# Patient Record
Sex: MALE | Race: WHITE | Employment: OTHER | URBAN - NONMETROPOLITAN AREA
[De-identification: names, ages, dates, MRNs, and addresses within clinical notes are randomized per-mention and may not be internally consistent; named-entity substitution may affect disease eponyms.]

---

## 2017-08-11 LAB
ALBUMIN SERPL-MCNC: 4.1 G/DL (ref 3.5–5.2)
ALP BLD-CCNC: 96 U/L (ref 40–130)
ALT SERPL-CCNC: 16 U/L (ref 5–41)
ANION GAP SERPL CALCULATED.3IONS-SCNC: 13 MMOL/L (ref 7–19)
AST SERPL-CCNC: 17 U/L (ref 5–40)
BASOPHILS ABSOLUTE: 0 K/UL (ref 0–0.2)
BASOPHILS RELATIVE PERCENT: 0.4 % (ref 0–1)
BILIRUB SERPL-MCNC: 0.4 MG/DL (ref 0.2–1.2)
BUN BLDV-MCNC: 21 MG/DL (ref 8–23)
CALCIUM SERPL-MCNC: 10.1 MG/DL (ref 8.8–10.2)
CHLORIDE BLD-SCNC: 104 MMOL/L (ref 98–111)
CHOLESTEROL, TOTAL: 180 MG/DL (ref 160–199)
CO2: 29 MMOL/L (ref 22–29)
CREAT SERPL-MCNC: 1.2 MG/DL (ref 0.5–1.2)
EOSINOPHILS ABSOLUTE: 0.2 K/UL (ref 0–0.6)
EOSINOPHILS RELATIVE PERCENT: 2.5 % (ref 0–5)
GFR NON-AFRICAN AMERICAN: 58
GLUCOSE BLD-MCNC: 105 MG/DL (ref 74–109)
HCT VFR BLD CALC: 44.8 % (ref 42–52)
HDLC SERPL-MCNC: 66 MG/DL (ref 55–121)
HEMOGLOBIN: 14.2 G/DL (ref 14–18)
LDL CHOLESTEROL CALCULATED: 87 MG/DL
LYMPHOCYTES ABSOLUTE: 2.3 K/UL (ref 1.1–4.5)
LYMPHOCYTES RELATIVE PERCENT: 29.9 % (ref 20–40)
MCH RBC QN AUTO: 27.5 PG (ref 27–31)
MCHC RBC AUTO-ENTMCNC: 31.7 G/DL (ref 33–37)
MCV RBC AUTO: 86.8 FL (ref 80–94)
MONOCYTES ABSOLUTE: 0.7 K/UL (ref 0–0.9)
MONOCYTES RELATIVE PERCENT: 9.1 % (ref 0–10)
NEUTROPHILS ABSOLUTE: 4.4 K/UL (ref 1.5–7.5)
NEUTROPHILS RELATIVE PERCENT: 57.7 % (ref 50–65)
PDW BLD-RTO: 13.7 % (ref 11.5–14.5)
PLATELET # BLD: 252 K/UL (ref 130–400)
PMV BLD AUTO: 10.8 FL (ref 9.4–12.4)
POTASSIUM SERPL-SCNC: 4.5 MMOL/L (ref 3.5–5)
RBC # BLD: 5.16 M/UL (ref 4.7–6.1)
SODIUM BLD-SCNC: 146 MMOL/L (ref 136–145)
TOTAL PROTEIN: 7.3 G/DL (ref 6.6–8.7)
TRIGL SERPL-MCNC: 135 MG/DL (ref 150–199)
WBC # BLD: 7.6 K/UL (ref 4.8–10.8)

## 2017-08-16 PROBLEM — M15.9 PRIMARY OSTEOARTHRITIS INVOLVING MULTIPLE JOINTS: Chronic | Status: ACTIVE | Noted: 2017-08-16

## 2017-08-16 PROBLEM — I10 ESSENTIAL HYPERTENSION: Chronic | Status: ACTIVE | Noted: 2017-08-16

## 2017-08-16 PROBLEM — G25.81 RESTLESS LEGS SYNDROME: Chronic | Status: ACTIVE | Noted: 2017-08-16

## 2017-08-16 PROBLEM — M79.2 PERIPHERAL NEUROPATHIC PAIN: Chronic | Status: ACTIVE | Noted: 2017-08-16

## 2017-08-16 PROBLEM — N40.1 BENIGN NON-NODULAR PROSTATIC HYPERPLASIA WITH LOWER URINARY TRACT SYMPTOMS: Chronic | Status: ACTIVE | Noted: 2017-08-16

## 2017-08-16 PROBLEM — E78.2 MIXED HYPERLIPIDEMIA: Chronic | Status: ACTIVE | Noted: 2017-08-16

## 2017-08-16 PROBLEM — M10.9 GOUT: Chronic | Status: ACTIVE | Noted: 2017-08-16

## 2017-08-16 PROBLEM — K21.9 GASTROESOPHAGEAL REFLUX DISEASE WITHOUT ESOPHAGITIS: Chronic | Status: ACTIVE | Noted: 2017-08-16

## 2017-08-16 PROBLEM — M15.0 PRIMARY OSTEOARTHRITIS INVOLVING MULTIPLE JOINTS: Chronic | Status: ACTIVE | Noted: 2017-08-16

## 2017-08-16 PROBLEM — R97.20 ELEVATED PSA: Status: ACTIVE | Noted: 2017-08-16

## 2017-08-17 RX ORDER — BUSPIRONE HYDROCHLORIDE 10 MG/1
10 TABLET ORAL 2 TIMES DAILY
COMMUNITY
End: 2018-08-20

## 2017-08-17 RX ORDER — ATORVASTATIN CALCIUM 20 MG/1
20 TABLET, FILM COATED ORAL DAILY
COMMUNITY
End: 2018-02-19 | Stop reason: SDUPTHER

## 2017-08-17 RX ORDER — ETODOLAC 400 MG/1
400 TABLET, FILM COATED ORAL DAILY
COMMUNITY
End: 2018-02-19 | Stop reason: SDUPTHER

## 2017-08-17 RX ORDER — GABAPENTIN 300 MG/1
600 CAPSULE ORAL NIGHTLY
COMMUNITY
End: 2017-11-16 | Stop reason: SDUPTHER

## 2017-08-17 RX ORDER — METOPROLOL SUCCINATE 50 MG/1
50 TABLET, EXTENDED RELEASE ORAL DAILY
COMMUNITY
End: 2017-11-16 | Stop reason: SDUPTHER

## 2017-08-17 RX ORDER — OMEPRAZOLE 20 MG/1
20 CAPSULE, DELAYED RELEASE ORAL DAILY
COMMUNITY
End: 2018-02-19 | Stop reason: SDUPTHER

## 2017-08-17 RX ORDER — FINASTERIDE 5 MG/1
5 TABLET, FILM COATED ORAL DAILY
COMMUNITY
End: 2017-11-16 | Stop reason: SDUPTHER

## 2017-08-17 RX ORDER — ALLOPURINOL 300 MG/1
300 TABLET ORAL DAILY
COMMUNITY
End: 2020-02-18

## 2017-08-17 RX ORDER — PRAMIPEXOLE DIHYDROCHLORIDE 0.25 MG/1
TABLET ORAL
COMMUNITY
End: 2018-08-20

## 2017-08-18 ENCOUNTER — OFFICE VISIT (OUTPATIENT)
Dept: INTERNAL MEDICINE | Age: 77
End: 2017-08-18
Payer: MEDICARE

## 2017-08-18 VITALS
DIASTOLIC BLOOD PRESSURE: 84 MMHG | SYSTOLIC BLOOD PRESSURE: 134 MMHG | HEIGHT: 72 IN | BODY MASS INDEX: 26.68 KG/M2 | OXYGEN SATURATION: 94 % | HEART RATE: 73 BPM | WEIGHT: 197 LBS

## 2017-08-18 DIAGNOSIS — I10 ESSENTIAL HYPERTENSION: Chronic | ICD-10-CM

## 2017-08-18 DIAGNOSIS — E78.2 MIXED HYPERLIPIDEMIA: Primary | Chronic | ICD-10-CM

## 2017-08-18 DIAGNOSIS — N40.1 BENIGN NON-NODULAR PROSTATIC HYPERPLASIA WITH LOWER URINARY TRACT SYMPTOMS: Chronic | ICD-10-CM

## 2017-08-18 DIAGNOSIS — G25.81 RESTLESS LEGS SYNDROME: Chronic | ICD-10-CM

## 2017-08-18 DIAGNOSIS — M15.9 PRIMARY OSTEOARTHRITIS INVOLVING MULTIPLE JOINTS: Chronic | ICD-10-CM

## 2017-08-18 DIAGNOSIS — M79.2 PERIPHERAL NEUROPATHIC PAIN: Chronic | ICD-10-CM

## 2017-08-18 PROCEDURE — 99214 OFFICE O/P EST MOD 30 MIN: CPT | Performed by: INTERNAL MEDICINE

## 2017-08-18 RX ORDER — COLCHICINE 0.6 MG/1
0.6 TABLET ORAL 3 TIMES DAILY
COMMUNITY
End: 2018-08-20 | Stop reason: ALTCHOICE

## 2017-08-18 ASSESSMENT — ENCOUNTER SYMPTOMS
SORE THROAT: 0
ABDOMINAL PAIN: 0
DIARRHEA: 0
RHINORRHEA: 0
NAUSEA: 0
CONSTIPATION: 0
TROUBLE SWALLOWING: 0
SHORTNESS OF BREATH: 0
BLOOD IN STOOL: 0
COUGH: 0
APNEA: 1

## 2017-08-18 ASSESSMENT — PATIENT HEALTH QUESTIONNAIRE - PHQ9
2. FEELING DOWN, DEPRESSED OR HOPELESS: 0
1. LITTLE INTEREST OR PLEASURE IN DOING THINGS: 0
SUM OF ALL RESPONSES TO PHQ QUESTIONS 1-9: 0
SUM OF ALL RESPONSES TO PHQ9 QUESTIONS 1 & 2: 0

## 2017-11-17 NOTE — TELEPHONE ENCOUNTER
Requested Prescriptions     Pending Prescriptions Disp Refills    finasteride (PROSCAR) 5 MG tablet [Pharmacy Med Name: FINASTERIDE 5 MG TAB 5 TAB] 90 tablet 1     Sig: TAKE 1 TABLET DAILY AS DIRECTED.  gabapentin (NEURONTIN) 300 MG capsule [Pharmacy Med Name: GABAPENTIN 300 MG  CAP] 60 capsule 5     Sig: TAKE 1 CAPSULE AT BEDTIME FOR 1 WEEK THEN INCREASE  MG (2 CAPSULES) AT BEDTIME    metoprolol succinate (TOPROL XL) 50 MG extended release tablet [Pharmacy Med Name: METOPROLOL SUCC ER 50 MG TA 50 TAB] 90 tablet 3     Sig: TAKE 1 TABLET BY MOUTH EVERY DAY       Rosie farah ran.

## 2017-11-20 RX ORDER — GABAPENTIN 300 MG/1
CAPSULE ORAL
Qty: 60 CAPSULE | Refills: 5 | Status: SHIPPED | OUTPATIENT
Start: 2017-11-20 | End: 2018-02-19 | Stop reason: SDUPTHER

## 2017-11-20 RX ORDER — METOPROLOL SUCCINATE 50 MG/1
TABLET, EXTENDED RELEASE ORAL
Qty: 90 TABLET | Refills: 3 | Status: SHIPPED | OUTPATIENT
Start: 2017-11-20 | End: 2019-01-27 | Stop reason: SDUPTHER

## 2017-11-20 RX ORDER — FINASTERIDE 5 MG/1
TABLET, FILM COATED ORAL
Qty: 90 TABLET | Refills: 3 | Status: SHIPPED | OUTPATIENT
Start: 2017-11-20 | End: 2018-02-19 | Stop reason: SDUPTHER

## 2017-12-28 ENCOUNTER — OFFICE VISIT (OUTPATIENT)
Dept: URGENT CARE | Age: 77
End: 2017-12-28
Payer: MEDICARE

## 2017-12-28 VITALS
DIASTOLIC BLOOD PRESSURE: 78 MMHG | BODY MASS INDEX: 27.16 KG/M2 | SYSTOLIC BLOOD PRESSURE: 138 MMHG | OXYGEN SATURATION: 96 % | WEIGHT: 200.25 LBS | HEART RATE: 66 BPM | RESPIRATION RATE: 18 BRPM | TEMPERATURE: 98 F

## 2017-12-28 DIAGNOSIS — H60.502 ACUTE OTITIS EXTERNA OF LEFT EAR, UNSPECIFIED TYPE: Primary | ICD-10-CM

## 2017-12-28 DIAGNOSIS — L98.9 SKIN LESION: ICD-10-CM

## 2017-12-28 DIAGNOSIS — H61.23 BILATERAL IMPACTED CERUMEN: ICD-10-CM

## 2017-12-28 PROCEDURE — 1123F ACP DISCUSS/DSCN MKR DOCD: CPT | Performed by: NURSE PRACTITIONER

## 2017-12-28 PROCEDURE — 4130F TOPICAL PREP RX AOE: CPT | Performed by: NURSE PRACTITIONER

## 2017-12-28 PROCEDURE — 4040F PNEUMOC VAC/ADMIN/RCVD: CPT | Performed by: NURSE PRACTITIONER

## 2017-12-28 PROCEDURE — 1036F TOBACCO NON-USER: CPT | Performed by: NURSE PRACTITIONER

## 2017-12-28 PROCEDURE — 69210 REMOVE IMPACTED EAR WAX UNI: CPT | Performed by: NURSE PRACTITIONER

## 2017-12-28 PROCEDURE — G8484 FLU IMMUNIZE NO ADMIN: HCPCS | Performed by: NURSE PRACTITIONER

## 2017-12-28 PROCEDURE — G8419 CALC BMI OUT NRM PARAM NOF/U: HCPCS | Performed by: NURSE PRACTITIONER

## 2017-12-28 PROCEDURE — G8427 DOCREV CUR MEDS BY ELIG CLIN: HCPCS | Performed by: NURSE PRACTITIONER

## 2017-12-28 PROCEDURE — 99213 OFFICE O/P EST LOW 20 MIN: CPT | Performed by: NURSE PRACTITIONER

## 2017-12-28 ASSESSMENT — ENCOUNTER SYMPTOMS
COLOR CHANGE: 1
SINUS PRESSURE: 0
SORE THROAT: 0
RESPIRATORY NEGATIVE: 1
RHINORRHEA: 0
EYES NEGATIVE: 1
SINUS PAIN: 0

## 2017-12-28 NOTE — PROGRESS NOTES
BEDTIME 60 capsule 5    metoprolol succinate (TOPROL XL) 50 MG extended release tablet TAKE 1 TABLET BY MOUTH EVERY DAY 90 tablet 3    colchicine (COLCRYS) 0.6 MG tablet Take 0.6 mg by mouth 3 times daily Indications: Gout Until symptoms resolve      allopurinol (ZYLOPRIM) 300 MG tablet Take 300 mg by mouth daily      atorvastatin (LIPITOR) 20 MG tablet Take 20 mg by mouth daily      busPIRone (BUSPAR) 10 MG tablet Take 10 mg by mouth 2 times daily      etodolac (LODINE) 400 MG tablet Take 400 mg by mouth daily      omeprazole (PRILOSEC) 20 MG delayed release capsule Take 20 mg by mouth daily      pramipexole (MIRAPEX) 0.25 MG tablet Take 1-2 tablets at bedtime as needed. No current facility-administered medications for this visit. Allergies   Allergen Reactions    Codeine Nausea Only       Health Maintenance   Topic Date Due    DTaP/Tdap/Td vaccine (1 - Tdap) 02/14/1959    Zostavax vaccine  02/14/1999    Flu vaccine (1) 09/01/2017    Lipid screen  08/11/2022    Pneumococcal low/med risk  Completed       Subjective:      Review of Systems   Constitutional: Negative. Negative for activity change. HENT: Positive for ear pain. Negative for ear discharge, hearing loss, postnasal drip, rhinorrhea, sinus pain, sinus pressure and sore throat. Eyes: Negative. Respiratory: Negative. Cardiovascular: Negative. Skin: Positive for color change. Objective:     Physical Exam   Constitutional: He is oriented to person, place, and time. Vital signs are normal. He appears well-developed and well-nourished. Non-toxic appearance. He does not have a sickly appearance. He does not appear ill. No distress. HENT:   Head: Normocephalic and atraumatic. Right Ear: Hearing, tympanic membrane, external ear and ear canal normal.   Left Ear: Tympanic membrane and ear canal normal. There is swelling. Tympanic membrane is not injected and not scarred. Decreased hearing is noted.    Nose: Nose normal. Right sinus exhibits no maxillary sinus tenderness and no frontal sinus tenderness. Left sinus exhibits no maxillary sinus tenderness and no frontal sinus tenderness. Mouth/Throat: Uvula is midline, oropharynx is clear and moist and mucous membranes are normal.   Eyes: Conjunctivae are normal.   Neck: Normal range of motion. Cardiovascular: Normal rate and regular rhythm. Pulmonary/Chest: Effort normal and breath sounds normal. He has no decreased breath sounds. He has no wheezes. He has no rhonchi. He has no rales. Abdominal: Soft. Lymphadenopathy:        Head (right side): No submental, no submandibular, no tonsillar, no preauricular, no posterior auricular and no occipital adenopathy present. Head (left side): No submental, no submandibular, no tonsillar, no preauricular, no posterior auricular and no occipital adenopathy present. Neurological: He is alert and oriented to person, place, and time. Skin: Skin is warm and intact. No rash noted. 1cm skin crusted lesion opening of left ear canal. Swelling of middle ear noted. No drainage, bleeding noted. Psychiatric: He has a normal mood and affect. His speech is normal and behavior is normal. Judgment and thought content normal. Cognition and memory are normal.   Vitals reviewed. /78   Pulse 66   Temp 98 °F (36.7 °C) (Oral)   Resp 18   Wt 200 lb 4 oz (90.8 kg)   SpO2 96%   BMI 27.16 kg/m²     Assessment:      1. Acute otitis externa of left ear, unspecified type  neomycin-polymyxin-hydrocortisone (CORTISPORIN) 3.5-51986-7 otic solution   2. Bilateral impacted cerumen  32023 - FL REMOVE IMPACTED EAR WAX       Plan:      Orders Placed This Encounter   Procedures    22808 - [place] REMOVE IMPACTED EAR WAX       No Follow-up on file.     Orders Placed This Encounter   Procedures    46027 - [place] REMOVE IMPACTED EAR WAX     Orders Placed This Encounter   Medications    neomycin-polymyxin-hydrocortisone (CORTISPORIN) 3.5-99066-7 otic solution     Sig: Place 3 drops into the left ear 3 times daily for 10 days     Dispense:  1 each     Refill:  0       Patient given educational materials - see patient instructions. Discussed use, benefit, and side effects of prescribed medications. All patient questions answered. Pt voiced understanding. Reviewed health maintenance. Instructed to continue current medications, diet and exercise. Patient agreed with treatment plan. Follow up as directed. Patient Instructions   1. Follow up with Dr Carter regarding ear growth  2. Use ear drops as prescribed   3.  Return to clinic if symptoms worsen or fail to improve        Electronically signed by Anita Ramos CNP on 12/28/2017 at 3:59 PM

## 2018-02-12 DIAGNOSIS — M79.2 PERIPHERAL NEUROPATHIC PAIN: Chronic | ICD-10-CM

## 2018-02-12 DIAGNOSIS — G25.81 RESTLESS LEGS SYNDROME: Chronic | ICD-10-CM

## 2018-02-12 DIAGNOSIS — E78.2 MIXED HYPERLIPIDEMIA: Chronic | ICD-10-CM

## 2018-02-12 DIAGNOSIS — M15.9 PRIMARY OSTEOARTHRITIS INVOLVING MULTIPLE JOINTS: Chronic | ICD-10-CM

## 2018-02-12 DIAGNOSIS — I10 ESSENTIAL HYPERTENSION: Chronic | ICD-10-CM

## 2018-02-12 LAB
ALBUMIN SERPL-MCNC: 4 G/DL (ref 3.5–5.2)
ALP BLD-CCNC: 96 U/L (ref 40–130)
ALT SERPL-CCNC: 15 U/L (ref 5–41)
ANION GAP SERPL CALCULATED.3IONS-SCNC: 15 MMOL/L (ref 7–19)
AST SERPL-CCNC: 17 U/L (ref 5–40)
BASOPHILS ABSOLUTE: 0 K/UL (ref 0–0.2)
BASOPHILS RELATIVE PERCENT: 0.3 % (ref 0–1)
BILIRUB SERPL-MCNC: 0.5 MG/DL (ref 0.2–1.2)
BUN BLDV-MCNC: 20 MG/DL (ref 8–23)
CALCIUM SERPL-MCNC: 9.7 MG/DL (ref 8.8–10.2)
CHLORIDE BLD-SCNC: 102 MMOL/L (ref 98–111)
CHOLESTEROL, TOTAL: 172 MG/DL (ref 160–199)
CO2: 29 MMOL/L (ref 22–29)
CREAT SERPL-MCNC: 1 MG/DL (ref 0.5–1.2)
EOSINOPHILS ABSOLUTE: 0.2 K/UL (ref 0–0.6)
EOSINOPHILS RELATIVE PERCENT: 1.8 % (ref 0–5)
GFR NON-AFRICAN AMERICAN: >60
GLUCOSE BLD-MCNC: 102 MG/DL (ref 74–109)
HCT VFR BLD CALC: 46.5 % (ref 42–52)
HDLC SERPL-MCNC: 69 MG/DL (ref 55–121)
HEMOGLOBIN: 14.2 G/DL (ref 14–18)
LDL CHOLESTEROL CALCULATED: 71 MG/DL
LYMPHOCYTES ABSOLUTE: 2.6 K/UL (ref 1.1–4.5)
LYMPHOCYTES RELATIVE PERCENT: 27.6 % (ref 20–40)
MCH RBC QN AUTO: 27 PG (ref 27–31)
MCHC RBC AUTO-ENTMCNC: 30.5 G/DL (ref 33–37)
MCV RBC AUTO: 88.4 FL (ref 80–94)
MONOCYTES ABSOLUTE: 0.7 K/UL (ref 0–0.9)
MONOCYTES RELATIVE PERCENT: 7.2 % (ref 0–10)
NEUTROPHILS ABSOLUTE: 6 K/UL (ref 1.5–7.5)
NEUTROPHILS RELATIVE PERCENT: 62.4 % (ref 50–65)
PDW BLD-RTO: 14.8 % (ref 11.5–14.5)
PLATELET # BLD: 283 K/UL (ref 130–400)
PMV BLD AUTO: 10.8 FL (ref 9.4–12.4)
POTASSIUM SERPL-SCNC: 4.5 MMOL/L (ref 3.5–5)
RBC # BLD: 5.26 M/UL (ref 4.7–6.1)
SODIUM BLD-SCNC: 146 MMOL/L (ref 136–145)
TOTAL PROTEIN: 7.2 G/DL (ref 6.6–8.7)
TRIGL SERPL-MCNC: 162 MG/DL (ref 0–149)
WBC # BLD: 9.6 K/UL (ref 4.8–10.8)

## 2018-02-19 ENCOUNTER — OFFICE VISIT (OUTPATIENT)
Dept: INTERNAL MEDICINE | Age: 78
End: 2018-02-19
Payer: MEDICARE

## 2018-02-19 VITALS
SYSTOLIC BLOOD PRESSURE: 104 MMHG | RESPIRATION RATE: 20 BRPM | OXYGEN SATURATION: 97 % | WEIGHT: 185.6 LBS | DIASTOLIC BLOOD PRESSURE: 72 MMHG | BODY MASS INDEX: 25.14 KG/M2 | HEART RATE: 78 BPM | HEIGHT: 72 IN

## 2018-02-19 DIAGNOSIS — E78.2 MIXED HYPERLIPIDEMIA: Chronic | ICD-10-CM

## 2018-02-19 DIAGNOSIS — G25.81 RESTLESS LEGS SYNDROME: Primary | Chronic | ICD-10-CM

## 2018-02-19 DIAGNOSIS — M79.2 PERIPHERAL NEUROPATHIC PAIN: Chronic | ICD-10-CM

## 2018-02-19 DIAGNOSIS — N40.1 BENIGN NON-NODULAR PROSTATIC HYPERPLASIA WITH LOWER URINARY TRACT SYMPTOMS: Chronic | ICD-10-CM

## 2018-02-19 DIAGNOSIS — G60.9 IDIOPATHIC PERIPHERAL NEUROPATHY: Chronic | ICD-10-CM

## 2018-02-19 DIAGNOSIS — K21.9 GASTROESOPHAGEAL REFLUX DISEASE WITHOUT ESOPHAGITIS: Chronic | ICD-10-CM

## 2018-02-19 DIAGNOSIS — I10 ESSENTIAL HYPERTENSION: Chronic | ICD-10-CM

## 2018-02-19 PROCEDURE — G8419 CALC BMI OUT NRM PARAM NOF/U: HCPCS | Performed by: INTERNAL MEDICINE

## 2018-02-19 PROCEDURE — 4040F PNEUMOC VAC/ADMIN/RCVD: CPT | Performed by: INTERNAL MEDICINE

## 2018-02-19 PROCEDURE — 1123F ACP DISCUSS/DSCN MKR DOCD: CPT | Performed by: INTERNAL MEDICINE

## 2018-02-19 PROCEDURE — G8484 FLU IMMUNIZE NO ADMIN: HCPCS | Performed by: INTERNAL MEDICINE

## 2018-02-19 PROCEDURE — G8427 DOCREV CUR MEDS BY ELIG CLIN: HCPCS | Performed by: INTERNAL MEDICINE

## 2018-02-19 PROCEDURE — 99213 OFFICE O/P EST LOW 20 MIN: CPT | Performed by: INTERNAL MEDICINE

## 2018-02-19 PROCEDURE — 1036F TOBACCO NON-USER: CPT | Performed by: INTERNAL MEDICINE

## 2018-02-19 RX ORDER — FINASTERIDE 5 MG/1
TABLET, FILM COATED ORAL
Qty: 90 TABLET | Refills: 3 | Status: SHIPPED | OUTPATIENT
Start: 2018-02-19 | End: 2018-06-22 | Stop reason: SDUPTHER

## 2018-02-19 RX ORDER — GABAPENTIN 300 MG/1
CAPSULE ORAL
Qty: 180 CAPSULE | Refills: 3 | Status: SHIPPED | OUTPATIENT
Start: 2018-02-19 | End: 2018-06-22 | Stop reason: SDUPTHER

## 2018-02-19 RX ORDER — ATORVASTATIN CALCIUM 20 MG/1
20 TABLET, FILM COATED ORAL DAILY
Qty: 90 TABLET | Refills: 3 | Status: SHIPPED | OUTPATIENT
Start: 2018-02-19 | End: 2019-02-19 | Stop reason: SDUPTHER

## 2018-02-19 RX ORDER — ETODOLAC 400 MG/1
400 TABLET, FILM COATED ORAL DAILY
Qty: 90 TABLET | Refills: 3 | Status: SHIPPED | OUTPATIENT
Start: 2018-02-19 | End: 2019-02-19 | Stop reason: SDUPTHER

## 2018-02-19 RX ORDER — OMEPRAZOLE 20 MG/1
20 CAPSULE, DELAYED RELEASE ORAL DAILY
Qty: 90 CAPSULE | Refills: 3 | Status: SHIPPED | OUTPATIENT
Start: 2018-02-19 | End: 2019-02-19 | Stop reason: SDUPTHER

## 2018-02-19 ASSESSMENT — ENCOUNTER SYMPTOMS
CONSTIPATION: 0
ABDOMINAL PAIN: 0
SHORTNESS OF BREATH: 0
COUGH: 0
NAUSEA: 0
DIARRHEA: 0

## 2018-02-19 NOTE — PROGRESS NOTES
place, and time. He appears well-developed. No distress. HENT:   Head: Normocephalic. Neck: Neck supple. No thyromegaly present. Cardiovascular: Normal rate, regular rhythm and normal heart sounds. Exam reveals no gallop. No murmur heard. No carotid bruits heard   Pulmonary/Chest: Effort normal and breath sounds normal. No respiratory distress. Musculoskeletal: He exhibits no edema. Lymphadenopathy:     He has no cervical adenopathy. Neurological: He is alert and oriented to person, place, and time. Psychiatric: He has a normal mood and affect.  Thought content normal.        Results for orders placed or performed in visit on 02/12/18   CBC Auto Differential   Result Value Ref Range    WBC 9.6 4.8 - 10.8 K/uL    RBC 5.26 4.70 - 6.10 M/uL    Hemoglobin 14.2 14.0 - 18.0 g/dL    Hematocrit 46.5 42.0 - 52.0 %    MCV 88.4 80.0 - 94.0 fL    MCH 27.0 27.0 - 31.0 pg    MCHC 30.5 (L) 33.0 - 37.0 g/dL    RDW 14.8 (H) 11.5 - 14.5 %    Platelets 394 873 - 034 K/uL    MPV 10.8 9.4 - 12.4 fL    Neutrophils % 62.4 50.0 - 65.0 %    Lymphocytes % 27.6 20.0 - 40.0 %    Monocytes % 7.2 0.0 - 10.0 %    Eosinophils % 1.8 0.0 - 5.0 %    Basophils % 0.3 0.0 - 1.0 %    Neutrophils # 6.0 1.5 - 7.5 K/uL    Lymphocytes # 2.6 1.1 - 4.5 K/uL    Monocytes # 0.70 0.00 - 0.90 K/uL    Eosinophils # 0.20 0.00 - 0.60 K/uL    Basophils # 0.00 0.00 - 0.20 K/uL   Comprehensive Metabolic Panel   Result Value Ref Range    Sodium 146 (H) 136 - 145 mmol/L    Potassium 4.5 3.5 - 5.0 mmol/L    Chloride 102 98 - 111 mmol/L    CO2 29 22 - 29 mmol/L    Anion Gap 15 7 - 19 mmol/L    Glucose 102 74 - 109 mg/dL    BUN 20 8 - 23 mg/dL    CREATININE 1.0 0.5 - 1.2 mg/dL    GFR Non-African American >60 >60    Calcium 9.7 8.8 - 10.2 mg/dL    Total Protein 7.2 6.6 - 8.7 g/dL    Alb 4.0 3.5 - 5.2 g/dL    Total Bilirubin 0.5 0.2 - 1.2 mg/dL    Alkaline Phosphatase 96 40 - 130 U/L    ALT 15 5 - 41 U/L    AST 17 5 - 40 U/L   Lipid Panel   Result Value Ref

## 2018-04-10 ENCOUNTER — OFFICE VISIT (OUTPATIENT)
Dept: PRIMARY CARE CLINIC | Age: 78
End: 2018-04-10
Payer: MEDICARE

## 2018-04-10 VITALS
WEIGHT: 186 LBS | BODY MASS INDEX: 25.19 KG/M2 | OXYGEN SATURATION: 94 % | HEIGHT: 72 IN | HEART RATE: 64 BPM | DIASTOLIC BLOOD PRESSURE: 88 MMHG | SYSTOLIC BLOOD PRESSURE: 134 MMHG | TEMPERATURE: 97.8 F

## 2018-04-10 DIAGNOSIS — H61.032 CHONDRITIS OF LEFT EXTERNAL EAR: Primary | ICD-10-CM

## 2018-04-10 PROCEDURE — G8419 CALC BMI OUT NRM PARAM NOF/U: HCPCS | Performed by: NURSE PRACTITIONER

## 2018-04-10 PROCEDURE — 99203 OFFICE O/P NEW LOW 30 MIN: CPT | Performed by: NURSE PRACTITIONER

## 2018-04-10 PROCEDURE — 4040F PNEUMOC VAC/ADMIN/RCVD: CPT | Performed by: NURSE PRACTITIONER

## 2018-04-10 PROCEDURE — 1036F TOBACCO NON-USER: CPT | Performed by: NURSE PRACTITIONER

## 2018-04-10 PROCEDURE — 1123F ACP DISCUSS/DSCN MKR DOCD: CPT | Performed by: NURSE PRACTITIONER

## 2018-04-10 PROCEDURE — G8427 DOCREV CUR MEDS BY ELIG CLIN: HCPCS | Performed by: NURSE PRACTITIONER

## 2018-04-10 RX ORDER — CIPROFLOXACIN 500 MG/1
500 TABLET, FILM COATED ORAL 2 TIMES DAILY
Qty: 20 TABLET | Refills: 0 | Status: SHIPPED | OUTPATIENT
Start: 2018-04-10 | End: 2018-04-20

## 2018-04-10 ASSESSMENT — ENCOUNTER SYMPTOMS
SHORTNESS OF BREATH: 0
COUGH: 0
ABDOMINAL PAIN: 0
CONSTIPATION: 0
RHINORRHEA: 0
NAUSEA: 0
VOMITING: 0
DIARRHEA: 0
SORE THROAT: 0
TROUBLE SWALLOWING: 0

## 2018-06-18 ENCOUNTER — APPOINTMENT (OUTPATIENT)
Dept: GENERAL RADIOLOGY | Facility: HOSPITAL | Age: 78
End: 2018-06-18

## 2018-06-18 ENCOUNTER — HOSPITAL ENCOUNTER (EMERGENCY)
Facility: HOSPITAL | Age: 78
Discharge: HOME | End: 2018-06-19
Admitting: EMERGENCY MEDICINE

## 2018-06-18 DIAGNOSIS — R33.8 ACUTE URINARY RETENTION: ICD-10-CM

## 2018-06-18 DIAGNOSIS — K59.01 SLOW TRANSIT CONSTIPATION: Primary | ICD-10-CM

## 2018-06-18 DIAGNOSIS — Z87.438 HISTORY OF BPH: ICD-10-CM

## 2018-06-18 PROCEDURE — 74018 RADEX ABDOMEN 1 VIEW: CPT

## 2018-06-18 PROCEDURE — 99285 EMERGENCY DEPT VISIT HI MDM: CPT

## 2018-06-18 PROCEDURE — 81003 URINALYSIS AUTO W/O SCOPE: CPT | Performed by: NURSE PRACTITIONER

## 2018-06-18 PROCEDURE — 51702 INSERT TEMP BLADDER CATH: CPT

## 2018-06-18 PROCEDURE — 51798 US URINE CAPACITY MEASURE: CPT

## 2018-06-19 ENCOUNTER — TELEPHONE (OUTPATIENT)
Dept: UROLOGY | Facility: CLINIC | Age: 78
End: 2018-06-19

## 2018-06-19 VITALS
SYSTOLIC BLOOD PRESSURE: 129 MMHG | TEMPERATURE: 97.9 F | HEIGHT: 72 IN | WEIGHT: 193 LBS | HEART RATE: 76 BPM | DIASTOLIC BLOOD PRESSURE: 82 MMHG | RESPIRATION RATE: 16 BRPM | BODY MASS INDEX: 26.14 KG/M2 | OXYGEN SATURATION: 96 %

## 2018-06-19 LAB
ANION GAP SERPL CALCULATED.3IONS-SCNC: 12 MMOL/L (ref 4–13)
BILIRUB UR QL STRIP: NEGATIVE
BUN BLD-MCNC: 24 MG/DL (ref 5–21)
BUN/CREAT SERPL: 23.3 (ref 7–25)
CALCIUM SPEC-SCNC: 9.6 MG/DL (ref 8.4–10.4)
CHLORIDE SERPL-SCNC: 106 MMOL/L (ref 98–110)
CLARITY UR: CLEAR
CO2 SERPL-SCNC: 25 MMOL/L (ref 24–31)
COLOR UR: YELLOW
CREAT BLD-MCNC: 1.03 MG/DL (ref 0.5–1.4)
GFR SERPL CREATININE-BSD FRML MDRD: 70 ML/MIN/1.73
GLUCOSE BLD-MCNC: 122 MG/DL (ref 70–100)
GLUCOSE UR STRIP-MCNC: NEGATIVE MG/DL
HGB UR QL STRIP.AUTO: NEGATIVE
KETONES UR QL STRIP: ABNORMAL
LEUKOCYTE ESTERASE UR QL STRIP.AUTO: NEGATIVE
NITRITE UR QL STRIP: NEGATIVE
PH UR STRIP.AUTO: 7 [PH] (ref 5–8)
POTASSIUM BLD-SCNC: 4.5 MMOL/L (ref 3.5–5.3)
PROT UR QL STRIP: NEGATIVE
SODIUM BLD-SCNC: 143 MMOL/L (ref 135–145)
SP GR UR STRIP: 1.02 (ref 1–1.03)
UROBILINOGEN UR QL STRIP: ABNORMAL

## 2018-06-19 PROCEDURE — 80048 BASIC METABOLIC PNL TOTAL CA: CPT | Performed by: NURSE PRACTITIONER

## 2018-06-21 ENCOUNTER — PROCEDURE VISIT (OUTPATIENT)
Dept: UROLOGY | Facility: CLINIC | Age: 78
End: 2018-06-21

## 2018-06-21 VITALS — HEIGHT: 72 IN | WEIGHT: 195 LBS | BODY MASS INDEX: 26.41 KG/M2

## 2018-06-21 DIAGNOSIS — R33.9 RETENTION OF URINE: ICD-10-CM

## 2018-06-21 DIAGNOSIS — N13.8 BPH WITH URINARY OBSTRUCTION: Primary | ICD-10-CM

## 2018-06-21 DIAGNOSIS — N40.1 BPH WITH URINARY OBSTRUCTION: Primary | ICD-10-CM

## 2018-06-21 PROCEDURE — 99214 OFFICE O/P EST MOD 30 MIN: CPT | Performed by: UROLOGY

## 2018-06-21 RX ORDER — METOPROLOL SUCCINATE 50 MG/1
TABLET, EXTENDED RELEASE ORAL
COMMUNITY
Start: 2018-03-23

## 2018-06-21 RX ORDER — GABAPENTIN 300 MG/1
CAPSULE ORAL
COMMUNITY
Start: 2018-04-28

## 2018-06-21 RX ORDER — ETODOLAC 400 MG/1
TABLET, FILM COATED ORAL
COMMUNITY
Start: 2018-04-28

## 2018-06-21 RX ORDER — TAMSULOSIN HYDROCHLORIDE 0.4 MG/1
1 CAPSULE ORAL NIGHTLY
Qty: 90 CAPSULE | Refills: 3 | Status: SHIPPED | OUTPATIENT
Start: 2018-06-21 | End: 2019-06-17 | Stop reason: SDUPTHER

## 2018-06-21 RX ORDER — FINASTERIDE 5 MG/1
TABLET, FILM COATED ORAL
COMMUNITY
Start: 2018-02-19

## 2018-06-21 RX ORDER — FINASTERIDE 5 MG/1
TABLET, FILM COATED ORAL
COMMUNITY
Start: 2018-06-13 | End: 2018-09-18 | Stop reason: SDUPTHER

## 2018-06-21 RX ORDER — ATORVASTATIN CALCIUM 20 MG/1
20 TABLET, FILM COATED ORAL
COMMUNITY
Start: 2018-02-19

## 2018-06-22 DIAGNOSIS — M79.2 PERIPHERAL NEUROPATHIC PAIN: Primary | Chronic | ICD-10-CM

## 2018-06-22 RX ORDER — FINASTERIDE 5 MG/1
TABLET, FILM COATED ORAL
Qty: 90 TABLET | Refills: 3 | Status: SHIPPED | OUTPATIENT
Start: 2018-06-22 | End: 2019-02-19 | Stop reason: SDUPTHER

## 2018-06-22 RX ORDER — GABAPENTIN 300 MG/1
CAPSULE ORAL
Qty: 180 CAPSULE | Refills: 3 | Status: SHIPPED | OUTPATIENT
Start: 2018-06-22 | End: 2019-02-19 | Stop reason: SDUPTHER

## 2018-07-09 ENCOUNTER — PROCEDURE VISIT (OUTPATIENT)
Dept: UROLOGY | Facility: CLINIC | Age: 78
End: 2018-07-09

## 2018-07-09 DIAGNOSIS — R33.9 RETENTION OF URINE: ICD-10-CM

## 2018-07-09 DIAGNOSIS — N13.8 BPH WITH URINARY OBSTRUCTION: Primary | ICD-10-CM

## 2018-07-09 DIAGNOSIS — N40.1 BPH WITH URINARY OBSTRUCTION: Primary | ICD-10-CM

## 2018-07-09 PROCEDURE — 52000 CYSTOURETHROSCOPY: CPT | Performed by: UROLOGY

## 2018-08-14 DIAGNOSIS — E78.2 MIXED HYPERLIPIDEMIA: Chronic | ICD-10-CM

## 2018-08-14 DIAGNOSIS — I10 ESSENTIAL HYPERTENSION: Chronic | ICD-10-CM

## 2018-08-14 DIAGNOSIS — G60.9 IDIOPATHIC PERIPHERAL NEUROPATHY: Chronic | ICD-10-CM

## 2018-08-14 LAB
ALBUMIN SERPL-MCNC: 4.3 G/DL (ref 3.5–5.2)
ALP BLD-CCNC: 94 U/L (ref 40–130)
ALT SERPL-CCNC: 27 U/L (ref 5–41)
ANION GAP SERPL CALCULATED.3IONS-SCNC: 16 MMOL/L (ref 7–19)
AST SERPL-CCNC: 25 U/L (ref 5–40)
BILIRUB SERPL-MCNC: 0.6 MG/DL (ref 0.2–1.2)
BUN BLDV-MCNC: 24 MG/DL (ref 8–23)
CALCIUM SERPL-MCNC: 10.1 MG/DL (ref 8.8–10.2)
CHLORIDE BLD-SCNC: 103 MMOL/L (ref 98–111)
CHOLESTEROL, TOTAL: 178 MG/DL (ref 160–199)
CO2: 26 MMOL/L (ref 22–29)
CREAT SERPL-MCNC: 1.1 MG/DL (ref 0.5–1.2)
GFR NON-AFRICAN AMERICAN: >60
GLUCOSE BLD-MCNC: 98 MG/DL (ref 74–109)
HDLC SERPL-MCNC: 70 MG/DL (ref 55–121)
LDL CHOLESTEROL CALCULATED: 87 MG/DL
POTASSIUM SERPL-SCNC: 5.1 MMOL/L (ref 3.5–5)
SODIUM BLD-SCNC: 145 MMOL/L (ref 136–145)
TOTAL PROTEIN: 7.3 G/DL (ref 6.6–8.7)
TRIGL SERPL-MCNC: 104 MG/DL (ref 0–149)

## 2018-08-20 ENCOUNTER — OFFICE VISIT (OUTPATIENT)
Dept: INTERNAL MEDICINE | Age: 78
End: 2018-08-20
Payer: MEDICARE

## 2018-08-20 VITALS
SYSTOLIC BLOOD PRESSURE: 102 MMHG | BODY MASS INDEX: 25.25 KG/M2 | HEIGHT: 72 IN | HEART RATE: 73 BPM | RESPIRATION RATE: 20 BRPM | OXYGEN SATURATION: 94 % | DIASTOLIC BLOOD PRESSURE: 60 MMHG | WEIGHT: 186.4 LBS

## 2018-08-20 DIAGNOSIS — K21.9 GASTROESOPHAGEAL REFLUX DISEASE WITHOUT ESOPHAGITIS: Chronic | ICD-10-CM

## 2018-08-20 DIAGNOSIS — G60.9 IDIOPATHIC PERIPHERAL NEUROPATHY: Chronic | ICD-10-CM

## 2018-08-20 DIAGNOSIS — I10 ESSENTIAL HYPERTENSION: Primary | Chronic | ICD-10-CM

## 2018-08-20 DIAGNOSIS — Z23 NEED FOR PROPHYLACTIC VACCINATION AND INOCULATION AGAINST VARICELLA: ICD-10-CM

## 2018-08-20 DIAGNOSIS — Z23 NEED FOR PROPHYLACTIC VACCINATION AGAINST DIPHTHERIA-TETANUS-PERTUSSIS (DTP): ICD-10-CM

## 2018-08-20 DIAGNOSIS — K59.04 CHRONIC IDIOPATHIC CONSTIPATION: Chronic | ICD-10-CM

## 2018-08-20 DIAGNOSIS — E78.2 MIXED HYPERLIPIDEMIA: Chronic | ICD-10-CM

## 2018-08-20 DIAGNOSIS — N40.1 BENIGN NON-NODULAR PROSTATIC HYPERPLASIA WITH LOWER URINARY TRACT SYMPTOMS: Chronic | ICD-10-CM

## 2018-08-20 DIAGNOSIS — Z12.11 COLON CANCER SCREENING: ICD-10-CM

## 2018-08-20 DIAGNOSIS — M15.9 PRIMARY OSTEOARTHRITIS INVOLVING MULTIPLE JOINTS: Chronic | ICD-10-CM

## 2018-08-20 PROCEDURE — 99214 OFFICE O/P EST MOD 30 MIN: CPT | Performed by: INTERNAL MEDICINE

## 2018-08-20 PROCEDURE — 1101F PT FALLS ASSESS-DOCD LE1/YR: CPT | Performed by: INTERNAL MEDICINE

## 2018-08-20 PROCEDURE — 1123F ACP DISCUSS/DSCN MKR DOCD: CPT | Performed by: INTERNAL MEDICINE

## 2018-08-20 PROCEDURE — 1036F TOBACCO NON-USER: CPT | Performed by: INTERNAL MEDICINE

## 2018-08-20 PROCEDURE — G8419 CALC BMI OUT NRM PARAM NOF/U: HCPCS | Performed by: INTERNAL MEDICINE

## 2018-08-20 PROCEDURE — 4040F PNEUMOC VAC/ADMIN/RCVD: CPT | Performed by: INTERNAL MEDICINE

## 2018-08-20 PROCEDURE — G8427 DOCREV CUR MEDS BY ELIG CLIN: HCPCS | Performed by: INTERNAL MEDICINE

## 2018-08-20 RX ORDER — TAMSULOSIN HYDROCHLORIDE 0.4 MG/1
0.4 CAPSULE ORAL
COMMUNITY
Start: 2018-06-21 | End: 2020-02-18 | Stop reason: SDUPTHER

## 2018-08-20 ASSESSMENT — ENCOUNTER SYMPTOMS
COUGH: 0
CONSTIPATION: 1
ABDOMINAL PAIN: 0
DIARRHEA: 0
SHORTNESS OF BREATH: 0
NAUSEA: 0

## 2018-08-20 ASSESSMENT — PATIENT HEALTH QUESTIONNAIRE - PHQ9
SUM OF ALL RESPONSES TO PHQ QUESTIONS 1-9: 0
SUM OF ALL RESPONSES TO PHQ9 QUESTIONS 1 & 2: 0
SUM OF ALL RESPONSES TO PHQ QUESTIONS 1-9: 0
2. FEELING DOWN, DEPRESSED OR HOPELESS: 0
1. LITTLE INTEREST OR PLEASURE IN DOING THINGS: 0
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
## 2018-09-18 ENCOUNTER — OFFICE VISIT (OUTPATIENT)
Dept: GASTROENTEROLOGY | Facility: CLINIC | Age: 78
End: 2018-09-18

## 2018-09-18 VITALS
HEIGHT: 72 IN | WEIGHT: 187 LBS | SYSTOLIC BLOOD PRESSURE: 122 MMHG | OXYGEN SATURATION: 98 % | DIASTOLIC BLOOD PRESSURE: 72 MMHG | BODY MASS INDEX: 25.33 KG/M2 | HEART RATE: 90 BPM

## 2018-09-18 DIAGNOSIS — R19.4 CHANGE IN BOWEL HABITS: ICD-10-CM

## 2018-09-18 DIAGNOSIS — I10 HTN (HYPERTENSION), BENIGN: ICD-10-CM

## 2018-09-18 DIAGNOSIS — Z78.9 NONSMOKER: ICD-10-CM

## 2018-09-18 DIAGNOSIS — K59.01 SLOW TRANSIT CONSTIPATION: Primary | ICD-10-CM

## 2018-09-18 PROCEDURE — 99213 OFFICE O/P EST LOW 20 MIN: CPT | Performed by: CLINICAL NURSE SPECIALIST

## 2018-09-18 RX ORDER — PRAMIPEXOLE DIHYDROCHLORIDE 0.25 MG/1
0.25 TABLET ORAL 3 TIMES DAILY
COMMUNITY

## 2018-09-18 RX ORDER — BUSPIRONE HYDROCHLORIDE 10 MG/1
10 TABLET ORAL 2 TIMES DAILY
Status: ON HOLD | COMMUNITY
End: 2018-11-06

## 2018-09-18 RX ORDER — OMEPRAZOLE 20 MG/1
20 CAPSULE, DELAYED RELEASE ORAL DAILY
COMMUNITY

## 2018-09-18 RX ORDER — COLCHICINE 0.6 MG/1
0.6 TABLET ORAL DAILY
COMMUNITY

## 2018-09-18 RX ORDER — ALLOPURINOL 300 MG/1
300 TABLET ORAL DAILY
COMMUNITY

## 2018-09-18 RX ORDER — POLYETHYLENE GLYCOL 3350 17 G/17G
17 POWDER, FOR SOLUTION ORAL DAILY
COMMUNITY

## 2018-11-06 ENCOUNTER — ANESTHESIA EVENT (OUTPATIENT)
Dept: GASTROENTEROLOGY | Facility: HOSPITAL | Age: 78
End: 2018-11-06

## 2018-11-06 ENCOUNTER — HOSPITAL ENCOUNTER (OUTPATIENT)
Facility: HOSPITAL | Age: 78
Setting detail: HOSPITAL OUTPATIENT SURGERY
Discharge: HOME | End: 2018-11-06
Attending: INTERNAL MEDICINE | Admitting: INTERNAL MEDICINE

## 2018-11-06 ENCOUNTER — ANESTHESIA (OUTPATIENT)
Dept: GASTROENTEROLOGY | Facility: HOSPITAL | Age: 78
End: 2018-11-06

## 2018-11-06 VITALS
SYSTOLIC BLOOD PRESSURE: 104 MMHG | OXYGEN SATURATION: 95 % | WEIGHT: 188 LBS | HEART RATE: 72 BPM | TEMPERATURE: 97 F | BODY MASS INDEX: 25.47 KG/M2 | HEIGHT: 72 IN | RESPIRATION RATE: 25 BRPM | DIASTOLIC BLOOD PRESSURE: 66 MMHG

## 2018-11-06 DIAGNOSIS — K59.01 SLOW TRANSIT CONSTIPATION: ICD-10-CM

## 2018-11-06 PROCEDURE — 45385 COLONOSCOPY W/LESION REMOVAL: CPT | Performed by: INTERNAL MEDICINE

## 2018-11-06 PROCEDURE — 25010000002 PROPOFOL 10 MG/ML EMULSION: Performed by: NURSE ANESTHETIST, CERTIFIED REGISTERED

## 2018-11-06 PROCEDURE — 88305 TISSUE EXAM BY PATHOLOGIST: CPT | Performed by: INTERNAL MEDICINE

## 2018-11-06 RX ORDER — SODIUM CHLORIDE 9 MG/ML
500 INJECTION, SOLUTION INTRAVENOUS CONTINUOUS PRN
Status: DISCONTINUED | OUTPATIENT
Start: 2018-11-06 | End: 2018-11-06 | Stop reason: HOSPADM

## 2018-11-06 RX ORDER — LIDOCAINE HYDROCHLORIDE 20 MG/ML
INJECTION, SOLUTION INFILTRATION; PERINEURAL AS NEEDED
Status: DISCONTINUED | OUTPATIENT
Start: 2018-11-06 | End: 2018-11-06 | Stop reason: SURG

## 2018-11-06 RX ORDER — SODIUM CHLORIDE 0.9 % (FLUSH) 0.9 %
3 SYRINGE (ML) INJECTION AS NEEDED
Status: DISCONTINUED | OUTPATIENT
Start: 2018-11-06 | End: 2018-11-06 | Stop reason: HOSPADM

## 2018-11-06 RX ORDER — METOPROLOL TARTRATE 5 MG/5ML
INJECTION INTRAVENOUS AS NEEDED
Status: DISCONTINUED | OUTPATIENT
Start: 2018-11-06 | End: 2018-11-06 | Stop reason: SURG

## 2018-11-06 RX ORDER — PROPOFOL 10 MG/ML
VIAL (ML) INTRAVENOUS AS NEEDED
Status: DISCONTINUED | OUTPATIENT
Start: 2018-11-06 | End: 2018-11-06 | Stop reason: SURG

## 2018-11-06 RX ADMIN — LIDOCAINE HYDROCHLORIDE 100 MG: 20 INJECTION, SOLUTION INFILTRATION; PERINEURAL at 07:40

## 2018-11-06 RX ADMIN — METOPROLOL TARTRATE 2.5 MG: 5 INJECTION, SOLUTION INTRAVENOUS at 07:38

## 2018-11-06 RX ADMIN — LIDOCAINE HYDROCHLORIDE 0.5 ML: 10 INJECTION, SOLUTION EPIDURAL; INFILTRATION; INTRACAUDAL; PERINEURAL at 07:20

## 2018-11-06 RX ADMIN — PROPOFOL 300 MG: 10 INJECTION, EMULSION INTRAVENOUS at 07:40

## 2018-11-06 RX ADMIN — SODIUM CHLORIDE 500 ML: 9 INJECTION, SOLUTION INTRAVENOUS at 07:21

## 2018-11-07 LAB
CYTO UR: NORMAL
LAB AP CASE REPORT: NORMAL
LAB AP CLINICAL INFORMATION: NORMAL
PATH REPORT.FINAL DX SPEC: NORMAL
PATH REPORT.GROSS SPEC: NORMAL

## 2019-01-28 RX ORDER — METOPROLOL SUCCINATE 50 MG/1
TABLET, EXTENDED RELEASE ORAL
Qty: 90 TABLET | Refills: 2 | Status: SHIPPED | OUTPATIENT
Start: 2019-01-28 | End: 2020-01-03 | Stop reason: SDUPTHER

## 2019-02-12 DIAGNOSIS — M15.9 PRIMARY OSTEOARTHRITIS INVOLVING MULTIPLE JOINTS: Chronic | ICD-10-CM

## 2019-02-12 DIAGNOSIS — I10 ESSENTIAL HYPERTENSION: Chronic | ICD-10-CM

## 2019-02-12 DIAGNOSIS — K59.04 CHRONIC IDIOPATHIC CONSTIPATION: Chronic | ICD-10-CM

## 2019-02-12 DIAGNOSIS — K21.9 GASTROESOPHAGEAL REFLUX DISEASE WITHOUT ESOPHAGITIS: Chronic | ICD-10-CM

## 2019-02-12 DIAGNOSIS — E78.2 MIXED HYPERLIPIDEMIA: Chronic | ICD-10-CM

## 2019-02-12 DIAGNOSIS — G60.9 IDIOPATHIC PERIPHERAL NEUROPATHY: Chronic | ICD-10-CM

## 2019-02-12 LAB
ALBUMIN SERPL-MCNC: 4.3 G/DL (ref 3.5–5.2)
ALP BLD-CCNC: 100 U/L (ref 40–130)
ALT SERPL-CCNC: 28 U/L (ref 5–41)
ANION GAP SERPL CALCULATED.3IONS-SCNC: 19 MMOL/L (ref 7–19)
AST SERPL-CCNC: 25 U/L (ref 5–40)
BILIRUB SERPL-MCNC: 0.4 MG/DL (ref 0.2–1.2)
BUN BLDV-MCNC: 27 MG/DL (ref 8–23)
CALCIUM SERPL-MCNC: 9.8 MG/DL (ref 8.8–10.2)
CHLORIDE BLD-SCNC: 105 MMOL/L (ref 98–111)
CHOLESTEROL, TOTAL: 170 MG/DL (ref 160–199)
CO2: 25 MMOL/L (ref 22–29)
CREAT SERPL-MCNC: 1.1 MG/DL (ref 0.5–1.2)
GFR NON-AFRICAN AMERICAN: >60
GLUCOSE BLD-MCNC: 106 MG/DL (ref 74–109)
HDLC SERPL-MCNC: 68 MG/DL (ref 55–121)
LDL CHOLESTEROL CALCULATED: 85 MG/DL
POTASSIUM SERPL-SCNC: 4.7 MMOL/L (ref 3.5–5)
SODIUM BLD-SCNC: 149 MMOL/L (ref 136–145)
TOTAL PROTEIN: 7.2 G/DL (ref 6.6–8.7)
TRIGL SERPL-MCNC: 86 MG/DL (ref 0–149)

## 2019-02-19 ENCOUNTER — OFFICE VISIT (OUTPATIENT)
Dept: INTERNAL MEDICINE | Age: 79
End: 2019-02-19
Payer: MEDICARE

## 2019-02-19 VITALS
OXYGEN SATURATION: 95 % | BODY MASS INDEX: 27.04 KG/M2 | WEIGHT: 199.6 LBS | DIASTOLIC BLOOD PRESSURE: 92 MMHG | HEART RATE: 71 BPM | SYSTOLIC BLOOD PRESSURE: 150 MMHG | HEIGHT: 72 IN | RESPIRATION RATE: 22 BRPM

## 2019-02-19 DIAGNOSIS — Z79.899 HIGH RISK MEDICATION USE: ICD-10-CM

## 2019-02-19 DIAGNOSIS — K59.04 CHRONIC IDIOPATHIC CONSTIPATION: Chronic | ICD-10-CM

## 2019-02-19 DIAGNOSIS — M79.2 PERIPHERAL NEUROPATHIC PAIN: Chronic | ICD-10-CM

## 2019-02-19 DIAGNOSIS — G60.9 IDIOPATHIC PERIPHERAL NEUROPATHY: Chronic | ICD-10-CM

## 2019-02-19 DIAGNOSIS — I10 ESSENTIAL HYPERTENSION: Primary | Chronic | ICD-10-CM

## 2019-02-19 DIAGNOSIS — K21.9 GASTROESOPHAGEAL REFLUX DISEASE WITHOUT ESOPHAGITIS: Chronic | ICD-10-CM

## 2019-02-19 DIAGNOSIS — M15.9 PRIMARY OSTEOARTHRITIS INVOLVING MULTIPLE JOINTS: Chronic | ICD-10-CM

## 2019-02-19 DIAGNOSIS — E78.2 MIXED HYPERLIPIDEMIA: Chronic | ICD-10-CM

## 2019-02-19 LAB
AMPHETAMINE SCREEN, URINE: NEGATIVE
BARBITURATE SCREEN, URINE: NEGATIVE
BENZODIAZEPINE SCREEN, URINE: NEGATIVE
BUPRENORPHINE URINE: NEGATIVE
COCAINE METABOLITE SCREEN URINE: NEGATIVE
GABAPENTIN SCREEN, URINE: NEGATIVE
MDMA URINE: NEGATIVE
METHADONE SCREEN, URINE: NEGATIVE
METHAMPHETAMINE, URINE: NEGATIVE
OPIATE SCREEN URINE: NEGATIVE
OXYCODONE SCREEN URINE: NEGATIVE
PHENCYCLIDINE SCREEN URINE: NEGATIVE
PROPOXYPHENE SCREEN, URINE: NEGATIVE
THC SCREEN, URINE: NEGATIVE
TRICYCLIC ANTIDEPRESSANTS, UR: NEGATIVE

## 2019-02-19 PROCEDURE — 1123F ACP DISCUSS/DSCN MKR DOCD: CPT | Performed by: INTERNAL MEDICINE

## 2019-02-19 PROCEDURE — G8482 FLU IMMUNIZE ORDER/ADMIN: HCPCS | Performed by: INTERNAL MEDICINE

## 2019-02-19 PROCEDURE — 80305 DRUG TEST PRSMV DIR OPT OBS: CPT | Performed by: INTERNAL MEDICINE

## 2019-02-19 PROCEDURE — G8419 CALC BMI OUT NRM PARAM NOF/U: HCPCS | Performed by: INTERNAL MEDICINE

## 2019-02-19 PROCEDURE — 99213 OFFICE O/P EST LOW 20 MIN: CPT | Performed by: INTERNAL MEDICINE

## 2019-02-19 PROCEDURE — 1036F TOBACCO NON-USER: CPT | Performed by: INTERNAL MEDICINE

## 2019-02-19 PROCEDURE — 4040F PNEUMOC VAC/ADMIN/RCVD: CPT | Performed by: INTERNAL MEDICINE

## 2019-02-19 PROCEDURE — 1101F PT FALLS ASSESS-DOCD LE1/YR: CPT | Performed by: INTERNAL MEDICINE

## 2019-02-19 PROCEDURE — G8427 DOCREV CUR MEDS BY ELIG CLIN: HCPCS | Performed by: INTERNAL MEDICINE

## 2019-02-19 RX ORDER — ATORVASTATIN CALCIUM 20 MG/1
20 TABLET, FILM COATED ORAL DAILY
Qty: 90 TABLET | Refills: 3 | Status: SHIPPED | OUTPATIENT
Start: 2019-02-19 | End: 2020-02-18 | Stop reason: SDUPTHER

## 2019-02-19 RX ORDER — FINASTERIDE 5 MG/1
TABLET, FILM COATED ORAL
Qty: 90 TABLET | Refills: 3 | Status: SHIPPED | OUTPATIENT
Start: 2019-02-19 | End: 2020-02-18 | Stop reason: SDUPTHER

## 2019-02-19 RX ORDER — ETODOLAC 400 MG/1
400 TABLET, FILM COATED ORAL DAILY
Qty: 90 TABLET | Refills: 3 | Status: SHIPPED | OUTPATIENT
Start: 2019-02-19 | End: 2020-02-18 | Stop reason: SDUPTHER

## 2019-02-19 RX ORDER — OMEPRAZOLE 20 MG/1
20 CAPSULE, DELAYED RELEASE ORAL DAILY
Qty: 90 CAPSULE | Refills: 3 | Status: SHIPPED | OUTPATIENT
Start: 2019-02-19 | End: 2020-02-18 | Stop reason: SDUPTHER

## 2019-02-19 RX ORDER — GABAPENTIN 300 MG/1
CAPSULE ORAL
Qty: 180 CAPSULE | Refills: 3 | Status: SHIPPED | OUTPATIENT
Start: 2019-02-19 | End: 2019-09-24 | Stop reason: SDUPTHER

## 2019-02-19 ASSESSMENT — ENCOUNTER SYMPTOMS
NAUSEA: 0
COUGH: 0
CONSTIPATION: 1
SHORTNESS OF BREATH: 0
ABDOMINAL PAIN: 0

## 2019-02-19 ASSESSMENT — PATIENT HEALTH QUESTIONNAIRE - PHQ9
1. LITTLE INTEREST OR PLEASURE IN DOING THINGS: 0
SUM OF ALL RESPONSES TO PHQ9 QUESTIONS 1 & 2: 0
SUM OF ALL RESPONSES TO PHQ QUESTIONS 1-9: 0
2. FEELING DOWN, DEPRESSED OR HOPELESS: 0
SUM OF ALL RESPONSES TO PHQ QUESTIONS 1-9: 0

## 2019-06-17 DIAGNOSIS — N13.8 BPH WITH URINARY OBSTRUCTION: ICD-10-CM

## 2019-06-17 DIAGNOSIS — R33.9 RETENTION OF URINE: ICD-10-CM

## 2019-06-17 DIAGNOSIS — N40.1 BPH WITH URINARY OBSTRUCTION: ICD-10-CM

## 2019-06-18 RX ORDER — TAMSULOSIN HYDROCHLORIDE 0.4 MG/1
CAPSULE ORAL
Qty: 90 CAPSULE | Refills: 3 | Status: SHIPPED | OUTPATIENT
Start: 2019-06-18

## 2019-08-12 DIAGNOSIS — K59.04 CHRONIC IDIOPATHIC CONSTIPATION: Chronic | ICD-10-CM

## 2019-08-12 DIAGNOSIS — G60.9 IDIOPATHIC PERIPHERAL NEUROPATHY: Chronic | ICD-10-CM

## 2019-08-12 DIAGNOSIS — E78.2 MIXED HYPERLIPIDEMIA: Chronic | ICD-10-CM

## 2019-08-12 DIAGNOSIS — I10 ESSENTIAL HYPERTENSION: Chronic | ICD-10-CM

## 2019-08-12 DIAGNOSIS — M15.9 PRIMARY OSTEOARTHRITIS INVOLVING MULTIPLE JOINTS: Chronic | ICD-10-CM

## 2019-08-12 LAB
ALBUMIN SERPL-MCNC: 4.1 G/DL (ref 3.5–5.2)
ALP BLD-CCNC: 97 U/L (ref 40–130)
ALT SERPL-CCNC: 20 U/L (ref 5–41)
ANION GAP SERPL CALCULATED.3IONS-SCNC: 16 MMOL/L (ref 7–19)
AST SERPL-CCNC: 22 U/L (ref 5–40)
BASOPHILS ABSOLUTE: 0 K/UL (ref 0–0.2)
BASOPHILS RELATIVE PERCENT: 0.4 % (ref 0–1)
BILIRUB SERPL-MCNC: 0.4 MG/DL (ref 0.2–1.2)
BUN BLDV-MCNC: 20 MG/DL (ref 8–23)
CALCIUM SERPL-MCNC: 9.6 MG/DL (ref 8.8–10.2)
CHLORIDE BLD-SCNC: 107 MMOL/L (ref 98–111)
CHOLESTEROL, TOTAL: 155 MG/DL (ref 160–199)
CO2: 22 MMOL/L (ref 22–29)
CREAT SERPL-MCNC: 1.1 MG/DL (ref 0.5–1.2)
EOSINOPHILS ABSOLUTE: 0.2 K/UL (ref 0–0.6)
EOSINOPHILS RELATIVE PERCENT: 2.4 % (ref 0–5)
GFR NON-AFRICAN AMERICAN: >60
GLUCOSE BLD-MCNC: 100 MG/DL (ref 74–109)
HCT VFR BLD CALC: 44.6 % (ref 42–52)
HDLC SERPL-MCNC: 62 MG/DL (ref 55–121)
HEMOGLOBIN: 13.5 G/DL (ref 14–18)
LDL CHOLESTEROL CALCULATED: 69 MG/DL
LYMPHOCYTES ABSOLUTE: 1.7 K/UL (ref 1.1–4.5)
LYMPHOCYTES RELATIVE PERCENT: 23.2 % (ref 20–40)
MCH RBC QN AUTO: 27.1 PG (ref 27–31)
MCHC RBC AUTO-ENTMCNC: 30.3 G/DL (ref 33–37)
MCV RBC AUTO: 89.4 FL (ref 80–94)
MONOCYTES ABSOLUTE: 0.6 K/UL (ref 0–0.9)
MONOCYTES RELATIVE PERCENT: 7.6 % (ref 0–10)
NEUTROPHILS ABSOLUTE: 5 K/UL (ref 1.5–7.5)
NEUTROPHILS RELATIVE PERCENT: 66 % (ref 50–65)
PDW BLD-RTO: 13.7 % (ref 11.5–14.5)
PLATELET # BLD: 214 K/UL (ref 130–400)
PMV BLD AUTO: 11.2 FL (ref 9.4–12.4)
POTASSIUM SERPL-SCNC: 4.5 MMOL/L (ref 3.5–5)
RBC # BLD: 4.99 M/UL (ref 4.7–6.1)
SODIUM BLD-SCNC: 145 MMOL/L (ref 136–145)
TOTAL PROTEIN: 7.1 G/DL (ref 6.6–8.7)
TRIGL SERPL-MCNC: 120 MG/DL (ref 0–149)
WBC # BLD: 7.5 K/UL (ref 4.8–10.8)

## 2019-08-20 ENCOUNTER — OFFICE VISIT (OUTPATIENT)
Dept: FAMILY MEDICINE CLINIC | Age: 79
End: 2019-08-20
Payer: MEDICARE

## 2019-08-20 VITALS
BODY MASS INDEX: 27.22 KG/M2 | RESPIRATION RATE: 16 BRPM | HEIGHT: 72 IN | DIASTOLIC BLOOD PRESSURE: 84 MMHG | HEART RATE: 94 BPM | TEMPERATURE: 98.1 F | WEIGHT: 201 LBS | SYSTOLIC BLOOD PRESSURE: 130 MMHG | OXYGEN SATURATION: 96 %

## 2019-08-20 DIAGNOSIS — M10.9 GOUT, UNSPECIFIED CAUSE, UNSPECIFIED CHRONICITY, UNSPECIFIED SITE: ICD-10-CM

## 2019-08-20 DIAGNOSIS — G62.9 NEUROPATHY: Primary | ICD-10-CM

## 2019-08-20 DIAGNOSIS — N40.1 BENIGN PROSTATIC HYPERPLASIA WITH URINARY OBSTRUCTION: ICD-10-CM

## 2019-08-20 DIAGNOSIS — E78.2 MIXED HYPERLIPIDEMIA: ICD-10-CM

## 2019-08-20 DIAGNOSIS — M19.90 ARTHRITIS: ICD-10-CM

## 2019-08-20 DIAGNOSIS — N13.8 BENIGN PROSTATIC HYPERPLASIA WITH URINARY OBSTRUCTION: ICD-10-CM

## 2019-08-20 DIAGNOSIS — I10 ESSENTIAL HYPERTENSION: Chronic | ICD-10-CM

## 2019-08-20 DIAGNOSIS — K21.9 GASTROESOPHAGEAL REFLUX DISEASE, ESOPHAGITIS PRESENCE NOT SPECIFIED: ICD-10-CM

## 2019-08-20 DIAGNOSIS — Z23 NEED FOR PROPHYLACTIC VACCINATION AND INOCULATION AGAINST VARICELLA: ICD-10-CM

## 2019-08-20 PROCEDURE — 99214 OFFICE O/P EST MOD 30 MIN: CPT | Performed by: FAMILY MEDICINE

## 2019-08-20 PROCEDURE — 4040F PNEUMOC VAC/ADMIN/RCVD: CPT | Performed by: FAMILY MEDICINE

## 2019-08-20 PROCEDURE — 1036F TOBACCO NON-USER: CPT | Performed by: FAMILY MEDICINE

## 2019-08-20 PROCEDURE — G8427 DOCREV CUR MEDS BY ELIG CLIN: HCPCS | Performed by: FAMILY MEDICINE

## 2019-08-20 PROCEDURE — G8419 CALC BMI OUT NRM PARAM NOF/U: HCPCS | Performed by: FAMILY MEDICINE

## 2019-08-20 PROCEDURE — 1123F ACP DISCUSS/DSCN MKR DOCD: CPT | Performed by: FAMILY MEDICINE

## 2019-08-20 ASSESSMENT — ENCOUNTER SYMPTOMS
CONSTIPATION: 0
SHORTNESS OF BREATH: 0
COUGH: 0
DIARRHEA: 0
EYE DISCHARGE: 0
EYE PAIN: 0
VOMITING: 0
ABDOMINAL PAIN: 0
NAUSEA: 0
RHINORRHEA: 0

## 2019-09-24 DIAGNOSIS — M79.2 PERIPHERAL NEUROPATHIC PAIN: Chronic | ICD-10-CM

## 2019-09-24 RX ORDER — GABAPENTIN 300 MG/1
CAPSULE ORAL
Qty: 180 CAPSULE | Refills: 1 | Status: SHIPPED | OUTPATIENT
Start: 2019-09-24 | End: 2020-02-18 | Stop reason: SDUPTHER

## 2020-01-02 ENCOUNTER — TELEPHONE (OUTPATIENT)
Dept: FAMILY MEDICINE CLINIC | Age: 80
End: 2020-01-02

## 2020-01-02 NOTE — TELEPHONE ENCOUNTER
Pt needs a refill on Metoprolol ER 50 MG tab act / a 3 month supply to [facility]. He brought the bottle into the office. He explained that he has only seen Dr Naveed Arcos once when he established care and now needs this medication.

## 2020-01-03 RX ORDER — METOPROLOL SUCCINATE 50 MG/1
50 TABLET, EXTENDED RELEASE ORAL DAILY
Qty: 90 TABLET | Refills: 0 | Status: SHIPPED | OUTPATIENT
Start: 2020-01-03 | End: 2020-01-06 | Stop reason: SDUPTHER

## 2020-01-06 ENCOUNTER — TELEPHONE (OUTPATIENT)
Dept: FAMILY MEDICINE CLINIC | Age: 80
End: 2020-01-06

## 2020-01-06 RX ORDER — METOPROLOL SUCCINATE 50 MG/1
50 TABLET, EXTENDED RELEASE ORAL DAILY
Qty: 90 TABLET | Refills: 0 | Status: SHIPPED | OUTPATIENT
Start: 2020-01-06 | End: 2020-02-18 | Stop reason: SDUPTHER

## 2020-02-10 DIAGNOSIS — I10 ESSENTIAL HYPERTENSION: Chronic | ICD-10-CM

## 2020-02-10 LAB
ALBUMIN SERPL-MCNC: 4.1 G/DL (ref 3.5–5.2)
ALP BLD-CCNC: 97 U/L (ref 40–130)
ALT SERPL-CCNC: 24 U/L (ref 5–41)
ANION GAP SERPL CALCULATED.3IONS-SCNC: 12 MMOL/L (ref 7–19)
AST SERPL-CCNC: 20 U/L (ref 5–40)
BASOPHILS ABSOLUTE: 0 K/UL (ref 0–0.2)
BASOPHILS RELATIVE PERCENT: 0.5 % (ref 0–1)
BILIRUB SERPL-MCNC: 0.6 MG/DL (ref 0.2–1.2)
BUN BLDV-MCNC: 25 MG/DL (ref 8–23)
CALCIUM SERPL-MCNC: 9.6 MG/DL (ref 8.8–10.2)
CHLORIDE BLD-SCNC: 103 MMOL/L (ref 98–111)
CO2: 29 MMOL/L (ref 22–29)
CREAT SERPL-MCNC: 1.1 MG/DL (ref 0.5–1.2)
EOSINOPHILS ABSOLUTE: 0.2 K/UL (ref 0–0.6)
EOSINOPHILS RELATIVE PERCENT: 3 % (ref 0–5)
GFR NON-AFRICAN AMERICAN: >60
GLUCOSE BLD-MCNC: 105 MG/DL (ref 74–109)
HCT VFR BLD CALC: 46.3 % (ref 42–52)
HEMOGLOBIN: 14.1 G/DL (ref 14–18)
IMMATURE GRANULOCYTES #: 0 K/UL
LYMPHOCYTES ABSOLUTE: 2.1 K/UL (ref 1.1–4.5)
LYMPHOCYTES RELATIVE PERCENT: 26.1 % (ref 20–40)
MCH RBC QN AUTO: 27.4 PG (ref 27–31)
MCHC RBC AUTO-ENTMCNC: 30.5 G/DL (ref 33–37)
MCV RBC AUTO: 89.9 FL (ref 80–94)
MONOCYTES ABSOLUTE: 0.7 K/UL (ref 0–0.9)
MONOCYTES RELATIVE PERCENT: 8.5 % (ref 0–10)
NEUTROPHILS ABSOLUTE: 4.9 K/UL (ref 1.5–7.5)
NEUTROPHILS RELATIVE PERCENT: 61.6 % (ref 50–65)
PDW BLD-RTO: 13.3 % (ref 11.5–14.5)
PLATELET # BLD: 237 K/UL (ref 130–400)
PMV BLD AUTO: 10.9 FL (ref 9.4–12.4)
POTASSIUM SERPL-SCNC: 4.9 MMOL/L (ref 3.5–5)
RBC # BLD: 5.15 M/UL (ref 4.7–6.1)
SODIUM BLD-SCNC: 144 MMOL/L (ref 136–145)
TOTAL PROTEIN: 7 G/DL (ref 6.6–8.7)
WBC # BLD: 7.9 K/UL (ref 4.8–10.8)

## 2020-02-18 ENCOUNTER — OFFICE VISIT (OUTPATIENT)
Dept: FAMILY MEDICINE CLINIC | Age: 80
End: 2020-02-18
Payer: MEDICARE

## 2020-02-18 VITALS
TEMPERATURE: 97.6 F | SYSTOLIC BLOOD PRESSURE: 130 MMHG | HEART RATE: 82 BPM | WEIGHT: 205 LBS | HEIGHT: 72 IN | OXYGEN SATURATION: 96 % | BODY MASS INDEX: 27.77 KG/M2 | RESPIRATION RATE: 16 BRPM | DIASTOLIC BLOOD PRESSURE: 72 MMHG

## 2020-02-18 PROCEDURE — 99214 OFFICE O/P EST MOD 30 MIN: CPT | Performed by: FAMILY MEDICINE

## 2020-02-18 PROCEDURE — 4040F PNEUMOC VAC/ADMIN/RCVD: CPT | Performed by: FAMILY MEDICINE

## 2020-02-18 PROCEDURE — 1123F ACP DISCUSS/DSCN MKR DOCD: CPT | Performed by: FAMILY MEDICINE

## 2020-02-18 PROCEDURE — G8484 FLU IMMUNIZE NO ADMIN: HCPCS | Performed by: FAMILY MEDICINE

## 2020-02-18 PROCEDURE — G8417 CALC BMI ABV UP PARAM F/U: HCPCS | Performed by: FAMILY MEDICINE

## 2020-02-18 PROCEDURE — G8427 DOCREV CUR MEDS BY ELIG CLIN: HCPCS | Performed by: FAMILY MEDICINE

## 2020-02-18 PROCEDURE — 1036F TOBACCO NON-USER: CPT | Performed by: FAMILY MEDICINE

## 2020-02-18 RX ORDER — ETODOLAC 400 MG/1
400 TABLET, FILM COATED ORAL DAILY
Qty: 90 TABLET | Refills: 3 | Status: SHIPPED | OUTPATIENT
Start: 2020-02-18 | End: 2020-09-17 | Stop reason: SDUPTHER

## 2020-02-18 RX ORDER — FINASTERIDE 5 MG/1
TABLET, FILM COATED ORAL
Qty: 90 TABLET | Refills: 3 | Status: SHIPPED | OUTPATIENT
Start: 2020-02-18 | End: 2020-09-17 | Stop reason: SDUPTHER

## 2020-02-18 RX ORDER — OMEPRAZOLE 20 MG/1
20 CAPSULE, DELAYED RELEASE ORAL DAILY
Qty: 90 CAPSULE | Refills: 3 | Status: SHIPPED | OUTPATIENT
Start: 2020-02-18 | End: 2020-09-17 | Stop reason: SDUPTHER

## 2020-02-18 RX ORDER — GABAPENTIN 300 MG/1
CAPSULE ORAL
Qty: 180 CAPSULE | Refills: 1 | Status: SHIPPED | OUTPATIENT
Start: 2020-02-18 | End: 2020-10-12 | Stop reason: SDUPTHER

## 2020-02-18 RX ORDER — METOPROLOL SUCCINATE 50 MG/1
50 TABLET, EXTENDED RELEASE ORAL DAILY
Qty: 90 TABLET | Refills: 0 | Status: SHIPPED | OUTPATIENT
Start: 2020-02-18 | End: 2020-06-24

## 2020-02-18 RX ORDER — TAMSULOSIN HYDROCHLORIDE 0.4 MG/1
0.4 CAPSULE ORAL DAILY
Qty: 90 CAPSULE | Refills: 3 | Status: SHIPPED | OUTPATIENT
Start: 2020-02-18 | End: 2020-09-17 | Stop reason: SDUPTHER

## 2020-02-18 RX ORDER — ATORVASTATIN CALCIUM 20 MG/1
20 TABLET, FILM COATED ORAL DAILY
Qty: 90 TABLET | Refills: 3 | Status: SHIPPED | OUTPATIENT
Start: 2020-02-18 | End: 2020-10-12 | Stop reason: SDUPTHER

## 2020-02-18 ASSESSMENT — PATIENT HEALTH QUESTIONNAIRE - PHQ9
2. FEELING DOWN, DEPRESSED OR HOPELESS: 0
SUM OF ALL RESPONSES TO PHQ QUESTIONS 1-9: 0
SUM OF ALL RESPONSES TO PHQ QUESTIONS 1-9: 0
SUM OF ALL RESPONSES TO PHQ9 QUESTIONS 1 & 2: 0
1. LITTLE INTEREST OR PLEASURE IN DOING THINGS: 0

## 2020-02-18 NOTE — PROGRESS NOTES
Dav Lowry is a 79 y.o. male who presents today for   Chief Complaint   Patient presents with    6 Month Follow-Up    Arthritis    Peripheral Neuropathy       Chief Complaint: Follow-up    HPI  Patient reports that he is been doing well without any new problems or issues. He does have a history of arthritis and states that he is doing well with his current use of low down. Denies any issues with use of medicine denies any recent changes to his arthritis. He also has issues with neuropathic pain and uses Neurontin which is been beneficial for symptoms. He denies any issues with use the medicine reports that has been beneficial for symptoms. Without he has significantly more issues with his peripheral neuropathic pain. He does have a history of hyperlipidemia is tolerating Lipitor without any new myalgias or GI upsets. He is attempting watch his diet and try and stay physically active. He also has BPH with some urinary symptoms which was improved with the use of Proscar and Flomax. He denies any issues with use of medicine denies any recent changes or issues with his BPH/urinary symptoms at this time. He does also have gastroesophageal reflux disease and does well with use of Prilosec. He denies any issues with use of medicine denies any recent changes to his reflux symptoms. Review of Systems   Constitutional: Negative for activity change, appetite change, fatigue and fever. HENT: Negative for congestion and rhinorrhea. Eyes: Negative for pain and discharge. Respiratory: Negative for cough and shortness of breath. Cardiovascular: Negative for chest pain and palpitations. Gastrointestinal: Negative for abdominal pain, constipation, diarrhea, nausea and vomiting. Endocrine: Negative for cold intolerance and heat intolerance. Genitourinary: Negative for dysuria and hematuria. Musculoskeletal: Positive for arthralgias. Negative for gait problem.    Skin: Negative for rash and wound. Neurological: Negative for syncope and weakness. Hematological: Negative for adenopathy. Does not bruise/bleed easily. Psychiatric/Behavioral: Negative for dysphoric mood and sleep disturbance. The patient is not nervous/anxious. Past Medical History:   Diagnosis Date    Benign non-nodular prostatic hyperplasia with lower urinary tract symptoms 2017    Essential hypertension 2017    Gastroesophageal reflux disease without esophagitis 2017    Gout 2017    Idiopathic peripheral neuropathy 2018    Mixed hyperlipidemia 2017    Peripheral neuropathic pain 2017    Primary osteoarthritis involving multiple joints 2017    Restless legs syndrome 2017       Current Outpatient Medications   Medication Sig Dispense Refill    atorvastatin (LIPITOR) 20 MG tablet Take 1 tablet by mouth daily 90 tablet 3    etodolac (LODINE) 400 MG tablet Take 1 tablet by mouth daily 90 tablet 3    finasteride (PROSCAR) 5 MG tablet TAKE 1 TABLET DAILY AS DIRECTED. 90 tablet 3    gabapentin (NEURONTIN) 300 MG capsule 2 tablets at bedtime M79.2 180 capsule 1    metoprolol succinate (TOPROL XL) 50 MG extended release tablet Take 1 tablet by mouth daily 90 tablet 0    omeprazole (PRILOSEC) 20 MG delayed release capsule Take 1 capsule by mouth daily 90 capsule 3    tamsulosin (FLOMAX) 0.4 MG capsule Take 1 capsule by mouth daily 90 capsule 3     No current facility-administered medications for this visit. Allergies   Allergen Reactions    Codeine Nausea Only       Past Surgical History:   Procedure Laterality Date    INGUINAL HERNIA REPAIR Bilateral     TURP         Social History     Tobacco Use    Smoking status: Former Smoker     Packs/day: 3.00     Years: 30.00     Pack years: 90.00     Types: Cigarettes     Last attempt to quit: 1975     Years since quittin.1    Smokeless tobacco: Never Used   Substance Use Topics    Alcohol use:  No  Drug use: No       Family History   Problem Relation Age of Onset    Uterine Cancer Mother     Coronary Art Dis Father         MI at 62    COPD Sister     Stroke Brother        /72 (Site: Right Lower Arm, Position: Sitting, Cuff Size: Medium Adult)   Pulse 82   Temp 97.6 °F (36.4 °C) (Oral)   Resp 16   Ht 6' (1.829 m)   Wt 205 lb (93 kg)   SpO2 96%   BMI 27.80 kg/m²     Physical Exam  Vitals signs and nursing note reviewed. Constitutional:       General: He is not in acute distress. Appearance: He is well-developed. He is not diaphoretic. HENT:      Head: Normocephalic and atraumatic. Right Ear: External ear normal.      Left Ear: External ear normal.      Nose: Nose normal.   Eyes:      General: No scleral icterus. Right eye: No discharge. Left eye: No discharge. Conjunctiva/sclera: Conjunctivae normal.   Neck:      Musculoskeletal: Neck supple. Thyroid: No thyromegaly. Trachea: No tracheal deviation. Cardiovascular:      Rate and Rhythm: Normal rate and regular rhythm. Heart sounds: Normal heart sounds. No murmur. No friction rub. No gallop. Pulmonary:      Effort: Pulmonary effort is normal. No respiratory distress. Breath sounds: Normal breath sounds. No wheezing or rales. Abdominal:      General: Bowel sounds are normal. There is no distension. Palpations: Abdomen is soft. Tenderness: There is no abdominal tenderness. Musculoskeletal:         General: No deformity (No gross deformities of upper or lower extremities). Lymphadenopathy:      Cervical: No cervical adenopathy. Skin:     General: Skin is warm and dry. Findings: No erythema or rash. Neurological:      Mental Status: He is alert and oriented to person, place, and time. Cranial Nerves: No cranial nerve deficit. Psychiatric:         Behavior: Behavior normal.         Thought Content:  Thought content normal.         Assessment:       ICD-10-CM 1. Peripheral neuropathic pain M79.2 gabapentin (NEURONTIN) 300 MG capsule    Symptomatic improvement with use of Neurontin. Will continue continue to monitor. 2. Arthritis M19.90 etodolac (LODINE) 400 MG tablet    Doing well with current dose of Lodine. Will continue and adjust the course dictates. 3. Essential hypertension I10 metoprolol succinate (TOPROL XL) 50 MG extended release tablet     CBC Auto Differential     Comprehensive Metabolic Panel     Microalbumin / Creatinine Urine Ratio     CBC Auto Differential     Comprehensive Metabolic Panel    Controlled current medications. We will continue to monitor and adjust as course dictates. 4. Mixed hyperlipidemia E78.2 atorvastatin (LIPITOR) 20 MG tablet     Lipid Panel    Tolerating Lipitor. Will continue and adjust course dictates. 5. Benign prostatic hyperplasia, unspecified whether lower urinary tract symptoms present N40.0 finasteride (PROSCAR) 5 MG tablet     tamsulosin (FLOMAX) 0.4 MG capsule    Symptomatic improvement with use of medications. Will continue and adjust the course dictates. 6. Gastroesophageal reflux disease, esophagitis presence not specified K21.9 omeprazole (PRILOSEC) 20 MG delayed release capsule    Symptomatic improvement with use of Prilosec. Will continue continue to monitor. Discussed side effects with use of the medicine. 7. Need for prophylactic vaccination and inoculation against varicella Z23 zoster recombinant adjuvanted vaccine [facility]) 50 MCG/0.5ML SUSR injection       Plan:  Discussed proper use of medication, including OTC medications if prescription is too expensive or insurance does not cover. Discussed signs and symptoms requiring medical attention. All questions were answered and patient voiced understanding and agreement with plan as discussed.     Orders Placed This Encounter   Procedures    CBC Auto Differential     Standing Status:   Future     Standing Expiration Date:   2/17/2021   [facility] Comprehensive Metabolic Panel     Standing Status:   Future     Standing Expiration Date:   2021    Lipid Panel     Standing Status:   Future     Standing Expiration Date:   2021     Order Specific Question:   Is Patient Fasting?/# of Hours     Answer:   fasting    Microalbumin / Creatinine Urine Ratio     Standing Status:   Future     Standing Expiration Date:   2021    CBC Auto Differential     Standing Status:   Future     Standing Expiration Date:   3/24/2021    Comprehensive Metabolic Panel     Standing Status:   Future     Standing Expiration Date:   3/24/2021     Orders Placed This Encounter   Medications    atorvastatin (LIPITOR) 20 MG tablet     Sig: Take 1 tablet by mouth daily     Dispense:  90 tablet     Refill:  3    etodolac (LODINE) 400 MG tablet     Sig: Take 1 tablet by mouth daily     Dispense:  90 tablet     Refill:  3    finasteride (PROSCAR) 5 MG tablet     Sig: TAKE 1 TABLET DAILY AS DIRECTED. Dispense:  90 tablet     Refill:  3    gabapentin (NEURONTIN) 300 MG capsule     Si tablets at bedtime M79.2     Dispense:  180 capsule     Refill:  1    metoprolol succinate (TOPROL XL) 50 MG extended release tablet     Sig: Take 1 tablet by mouth daily     Dispense:  90 tablet     Refill:  0    omeprazole (PRILOSEC) 20 MG delayed release capsule     Sig: Take 1 capsule by mouth daily     Dispense:  90 capsule     Refill:  3    tamsulosin (FLOMAX) 0.4 MG capsule     Sig: Take 1 capsule by mouth daily     Dispense:  90 capsule     Refill:  3    zoster recombinant adjuvanted vaccine (SHINGRIX) 50 MCG/0.5ML SUSR injection     Sig: Inject 0.5 mLs into the muscle once for 1 dose 50 MCG IM then repeat 2-6 months.      Dispense:  1 each     Refill:  1     Medications Discontinued During This Encounter   Medication Reason    allopurinol (ZYLOPRIM) 300 MG tablet     atorvastatin (LIPITOR) 20 MG tablet REORDER    etodolac (LODINE) 400 MG tablet REORDER    finasteride (PROSCAR)

## 2020-02-25 ASSESSMENT — ENCOUNTER SYMPTOMS
EYE DISCHARGE: 0
RHINORRHEA: 0
DIARRHEA: 0
COUGH: 0
EYE PAIN: 0
NAUSEA: 0
CONSTIPATION: 0
VOMITING: 0
SHORTNESS OF BREATH: 0
ABDOMINAL PAIN: 0

## 2020-02-25 NOTE — PATIENT INSTRUCTIONS
Patient Education        Arthritis: Care Instructions  Your Care Instructions  Arthritis, also called osteoarthritis, is a breakdown of the cartilage that cushions your joints. When the cartilage wears down, your bones rub against each other. This causes pain and stiffness. Many people have some arthritis as they age. Arthritis most often affects the joints of the spine, hands, hips, knees, or feet. You can take simple measures to protect your joints, ease your pain, and help you stay active. Follow-up care is a key part of your treatment and safety. Be sure to make and go to all appointments, and call your doctor if you are having problems. It's also a good idea to know your test results and keep a list of the medicines you take. How can you care for yourself at home? · Stay at a healthy weight. Being overweight puts extra strain on your joints. · Talk to your doctor or physical therapist about exercises that will help ease joint pain. ? Stretch. You may enjoy gentle forms of yoga to help keep your joints and muscles flexible. ? Walk instead of jog. Other types of exercise that are less stressful on the joints include riding a bicycle, swimming, edenilson chi, or water exercise. ? Lift weights. Strong muscles help reduce stress on your joints. Stronger thigh muscles, for example, take some of the stress off of the knees and hips. Learn the right way to lift weights so you do not make joint pain worse. · Take your medicines exactly as prescribed. Call your doctor if you think you are having a problem with your medicine. · Take pain medicines exactly as directed. ? If the doctor gave you a prescription medicine for pain, take it as prescribed. ? If you are not taking a prescription pain medicine, ask your doctor if you can take an over-the-counter medicine. · Use a cane, crutch, walker, or another device if you need help to get around. These can help rest your joints.  You also can use other things to make life easier, such as a higher toilet seat and padded handles on kitchen utensils. · Do not sit in low chairs, which can make it hard to get up. · Put heat or cold on your sore joints as needed. Use whichever helps you most. You also can take turns with hot and cold packs. ? Apply heat 2 or 3 times a day for 20 to 30 minutes--using a heating pad, hot shower, or hot pack--to relieve pain and stiffness. ? Put ice or a cold pack on your sore joint for 10 to 20 minutes at a time. Put a thin cloth between the ice and your skin. When should you call for help? Call your doctor now or seek immediate medical care if:    · You have sudden swelling, warmth, or pain in any joint.     · You have joint pain and a fever or rash.     · You have such bad pain that you cannot use a joint.    Watch closely for changes in your health, and be sure to contact your doctor if:    · You have mild joint symptoms that continue even with more than 6 weeks of care at home.     · You have stomach pain or other problems with your medicine. Where can you learn more? Go to https://[employer] (Offers.com). org and sign in to your [email or web address][email or web address] account. Enter U500 in the ZoomInfo box to learn more about \"Arthritis: Care Instructions. \"     If you do not have an account, please click on the \"Sign Up Now\" link. Current as of: April 1, 2019  Content Version: 12.3  © 7065-8484 [employer]wise, Incorporated. Care instructions adapted under license by [facility] ([facility]). If you have questions about a medical condition or this instruction, always ask your healthcare professional. [place] [address] any warranty or liability for your use of this information.

## 2020-06-24 RX ORDER — METOPROLOL SUCCINATE 50 MG/1
TABLET, EXTENDED RELEASE ORAL
Qty: 90 TABLET | Refills: 0 | Status: SHIPPED | OUTPATIENT
Start: 2020-06-24 | End: 2020-08-18

## 2020-08-18 RX ORDER — METOPROLOL SUCCINATE 50 MG/1
TABLET, EXTENDED RELEASE ORAL
Qty: 90 TABLET | Refills: 0 | Status: SHIPPED | OUTPATIENT
Start: 2020-08-18 | End: 2020-10-12 | Stop reason: SDUPTHER

## 2020-09-18 RX ORDER — ETODOLAC 400 MG/1
400 TABLET, FILM COATED ORAL DAILY
Qty: 90 TABLET | Refills: 1 | Status: SHIPPED | OUTPATIENT
Start: 2020-09-18 | End: 2021-03-27 | Stop reason: SDUPTHER

## 2020-09-18 RX ORDER — TAMSULOSIN HYDROCHLORIDE 0.4 MG/1
0.4 CAPSULE ORAL DAILY
Qty: 90 CAPSULE | Refills: 1 | Status: SHIPPED | OUTPATIENT
Start: 2020-09-18 | End: 2021-03-12 | Stop reason: SDUPTHER

## 2020-09-18 RX ORDER — FINASTERIDE 5 MG/1
TABLET, FILM COATED ORAL
Qty: 90 TABLET | Refills: 1 | Status: SHIPPED | OUTPATIENT
Start: 2020-09-18 | End: 2020-10-12 | Stop reason: SDUPTHER

## 2020-09-18 RX ORDER — OMEPRAZOLE 20 MG/1
20 CAPSULE, DELAYED RELEASE ORAL DAILY
Qty: 90 CAPSULE | Refills: 1 | Status: SHIPPED | OUTPATIENT
Start: 2020-09-18 | End: 2021-04-19 | Stop reason: SDUPTHER

## 2020-09-25 DIAGNOSIS — E78.2 MIXED HYPERLIPIDEMIA: ICD-10-CM

## 2020-09-25 DIAGNOSIS — I10 ESSENTIAL HYPERTENSION: ICD-10-CM

## 2020-09-25 LAB
ALBUMIN SERPL-MCNC: 4 G/DL (ref 3.5–5.2)
ALP BLD-CCNC: 86 U/L (ref 40–130)
ALT SERPL-CCNC: 14 U/L (ref 5–41)
ANION GAP SERPL CALCULATED.3IONS-SCNC: 10 MMOL/L (ref 7–19)
AST SERPL-CCNC: 18 U/L (ref 5–40)
BASOPHILS ABSOLUTE: 0 K/UL (ref 0–0.2)
BASOPHILS RELATIVE PERCENT: 0.5 % (ref 0–1)
BILIRUB SERPL-MCNC: 0.6 MG/DL (ref 0.2–1.2)
BUN BLDV-MCNC: 22 MG/DL (ref 8–23)
CALCIUM SERPL-MCNC: 9.8 MG/DL (ref 8.8–10.2)
CHLORIDE BLD-SCNC: 104 MMOL/L (ref 98–111)
CHOLESTEROL, TOTAL: 160 MG/DL (ref 160–199)
CO2: 30 MMOL/L (ref 22–29)
CREAT SERPL-MCNC: 1.2 MG/DL (ref 0.5–1.2)
EOSINOPHILS ABSOLUTE: 0.2 K/UL (ref 0–0.6)
EOSINOPHILS RELATIVE PERCENT: 2.2 % (ref 0–5)
GFR AFRICAN AMERICAN: >59
GFR NON-AFRICAN AMERICAN: 58
GLUCOSE BLD-MCNC: 102 MG/DL (ref 74–109)
HCT VFR BLD CALC: 45.4 % (ref 42–52)
HDLC SERPL-MCNC: 61 MG/DL (ref 55–121)
HEMOGLOBIN: 14 G/DL (ref 14–18)
IMMATURE GRANULOCYTES #: 0 K/UL
LDL CHOLESTEROL CALCULATED: 76 MG/DL
LYMPHOCYTES ABSOLUTE: 1.7 K/UL (ref 1.1–4.5)
LYMPHOCYTES RELATIVE PERCENT: 21.6 % (ref 20–40)
MCH RBC QN AUTO: 27.1 PG (ref 27–31)
MCHC RBC AUTO-ENTMCNC: 30.8 G/DL (ref 33–37)
MCV RBC AUTO: 87.8 FL (ref 80–94)
MONOCYTES ABSOLUTE: 0.6 K/UL (ref 0–0.9)
MONOCYTES RELATIVE PERCENT: 7.6 % (ref 0–10)
NEUTROPHILS ABSOLUTE: 5.2 K/UL (ref 1.5–7.5)
NEUTROPHILS RELATIVE PERCENT: 67.7 % (ref 50–65)
PDW BLD-RTO: 13.3 % (ref 11.5–14.5)
PLATELET # BLD: 246 K/UL (ref 130–400)
PMV BLD AUTO: 10.8 FL (ref 9.4–12.4)
POTASSIUM SERPL-SCNC: 4.7 MMOL/L (ref 3.5–5)
RBC # BLD: 5.17 M/UL (ref 4.7–6.1)
SODIUM BLD-SCNC: 144 MMOL/L (ref 136–145)
TOTAL PROTEIN: 6.9 G/DL (ref 6.6–8.7)
TRIGL SERPL-MCNC: 113 MG/DL (ref 0–149)
WBC # BLD: 7.7 K/UL (ref 4.8–10.8)

## 2020-10-12 ENCOUNTER — OFFICE VISIT (OUTPATIENT)
Dept: FAMILY MEDICINE CLINIC | Age: 80
End: 2020-10-12
Payer: MEDICARE

## 2020-10-12 VITALS
DIASTOLIC BLOOD PRESSURE: 74 MMHG | WEIGHT: 196 LBS | SYSTOLIC BLOOD PRESSURE: 126 MMHG | HEART RATE: 92 BPM | TEMPERATURE: 97 F | OXYGEN SATURATION: 98 % | HEIGHT: 72 IN | BODY MASS INDEX: 26.55 KG/M2

## 2020-10-12 PROCEDURE — G8417 CALC BMI ABV UP PARAM F/U: HCPCS | Performed by: FAMILY MEDICINE

## 2020-10-12 PROCEDURE — 1123F ACP DISCUSS/DSCN MKR DOCD: CPT | Performed by: FAMILY MEDICINE

## 2020-10-12 PROCEDURE — 4040F PNEUMOC VAC/ADMIN/RCVD: CPT | Performed by: FAMILY MEDICINE

## 2020-10-12 PROCEDURE — G8427 DOCREV CUR MEDS BY ELIG CLIN: HCPCS | Performed by: FAMILY MEDICINE

## 2020-10-12 PROCEDURE — G8484 FLU IMMUNIZE NO ADMIN: HCPCS | Performed by: FAMILY MEDICINE

## 2020-10-12 PROCEDURE — 1036F TOBACCO NON-USER: CPT | Performed by: FAMILY MEDICINE

## 2020-10-12 PROCEDURE — 90694 VACC AIIV4 NO PRSRV 0.5ML IM: CPT | Performed by: FAMILY MEDICINE

## 2020-10-12 PROCEDURE — 99214 OFFICE O/P EST MOD 30 MIN: CPT | Performed by: FAMILY MEDICINE

## 2020-10-12 PROCEDURE — G0008 ADMIN INFLUENZA VIRUS VAC: HCPCS | Performed by: FAMILY MEDICINE

## 2020-10-12 RX ORDER — METOPROLOL SUCCINATE 50 MG/1
TABLET, EXTENDED RELEASE ORAL
Qty: 90 TABLET | Refills: 1 | Status: SHIPPED | OUTPATIENT
Start: 2020-10-12 | End: 2021-04-19 | Stop reason: SDUPTHER

## 2020-10-12 RX ORDER — FINASTERIDE 5 MG/1
TABLET, FILM COATED ORAL
Qty: 90 TABLET | Refills: 1 | Status: SHIPPED | OUTPATIENT
Start: 2020-10-12 | End: 2021-04-27 | Stop reason: SDUPTHER

## 2020-10-12 RX ORDER — GABAPENTIN 300 MG/1
CAPSULE ORAL
Qty: 180 CAPSULE | Refills: 1 | Status: SHIPPED | OUTPATIENT
Start: 2020-10-12 | End: 2021-04-19 | Stop reason: SDUPTHER

## 2020-10-12 RX ORDER — ATORVASTATIN CALCIUM 20 MG/1
20 TABLET, FILM COATED ORAL DAILY
Qty: 90 TABLET | Refills: 3 | Status: SHIPPED | OUTPATIENT
Start: 2020-10-12 | End: 2021-04-19 | Stop reason: SDUPTHER

## 2020-10-12 ASSESSMENT — PATIENT HEALTH QUESTIONNAIRE - PHQ9
SUM OF ALL RESPONSES TO PHQ9 QUESTIONS 1 & 2: 1
SUM OF ALL RESPONSES TO PHQ QUESTIONS 1-9: 1
1. LITTLE INTEREST OR PLEASURE IN DOING THINGS: 1
SUM OF ALL RESPONSES TO PHQ QUESTIONS 1-9: 1
2. FEELING DOWN, DEPRESSED OR HOPELESS: 0

## 2020-10-12 NOTE — PROGRESS NOTES
nervous/anxious. Past Medical History:   Diagnosis Date    Benign non-nodular prostatic hyperplasia with lower urinary tract symptoms 2017    Essential hypertension 2017    Gastroesophageal reflux disease without esophagitis 2017    Gout 2017    Idiopathic peripheral neuropathy 2018    Mixed hyperlipidemia 2017    Peripheral neuropathic pain 2017    Primary osteoarthritis involving multiple joints 2017    Restless legs syndrome 2017       Current Outpatient Medications   Medication Sig Dispense Refill    finasteride (PROSCAR) 5 MG tablet TAKE 1 TABLET DAILY AS DIRECTED. 90 tablet 1    gabapentin (NEURONTIN) 300 MG capsule 2 tablets at bedtime M79.2 180 capsule 1    metoprolol succinate (TOPROL XL) 50 MG extended release tablet TAKE 1 TABLET EVERY DAY 90 tablet 1    atorvastatin (LIPITOR) 20 MG tablet Take 1 tablet by mouth daily 90 tablet 3    etodolac (LODINE) 400 MG tablet Take 1 tablet by mouth daily 90 tablet 1    omeprazole (PRILOSEC) 20 MG delayed release capsule Take 1 capsule by mouth daily 90 capsule 1    tamsulosin (FLOMAX) 0.4 MG capsule Take 1 capsule by mouth daily 90 capsule 1     No current facility-administered medications for this visit.            Allergies   Allergen Reactions    Codeine Nausea Only       Past Surgical History:   Procedure Laterality Date    INGUINAL HERNIA REPAIR Bilateral     TURP         Social History     Tobacco Use    Smoking status: Former Smoker     Packs/day: 3.00     Years: 30.00     Pack years: 90.00     Types: Cigarettes     Last attempt to quit: 1975     Years since quittin.8    Smokeless tobacco: Never Used   Substance Use Topics    Alcohol use: No    Drug use: No       Family History   Problem Relation Age of Onset    Uterine Cancer Mother     Coronary Art Dis Father         MI at 62    COPD Sister     Stroke Brother        /74 (Site: Left Upper Arm, Position: Sitting, Cuff Size: Large Adult)   Pulse 92   Temp 97 °F (36.1 °C) (Infrared)   Ht 6' (1.829 m)   Wt 196 lb (88.9 kg)   SpO2 98%   BMI 26.58 kg/m²     Physical Exam  Vitals signs and nursing note reviewed. Constitutional:       General: He is not in acute distress. Appearance: He is well-developed. He is not diaphoretic. HENT:      Head: Normocephalic and atraumatic. Right Ear: External ear normal.      Left Ear: External ear normal.      Mouth/Throat:      Comments: Mask in place  Eyes:      General: No scleral icterus. Right eye: No discharge. Left eye: No discharge. Conjunctiva/sclera: Conjunctivae normal.   Neck:      Musculoskeletal: Neck supple. Thyroid: No thyromegaly. Trachea: No tracheal deviation. Cardiovascular:      Rate and Rhythm: Normal rate and regular rhythm. Heart sounds: Normal heart sounds. No murmur. No friction rub. No gallop. Pulmonary:      Effort: Pulmonary effort is normal. No respiratory distress. Breath sounds: Normal breath sounds. No wheezing or rales. Abdominal:      General: Bowel sounds are normal. There is no distension. Palpations: Abdomen is soft. Tenderness: There is no abdominal tenderness. Musculoskeletal:         General: No deformity (No gross deformities of upper or lower extremities). Right lower leg: No edema. Left lower leg: No edema. Lymphadenopathy:      Cervical: No cervical adenopathy. Skin:     General: Skin is warm and dry. Findings: No erythema or rash. Neurological:      Mental Status: He is alert and oriented to person, place, and time. Cranial Nerves: No cranial nerve deficit. Psychiatric:         Behavior: Behavior normal.         Thought Content: Thought content normal.         Assessment:       ICD-10-CM    1. Essential hypertension  I10 metoprolol succinate (TOPROL XL) 50 MG extended release tablet    Doing well with current medication.   Will continue at this time continue to monitor. 2. Mixed hyperlipidemia  E78.2 atorvastatin (LIPITOR) 20 MG tablet    Tolerating Lipitor. Will continue and adjust course dictates. 3. Peripheral neuropathic pain  M79.2 gabapentin (NEURONTIN) 300 MG capsule    Symptomatic improvement with use of Neurontin. Will continue at this time and adjust as course dictates. 4. Benign prostatic hyperplasia, unspecified whether lower urinary tract symptoms present  N40.0 finasteride (PROSCAR) 5 MG Tablet    Symptomatic improvement with the use of Flomax and Proscar. We will continue to monitor and adjust course dictates. 5. Need for vaccination  Z23 INFLUENZA, QUADV, ADJUVANTED, 65 YRS =, IM, PF, PREFILL SYR, 0.5ML (FLUAD)     zoster recombinant adjuvanted vaccine [facility]) injection 50 mcg       Plan:  Discussed proper use of medication. Discussed signs and symptoms requiring medical attention. All questions were answered and patient voiced understanding and agreement with plan as discussed. Orders Placed This Encounter   Procedures    INFLUENZA, QUADV, ADJUVANTED, 72 YRS =, IM, PF, PREFILL SYR, 0.5ML (FLUAD)     Orders Placed This Encounter   Medications    finasteride (PROSCAR) 5 MG tablet     Sig: TAKE 1 TABLET DAILY AS DIRECTED.      Dispense:  90 tablet     Refill:  1    zoster recombinant adjuvanted vaccine [facility]) injection 50 mcg    gabapentin (NEURONTIN) 300 MG capsule     Si tablets at bedtime M79.2     Dispense:  180 capsule     Refill:  1    metoprolol succinate (TOPROL XL) 50 MG extended release tablet     Sig: TAKE 1 TABLET EVERY DAY     Dispense:  90 tablet     Refill:  1    atorvastatin (LIPITOR) 20 MG tablet     Sig: Take 1 tablet by mouth daily     Dispense:  90 tablet     Refill:  3     Medications Discontinued During This Encounter   Medication Reason    finasteride (PROSCAR) 5 MG tablet REORDER    gabapentin (NEURONTIN) 300 MG capsule REORDER    metoprolol succinate (TOPROL XL) 50 MG extended release tablet REORDER    atorvastatin (LIPITOR) 20 MG tablet REORDER     Patient Instructions   Patient given educational handouts and has had all questions answered. Patient voices understanding and agrees to plans along with risks and benefits of plan. Patient is instructed to continue prior meds,diet, and exercise plans as instructed. Patient agrees to follow up as instructed and sooner if needed. Patient agrees to go to ER if condition becomes emergent. Return in about 6 months (around 4/12/2021).

## 2020-11-04 ASSESSMENT — ENCOUNTER SYMPTOMS
DIARRHEA: 0
CONSTIPATION: 0
EYE PAIN: 0
NAUSEA: 0
ABDOMINAL PAIN: 0
SHORTNESS OF BREATH: 0
RHINORRHEA: 0
COUGH: 0
VOMITING: 0
EYE DISCHARGE: 0

## 2020-11-04 NOTE — PATIENT INSTRUCTIONS

## 2020-11-05 ENCOUNTER — VIRTUAL VISIT (OUTPATIENT)
Dept: FAMILY MEDICINE CLINIC | Age: 80
End: 2020-11-05
Payer: MEDICARE

## 2020-11-05 VITALS
WEIGHT: 198 LBS | SYSTOLIC BLOOD PRESSURE: 145 MMHG | HEIGHT: 72 IN | BODY MASS INDEX: 26.82 KG/M2 | DIASTOLIC BLOOD PRESSURE: 74 MMHG

## 2020-11-05 PROCEDURE — 4040F PNEUMOC VAC/ADMIN/RCVD: CPT | Performed by: FAMILY MEDICINE

## 2020-11-05 PROCEDURE — G0438 PPPS, INITIAL VISIT: HCPCS | Performed by: FAMILY MEDICINE

## 2020-11-05 PROCEDURE — G8484 FLU IMMUNIZE NO ADMIN: HCPCS | Performed by: FAMILY MEDICINE

## 2020-11-05 PROCEDURE — 1123F ACP DISCUSS/DSCN MKR DOCD: CPT | Performed by: FAMILY MEDICINE

## 2020-11-05 ASSESSMENT — PATIENT HEALTH QUESTIONNAIRE - PHQ9
1. LITTLE INTEREST OR PLEASURE IN DOING THINGS: 2
SUM OF ALL RESPONSES TO PHQ QUESTIONS 1-9: 2
SUM OF ALL RESPONSES TO PHQ QUESTIONS 1-9: 2
2. FEELING DOWN, DEPRESSED OR HOPELESS: 0
SUM OF ALL RESPONSES TO PHQ9 QUESTIONS 1 & 2: 2
SUM OF ALL RESPONSES TO PHQ QUESTIONS 1-9: 2

## 2020-11-05 ASSESSMENT — LIFESTYLE VARIABLES
HOW MANY STANDARD DRINKS CONTAINING ALCOHOL DO YOU HAVE ON A TYPICAL DAY: 0
AUDIT-C TOTAL SCORE: 1
HOW OFTEN DURING THE LAST YEAR HAVE YOU BEEN UNABLE TO REMEMBER WHAT HAPPENED THE NIGHT BEFORE BECAUSE YOU HAD BEEN DRINKING: 0
HOW OFTEN DURING THE LAST YEAR HAVE YOU FAILED TO DO WHAT WAS NORMALLY EXPECTED FROM YOU BECAUSE OF DRINKING: 0
HOW OFTEN DURING THE LAST YEAR HAVE YOU FOUND THAT YOU WERE NOT ABLE TO STOP DRINKING ONCE YOU HAD STARTED: 0
HOW OFTEN DURING THE LAST YEAR HAVE YOU HAD A FEELING OF GUILT OR REMORSE AFTER DRINKING: 0
HAS A RELATIVE, FRIEND, DOCTOR, OR ANOTHER HEALTH PROFESSIONAL EXPRESSED CONCERN ABOUT YOUR DRINKING OR SUGGESTED YOU CUT DOWN: 0
HOW OFTEN DO YOU HAVE A DRINK CONTAINING ALCOHOL: 1
HOW OFTEN DO YOU HAVE SIX OR MORE DRINKS ON ONE OCCASION: 0
HAVE YOU OR SOMEONE ELSE BEEN INJURED AS A RESULT OF YOUR DRINKING: 0
HOW OFTEN DURING THE LAST YEAR HAVE YOU NEEDED AN ALCOHOLIC DRINK FIRST THING IN THE MORNING TO GET YOURSELF GOING AFTER A NIGHT OF HEAVY DRINKING: 0
AUDIT TOTAL SCORE: 1

## 2020-11-05 NOTE — PATIENT INSTRUCTIONS
Personalized Preventive Plan for Dameon Harness - 11/5/2020  Medicare offers a range of preventive health benefits. Some of the tests and screenings are paid in full while other may be subject to a deductible, co-insurance, and/or copay. Some of these benefits include a comprehensive review of your medical history including lifestyle, illnesses that may run in your family, and various assessments and screenings as appropriate. After reviewing your medical record and screening and assessments performed today your provider may have ordered immunizations, labs, imaging, and/or referrals for you. A list of these orders (if applicable) as well as your Preventive Care list are included within your After Visit Summary for your review. Other Preventive Recommendations:    · A preventive eye exam performed by an eye specialist is recommended every 1-2 years to screen for glaucoma; cataracts, macular degeneration, and other eye disorders. · A preventive dental visit is recommended every 6 months. · Try to get at least 150 minutes of exercise per week or 10,000 steps per day on a pedometer . · Order or download the FREE \"Exercise & Physical Activity: Your Everyday Guide\" from [employer]. Call 7-868.170.9143 or search [employer] online. · You need 7646-3524 mg of calcium and 0094-7905 IU of vitamin D per day. It is possible to meet your calcium requirement with diet alone, but a vitamin D supplement is usually necessary to meet this goal.  · When exposed to the sun, use a sunscreen that protects against both UVA and UVB radiation with an SPF of 30 or greater. Reapply every 2 to 3 hours or after sweating, drying off with a towel, or swimming. · Always wear a seat belt when traveling in a car. Always wear a helmet when riding a bicycle or motorcycle.

## 2020-11-05 NOTE — PROGRESS NOTES
Medicare Annual Wellness Visit  Name: Dina Morgan Date: 2020   MRN: [id number] Sex: Male   Age: 79 y.o. Ethnicity: Non-/Non    : 1940 Race: Francis Whitt is here for Medicare AWV    Screenings for behavioral, psychosocial and functional/safety risks, and cognitive dysfunction are all negative except as indicated below. These results, as well as other patient data from the [address] form, are documented in Flowsheets linked to this Encounter. Allergies   Allergen Reactions    Codeine Nausea Only       Prior to Visit Medications    Medication Sig Taking? Authorizing Provider   finasteride (PROSCAR) 5 MG tablet TAKE 1 TABLET DAILY AS DIRECTED.  Yes Olga Castro MD   gabapentin (NEURONTIN) 300 MG capsule 2 tablets at bedtime M79.2 Yes Olga Castro MD   metoprolol succinate (TOPROL XL) 50 MG extended release tablet TAKE 1 TABLET EVERY DAY Yes Olga Castro MD   atorvastatin (LIPITOR) 20 MG tablet Take 1 tablet by mouth daily Yes Olga Castro MD   etodolac (LODINE) 400 MG tablet Take 1 tablet by mouth daily Yes Olga Castro MD   omeprazole (PRILOSEC) 20 MG delayed release capsule Take 1 capsule by mouth daily Yes Olga Castro MD   tamsulosin (FLOMAX) 0.4 MG capsule Take 1 capsule by mouth daily Yes Olga Castro MD       Past Medical History:   Diagnosis Date    Benign non-nodular prostatic hyperplasia with lower urinary tract symptoms 2017    Essential hypertension 2017    Gastroesophageal reflux disease without esophagitis 2017    Gout 2017    Idiopathic peripheral neuropathy 2018    Mixed hyperlipidemia 2017    Peripheral neuropathic pain 2017    Primary osteoarthritis involving multiple joints 2017    Restless legs syndrome 2017       Past Surgical History:   Procedure Laterality Date    INGUINAL HERNIA REPAIR Bilateral 1963    TURP         Family History   Problem Relation Age of Onset    Uterine Cancer Mother     Coronary Art Dis Father         MI at 60    COPD Sister     Stroke Brother        CareTeam (Including outside providers/suppliers regularly involved in providing care):   Patient Care Team:  Gaby Jensen MD as PCP - General (Family Medicine)  Gaby Jensen MD as PCP - REHABILITATION [facility] Empaneled Provider    Wt Readings from Last 3 Encounters:   11/05/20 198 lb (89.8 kg)   10/12/20 196 lb (88.9 kg)   02/18/20 205 lb (93 kg)     Vitals:    11/05/20 1358   BP: (!) 145/74   Weight: 198 lb (89.8 kg)   Height: 6' (1.829 m)     Body mass index is 26.85 kg/m². Based upon direct observation of the patient, evaluation of cognition reveals recent and remote memory intact. Patient's complete Health Risk Assessment and screening values have been reviewed and are found in Flowsheets. The following problems were reviewed today and where indicated follow up appointments were made and/or referrals ordered. Positive Risk Factor Screenings with Interventions:     General Health and ACP:  General  In general, how would you say your health is?: Excellent  In the past 7 days, have you experienced any of the following?  New or Increased Pain, New or Increased Fatigue, Loneliness, Social Isolation, Stress or Anger?: None of These(related to living alone.)  Do you get the social and emotional support that you need?: Yes  Do you have a Living Will?: (!) No  Advance Directives     Power of [facility] Will ACP-Advance Directive ACP-Power of Jesús Lee on 06/17/20 Not on Ghulam Moore  Risk Interventions:  · No Living Will: Patient declines ACP discussion/assistance    Health Habits/Nutrition:  Health Habits/Nutrition  Do you exercise for at least 20 minutes 2-3 times per week?: (!) No  Have you lost any weight without trying in the past 3 months?: No  Do you eat fewer than 2 meals per day?: No  Have you seen a dentist within the past year?: (!) No  Body mass index: (!) 26.85  Health Habits/Nutrition Interventions:  · Inadequate physical activity:  educational materials provided to promote increased physical activity  · Dental exam overdue:  patient encouraged to make appointment with his/her dentist    Hearing/Vision:  No exam data present  Hearing/Vision  Do you or your family notice any trouble with your hearing?: (!) Yes  Do you have difficulty driving, watching TV, or doing any of your daily activities because of your eyesight?: No  Have you had an eye exam within the past year?: Yes  Hearing/Vision Interventions:  · Hearing concerns:  patient declines any further evaluation/treatment for hearing issues    ADL:  ADLs  In the past 7 days, did you need help from others to perform any of the following everyday activities? Eating, dressing, grooming, bathing, toileting, or walking/balance?: None  In the past 7 days, did you need help from others to take care of any of the following? Laundry, housekeeping, banking/finances, shopping, telephone use, food preparation, transportation, or taking medications?: [employer], Food Preparation, Transportation  ADL Interventions:  · Patient declines any further evaluation/treatment for this issue  · Patients daughter lives with him and helps care for him. He reports he is thinking about moving into a assisted facility.      Personalized Preventive Plan   Current Health Maintenance Status  Immunization History   Administered Date(s) Administered    Influenza, High Dose (Fluzone 65 yrs and older) 10/12/2016, 10/12/2017, 09/17/2018    Influenza, Quadv, adjuvanted, 65 yrs +, IM, PF (Fluad) 10/12/2020    Pneumococcal Conjugate 13-valent ([id number]) 10/12/2016    Pneumococcal Polysaccharide ([id number]) 10/05/2015    Tdap (Boostrix, Adacel) 09/17/2018    Zoster Recombinant (Shingrix) 10/12/2020        Health Maintenance   Topic Date Due    PSA counseling  01/29/2017    Annual Wellness Visit (AWV)  05/29/2019    Shingles Vaccine (2 of 2) 12/07/2020    Lipid screen  09/25/2021    Potassium monitoring  09/25/2021    Creatinine monitoring  09/25/2021    DTaP/Tdap/Td vaccine (2 - Td) 09/17/2028    Colon cancer screen colonoscopy  11/06/2117    Flu vaccine  Completed    Pneumococcal 65+ years Vaccine  Completed    Hepatitis A vaccine  Aged Out    Hepatitis B vaccine  Aged Out    Hib vaccine  Aged Out    Meningococcal (ACWY) vaccine  Aged Out     Recommendations for [address] Due: see orders and patient instructions/AVS.  . Recommended screening schedule for the next 5-10 years is provided to the patient in written form: see Patient Instructions/AVS.    IAngela LPN, 56/3/7911, performed the documented evaluation under the direct supervision of the attending physician. Rosio Ashford is a 79 y.o. male evaluated via telephone on 11/5/2020. Consent:  He and/or health care decision maker is aware that that he may receive a bill for this telephone service, depending on his insurance coverage, and has provided verbal consent to proceed: Yes      Documentation:  I communicated with the patient and/or health care decision maker about AWV. Details of this discussion including any medical advice provided. I affirm this is a Patient Initiated Episode with a Patient who has not had a related appointment within my department in the past 7 days or scheduled within the next 24 hours.     Patient identification was verified at the start of the visit: Yes    Total Time: minutes: 21-30 minutes    Note: not billable if this call serves to triage the patient into an appointment for the relevant concern      Angela Conroy

## 2021-03-08 ENCOUNTER — IMMUNIZATION (OUTPATIENT)
Dept: VACCINE CLINIC | Facility: HOSPITAL | Age: 81
End: 2021-03-08

## 2021-03-08 PROCEDURE — 91301 HC SARSCO02 VAC 100MCG/0.5ML IM: CPT | Performed by: OBSTETRICS & GYNECOLOGY

## 2021-03-08 PROCEDURE — 0011A: CPT | Performed by: OBSTETRICS & GYNECOLOGY

## 2021-03-12 DIAGNOSIS — N40.0 BENIGN PROSTATIC HYPERPLASIA, UNSPECIFIED WHETHER LOWER URINARY TRACT SYMPTOMS PRESENT: ICD-10-CM

## 2021-03-16 RX ORDER — TAMSULOSIN HYDROCHLORIDE 0.4 MG/1
0.4 CAPSULE ORAL DAILY
Qty: 90 CAPSULE | Refills: 1 | Status: SHIPPED | OUTPATIENT
Start: 2021-03-16 | End: 2021-09-15 | Stop reason: SDUPTHER

## 2021-03-27 DIAGNOSIS — M19.90 ARTHRITIS: ICD-10-CM

## 2021-03-29 RX ORDER — ETODOLAC 400 MG/1
400 TABLET, FILM COATED ORAL DAILY
Qty: 90 TABLET | Refills: 1 | Status: SHIPPED | OUTPATIENT
Start: 2021-03-29 | End: 2021-09-15 | Stop reason: SDUPTHER

## 2021-04-01 DIAGNOSIS — I10 ESSENTIAL HYPERTENSION: ICD-10-CM

## 2021-04-01 DIAGNOSIS — E78.2 MIXED HYPERLIPIDEMIA: ICD-10-CM

## 2021-04-01 DIAGNOSIS — Z12.5 SCREENING PSA (PROSTATE SPECIFIC ANTIGEN): ICD-10-CM

## 2021-04-01 DIAGNOSIS — N40.0 BENIGN PROSTATIC HYPERPLASIA, UNSPECIFIED WHETHER LOWER URINARY TRACT SYMPTOMS PRESENT: Primary | ICD-10-CM

## 2021-04-01 LAB
ALBUMIN SERPL-MCNC: 4.1 G/DL (ref 3.5–5.2)
ALP BLD-CCNC: 102 U/L (ref 40–130)
ALT SERPL-CCNC: 13 U/L (ref 5–41)
ANION GAP SERPL CALCULATED.3IONS-SCNC: 9 MMOL/L (ref 7–19)
AST SERPL-CCNC: 16 U/L (ref 5–40)
BASOPHILS ABSOLUTE: 0 K/UL (ref 0–0.2)
BASOPHILS RELATIVE PERCENT: 0.3 % (ref 0–1)
BILIRUB SERPL-MCNC: 0.4 MG/DL (ref 0.2–1.2)
BUN BLDV-MCNC: 23 MG/DL (ref 8–23)
CALCIUM SERPL-MCNC: 9.7 MG/DL (ref 8.8–10.2)
CHLORIDE BLD-SCNC: 105 MMOL/L (ref 98–111)
CHOLESTEROL, TOTAL: 155 MG/DL (ref 160–199)
CO2: 29 MMOL/L (ref 22–29)
CREAT SERPL-MCNC: 1.1 MG/DL (ref 0.5–1.2)
EOSINOPHILS ABSOLUTE: 0.2 K/UL (ref 0–0.6)
EOSINOPHILS RELATIVE PERCENT: 1.6 % (ref 0–5)
GFR AFRICAN AMERICAN: >59
GFR NON-AFRICAN AMERICAN: >60
GLUCOSE BLD-MCNC: 95 MG/DL (ref 74–109)
HCT VFR BLD CALC: 46 % (ref 42–52)
HDLC SERPL-MCNC: 65 MG/DL (ref 55–121)
HEMOGLOBIN: 13.9 G/DL (ref 14–18)
IMMATURE GRANULOCYTES #: 0 K/UL
LDL CHOLESTEROL CALCULATED: 72 MG/DL
LYMPHOCYTES ABSOLUTE: 1.9 K/UL (ref 1.1–4.5)
LYMPHOCYTES RELATIVE PERCENT: 19.6 % (ref 20–40)
MCH RBC QN AUTO: 26.8 PG (ref 27–31)
MCHC RBC AUTO-ENTMCNC: 30.2 G/DL (ref 33–37)
MCV RBC AUTO: 88.8 FL (ref 80–94)
MONOCYTES ABSOLUTE: 0.7 K/UL (ref 0–0.9)
MONOCYTES RELATIVE PERCENT: 7.4 % (ref 0–10)
NEUTROPHILS ABSOLUTE: 6.7 K/UL (ref 1.5–7.5)
NEUTROPHILS RELATIVE PERCENT: 70.8 % (ref 50–65)
PDW BLD-RTO: 13.5 % (ref 11.5–14.5)
PLATELET # BLD: 247 K/UL (ref 130–400)
PMV BLD AUTO: 11.1 FL (ref 9.4–12.4)
POTASSIUM SERPL-SCNC: 5 MMOL/L (ref 3.5–5)
PROSTATE SPECIFIC ANTIGEN: 1.63 NG/ML (ref 0–4)
RBC # BLD: 5.18 M/UL (ref 4.7–6.1)
SODIUM BLD-SCNC: 143 MMOL/L (ref 136–145)
TOTAL PROTEIN: 7 G/DL (ref 6.6–8.7)
TRIGL SERPL-MCNC: 92 MG/DL (ref 0–149)
WBC # BLD: 9.4 K/UL (ref 4.8–10.8)

## 2021-04-05 ENCOUNTER — IMMUNIZATION (OUTPATIENT)
Dept: VACCINE CLINIC | Facility: HOSPITAL | Age: 81
End: 2021-04-05

## 2021-04-05 PROCEDURE — 91301 HC SARSCO02 VAC 100MCG/0.5ML IM: CPT | Performed by: OBSTETRICS & GYNECOLOGY

## 2021-04-05 PROCEDURE — 0012A: CPT | Performed by: OBSTETRICS & GYNECOLOGY

## 2021-04-19 ENCOUNTER — OFFICE VISIT (OUTPATIENT)
Dept: FAMILY MEDICINE CLINIC | Age: 81
End: 2021-04-19
Payer: MEDICARE

## 2021-04-19 VITALS
DIASTOLIC BLOOD PRESSURE: 84 MMHG | WEIGHT: 202 LBS | HEART RATE: 90 BPM | HEIGHT: 72 IN | RESPIRATION RATE: 16 BRPM | TEMPERATURE: 96.5 F | SYSTOLIC BLOOD PRESSURE: 132 MMHG | BODY MASS INDEX: 27.36 KG/M2 | OXYGEN SATURATION: 96 %

## 2021-04-19 DIAGNOSIS — K21.9 GASTROESOPHAGEAL REFLUX DISEASE WITHOUT ESOPHAGITIS: ICD-10-CM

## 2021-04-19 DIAGNOSIS — M79.2 PERIPHERAL NEUROPATHIC PAIN: Chronic | ICD-10-CM

## 2021-04-19 DIAGNOSIS — E78.2 MIXED HYPERLIPIDEMIA: ICD-10-CM

## 2021-04-19 DIAGNOSIS — I10 ESSENTIAL HYPERTENSION: ICD-10-CM

## 2021-04-19 PROCEDURE — G8427 DOCREV CUR MEDS BY ELIG CLIN: HCPCS | Performed by: FAMILY MEDICINE

## 2021-04-19 PROCEDURE — G8417 CALC BMI ABV UP PARAM F/U: HCPCS | Performed by: FAMILY MEDICINE

## 2021-04-19 PROCEDURE — 99214 OFFICE O/P EST MOD 30 MIN: CPT | Performed by: FAMILY MEDICINE

## 2021-04-19 PROCEDURE — 1036F TOBACCO NON-USER: CPT | Performed by: FAMILY MEDICINE

## 2021-04-19 PROCEDURE — 4040F PNEUMOC VAC/ADMIN/RCVD: CPT | Performed by: FAMILY MEDICINE

## 2021-04-19 PROCEDURE — 1123F ACP DISCUSS/DSCN MKR DOCD: CPT | Performed by: FAMILY MEDICINE

## 2021-04-19 RX ORDER — METOPROLOL SUCCINATE 50 MG/1
TABLET, EXTENDED RELEASE ORAL
Qty: 90 TABLET | Refills: 1 | Status: SHIPPED | OUTPATIENT
Start: 2021-04-19 | End: 2022-02-25 | Stop reason: SDUPTHER

## 2021-04-19 RX ORDER — ATORVASTATIN CALCIUM 20 MG/1
20 TABLET, FILM COATED ORAL DAILY
Qty: 90 TABLET | Refills: 3 | Status: SHIPPED | OUTPATIENT
Start: 2021-04-19 | End: 2022-04-01 | Stop reason: SDUPTHER

## 2021-04-19 RX ORDER — GABAPENTIN 300 MG/1
CAPSULE ORAL
Qty: 180 CAPSULE | Refills: 1 | Status: SHIPPED | OUTPATIENT
Start: 2021-04-19 | End: 2022-01-28 | Stop reason: ALTCHOICE

## 2021-04-19 RX ORDER — OMEPRAZOLE 20 MG/1
20 CAPSULE, DELAYED RELEASE ORAL DAILY
Qty: 90 CAPSULE | Refills: 1 | Status: SHIPPED | OUTPATIENT
Start: 2021-04-19 | End: 2022-01-28 | Stop reason: SDUPTHER

## 2021-04-19 ASSESSMENT — PATIENT HEALTH QUESTIONNAIRE - PHQ9
2. FEELING DOWN, DEPRESSED OR HOPELESS: 0
1. LITTLE INTEREST OR PLEASURE IN DOING THINGS: 0
SUM OF ALL RESPONSES TO PHQ9 QUESTIONS 1 & 2: 0
SUM OF ALL RESPONSES TO PHQ QUESTIONS 1-9: 0
SUM OF ALL RESPONSES TO PHQ QUESTIONS 1-9: 0

## 2021-04-19 NOTE — PROGRESS NOTES
[address] [facility]  [address] [place] [address]  Dept: [phone]  Dept Fax: [phone]: [phone]     Visit type: Established patient    Reason for Visit: 6 Month Follow-Up, Discuss Medications, Peripheral Neuropathy, and Discuss Labs      Assessment and Plan       1. Peripheral neuropathic pain  -     gabapentin (NEURONTIN) 300 MG capsule; 2 tablets at bedtime M79.2, Disp-180 capsule, R-1Normal  2. Essential hypertension  -     metoprolol succinate (TOPROL XL) 50 MG extended release tablet; TAKE 1 TABLET EVERY DAY, Disp-90 tablet, R-1Normal  3. Mixed hyperlipidemia  -     atorvastatin (LIPITOR) 20 MG tablet; Take 1 tablet by mouth daily, Disp-90 tablet, R-3Normal  4. Gastroesophageal reflux disease without esophagitis  -     omeprazole (PRILOSEC) 20 MG delayed release capsule; Take 1 capsule by mouth daily, Disp-90 capsule, R-1Normal    With patient doing well with his current medications will continue at this time continue to monitor and adjust course dictates. Discussed proper use of medication. Discussed signs symptoms require medical attention. All questions were answered patient voiced understanding and agreement with plan as discussed. Return in about 6 months (around 10/19/2021), or if symptoms worsen or fail to improve. Subjective       HPI   Patient has a history of peripheral neuropathy and reports that he is benefiting from the use of Neurontin. He denies any issues with the medicine or any recent changes to his symptoms. He does have arterial hypertension that he reports is doing well with his current medication. He denies any issues with use of his medicine. Denies any symptoms of abnormal blood pressures. He does attempt avoid excess salt intake. He also has hyperlipidemia is tolerating Lipitor without any myalgias or GI upsets. He is tender watch his diet and try to stay physically active.     He also has gastroesophageal reflux disease and is doing well with his use of Prilosec. Denies any problems with the medicine or any recent changes to his symptoms. Review of Systems   Constitutional: Negative for activity change, appetite change, fatigue and fever. HENT: Negative for congestion and rhinorrhea. Eyes: Negative for pain and discharge. Respiratory: Negative for cough and shortness of breath. Cardiovascular: Negative for chest pain and palpitations. Gastrointestinal: Negative for abdominal pain, constipation, diarrhea, nausea and vomiting. Endocrine: Negative for cold intolerance and heat intolerance. Genitourinary: Negative for dysuria and hematuria. Musculoskeletal: Positive for arthralgias. Negative for gait problem. Skin: Negative for rash and wound. Neurological: Negative for syncope and weakness. Hematological: Negative for adenopathy. Does not bruise/bleed easily. Psychiatric/Behavioral: Negative for dysphoric mood and sleep disturbance. The patient is not nervous/anxious. Allergies   Allergen Reactions    Codeine Nausea Only       Outpatient Medications Prior to Visit   Medication Sig Dispense Refill    etodolac (LODINE) 400 MG tablet Take 1 tablet by mouth daily 90 tablet 1    tamsulosin (FLOMAX) 0.4 MG capsule Take 1 capsule by mouth daily 90 capsule 1    finasteride (PROSCAR) 5 MG tablet TAKE 1 TABLET DAILY AS DIRECTED. 90 tablet 1    gabapentin (NEURONTIN) 300 MG capsule 2 tablets at bedtime M79.2 180 capsule 1    metoprolol succinate (TOPROL XL) 50 MG extended release tablet TAKE 1 TABLET EVERY DAY 90 tablet 1    atorvastatin (LIPITOR) 20 MG tablet Take 1 tablet by mouth daily 90 tablet 3    omeprazole (PRILOSEC) 20 MG delayed release capsule Take 1 capsule by mouth daily 90 capsule 1     No facility-administered medications prior to visit.          Past Medical History:   Diagnosis Date    Benign non-nodular prostatic hyperplasia with lower urinary tract symptoms 8/16/2017  Essential hypertension 2017    Gastroesophageal reflux disease without esophagitis 2017    Gout 2017    Idiopathic peripheral neuropathy 2018    Mixed hyperlipidemia 2017    Peripheral neuropathic pain 2017    Primary osteoarthritis involving multiple joints 2017    Restless legs syndrome 2017        Social History     Tobacco Use    Smoking status: Former Smoker     Packs/day: 3.00     Years: 30.00     Pack years: 90.00     Types: Cigarettes     Quit date: 1975     Years since quittin.3    Smokeless tobacco: Never Used   Substance Use Topics    Alcohol use: No        Past Surgical History:   Procedure Laterality Date    INGUINAL HERNIA REPAIR Bilateral 1963    TURP         Family History   Problem Relation Age of Onset    Uterine Cancer Mother     Coronary Art Dis Father         MI at 62    COPD Sister     Stroke Brother        Objective       /84 (Site: Left Upper Arm, Position: Sitting, Cuff Size: Medium Adult)   Pulse 90   Temp 96.5 °F (35.8 °C) (Oral)   Resp 16   Ht 6' (1.829 m)   Wt 202 lb (91.6 kg)   SpO2 96%   BMI 27.40 kg/m²   Physical Exam  Vitals signs and nursing note reviewed. Constitutional:       General: He is not in acute distress. Appearance: He is well-developed. He is not diaphoretic. HENT:      Head: Normocephalic and atraumatic. Right Ear: External ear normal.      Left Ear: External ear normal.      Mouth/Throat:      Comments: Mask in place  Eyes:      General: No scleral icterus. Right eye: No discharge. Left eye: No discharge. Conjunctiva/sclera: Conjunctivae normal.   Neck:      Musculoskeletal: Neck supple. Thyroid: No thyromegaly. Trachea: No tracheal deviation. Cardiovascular:      Rate and Rhythm: Normal rate and regular rhythm. Heart sounds: Normal heart sounds. No murmur. No friction rub. No gallop.     Pulmonary:      Effort: Pulmonary effort is normal. No respiratory distress. Breath sounds: Normal breath sounds. No wheezing or rales. Abdominal:      General: Bowel sounds are normal. There is no distension. Palpations: Abdomen is soft. Tenderness: There is no abdominal tenderness. Musculoskeletal:         General: No deformity (No gross deformities of upper or lower extremities). Right lower leg: No edema. Left lower leg: No edema. Lymphadenopathy:      Cervical: No cervical adenopathy. Skin:     General: Skin is warm and dry. Findings: No erythema or rash. Neurological:      Mental Status: He is alert and oriented to person, place, and time. Cranial Nerves: No cranial nerve deficit. Psychiatric:         Behavior: Behavior normal.         Thought Content:  Thought content normal.           Data Reviewed and Summarized       Labs:     Imaging/Testing:        Melany Eli MD

## 2021-04-19 NOTE — LETTER
CONTROLLED SUBSTANCE MEDICATION AGREEMENT     Patient Name: Rizwan Shelton  Patient YOB: 1940   I understand, that controlled substance medications may be used to help better manage my symptoms and to improve my ability to function at home, work and in social settings. However, I also understand that these medications do have risks, which have been discussed with me, including possible development of physical or psychological dependence. I understand that successful treatment requires mutual trust and honesty between me and my provider. I understand and agree that following this Medication Agreement is necessary in continuing my provider-patient relationship and the success of my treatment plan. Explanation from my Provider: Benefits and Goals of Controlled Substance Medications: There are two potential goals for your treatment: (1) decreased pain and suffering (2) improved daily life functions. There are many possible treatments for your chronic condition(s). Alternatives such as physical therapy, yoga, massage, home daily exercise, meditation, relaxation techniques, injections, chiropractic manipulations, surgery, cognitive therapy, hypnosis and many medications that are not habit-forming may be used. Use of controlled substance medications may be helpful, but they are unlikely to resolve all symptoms or restore all function. Explanation from my Provider: Risks of Controlled Substance Medications:  Opioid pain medications: These medications can lead to problems such as addiction/dependence, sedation, lightheadedness/dizziness, memory issues, falls, constipation, nausea, or vomiting. They may also impair the ability to drive or operate machinery. Additionally, these medications may lower testosterone levels, leading to loss of bone strength, stamina and sex drive.   They may cause problems with breathing, sleep apnea and reduced coughing, which is especially dangerous for patients with lung disease. Overdose or dangerous interactions with alcohol and other medications may occur, leading to death. Hyperalgesia may develop, which means patients receiving opioids for the treatment of pain may become more sensitive to certain painful stimuli, and in some cases, experience pain from ordinarily non-painful stimuli. Women between the ages of 14-53 who could become pregnant should carefully weigh the risks and benefits of opioids with their physicians, as these medications increase the risk of pregnancy complications, including miscarriage,  delivery and stillbirth. It is also possible for babies to be born addicted to opioids. Opioid dependence withdrawal symptoms may include; feelings of uneasiness, increased pain, irritability, belly pain, diarrhea, sweats and goose-flesh. Benzodiazepines and non-benzodiazepine sleep medications: These medications can lead to problems such as addiction/dependence, sedation, fatigue, lightheadedness, dizziness, incoordination, falls, depression, hallucinations, and impaired judgment, memory and concentration. The ability to drive and operate machinery may also be affected. Abnormal sleep-related behaviors have been reported, including sleepwalking, driving, making telephone calls, eating, or having sex while not fully awake. These medications can suppress breathing and worsen sleep apnea, particularly when combined with alcohol or other sedating medications, potentially leading to death. Dependence withdrawal symptoms may include tremors, anxiety, hallucinations and seizures. Stimulants:  Common adverse effects include addiction/dependence, increased blood  pressure and heart rate, decreased appetite, nausea, involuntary weight loss, insomnia,                                                                                                                     Initials:_______   irritability, and headaches.   These risks may increase when these medications are combined with other stimulants, such as caffeine pills or energy drinks, certain weight loss supplements and oral decongestants. Dependence withdrawal symptoms may include depressed mood, loss of interest, suicidal thoughts, anxiety, fatigue, appetite changes and agitation. Testosterone replacement therapy:  Potential side effects include increased risk of stroke and heart attack, blood clots, increased blood pressure, increased cholesterol, enlarged prostate, sleep apnea, irritability/aggression and other mood disorders, and decreased fertility. I agree and understand that I and my prescriber have the following rights and responsibilities regarding my treatment plan:     1. MY RIGHTS:  To be informed of my treatment and medication plan. To be an active participant in my health and wellbeing. 2. MY RESPONSIBILITY AND UNDERSTANDING FOR USE OF MEDICATIONS   I will take medications at the dose and frequency as directed. For my safety, I will not increase or change how I take my medications without the recommendation of my healthcare provider.  I will actively participate in any program recommended by my provider which may improve function, including social, physical, psychological programs.  I will not take my medications with alcohol or other drugs not prescribed to me. I understand that drinking alcohol with my medications increases the chances of side effects, including reduced breathing rate and could lead to personal injury when operating machinery.  I understand that if I have a history of substance use disorders, including alcohol or other illicit drugs, that I may be at increased risk of addiction to my medications.  I agree to notify my provider immediately if I should become pregnant so that my treatment plan can be adjusted.    I agree and understand that I shall only receive controlled substance medications from the prescriber that signed this agreement unless there is written agreement among other prescribers of controlled substances outlining the responsibility of the medications being prescribed.  I understand that the if the controlled medication is not helping to achieve goals, the dosage may be tapered and no longer prescribed. 3. MY RESPONSIBILITY FOR COMMUNICATION / PRESCRIPTION RENEWALS   I agree that all controlled substance medications that I take will be prescribed only by my provider. If another healthcare provider prescribes me medication in an emergency, I will notify my provider within seventy-two (72) hours.  I will arrange for refills at the prescribed interval ONLY during regular office hours. I will not ask for refills earlier than agreed, after-hours, on holidays or weekends. Refills may take up to 72 hours for processing and prescriptions to reach the pharmacy.  I will inform my other health care providers that I am taking these medications and of the existence of this [address] [id number]. In the event of an emergency, I will provide the same information to the emergency department prescribers.  I will keep my provider updated on the pharmacy I am using for controlled medication prescription filling. Initials:_______  4. MY RESPONSIBILITY FOR PROTECTING MEDICATIONS   I will protect my prescriptions and medications. I understand that lost or misplaced prescriptions will not be replaced.  I will keep medications only for my own use and will not share them with others. I will keep all medications away from children.  I agree that if my medications are adjusted or discontinued, I will properly dispose of any remaining medications. I understand that I will be required to dispose of any remaining controlled medications as, directed by my prescriber, prior to being provided with any prescriptions for other controlled medications.   Medication drop box locations can be found at: [employer]dk    5. MY RESPONSIBILITY WITH ILLEGAL DRUGS    I will not use illegal or street drugs or another person's prescription medications not prescribed to me.  If there are identified addiction type symptoms, then referral to a program may be provided by my provider and I agree to follow through with this recommendation. 6. MY RESPONSIBILITY FOR COOPERATION WITH INVESTIGATIONS   I understand that my provider will comply with any applicable law and may discuss my use and/or possible misuse/abuse of controlled substances and alcohol, as appropriate, with any health care provider involved in my care, pharmacist, or legal authority.  I authorize my provider and pharmacy to cooperate fully with law enforcement agencies (as permitted by law) in the investigation of any possible misuse, sale, or other diversion of my controlled substances.  I agree to waive any applicable privilege or right of privacy or confidentiality with respect to these authorizations. 7. PROVIDERS RIGHT TO MONITOR FOR SAFETY: PRESCRIPTION MONITORING / DRUG TESTING   I consent to drug/toxicology screening and will submit to a drug screen upon my providers request to assure I am only taking the prescribed drugs for my safety monitoring. I understand that a drug screen is a laboratory test in which a sample of my urine, blood or saliva is checked to see what drugs I have been taking. This may entail an observed urine specimen, which means that a nurse or other health care provider may watch me provide urine, and I will cooperate if I am asked to provide an observed specimen.  I understand that my provider will check a copy of my State Prescription Monitoring Program () Report in order to safely prescribe medications.  Pill Counts: I consent to pill counts when requested.   I may be asked to bring all my prescribed controlled substance medications, in their original bottles, to all of my scheduled appointments. In addition, my provider may ask me to come to the practice at any time for a random pill count. 8. TERMINATION OF THIS AGREEMENT  For my safety, my prescriber has the right to stop prescribing controlled substance medications and may end this agreement. Initials:_______   Conditions that may result in termination of this agreement:  a. I do not show any improvement in pain, or my activity has not improved. b. I develop rapid tolerance or loss of improvement, as described in my treatment plan.  c. I develop significant side effects from the medication. d. My behavior is not consistent with the responsibilities outlined above, thereby causing safety concerns to continue prescribing controlled substance medications. e. I fail to follow the terms of this agreement. f. Other:____________________________       UNDERSTANDING THIS MEDICATION AGREEMENT:    I have read the above and have had all my questions answered. For chronic disease management, I know that my symptoms can be managed with many types of treatments. A chronic medication trial may be part of my treatment, but I must be an active participant in my care. Medication therapy is only one part of my symptom management plan. In some cases, there may be limited scientific evidence to support the chronic use of certain medications to improve symptoms and daily function. Furthermore, in certain circumstances, there may be scientific information that suggests that the use of chronic controlled substances may worsen my symptoms and increase my risk of unintentional death directly related to this medication therapy. I know that if my provider feels my risk from controlled medications is greater than my benefit, I will have my controlled substance medication(s) compassionately lowered or removed altogether.      I further agree to allow this office to contact my HIPAA contact if there are concerns about my safety and use of the controlled medications. I have agreed to use the prescribed controlled substance medications to me as instructed by my provider and as stated in this Medication Agreement. My initial on each page and my signature below shows that I have read each page and I have had the opportunity to ask questions with answers provided by my provider.     Patient Name (Printed): _____________________________________  Patient Signature:  ______________________   Date: _____________    Prescriber Name (Printed): ___________________________________  Prescriber Signature: _____________________  Date: _____________

## 2021-04-27 DIAGNOSIS — N40.0 BENIGN PROSTATIC HYPERPLASIA, UNSPECIFIED WHETHER LOWER URINARY TRACT SYMPTOMS PRESENT: ICD-10-CM

## 2021-04-27 RX ORDER — FINASTERIDE 5 MG/1
TABLET, FILM COATED ORAL
Qty: 90 TABLET | Refills: 1 | Status: SHIPPED | OUTPATIENT
Start: 2021-04-27 | End: 2021-07-26 | Stop reason: SDUPTHER

## 2021-05-05 ASSESSMENT — ENCOUNTER SYMPTOMS
RHINORRHEA: 0
EYE PAIN: 0
SHORTNESS OF BREATH: 0
CONSTIPATION: 0
EYE DISCHARGE: 0
ABDOMINAL PAIN: 0
NAUSEA: 0
COUGH: 0
DIARRHEA: 0
VOMITING: 0

## 2021-05-05 NOTE — PATIENT INSTRUCTIONS
Patient Education        Home Blood Pressure Test: About This Test  What is it? A home blood pressure test allows you to keep track of your blood pressure at home. Blood pressure is a measure of the force of blood against the walls of your arteries. Blood pressure readings include two numbers, such as 130/80 (say \"130 over 80\"). The first number is the systolic pressure. The second number is the diastolic pressure. Why is this test done? You may do this test at home to:  · Find out if you have high blood pressure. · Track your blood pressure if you have high blood pressure. · Track how well medicine is working to reduce high blood pressure. · Check how lifestyle changes, such as weight loss and exercise, are affecting blood pressure. How do you prepare for the test?  For at least 30 minutes before you take your blood pressure, don't exercise, drink caffeine, or smoke. Empty your bladder before the test. Sit quietly with your back straight and both feet on the floor for at least 5 minutes. This helps you take your blood pressure while you feel comfortable and relaxed. How is the test done? · If your doctor recommends it, take your blood pressure twice a day. Take it in the morning and evening. · Sit with your arm slightly bent and resting on a table so that your upper arm is at the same level as your heart. · Use the same arm each time you take your blood pressure. · Place the blood pressure cuff on the bare skin of your upper arm. You may have to roll up your sleeve, remove your arm from the sleeve, or take your shirt off. · Wrap the blood pressure cuff around your upper arm so that the lower edge of the cuff is about 1 inch above the bend of your elbow. · Do not move, talk, or text while you take your blood pressure. Follow the instructions that came with your blood pressure monitor. They might be different from the following. · Press the on/off button on the automatic monitor.  Then you may need to wait until the screen says the monitor is ready. · Press the start button. The cuff will inflate and deflate by itself. · Your blood pressure numbers will appear on the screen. · Wait one minute and take your blood pressure again. · If your monitor does not automatically save your numbers, write them in your log book, along with the date and time. Follow-up care is a key part of your treatment and safety. Be sure to make and go to all appointments, and call your doctor if you are having problems. It's also a good idea to keep a list of the medicines you take. Where can you learn more? Go to https://[employer]. org and sign in to your [employer] account. Enter C427 in the Student[employer]under box to learn more about \"Home Blood Pressure Test: About This Test.\"     If you do not have an account, please click on the \"Sign Up Now\" link. Current as of: August 31, 2020               Content Version: 12.8  © 2006-2021 [employer]wise[employer]. Care instructions adapted under license by [facility] ([facility]). If you have questions about a medical condition or this instruction, always ask your healthcare professional. [place] [address] any warranty or liability for your use of this information.

## 2021-07-26 DIAGNOSIS — N40.0 BENIGN PROSTATIC HYPERPLASIA, UNSPECIFIED WHETHER LOWER URINARY TRACT SYMPTOMS PRESENT: ICD-10-CM

## 2021-07-27 RX ORDER — FINASTERIDE 5 MG/1
TABLET, FILM COATED ORAL
Qty: 90 TABLET | Refills: 1 | Status: SHIPPED | OUTPATIENT
Start: 2021-07-27 | End: 2022-04-01 | Stop reason: SDUPTHER

## 2021-09-13 ENCOUNTER — TELEPHONE (OUTPATIENT)
Dept: PRIMARY CARE CLINIC | Age: 81
End: 2021-09-13

## 2021-09-13 ENCOUNTER — OFFICE VISIT (OUTPATIENT)
Dept: PRIMARY CARE CLINIC | Age: 81
End: 2021-09-13
Payer: MEDICARE

## 2021-09-13 VITALS
SYSTOLIC BLOOD PRESSURE: 130 MMHG | OXYGEN SATURATION: 98 % | BODY MASS INDEX: 27.58 KG/M2 | HEART RATE: 86 BPM | WEIGHT: 203.6 LBS | DIASTOLIC BLOOD PRESSURE: 90 MMHG | TEMPERATURE: 97.1 F | HEIGHT: 72 IN

## 2021-09-13 DIAGNOSIS — I10 ESSENTIAL HYPERTENSION: ICD-10-CM

## 2021-09-13 DIAGNOSIS — E78.2 MIXED HYPERLIPIDEMIA: Chronic | ICD-10-CM

## 2021-09-13 DIAGNOSIS — K21.9 GASTROESOPHAGEAL REFLUX DISEASE WITHOUT ESOPHAGITIS: Chronic | ICD-10-CM

## 2021-09-13 DIAGNOSIS — N40.1 BENIGN NON-NODULAR PROSTATIC HYPERPLASIA WITH LOWER URINARY TRACT SYMPTOMS: ICD-10-CM

## 2021-09-13 DIAGNOSIS — Z00.00 ENCOUNTER FOR MEDICAL EXAMINATION TO ESTABLISH CARE: Primary | ICD-10-CM

## 2021-09-13 DIAGNOSIS — M10.9 GOUT, UNSPECIFIED CAUSE, UNSPECIFIED CHRONICITY, UNSPECIFIED SITE: ICD-10-CM

## 2021-09-13 DIAGNOSIS — G60.9 IDIOPATHIC PERIPHERAL NEUROPATHY: ICD-10-CM

## 2021-09-13 DIAGNOSIS — R79.9 ABNORMAL FINDING OF BLOOD CHEMISTRY, UNSPECIFIED: ICD-10-CM

## 2021-09-13 PROCEDURE — 1036F TOBACCO NON-USER: CPT | Performed by: STUDENT IN AN ORGANIZED HEALTH CARE EDUCATION/TRAINING PROGRAM

## 2021-09-13 PROCEDURE — 99214 OFFICE O/P EST MOD 30 MIN: CPT | Performed by: STUDENT IN AN ORGANIZED HEALTH CARE EDUCATION/TRAINING PROGRAM

## 2021-09-13 PROCEDURE — 1123F ACP DISCUSS/DSCN MKR DOCD: CPT | Performed by: STUDENT IN AN ORGANIZED HEALTH CARE EDUCATION/TRAINING PROGRAM

## 2021-09-13 PROCEDURE — 4040F PNEUMOC VAC/ADMIN/RCVD: CPT | Performed by: STUDENT IN AN ORGANIZED HEALTH CARE EDUCATION/TRAINING PROGRAM

## 2021-09-13 PROCEDURE — G8417 CALC BMI ABV UP PARAM F/U: HCPCS | Performed by: STUDENT IN AN ORGANIZED HEALTH CARE EDUCATION/TRAINING PROGRAM

## 2021-09-13 PROCEDURE — G8427 DOCREV CUR MEDS BY ELIG CLIN: HCPCS | Performed by: STUDENT IN AN ORGANIZED HEALTH CARE EDUCATION/TRAINING PROGRAM

## 2021-09-13 RX ORDER — PREGABALIN 50 MG/1
50 CAPSULE ORAL 2 TIMES DAILY PRN
Qty: 90 CAPSULE | Refills: 3 | Status: SHIPPED | OUTPATIENT
Start: 2021-09-13 | End: 2022-04-01

## 2021-09-13 SDOH — ECONOMIC STABILITY: FOOD INSECURITY: WITHIN THE PAST 12 MONTHS, THE FOOD YOU BOUGHT JUST DIDN'T LAST AND YOU DIDN'T HAVE MONEY TO GET MORE.: NEVER TRUE

## 2021-09-13 SDOH — ECONOMIC STABILITY: FOOD INSECURITY: WITHIN THE PAST 12 MONTHS, YOU WORRIED THAT YOUR FOOD WOULD RUN OUT BEFORE YOU GOT MONEY TO BUY MORE.: NEVER TRUE

## 2021-09-13 ASSESSMENT — SOCIAL DETERMINANTS OF HEALTH (SDOH): HOW HARD IS IT FOR YOU TO PAY FOR THE VERY BASICS LIKE FOOD, HOUSING, MEDICAL CARE, AND HEATING?: NOT HARD AT ALL

## 2021-09-13 NOTE — PROGRESS NOTES
[address] [facility]  [address] [facility] [address] [address]  Dept: [phone]  Dept Fax: [phone]  Loc: [phone]      Subjective:     Chief Complaint   Patient presents with    New Patient     establish care    Discuss Medications       HPI:  Tamir Hodgson is a 79 y.o. male presenting for    Here to establish care with me. Moved recently to assisted living facility closer to this office. Idiopathic peripheral neuropathy- For at least 10 years. No h/o DM. Unsure of etiology. No clear occupational/toxic exposure. Says feet burn and tingle from toes up to mid shin. Hasn't fallen but uses cane. Doesn't walk barefoot. Had been taking gabapentin 600mg nightly, not lately. Thinks he may have had nerve conduction study done 40 years ago but doesn't quite remember. HTN-On metoprolol 50mg. Denies HA, visual changes, cp, palpitations, or swelling  HLD-On lipitor 20mg. LDL 72 checked 4/2021 HDL 65, TRG 92, total cholesterol 155. No h/o CAD. Denies chest pain, exertional or at rest.  GERD-Sx controlled w/ prilosec  Gout-Do not see recent labs. Not on medication recently. Says last gout attack 9 or 10 years. BPH-S/p TURP. On finasteride and flomax. PSA checked 04/2021 (1.63).  Seen Dr. Mireille Wheeler and Dmitri Plummer previously    ROS:   Review of Systems  Reviewed with patient and noted to be negative except that listed in HPI    PMHx:  Past Medical History:   Diagnosis Date    Benign non-nodular prostatic hyperplasia with lower urinary tract symptoms 8/16/2017    Essential hypertension 8/16/2017    Gastroesophageal reflux disease without esophagitis 8/16/2017    Gout 8/16/2017    Idiopathic peripheral neuropathy 2/19/2018    Mixed hyperlipidemia 8/16/2017    Peripheral neuropathic pain 8/16/2017    Primary osteoarthritis involving multiple joints 8/16/2017    Restless legs syndrome 8/16/2017     Patient Active Problem List   Diagnosis    Mixed hyperlipidemia    this visit. Objective:   PE:  BP (!) 130/90   Pulse 86   Temp 97.1 °F (36.2 °C) (Temporal)   Ht 6' (1.829 m)   Wt 203 lb 9.6 oz (92.4 kg)   SpO2 98%   BMI 27.61 kg/m²   Physical Exam  Constitutional:       General: He is not in acute distress. Appearance: Normal appearance. HENT:      Head: Normocephalic. Nose: Nose normal.      Mouth/Throat:      Mouth: Mucous membranes are moist.      Pharynx: Oropharynx is clear. No oropharyngeal exudate or posterior oropharyngeal erythema. Eyes:      General: No scleral icterus. Extraocular Movements: Extraocular movements intact. Conjunctiva/sclera: Conjunctivae normal.   Cardiovascular:      Rate and Rhythm: Normal rate and regular rhythm. Pulses: Normal pulses. Heart sounds: No murmur heard. Pulmonary:      Effort: Pulmonary effort is normal.      Breath sounds: Normal breath sounds. Abdominal:      General: There is no distension. Palpations: Abdomen is soft. There is no mass. Tenderness: There is no abdominal tenderness. Musculoskeletal:         General: Normal range of motion. Cervical back: Normal range of motion. Skin:     General: Skin is warm and dry. Capillary Refill: Capillary refill takes less than 2 seconds. Comments: No cuts or wounds seen on feet     Neurological:      General: No focal deficit present. Mental Status: He is alert and oriented to person, place, and time. Comments: Abnormal monofilament exam, 0/10 bilaterally  Absent propioception and vibratory sensations bilaterally   Psychiatric:         Mood and Affect: Mood normal.         Behavior: Behavior normal.         Thought Content: Thought content normal.            Assessment & Plan   Georgiana Mooney was seen today for new patient and discuss medications.     Diagnoses and all orders for this visit:    Encounter for medical examination to establish care    Idiopathic peripheral neuropathy  -     Hemoglobin A1C; Future  - TSH WITH REFLEX TO FT4; Future  -     Vitamin B12 & Folate; Future  -     CBC Auto Differential; Future  -     Nerve conduction test; Future  -     pregabalin (LYRICA) 50 MG capsule; Take 1 capsule by mouth 2 times daily as needed (neuropathy) for up to 30 days. Essential hypertension  -Continue to monitor. Continue metoprolol 50mg for now  -     Microalbumin, Ur; Future  -     Comprehensive Metabolic Panel; Future  -     Lipid Panel; Future    Mixed hyperlipidemia  -Labs reviewed. Continue lipitor 20mg    Gastroesophageal reflux disease without esophagitis  -Continue prilosec    Gout, unspecified cause, unspecified chronicity, unspecified site  -     Uric Acid; Future    Benign non-nodular prostatic hyperplasia with lower urinary tract symptoms  -Continue finasteride and flomax    Abnormal finding of blood chemistry, unspecified   -     Hemoglobin A1C; Future        Return in about 5 weeks (around 10/18/2021). All questions were answered. Medications, including possible adverse effects, and instructions were reviewed and  understanding was confirmed. Follow-up recommendations, including when to contact or return to office (ie; if symptoms worsen or fail to improve), were discussed and acknowledged.     Electronically signed by Babak Louis MD on 9/13/21 at 10:55 AM CDT

## 2021-09-13 NOTE — TELEPHONE ENCOUNTER
Called patient to let him know about his nerve conduction test. Patient told me that that day did not work for him so I advised patient to call and reschedule at his convenience. Patient KASSIDY.

## 2021-09-14 ENCOUNTER — TELEPHONE (OUTPATIENT)
Dept: PRIMARY CARE CLINIC | Age: 81
End: 2021-09-14

## 2021-09-14 DIAGNOSIS — G60.9 IDIOPATHIC PERIPHERAL NEUROPATHY: Primary | ICD-10-CM

## 2021-09-14 NOTE — TELEPHONE ENCOUNTER
Antoine from imaging called needing the order for EMG be faxed.  I have faxed the order to [phone]

## 2021-09-15 DIAGNOSIS — M19.90 ARTHRITIS: ICD-10-CM

## 2021-09-15 DIAGNOSIS — N40.0 BENIGN PROSTATIC HYPERPLASIA, UNSPECIFIED WHETHER LOWER URINARY TRACT SYMPTOMS PRESENT: ICD-10-CM

## 2021-09-15 RX ORDER — ETODOLAC 400 MG/1
400 TABLET, FILM COATED ORAL DAILY
Qty: 90 TABLET | Refills: 1 | Status: SHIPPED | OUTPATIENT
Start: 2021-09-15 | End: 2022-04-01 | Stop reason: SDUPTHER

## 2021-09-15 RX ORDER — TAMSULOSIN HYDROCHLORIDE 0.4 MG/1
0.4 CAPSULE ORAL DAILY
Qty: 90 CAPSULE | Refills: 1 | Status: SHIPPED | OUTPATIENT
Start: 2021-09-15 | End: 2022-03-28 | Stop reason: SDUPTHER

## 2021-09-23 ENCOUNTER — TELEPHONE (OUTPATIENT)
Dept: PRIMARY CARE CLINIC | Age: 81
End: 2021-09-23

## 2021-09-24 ENCOUNTER — HOSPITAL ENCOUNTER (OUTPATIENT)
Dept: NEUROLOGY | Age: 81
Discharge: HOME | End: 2021-09-24
Payer: MEDICARE

## 2021-09-24 DIAGNOSIS — G60.9 IDIOPATHIC PERIPHERAL NEUROPATHY: ICD-10-CM

## 2021-09-24 PROCEDURE — 95886 MUSC TEST DONE W/N TEST COMP: CPT | Performed by: PSYCHIATRY & NEUROLOGY

## 2021-09-24 PROCEDURE — 95909 NRV CNDJ TST 5-6 STUDIES: CPT

## 2021-09-24 PROCEDURE — 95886 MUSC TEST DONE W/N TEST COMP: CPT

## 2021-09-24 PROCEDURE — 95909 NRV CNDJ TST 5-6 STUDIES: CPT | Performed by: PSYCHIATRY & NEUROLOGY

## 2021-10-01 ENCOUNTER — TELEPHONE (OUTPATIENT)
Dept: PRIMARY CARE CLINIC | Age: 81
End: 2021-10-01

## 2021-10-01 NOTE — TELEPHONE ENCOUNTER
----- Message from Toby Barton sent at 10/1/2021  9:34 AM CDT -----  Subject: Message to Provider    QUESTIONS  Information for Provider? Pt wants to know if any lab work is needed to be   done prior to appointment on 10/22.   ---------------------------------------------------------------------------  --------------  [address]  What is the best way for the office to contact you? OK to leave message on   voicemail  Preferred Call Back Phone Number? [phone]  ---------------------------------------------------------------------------  --------------  SCRIPT ANSWERS  Relationship to Patient?  Self

## 2021-10-22 ENCOUNTER — OFFICE VISIT (OUTPATIENT)
Dept: PRIMARY CARE CLINIC | Age: 81
End: 2021-10-22
Payer: MEDICARE

## 2021-10-22 VITALS
OXYGEN SATURATION: 97 % | HEIGHT: 73 IN | HEART RATE: 66 BPM | SYSTOLIC BLOOD PRESSURE: 130 MMHG | WEIGHT: 204 LBS | TEMPERATURE: 97 F | DIASTOLIC BLOOD PRESSURE: 72 MMHG | BODY MASS INDEX: 27.04 KG/M2

## 2021-10-22 DIAGNOSIS — G60.9 IDIOPATHIC PERIPHERAL NEUROPATHY: Primary | ICD-10-CM

## 2021-10-22 DIAGNOSIS — Z23 NEED FOR INFLUENZA VACCINATION: ICD-10-CM

## 2021-10-22 DIAGNOSIS — M10.9 GOUT, UNSPECIFIED CAUSE, UNSPECIFIED CHRONICITY, UNSPECIFIED SITE: ICD-10-CM

## 2021-10-22 PROCEDURE — G8417 CALC BMI ABV UP PARAM F/U: HCPCS | Performed by: STUDENT IN AN ORGANIZED HEALTH CARE EDUCATION/TRAINING PROGRAM

## 2021-10-22 PROCEDURE — 4040F PNEUMOC VAC/ADMIN/RCVD: CPT | Performed by: STUDENT IN AN ORGANIZED HEALTH CARE EDUCATION/TRAINING PROGRAM

## 2021-10-22 PROCEDURE — 1123F ACP DISCUSS/DSCN MKR DOCD: CPT | Performed by: STUDENT IN AN ORGANIZED HEALTH CARE EDUCATION/TRAINING PROGRAM

## 2021-10-22 PROCEDURE — 99214 OFFICE O/P EST MOD 30 MIN: CPT | Performed by: STUDENT IN AN ORGANIZED HEALTH CARE EDUCATION/TRAINING PROGRAM

## 2021-10-22 PROCEDURE — G8427 DOCREV CUR MEDS BY ELIG CLIN: HCPCS | Performed by: STUDENT IN AN ORGANIZED HEALTH CARE EDUCATION/TRAINING PROGRAM

## 2021-10-22 PROCEDURE — G8484 FLU IMMUNIZE NO ADMIN: HCPCS | Performed by: STUDENT IN AN ORGANIZED HEALTH CARE EDUCATION/TRAINING PROGRAM

## 2021-10-22 PROCEDURE — G0008 ADMIN INFLUENZA VIRUS VAC: HCPCS | Performed by: STUDENT IN AN ORGANIZED HEALTH CARE EDUCATION/TRAINING PROGRAM

## 2021-10-22 PROCEDURE — 90694 VACC AIIV4 NO PRSRV 0.5ML IM: CPT | Performed by: STUDENT IN AN ORGANIZED HEALTH CARE EDUCATION/TRAINING PROGRAM

## 2021-10-22 PROCEDURE — 1036F TOBACCO NON-USER: CPT | Performed by: STUDENT IN AN ORGANIZED HEALTH CARE EDUCATION/TRAINING PROGRAM

## 2021-10-22 RX ORDER — ALLOPURINOL 100 MG/1
100 TABLET ORAL DAILY
Qty: 90 TABLET | Refills: 1 | Status: SHIPPED | OUTPATIENT
Start: 2021-10-22 | End: 2022-04-01 | Stop reason: SDUPTHER

## 2021-10-22 NOTE — PROGRESS NOTES
[address] [facility]  [address] [address]  [address] [address]  Dept: [phone]  Dept Fax: [phone]  Loc: [phone]      Subjective:     Chief Complaint   Patient presents with    3 Month Follow-Up    Results     Patient states he had some test completed he is needing results on them. HPI:    Cece Galvan is a 79 y.o. male presenting for    Peripheral neuropathy  -Lyrica 50mg bid is helping-  -NCS done, shows s/o severe generalized peripheral neuropathy w/ both axonal and demyelinating feature. -Wants to wait on lyrica increase or seeing neurology until next visit    Gout-Uric acid 6.3. Not on medication. Last gout attack 7-8 years ago. Doesn't eat a lot of red meat    Other labs reviewed today. Renal fxn stable. Otherwise unremarkable.     Would like flu shot today    ROS:   Review of Systems  Reviewed with patient and noted to be negative except that listed in HPI    PMHx:  Past Medical History:   Diagnosis Date    Benign non-nodular prostatic hyperplasia with lower urinary tract symptoms 8/16/2017    Essential hypertension 8/16/2017    Gastroesophageal reflux disease without esophagitis 8/16/2017    Gout 8/16/2017    Idiopathic peripheral neuropathy 2/19/2018    Mixed hyperlipidemia 8/16/2017    Peripheral neuropathic pain 8/16/2017    Primary osteoarthritis involving multiple joints 8/16/2017    Restless legs syndrome 8/16/2017     Patient Active Problem List   Diagnosis    Mixed hyperlipidemia    Essential hypertension    Restless legs syndrome    Gastroesophageal reflux disease without esophagitis    Peripheral neuropathic pain    Primary osteoarthritis involving multiple joints    Gout    Benign non-nodular prostatic hyperplasia with lower urinary tract symptoms    Elevated PSA    Idiopathic peripheral neuropathy    Chronic idiopathic constipation       PSHx:  Past Surgical History:   Procedure Laterality Date    INGUINAL HERNIA REPAIR Bilateral     TURP         PFHx:  Family History   Problem Relation Age of Onset    Uterine Cancer Mother     Coronary Art Dis Father         MI at 62    COPD Sister     Stroke Brother        SocialHx:  Social History     Tobacco Use    Smoking status: Former Smoker     Packs/day: 3.00     Years: 30.00     Pack years: 90.00     Types: Cigarettes     Quit date: 1975     Years since quittin.8    Smokeless tobacco: Never Used   Substance Use Topics    Alcohol use: No       Allergies: Allergies   Allergen Reactions    Codeine Nausea Only       Medications:  Current Outpatient Medications   Medication Sig Dispense Refill    allopurinol (ZYLOPRIM) 100 MG tablet Take 1 tablet by mouth daily 90 tablet 1    tamsulosin (FLOMAX) 0.4 MG capsule Take 1 capsule by mouth daily 90 capsule 1    etodolac (LODINE) 400 MG tablet Take 1 tablet by mouth daily 90 tablet 1    pregabalin (LYRICA) 50 MG capsule Take 1 capsule by mouth 2 times daily as needed (neuropathy) for up to 30 days. 90 capsule 3    finasteride (PROSCAR) 5 MG tablet TAKE 1 TABLET DAILY AS DIRECTED. 90 tablet 1    metoprolol succinate (TOPROL XL) 50 MG extended release tablet TAKE 1 TABLET EVERY DAY 90 tablet 1    atorvastatin (LIPITOR) 20 MG tablet Take 1 tablet by mouth daily 90 tablet 3    omeprazole (PRILOSEC) 20 MG delayed release capsule Take 1 capsule by mouth daily 90 capsule 1    gabapentin (NEURONTIN) 300 MG capsule 2 tablets at bedtime M79.2 (Patient not taking: Reported on 2021) 180 capsule 1     No current facility-administered medications for this visit. Objective:   PE:  /72   Pulse 66   Temp 97 °F (36.1 °C)   Ht 6' 1\" (1.854 m)   Wt 204 lb (92.5 kg)   SpO2 97%   BMI 26.91 kg/m²   Physical Exam  Constitutional:       General: He is not in acute distress. Appearance: Normal appearance. HENT:      Head: Normocephalic.       Nose: Nose normal.      Mouth/Throat: Mouth: Mucous membranes are moist.      Pharynx: Oropharynx is clear. No oropharyngeal exudate or posterior oropharyngeal erythema. Eyes:      General: No scleral icterus. Extraocular Movements: Extraocular movements intact. Conjunctiva/sclera: Conjunctivae normal.   Cardiovascular:      Rate and Rhythm: Normal rate and regular rhythm. Pulses: Normal pulses. Heart sounds: No murmur heard. Pulmonary:      Effort: Pulmonary effort is normal.      Breath sounds: Normal breath sounds. Abdominal:      General: There is no distension. Palpations: Abdomen is soft. There is no mass. Tenderness: There is no abdominal tenderness. Musculoskeletal:         General: Normal range of motion. Cervical back: Normal range of motion. Skin:     General: Skin is warm and dry. Capillary Refill: Capillary refill takes less than 2 seconds. Neurological:      General: No focal deficit present. Mental Status: He is alert and oriented to person, place, and time. Comments: Neurologic exam unchanged from previous visit. Psychiatric:         Mood and Affect: Mood normal.         Behavior: Behavior normal.         Thought Content: Thought content normal.            Assessment & Plan   Abi Scott was seen today for 3 month follow-up and results. Diagnoses and all orders for this visit:    Idiopathic peripheral neuropathy  -Continue lyrica 50mg BID for now per pt request. Follow up in 2 mo to reassess sx. Discussed possibly referring pt to neurology     Gout, unspecified cause, unspecified chronicity, unspecified site  -Uric acid slightly above goal. Recheck labs at next visit  -     allopurinol (ZYLOPRIM) 100 MG tablet; Take 1 tablet by mouth daily      Return in about 2 months (around 12/22/2021) for Medicare annual wellness. All questions were answered. Medications, including possible adverse effects, and instructions were reviewed and  understanding was confirmed.    Follow-up recommendations, including when to contact or return to office (ie; if symptoms worsen or fail to improve), were discussed and acknowledged.     Electronically signed by Kip Romero MD on 10/22/21 at 2:33 PM CDT

## 2022-01-24 ASSESSMENT — PATIENT HEALTH QUESTIONNAIRE - PHQ9
SUM OF ALL RESPONSES TO PHQ9 QUESTIONS 1 & 2: 0
SUM OF ALL RESPONSES TO PHQ QUESTIONS 1-9: 0
1. LITTLE INTEREST OR PLEASURE IN DOING THINGS: 0
2. FEELING DOWN, DEPRESSED OR HOPELESS: 0
SUM OF ALL RESPONSES TO PHQ QUESTIONS 1-9: 0

## 2022-01-24 ASSESSMENT — LIFESTYLE VARIABLES
AUDIT-C TOTAL SCORE: INCOMPLETE
HOW OFTEN DO YOU HAVE A DRINK CONTAINING ALCOHOL: NEVER
HOW OFTEN DO YOU HAVE A DRINK CONTAINING ALCOHOL: 0
AUDIT TOTAL SCORE: INCOMPLETE

## 2022-01-28 ENCOUNTER — OFFICE VISIT (OUTPATIENT)
Dept: PRIMARY CARE CLINIC | Age: 82
End: 2022-01-28
Payer: MEDICARE

## 2022-01-28 VITALS
TEMPERATURE: 97.4 F | DIASTOLIC BLOOD PRESSURE: 78 MMHG | HEIGHT: 73 IN | HEART RATE: 81 BPM | OXYGEN SATURATION: 96 % | BODY MASS INDEX: 27.8 KG/M2 | SYSTOLIC BLOOD PRESSURE: 120 MMHG | WEIGHT: 209.8 LBS

## 2022-01-28 DIAGNOSIS — Z00.00 ROUTINE GENERAL MEDICAL EXAMINATION AT A HEALTH CARE FACILITY: Primary | ICD-10-CM

## 2022-01-28 DIAGNOSIS — N40.1 BENIGN NON-NODULAR PROSTATIC HYPERPLASIA WITH LOWER URINARY TRACT SYMPTOMS: ICD-10-CM

## 2022-01-28 DIAGNOSIS — K21.9 GASTROESOPHAGEAL REFLUX DISEASE WITHOUT ESOPHAGITIS: ICD-10-CM

## 2022-01-28 DIAGNOSIS — I10 ESSENTIAL HYPERTENSION: ICD-10-CM

## 2022-01-28 DIAGNOSIS — G60.9 IDIOPATHIC PERIPHERAL NEUROPATHY: ICD-10-CM

## 2022-01-28 DIAGNOSIS — M10.9 GOUT, UNSPECIFIED CAUSE, UNSPECIFIED CHRONICITY, UNSPECIFIED SITE: ICD-10-CM

## 2022-01-28 LAB
MICROALBUMIN UR-MCNC: 3.6 MG/DL (ref 0–19)
URIC ACID, SERUM: 4.6 MG/DL (ref 3.4–7)

## 2022-01-28 PROCEDURE — G8417 CALC BMI ABV UP PARAM F/U: HCPCS | Performed by: STUDENT IN AN ORGANIZED HEALTH CARE EDUCATION/TRAINING PROGRAM

## 2022-01-28 PROCEDURE — G8427 DOCREV CUR MEDS BY ELIG CLIN: HCPCS | Performed by: STUDENT IN AN ORGANIZED HEALTH CARE EDUCATION/TRAINING PROGRAM

## 2022-01-28 PROCEDURE — 1036F TOBACCO NON-USER: CPT | Performed by: STUDENT IN AN ORGANIZED HEALTH CARE EDUCATION/TRAINING PROGRAM

## 2022-01-28 PROCEDURE — 1123F ACP DISCUSS/DSCN MKR DOCD: CPT | Performed by: STUDENT IN AN ORGANIZED HEALTH CARE EDUCATION/TRAINING PROGRAM

## 2022-01-28 PROCEDURE — G8484 FLU IMMUNIZE NO ADMIN: HCPCS | Performed by: STUDENT IN AN ORGANIZED HEALTH CARE EDUCATION/TRAINING PROGRAM

## 2022-01-28 PROCEDURE — 99214 OFFICE O/P EST MOD 30 MIN: CPT | Performed by: STUDENT IN AN ORGANIZED HEALTH CARE EDUCATION/TRAINING PROGRAM

## 2022-01-28 PROCEDURE — 4040F PNEUMOC VAC/ADMIN/RCVD: CPT | Performed by: STUDENT IN AN ORGANIZED HEALTH CARE EDUCATION/TRAINING PROGRAM

## 2022-01-28 PROCEDURE — G0439 PPPS, SUBSEQ VISIT: HCPCS | Performed by: STUDENT IN AN ORGANIZED HEALTH CARE EDUCATION/TRAINING PROGRAM

## 2022-01-28 RX ORDER — OMEPRAZOLE 20 MG/1
20 CAPSULE, DELAYED RELEASE ORAL DAILY
Qty: 90 CAPSULE | Refills: 3 | Status: SHIPPED | OUTPATIENT
Start: 2022-01-28 | End: 2022-04-01 | Stop reason: SDUPTHER

## 2022-01-28 RX ORDER — PREGABALIN 100 MG/1
100 CAPSULE ORAL 2 TIMES DAILY
Qty: 60 CAPSULE | Refills: 3 | Status: SHIPPED | OUTPATIENT
Start: 2022-01-28 | End: 2022-04-01 | Stop reason: SDUPTHER

## 2022-01-28 ASSESSMENT — PATIENT HEALTH QUESTIONNAIRE - PHQ9
SUM OF ALL RESPONSES TO PHQ QUESTIONS 1-9: 0
SUM OF ALL RESPONSES TO PHQ9 QUESTIONS 1 & 2: 0
SUM OF ALL RESPONSES TO PHQ QUESTIONS 1-9: 0
2. FEELING DOWN, DEPRESSED OR HOPELESS: 0
SUM OF ALL RESPONSES TO PHQ QUESTIONS 1-9: 0
SUM OF ALL RESPONSES TO PHQ QUESTIONS 1-9: 0
1. LITTLE INTEREST OR PLEASURE IN DOING THINGS: 0

## 2022-01-28 ASSESSMENT — LIFESTYLE VARIABLES: HOW OFTEN DO YOU HAVE A DRINK CONTAINING ALCOHOL: 0

## 2022-01-28 NOTE — PROGRESS NOTES
Patient is here for 2 separate reasons: annual wellness visit as well as acute and chronic issues. Medicare Annual Wellness Visit  Name: Teo Poole Date: 2022   MRN: [id number] Sex: Male   Age: 79 y.o. Ethnicity: Unavailable / Unknown   : 1940 Race: White (non-)      Henri Atkinson is here for Medicare AWV and Discuss Medications (Patient states Susi Olvera is not working for him like gabapentin did )    Screenings for behavioral, psychosocial and functional/safety risks, and cognitive dysfunction are all negative except as indicated below. These results, as well as other patient data from the [address] form, are documented in Flowsheets linked to this Encounter. Allergies   Allergen Reactions    Codeine Nausea Only         Prior to Visit Medications    Medication Sig Taking? Authorizing Provider   pregabalin (LYRICA) 100 MG capsule Take 1 capsule by mouth 2 times daily for 30 days. Yes Gabi Thomas MD   omeprazole (PRILOSEC) 20 MG delayed release capsule Take 1 capsule by mouth daily Yes Gabi Thomas MD   allopurinol (ZYLOPRIM) 100 MG tablet Take 1 tablet by mouth daily Yes Gabi Thomas MD   tamsulosin (FLOMAX) 0.4 MG capsule Take 1 capsule by mouth daily Yes Margy Cheek MD   etodolac (LODINE) 400 MG tablet Take 1 tablet by mouth daily Yes Margy Cheek MD   pregabalin (LYRICA) 50 MG capsule Take 1 capsule by mouth 2 times daily as needed (neuropathy) for up to 30 days. Yes Gabi Thomas MD   finasteride (PROSCAR) 5 MG tablet TAKE 1 TABLET DAILY AS DIRECTED.  Yes Margy Cheek MD   metoprolol succinate (TOPROL XL) 50 MG extended release tablet TAKE 1 TABLET EVERY DAY Yes Margy Cheek MD   atorvastatin (LIPITOR) 20 MG tablet Take 1 tablet by mouth daily Yes Margy Cheek MD         Past Medical History:   Diagnosis Date    Benign non-nodular prostatic hyperplasia with lower urinary tract symptoms 2017    Essential hypertension 2017    Gastroesophageal reflux disease without esophagitis 8/16/2017    Gout 8/16/2017    Idiopathic peripheral neuropathy 2/19/2018    Mixed hyperlipidemia 8/16/2017    Peripheral neuropathic pain 8/16/2017    Primary osteoarthritis involving multiple joints 8/16/2017    Restless legs syndrome 8/16/2017       Past Surgical History:   Procedure Laterality Date    INGUINAL HERNIA REPAIR Bilateral 1963    TURP  2003         Family History   Problem Relation Age of Onset    Uterine Cancer Mother     Coronary Art Dis Father         MI at 62    COPD Sister     Stroke Brother        CareTeam (Including outside providers/suppliers regularly involved in providing care):   Patient Care Team:  Chela Clayton MD as PCP - General (Family Medicine)  Chela Clayton MD as PCP - [facility] Empaneled Provider    Wt Readings from Last 3 Encounters:   01/28/22 209 lb 12.8 oz (95.2 kg)   10/22/21 204 lb (92.5 kg)   09/13/21 203 lb 9.6 oz (92.4 kg)     Vitals:    01/28/22 0905   BP: 120/78   Pulse: 81   Temp: 97.4 °F (36.3 °C)   SpO2: 96%   Weight: 209 lb 12.8 oz (95.2 kg)   Height: 6' 1\" (1.854 m)     Body mass index is 27.68 kg/m². Based upon direct observation of the patient, evaluation of cognition reveals recent and remote memory intact. Patient's complete Health Risk Assessment and screening values have been reviewed and are found in Flowsheets. The following problems were reviewed today and where indicated follow up appointments were made and/or referrals ordered. Positive Risk Factor Screenings with Interventions:              General Health and ACP:  General  In general, how would you say your health is?: Excellent  In the past 7 days, have you experienced any of the following?  New or Increased Pain, New or Increased Fatigue, Loneliness, Social Isolation, Stress or Anger?: None of These  Do you get the social and emotional support that you need?: Yes  Do you have a Living Will?: (!) No  Advance Directives     Power of [address] [place] [address] Will ACP-Advance Directive ACP-Power of     Not on File Not on File Not on File Filed      General Health Risk Interventions:  · No Living Will: Advance Care Planning addressed with patient today    Health Habits/Nutrition:  Health Habits/Nutrition  Do you exercise for at least 20 minutes 2-3 times per week?: (!) No  Have you lost any weight without trying in the past 3 months?: No  Do you eat only one meal per day?: No  Have you seen the dentist within the past year?: Yes  Body mass index: (!) 27.68  Health Habits/Nutrition Interventions:  · Inadequate physical activity:  educational materials provided to promote increased physical activity      ADL:  ADLs  In the past 7 days, did you need help from others to perform any of the following everyday activities? Eating, dressing, grooming, bathing, toileting, or walking/balance?: None  In the past 7 days, did you need help from others to take care of any of the following?  Laundry, housekeeping, banking/finances, shopping, telephone use, food preparation, transportation, or taking medications?: (!) Transportation  ADL Interventions:  · Patient declines any further evaluation/treatment for this issue      Personalized Preventive Plan   Current Health Maintenance Status  Immunization History   Administered Date(s) Administered    COVID-19, Moderna, Booster, PF, 50mcg/0.25ml 11/10/2021    COVID-19, Moderna, Primary or Immunocompromised, PF, 100mcg/0.5mL 03/08/2021, 04/05/2021    Influenza, High Dose (Fluzone 65 yrs and older) 10/12/2016, 10/12/2017, 09/17/2018    Influenza, Quadv, adjuvanted, 65 yrs +, IM, PF (Fluad) 10/12/2020, 10/22/2021    Pneumococcal Conjugate 13-valent ([id number]) 10/12/2016    Pneumococcal Polysaccharide ([id number]) 10/05/2015    Tdap (Boostrix, Adacel) 09/17/2018    Zoster Recombinant (Shingrix) 10/12/2020, 01/12/2021        Health Maintenance   Topic Date Due    Annual Wellness Visit (AWV)  11/06/2021    PSA counseling  04/01/2022  Lipid screen  09/13/2022    Potassium monitoring  09/13/2022    Creatinine monitoring  09/13/2022    Depression Screen  01/24/2023    DTaP/Tdap/Td vaccine (2 - Td or Tdap) 09/17/2028    Colon cancer screen colonoscopy  11/06/2117    Flu vaccine  Completed    Shingles Vaccine  Completed    Pneumococcal 65+ years Vaccine  Completed    COVID-19 Vaccine  Completed    Hepatitis A vaccine  Aged Out    Hepatitis B vaccine  Aged Out    Hib vaccine  Aged Out    Meningococcal (ACWY) vaccine  Aged Out     Recommendations for Drivr Due: see orders and patient instructions/AVS.  . Recommended screening schedule for the next 5-10 years is provided to the patient in written form: see Patient Instructions/AVS.    Moe Clements was seen today for medicare awv and discuss medications. Diagnoses and all orders for this visit:    Routine general medical examination at a health care facility    Idiopathic peripheral neuropathy  -     pregabalin (LYRICA) 100 MG capsule; Take 1 capsule by mouth 2 times daily for 30 days. Gout, unspecified cause, unspecified chronicity, unspecified site  -     Uric Acid; Future    Essential hypertension  -     Microalbumin, Ur; Future    Gastroesophageal reflux disease without esophagitis  -     omeprazole (PRILOSEC) 20 MG delayed release capsule;  Take 1 capsule by mouth daily    Benign non-nodular prostatic hyperplasia with lower urinary tract symptoms  -     External Referral To Urology                 BAILEE Wilson [address]  [address] [address]  Dept: [phone]  Dept Fax: [phone]  Loc: [phone]      Subjective:     Chief Complaint   Patient presents with    Medicare AWV    Discuss Medications     Patient states Libby Alexander is not working for him like gabapentin did        HPI:  Sabino Larson is a 79 y.o. male presenting for       Peripheral neuropathy  NCS done, shows s/o severe generalized peripheral neuropathy w/ both axonal and demyelinating feature. On lyrica 50mg bid. Describes breakthrough sx on this dose. Gout  -Uric acid 6.3 (2021). Started on allopurinol 100mg. He is taking this. Due to have lab rechecked    ROS:   Reviewed with patient and noted to be negative except that listed in HPI    PMHx:  Past Medical History:   Diagnosis Date    Benign non-nodular prostatic hyperplasia with lower urinary tract symptoms 2017    Essential hypertension 2017    Gastroesophageal reflux disease without esophagitis 2017    Gout 2017    Idiopathic peripheral neuropathy 2018    Mixed hyperlipidemia 2017    Peripheral neuropathic pain 2017    Primary osteoarthritis involving multiple joints 2017    Restless legs syndrome 2017     Patient Active Problem List   Diagnosis    Mixed hyperlipidemia    Essential hypertension    Restless legs syndrome    Gastroesophageal reflux disease without esophagitis    Peripheral neuropathic pain    Primary osteoarthritis involving multiple joints    Gout    Benign non-nodular prostatic hyperplasia with lower urinary tract symptoms    Elevated PSA    Idiopathic peripheral neuropathy    Chronic idiopathic constipation       PSHx:  Past Surgical History:   Procedure Laterality Date    INGUINAL HERNIA REPAIR Bilateral 1963    TURP         PFHx:  Family History   Problem Relation Age of Onset    Uterine Cancer Mother     Coronary Art Dis Father         MI at 62    COPD Sister     Stroke Brother        SocialHx:  Social History     Tobacco Use    Smoking status: Former Smoker     Packs/day: 3.00     Years: 30.00     Pack years: 90.00     Types: Cigarettes     Quit date: 1975     Years since quittin.1    Smokeless tobacco: Never Used   Substance Use Topics    Alcohol use: No       Allergies:   Allergies   Allergen Reactions    Codeine Nausea Only       Medications:  Current Outpatient Medications Medication Sig Dispense Refill    pregabalin (LYRICA) 100 MG capsule Take 1 capsule by mouth 2 times daily for 30 days. 60 capsule 3    omeprazole (PRILOSEC) 20 MG delayed release capsule Take 1 capsule by mouth daily 90 capsule 3    allopurinol (ZYLOPRIM) 100 MG tablet Take 1 tablet by mouth daily 90 tablet 1    tamsulosin (FLOMAX) 0.4 MG capsule Take 1 capsule by mouth daily 90 capsule 1    etodolac (LODINE) 400 MG tablet Take 1 tablet by mouth daily 90 tablet 1    pregabalin (LYRICA) 50 MG capsule Take 1 capsule by mouth 2 times daily as needed (neuropathy) for up to 30 days. 90 capsule 3    finasteride (PROSCAR) 5 MG tablet TAKE 1 TABLET DAILY AS DIRECTED. 90 tablet 1    metoprolol succinate (TOPROL XL) 50 MG extended release tablet TAKE 1 TABLET EVERY DAY 90 tablet 1    atorvastatin (LIPITOR) 20 MG tablet Take 1 tablet by mouth daily 90 tablet 3     No current facility-administered medications for this visit. Objective:   PE:  /78   Pulse 81   Temp 97.4 °F (36.3 °C)   Ht 6' 1\" (1.854 m)   Wt 209 lb 12.8 oz (95.2 kg)   SpO2 96%   BMI 27.68 kg/m²   Physical Exam  Constitutional:       General: He is not in acute distress. Appearance: Normal appearance. HENT:      Head: Normocephalic. Nose: Nose normal.      Mouth/Throat:      Mouth: Mucous membranes are moist.      Pharynx: Oropharynx is clear. No oropharyngeal exudate or posterior oropharyngeal erythema. Eyes:      General: No scleral icterus. Extraocular Movements: Extraocular movements intact. Conjunctiva/sclera: Conjunctivae normal.   Cardiovascular:      Rate and Rhythm: Normal rate and regular rhythm. Pulses: Normal pulses. Heart sounds: No murmur heard. Pulmonary:      Effort: Pulmonary effort is normal.      Breath sounds: Normal breath sounds. Abdominal:      General: There is no distension. Palpations: Abdomen is soft. There is no mass. Tenderness:  There is no abdominal tenderness. Musculoskeletal:         General: Normal range of motion. Cervical back: Normal range of motion. Skin:     General: Skin is warm and dry. Capillary Refill: Capillary refill takes less than 2 seconds. Neurological:      General: No focal deficit present. Mental Status: He is alert and oriented to person, place, and time. Psychiatric:         Mood and Affect: Mood normal.         Behavior: Behavior normal.         Thought Content: Thought content normal.            Assessment & Plan   Vincent Hogan was seen today for medicare aw and discuss medications. Diagnoses and all orders for this visit:    Routine general medical examination at a health care facility    Idiopathic peripheral neuropathy  -Uncontrolled sx. Increase lyrica to 100mg bid  -     pregabalin (LYRICA) 100 MG capsule; Take 1 capsule by mouth 2 times daily for 30 days. Gout, unspecified cause, unspecified chronicity, unspecified site  -Recheck levels. Continue allopurinol 100mg daily for now  -     Uric Acid; Future    Essential hypertension  -At goal. Continue metoprolol       Microalbumin, Ur; Future    Gastroesophageal reflux disease without esophagitis  -     omeprazole (PRILOSEC) 20 MG delayed release capsule; Take 1 capsule by mouth daily    Benign non-nodular prostatic hyperplasia with lower urinary tract symptoms  -Will have pt follow up w/ urology. S/p TURP, not clear why pt is still on proscar  -     External Referral To Urology        Return for Medicare Annual Wellness Visit in 1 year. All questions were answered. Medications, including possible adverse effects, and instructions were reviewed and  understanding was confirmed. Follow-up recommendations, including when to contact or return to office (ie; if symptoms worsen or fail to improve), were discussed and acknowledged.     Electronically signed by Aisha Merino MD on 1/28/22 at 9:43 AM CST

## 2022-01-28 NOTE — PATIENT INSTRUCTIONS
Personalized Preventive Plan for [facility] - 1/28/2022  Medicare offers a range of preventive health benefits. Some of the tests and screenings are paid in full while other may be subject to a deductible, co-insurance, and/or copay. Some of these benefits include a comprehensive review of your medical history including lifestyle, illnesses that may run in your family, and various assessments and screenings as appropriate. After reviewing your medical record and screening and assessments performed today your provider may have ordered immunizations, labs, imaging, and/or referrals for you. A list of these orders (if applicable) as well as your Preventive Care list are included within your After Visit Summary for your review. Other Preventive Recommendations:    · A preventive eye exam performed by an eye specialist is recommended every 1-2 years to screen for glaucoma; cataracts, macular degeneration, and other eye disorders. · A preventive dental visit is recommended every 6 months. · Try to get at least 150 minutes of exercise per week or 10,000 steps per day on a pedometer . · Order or download the FREE \"Exercise & Physical Activity: Your Everyday Guide\" from [employer]. Call 3-893.193.9825 or search [employer] online. · You need 4581-8420 mg of calcium and 7058-7857 IU of vitamin D per day. It is possible to meet your calcium requirement with diet alone, but a vitamin D supplement is usually necessary to meet this goal.  · When exposed to the sun, use a sunscreen that protects against both UVA and UVB radiation with an SPF of 30 or greater. Reapply every 2 to 3 hours or after sweating, drying off with a towel, or swimming. · Always wear a seat belt when traveling in a car. Always wear a helmet when riding a bicycle or motorcycle.

## 2022-02-22 DIAGNOSIS — I10 ESSENTIAL HYPERTENSION: ICD-10-CM

## 2022-02-22 RX ORDER — METOPROLOL SUCCINATE 50 MG/1
TABLET, EXTENDED RELEASE ORAL
Qty: 90 TABLET | Refills: 1 | OUTPATIENT
Start: 2022-02-22

## 2022-02-25 ENCOUNTER — TELEPHONE (OUTPATIENT)
Dept: PRIMARY CARE CLINIC | Age: 82
End: 2022-02-25

## 2022-02-25 DIAGNOSIS — I10 ESSENTIAL HYPERTENSION: ICD-10-CM

## 2022-02-25 RX ORDER — METOPROLOL SUCCINATE 50 MG/1
TABLET, EXTENDED RELEASE ORAL
Qty: 90 TABLET | Refills: 1 | Status: SHIPPED | OUTPATIENT
Start: 2022-02-25 | End: 2022-04-01 | Stop reason: SDUPTHER

## 2022-02-25 NOTE — TELEPHONE ENCOUNTER
Oxana Dobson called to request a refill on his medication.       Last office visit : 1/28/2022   Next office visit : 4/1/2022     Requested Prescriptions     Signed Prescriptions Disp Refills    metoprolol succinate (TOPROL XL) 50 MG extended release tablet 90 tablet 1     Sig: [id number] Provider: Oneal Chilel     Ordering User: Germania Silva

## 2022-02-25 NOTE — TELEPHONE ENCOUNTER
----- Message from [employer] sent at 2/25/2022  8:51 AM CST -----  Subject: Refill Request    QUESTIONS  Name of Medication? metoprolol succinate (TOPROL XL) 50 MG extended   release tablet  Patient-reported dosage and instructions? 1/day  How many days do you have left? 3  Preferred Pharmacy? [facility] phone number (if available)? [phone]  ---------------------------------------------------------------------------  --------------  CALL BACK INFO  What is the best way for the office to contact you?  OK to leave message on   voicemail  Preferred Call Back Phone Number? [phone]

## 2022-02-25 NOTE — TELEPHONE ENCOUNTER
I made contact with pt and asked him to reach out to [facility] and he VU and said he would call them at  [phone].

## 2022-02-25 NOTE — TELEPHONE ENCOUNTER
----- Message from Vladimir Alvarez sent at 2/24/2022  8:59 AM CST -----  Subject: Message to Provider    QUESTIONS  Information for Provider? [facility] department is requesting for the   office to reach out to patient, to call them, to get scheduled. They have   been unable to reach him.  ---------------------------------------------------------------------------  --------------  CALL BACK INFO  What is the best way for the office to contact you? OK to leave message on   voicemail  Preferred Call Back Phone Number? [phone]  ---------------------------------------------------------------------------  --------------  SCRIPT ANSWERS  Relationship to Patient? Third Party  Representative Name?  [facility]

## 2022-02-28 RX ORDER — METOPROLOL SUCCINATE 50 MG/1
TABLET, EXTENDED RELEASE ORAL
Qty: 90 TABLET | Refills: 0 | OUTPATIENT
Start: 2022-02-28

## 2022-03-23 DIAGNOSIS — N40.0 BENIGN PROSTATIC HYPERPLASIA, UNSPECIFIED WHETHER LOWER URINARY TRACT SYMPTOMS PRESENT: ICD-10-CM

## 2022-03-23 DIAGNOSIS — M19.90 ARTHRITIS: ICD-10-CM

## 2022-03-23 RX ORDER — TAMSULOSIN HYDROCHLORIDE 0.4 MG/1
0.4 CAPSULE ORAL DAILY
Qty: 90 CAPSULE | Refills: 1 | Status: CANCELLED | OUTPATIENT
Start: 2022-03-23

## 2022-03-28 ENCOUNTER — TELEPHONE (OUTPATIENT)
Dept: PRIMARY CARE CLINIC | Age: 82
End: 2022-03-28

## 2022-03-28 DIAGNOSIS — N40.0 BENIGN PROSTATIC HYPERPLASIA, UNSPECIFIED WHETHER LOWER URINARY TRACT SYMPTOMS PRESENT: ICD-10-CM

## 2022-03-28 DIAGNOSIS — M19.90 ARTHRITIS: ICD-10-CM

## 2022-03-28 RX ORDER — TAMSULOSIN HYDROCHLORIDE 0.4 MG/1
0.4 CAPSULE ORAL DAILY
Qty: 90 CAPSULE | Refills: 1 | Status: SHIPPED | OUTPATIENT
Start: 2022-03-28 | End: 2022-04-01 | Stop reason: SDUPTHER

## 2022-03-28 RX ORDER — ETODOLAC 400 MG/1
TABLET, FILM COATED ORAL
Qty: 90 TABLET | Refills: 0 | OUTPATIENT
Start: 2022-03-28

## 2022-03-28 RX ORDER — ETODOLAC 400 MG/1
400 TABLET, FILM COATED ORAL DAILY
Qty: 90 TABLET | Refills: 1 | OUTPATIENT
Start: 2022-03-28

## 2022-03-28 NOTE — TELEPHONE ENCOUNTER
Patient requested medication refill sent medication in for 90 day supply with 1 additional refill.     ----- Message from Roxanetristen Dye sent at 3/28/2022 11:17 AM CDT -----  Subject: Refill Request    QUESTIONS  Name of Medication? tamsulosin (FLOMAX) 0.4 MG capsule  Patient-reported dosage and instructions? 0.4mg  How many days do you have left? 2  Preferred Pharmacy? [facility] phone number (if available)? [phone]  ---------------------------------------------------------------------------  --------------,  Name of Medication? etodolac (LODINE) 400 MG tablet  Patient-reported dosage and instructions? 400mg  How many days do you have left? 2  Preferred Pharmacy? [facility] phone number (if available)? [phone]  ---------------------------------------------------------------------------  --------------  CALL BACK INFO  What is the best way for the office to contact you?  OK to leave message on   voicemail  Preferred Call Back Phone Number? [phone]

## 2022-03-29 DIAGNOSIS — Z01.89 ROUTINE LAB DRAW: ICD-10-CM

## 2022-03-29 DIAGNOSIS — I10 ESSENTIAL HYPERTENSION: ICD-10-CM

## 2022-03-29 DIAGNOSIS — E74.819 DISORDERS OF GLUCOSE TRANSPORT, UNSPECIFIED (HCC): ICD-10-CM

## 2022-03-29 DIAGNOSIS — E78.2 MIXED HYPERLIPIDEMIA: Primary | ICD-10-CM

## 2022-03-29 DIAGNOSIS — E78.2 MIXED HYPERLIPIDEMIA: ICD-10-CM

## 2022-03-29 DIAGNOSIS — E55.9 VITAMIN D DEFICIENCY: ICD-10-CM

## 2022-03-29 DIAGNOSIS — Z13.1 DIABETES MELLITUS SCREENING: ICD-10-CM

## 2022-03-29 DIAGNOSIS — K59.04 CHRONIC IDIOPATHIC CONSTIPATION: ICD-10-CM

## 2022-03-29 DIAGNOSIS — M10.9 GOUT, UNSPECIFIED CAUSE, UNSPECIFIED CHRONICITY, UNSPECIFIED SITE: ICD-10-CM

## 2022-03-29 LAB
ALBUMIN SERPL-MCNC: 4.7 G/DL (ref 3.5–5.2)
ALP BLD-CCNC: 126 U/L (ref 40–130)
ALT SERPL-CCNC: 13 U/L (ref 5–41)
ANION GAP SERPL CALCULATED.3IONS-SCNC: 13 MMOL/L (ref 7–19)
AST SERPL-CCNC: 17 U/L (ref 5–40)
BASOPHILS ABSOLUTE: 0 K/UL (ref 0–0.2)
BASOPHILS RELATIVE PERCENT: 0.3 % (ref 0–1)
BILIRUB SERPL-MCNC: 0.8 MG/DL (ref 0.2–1.2)
BUN BLDV-MCNC: 29 MG/DL (ref 8–23)
CALCIUM SERPL-MCNC: 10.2 MG/DL (ref 8.8–10.2)
CHLORIDE BLD-SCNC: 105 MMOL/L (ref 98–111)
CHOLESTEROL, FASTING: 156 MG/DL (ref 160–199)
CO2: 26 MMOL/L (ref 22–29)
CREAT SERPL-MCNC: 1.2 MG/DL (ref 0.5–1.2)
EOSINOPHILS ABSOLUTE: 0.2 K/UL (ref 0–0.6)
EOSINOPHILS RELATIVE PERCENT: 2.1 % (ref 0–5)
GFR AFRICAN AMERICAN: >59
GFR NON-AFRICAN AMERICAN: 58
GLUCOSE BLD-MCNC: 101 MG/DL (ref 74–109)
HBA1C MFR BLD: 6 % (ref 4–6)
HCT VFR BLD CALC: 45.9 % (ref 42–52)
HDLC SERPL-MCNC: 55 MG/DL (ref 55–121)
HEMOGLOBIN: 14.2 G/DL (ref 14–18)
IMMATURE GRANULOCYTES #: 0 K/UL
LDL CHOLESTEROL CALCULATED: 76 MG/DL
LYMPHOCYTES ABSOLUTE: 1.9 K/UL (ref 1.1–4.5)
LYMPHOCYTES RELATIVE PERCENT: 24.3 % (ref 20–40)
MCH RBC QN AUTO: 27.8 PG (ref 27–31)
MCHC RBC AUTO-ENTMCNC: 30.9 G/DL (ref 33–37)
MCV RBC AUTO: 89.8 FL (ref 80–94)
MONOCYTES ABSOLUTE: 0.6 K/UL (ref 0–0.9)
MONOCYTES RELATIVE PERCENT: 7.5 % (ref 0–10)
NEUTROPHILS ABSOLUTE: 5.2 K/UL (ref 1.5–7.5)
NEUTROPHILS RELATIVE PERCENT: 65.4 % (ref 50–65)
PDW BLD-RTO: 13.7 % (ref 11.5–14.5)
PLATELET # BLD: 247 K/UL (ref 130–400)
PMV BLD AUTO: 10.9 FL (ref 9.4–12.4)
POTASSIUM SERPL-SCNC: 5.1 MMOL/L (ref 3.5–5)
RBC # BLD: 5.11 M/UL (ref 4.7–6.1)
SODIUM BLD-SCNC: 144 MMOL/L (ref 136–145)
TOTAL PROTEIN: 6.9 G/DL (ref 6.6–8.7)
TRIGLYCERIDE, FASTING: 123 MG/DL (ref 0–149)
URIC ACID, SERUM: 4.7 MG/DL (ref 3.4–7)
VITAMIN D 25-HYDROXY: 15.4 NG/ML
WBC # BLD: 8 K/UL (ref 4.8–10.8)

## 2022-04-01 ENCOUNTER — OFFICE VISIT (OUTPATIENT)
Dept: PRIMARY CARE CLINIC | Age: 82
End: 2022-04-01
Payer: MEDICARE

## 2022-04-01 VITALS
BODY MASS INDEX: 27.7 KG/M2 | OXYGEN SATURATION: 99 % | TEMPERATURE: 97.2 F | SYSTOLIC BLOOD PRESSURE: 128 MMHG | RESPIRATION RATE: 20 BRPM | WEIGHT: 209 LBS | HEIGHT: 73 IN | DIASTOLIC BLOOD PRESSURE: 60 MMHG | HEART RATE: 76 BPM

## 2022-04-01 DIAGNOSIS — M19.90 ARTHRITIS: ICD-10-CM

## 2022-04-01 DIAGNOSIS — G60.9 IDIOPATHIC PERIPHERAL NEUROPATHY: ICD-10-CM

## 2022-04-01 DIAGNOSIS — K21.9 GASTROESOPHAGEAL REFLUX DISEASE WITHOUT ESOPHAGITIS: ICD-10-CM

## 2022-04-01 DIAGNOSIS — M10.9 GOUT, UNSPECIFIED CAUSE, UNSPECIFIED CHRONICITY, UNSPECIFIED SITE: ICD-10-CM

## 2022-04-01 DIAGNOSIS — N40.0 BENIGN PROSTATIC HYPERPLASIA, UNSPECIFIED WHETHER LOWER URINARY TRACT SYMPTOMS PRESENT: ICD-10-CM

## 2022-04-01 DIAGNOSIS — E78.2 MIXED HYPERLIPIDEMIA: ICD-10-CM

## 2022-04-01 DIAGNOSIS — E55.9 VITAMIN D DEFICIENCY: Primary | ICD-10-CM

## 2022-04-01 DIAGNOSIS — I10 ESSENTIAL HYPERTENSION: ICD-10-CM

## 2022-04-01 PROCEDURE — 99214 OFFICE O/P EST MOD 30 MIN: CPT | Performed by: STUDENT IN AN ORGANIZED HEALTH CARE EDUCATION/TRAINING PROGRAM

## 2022-04-01 PROCEDURE — G8417 CALC BMI ABV UP PARAM F/U: HCPCS | Performed by: STUDENT IN AN ORGANIZED HEALTH CARE EDUCATION/TRAINING PROGRAM

## 2022-04-01 PROCEDURE — 4040F PNEUMOC VAC/ADMIN/RCVD: CPT | Performed by: STUDENT IN AN ORGANIZED HEALTH CARE EDUCATION/TRAINING PROGRAM

## 2022-04-01 PROCEDURE — G8427 DOCREV CUR MEDS BY ELIG CLIN: HCPCS | Performed by: STUDENT IN AN ORGANIZED HEALTH CARE EDUCATION/TRAINING PROGRAM

## 2022-04-01 PROCEDURE — 1123F ACP DISCUSS/DSCN MKR DOCD: CPT | Performed by: STUDENT IN AN ORGANIZED HEALTH CARE EDUCATION/TRAINING PROGRAM

## 2022-04-01 PROCEDURE — 1036F TOBACCO NON-USER: CPT | Performed by: STUDENT IN AN ORGANIZED HEALTH CARE EDUCATION/TRAINING PROGRAM

## 2022-04-01 RX ORDER — METOPROLOL SUCCINATE 50 MG/1
TABLET, EXTENDED RELEASE ORAL
Qty: 90 TABLET | Refills: 1 | Status: SHIPPED | OUTPATIENT
Start: 2022-04-01 | End: 2022-09-13

## 2022-04-01 RX ORDER — ETODOLAC 400 MG/1
400 TABLET, FILM COATED ORAL DAILY
Qty: 90 TABLET | Refills: 1 | Status: SHIPPED | OUTPATIENT
Start: 2022-04-01 | End: 2022-04-01 | Stop reason: SDUPTHER

## 2022-04-01 RX ORDER — ALLOPURINOL 100 MG/1
100 TABLET ORAL DAILY
Qty: 90 TABLET | Refills: 1 | Status: SHIPPED | OUTPATIENT
Start: 2022-04-01

## 2022-04-01 RX ORDER — TAMSULOSIN HYDROCHLORIDE 0.4 MG/1
0.4 CAPSULE ORAL DAILY
Qty: 90 CAPSULE | Refills: 1 | Status: SHIPPED | OUTPATIENT
Start: 2022-04-01 | End: 2022-09-29 | Stop reason: SDUPTHER

## 2022-04-01 RX ORDER — ERGOCALCIFEROL 1.25 MG/1
50000 CAPSULE ORAL WEEKLY
Qty: 5 CAPSULE | Refills: 3 | Status: SHIPPED | OUTPATIENT
Start: 2022-04-01

## 2022-04-01 RX ORDER — ETODOLAC 400 MG/1
400 TABLET, FILM COATED ORAL DAILY
Qty: 90 TABLET | Refills: 1 | Status: SHIPPED | OUTPATIENT
Start: 2022-04-01 | End: 2022-09-29 | Stop reason: SDUPTHER

## 2022-04-01 RX ORDER — OMEPRAZOLE 20 MG/1
20 CAPSULE, DELAYED RELEASE ORAL DAILY
Qty: 90 CAPSULE | Refills: 3 | Status: SHIPPED | OUTPATIENT
Start: 2022-04-01

## 2022-04-01 RX ORDER — FINASTERIDE 5 MG/1
TABLET, FILM COATED ORAL
Qty: 90 TABLET | Refills: 1 | Status: SHIPPED | OUTPATIENT
Start: 2022-04-01 | End: 2022-10-14 | Stop reason: ALTCHOICE

## 2022-04-01 RX ORDER — ATORVASTATIN CALCIUM 20 MG/1
20 TABLET, FILM COATED ORAL DAILY
Qty: 90 TABLET | Refills: 3 | Status: SHIPPED | OUTPATIENT
Start: 2022-04-01

## 2022-04-01 RX ORDER — PREGABALIN 100 MG/1
100 CAPSULE ORAL 2 TIMES DAILY
Qty: 60 CAPSULE | Refills: 3 | Status: SHIPPED | OUTPATIENT
Start: 2022-04-01 | End: 2022-10-14 | Stop reason: SDUPTHER

## 2022-04-01 NOTE — PROGRESS NOTES
[facility] PRIMARY CARE  [address] [facility] [address] [address]  Dept: [phone]  Dept Fax: [phone]  Loc: [phone]      Subjective:     Chief Complaint   Patient presents with    Medication Adjustment       HPI:  Ed Nance is a 79 y.o. male presenting for    Patient is following up. He had lab work done to check uric acid. This was 4.7, relatively unchanged from previous reading last year. He is on allopurinol 100 mg, he limits red meat in his diet. Lyrica was also increased at last visit, now 100 mg twice daily. He says this is helped his peripheral neuropathy, more so in left leg than right leg, but overall better. Labs otherwise show low vitamin D 15.4. Blood counts normal.  A1c 6.0, this has not changed very much the last couple years. Renal function stable with GFR 58. No microalbuminuria. Normal hepatic enzymes.   Cholesterol levels are at goal, he is on Lipitor 20 mg    ROS:   Reviewed with patient and noted to be negative except that listed in HPI    PMHx:  Past Medical History:   Diagnosis Date    Benign non-nodular prostatic hyperplasia with lower urinary tract symptoms 8/16/2017    Essential hypertension 8/16/2017    Gastroesophageal reflux disease without esophagitis 8/16/2017    Gout 8/16/2017    Idiopathic peripheral neuropathy 2/19/2018    Mixed hyperlipidemia 8/16/2017    Peripheral neuropathic pain 8/16/2017    Primary osteoarthritis involving multiple joints 8/16/2017    Restless legs syndrome 8/16/2017     Patient Active Problem List   Diagnosis    Mixed hyperlipidemia    Essential hypertension    Restless legs syndrome    Gastroesophageal reflux disease without esophagitis    Peripheral neuropathic pain    Primary osteoarthritis involving multiple joints    Gout    Benign non-nodular prostatic hyperplasia with lower urinary tract symptoms    Elevated PSA    Idiopathic peripheral neuropathy    Chronic idiopathic constipation       PSHx:  Past Surgical History:   Procedure Laterality Date    INGUINAL HERNIA REPAIR Bilateral     TURP         PFHx:  Family History   Problem Relation Age of Onset    Uterine Cancer Mother     Coronary Art Dis Father         MI at 62    COPD Sister     Stroke Brother        SocialHx:  Social History     Tobacco Use    Smoking status: Former Smoker     Packs/day: 3.00     Years: 30.00     Pack years: 90.00     Types: Cigarettes     Quit date: 1975     Years since quittin.3    Smokeless tobacco: Never Used   Substance Use Topics    Alcohol use: No       Allergies: Allergies   Allergen Reactions    Codeine Nausea Only       Medications:  Current Outpatient Medications   Medication Sig Dispense Refill    allopurinol (ZYLOPRIM) 100 MG tablet Take 1 tablet by mouth daily 90 tablet 1    atorvastatin (LIPITOR) 20 MG tablet Take 1 tablet by mouth daily 90 tablet 3    finasteride (PROSCAR) 5 MG tablet TAKE 1 TABLET DAILY AS DIRECTED. 90 tablet 1    metoprolol succinate (TOPROL XL) 50 MG extended release tablet TAKE 1 TABLET EVERY DAY 90 tablet 1    omeprazole (PRILOSEC) 20 MG delayed release capsule Take 1 capsule by mouth daily 90 capsule 3    tamsulosin (FLOMAX) 0.4 MG capsule Take 1 capsule by mouth daily 90 capsule 1    pregabalin (LYRICA) 100 MG capsule Take 1 capsule by mouth 2 times daily for 30 days. 60 capsule 3    vitamin D (ERGOCALCIFEROL) 1.25 MG (01187 UT) CAPS capsule Take 1 capsule by mouth once a week 5 capsule 3    etodolac (LODINE) 400 MG tablet Take 1 tablet by mouth daily 90 tablet 1     No current facility-administered medications for this visit. Objective:   PE:  /60   Pulse 76   Temp 97.2 °F (36.2 °C) (Temporal)   Resp 20   Ht 6' 1\" (1.854 m)   Wt 209 lb (94.8 kg)   SpO2 99%   BMI 27.57 kg/m²   Physical Exam  Constitutional:       General: He is not in acute distress. Appearance: Normal appearance.    HENT: Head: Normocephalic. Nose: Nose normal.      Mouth/Throat:      Mouth: Mucous membranes are moist.      Pharynx: Oropharynx is clear. No oropharyngeal exudate or posterior oropharyngeal erythema. Eyes:      General: No scleral icterus. Extraocular Movements: Extraocular movements intact. Conjunctiva/sclera: Conjunctivae normal.   Cardiovascular:      Rate and Rhythm: Normal rate and regular rhythm. Pulses: Normal pulses. Heart sounds: No murmur heard. Pulmonary:      Effort: Pulmonary effort is normal.      Breath sounds: Normal breath sounds. Musculoskeletal:         General: Normal range of motion. Cervical back: Normal range of motion. Skin:     General: Skin is warm and dry. Capillary Refill: Capillary refill takes less than 2 seconds. Neurological:      General: No focal deficit present. Mental Status: He is alert and oriented to person, place, and time. Psychiatric:         Mood and Affect: Mood normal.         Behavior: Behavior normal.         Thought Content: Thought content normal.            Assessment & Plan   Vanessa Fernandez was seen today for medication adjustment. Diagnoses and all orders for this visit:    Vitamin D deficiency  -     vitamin D (ERGOCALCIFEROL) 1.25 MG (84721 UT) CAPS capsule; Take 1 capsule by mouth once a week    Gout, unspecified cause, unspecified chronicity, unspecified site  -Levels at goal.  Continue allopurinol at current dose  -     allopurinol (ZYLOPRIM) 100 MG tablet; Take 1 tablet by mouth daily    Idiopathic peripheral neuropathy  -General improvement noted on increased dose. Continue 100 mg twice daily for now. Will reevaluate next visit  -     pregabalin (LYRICA) 100 MG capsule; Take 1 capsule by mouth 2 times daily for 30 days. Mixed hyperlipidemia  -Levels at goal.  Continue statin at current dose  -     atorvastatin (LIPITOR) 20 MG tablet;  Take 1 tablet by mouth daily    Arthritis  -     etodolac (LODINE) 400 MG tablet; Take 1 tablet by mouth daily    Benign prostatic hyperplasia, unspecified whether lower urinary tract symptoms present  -     finasteride (PROSCAR) 5 MG tablet; TAKE 1 TABLET DAILY AS DIRECTED. -     tamsulosin (FLOMAX) 0.4 MG capsule; Take 1 capsule by mouth daily    Essential hypertension  -At goal.  Renal function stable, no proteinuria. Continue metoprolol  -     metoprolol succinate (TOPROL XL) 50 MG extended release tablet; TAKE 1 TABLET EVERY DAY    Gastroesophageal reflux disease without esophagitis  -     omeprazole (PRILOSEC) 20 MG delayed release capsule; Take 1 capsule by mouth daily        Return in about 6 months (around 10/1/2022) for Follow up. All questions were answered. Medications, including possible adverse effects, and instructions were reviewed and  understanding was confirmed. Follow-up recommendations, including when to contact or return to office (ie; if symptoms worsen or fail to improve), were discussed and acknowledged.     Electronically signed by Georgi Ruiz MD on 4/1/22 at 10:37 AM CDT

## 2022-05-19 ENCOUNTER — APPOINTMENT (OUTPATIENT)
Dept: GENERAL RADIOLOGY | Age: 82
End: 2022-05-19
Payer: MEDICARE

## 2022-05-19 ENCOUNTER — HOSPITAL ENCOUNTER (EMERGENCY)
Age: 82
Discharge: HOME | End: 2022-05-19
Payer: MEDICARE

## 2022-05-19 VITALS
SYSTOLIC BLOOD PRESSURE: 118 MMHG | RESPIRATION RATE: 17 BRPM | HEART RATE: 71 BPM | DIASTOLIC BLOOD PRESSURE: 74 MMHG | WEIGHT: 200 LBS | OXYGEN SATURATION: 95 % | HEIGHT: 72 IN | TEMPERATURE: 98.2 F | BODY MASS INDEX: 27.09 KG/M2

## 2022-05-19 DIAGNOSIS — U07.1 COVID-19: Primary | ICD-10-CM

## 2022-05-19 LAB
ADENOVIRUS BY PCR: NOT DETECTED
ALBUMIN SERPL-MCNC: 3.7 G/DL (ref 3.5–5.2)
ALP BLD-CCNC: 105 U/L (ref 40–130)
ALT SERPL-CCNC: 12 U/L (ref 5–41)
ANION GAP SERPL CALCULATED.3IONS-SCNC: 12 MMOL/L (ref 7–19)
APTT: 31.2 SEC (ref 26–36.2)
AST SERPL-CCNC: 19 U/L (ref 5–40)
BASOPHILS ABSOLUTE: 0 K/UL (ref 0–0.2)
BASOPHILS RELATIVE PERCENT: 0.1 % (ref 0–1)
BILIRUB SERPL-MCNC: 0.8 MG/DL (ref 0.2–1.2)
BORDETELLA PARAPERTUSSIS BY PCR: NOT DETECTED
BORDETELLA PERTUSSIS BY PCR: NOT DETECTED
BUN BLDV-MCNC: 25 MG/DL (ref 8–23)
CALCIUM SERPL-MCNC: 9.1 MG/DL (ref 8.8–10.2)
CHLAMYDOPHILIA PNEUMONIAE BY PCR: NOT DETECTED
CHLORIDE BLD-SCNC: 106 MMOL/L (ref 98–111)
CO2: 24 MMOL/L (ref 22–29)
CORONAVIRUS 229E BY PCR: NOT DETECTED
CORONAVIRUS HKU1 BY PCR: NOT DETECTED
CORONAVIRUS NL63 BY PCR: NOT DETECTED
CORONAVIRUS OC43 BY PCR: NOT DETECTED
CREAT SERPL-MCNC: 1.1 MG/DL (ref 0.5–1.2)
EOSINOPHILS ABSOLUTE: 0 K/UL (ref 0–0.6)
EOSINOPHILS RELATIVE PERCENT: 0.3 % (ref 0–5)
GFR AFRICAN AMERICAN: >59
GFR NON-AFRICAN AMERICAN: >60
GLUCOSE BLD-MCNC: 102 MG/DL (ref 74–109)
HCT VFR BLD CALC: 41.5 % (ref 42–52)
HEMOGLOBIN: 12.9 G/DL (ref 14–18)
HUMAN METAPNEUMOVIRUS BY PCR: NOT DETECTED
HUMAN RHINOVIRUS/ENTEROVIRUS BY PCR: NOT DETECTED
IMMATURE GRANULOCYTES #: 0 K/UL
INFLUENZA A BY PCR: NOT DETECTED
INFLUENZA B BY PCR: NOT DETECTED
INR BLD: 1.1 (ref 0.88–1.18)
LYMPHOCYTES ABSOLUTE: 1.1 K/UL (ref 1.1–4.5)
LYMPHOCYTES RELATIVE PERCENT: 14.7 % (ref 20–40)
MCH RBC QN AUTO: 27.3 PG (ref 27–31)
MCHC RBC AUTO-ENTMCNC: 31.1 G/DL (ref 33–37)
MCV RBC AUTO: 87.9 FL (ref 80–94)
MONOCYTES ABSOLUTE: 0.7 K/UL (ref 0–0.9)
MONOCYTES RELATIVE PERCENT: 9.6 % (ref 0–10)
MYCOPLASMA PNEUMONIAE BY PCR: NOT DETECTED
NEUTROPHILS ABSOLUTE: 5.8 K/UL (ref 1.5–7.5)
NEUTROPHILS RELATIVE PERCENT: 74.8 % (ref 50–65)
PARAINFLUENZA VIRUS 1 BY PCR: NOT DETECTED
PARAINFLUENZA VIRUS 2 BY PCR: NOT DETECTED
PARAINFLUENZA VIRUS 3 BY PCR: NOT DETECTED
PARAINFLUENZA VIRUS 4 BY PCR: NOT DETECTED
PDW BLD-RTO: 13.7 % (ref 11.5–14.5)
PLATELET # BLD: 188 K/UL (ref 130–400)
PMV BLD AUTO: 10.9 FL (ref 9.4–12.4)
POTASSIUM SERPL-SCNC: 4 MMOL/L (ref 3.5–5)
PRO-BNP: 507 PG/ML (ref 0–1800)
PROTHROMBIN TIME: 14.2 SEC (ref 12–14.6)
RBC # BLD: 4.72 M/UL (ref 4.7–6.1)
RESPIRATORY SYNCYTIAL VIRUS BY PCR: NOT DETECTED
SARS-COV-2, PCR: DETECTED
SODIUM BLD-SCNC: 142 MMOL/L (ref 136–145)
TOTAL PROTEIN: 6.5 G/DL (ref 6.6–8.7)
TROPONIN: <0.01 NG/ML (ref 0–0.03)
WBC # BLD: 7.7 K/UL (ref 4.8–10.8)

## 2022-05-19 PROCEDURE — 85610 PROTHROMBIN TIME: CPT

## 2022-05-19 PROCEDURE — 36415 COLL VENOUS BLD VENIPUNCTURE: CPT

## 2022-05-19 PROCEDURE — 80053 COMPREHEN METABOLIC PANEL: CPT

## 2022-05-19 PROCEDURE — 71045 X-RAY EXAM CHEST 1 VIEW: CPT

## 2022-05-19 PROCEDURE — 93005 ELECTROCARDIOGRAM TRACING: CPT | Performed by: EMERGENCY MEDICINE

## 2022-05-19 PROCEDURE — 85025 COMPLETE CBC W/AUTO DIFF WBC: CPT

## 2022-05-19 PROCEDURE — 84484 ASSAY OF TROPONIN QUANT: CPT

## 2022-05-19 PROCEDURE — 99285 EMERGENCY DEPT VISIT HI MDM: CPT

## 2022-05-19 PROCEDURE — 85730 THROMBOPLASTIN TIME PARTIAL: CPT

## 2022-05-19 PROCEDURE — 0202U NFCT DS 22 TRGT SARS-COV-2: CPT

## 2022-05-19 PROCEDURE — 83880 ASSAY OF NATRIURETIC PEPTIDE: CPT

## 2022-05-19 ASSESSMENT — ENCOUNTER SYMPTOMS
SORE THROAT: 1
VOMITING: 1
RHINORRHEA: 1
DIARRHEA: 0

## 2022-05-19 ASSESSMENT — PAIN - FUNCTIONAL ASSESSMENT
PAIN_FUNCTIONAL_ASSESSMENT: NONE - DENIES PAIN
PAIN_FUNCTIONAL_ASSESSMENT: NONE - DENIES PAIN

## 2022-05-19 NOTE — ED PROVIDER NOTES
[facility] EMERGENCY DEPT  eMERGENCY dEPARTMENT eNCOUnter      Pt Name: Tamir Hodgson  MRN: [id number]  [place] 1940  Date of evaluation: 5/19/2022  Provider: Elvia Dallas [address]       Chief Complaint   Patient presents with    Pharyngitis    Nausea & Vomiting     hx of GERD         HISTORY OF PRESENT ILLNESS   (Location/Symptom, Timing/Onset,Context/Setting, Quality, Duration, Modifying Factors, Severity)  Note limiting factors. Tamir Hodgson is a 79 y.o. male who presents to the emergency department with a fever that started last night and nasal congestion. Pt [facility] at [facility]. He describes fleeting chest pain that lasted seconds and has resolved. Pt says he feels better no pain now. Has also had an episode of nausea and vomiting. No diarrhea. Pt is vaccinated and boosted for covid     The history is provided by the patient. URI  Presenting symptoms: congestion, fever, rhinorrhea and sore throat    Severity:  Moderate  Onset quality:  Sudden  Duration:  1 day  Timing:  Intermittent  Progression:  Waxing and waning  Chronicity:  New  Risk factors: being elderly    Risk factors: no chronic cardiac disease, no chronic kidney disease, no chronic respiratory disease, no diabetes mellitus and no sick contacts        NursingNotes were reviewed. REVIEW OF SYSTEMS    (2-9 systems for level 4, 10 or more for level 5)     Review of Systems   Constitutional: Positive for fever. HENT: Positive for congestion, rhinorrhea and sore throat. Gastrointestinal: Positive for vomiting. Negative for diarrhea. Genitourinary: Negative for difficulty urinating. Except as noted above the remainder of the review of systems was reviewed and negative.        PAST MEDICAL HISTORY     Past Medical History:   Diagnosis Date    Benign non-nodular prostatic hyperplasia with lower urinary tract symptoms 8/16/2017    Essential hypertension 8/16/2017    Gastroesophageal reflux disease without esophagitis 2017    Gout 2017    Idiopathic peripheral neuropathy 2018    Mixed hyperlipidemia 2017    Peripheral neuropathic pain 2017    Primary osteoarthritis involving multiple joints 2017    Restless legs syndrome 2017         SURGICALHISTORY       Past Surgical History:   Procedure Laterality Date    INGUINAL HERNIA REPAIR Bilateral 1963    TURP           CURRENT MEDICATIONS       Previous Medications    ALLOPURINOL (ZYLOPRIM) 100 MG TABLET    Take 1 tablet by mouth daily    ATORVASTATIN (LIPITOR) 20 MG TABLET    Take 1 tablet by mouth daily    ETODOLAC (LODINE) 400 MG TABLET    Take 1 tablet by mouth daily    FINASTERIDE (PROSCAR) 5 MG TABLET    TAKE 1 TABLET DAILY AS DIRECTED. METOPROLOL SUCCINATE (TOPROL XL) 50 MG EXTENDED RELEASE TABLET    TAKE 1 TABLET EVERY DAY    OMEPRAZOLE (PRILOSEC) 20 MG DELAYED RELEASE CAPSULE    Take 1 capsule by mouth daily    PREGABALIN (LYRICA) 100 MG CAPSULE    Take 1 capsule by mouth 2 times daily for 30 days. TAMSULOSIN (FLOMAX) 0.4 MG CAPSULE    Take 1 capsule by mouth daily    VITAMIN D (ERGOCALCIFEROL) 1.25 MG (54790 UT) CAPS CAPSULE    Take 1 capsule by mouth once a week       ALLERGIES     Codeine    FAMILY HISTORY       Family History   Problem Relation Age of Onset    Uterine Cancer Mother     Coronary Art Dis Father         MI at 62    COPD Sister     Stroke Brother           SOCIAL HISTORY       Social History     Socioeconomic History    Marital status:      Spouse name: Carlos Wood    Number of children: 2    Years of education: 15    Highest education level: None   Occupational History     Employer: RETIRED   Tobacco Use    Smoking status: Former Smoker     Packs/day: 3.00     Years: 30.00     Pack years: 90.00     Types: Cigarettes     Quit date: 1975     Years since quittin.4    Smokeless tobacco: Never Used   Substance and Sexual Activity    Alcohol use: No    Drug use:  No  Sexual activity: Yes     Partners: Female     Comment: wife   Other Topics Concern    None   Social History Narrative    None     Social Determinants of Health     Financial Resource Strain: Low Risk     Difficulty of Paying Living Expenses: Not hard at all   Food Insecurity: No Food Insecurity    Worried About Running Out of Food in the Last Year: Never true    [address] in the Last Year: Never true   Transportation Needs:     Lack of Transportation (Medical): Not on file    Lack of Transportation (Non-Medical): Not on file   Physical Activity:     Days of Exercise per Week: Not on file    Minutes of Exercise per Session: Not on file   Stress:     Feeling of Stress : Not on file   Social Connections:     Frequency of Communication with Friends and Family: Not on file    Frequency of Social Gatherings with Friends and Family: Not on file    Attends Confucianism Services: Not on file    Active Member of [address] [place] [address] [id number] or Organizations: Not on file    Attends Club or Organization Meetings: Not on file    Marital Status: Not on file   Intimate Partner Violence:     Fear of Current or Ex-Partner: Not on file    Emotionally Abused: Not on file    Physically Abused: Not on file    Sexually Abused: Not on file   Housing Stability:     Unable to Pay for Housing in the Last Year: Not on file    Number of [place] in the Last Year: Not on file    Unstable Housing in the Last Year: Not on file       SCREENINGS      @FLOW([id number])@      PHYSICAL EXAM    (up to 7 for level 4, 8 or more for level 5)     ED Triage Vitals [05/19/22 1527]   BP Temp Temp Source Pulse Resp SpO2 Height Weight   129/76 98.2 °F (36.8 °C) Oral 83 18 96 % 6' (1.829 m) 200 lb (90.7 kg)       Physical Exam  Vitals and nursing note reviewed. Constitutional:       Appearance: He is well-developed. HENT:      Head: Normocephalic and atraumatic.       Right Ear: Tympanic membrane normal.      Left Ear: Tympanic membrane normal. Mouth/Throat:      Mouth: Mucous membranes are moist.   Eyes:      General: No scleral icterus. Right eye: No discharge. Left eye: No discharge. Cardiovascular:      Rate and Rhythm: Normal rate and regular rhythm. Heart sounds: Normal heart sounds. Pulmonary:      Effort: Pulmonary effort is normal. No respiratory distress. Breath sounds: Normal breath sounds. No wheezing. Musculoskeletal:      Cervical back: Normal range of motion and neck supple. Skin:     General: Skin is warm. Capillary Refill: Capillary refill takes less than 2 seconds. Neurological:      Mental Status: He is alert and oriented to person, place, and time. Psychiatric:         Behavior: Behavior normal.         DIAGNOSTIC RESULTS     EKG: All EKG's are interpreted by the Emergency Department Physician who either signs or Co-signsthis chart in the absence of a cardiologist.  81 sinus rhythm incomplete left bundle branch block read at 1536 by Dr. Sharyn Davis:   Lorrane Brownie such as CT, Ultrasound and MRI are read by the radiologist. Plain radiographic images are visualized and preliminarily interpreted by the emergency physician with the below findings:      Interpretation per the Radiologist below, if available at the time of this note:    XR CHEST PORTABLE   Final Result   1. Poor lung expansion but no active cardiopulmonary disease.    Signed by Dr Stephanie Cyr            ED BEDSIDEULTRASOUND:   Performed by ED Physician -none    LABS:  Labs Reviewed   RESPIRATORY PANEL, MOLECULAR, WITH COVID-19 - Abnormal; Notable for the following components:       Result Value    SARS-CoV-2, PCR DETECTED (*)     All other components within normal limits    Narrative:     Tari Collins tel. ,  Results called to [facility], 05/19/2022 17:11, by Romayne Matt   CBC WITH AUTO DIFFERENTIAL - Abnormal; Notable for the following components:    Hemoglobin 12.9 (*)     Hematocrit 41.5 (*)     MCHC 31.1 (*)     Neutrophils % 74.8 (*)     Lymphocytes % 14.7 (*)     All other components within normal limits   COMPREHENSIVE METABOLIC PANEL - Abnormal; Notable for the following components:    BUN 25 (*)     Total Protein 6.5 (*)     All other components within normal limits   BRAIN NATRIURETIC PEPTIDE   TROPONIN   PROTIME-INR   APTT       All other labs were within normal range or not returned as of this dictation. EMERGENCY DEPARTMENT COURSE and DIFFERENTIALDIAGNOSIS/MDM:   Vitals:    Vitals:    05/19/22 1527   BP: 129/76   Pulse: 83   Resp: 18   Temp: 98.2 °F (36.8 °C)   TempSrc: Oral   SpO2: 96%   Weight: 200 lb (90.7 kg)   Height: 6' (1.829 m)           MDM chest x-ray and labs are nonconcerning. Patient's oxygen's sat are normal.  He will be discharged back to the [facility]. Spoke to pt's son      CONSULTS:  None    PROCEDURES:  Unless otherwise noted below, none     Procedures    FINAL IMPRESSION      1. COVID-19        DISPOSITION/PLAN   DISPOSITION Decision To Discharge 05/19/2022 05:20:41 PM      PATIENT REFERRED TO:  No follow-up provider specified.     DISCHARGE MEDICATIONS:  New Prescriptions    No medications on file          (Please note that portions of this note were completed with a voice recognitionprogram.  Efforts were made to edit the dictations but occasionally words are mis-transcribed.)    GOLD Mcclelland (electronically signed)          GOLD Mcclelland  05/19/22 [id number]

## 2022-05-19 NOTE — ED NOTES
Phoned patients son, daughter, and Melani Annita. They all state that they are unable to come get patient and they cannot leave work. Notified all parties that patient must have a ride.       Meli Chun RN  05/19/22 [id number]

## 2022-05-20 ENCOUNTER — CARE COORDINATION (OUTPATIENT)
Dept: CARE COORDINATION | Age: 82
End: 2022-05-20

## 2022-05-20 LAB
EKG P AXIS: 13 DEGREES
EKG P-R INTERVAL: 154 MS
EKG Q-T INTERVAL: 370 MS
EKG QRS DURATION: 110 MS
EKG QTC CALCULATION (BAZETT): 405 MS
EKG T AXIS: 76 DEGREES

## 2022-05-20 PROCEDURE — 93010 ELECTROCARDIOGRAM REPORT: CPT | Performed by: INTERNAL MEDICINE

## 2022-05-21 NOTE — CARE COORDINATION
Patient contacted regarding COVID-19 diagnosis. Discussed COVID-19 related testing which was available at this time. Test results were positive. Patient informed of results, if available? Yes. Ambulatory Care Manager contacted the patient by telephone to perform post discharge assessment. Call within 2 business days of discharge: Yes. Verified name with patient as identifier. Provided introduction to self, and explanation of the CTN/ACM role, and reason for call due to risk factors for infection and/or exposure to COVID-19. Symptoms reviewed with patient who verbalized the following symptoms: fever  cough  shortness of breath  no new symptoms  no worsening symptoms. Patient states that he is feeling better today. Patient continues with the above symptoms, however he states that his symptoms have lessened. ACM reviewed COVID-19 Red Flag symptoms with patient and when to return to the ER. Patient agrees to self quarantine, drink plenty of fluids, eat nutritious meals, and to get plenty of rest.  Patient agrees to call PCP if experiencing new or worsening symptoms or will call 911 for severe or life threatening symptoms. Patient agrees to follow COVID-19 precautions. Due to no new or worsening symptoms encounter was not routed to provider for escalation. [facility] follow up appointment(s):   Future Appointments   Date Time Provider Edie Bey   10[facility]14/2022 10:45 AM [facility]   2/3/2023 10:15 AM Nisa Ramirez MD [address] [facility]         Non-face-to-face services provided:  Obtained and reviewed discharge summary and/or continuity of care documents    Educated patient about risk for severe COVID-19 due to risk factors according to CDC guidelines. ACM reviewed discharge instructions, medical action plan and red flag symptoms with the patient who verbalized understanding. Discussed exposure protocols and quarantine with CDC Guidelines.  Patient was given an opportunity to verbalize any questions and concerns and agrees to contact ACM or health care provider for questions related to their healthcare. Reviewed and educated patient on any new and changed medications related to discharge diagnosis       ACM provided contact information. No further follow-up call identified based on severity of symptoms and risk factors.

## 2022-09-12 DIAGNOSIS — I10 ESSENTIAL HYPERTENSION: ICD-10-CM

## 2022-09-13 RX ORDER — METOPROLOL SUCCINATE 50 MG/1
TABLET, EXTENDED RELEASE ORAL
Qty: 90 TABLET | Refills: 0 | Status: SHIPPED | OUTPATIENT
Start: 2022-09-13

## 2022-09-29 DIAGNOSIS — M19.90 ARTHRITIS: ICD-10-CM

## 2022-09-29 DIAGNOSIS — N40.0 BENIGN PROSTATIC HYPERPLASIA, UNSPECIFIED WHETHER LOWER URINARY TRACT SYMPTOMS PRESENT: ICD-10-CM

## 2022-10-01 DIAGNOSIS — N40.0 BENIGN PROSTATIC HYPERPLASIA, UNSPECIFIED WHETHER LOWER URINARY TRACT SYMPTOMS PRESENT: ICD-10-CM

## 2022-10-01 DIAGNOSIS — M19.90 ARTHRITIS: ICD-10-CM

## 2022-10-02 RX ORDER — ETODOLAC 400 MG/1
400 TABLET, FILM COATED ORAL DAILY
Qty: 90 TABLET | Refills: 1 | OUTPATIENT
Start: 2022-10-02

## 2022-10-02 RX ORDER — ETODOLAC 400 MG/1
400 TABLET, FILM COATED ORAL DAILY
Qty: 90 TABLET | Refills: 1 | Status: SHIPPED | OUTPATIENT
Start: 2022-10-02

## 2022-10-02 RX ORDER — TAMSULOSIN HYDROCHLORIDE 0.4 MG/1
0.4 CAPSULE ORAL DAILY
Qty: 90 CAPSULE | Refills: 1 | Status: SHIPPED | OUTPATIENT
Start: 2022-10-02

## 2022-10-02 RX ORDER — TAMSULOSIN HYDROCHLORIDE 0.4 MG/1
0.4 CAPSULE ORAL DAILY
Qty: 90 CAPSULE | Refills: 1 | OUTPATIENT
Start: 2022-10-02

## 2022-10-07 ENCOUNTER — TELEPHONE (OUTPATIENT)
Dept: PRIMARY CARE CLINIC | Age: 82
End: 2022-10-07

## 2022-10-07 NOTE — TELEPHONE ENCOUNTER
Left voicemail for return call to get pt established with a new provider, also sent a [employer] message. Gave [facility] #.

## 2022-10-12 SDOH — HEALTH STABILITY: PHYSICAL HEALTH: ON AVERAGE, HOW MANY DAYS PER WEEK DO YOU ENGAGE IN MODERATE TO STRENUOUS EXERCISE (LIKE A BRISK WALK)?: 0 DAYS

## 2022-10-12 ASSESSMENT — SOCIAL DETERMINANTS OF HEALTH (SDOH)
WITHIN THE LAST YEAR, HAVE YOU BEEN AFRAID OF YOUR PARTNER OR EX-PARTNER?: PATIENT DECLINED
WITHIN THE LAST YEAR, HAVE YOU BEEN HUMILIATED OR EMOTIONALLY ABUSED IN OTHER WAYS BY YOUR PARTNER OR EX-PARTNER?: PATIENT DECLINED
WITHIN THE LAST YEAR, HAVE TO BEEN RAPED OR FORCED TO HAVE ANY KIND OF SEXUAL ACTIVITY BY YOUR PARTNER OR EX-PARTNER?: PATIENT DECLINED
WITHIN THE LAST YEAR, HAVE YOU BEEN KICKED, HIT, SLAPPED, OR OTHERWISE PHYSICALLY HURT BY YOUR PARTNER OR EX-PARTNER?: PATIENT DECLINED

## 2022-10-14 ENCOUNTER — OFFICE VISIT (OUTPATIENT)
Dept: PRIMARY CARE CLINIC | Age: 82
End: 2022-10-14
Payer: MEDICARE

## 2022-10-14 VITALS
OXYGEN SATURATION: 97 % | RESPIRATION RATE: 20 BRPM | HEART RATE: 87 BPM | WEIGHT: 200 LBS | HEIGHT: 72 IN | TEMPERATURE: 97.6 F | SYSTOLIC BLOOD PRESSURE: 114 MMHG | DIASTOLIC BLOOD PRESSURE: 68 MMHG | BODY MASS INDEX: 27.09 KG/M2

## 2022-10-14 DIAGNOSIS — R73.03 PREDIABETES: ICD-10-CM

## 2022-10-14 DIAGNOSIS — R97.20 ELEVATED PSA: ICD-10-CM

## 2022-10-14 DIAGNOSIS — I10 ESSENTIAL HYPERTENSION: Primary | ICD-10-CM

## 2022-10-14 DIAGNOSIS — K21.9 GASTROESOPHAGEAL REFLUX DISEASE WITHOUT ESOPHAGITIS: ICD-10-CM

## 2022-10-14 DIAGNOSIS — E78.2 MIXED HYPERLIPIDEMIA: ICD-10-CM

## 2022-10-14 DIAGNOSIS — M10.9 GOUT, UNSPECIFIED CAUSE, UNSPECIFIED CHRONICITY, UNSPECIFIED SITE: ICD-10-CM

## 2022-10-14 DIAGNOSIS — G60.9 IDIOPATHIC PERIPHERAL NEUROPATHY: ICD-10-CM

## 2022-10-14 DIAGNOSIS — Z12.5 PROSTATE CANCER SCREENING: ICD-10-CM

## 2022-10-14 DIAGNOSIS — Z13.29 SCREENING FOR THYROID DISORDER: ICD-10-CM

## 2022-10-14 PROCEDURE — 1123F ACP DISCUSS/DSCN MKR DOCD: CPT | Performed by: INTERNAL MEDICINE

## 2022-10-14 PROCEDURE — 1036F TOBACCO NON-USER: CPT | Performed by: INTERNAL MEDICINE

## 2022-10-14 PROCEDURE — G8417 CALC BMI ABV UP PARAM F/U: HCPCS | Performed by: INTERNAL MEDICINE

## 2022-10-14 PROCEDURE — G8484 FLU IMMUNIZE NO ADMIN: HCPCS | Performed by: INTERNAL MEDICINE

## 2022-10-14 PROCEDURE — G8427 DOCREV CUR MEDS BY ELIG CLIN: HCPCS | Performed by: INTERNAL MEDICINE

## 2022-10-14 PROCEDURE — 99214 OFFICE O/P EST MOD 30 MIN: CPT | Performed by: INTERNAL MEDICINE

## 2022-10-14 RX ORDER — PREGABALIN 100 MG/1
100 CAPSULE ORAL 2 TIMES DAILY
Qty: 60 CAPSULE | Refills: 0 | Status: SHIPPED | OUTPATIENT
Start: 2022-10-14 | End: 2022-11-13

## 2022-10-14 RX ORDER — PREGABALIN 100 MG/1
100 CAPSULE ORAL 2 TIMES DAILY
Qty: 60 CAPSULE | Refills: 0 | Status: SHIPPED | OUTPATIENT
Start: 2022-10-14 | End: 2022-10-14

## 2022-10-14 SDOH — ECONOMIC STABILITY: FOOD INSECURITY: WITHIN THE PAST 12 MONTHS, YOU WORRIED THAT YOUR FOOD WOULD RUN OUT BEFORE YOU GOT MONEY TO BUY MORE.: NEVER TRUE

## 2022-10-14 SDOH — ECONOMIC STABILITY: FOOD INSECURITY: WITHIN THE PAST 12 MONTHS, THE FOOD YOU BOUGHT JUST DIDN'T LAST AND YOU DIDN'T HAVE MONEY TO GET MORE.: NEVER TRUE

## 2022-10-14 ASSESSMENT — ENCOUNTER SYMPTOMS
DIARRHEA: 0
BACK PAIN: 0
COLOR CHANGE: 0
STRIDOR: 0
COUGH: 0
SORE THROAT: 0
SHORTNESS OF BREATH: 0
WHEEZING: 0
CONSTIPATION: 0
TROUBLE SWALLOWING: 0
ABDOMINAL PAIN: 0
BLOOD IN STOOL: 0

## 2022-10-14 ASSESSMENT — SOCIAL DETERMINANTS OF HEALTH (SDOH): HOW HARD IS IT FOR YOU TO PAY FOR THE VERY BASICS LIKE FOOD, HOUSING, MEDICAL CARE, AND HEATING?: NOT HARD AT ALL

## 2022-10-14 NOTE — PROGRESS NOTES
Aristides Young ( 1940) is a 79 y.o. male, NEW  here for evaluation of the following chief complaint(s). Established New Doctor      Patient was encouraged and advised to be compliant with all  medications leads an active lifestyle and promote maintaining a healthy weight, encouraged not to use cigarettes, laboratory results discussed and reviewed with patient's during this visit   ASSESSMENT/PLAN:  Problem List          Circulatory    Essential hypertension - Primary (Chronic)    Relevant Medications    metoprolol succinate (TOPROL XL) 50 MG extended release tablet    Other Relevant Orders    Comprehensive Metabolic Panel       Digestive    Gastroesophageal reflux disease without esophagitis (Chronic)    Relevant Medications    omeprazole (PRILOSEC) 20 MG delayed release capsule    Other Relevant Orders    Comprehensive Metabolic Panel       Nervous and Auditory    Idiopathic peripheral neuropathy (Chronic)    Relevant Medications    pregabalin (LYRICA) 100 MG capsule    pregabalin (LYRICA) 100 MG capsule       Other    Prediabetes    Mixed hyperlipidemia (Chronic)    Relevant Medications    atorvastatin (LIPITOR) 20 MG tablet    metoprolol succinate (TOPROL XL) 50 MG extended release tablet    Other Relevant Orders    Comprehensive Metabolic Panel    Lipid Panel    Gout (Chronic)    Relevant Medications    allopurinol (ZYLOPRIM) 100 MG tablet    etodolac (LODINE) 400 MG tablet    Other Relevant Orders    CBC with Auto Differential    Uric Acid    Elevated PSA    Relevant Orders    PSA Screening    PSA Screening         No flowsheet data found.     PHQ Scores 2022 2022 2021 2020 10/12/2020   PHQ2 Score 0 0 0 2 1   PHQ9 Score 0 0 0 2 1       Results for orders placed or performed during the hospital encounter of 22   Respiratory Panel, Molecular, with COVID-19 (Restricted: peds pts or suitable admitted adults)    Specimen: Nasopharyngeal   Result Value Ref Range    Adenovirus by PCR Not Detected Not Detected    Bordetella parapertussis by PCR Not Detected Not Detected    Bordetella pertussis by PCR Not Detected Not Detected    Chlamydophilia pneumoniae by PCR Not Detected Not Detected    Coronavirus 229E by PCR Not Detected Not Detected    Coronavirus HKU1 by PCR Not Detected Not Detected    Coronavirus NL63 by PCR Not Detected Not Detected    Coronavirus OC43 by PCR Not Detected Not Detected    SARS-CoV-2, PCR DETECTED (AA) Not Detected    Human Metapneumovirus by PCR Not Detected Not Detected    Human Rhinovirus/Enterovirus by PCR Not Detected Not Detected    Influenza A by PCR Not Detected Not Detected    Influenza B by PCR Not Detected Not Detected    Mycoplasma pneumoniae by PCR Not Detected Not Detected    Parainfluenza Virus 1 by PCR Not Detected Not Detected    Parainfluenza Virus 2 by PCR Not Detected Not Detected    Parainfluenza Virus 3 by PCR Not Detected Not Detected    Parainfluenza Virus 4 by PCR Not Detected Not Detected    Respiratory Syncytial Virus by PCR Not Detected Not Detected   CBC with Auto Differential   Result Value Ref Range    WBC 7.7 4.8 - 10.8 K/uL    RBC 4.72 4.70 - 6.10 M/uL    Hemoglobin 12.9 (L) 14.0 - 18.0 g/dL    Hematocrit 41.5 (L) 42.0 - 52.0 %    MCV 87.9 80.0 - 94.0 fL    MCH 27.3 27.0 - 31.0 pg    MCHC 31.1 (L) 33.0 - 37.0 g/dL    RDW 13.7 11.5 - 14.5 %    Platelets 824 118 - 394 K/uL    MPV 10.9 9.4 - 12.4 fL    Neutrophils % 74.8 (H) 50.0 - 65.0 %    Lymphocytes % 14.7 (L) 20.0 - 40.0 %    Monocytes % 9.6 0.0 - 10.0 %    Eosinophils % 0.3 0.0 - 5.0 %    Basophils % 0.1 0.0 - 1.0 %    Neutrophils Absolute 5.8 1.5 - 7.5 K/uL    Immature Granulocytes # 0.0 K/uL    Lymphocytes Absolute 1.1 1.1 - 4.5 K/uL    Monocytes Absolute 0.70 0.00 - 0.90 K/uL    Eosinophils Absolute 0.00 0.00 - 0.60 K/uL    Basophils Absolute 0.00 0.00 - 0.20 K/uL   Comprehensive Metabolic Panel   Result Value Ref Range    Sodium 142 136 - 145 mmol/L    Potassium 4.0 3.5 - 5.0 mmol/L Chloride 106 98 - 111 mmol/L    CO2 24 22 - 29 mmol/L    Anion Gap 12 7 - 19 mmol/L    Glucose 102 74 - 109 mg/dL    BUN 25 (H) 8 - 23 mg/dL    Creatinine 1.1 0.5 - 1.2 mg/dL    GFR Non-African American >60 >60    GFR African American >59 >59    Calcium 9.1 8.8 - 10.2 mg/dL    Total Protein 6.5 (L) 6.6 - 8.7 g/dL    Albumin 3.7 3.5 - 5.2 g/dL    Total Bilirubin 0.8 0.2 - 1.2 mg/dL    Alkaline Phosphatase 105 40 - 130 U/L    ALT 12 5 - 41 U/L    AST 19 5 - 40 U/L   Brain Natriuretic Peptide   Result Value Ref Range    Pro- 0 - 1,800 pg/mL   Troponin   Result Value Ref Range    Troponin <0.01 0.00 - 0.03 ng/mL   Protime-INR   Result Value Ref Range    Protime 14.2 12.0 - 14.6 sec    INR 1.10 0.88 - 1.18   APTT   Result Value Ref Range    aPTT 31.2 26.0 - 36.2 sec   EKG 12 Lead   Result Value Ref Range    P-R Interval 154 ms    QRS Duration 110 ms    Q-T Interval 370 ms    QTc Calculation (Bazett) 405 ms    P Axis 13 degrees    T Axis 76 degrees       Return in about 6 months (around 4/14/2023). HPI  Peripheral neuropathu  Gout  R foot pain  GERD  Hyperlipidimia  Prediabetes  Review of Systems   Constitutional:  Negative for activity change, appetite change, chills, diaphoresis, fatigue and fever. HENT:  Negative for congestion, hearing loss, postnasal drip, sore throat, tinnitus and trouble swallowing. Eyes:  Negative for visual disturbance. Respiratory:  Negative for cough, shortness of breath, wheezing and stridor. Cardiovascular:  Negative for chest pain, palpitations and leg swelling. Gastrointestinal:  Negative for abdominal pain, blood in stool, constipation and diarrhea. Endocrine: Negative for cold intolerance. Genitourinary:  Negative for frequency. Musculoskeletal:  Positive for arthralgias. Negative for back pain and joint swelling. Skin:  Positive for rash. Negative for color change and wound. Allergic/Immunologic: Negative for environmental allergies.    Neurological: Positive for numbness. Negative for dizziness, light-headedness and headaches. Hematological:  Negative for adenopathy. Does not bruise/bleed easily. Psychiatric/Behavioral:  Negative for decreased concentration, dysphoric mood and sleep disturbance. The patient is not nervous/anxious. Physical Exam  Constitutional:       General: He is not in acute distress. Appearance: Normal appearance. HENT:      Head: Normocephalic. Nose: Nose normal.      Mouth/Throat:      Mouth: Mucous membranes are moist.      Pharynx: Oropharynx is clear. No oropharyngeal exudate or posterior oropharyngeal erythema. Eyes:      General: No scleral icterus. Extraocular Movements: Extraocular movements intact. Conjunctiva/sclera: Conjunctivae normal.   Cardiovascular:      Rate and Rhythm: Normal rate and regular rhythm. Pulses: Normal pulses. Heart sounds: No murmur heard. Pulmonary:      Effort: Pulmonary effort is normal.      Breath sounds: Normal breath sounds. Abdominal:      General: There is no distension. Palpations: Abdomen is soft. There is no mass. Tenderness: There is no abdominal tenderness. Musculoskeletal:         General: Swelling and deformity (R foot) present. Normal range of motion. Cervical back: Normal range of motion. Skin:     General: Skin is warm and dry. Capillary Refill: Capillary refill takes less than 2 seconds. Findings: Rash present. Neurological:      General: No focal deficit present. Mental Status: He is alert and oriented to person, place, and time. Sensory: Sensory deficit present. Comments: Neurologic exam unchanged from previous visit. Psychiatric:         Mood and Affect: Mood normal.         Behavior: Behavior normal.         Thought Content:  Thought content normal.         (Time Documentation Optional [id number])    An electronic signaturewaas used to authenticate this note  -Rowena Cervnates MD on 10/14/2022 at 2:51 PM

## 2022-10-14 NOTE — PATIENT INSTRUCTIONS
Please schedule FOLLOW UP VISIT in  6  months  Have lab work 2 weeks before scheduled Follow up Visit

## 2023-01-01 DIAGNOSIS — G60.9 IDIOPATHIC PERIPHERAL NEUROPATHY: ICD-10-CM

## 2023-01-02 DIAGNOSIS — G60.9 IDIOPATHIC PERIPHERAL NEUROPATHY: ICD-10-CM

## 2023-01-04 ENCOUNTER — TELEPHONE (OUTPATIENT)
Dept: PRIMARY CARE CLINIC | Age: 83
End: 2023-01-04

## 2023-01-04 RX ORDER — PREGABALIN 100 MG/1
100 CAPSULE ORAL 2 TIMES DAILY
Qty: 60 CAPSULE | Refills: 0 | Status: SHIPPED | OUTPATIENT
Start: 2023-01-04 | End: 2023-02-03

## 2023-01-04 NOTE — TELEPHONE ENCOUNTER
Called patient and left voicemail instructing patient to call the office regarding a refill request.   Patient will need to come in for a UDS and medication contract prior to refill.

## 2023-01-04 NOTE — TELEPHONE ENCOUNTER
[facility] called to request a refill on his medication. Last office visit : 10/14/2022   Next office visit : 4/14/2023     Last UDS:   Amphetamine Screen, Urine   Date Value Ref Range Status   02/19/2019 NEGATIVE  Final     Barbiturate Screen, Urine   Date Value Ref Range Status   02/19/2019 NEGATIVE  Final     Benzodiazepine Screen, Urine   Date Value Ref Range Status   02/19/2019 NEGATIVE  Final     Buprenorphine Urine   Date Value Ref Range Status   02/19/2019 NEGATIVE  Final     Cocaine Metabolite Screen, Urine   Date Value Ref Range Status   02/19/2019 NEGATIVE  Final     Gabapentin Screen, Urine   Date Value Ref Range Status   02/19/2019 NEGATIVE  Final     MDMA, Urine   Date Value Ref Range Status   02/19/2019 NEGATIVE  Final     Methamphetamine, Urine   Date Value Ref Range Status   02/19/2019 NEGATIVE  Final     Opiate Scrn, Ur   Date Value Ref Range Status   02/19/2019 NEGATIVE  Final     Oxycodone Screen, Ur   Date Value Ref Range Status   02/19/2019 NEGATIVE  Final     PCP Screen, Urine   Date Value Ref Range Status   02/19/2019 NEGATIVE  Final     Propoxyphene Screen, Urine   Date Value Ref Range Status   02/19/2019 NEGATIVE  Final     THC Screen, Urine   Date Value Ref Range Status   02/19/2019 NEGATIVE  Final     Tricyclic Antidepressants, Urine   Date Value Ref Range Status   02/19/2019 NEGATIVE  Final       Last Pansy Mila: 04/15/2021  Medication Contract: NONE   Last Fill: 10/14/2022  Left message for patient to call the office regarding this refill request.   Patient called stating he would come in the office on Friday 01/06/2023 to update UDS and contract. Requested Prescriptions     Pending Prescriptions Disp Refills    pregabalin (LYRICA) 100 MG capsule 60 capsule 0     Sig: Take 1 capsule by mouth 2 times daily for 30 days. Please approve or refuse this medication.    Mary Nicole LPN

## 2023-01-05 RX ORDER — PREGABALIN 100 MG/1
CAPSULE ORAL
Qty: 60 CAPSULE | Refills: 0 | OUTPATIENT
Start: 2023-01-05

## 2023-01-06 ENCOUNTER — NURSE ONLY (OUTPATIENT)
Dept: PRIMARY CARE CLINIC | Age: 83
End: 2023-01-06
Payer: MEDICARE

## 2023-01-06 DIAGNOSIS — Z51.81 MEDICATION MONITORING ENCOUNTER: Primary | ICD-10-CM

## 2023-01-06 LAB
ALCOHOL URINE: NORMAL
AMPHETAMINE SCREEN, URINE: NEGATIVE
BARBITURATE SCREEN, URINE: NEGATIVE
BENZODIAZEPINE SCREEN, URINE: NEGATIVE
BUPRENORPHINE URINE: NEGATIVE
COCAINE METABOLITE SCREEN URINE: NEGATIVE
FENTANYL SCREEN, URINE: NORMAL
GABAPENTIN SCREEN, URINE: NORMAL
MDMA URINE: NEGATIVE
METHADONE SCREEN, URINE: NEGATIVE
METHAMPHETAMINE, URINE: NEGATIVE
OPIATE SCREEN URINE: NEGATIVE
OXYCODONE SCREEN URINE: NEGATIVE
PHENCYCLIDINE SCREEN URINE: NEGATIVE
PROPOXYPHENE SCREEN, URINE: NEGATIVE
SYNTHETIC CANNABINOIDS(K2) SCREEN, URINE: NORMAL
THC SCREEN, URINE: NEGATIVE
TRAMADOL SCREEN URINE: NORMAL
TRICYCLIC ANTIDEPRESSANTS, UR: NEGATIVE

## 2023-01-06 PROCEDURE — 80305 DRUG TEST PRSMV DIR OPT OBS: CPT | Performed by: INTERNAL MEDICINE

## 2023-01-06 PROCEDURE — 99999 PR OFFICE/OUTPT VISIT,PROCEDURE ONLY: CPT | Performed by: INTERNAL MEDICINE

## 2023-01-06 NOTE — PROGRESS NOTES
Patient came in to the office to update Medication Contract and UDS. UDS collected with results entered. Contract signed and scanned into the chart.

## 2023-01-23 ENCOUNTER — APPOINTMENT (OUTPATIENT)
Dept: CT IMAGING | Age: 83
DRG: 871 | End: 2023-01-23
Payer: MEDICARE

## 2023-01-23 ENCOUNTER — HOSPITAL ENCOUNTER (INPATIENT)
Age: 83
LOS: 12 days | Discharge: NURSING, REHAB OR LONG-TERM CARE | DRG: 871 | End: 2023-02-04
Attending: EMERGENCY MEDICINE | Admitting: STUDENT IN AN ORGANIZED HEALTH CARE EDUCATION/TRAINING PROGRAM
Payer: MEDICARE

## 2023-01-23 DIAGNOSIS — N12 PYELONEPHRITIS OF LEFT KIDNEY: Primary | ICD-10-CM

## 2023-01-23 DIAGNOSIS — G60.9 IDIOPATHIC PERIPHERAL NEUROPATHY: ICD-10-CM

## 2023-01-23 DIAGNOSIS — R11.2 NAUSEA AND VOMITING, UNSPECIFIED VOMITING TYPE: ICD-10-CM

## 2023-01-23 PROBLEM — N20.0 RENAL STONE: Status: ACTIVE | Noted: 2023-01-23

## 2023-01-23 LAB
ALBUMIN SERPL-MCNC: 4.1 G/DL (ref 3.5–5.2)
ALP BLD-CCNC: 125 U/L (ref 40–130)
ALT SERPL-CCNC: 15 U/L (ref 5–41)
ANION GAP SERPL CALCULATED.3IONS-SCNC: 11 MMOL/L (ref 7–19)
AST SERPL-CCNC: 15 U/L (ref 5–40)
BACTERIA: ABNORMAL /HPF
BASOPHILS ABSOLUTE: 0 K/UL (ref 0–0.2)
BASOPHILS RELATIVE PERCENT: 0.2 % (ref 0–1)
BILIRUB SERPL-MCNC: 0.7 MG/DL (ref 0.2–1.2)
BILIRUBIN URINE: NEGATIVE
BLOOD, URINE: ABNORMAL
BUN BLDV-MCNC: 31 MG/DL (ref 8–23)
CALCIUM SERPL-MCNC: 9.5 MG/DL (ref 8.8–10.2)
CHLORIDE BLD-SCNC: 103 MMOL/L (ref 98–111)
CLARITY: ABNORMAL
CO2: 26 MMOL/L (ref 22–29)
COLOR: YELLOW
CREAT SERPL-MCNC: 1.4 MG/DL (ref 0.5–1.2)
EOSINOPHILS ABSOLUTE: 0 K/UL (ref 0–0.6)
EOSINOPHILS RELATIVE PERCENT: 0.2 % (ref 0–5)
EPITHELIAL CELLS, UA: ABNORMAL /HPF
GFR SERPL CREATININE-BSD FRML MDRD: 50 ML/MIN/{1.73_M2}
GLUCOSE BLD-MCNC: 136 MG/DL (ref 74–109)
GLUCOSE URINE: NEGATIVE MG/DL
HCT VFR BLD CALC: 45 % (ref 42–52)
HEMOGLOBIN: 14.1 G/DL (ref 14–18)
IMMATURE GRANULOCYTES #: 0.1 K/UL
KETONES, URINE: NEGATIVE MG/DL
LACTIC ACID: 1.7 MMOL/L (ref 0.5–1.9)
LEUKOCYTE ESTERASE, URINE: ABNORMAL
LIPASE: 38 U/L (ref 13–60)
LYMPHOCYTES ABSOLUTE: 1 K/UL (ref 1.1–4.5)
LYMPHOCYTES RELATIVE PERCENT: 7.5 % (ref 20–40)
MCH RBC QN AUTO: 28 PG (ref 27–31)
MCHC RBC AUTO-ENTMCNC: 31.3 G/DL (ref 33–37)
MCV RBC AUTO: 89.3 FL (ref 80–94)
MONOCYTES ABSOLUTE: 0.9 K/UL (ref 0–0.9)
MONOCYTES RELATIVE PERCENT: 6.3 % (ref 0–10)
NEUTROPHILS ABSOLUTE: 11.6 K/UL (ref 1.5–7.5)
NEUTROPHILS RELATIVE PERCENT: 85.1 % (ref 50–65)
NITRITE, URINE: POSITIVE
PDW BLD-RTO: 13.3 % (ref 11.5–14.5)
PH UA: 6 (ref 5–8)
PLATELET # BLD: 214 K/UL (ref 130–400)
PMV BLD AUTO: 11 FL (ref 9.4–12.4)
POTASSIUM SERPL-SCNC: 4.6 MMOL/L (ref 3.5–5)
PROTEIN UA: 30 MG/DL
RBC # BLD: 5.04 M/UL (ref 4.7–6.1)
RBC UA: ABNORMAL /HPF (ref 0–2)
SARS-COV-2, NAAT: NOT DETECTED
SODIUM BLD-SCNC: 140 MMOL/L (ref 136–145)
SPECIFIC GRAVITY UA: 1.04 (ref 1–1.03)
TOTAL PROTEIN: 7.1 G/DL (ref 6.6–8.7)
UROBILINOGEN, URINE: 1 E.U./DL
WBC # BLD: 13.6 K/UL (ref 4.8–10.8)
WBC UA: ABNORMAL /HPF (ref 0–5)

## 2023-01-23 PROCEDURE — 80053 COMPREHEN METABOLIC PANEL: CPT

## 2023-01-23 PROCEDURE — 83690 ASSAY OF LIPASE: CPT

## 2023-01-23 PROCEDURE — 6370000000 HC RX 637 (ALT 250 FOR IP): Performed by: STUDENT IN AN ORGANIZED HEALTH CARE EDUCATION/TRAINING PROGRAM

## 2023-01-23 PROCEDURE — 96375 TX/PRO/DX INJ NEW DRUG ADDON: CPT

## 2023-01-23 PROCEDURE — 6360000002 HC RX W HCPCS: Performed by: EMERGENCY MEDICINE

## 2023-01-23 PROCEDURE — 6360000004 HC RX CONTRAST MEDICATION: Performed by: EMERGENCY MEDICINE

## 2023-01-23 PROCEDURE — 87040 BLOOD CULTURE FOR BACTERIA: CPT

## 2023-01-23 PROCEDURE — 85025 COMPLETE CBC W/AUTO DIFF WBC: CPT

## 2023-01-23 PROCEDURE — 74177 CT ABD & PELVIS W/CONTRAST: CPT | Performed by: RADIOLOGY

## 2023-01-23 PROCEDURE — 83605 ASSAY OF LACTIC ACID: CPT

## 2023-01-23 PROCEDURE — 36415 COLL VENOUS BLD VENIPUNCTURE: CPT

## 2023-01-23 PROCEDURE — 96374 THER/PROPH/DIAG INJ IV PUSH: CPT

## 2023-01-23 PROCEDURE — 87186 SC STD MICRODIL/AGAR DIL: CPT

## 2023-01-23 PROCEDURE — 2580000003 HC RX 258: Performed by: EMERGENCY MEDICINE

## 2023-01-23 PROCEDURE — 87635 SARS-COV-2 COVID-19 AMP PRB: CPT

## 2023-01-23 PROCEDURE — 87150 DNA/RNA AMPLIFIED PROBE: CPT

## 2023-01-23 PROCEDURE — 74177 CT ABD & PELVIS W/CONTRAST: CPT

## 2023-01-23 PROCEDURE — 81001 URINALYSIS AUTO W/SCOPE: CPT

## 2023-01-23 PROCEDURE — 99285 EMERGENCY DEPT VISIT HI MDM: CPT

## 2023-01-23 PROCEDURE — 1210000000 HC MED SURG R&B

## 2023-01-23 PROCEDURE — 6360000002 HC RX W HCPCS

## 2023-01-23 PROCEDURE — 87086 URINE CULTURE/COLONY COUNT: CPT

## 2023-01-23 PROCEDURE — 2580000003 HC RX 258

## 2023-01-23 PROCEDURE — 6370000000 HC RX 637 (ALT 250 FOR IP)

## 2023-01-23 RX ORDER — METOPROLOL SUCCINATE 50 MG/1
50 TABLET, EXTENDED RELEASE ORAL DAILY
Status: DISCONTINUED | OUTPATIENT
Start: 2023-01-23 | End: 2023-02-05 | Stop reason: HOSPADM

## 2023-01-23 RX ORDER — POLYETHYLENE GLYCOL 3350 17 G/17G
17 POWDER, FOR SOLUTION ORAL DAILY PRN
Status: DISCONTINUED | OUTPATIENT
Start: 2023-01-23 | End: 2023-01-29

## 2023-01-23 RX ORDER — ATORVASTATIN CALCIUM 40 MG/1
20 TABLET, FILM COATED ORAL DAILY
Status: DISCONTINUED | OUTPATIENT
Start: 2023-01-23 | End: 2023-01-31

## 2023-01-23 RX ORDER — ACETAMINOPHEN 325 MG/1
650 TABLET ORAL EVERY 6 HOURS PRN
Status: DISCONTINUED | OUTPATIENT
Start: 2023-01-23 | End: 2023-02-05 | Stop reason: HOSPADM

## 2023-01-23 RX ORDER — SODIUM CHLORIDE 9 MG/ML
INJECTION, SOLUTION INTRAVENOUS PRN
Status: DISCONTINUED | OUTPATIENT
Start: 2023-01-23 | End: 2023-02-03 | Stop reason: SDUPTHER

## 2023-01-23 RX ORDER — DOCUSATE SODIUM 100 MG/1
100 CAPSULE, LIQUID FILLED ORAL DAILY
Status: DISCONTINUED | OUTPATIENT
Start: 2023-01-23 | End: 2023-02-05 | Stop reason: HOSPADM

## 2023-01-23 RX ORDER — SODIUM CHLORIDE 9 MG/ML
INJECTION, SOLUTION INTRAVENOUS CONTINUOUS
Status: DISCONTINUED | OUTPATIENT
Start: 2023-01-23 | End: 2023-01-30

## 2023-01-23 RX ORDER — TRAMADOL HYDROCHLORIDE 50 MG/1
50 TABLET ORAL EVERY 6 HOURS PRN
Status: DISCONTINUED | OUTPATIENT
Start: 2023-01-23 | End: 2023-02-05 | Stop reason: HOSPADM

## 2023-01-23 RX ORDER — ONDANSETRON 2 MG/ML
4 INJECTION INTRAMUSCULAR; INTRAVENOUS ONCE
Status: COMPLETED | OUTPATIENT
Start: 2023-01-23 | End: 2023-01-23

## 2023-01-23 RX ORDER — PANTOPRAZOLE SODIUM 40 MG/1
40 TABLET, DELAYED RELEASE ORAL
Status: DISCONTINUED | OUTPATIENT
Start: 2023-01-24 | End: 2023-02-05 | Stop reason: HOSPADM

## 2023-01-23 RX ORDER — MORPHINE SULFATE 4 MG/ML
4 INJECTION, SOLUTION INTRAMUSCULAR; INTRAVENOUS
Status: DISCONTINUED | OUTPATIENT
Start: 2023-01-23 | End: 2023-01-26

## 2023-01-23 RX ORDER — ENOXAPARIN SODIUM 100 MG/ML
40 INJECTION SUBCUTANEOUS DAILY
Status: DISCONTINUED | OUTPATIENT
Start: 2023-01-23 | End: 2023-01-25

## 2023-01-23 RX ORDER — SODIUM CHLORIDE, SODIUM LACTATE, POTASSIUM CHLORIDE, AND CALCIUM CHLORIDE .6; .31; .03; .02 G/100ML; G/100ML; G/100ML; G/100ML
1000 INJECTION, SOLUTION INTRAVENOUS ONCE
Status: COMPLETED | OUTPATIENT
Start: 2023-01-23 | End: 2023-01-23

## 2023-01-23 RX ORDER — SODIUM CHLORIDE 0.9 % (FLUSH) 0.9 %
5-40 SYRINGE (ML) INJECTION EVERY 12 HOURS SCHEDULED
Status: DISCONTINUED | OUTPATIENT
Start: 2023-01-23 | End: 2023-02-03 | Stop reason: SDUPTHER

## 2023-01-23 RX ORDER — ONDANSETRON 4 MG/1
4 TABLET, ORALLY DISINTEGRATING ORAL EVERY 8 HOURS PRN
Status: DISCONTINUED | OUTPATIENT
Start: 2023-01-23 | End: 2023-02-05 | Stop reason: HOSPADM

## 2023-01-23 RX ORDER — SODIUM CHLORIDE 0.9 % (FLUSH) 0.9 %
5-40 SYRINGE (ML) INJECTION PRN
Status: DISCONTINUED | OUTPATIENT
Start: 2023-01-23 | End: 2023-02-03 | Stop reason: SDUPTHER

## 2023-01-23 RX ORDER — ALLOPURINOL 100 MG/1
100 TABLET ORAL DAILY
Status: DISCONTINUED | OUTPATIENT
Start: 2023-01-23 | End: 2023-02-05 | Stop reason: HOSPADM

## 2023-01-23 RX ORDER — ACETAMINOPHEN 650 MG/1
650 SUPPOSITORY RECTAL EVERY 6 HOURS PRN
Status: DISCONTINUED | OUTPATIENT
Start: 2023-01-23 | End: 2023-02-05 | Stop reason: HOSPADM

## 2023-01-23 RX ORDER — PREGABALIN 50 MG/1
100 CAPSULE ORAL 2 TIMES DAILY
Status: DISCONTINUED | OUTPATIENT
Start: 2023-01-23 | End: 2023-02-04

## 2023-01-23 RX ORDER — MORPHINE SULFATE 4 MG/ML
4 INJECTION, SOLUTION INTRAMUSCULAR; INTRAVENOUS ONCE
Status: COMPLETED | OUTPATIENT
Start: 2023-01-23 | End: 2023-01-23

## 2023-01-23 RX ORDER — TAMSULOSIN HYDROCHLORIDE 0.4 MG/1
0.4 CAPSULE ORAL DAILY
Status: DISCONTINUED | OUTPATIENT
Start: 2023-01-23 | End: 2023-02-05 | Stop reason: HOSPADM

## 2023-01-23 RX ORDER — MORPHINE SULFATE 2 MG/ML
2 INJECTION, SOLUTION INTRAMUSCULAR; INTRAVENOUS
Status: DISCONTINUED | OUTPATIENT
Start: 2023-01-23 | End: 2023-01-26

## 2023-01-23 RX ORDER — ONDANSETRON 2 MG/ML
4 INJECTION INTRAMUSCULAR; INTRAVENOUS EVERY 6 HOURS PRN
Status: DISCONTINUED | OUTPATIENT
Start: 2023-01-23 | End: 2023-02-05 | Stop reason: HOSPADM

## 2023-01-23 RX ADMIN — SODIUM CHLORIDE, PRESERVATIVE FREE 10 ML: 5 INJECTION INTRAVENOUS at 20:18

## 2023-01-23 RX ADMIN — IOPAMIDOL 70 ML: 755 INJECTION, SOLUTION INTRAVENOUS at 16:32

## 2023-01-23 RX ADMIN — CEFTRIAXONE SODIUM 1000 MG: 1 INJECTION, POWDER, FOR SOLUTION INTRAMUSCULAR; INTRAVENOUS at 18:37

## 2023-01-23 RX ADMIN — ONDANSETRON 4 MG: 2 INJECTION INTRAMUSCULAR; INTRAVENOUS at 16:08

## 2023-01-23 RX ADMIN — TRAMADOL HYDROCHLORIDE 50 MG: 50 TABLET, COATED ORAL at 20:18

## 2023-01-23 RX ADMIN — PREGABALIN 100 MG: 50 CAPSULE ORAL at 20:18

## 2023-01-23 RX ADMIN — DOCUSATE SODIUM 100 MG: 100 CAPSULE, LIQUID FILLED ORAL at 20:18

## 2023-01-23 RX ADMIN — SODIUM CHLORIDE: 9 INJECTION, SOLUTION INTRAVENOUS at 20:19

## 2023-01-23 RX ADMIN — ENOXAPARIN SODIUM 40 MG: 100 INJECTION SUBCUTANEOUS at 20:18

## 2023-01-23 RX ADMIN — MORPHINE SULFATE 4 MG: 4 INJECTION, SOLUTION INTRAMUSCULAR; INTRAVENOUS at 16:08

## 2023-01-23 RX ADMIN — SODIUM CHLORIDE, SODIUM LACTATE, POTASSIUM CHLORIDE, AND CALCIUM CHLORIDE 1000 ML: 600; 310; 30; 20 INJECTION, SOLUTION INTRAVENOUS at 16:07

## 2023-01-23 ASSESSMENT — ENCOUNTER SYMPTOMS
CONSTIPATION: 0
CHEST TIGHTNESS: 0
NAUSEA: 1
SHORTNESS OF BREATH: 0
ABDOMINAL DISTENTION: 1
WHEEZING: 0
COUGH: 0
COLOR CHANGE: 0
ABDOMINAL DISTENTION: 0
ABDOMINAL PAIN: 1
VOMITING: 1

## 2023-01-23 ASSESSMENT — PAIN DESCRIPTION - ORIENTATION: ORIENTATION: LEFT

## 2023-01-23 ASSESSMENT — PAIN SCALES - GENERAL
PAINLEVEL_OUTOF10: 5
PAINLEVEL_OUTOF10: 6

## 2023-01-23 ASSESSMENT — PAIN DESCRIPTION - LOCATION: LOCATION: FLANK

## 2023-01-23 ASSESSMENT — PAIN - FUNCTIONAL ASSESSMENT: PAIN_FUNCTIONAL_ASSESSMENT: ACTIVITIES ARE NOT PREVENTED

## 2023-01-23 ASSESSMENT — PAIN DESCRIPTION - DESCRIPTORS: DESCRIPTORS: NAGGING

## 2023-01-24 ENCOUNTER — APPOINTMENT (OUTPATIENT)
Dept: CT IMAGING | Age: 83
DRG: 871 | End: 2023-01-24
Payer: MEDICARE

## 2023-01-24 LAB
ACINETOBACTER CALCOAC BAUMANNII COMPLEX BY PCR: NOT DETECTED
ALBUMIN SERPL-MCNC: 3.8 G/DL (ref 3.5–5.2)
ALP BLD-CCNC: 116 U/L (ref 40–130)
ALT SERPL-CCNC: 14 U/L (ref 5–41)
ANION GAP SERPL CALCULATED.3IONS-SCNC: 12 MMOL/L (ref 7–19)
ANION GAP SERPL CALCULATED.3IONS-SCNC: 9 MMOL/L (ref 7–19)
AST SERPL-CCNC: 14 U/L (ref 5–40)
BACTEROIDES FRAGILIS BY PCR: NOT DETECTED
BILIRUB SERPL-MCNC: 0.6 MG/DL (ref 0.2–1.2)
BUN BLDV-MCNC: 35 MG/DL (ref 8–23)
BUN BLDV-MCNC: 41 MG/DL (ref 8–23)
CALCIUM SERPL-MCNC: 9.1 MG/DL (ref 8.8–10.2)
CALCIUM SERPL-MCNC: 9.2 MG/DL (ref 8.8–10.2)
CANDIDA ALBICANS BY PCR: NOT DETECTED
CANDIDA AURIS BY PCR: NOT DETECTED
CANDIDA GLABRATA BY PCR: NOT DETECTED
CANDIDA KRUSEI BY PCR: NOT DETECTED
CANDIDA PARAPSILOSIS BY PCR: NOT DETECTED
CANDIDA TROPICALIS BY PCR: NOT DETECTED
CHLORIDE BLD-SCNC: 105 MMOL/L (ref 98–111)
CHLORIDE BLD-SCNC: 105 MMOL/L (ref 98–111)
CO2: 24 MMOL/L (ref 22–29)
CO2: 26 MMOL/L (ref 22–29)
CREAT SERPL-MCNC: 1.7 MG/DL (ref 0.5–1.2)
CREAT SERPL-MCNC: 2.1 MG/DL (ref 0.5–1.2)
CRYPTOCOCCUS NEOFORMANS/GATTII BY PCR: NOT DETECTED
ENTEROBACTER CLOACAE COMPLEX BY PCR: NOT DETECTED
ENTEROBACTERALES BY PCR: NOT DETECTED
ENTEROCOCCUS FAECALIS BY PCR: NOT DETECTED
ENTEROCOCCUS FAECIUM BY PCR: NOT DETECTED
ESCHERICHIA COLI BY PCR: NOT DETECTED
GFR SERPL CREATININE-BSD FRML MDRD: 30 ML/MIN/{1.73_M2}
GFR SERPL CREATININE-BSD FRML MDRD: 39 ML/MIN/{1.73_M2}
GLUCOSE BLD-MCNC: 117 MG/DL (ref 74–109)
GLUCOSE BLD-MCNC: 139 MG/DL (ref 74–109)
HAEMOPHILUS INFLUENZAE BY PCR: NOT DETECTED
HCT VFR BLD CALC: 42.3 % (ref 42–52)
HEMOGLOBIN: 13.3 G/DL (ref 14–18)
KLEBSIELLA AEROGENES BY PCR: NOT DETECTED
KLEBSIELLA OXYTOCA BY PCR: NOT DETECTED
KLEBSIELLA PNEUMONIAE GROUP BY PCR: NOT DETECTED
LISTERIA MONOCYTOGENES BY PCR: NOT DETECTED
MCH RBC QN AUTO: 28.2 PG (ref 27–31)
MCHC RBC AUTO-ENTMCNC: 31.4 G/DL (ref 33–37)
MCV RBC AUTO: 89.8 FL (ref 80–94)
METHICILLIN RESISTANCE MECA/C  BY PCR: NOT DETECTED
NEISSERIA MENINGITIDIS BY PCR: NOT DETECTED
PDW BLD-RTO: 13.5 % (ref 11.5–14.5)
PLATELET # BLD: 191 K/UL (ref 130–400)
PMV BLD AUTO: 11.4 FL (ref 9.4–12.4)
POTASSIUM REFLEX MAGNESIUM: 4.8 MMOL/L (ref 3.5–5)
POTASSIUM REFLEX MAGNESIUM: 5.2 MMOL/L (ref 3.5–5)
PROTEUS SPECIES BY PCR: NOT DETECTED
PSEUDOMONAS AERUGINOSA BY PCR: NOT DETECTED
RBC # BLD: 4.71 M/UL (ref 4.7–6.1)
SALMONELLA SPECIES BY PCR: NOT DETECTED
SERRATIA MARCESCENS BY PCR: NOT DETECTED
SODIUM BLD-SCNC: 140 MMOL/L (ref 136–145)
SODIUM BLD-SCNC: 141 MMOL/L (ref 136–145)
STAPHYLOCOCCUS AUREUS BY PCR: NOT DETECTED
STAPHYLOCOCCUS EPIDERMIDIS BY PCR: DETECTED
STAPHYLOCOCCUS LUGDUNENSIS BY PCR: NOT DETECTED
STAPHYLOCOCCUS SPECIES BY PCR: DETECTED
STENOTROPHOMONAS MALTOPHILIA BY PCR: NOT DETECTED
STREPTOCOCCUS AGALACTIAE BY PCR: NOT DETECTED
STREPTOCOCCUS PNEUMONIAE BY PCR: NOT DETECTED
STREPTOCOCCUS PYOGENES  BY PCR: NOT DETECTED
STREPTOCOCCUS SPECIES BY PCR: NOT DETECTED
TOTAL CK: 105 U/L (ref 39–308)
TOTAL PROTEIN: 6.7 G/DL (ref 6.6–8.7)
VANCOMYCIN RANDOM: <4 UG/ML
WBC # BLD: 13.1 K/UL (ref 4.8–10.8)

## 2023-01-24 PROCEDURE — 2580000003 HC RX 258

## 2023-01-24 PROCEDURE — 36415 COLL VENOUS BLD VENIPUNCTURE: CPT

## 2023-01-24 PROCEDURE — 6370000000 HC RX 637 (ALT 250 FOR IP): Performed by: STUDENT IN AN ORGANIZED HEALTH CARE EDUCATION/TRAINING PROGRAM

## 2023-01-24 PROCEDURE — 6360000002 HC RX W HCPCS

## 2023-01-24 PROCEDURE — 6370000000 HC RX 637 (ALT 250 FOR IP)

## 2023-01-24 PROCEDURE — 94760 N-INVAS EAR/PLS OXIMETRY 1: CPT

## 2023-01-24 PROCEDURE — 2580000003 HC RX 258: Performed by: STUDENT IN AN ORGANIZED HEALTH CARE EDUCATION/TRAINING PROGRAM

## 2023-01-24 PROCEDURE — 51798 US URINE CAPACITY MEASURE: CPT

## 2023-01-24 PROCEDURE — 85027 COMPLETE CBC AUTOMATED: CPT

## 2023-01-24 PROCEDURE — 82550 ASSAY OF CK (CPK): CPT

## 2023-01-24 PROCEDURE — 6360000002 HC RX W HCPCS: Performed by: STUDENT IN AN ORGANIZED HEALTH CARE EDUCATION/TRAINING PROGRAM

## 2023-01-24 PROCEDURE — 80053 COMPREHEN METABOLIC PANEL: CPT

## 2023-01-24 PROCEDURE — 80202 ASSAY OF VANCOMYCIN: CPT

## 2023-01-24 PROCEDURE — 70450 CT HEAD/BRAIN W/O DYE: CPT

## 2023-01-24 PROCEDURE — 70450 CT HEAD/BRAIN W/O DYE: CPT | Performed by: RADIOLOGY

## 2023-01-24 PROCEDURE — 1210000000 HC MED SURG R&B

## 2023-01-24 RX ADMIN — PREGABALIN 100 MG: 50 CAPSULE ORAL at 21:36

## 2023-01-24 RX ADMIN — SODIUM CHLORIDE: 9 INJECTION, SOLUTION INTRAVENOUS at 17:30

## 2023-01-24 RX ADMIN — SODIUM CHLORIDE, PRESERVATIVE FREE 10 ML: 5 INJECTION INTRAVENOUS at 21:47

## 2023-01-24 RX ADMIN — METOPROLOL SUCCINATE 50 MG: 50 TABLET, FILM COATED, EXTENDED RELEASE ORAL at 08:19

## 2023-01-24 RX ADMIN — ENOXAPARIN SODIUM 40 MG: 100 INJECTION SUBCUTANEOUS at 17:28

## 2023-01-24 RX ADMIN — TRAMADOL HYDROCHLORIDE 50 MG: 50 TABLET, COATED ORAL at 12:07

## 2023-01-24 RX ADMIN — VANCOMYCIN HYDROCHLORIDE 1750 MG: 10 INJECTION, POWDER, LYOPHILIZED, FOR SOLUTION INTRAVENOUS at 21:36

## 2023-01-24 RX ADMIN — PREGABALIN 100 MG: 50 CAPSULE ORAL at 08:18

## 2023-01-24 RX ADMIN — ATORVASTATIN CALCIUM 20 MG: 40 TABLET, FILM COATED ORAL at 08:19

## 2023-01-24 RX ADMIN — ALLOPURINOL 100 MG: 100 TABLET ORAL at 08:19

## 2023-01-24 RX ADMIN — TAMSULOSIN HYDROCHLORIDE 0.4 MG: 0.4 CAPSULE ORAL at 08:19

## 2023-01-24 RX ADMIN — SODIUM ZIRCONIUM CYCLOSILICATE 10 G: 10 POWDER, FOR SUSPENSION ORAL at 21:36

## 2023-01-24 RX ADMIN — PANTOPRAZOLE SODIUM 40 MG: 40 TABLET, DELAYED RELEASE ORAL at 06:04

## 2023-01-24 RX ADMIN — SODIUM CHLORIDE: 9 INJECTION, SOLUTION INTRAVENOUS at 08:15

## 2023-01-24 RX ADMIN — DOCUSATE SODIUM 100 MG: 100 CAPSULE, LIQUID FILLED ORAL at 08:19

## 2023-01-24 RX ADMIN — CEFTRIAXONE 1000 MG: 1 INJECTION, POWDER, FOR SOLUTION INTRAMUSCULAR; INTRAVENOUS at 17:28

## 2023-01-24 ASSESSMENT — PAIN - FUNCTIONAL ASSESSMENT: PAIN_FUNCTIONAL_ASSESSMENT: PREVENTS OR INTERFERES WITH ALL ACTIVE AND SOME PASSIVE ACTIVITIES

## 2023-01-24 ASSESSMENT — PAIN SCALES - GENERAL
PAINLEVEL_OUTOF10: 0
PAINLEVEL_OUTOF10: 7

## 2023-01-24 ASSESSMENT — ENCOUNTER SYMPTOMS
ABDOMINAL PAIN: 1
SHORTNESS OF BREATH: 0
NAUSEA: 0
COUGH: 0
VOMITING: 0

## 2023-01-24 ASSESSMENT — PAIN DESCRIPTION - LOCATION: LOCATION: BACK

## 2023-01-24 ASSESSMENT — PAIN DESCRIPTION - DESCRIPTORS: DESCRIPTORS: ACHING;THROBBING

## 2023-01-24 NOTE — ED PROVIDER NOTES
[facility] EMERGENCY DEPT  eMERGENCY dEPARTMENT eNCOUnter      Pt Name: Perez Coffman  MRN: [id number]  Birthdate 1940  Date of evaluation: 1/23/2023  Provider: Duane Gamino MD    CHIEF COMPLAINT       Chief Complaint   Patient presents with    Abdominal Pain    Flank Pain     Pt states that he began having flank pain and LQ abdominal pain at 1000 this morning. Pt states that he has not had an episode like this in the past. Pt states he has had nausea, and has had consistent Bms. Pt denies diarrhea. ABCs are intact. Skin wpd. A&ox4.         HISTORY OF PRESENT ILLNESS   (Location/Symptom, Timing/Onset,Context/Setting, Quality, Duration, Modifying Factors, Severity)  Note limiting factors.   Perez Coffman is a 82 y.o. male who presents to the emergency department for evaluation regarding left-sided lower abdominal pain and flank pain.  Patient reports the symptoms began today at around 10 AM.  He describes the pain as fairly sharp in nature.  Is been associated with nausea and a several episodes of vomiting.  States that he is not really had any episodes of diarrhea.  He denies any hematuria or dysuria symptoms.  No prior history of previous renal calculus.  He has not had any fevers or chills but has noted decreased oral intake today.  There have been no relieving factors for the symptoms.  No known prior history of diverticulitis.    HPI    NursingNotes were reviewed.    REVIEW OF SYSTEMS    (2-9 systems for level 4, 10 or more for level 5)     Review of Systems   Constitutional:  Positive for appetite change and fatigue. Negative for chills and fever.   Respiratory:  Negative for shortness of breath and wheezing.    Cardiovascular:  Negative for chest pain and palpitations.   Gastrointestinal:  Positive for abdominal pain, nausea and vomiting. Negative for abdominal distention.   Genitourinary:  Positive for flank pain. Negative for hematuria and testicular pain.   Neurological:  Negative for syncope.   All other  systems reviewed and are negative. PAST MEDICALHISTORY     Past Medical History:   Diagnosis Date    Benign non-nodular prostatic hyperplasia with lower urinary tract symptoms 8/16/2017    Essential hypertension 8/16/2017    Gastroesophageal reflux disease without esophagitis 8/16/2017    Gout 8/16/2017    Idiopathic peripheral neuropathy 2/19/2018    Mixed hyperlipidemia 8/16/2017    Peripheral neuropathic pain 8/16/2017    Primary osteoarthritis involving multiple joints 8/16/2017    Restless legs syndrome 8/16/2017         SURGICAL HISTORY       Past Surgical History:   Procedure Laterality Date    INGUINAL HERNIA REPAIR Bilateral 1963    TURP  2003         CURRENT MEDICATIONS     Previous Medications    ALLOPURINOL (ZYLOPRIM) 100 MG TABLET    Take 1 tablet by mouth daily    ATORVASTATIN (LIPITOR) 20 MG TABLET    Take 1 tablet by mouth daily    ETODOLAC (LODINE) 400 MG TABLET    Take 1 tablet by mouth daily    METOPROLOL SUCCINATE (TOPROL XL) 50 MG EXTENDED RELEASE TABLET    Take 1 tablet by mouth once daily    OMEPRAZOLE (PRILOSEC) 20 MG DELAYED RELEASE CAPSULE    Take 1 capsule by mouth daily    PREGABALIN (LYRICA) 100 MG CAPSULE    Take 1 capsule by mouth 2 times daily for 30 days. PREGABALIN (LYRICA) 100 MG CAPSULE    Take 1 capsule by mouth 2 times daily for 30 days. TAMSULOSIN (FLOMAX) 0.4 MG CAPSULE    Take 1 capsule by mouth daily    VITAMIN D (ERGOCALCIFEROL) 1.25 MG (51132 UT) CAPS CAPSULE    Take 1 capsule by mouth once a week       ALLERGIES     Codeine    FAMILY HISTORY       Family History   Problem Relation Age of Onset    Uterine Cancer Mother     Coronary Art Dis Father         MI at 62    COPD Sister     Stroke Brother           SOCIAL HISTORY       Social History     Socioeconomic History    Marital status:       Spouse name: Brody Gerber    Number of children: 2    Years of education: 12    Highest education level: None   Occupational History     Employer: RETIRED Tobacco Use    Smoking status: Former     Packs/day: 3.00     Years: 30.00     Pack years: 90.00     Types: Cigarettes     Quit date: 1975     Years since quittin.0    Smokeless tobacco: Never   Substance and Sexual Activity    Alcohol use: No    Drug use: No    Sexual activity: Yes     Partners: Female     Comment: wife     Social Determinants of Health     Financial Resource Strain: Low Risk     Difficulty of Paying Living Expenses: Not hard at all   Food Insecurity: No Food Insecurity    Worried About Running Out of Food in the Last Year: Never true    Ran Out of Food in the Last Year: Never true   Physical Activity: Unknown    Days of Exercise per Week: 0 days   Intimate Partner Violence: Unknown    Fear of Current or Ex-Partner: Patient refused    Emotionally Abused: Patient refused    Physically Abused: Patient refused    Sexually Abused: Patient refused       SCREENINGS    [address] Coma Scale  Eye Opening: Spontaneous  Best Verbal Response: Oriented  Best Motor Response: Obeys commands  Jl Coma Scale Score: 15        PHYSICAL EXAM    (up to 7 for level 4, 8 or more for level 5)     ED Triage Vitals   BP Temp Temp Source Heart Rate Resp SpO2 Height Weight   23 1443 23 1441 23 1441 23 1443 23 1443 23 1443 -- --   118/74 97.6 °F (36.4 °C) Temporal 62 18 95 %         Physical Exam  Vitals and nursing note reviewed. HENT:      Head: Atraumatic. Mouth/Throat:      Mouth: Mucous membranes are moist. Mucous membranes are not dry. Eyes:      General: No scleral icterus. Pupils: Pupils are equal, round, and reactive to light. Neck:      Trachea: No tracheal deviation. Cardiovascular:      Rate and Rhythm: Normal rate and regular rhythm. Pulses: Normal pulses. Heart sounds: Normal heart sounds. No murmur heard. Pulmonary:      Effort: Pulmonary effort is normal. No respiratory distress. Breath sounds: Normal breath sounds. No stridor. Abdominal:      General: There is no distension. Palpations: Abdomen is soft. Tenderness: There is abdominal tenderness. There is no guarding. Musculoskeletal:      Right lower leg: No edema. Left lower leg: No edema. Skin:     Capillary Refill: Capillary refill takes less than 2 seconds. Coloration: Skin is not pale. Findings: No rash. Neurological:      General: No focal deficit present. Mental Status: He is alert and oriented to person, place, and time. Psychiatric:         Behavior: Behavior is cooperative. DIAGNOSTIC RESULTS       RADIOLOGY:  Non-plain film images such as CT, Ultrasound and MRI are read by the radiologist. Plain radiographic images are visualized and preliminarily interpreted bythe emergency physician with the below findings:      CT ABDOMEN PELVIS W IV CONTRAST Additional Contrast? None   Final Result   1. Portions of the mid small bowel suggest mild mucosal thickening. Exact etiology is uncertain. Mild enteritis is not excluded. No fluid-filled bowel loops. 2. Stool filled colon. 3. Simple cystic lesions of the liver and kidneys. 4. Nonobstructing stone within the left renal pelvis. 5. Focal coarse calcification in the left renal parenchyma. This can be from prior inflammatory infectious process. Recommendation: Follow up as clinically indicated. All CT scans at this facility utilize dose modulation, iterative reconstruction, and/or weight based dosing when appropriate to reduce radiation dose to as low as reasonably achievable.    Dictated and Electronically Signed by Gordy Vivar MD, MQSA CERTIFIED at [id number] 07:03:41 PM EST                       LABS:  Labs Reviewed   CBC WITH AUTO DIFFERENTIAL - Abnormal; Notable for the following components:       Result Value    WBC 13.6 (*)     MCHC 31.3 (*)     Neutrophils % 85.1 (*)     Lymphocytes % 7.5 (*)     Neutrophils Absolute 11.6 (*)     Lymphocytes Absolute 1.0 (*)     All other components within normal limits   COMPREHENSIVE METABOLIC PANEL - Abnormal; Notable for the following components:    Glucose 136 (*)     BUN 31 (*)     Creatinine 1.4 (*)     Est, Glom Filt Rate 50 (*)     All other components within normal limits   URINALYSIS - Abnormal; Notable for the following components:    Clarity, UA CLOUDY (*)     Blood, Urine MODERATE (*)     Protein, UA 30 (*)     Nitrite, Urine POSITIVE (*)     Leukocyte Esterase, Urine MODERATE (*)     All other components within normal limits   MICROSCOPIC URINALYSIS - Abnormal; Notable for the following components:    WBC, UA 21-30 (*)     RBC, UA 6-10 (*)     Bacteria, UA 1+ (*)     All other components within normal limits   CULTURE, BLOOD 1   CULTURE, BLOOD 2   CULTURE, URINE   LACTIC ACID   LIPASE       All other labs were within normal range or not returned as of this dictation. EMERGENCY DEPARTMENT COURSE and DIFFERENTIAL DIAGNOSIS/MDM:   Vitals:    Vitals:    01/23/23 1441 01/23/23 1443 01/23/23 1613 01/23/23 1713   BP:  118/74 138/70 (!) 172/86   Pulse:  62 64 62   Resp:  18 18 18   Temp: 97.6 °F (36.4 °C)      TempSrc: Temporal      SpO2:  95% 90% 96%       MDM     Amount and/or Complexity of Data Reviewed  Clinical lab tests: ordered and reviewed  Tests in the radiology section of CPT®: ordered and reviewed  Obtain history from someone other than the patient: yes  Discuss the patient with other providers: yes  Independent visualization of images, tracings, or specimens: yes    Patient presents to the ED with left lower quadrant abdominal pain that began today. This been associated with nausea and associated vomiting. Patient undergo laboratory studies and CT imaging for further evaluation. I have independently reviewed patient's laboratory studies which reveal evidence of bacteria and pyuria with nitrate positive, leukocyte Estrace positive urinalysis. His serum lactic acid is normal at 1.7.   WBC count is slightly elevated at 13.6.  Serum creatinine looks okay at 1.4 along with a serum electrolytes that appeared normal.  Patient has received IV fluids along with IV pain medications and broad-spectrum IV antibiotics for treatment of presumed pyelonephritis. I have independently reviewed his CT imaging that demonstrates evidence of a sending infection. There is no acute bowel pathology identified. Urine culture and blood cultures have been obtained. CONSULTS:    Case was discussed with Shaheen Monte of the hospitalist team regarding plans for inpatient admission. PROCEDURES:  Unless otherwise noted below, none     Procedures    FINAL IMPRESSION      1. Pyelonephritis of left kidney    2.  Nausea and vomiting, unspecified vomiting type          DISPOSITION/PLAN   DISPOSITION  Admitted      (Please note that portions of this note were completed with a voice recognition program.  Efforts were made to edit thedictations but occasionally words are mis-transcribed.)    Dyana Avilez MD (electronically signed)  Attending Emergency Physician         Dyana Avilez MD  01/23/23 [id number]

## 2023-01-24 NOTE — PROGRESS NOTES
Verified home med list with [address] [place] [address].  No changes made to list.     Electronically signed by Vel Landry RN on 1/24/2023 at 10:21 AM

## 2023-01-24 NOTE — PROGRESS NOTES
Comprehensive Nutrition Assessment    Type and Reason for Visit:  Initial, Positive Nutrition Screen    Nutrition Recommendations/Plan:   ONS BID     Malnutrition Assessment:  Malnutrition Status: At risk for malnutrition (Comment) (01/24/23 1412)    Context:  Acute Illness     Findings of the 6 clinical characteristics of malnutrition:  Energy Intake:  Mild decrease in energy intake (Comment)  Weight Loss:  No significant weight loss     Body Fat Loss:  Unable to assess     Muscle Mass Loss:  Unable to assess    Fluid Accumulation:  Unable to assess     Strength:  Not Performed    Nutrition Assessment:    Seen for positive nutrition screen. Pt reflecting a poor PO intake of 1-25% since admission. BG elevated at 117. Electrolytes WNL. Pt has complaints of nausea and consistent BM at this time. Will start ONS BID to help with oral intake. Will follow-up on 1/26/23 to see if tolerating well. Nutrition Related Findings:      Wound Type: None       Current Nutrition Intake & Therapies:    Average Meal Intake: 1-25%  ADULT DIET; Regular; 4 carb choices (60 gm/meal); Low Fat/Low Chol/High Fiber/MAITE  ADULT ORAL NUTRITION SUPPLEMENT; Breakfast, Dinner; Low Calorie/High Protein Oral Supplement    Anthropometric Measures:  Height: 6' (182.9 cm)  Ideal Body Weight (IBW): 178 lbs (81 kg)    Current Body Weight: 195 lb (88.5 kg), 109.6 % IBW. Weight Source: Standing Scale  Current BMI (kg/m2): 26.4  Weight Adjustment For: No Adjustment  BMI Categories: Overweight (BMI 25.0-29. 9)    Estimated Daily Nutrient Needs:  Energy Requirements Based On: Kcal/kg  Weight Used for Energy Requirements: Current  Energy (kcal/day): 2341-3615  Weight Used for Protein Requirements: Ideal  Protein (g/day): 105-162  Method Used for Fluid Requirements: 1 ml/kcal  Fluid (ml/day): 8879-8436    Nutrition Diagnosis:   Inadequate oral intake related to acute injury/trauma, other (comment) (nausea and frequent BM) as evidenced by intake 0-25%    Nutrition Interventions:   Food and/or Nutrient Delivery: Continue Current Diet, Start Oral Nutrition Supplement    Goals:  Goals: Meet at least 75% of estimated needs, PO intake 50% or greater    Nutrition Monitoring and Evaluation:   Food/Nutrient Intake Outcomes: Diet Advancement/Tolerance, Food and Nutrient Intake, Supplement Intake  Physical Signs/Symptoms Outcomes: Biochemical Data, Weight, GI Status, Nausea or Vomiting    Cletis Rinne, MS, RD, LD  Contact: [phone]

## 2023-01-24 NOTE — PROGRESS NOTES
4 Eyes Skin Assessment    Froilan Alvares is being assessed upon: Admission    I agree that I, Kelsy Echavarria RN, along with Thaddeus Severin RN (either 2 RN's or 1 LPN and 1 RN) have performed a thorough Head to Toe Skin Assessment on the patient. ALL assessment sites listed below have been assessed. Areas assessed by both nurses:     [x]   Head, Face, and Ears   [x]   Shoulders, Back, and Chest  [x]   Arms, Elbows, and Hands   [x]   Coccyx, Sacrum, and Ischium  [x]   Legs, Feet, and Heels    Does the Patient have Skin Breakdown? No    Isiah Prevention initiated: Yes  Wound Care Orders initiated: No    WOC nurse consulted for Pressure Injury (Stage 3,4, Unstageable, DTI, NWPT, and Complex wounds) and New or Established Ostomies: No        Primary Nurse eSignature:  Kelsy Echavarria RN on 1/24/2023 at 2:58 AM      Co-Signer eSignature: {Esignature:[id number]}  2

## 2023-01-24 NOTE — H&P
[facility] - History & Physical      PCP: Sarah Perez MD    Date of Admission: 1/23/2023    Date of Service: 1/23/2023    Chief Complaint: Abdominal pain    History Of Present Illness: The patient is a 79 y.o. male who presented to [facility] ER with PMH HTN, GERD, hyperlipidemia, BPH, s/p TURP, complaining of abdominal pain. Patient states that he began having left-sided lower abdominal pain last evening. Initially he thought this was related to him being constipated however this morning he began having sharp worsening abdominal pain with nausea and vomiting. He has noted diaphoresis and has had decreased oral intake. Denies diarrhea, shortness of breath and chest pain. Denies hematemesis or blood in stool. Work-up in ER CT abdomen pelvis stool-filled colon, nonobstructing stone within the left renal pelvis, WBC 13.6, creatinine 1.4 BUN 31, lipase 38, urinalysis moderate leukocyte, positive nitrite, WBC 21-30, +1 bacteria, and blood cultures pending. Patient is to be admitted to the hospitalist service due to pyelonephritis. Past Medical History:        Diagnosis Date    Benign non-nodular prostatic hyperplasia with lower urinary tract symptoms 8/16/2017    Essential hypertension 8/16/2017    Gastroesophageal reflux disease without esophagitis 8/16/2017    Gout 8/16/2017    Idiopathic peripheral neuropathy 2/19/2018    Mixed hyperlipidemia 8/16/2017    Peripheral neuropathic pain 8/16/2017    Primary osteoarthritis involving multiple joints 8/16/2017    Restless legs syndrome 8/16/2017       Past Surgical History:        Procedure Laterality Date    INGUINAL HERNIA REPAIR Bilateral 1963    TURP  2003       Home Medications:  Prior to Admission medications    Medication Sig Start Date End Date Taking? Authorizing Provider   pregabalin (LYRICA) 100 MG capsule Take 1 capsule by mouth 2 times daily for 30 days.  1/4/23 2/3/23  Bety Cuevas MD   pregabalin (LYRICA) 100 MG capsule Take 1 capsule by mouth 2 times daily for 30 days. 10/14/22 11/13/22  Bety Cuevas MD   tamsulosin (FLOMAX) 0.4 MG capsule Take 1 capsule by mouth daily 10/2/22   Sunshine Brooks MD   etodolac (LODINE) 400 MG tablet Take 1 tablet by mouth daily 10/2/22   Sunshine Brooks MD   metoprolol succinate (TOPROL XL) 50 MG extended release tablet Take 1 tablet by mouth once daily 9/13/22   Sunshine Brooks MD   allopurinol (ZYLOPRIM) 100 MG tablet Take 1 tablet by mouth daily 4/1/22   Sunshine Brooks MD   atorvastatin (LIPITOR) 20 MG tablet Take 1 tablet by mouth daily 4/1/22   Sunshine Brooks MD   omeprazole (PRILOSEC) 20 MG delayed release capsule Take 1 capsule by mouth daily 4/1/22   Sunshine Brooks MD   vitamin D (ERGOCALCIFEROL) 1.25 MG (47383 UT) CAPS capsule Take 1 capsule by mouth once a week 4/1/22   Sunshine Brooks MD       Allergies:    Codeine    Social History:    The patient currently lives home  Tobacco:   reports that he quit smoking about 48 years ago. His smoking use included cigarettes. He has a 90.00 pack-year smoking history. He has never used smokeless tobacco.  Alcohol:   reports no history of alcohol use. Illicit Drugs: denies    Family History:      Problem Relation Age of Onset    Uterine Cancer Mother     Coronary Art Dis Father         MI at 62    COPD Sister     Stroke Brother        Review of Systems:   Review of Systems   Constitutional:  Positive for activity change, appetite change, diaphoresis and fatigue. Negative for chills and fever. Respiratory:  Negative for cough, chest tightness, shortness of breath and wheezing. Cardiovascular:  Negative for chest pain and palpitations. Gastrointestinal:  Positive for abdominal distention, abdominal pain, nausea and vomiting. Negative for constipation. Genitourinary:  Positive for decreased urine volume and flank pain. Skin:  Negative for color change, pallor and rash. Neurological:  Positive for weakness.  Negative for tremors, syncope, light-headedness, numbness and headaches. Psychiatric/Behavioral:  Negative for agitation, behavioral problems and confusion. 14 point review of systems is negative except as specifically addressed above. Physical Examination:  /78   Pulse 68   Temp 97.6 °F (36.4 °C) (Temporal)   Resp 18   SpO2 96%   Physical Exam  Vitals and nursing note reviewed. Constitutional:       Appearance: Normal appearance. HENT:      Mouth/Throat:      Mouth: Mucous membranes are moist.      Pharynx: Oropharynx is clear. Eyes:      Extraocular Movements: Extraocular movements intact. Conjunctiva/sclera: Conjunctivae normal.      Pupils: Pupils are equal, round, and reactive to light. Cardiovascular:      Rate and Rhythm: Normal rate and regular rhythm. Pulses: Normal pulses. Heart sounds: Normal heart sounds. No murmur heard. Pulmonary:      Effort: Pulmonary effort is normal. No respiratory distress. Breath sounds: Normal breath sounds. Abdominal:      General: Bowel sounds are normal. There is distension. Palpations: Abdomen is soft. Tenderness: There is abdominal tenderness in the left upper quadrant and left lower quadrant. There is left CVA tenderness. There is no guarding or rebound. Musculoskeletal:         General: No swelling. Normal range of motion. Cervical back: Normal range of motion and neck supple. No rigidity or tenderness. Right lower leg: No edema. Left lower leg: No edema. Skin:     General: Skin is warm and dry. Capillary Refill: Capillary refill takes less than 2 seconds. Neurological:      General: No focal deficit present. Mental Status: He is alert and oriented to person, place, and time. Cranial Nerves: No cranial nerve deficit.    Psychiatric:         Mood and Affect: Mood normal.         Behavior: Behavior normal.        Diagnostic Data:  CBC:  Recent Labs     01/23/23  1535   WBC 13.6*   HGB 14.1   HCT 45.0   PLT 214     BMP:  Recent Labs     01/23/23  1535      K 4.6      CO2 26   BUN 31*   CREATININE 1.4*   CALCIUM 9.5     Recent Labs     01/23/23  1535   AST 15   ALT 15   BILITOT 0.7   ALKPHOS 125       Urinalysis:  Lab Results   Component Value Date/Time    NITRU POSITIVE 01/23/2023 04:59 PM    WBCUA 21-30 01/23/2023 04:59 PM    BACTERIA 1+ 01/23/2023 04:59 PM    RBCUA 6-10 01/23/2023 04:59 PM    BLOODU MODERATE 01/23/2023 04:59 PM    SPECGRAV 1.039 01/23/2023 04:59 PM    GLUCOSEU Negative 01/23/2023 04:59 PM         CT ABDOMEN PELVIS W IV CONTRAST Additional Contrast? None    Result Date: 1/23/2023  NO PRIOR REPORT AVAILABLE Exam: CT OF THE ABDOMEN/PELVIS WITH IV CONTRAST Clinical data: Left LQ pain Technique:Direct contiguousaxial CT images were acquired through the abdomen and pelvis with intravenouscontrastusing soft tissue and bone algorithms. Oral contrast was not administered. Reformatted/MPR images were performed. Radiation dose: CTDIvol =10.77 mGy, DLP =645.28 mGy x cm. Limitations: Lack of oral contrast limits evaluation of the bowel loops. Prior Studies: No prior studies submitted. Findings: Lung bases: No acute pulmonary process. Mild cardiomegaly. d Liver:Unremarkable size andcontour. Low attenuated lesions within the liver measuring up to 2.1 cm consistent with simple cysts. Normal density. No evidence of mass. No evidence of dilated ducts. Gallbladder Fossa: Unremarkable Spleen: Grossly unremarkable. Pancreas/adrenal glands: Grossly unremarkable size, contour and density. Kidneys: In anatomic position. Grossly unremarkablerenal size, contour and density. Cystic lesions of the kidneys measuring up to 3.9 cm with simple appearance. Coarse calcification within the parenchyma posteriorly within the interpolar left kidney can be from prior inflammatory infectious or chronic scarring. 4-5 mm nonobstructing stone within the left renal pelvis. No evidence of a renal mass.  Perinephric space is unremarkable. Retroperitoneum: No enlarged retroperitoneal lymphadenopathy. The aorta and IVC appear unremarkable. Peritoneal cavity: No evidence of free air or ascites. Gastrointestinal tract: No obstruction. Stool filled colon. Portions of the small bowel demonstrate mucosal thickening. Appendix: Unremarkable Pelvis: Solid and hollow viscera grossly unremarkable. Osseous structures: No acute or destructive bony process identified. Degenerative changes of the spine. 1. Portions of the mid small bowel suggest mild mucosal thickening. Exact etiology is uncertain. Mild enteritis is not excluded. No fluid-filled bowel loops. 2. Stool filled colon. 3. Simple cystic lesions of the liver and kidneys. 4. Nonobstructing stone within the left renal pelvis. 5. Focal coarse calcification in the left renal parenchyma. This can be from prior inflammatory infectious process. Recommendation: Follow up as clinically indicated. All CT scans at this facility utilize dose modulation, iterative reconstruction, and/or weight based dosing when appropriate to reduce radiation dose to as low as reasonably achievable. Dictated and Electronically Signed by Hernan Whittington MD, [facility] CERTIFIED at [id number] 07:03:41 PM EST               Assessment/Plan:  Principal Problem:    Pyelonephritis   -CT abdomen pelvis    -Urinalysis & Culture  -IVF    -IV antibiotic, Rocephin    -As needed pain medication    -Monitor I&O  Active Problems:    Renal stone   -Flomax daily    -Strain all urine     DVT prophylactic: Lovenox        Signed:  GOLD Camejo CNP, 1/23/2023 6:43 PM       EMR Dragon/Transcription disclaimer:   Much of this encounter note is an electronic transcription/translation of spoken language to printed text.  The electronic translation of spoken language may permit erroneous, or at times, nonsensical words or phrases to be inadvertently transcribed; although attempts have made to review the note for such errors, some may still exist.

## 2023-01-24 NOTE — PROGRESS NOTES
Daily Progress Note    Date:2023  Patient: Veronica Garcia  : 1940  EGL:[id number]  CODE:Full Code No additional code details  PCP:Bety Cuevas MD    Admit Date: 2023  3:25 PM   LOS: 1 day     Chief Complaint   Patient presents with    Abdominal Pain    Flank Pain     Pt states that he began having flank pain and LQ abdominal pain at 1000 this morning. Pt states that he has not had an episode like this in the past. Pt states he has had nausea, and has had consistent Bms. Pt denies diarrhea. ABCs are intact. Skin wpd. A&ox4. Subjective: Pt resting in bed. No fevers today or overnight. Denies difficulty urinating. Still having some L sided abdominal and flank pain. Bladder scans have been <100 cc throughout the day. Had an unwitnessed fall when climbing out of bed to get his phone this evening. Hit his head on the floor. No headache or signs of trauma to head. Skin tear on L arm. Hospital Summary:  78 yo M with HTN, GERD, HLD, BPH s/p TURP who presented to [address] [place] [address] ED with abdominal pain. CT abdomen in ED showed stool-filled colon, nonobstructing stone within L renal pelvis. Admitted to hospitalist service for UTI/Pyelonephritis. Blood cultures returned 2/2 + for gram + cocci, PCR showing Staph Epi. Review of Systems   Constitutional:  Negative for chills and fever. Respiratory:  Negative for cough and shortness of breath. Cardiovascular:  Negative for chest pain and palpitations. Gastrointestinal:  Positive for abdominal pain. Negative for nausea and vomiting.      Objective:      Vital signs in last 24 hours:  Patient Vitals for the past 24 hrs:   BP Temp Temp src Pulse Resp SpO2 Height Weight   23 1743 (!) 118/92 97 °F (36.1 °C) Oral 76 16 97 % -- --   23 1406 -- -- -- -- -- -- 6' (1.829 m) --   23 1118 109/65 98 °F (36.7 °C) Temporal 77 18 91 % -- --   23 0727 (!) 142/78 97.5 °F (36.4 °C) Oral 75 18 92 % -- --   23 0717 -- -- -- -- -- 90 % -- --   01/24/23 0134 137/86 98.2 °F (36.8 °C) Oral 86 18 90 % -- --   01/23/23 2048 -- -- -- -- 16 -- -- --   01/23/23 1930 (!) 153/71 -- -- 61 14 93 % 6' (1.829 m) 195 lb 11.2 oz (88.8 kg)   01/23/23 1836 138/78 -- -- 68 18 96 % -- --       No intake/output data recorded. I/O this shift:  In: 2143 [P.O.:360; I.V.:1783]  Out: 50 [Urine:50]    Physical Exam  Constitutional:       General: He is not in acute distress. Appearance: Normal appearance. He is not toxic-appearing. Cardiovascular:      Rate and Rhythm: Normal rate and regular rhythm. Pulses: Normal pulses. Heart sounds: Normal heart sounds. Pulmonary:      Effort: Pulmonary effort is normal. No respiratory distress. Breath sounds: Normal breath sounds. Abdominal:      General: Bowel sounds are normal. There is no distension. Palpations: Abdomen is soft. Tenderness: There is abdominal tenderness (L flank and LLQ).            Lab Review   Recent Results (from the past 24 hour(s))   COVID-19, Rapid    Collection Time: 01/23/23  6:27 PM    Specimen: Nasopharyngeal Swab   Result Value Ref Range    SARS-CoV-2, NAAT Not Detected Not Detected   Comprehensive Metabolic Panel w/ Reflex to MG    Collection Time: 01/24/23  2:55 AM   Result Value Ref Range    Sodium 141 136 - 145 mmol/L    Potassium reflex Magnesium 4.8 3.5 - 5.0 mmol/L    Chloride 105 98 - 111 mmol/L    CO2 24 22 - 29 mmol/L    Anion Gap 12 7 - 19 mmol/L    Glucose 117 (H) 74 - 109 mg/dL    BUN 35 (H) 8 - 23 mg/dL    Creatinine 1.7 (H) 0.5 - 1.2 mg/dL    Est, Glom Filt Rate 39 (A) >60    Calcium 9.2 8.8 - 10.2 mg/dL    Total Protein 6.7 6.6 - 8.7 g/dL    Albumin 3.8 3.5 - 5.2 g/dL    Total Bilirubin 0.6 0.2 - 1.2 mg/dL    Alkaline Phosphatase 116 40 - 130 U/L    ALT 14 5 - 41 U/L    AST 14 5 - 40 U/L   CBC    Collection Time: 01/24/23  2:55 AM   Result Value Ref Range    WBC 13.1 (H) 4.8 - 10.8 K/uL    RBC 4.71 4.70 - 6.10 M/uL    Hemoglobin 13.3 (L) 14.0 - 18.0 g/dL    Hematocrit 42.3 42.0 - 52.0 %    MCV 89.8 80.0 - 94.0 fL    MCH 28.2 27.0 - 31.0 pg    MCHC 31.4 (L) 33.0 - 37.0 g/dL    RDW 13.5 11.5 - 14.5 %    Platelets 887 852 - 282 K/uL    MPV 11.4 9.4 - 12.4 fL   CK    Collection Time: 01/24/23  2:55 AM   Result Value Ref Range    Total  39 - 308 U/L   Basic Metabolic Panel w/ Reflex to MG    Collection Time: 01/24/23  3:18 PM   Result Value Ref Range    Sodium 140 136 - 145 mmol/L    Potassium reflex Magnesium 5.2 (H) 3.5 - 5.0 mmol/L    Chloride 105 98 - 111 mmol/L    CO2 26 22 - 29 mmol/L    Anion Gap 9 7 - 19 mmol/L    Glucose 139 (H) 74 - 109 mg/dL    BUN 41 (H) 8 - 23 mg/dL    Creatinine 2.1 (H) 0.5 - 1.2 mg/dL    Est, Glom Filt Rate 30 (A) >60    Calcium 9.1 8.8 - 10.2 mg/dL             Current Facility-Administered Medications:     cefTRIAXone (ROCEPHIN) 1,000 mg in sterile water 10 mL IV syringe, 1,000 mg, IntraVENous, Q24H, Caleb Tapia MD, 1,000 mg at 01/24/23 1728    tamsulosin (FLOMAX) capsule 0.4 mg, 0.4 mg, Oral, Daily, Arlyne Primus, APRN - CNP, 0.4 mg at 01/24/23 4419    sodium chloride flush 0.9 % injection 5-40 mL, 5-40 mL, IntraVENous, 2 times per day, Arlyne Primus, APRN - CNP, 10 mL at 01/23/23 2018    sodium chloride flush 0.9 % injection 5-40 mL, 5-40 mL, IntraVENous, PRN, Arlyne Primus, APRN - CNP    0.9 % sodium chloride infusion, , IntraVENous, PRN, Arlyne Primus, APRN - CNP    enoxaparin (LOVENOX) injection 40 mg, 40 mg, SubCUTAneous, Daily, Arlyne Primus, APRN - CNP, 40 mg at 01/24/23 1728    ondansetron (ZOFRAN-ODT) disintegrating tablet 4 mg, 4 mg, Oral, Q8H PRN **OR** ondansetron (ZOFRAN) injection 4 mg, 4 mg, IntraVENous, Q6H PRN, Arlyne Primus, APRN - CNP    acetaminophen (TYLENOL) tablet 650 mg, 650 mg, Oral, Q6H PRN **OR** acetaminophen (TYLENOL) suppository 650 mg, 650 mg, Rectal, Q6H PRN, Arlyne Primus, APRN - CNP    polyethylene glycol (GLYCOLAX) packet 17 g, 17 g, Oral, Daily PRN, Arlyne Primus, APRN - CNP 0.9 % sodium chloride infusion, , IntraVENous, Continuous, Denise Swanson MD, Last Rate: 150 mL/hr at 01/24/23 1730, New Bag at 01/24/23 1730    traMADol (ULTRAM) tablet 50 mg, 50 mg, Oral, Q6H PRN, Denise Swanson MD, 50 mg at 01/24/23 1207    morphine (PF) injection 2 mg, 2 mg, IntraVENous, Q2H PRN **OR** morphine sulfate (PF) injection 4 mg, 4 mg, IntraVENous, Q2H PRN, Denise Swanson MD    allopurinol (ZYLOPRIM) tablet 100 mg, 100 mg, Oral, Daily, Zarate Yuriy, APRN - CNP, 100 mg at 01/24/23 0819    atorvastatin (LIPITOR) tablet 20 mg, 20 mg, Oral, Daily, Zarate Yuriy, APRN - CNP, 20 mg at 01/24/23 0819    metoprolol succinate (TOPROL XL) extended release tablet 50 mg, 50 mg, Oral, Daily, Zarate Yuriy, APRN - CNP, 50 mg at 01/24/23 0819    pantoprazole (PROTONIX) tablet 40 mg, 40 mg, Oral, QAM AC, Zarate Yuriy, APRN - CNP, 40 mg at 01/24/23 0604    pregabalin (LYRICA) capsule 100 mg, 100 mg, Oral, BID, Zarate Yuriy, APRN - CNP, 100 mg at 01/24/23 0818    docusate sodium (COLACE) capsule 100 mg, 100 mg, Oral, Daily, Zarate Yuriy, APRN - CNP, 100 mg at 01/24/23 8048      Imaging:  CT ABDOMEN PELVIS W IV CONTRAST Additional Contrast? None    Result Date: 1/23/2023  1. Portions of the mid small bowel suggest mild mucosal thickening. Exact etiology is uncertain. Mild enteritis is not excluded. No fluid-filled bowel loops. 2. Stool filled colon. 3. Simple cystic lesions of the liver and kidneys. 4. Nonobstructing stone within the left renal pelvis. 5. Focal coarse calcification in the left renal parenchyma. This can be from prior inflammatory infectious process. Recommendation: Follow up as clinically indicated. All CT scans at this facility utilize dose modulation, iterative reconstruction, and/or weight based dosing when appropriate to reduce radiation dose to as low as reasonably achievable.  Dictated and Electronically Signed by Mario Naidu MD, [facility] CERTIFIED at [id number] 07:03:41 PM EST Assessment/Plan  Principal Problem:    Pyelonephritis  Active Problems:    Renal stone  Resolved Problems:    * No resolved hospital problems. *       I have reviewed the patient's daily labs, including BMP and CBC with pertinent results discussed below.     Pyelonephritis  -- SIRS 1/4 on admission for WBC 13.6  -- UA w/ 21-30 WBCs, 6-10 RBCs, positive nitrite, mod LE  -- CT abdomen w/ nonobstructing stone in L renal pelvis, coarse calcification in L renal parenchyma  -- Continue Ceftriaxone  -- Follow up urine culture    Gram + bacteremia  -- 2/2 blood cultures + for GPC, PCR showing Staph Epi  -- Vancomycin added, continue Ceftriaxone   -- Follow up final blood cx    ALEXANDRO  -- Cr up to 2.1 from 1.1 (baseline)  -- nonobstructing stone on CT abdomen  -- PVRs have been negative  -- Urine studies ordered  -- Order renal US; its possible stone has become obstructive if it moved distally  -- Increase IVF to 150/hr  -- [facility] for K 5.2  -- Possible Nephrology consult in AM  -- May need urology consult pending results of US    Nephrolithiasis  -- Nonobstructing on CT abdomen  -- Continue Flomax  -- Reassess on Renal US      DVT prophylaxis: Lovenox    DISPOSITION:  TBD, PT/OT eval    Full Code No additional code details        Arnetha Mohs, MD 1/24/2023 5:55 PM

## 2023-01-25 ENCOUNTER — APPOINTMENT (OUTPATIENT)
Dept: ULTRASOUND IMAGING | Age: 83
DRG: 871 | End: 2023-01-25
Payer: MEDICARE

## 2023-01-25 LAB
ALBUMIN SERPL-MCNC: 3.2 G/DL (ref 3.5–5.2)
ALP BLD-CCNC: 85 U/L (ref 40–130)
ALT SERPL-CCNC: 12 U/L (ref 5–41)
ANION GAP SERPL CALCULATED.3IONS-SCNC: 11 MMOL/L (ref 7–19)
AST SERPL-CCNC: 22 U/L (ref 5–40)
BILIRUB SERPL-MCNC: 0.4 MG/DL (ref 0.2–1.2)
BUN BLDV-MCNC: 44 MG/DL (ref 8–23)
CALCIUM SERPL-MCNC: 8.3 MG/DL (ref 8.8–10.2)
CHLORIDE BLD-SCNC: 108 MMOL/L (ref 98–111)
CO2: 23 MMOL/L (ref 22–29)
CREAT SERPL-MCNC: 2.3 MG/DL (ref 0.5–1.2)
CREATININE URINE: 177.9 MG/DL (ref 4.2–622)
CREATININE URINE: 236.4 MG/DL (ref 4.2–622)
GFR SERPL CREATININE-BSD FRML MDRD: 27 ML/MIN/{1.73_M2}
GLUCOSE BLD-MCNC: 106 MG/DL (ref 74–109)
HCT VFR BLD CALC: 34.9 % (ref 42–52)
HEMOGLOBIN: 10.9 G/DL (ref 14–18)
MCH RBC QN AUTO: 28.2 PG (ref 27–31)
MCHC RBC AUTO-ENTMCNC: 31.2 G/DL (ref 33–37)
MCV RBC AUTO: 90.4 FL (ref 80–94)
PDW BLD-RTO: 13.7 % (ref 11.5–14.5)
PLATELET # BLD: 141 K/UL (ref 130–400)
PMV BLD AUTO: 11.6 FL (ref 9.4–12.4)
POTASSIUM REFLEX MAGNESIUM: 4.8 MMOL/L (ref 3.5–5)
RBC # BLD: 3.86 M/UL (ref 4.7–6.1)
SODIUM BLD-SCNC: 142 MMOL/L (ref 136–145)
SODIUM URINE: 34 MMOL/L
SODIUM URINE: 34 MMOL/L
TOTAL PROTEIN: 5.5 G/DL (ref 6.6–8.7)
WBC # BLD: 10.8 K/UL (ref 4.8–10.8)

## 2023-01-25 PROCEDURE — 76770 US EXAM ABDO BACK WALL COMP: CPT | Performed by: RADIOLOGY

## 2023-01-25 PROCEDURE — 87040 BLOOD CULTURE FOR BACTERIA: CPT

## 2023-01-25 PROCEDURE — 51798 US URINE CAPACITY MEASURE: CPT

## 2023-01-25 PROCEDURE — 2580000003 HC RX 258: Performed by: STUDENT IN AN ORGANIZED HEALTH CARE EDUCATION/TRAINING PROGRAM

## 2023-01-25 PROCEDURE — 97165 OT EVAL LOW COMPLEX 30 MIN: CPT

## 2023-01-25 PROCEDURE — 84300 ASSAY OF URINE SODIUM: CPT

## 2023-01-25 PROCEDURE — 1210000000 HC MED SURG R&B

## 2023-01-25 PROCEDURE — 6360000002 HC RX W HCPCS

## 2023-01-25 PROCEDURE — 97530 THERAPEUTIC ACTIVITIES: CPT

## 2023-01-25 PROCEDURE — 82570 ASSAY OF URINE CREATININE: CPT

## 2023-01-25 PROCEDURE — 85027 COMPLETE CBC AUTOMATED: CPT

## 2023-01-25 PROCEDURE — 6360000002 HC RX W HCPCS: Performed by: STUDENT IN AN ORGANIZED HEALTH CARE EDUCATION/TRAINING PROGRAM

## 2023-01-25 PROCEDURE — 76770 US EXAM ABDO BACK WALL COMP: CPT

## 2023-01-25 PROCEDURE — 80053 COMPREHEN METABOLIC PANEL: CPT

## 2023-01-25 PROCEDURE — 2580000003 HC RX 258

## 2023-01-25 PROCEDURE — 6370000000 HC RX 637 (ALT 250 FOR IP)

## 2023-01-25 PROCEDURE — 97162 PT EVAL MOD COMPLEX 30 MIN: CPT

## 2023-01-25 PROCEDURE — 36415 COLL VENOUS BLD VENIPUNCTURE: CPT

## 2023-01-25 RX ORDER — ENOXAPARIN SODIUM 100 MG/ML
30 INJECTION SUBCUTANEOUS DAILY
Status: DISCONTINUED | OUTPATIENT
Start: 2023-01-25 | End: 2023-01-28

## 2023-01-25 RX ADMIN — PREGABALIN 100 MG: 50 CAPSULE ORAL at 21:02

## 2023-01-25 RX ADMIN — POLYETHYLENE GLYCOL 3350 17 G: 17 POWDER, FOR SOLUTION ORAL at 14:22

## 2023-01-25 RX ADMIN — DOCUSATE SODIUM 100 MG: 100 CAPSULE, LIQUID FILLED ORAL at 08:54

## 2023-01-25 RX ADMIN — ENOXAPARIN SODIUM 30 MG: 100 INJECTION SUBCUTANEOUS at 18:19

## 2023-01-25 RX ADMIN — ALLOPURINOL 100 MG: 100 TABLET ORAL at 08:54

## 2023-01-25 RX ADMIN — PANTOPRAZOLE SODIUM 40 MG: 40 TABLET, DELAYED RELEASE ORAL at 06:24

## 2023-01-25 RX ADMIN — SODIUM CHLORIDE: 9 INJECTION, SOLUTION INTRAVENOUS at 17:37

## 2023-01-25 RX ADMIN — PREGABALIN 100 MG: 50 CAPSULE ORAL at 08:54

## 2023-01-25 RX ADMIN — SODIUM CHLORIDE: 9 INJECTION, SOLUTION INTRAVENOUS at 10:55

## 2023-01-25 RX ADMIN — ONDANSETRON 4 MG: 2 INJECTION INTRAMUSCULAR; INTRAVENOUS at 02:51

## 2023-01-25 RX ADMIN — SODIUM CHLORIDE: 9 INJECTION, SOLUTION INTRAVENOUS at 03:52

## 2023-01-25 RX ADMIN — CEFTRIAXONE 1000 MG: 1 INJECTION, POWDER, FOR SOLUTION INTRAMUSCULAR; INTRAVENOUS at 18:19

## 2023-01-25 RX ADMIN — METOPROLOL SUCCINATE 50 MG: 50 TABLET, FILM COATED, EXTENDED RELEASE ORAL at 08:54

## 2023-01-25 RX ADMIN — SODIUM CHLORIDE, PRESERVATIVE FREE 10 ML: 5 INJECTION INTRAVENOUS at 08:56

## 2023-01-25 RX ADMIN — ATORVASTATIN CALCIUM 20 MG: 40 TABLET, FILM COATED ORAL at 08:55

## 2023-01-25 RX ADMIN — TAMSULOSIN HYDROCHLORIDE 0.4 MG: 0.4 CAPSULE ORAL at 08:54

## 2023-01-25 ASSESSMENT — ENCOUNTER SYMPTOMS
COUGH: 0
SHORTNESS OF BREATH: 0
NAUSEA: 0
VOMITING: 0
ABDOMINAL PAIN: 1

## 2023-01-25 NOTE — PROGRESS NOTES
Daily Progress Note    Date:2023  Patient: Jess Peace  : 1940  JENNIFER:[id number]  CODE:Full Code No additional code details  PCP:Bety Lee MD    Admit Date: 2023  3:25 PM   LOS: 2 days     Chief Complaint   Patient presents with    Abdominal Pain    Flank Pain     Pt states that he began having flank pain and LQ abdominal pain at 1000 this morning. Pt states that he has not had an episode like this in the past. Pt states he has had nausea, and has had consistent Bms. Pt denies diarrhea. ABCs are intact. Skin wpd. A&ox4. Subjective: Pt became somewhat confused last night. Had a fall where he hit his head as previously documented. CT head was unremarkable. He does continue to feel a little lightheaded with positional changes. IVF running at 150/hr. Hospital Summary:  78 yo M with HTN, GERD, HLD, BPH s/p TURP who presented to [facility] ED with abdominal pain. CT abdomen in ED showed stool-filled colon, nonobstructing stone within L renal pelvis. Admitted to hospitalist service for UTI/Pyelonephritis. Blood cultures returned 2/2 + for gram + cocci, PCR showing Staph Epi. Review of Systems   Constitutional:  Negative for chills and fever. Respiratory:  Negative for cough and shortness of breath. Cardiovascular:  Negative for chest pain and palpitations. Gastrointestinal:  Positive for abdominal pain. Negative for nausea and vomiting. Objective:      Vital signs in last 24 hours:  Patient Vitals for the past 24 hrs:   BP Temp Temp src Pulse Resp SpO2   23 1324 111/73 97.9 °F (36.6 °C) Oral 69 -- 92 %   23 0638 123/80 97.5 °F (36.4 °C) Oral 75 18 94 %   23 0149 126/74 97.5 °F (36.4 °C) Oral 79 18 90 %   23 2147 -- -- -- 96 -- --   23 1816 139/76 97.4 °F (36.3 °C) Oral 96 20 95 %   23 1743 (!) 118/92 97 °F (36.1 °C) Oral 76 16 97 %         I/O last 3 completed shifts:   In: 9219 [P.O.:360; I.V.:3377]  Out: 48 [Urine:50]  I/O this shift:  In: 330 [P.O.:330]  Out: -     Physical Exam  Constitutional:       General: He is not in acute distress. Appearance: Normal appearance. He is not toxic-appearing. Cardiovascular:      Rate and Rhythm: Normal rate and regular rhythm. Pulses: Normal pulses. Heart sounds: Normal heart sounds. Pulmonary:      Effort: Pulmonary effort is normal. No respiratory distress. Breath sounds: Normal breath sounds. Abdominal:      General: Bowel sounds are normal. There is no distension. Palpations: Abdomen is soft. Tenderness: There is abdominal tenderness (L flank and LLQ).            Lab Review   Recent Results (from the past 24 hour(s))   Basic Metabolic Panel w/ Reflex to MG    Collection Time: 01/24/23  3:18 PM   Result Value Ref Range    Sodium 140 136 - 145 mmol/L    Potassium reflex Magnesium 5.2 (H) 3.5 - 5.0 mmol/L    Chloride 105 98 - 111 mmol/L    CO2 26 22 - 29 mmol/L    Anion Gap 9 7 - 19 mmol/L    Glucose 139 (H) 74 - 109 mg/dL    BUN 41 (H) 8 - 23 mg/dL    Creatinine 2.1 (H) 0.5 - 1.2 mg/dL    Est, Glom Filt Rate 30 (A) >60    Calcium 9.1 8.8 - 10.2 mg/dL   Vancomycin Level, Random    Collection Time: 01/24/23  9:01 PM   Result Value Ref Range    Vancomycin Rm <4.0 ug/mL   Comprehensive Metabolic Panel w/ Reflex to MG    Collection Time: 01/25/23  1:27 AM   Result Value Ref Range    Sodium 142 136 - 145 mmol/L    Potassium reflex Magnesium 4.8 3.5 - 5.0 mmol/L    Chloride 108 98 - 111 mmol/L    CO2 23 22 - 29 mmol/L    Anion Gap 11 7 - 19 mmol/L    Glucose 106 74 - 109 mg/dL    BUN 44 (H) 8 - 23 mg/dL    Creatinine 2.3 (H) 0.5 - 1.2 mg/dL    Est, Glom Filt Rate 27 (A) >60    Calcium 8.3 (L) 8.8 - 10.2 mg/dL    Total Protein 5.5 (L) 6.6 - 8.7 g/dL    Albumin 3.2 (L) 3.5 - 5.2 g/dL    Total Bilirubin 0.4 0.2 - 1.2 mg/dL    Alkaline Phosphatase 85 40 - 130 U/L    ALT 12 5 - 41 U/L    AST 22 5 - 40 U/L   CBC    Collection Time: 01/25/23  1:27 AM   Result Value Ref Range WBC 10.8 4.8 - 10.8 K/uL    RBC 3.86 (L) 4.70 - 6.10 M/uL    Hemoglobin 10.9 (L) 14.0 - 18.0 g/dL    Hematocrit 34.9 (L) 42.0 - 52.0 %    MCV 90.4 80.0 - 94.0 fL    MCH 28.2 27.0 - 31.0 pg    MCHC 31.2 (L) 33.0 - 37.0 g/dL    RDW 13.7 11.5 - 14.5 %    Platelets 445 163 - 483 K/uL    MPV 11.6 9.4 - 12.4 fL   Sodium, Urine, Random    Collection Time: 01/25/23  5:00 AM   Result Value Ref Range    Sodium, Ur 34.0 mmol/L   Creatinine, Random Urine    Collection Time: 01/25/23  5:00 AM   Result Value Ref Range    Creatinine, Ur 236.4 4.2 - 622.0 mg/dL             Current Facility-Administered Medications:     enoxaparin Sodium (LOVENOX) injection 30 mg, 30 mg, SubCUTAneous, Daily, GOLD Ruvalcaba CNP    cefTRIAXone (ROCEPHIN) 1,000 mg in sterile water 10 mL IV syringe, 1,000 mg, IntraVENous, Q24H, Donny Ramirez MD, 1,000 mg at 01/24/23 1728    vancomycin (VANCOCIN) intermittent dosing (placeholder), , Other, RX Placeholder, GOLD Ruvalcaba CNP    tamsulosin (FLOMAX) capsule 0.4 mg, 0.4 mg, Oral, Daily, GOLD Ruvalcaba CNP, 0.4 mg at 01/25/23 0854    sodium chloride flush 0.9 % injection 5-40 mL, 5-40 mL, IntraVENous, 2 times per day, GOLD Ruvalcaba CNP, 10 mL at 01/25/23 0856    sodium chloride flush 0.9 % injection 5-40 mL, 5-40 mL, IntraVENous, PRN, GOLD Ruvalcaba CNP    0.9 % sodium chloride infusion, , IntraVENous, PRN, GOLD Ruvalcaba CNP    ondansetron (ZOFRAN-ODT) disintegrating tablet 4 mg, 4 mg, Oral, Q8H PRN **OR** ondansetron (ZOFRAN) injection 4 mg, 4 mg, IntraVENous, Q6H PRN, GLOD Ruvalcaba CNP, 4 mg at 01/25/23 0251    acetaminophen (TYLENOL) tablet 650 mg, 650 mg, Oral, Q6H PRN **OR** acetaminophen (TYLENOL) suppository 650 mg, 650 mg, Rectal, Q6H PRN, GOLD Ruvalcaba CNP    polyethylene glycol (GLYCOLAX) packet 17 g, 17 g, Oral, Daily PRN, GOLD Ruvalcaba CNP    0.9 % sodium chloride infusion, , IntraVENous, Continuous, Amirah Lema Ronnie Gomez MD, Last Rate: 150 mL/hr at 01/25/23 1055, New Bag at 01/25/23 1055    traMADol (ULTRAM) tablet 50 mg, 50 mg, Oral, Q6H PRN, Alejandra Bauer MD, 50 mg at 01/24/23 1207    morphine (PF) injection 2 mg, 2 mg, IntraVENous, Q2H PRN **OR** morphine sulfate (PF) injection 4 mg, 4 mg, IntraVENous, Q2H PRN, Alejandra Bauer MD    allopurinol (ZYLOPRIM) tablet 100 mg, 100 mg, Oral, Daily, Sara Sellar, APRN - CNP, 100 mg at 01/25/23 0854    atorvastatin (LIPITOR) tablet 20 mg, 20 mg, Oral, Daily, Sara Sellar, APRN - CNP, 20 mg at 01/25/23 0855    metoprolol succinate (TOPROL XL) extended release tablet 50 mg, 50 mg, Oral, Daily, Sara Sellar, APRN - CNP, 50 mg at 01/25/23 0854    pantoprazole (PROTONIX) tablet 40 mg, 40 mg, Oral, QAM AC, Sara Sellar, APRN - CNP, 40 mg at 01/25/23 6609    pregabalin (LYRICA) capsule 100 mg, 100 mg, Oral, BID, Sara Sellar, APRN - CNP, 100 mg at 01/25/23 0854    docusate sodium (COLACE) capsule 100 mg, 100 mg, Oral, Daily, Sara Sellar, APRN - CNP, 100 mg at 01/25/23 3239      Imaging:  CT ABDOMEN PELVIS W IV CONTRAST Additional Contrast? None    Result Date: 1/23/2023  1. Portions of the mid small bowel suggest mild mucosal thickening. Exact etiology is uncertain. Mild enteritis is not excluded. No fluid-filled bowel loops. 2. Stool filled colon. 3. Simple cystic lesions of the liver and kidneys. 4. Nonobstructing stone within the left renal pelvis. 5. Focal coarse calcification in the left renal parenchyma. This can be from prior inflammatory infectious process. Recommendation: Follow up as clinically indicated. All CT scans at this facility utilize dose modulation, iterative reconstruction, and/or weight based dosing when appropriate to reduce radiation dose to as low as reasonably achievable.  Dictated and Electronically Signed by Michelle Sidhu MD, MQSA CERTIFIED at [id number] 07:03:41 PM EST                  Assessment/Plan  Principal Problem: Pyelonephritis  Active Problems:    Renal stone  Resolved Problems:    * No resolved hospital problems. *       I have reviewed the patient's daily labs, including BMP and CBC with pertinent results discussed below.     Pyelonephritis  -- SIRS 1/4 on admission for WBC 13.6  -- UA w/ 21-30 WBCs, 6-10 RBCs, positive nitrite, mod LE  -- CT abdomen w/ nonobstructing stone in L renal pelvis, coarse calcification in L renal parenchyma  -- Continue Ceftriaxone  -- Follow up urine culture    Staph Epidermidis bacteremia  -- 2/2 blood cultures + for GPC, PCR showing Staph Epi  -- Vancomycin added, continue Ceftriaxone   -- Follow up final blood cx  -- Consult ID  -- Staph Epi not typical for UTI, we may need to look for alternative sources of infection, will obtain Echo and f/u ID recs    ALEXANDRO  -- Cr up to 2.1 from 1.1 (baseline)  -- nonobstructing stone on CT abdomen  -- PVRs have been negative  -- Urine studies ordered  -- Renal US negative for hydronephrosis  -- Continue /hr  -- [facility] for K 5.2 > 4.8  -- Consulted nephrology, appreciate recommendations  -- Renal US negative for hydronephrosis on 1/25    Nephrolithiasis  -- Nonobstructing on CT abdomen  -- Continue Flomax  -- Renal US negative for hydronephrosis 1/25      DVT prophylaxis: Lovenox    DISPOSITION:  TBD, PT/OT eval    Full Code No additional code details        Sanna Mendez MD 1/25/2023 2:09 PM

## 2023-01-25 NOTE — PROGRESS NOTES
This RN arrived to patient's room at approximately 1808 d/t sounding IV alarm signaling low battery. IV pump was plugged in and patient was awoken to inform him of his dinner tray at the bedside. Patient stated he would wake up to eat. This RN then left patient's room to assist another nurse in a room nearby but was immediately called back to this patient's room d/t sounding bed alarm. Patient was found in floor after going over two side rails on the L side. Patient was alert and oriented and stated he was trying to get to his ringing cellphone that was on his bedside table. Stated he hit his head on the floor but caught himself with his L arm and did not hit hip on ground. Skin tear noted to L arm and dressed with dry dressing. VSS. Dr. Naila Coppola notified of event, CT of head ordered. Pharmacy, charge RN Faina Russell, and clinical house RN, and patient's Daughter Julia Hernandez) notified of event. Non-slip socks, fall bracelet, three side rails, and bed alarm all present prior to fall event.      Electronically signed by Erick Conley RN on 1/24/2023 at 6:41 PM

## 2023-01-25 NOTE — PROGRESS NOTES
[address] [facility] Pharmacokinetic Monitoring Service - Vancomycin     Latanya Cordero is a 79 y.o. male starting on vancomycin therapy for blood stream infection. Pharmacy consulted by GOLD Fernández for monitoring and adjustment. Target Concentration: Dosing based on anticipated concentration <15 mg/L due to renal impairment/insufficiency    Additional Antimicrobials: Ceftriaxone    Pertinent Laboratory Values: Wt Readings from Last 1 Encounters:   01/23/23 195 lb 11.2 oz (88.8 kg)     Temp Readings from Last 1 Encounters:   01/24/23 97.4 °F (36.3 °C) (Oral)     Estimated Creatinine Clearance: 29 mL/min (A) (based on SCr of 2.1 mg/dL (H)). Recent Labs     01/23/23  1535 01/24/23  0255 01/24/23  1518   CREATININE 1.4* 1.7* 2.1*   WBC 13.6* 13.1*  --      Procalcitonin: NA    Pertinent Cultures:  Culture Date Source Results   01/23/23 Blood Gram + cocci in clusters   01/23/23 Urine Too young   MRSA Nasal Swab: N/A. Non-respiratory infection.     Plan:  Concentration-guided dosing due to renal impairment/insufficiency  Give Vancomycin 1750 mg IV x 1  Renal labs as indicated   Vancomycin concentration ordered for 1/26 @ 0200   Pharmacy will continue to monitor patient and adjust therapy as indicated    Thank you for the consult,  Samantha Escalona RPH, BCPS  1/24/2023 8:13 PM

## 2023-01-25 NOTE — PROGRESS NOTES
Physical Therapy  Facility/Department: [facility] GABI/VAS/MED  Physical Therapy Initial Assessment    Name: Juana Terrell  : 1940  MRN: [id number]  Date of Service: 2023    Discharge Recommendations:  Continue to assess pending progress, Patient would benefit from continued therapy after discharge (will continue to assess for safe dc as Pt is able to tolerate being upright without becoming orthostatic)          Patient Diagnosis(es): The primary encounter diagnosis was Pyelonephritis of left kidney. A diagnosis of Nausea and vomiting, unspecified vomiting type was also pertinent to this visit. Past Medical History:  has a past medical history of Benign non-nodular prostatic hyperplasia with lower urinary tract symptoms, Essential hypertension, Gastroesophageal reflux disease without esophagitis, Gout, Idiopathic peripheral neuropathy, Mixed hyperlipidemia, Peripheral neuropathic pain, Primary osteoarthritis involving multiple joints, Renal stone, and Restless legs syndrome. Past Surgical History:  has a past surgical history that includes Inguinal hernia repair (Bilateral, ) and TURP (). Assessment   Body Structures, Functions, Activity Limitations Requiring Skilled Therapeutic Intervention: Decreased functional mobility ; Decreased safe awareness;Decreased balance;Decreased endurance  Assessment: Pt would benefit from skilled PT in this setting to address his mobility deficits  Therapy Prognosis: Good  Decision Making: Medium Complexity  Requires PT Follow-Up: Yes  Activity Tolerance  Activity Tolerance: Patient tolerated treatment well     Plan   Physcial Therapy Plan  General Plan: 5-7 times per week  Therapy Duration: 2 Weeks  Current Treatment Recommendations: Strengthening, Balance training, Functional mobility training, Transfer training, Gait training, Endurance training, Safety education & training, Therapeutic activities  Safety Devices  Type of Devices: Call light within reach, Bed alarm in place, Nurse notified     Restrictions  Restrictions/Precautions  Restrictions/Precautions: Fall Risk  Position Activity Restriction  Other position/activity restrictions: watch for signs and symptoms of orthostatic hypotension. Strain all urine     Subjective   General  Patient assessed for rehabilitation services?: Yes  Diagnosis: pyelonephritis. fall out of bed 1-24  Subjective  Subjective: Pt c/o being \"woozy\" when sitting up on eob. states he feels like his BP has been dropping. Social/Functional History  Social/Functional History  Lives With: Alone  Type of Home: Assisted living  Home Layout: Multi-level  Home Access: Elevator  Bathroom Shower/Tub: Tub/Shower unit  Bathroom Accessibility: Accessible  Home Equipment: [employer] Help From: Family  ADL Assistance: Independent  Homemaking Assistance: Independent  Ambulation Assistance: Independent  Transfer Assistance: Independent  Active : No  Occupation: Retired  Vision/Hearing       Cognition   Orientation  Overall Orientation Status: Within [address]: Awake, alert, verbal. Barbara Patel to provide detailed information regarding symptoms     Objective   Heart Rate: 69  Heart Rate Source: Monitor  BP: 111/73  BP Location: Left upper arm  Patient Position: Sitting  MAP (Calculated): 86  Resp: 18  SpO2: 92 %  O2 Device: None (Room air)              AROM RLE (degrees)  RLE AROM: WFL  AROM LLE (degrees)  LLE AROM : WFL  Strength Other  Other: able to move le's against gravity           Bed mobility  Supine to Sit: Minimal assistance  Sit to Supine: Minimal assistance  Transfers  Sit to Stand: Contact guard assistance;Minimal Assistance  Stand to Sit: Contact guard assistance  Comment: initially stood with one bedrail and use of sc. pT was unsteady with this.  pT then given two bedrails and was able to stand with upright posture and supporting weight well thru le's, but states \"i dont feel right\" and c/o being \"woozy\" so Pt was returned to supine and RN notified.         Balance  Posture: Fair  Sitting - Static: Good  Sitting - Dynamic: Good           Goals  Short Term Goals  Time Frame for Short Term Goals: 2 wks  Short Term Goal 1: amb 200 ft with rw, SBA       Education  Patient Education  Education Given To: Patient  Education Provided: Role of Therapy;Plan of Care  Education Method: Verbal;Demonstration  Education Outcome: Verbalized understanding      Therapy Time   Individual Concurrent Group Co-treatment   Time In           Time Out           Minutes                   Li Andersen PT   Electronically signed by Li Andersen PT on 1/25/2023 at 4:02 PM

## 2023-01-25 NOTE — PLAN OF CARE
Problem: Discharge Planning  Goal: Discharge to home or other facility with appropriate resources  1/25/2023 1225 by Joe Lee RN  Outcome: Progressing  1/24/2023 2258 by Aisha Kaye RN  Outcome: Progressing  Flowsheets (Taken 1/24/2023 2147)  Discharge to home or other facility with appropriate resources: Identify barriers to discharge with patient and caregiver     Problem: Pain  Goal: Verbalizes/displays adequate comfort level or baseline comfort level  1/25/2023 1225 by Joe Lee RN  Outcome: Progressing  1/24/2023 2258 by Aisha Kaye RN  Outcome: Progressing     Problem: Safety - Adult  Goal: Free from fall injury  1/25/2023 1225 by Joe Lee RN  Outcome: Progressing  1/24/2023 2258 by Aisha Kaye RN  Outcome: Progressing     Problem: Skin/Tissue Integrity  Goal: Absence of new skin breakdown  Description: 1. Monitor for areas of redness and/or skin breakdown  2. Assess vascular access sites hourly  3. Every 4-6 hours minimum:  Change oxygen saturation probe site  4. Every 4-6 hours:  If on nasal continuous positive airway pressure, respiratory therapy assess nares and determine need for appliance change or resting period.   1/25/2023 1225 by Joe Lee RN  Outcome: Progressing  1/24/2023 2258 by Aisha Kaye RN  Outcome: Progressing     Problem: Nutrition Deficit:  Goal: Optimize nutritional status  1/25/2023 1225 by Joe Lee RN  Outcome: Progressing  1/24/2023 2258 by Aisha Kaye RN  Outcome: Progressing

## 2023-01-25 NOTE — CARE COORDINATION
Case Management Assessment  Initial Evaluation    Date/Time of Evaluation: 1/25/2023 3:14 PM  Assessment Completed by: Tyshawn Schuster RN    If patient is discharged prior to next notation, then this note serves as note for discharge by case management. Patient Name: Olegario Marshall                   YOB: 1940  Diagnosis: Pyelonephritis [N12]  Pyelonephritis of left kidney [N12]  Nausea and vomiting, unspecified vomiting type [R11.2]                   Date / Time: 1/23/2023  3:25 PM    Patient Admission Status: Inpatient   Readmission Risk (Low < 19, Mod (19-27), High > 27): Readmission Risk Score: 17.7    Current PCP: Denver Rodriguez MD  PCP verified by CM? Yes    Chart Reviewed: Yes      History Provided by: Patient  Patient Orientation: Alert and Oriented, Person, Place, Situation    Patient Cognition: Alert    Hospitalization in the last 30 days (Readmission):  No    If yes, Readmission Assessment in  Navigator will be completed. Advance Directives:      Code Status: Full Code   Patient's Primary Decision Maker is: Legal Next of Kin    Primary Decision Maker: Donna Anand - Child - [phone]    Secondary Decision Maker: Coffman,Russ - Child - [phone]    Discharge Planning:    Patient lives with: Alone Type of Home: Assisted living  Primary Care Giver: Self  Patient Support Systems include: Friends/Neighbors, Children   Current Financial resources:    Current community resources:    Current services prior to admission: None, Durable Medical Equipment            Current DME: Eun Bland            Type of Home Care services:  None    ADLS  Prior functional level: Independent in ADLs/IADLs  Current functional level: Independent in ADLs/IADLs    PT AM-PAC:   /24  OT AM-PAC:   /24    Family can provide assistance at DC: Yes  Would you like Case Management to discuss the discharge plan with any other family members/significant others, and if so, who?  No  Plans to Return to Present Housing: Yes  Other Identified Issues/Barriers to RETURNING to current housing: none  Potential Assistance needed at discharge: Home Care            Potential DME:    Patient expects to discharge to: Assisted living [facility])  Plan for transportation at discharge:      Financial    Payor: [place] / Plan: MEDICARE PART A AND B / Product Type: *No Product type* /     Does insurance require precert for SNF: No    Potential assistance Purchasing Medications: No  Meds-to-Beds request: No      [address] [employer] [phone]  [facility] [address]  Phone: [phone] Fax: [phone]      Notes:    Factors facilitating achievement of predicted outcomes: Family support, Friend support, Cooperative, Pleasant, and Has needed Durable Medical Equipment at home    Barriers to discharge: Pain and Limited family support    Additional Case Management Notes: pt lives alone at [facility]. Plans same upon dc. Pt has family support and all dme thre he needs. Daughter transports pt to and from [facility]. Pt willing to have [facility] as needed. Will cont to follow. The Plan for Transition of Care is related to the following treatment goals of Pyelonephritis [N12]  Pyelonephritis of left kidney [N12]  Nausea and vomiting, unspecified vomiting type [E59.0]    IF APPLICABLE: The Patient and/or patient representative Nandini Villela and his family were provided with a choice of provider and agrees with the discharge plan. Freedom of choice list with basic dialogue that supports the patient's individualized plan of care/goals and shares the quality data associated with the providers was provided to:     Patient Representative Name:       The Patient and/or Patient Representative Agree with the Discharge Plan?       Claudio England RN  Case Management Department  Ph: [phone] Fax: [phone]

## 2023-01-25 NOTE — PROGRESS NOTES
Automatic Dose Adjustment of                Subcutaneous Anticoagulant for Prophylaxis    Chares Dakin is a 79 y.o. male. Recent Labs     01/24/23  1518 01/25/23  0127   CREATININE 2.1* 2.3*       Estimated Creatinine Clearance: 27 mL/min (A) (based on SCr of 2.3 mg/dL (H)). Weight:  Wt Readings from Last 1 Encounters:   01/23/23 195 lb 11.2 oz (88.8 kg)           Pharmacy has adjusted subcutaneous anticoagulant for prophylaxis to Enoxaparin 30 mg SC daily based on the patient's weight and estimated CrCl per [facility].              Héctor Storm, PHARM D, 1/25/2023, 12:55 PM

## 2023-01-25 NOTE — CONSULTS
Renal Consult Note    Thank you to requesting provider: Dr Tip Montes De Oca, for asking us to see Erik Stephenson    Reason for consultation:  ALEXANDRO. Chief Complaint:  Left flank pain. History of Presenting Illness      Patient is a very pleasant 80 yo man with pmhx of HTN, pre diabetes, gout, OA, peripheral neuropathy. He was admitted on 1/23 for left flank and lateral abdominal pain. He was also having chills and was not feeling well. He denies recurrent UTI. His abdominal CT in ED revealed non-obstructing left kidney stone of 4-5 mm in size. He was also having positive UA. He was subsequently admitted and was started with IV fluids and antibiotics for acute pyelonephritis. His serum creat was 1.4 on admission (baseline 1.1). It ab to 2.1 and 2.3. Renal service is consulted to manage his ALEXANDRO. Patient denies any prior renal dysfunction. He noticed some drop in his urine output. He also denies NSAIDs abuse.       Past Medical/Surgical History      Active Ambulatory Problems     Diagnosis Date Noted    Mixed hyperlipidemia 08/16/2017    Essential hypertension 08/16/2017    Restless legs syndrome 08/16/2017    Gastroesophageal reflux disease without esophagitis 08/16/2017    Peripheral neuropathic pain 08/16/2017    Primary osteoarthritis involving multiple joints 08/16/2017    Gout 08/16/2017    Benign non-nodular prostatic hyperplasia with lower urinary tract symptoms 08/16/2017    Elevated PSA 08/16/2017    Idiopathic peripheral neuropathy 02/19/2018    Chronic idiopathic constipation 08/20/2018    Prediabetes 10/14/2022     Resolved Ambulatory Problems     Diagnosis Date Noted    No Resolved Ambulatory Problems     Past Medical History:   Diagnosis Date    Renal stone 1/23/2023         Review of Systems     Constitutional:  No weight loss, + fever/chills  Eyes:  No eye pain, no eye redness  Cardiovascular:  No chest pain, no worsening of edema  Respiratory:  No hemoptysis, no stidor  Gastrointestinal:  No blood in stool, no n/v, no diarrhea  Genitoruinary:  No hematuria, no difficulty with urination  Musculoskeletal:  No joint swelling, no redness  Integumentary:  No Rash, no itching  Neurological:  No focal weakness, No new sensory deficit  Psychiatric:  No depression, no confusion  Endocrine:  No polyuria, no polydipsia       Medications        Current Facility-Administered Medications:     cefTRIAXone (ROCEPHIN) 1,000 mg in sterile water 10 mL IV syringe, 1,000 mg, IntraVENous, Q24H, Tracey Lane MD, 1,000 mg at 01/24/23 1728    sodium zirconium cyclosilicate (LOKELMA) oral suspension 10 g, 10 g, Oral, TID, Tracey Lane MD, 10 g at 01/24/23 2136    vancomycin (VANCOCIN) intermittent dosing (placeholder), , Other, RX Placeholder, Davonna Imus, APRN - CNP    tamsulosin (FLOMAX) capsule 0.4 mg, 0.4 mg, Oral, Daily, Davonna Imus, APRN - CNP, 0.4 mg at 01/25/23 0854    sodium chloride flush 0.9 % injection 5-40 mL, 5-40 mL, IntraVENous, 2 times per day, Davonna Imus, APRN - CNP, 10 mL at 01/25/23 0856    sodium chloride flush 0.9 % injection 5-40 mL, 5-40 mL, IntraVENous, PRN, Davonna Imus, APRN - CNP    0.9 % sodium chloride infusion, , IntraVENous, PRN, Davonna Imus, APRN - CNP    enoxaparin (LOVENOX) injection 40 mg, 40 mg, SubCUTAneous, Daily, Davonna Imus, APRN - CNP, 40 mg at 01/24/23 1728    ondansetron (ZOFRAN-ODT) disintegrating tablet 4 mg, 4 mg, Oral, Q8H PRN **OR** ondansetron (ZOFRAN) injection 4 mg, 4 mg, IntraVENous, Q6H PRN, Davonna Imus, APRN - CNP, 4 mg at 01/25/23 0251    acetaminophen (TYLENOL) tablet 650 mg, 650 mg, Oral, Q6H PRN **OR** acetaminophen (TYLENOL) suppository 650 mg, 650 mg, Rectal, Q6H PRN, Davonna Imus, APRN - CNP    polyethylene glycol (GLYCOLAX) packet 17 g, 17 g, Oral, Daily PRN, Davonna Imus, APRN - CNP    0.9 % sodium chloride infusion, , IntraVENous, Continuous, Tracey Lane MD, Last Rate: 150 mL/hr at 01/25/23 1055, New Bag at 01/25/23 1055    traMADol (ULTRAM) tablet 50 mg, 50 mg, Oral, Q6H PRN, Giselle Bass MD, 50 mg at 23 1207    morphine (PF) injection 2 mg, 2 mg, IntraVENous, Q2H PRN **OR** morphine sulfate (PF) injection 4 mg, 4 mg, IntraVENous, Q2H PRN, Giselle Bass MD    allopurinol (ZYLOPRIM) tablet 100 mg, 100 mg, Oral, Daily, Candelaria Valenzuela, APRN - CNP, 100 mg at 23 0854    atorvastatin (LIPITOR) tablet 20 mg, 20 mg, Oral, Daily, Candelaria Valenzuela, APRN - CNP, 20 mg at 23 0855    metoprolol succinate (TOPROL XL) extended release tablet 50 mg, 50 mg, Oral, Daily, Candelaria Valenzuela, APRN - CNP, 50 mg at 23 0854    pantoprazole (PROTONIX) tablet 40 mg, 40 mg, Oral, QAM AC, Candelaria Valenzuela, APRN - CNP, 40 mg at 23 4292    pregabalin (LYRICA) capsule 100 mg, 100 mg, Oral, BID, Candelaria Valenzuela, APRN - CNP, 100 mg at 23 0854    docusate sodium (COLACE) capsule 100 mg, 100 mg, Oral, Daily, Candelaria Valenzuela, APRN - CNP, 100 mg at 23 0854  No outpatient medications have been marked as taking for the 23 encounter [facility] HOSPITAL Encounter). Allergies   Codeine    Family History       Family History   Problem Relation Age of Onset    Uterine Cancer Mother     Coronary Art Dis Father         MI at 62    COPD Sister     Stroke Brother      Family history negative for kidney disease. Social History      Social History     Socioeconomic History    Marital status:       Spouse name: Teresa Torres    Number of children: 2    Years of education: 12    Highest education level: None   Occupational History     Employer: RETIRED   Tobacco Use    Smoking status: Former     Packs/day: 3.00     Years: 30.00     Pack years: 90.00     Types: Cigarettes     Quit date: 1975     Years since quittin.0    Smokeless tobacco: Never   Substance and Sexual Activity    Alcohol use: No    Drug use: No    Sexual activity: Yes     Partners: Female     Comment: wife     Social Determinants of Health     Financial Resource Strain: Low Risk Difficulty of Paying Living Expenses: Not hard at all   Food Insecurity: No Food Insecurity    Worried About Running Out of Food in the Last Year: Never true    Ran Out of Food in the Last Year: Never true   Physical Activity: Unknown    Days of Exercise per Week: 0 days   Intimate Partner Violence: Unknown    Fear of Current or Ex-Partner: Patient refused    Emotionally Abused: Patient refused    Physically Abused: Patient refused    Sexually Abused: Patient refused       Physical Exam     Blood pressure 123/80, pulse 75, temperature 97.5 °F (36.4 °C), temperature source Oral, resp. rate 18, height 6' (1.829 m), weight 195 lb 11.2 oz (88.8 kg), SpO2 94 %. General:  NAD, A+Ox3, ill-appearing, normal body habitus  HEENT:  Atraumatic, normocephalic  Neck:  Supple, normal range of movement  Chest:  CTAB, good respiratory effort, good air movement  CV:  RRR, no murmurs Abdomen:  NTND, soft, +BS, no hepatosplenomegaly  Extremities:  No peripheral edema  Neurological:  Moving all four extremities   Lymphatics:  No palpable lymph nodes   Skin:  No rash, no jaundice  Psychiatric:  Normal insight and judgement, good recall    Data     Recent Labs     01/23/23  1535 01/24/23  0255 01/25/23  0127   WBC 13.6* 13.1* 10.8   HGB 14.1 13.3* 10.9*   HCT 45.0 42.3 34.9*   MCV 89.3 89.8 90.4    191 141     Recent Labs     01/24/23  0255 01/24/23  1518 01/25/23  0127    140 142   K 4.8 5.2* 4.8    105 108   CO2 24 26 23   GLUCOSE 117* 139* 106   BUN 35* 41* 44*   CREATININE 1.7* 2.1* 2.3*   LABGLOM 39* 30* 27*       Assessment:  ALEXANDRO- pre renal azotemia vs ATN  Acute pyelonephritis  HTN  Pre- type DM  Left kidney stone      Plan:  I agree with current management (IV fluids and antibiotics). Follow up labs. Avoid hypotension and nephrotoxins. Check PTH and uric acid.      Thank you for asking us to participate in the management of your patient, please do not hesitate to contact me for any concerns regarding my recommendations as outlined above.    -----------------------------  Electronically signed by King Milan MD on 1/25/23 at 11:44 AM CST

## 2023-01-25 NOTE — PROGRESS NOTES
[address] [facility] Gloria Ortiz has been consulted to review medication profile for fall risk. Medications increasing risk of falling:     Allopurinol  Atorvastatin  Ceftriaxone  Metoprolol  Morphine  Ondansetron  Pantoprazole  Pregabalin  Tamsulosin  Tramadol    Medications increasing risk of bleeding: Allopurinol, Ceftriaxone, Enoxaparin, Pantoprazole    Findings discussed with Ivet Avila    Recommendations: No medication modifications warranted at this time. Encourage to patient to use call lights, and as for assistance when ambulating.     Electronically signed by JEFFREY Epstein [facility] on 1/24/2023 at 6:23 PM

## 2023-01-25 NOTE — PROGRESS NOTES
Occupational Therapy Initial Assessment  Date: 2023   Patient Name: Bethany Strong  MRN: [id number]     : 1940    Date of Service: 2023    Discharge Recommendations:  Patient would benefit from continued therapy after discharge       Assessment   Assessment: Eval completed and tx initiated. Unable to progress eval and tx to include standing or transfers due to low BP. Will continue to follow. Treatment Diagnosis: Pyelonephritis, fall  History: Neuropathy  REQUIRES OT FOLLOW-UP: Yes  Activity Tolerance  Activity Tolerance: Treatment limited secondary to medical complications (free text)  Activity Tolerance Comments: BP in supine dsf602/67 and felt lightheaded. Sitting BP was 88/68 with only mild increase in lightheadedness. Returned to recline in bed 129/64. Report given to charge nurse           Patient Diagnosis(es): The primary encounter diagnosis was Pyelonephritis of left kidney. A diagnosis of Nausea and vomiting, unspecified vomiting type was also pertinent to this visit. has a past medical history of Benign non-nodular prostatic hyperplasia with lower urinary tract symptoms, Essential hypertension, Gastroesophageal reflux disease without esophagitis, Gout, Idiopathic peripheral neuropathy, Mixed hyperlipidemia, Peripheral neuropathic pain, Primary osteoarthritis involving multiple joints, Renal stone, and Restless legs syndrome. has a past surgical history that includes Inguinal hernia repair (Bilateral, ) and TURP (). Treatment Diagnosis: Pyelonephritis, fall      Restrictions  Restrictions/Precautions  Restrictions/Precautions: Fall Risk  Position Activity Restriction  Other position/activity restrictions: watch for signs and symptoms of orthostatic hypotension.   Strain all urine    Subjective   General  Chart Reviewed: Yes  Patient assessed for rehabilitation services?: Yes  Family / Caregiver Present: No  Pre Treatment Pain Screening  Pain at present: 0  Scale Used: Numeric Score  Intervention List: Patient able to continue with treatment  Comments / Details: no complaint of pain with activity    Social/Functional History  Social/Functional History  Type of Home: Assisted living  ADL Assistance: Independent (for toileting and dressing)  Ambulation Assistance: Independent (cane prn)  Transfer Assistance: Independent       Objective              Toilet Transfers  Toilet Transfers Comments: Did not perform due to low BP in sitting  ADL  Feeding: Independent  Grooming: Independent;Setup  UE Bathing: Independent;Setup  LE Bathing: Minimal assistance  UE Dressing: Independent;Setup  LE Dressing: Minimal assistance (for seated aspects. Did not stand due to low BP)  Toileting: Unable to assess(comment) (due to low BP)        Bed mobility  Supine to Sit: Minimal assistance  Sit to Supine: Minimal assistance  Bed Mobility Comments: Sitting EOB with SBA  Transfers  Transfer Comments: Did not perform due to low BP in sitting     Cognition  Cognition Comment: Awake, alert, verbal. Unable to provide detailed information regarding symptoms                 LUE AROM (degrees)  LUE AROM : WFL  RUE AROM (degrees)  RUE AROM : WFL                    Plan   Occupational Therapy Plan  Times Per Week: 3-5    Goals  Short Term Goals  Short Term Goal 1: Upgrade activity to include standing level transfers and ADL  with assist as needed as BP allows  Short Term Goal 2:  Independent with therapeutic activity recommendations  Short Term Goal 3: Perform seated ADL with supervision     Tx initiated:  training in therapeutic activity  (15 mins)          MINA Voss  Electronically signed by Christel ENRIQUEZ on 1/25/2023 at 1:17 PM.

## 2023-01-25 NOTE — PLAN OF CARE
Problem: Discharge Planning  Goal: Discharge to home or other facility with appropriate resources  1/24/2023 2258 by Aisha Kaye RN  Outcome: Progressing  Flowsheets (Taken 1/24/2023 2147)  Discharge to home or other facility with appropriate resources: Identify barriers to discharge with patient and caregiver  1/24/2023 1849 by Amberly Agustin RN  Outcome: Progressing     Problem: Pain  Goal: Verbalizes/displays adequate comfort level or baseline comfort level  1/24/2023 2258 by Aisha Kaye RN  Outcome: Progressing  1/24/2023 1849 by Amberly Agustin RN  Outcome: Progressing     Problem: Safety - Adult  Goal: Free from fall injury  1/24/2023 2258 by Aisha Kaye RN  Outcome: Progressing  1/24/2023 1849 by Amberly Agustin RN  Outcome: Progressing     Problem: Skin/Tissue Integrity  Goal: Absence of new skin breakdown  Description: 1. Monitor for areas of redness and/or skin breakdown  2. Assess vascular access sites hourly  3. Every 4-6 hours minimum:  Change oxygen saturation probe site  4. Every 4-6 hours:  If on nasal continuous positive airway pressure, respiratory therapy assess nares and determine need for appliance change or resting period.   1/24/2023 2258 by Aisha Kaye RN  Outcome: Progressing  1/24/2023 1849 by Amberly Agustin RN  Outcome: Progressing     Problem: Nutrition Deficit:  Goal: Optimize nutritional status  1/24/2023 2258 by Aisha Kaye RN  Outcome: Progressing  1/24/2023 1849 by Amberly Agustin RN  Outcome: Progressing

## 2023-01-26 LAB
ALBUMIN SERPL-MCNC: 3 G/DL (ref 3.5–5.2)
ALP BLD-CCNC: 83 U/L (ref 40–130)
ALT SERPL-CCNC: 15 U/L (ref 5–41)
ANION GAP SERPL CALCULATED.3IONS-SCNC: 11 MMOL/L (ref 7–19)
AST SERPL-CCNC: 33 U/L (ref 5–40)
BILIRUB SERPL-MCNC: 0.4 MG/DL (ref 0.2–1.2)
BUN BLDV-MCNC: 46 MG/DL (ref 8–23)
CALCIUM SERPL-MCNC: 8.3 MG/DL (ref 8.8–10.2)
CHLORIDE BLD-SCNC: 108 MMOL/L (ref 98–111)
CO2: 22 MMOL/L (ref 22–29)
CREAT SERPL-MCNC: 2.2 MG/DL (ref 0.5–1.2)
GFR SERPL CREATININE-BSD FRML MDRD: 29 ML/MIN/{1.73_M2}
GLUCOSE BLD-MCNC: 91 MG/DL (ref 74–109)
HCT VFR BLD CALC: 39.5 % (ref 42–52)
HEMOGLOBIN: 11.7 G/DL (ref 14–18)
LV EF: 53 %
LVEF MODALITY: NORMAL
MCH RBC QN AUTO: 27.1 PG (ref 27–31)
MCHC RBC AUTO-ENTMCNC: 29.6 G/DL (ref 33–37)
MCV RBC AUTO: 91.4 FL (ref 80–94)
PARATHYROID HORMONE INTACT: 152.1 PG/ML (ref 15–65)
PDW BLD-RTO: 13.6 % (ref 11.5–14.5)
PLATELET # BLD: 136 K/UL (ref 130–400)
PMV BLD AUTO: 10.8 FL (ref 9.4–12.4)
POTASSIUM REFLEX MAGNESIUM: 4.6 MMOL/L (ref 3.5–5)
RBC # BLD: 4.32 M/UL (ref 4.7–6.1)
SODIUM BLD-SCNC: 141 MMOL/L (ref 136–145)
TOTAL CK: 631 U/L (ref 39–308)
TOTAL PROTEIN: 5.6 G/DL (ref 6.6–8.7)
URIC ACID, SERUM: 5.7 MG/DL (ref 3.4–7)
VANCOMYCIN RANDOM: 10.5 UG/ML
WBC # BLD: 7.7 K/UL (ref 4.8–10.8)

## 2023-01-26 PROCEDURE — 6360000004 HC RX CONTRAST MEDICATION: Performed by: STUDENT IN AN ORGANIZED HEALTH CARE EDUCATION/TRAINING PROGRAM

## 2023-01-26 PROCEDURE — 82550 ASSAY OF CK (CPK): CPT

## 2023-01-26 PROCEDURE — 83970 ASSAY OF PARATHORMONE: CPT

## 2023-01-26 PROCEDURE — 2580000003 HC RX 258

## 2023-01-26 PROCEDURE — 6370000000 HC RX 637 (ALT 250 FOR IP): Performed by: STUDENT IN AN ORGANIZED HEALTH CARE EDUCATION/TRAINING PROGRAM

## 2023-01-26 PROCEDURE — 6370000000 HC RX 637 (ALT 250 FOR IP): Performed by: INTERNAL MEDICINE

## 2023-01-26 PROCEDURE — 36415 COLL VENOUS BLD VENIPUNCTURE: CPT

## 2023-01-26 PROCEDURE — 85027 COMPLETE CBC AUTOMATED: CPT

## 2023-01-26 PROCEDURE — 80202 ASSAY OF VANCOMYCIN: CPT

## 2023-01-26 PROCEDURE — 80053 COMPREHEN METABOLIC PANEL: CPT

## 2023-01-26 PROCEDURE — 6360000002 HC RX W HCPCS

## 2023-01-26 PROCEDURE — 94760 N-INVAS EAR/PLS OXIMETRY 1: CPT

## 2023-01-26 PROCEDURE — 97116 GAIT TRAINING THERAPY: CPT

## 2023-01-26 PROCEDURE — 51702 INSERT TEMP BLADDER CATH: CPT

## 2023-01-26 PROCEDURE — 84550 ASSAY OF BLOOD/URIC ACID: CPT

## 2023-01-26 PROCEDURE — C8929 TTE W OR WO FOL WCON,DOPPLER: HCPCS

## 2023-01-26 PROCEDURE — 1210000000 HC MED SURG R&B

## 2023-01-26 PROCEDURE — 6370000000 HC RX 637 (ALT 250 FOR IP)

## 2023-01-26 PROCEDURE — 97530 THERAPEUTIC ACTIVITIES: CPT

## 2023-01-26 PROCEDURE — 2580000003 HC RX 258: Performed by: STUDENT IN AN ORGANIZED HEALTH CARE EDUCATION/TRAINING PROGRAM

## 2023-01-26 RX ORDER — MIRTAZAPINE 7.5 MG/1
7.5 TABLET, FILM COATED ORAL NIGHTLY
Status: DISCONTINUED | OUTPATIENT
Start: 2023-01-26 | End: 2023-02-05 | Stop reason: HOSPADM

## 2023-01-26 RX ORDER — CALCITRIOL 0.25 UG/1
0.25 CAPSULE, LIQUID FILLED ORAL DAILY
Status: DISCONTINUED | OUTPATIENT
Start: 2023-01-26 | End: 2023-02-05 | Stop reason: HOSPADM

## 2023-01-26 RX ADMIN — ENOXAPARIN SODIUM 30 MG: 100 INJECTION SUBCUTANEOUS at 17:35

## 2023-01-26 RX ADMIN — ALLOPURINOL 100 MG: 100 TABLET ORAL at 08:56

## 2023-01-26 RX ADMIN — MIRTAZAPINE 7.5 MG: 7.5 TABLET ORAL at 19:56

## 2023-01-26 RX ADMIN — ATORVASTATIN CALCIUM 20 MG: 40 TABLET, FILM COATED ORAL at 08:56

## 2023-01-26 RX ADMIN — PERFLUTREN 1.5 ML: 6.52 INJECTION, SUSPENSION INTRAVENOUS at 08:00

## 2023-01-26 RX ADMIN — Medication 1000 MG: at 04:42

## 2023-01-26 RX ADMIN — SODIUM CHLORIDE: 9 INJECTION, SOLUTION INTRAVENOUS at 09:04

## 2023-01-26 RX ADMIN — METOPROLOL SUCCINATE 50 MG: 50 TABLET, FILM COATED, EXTENDED RELEASE ORAL at 08:56

## 2023-01-26 RX ADMIN — PREGABALIN 100 MG: 50 CAPSULE ORAL at 19:56

## 2023-01-26 RX ADMIN — PANTOPRAZOLE SODIUM 40 MG: 40 TABLET, DELAYED RELEASE ORAL at 06:38

## 2023-01-26 RX ADMIN — CALCITRIOL CAPSULES 0.25 MCG 0.25 MCG: 0.25 CAPSULE ORAL at 11:35

## 2023-01-26 RX ADMIN — TAMSULOSIN HYDROCHLORIDE 0.4 MG: 0.4 CAPSULE ORAL at 08:56

## 2023-01-26 RX ADMIN — PREGABALIN 100 MG: 50 CAPSULE ORAL at 08:56

## 2023-01-26 RX ADMIN — DOCUSATE SODIUM 100 MG: 100 CAPSULE, LIQUID FILLED ORAL at 08:56

## 2023-01-26 RX ADMIN — SODIUM CHLORIDE, PRESERVATIVE FREE 10 ML: 5 INJECTION INTRAVENOUS at 08:56

## 2023-01-26 ASSESSMENT — ENCOUNTER SYMPTOMS
COUGH: 0
ABDOMINAL PAIN: 1
NAUSEA: 0
VOMITING: 0
SHORTNESS OF BREATH: 0

## 2023-01-26 NOTE — PROGRESS NOTES
Physical Therapy  Name: Erik Stephenson  MRN:  [id number]  Date of service:  1/26/2023 01/26/23 1330   Restrictions/Precautions   Restrictions/Precautions Fall Risk   Subjective   Subjective I just got done with the pot. Do I have too? (Walk)   Pain Assessment   Pain Assessment None - Denies Pain   Oxygen Therapy   O2 Device None (Room air)   Bed Mobility   Supine to Sit Contact guard assistance;Stand by assistance   Sit to Supine Contact guard assistance;Stand by assistance   Transfers   Sit to Stand Contact guard assistance;Stand by assistance   Stand to Sit Contact guard assistance;Stand by assistance   Short Term Goals   Time Frame for Short Term Goals 2 wks   Short Term Goal 1 amb 200 ft with rw, SBA   Conditions Requiring Skilled Therapeutic Intervention   Body Structures, Functions, Activity Limitations Requiring Skilled Therapeutic Intervention Decreased functional mobility ; Decreased safe awareness;Decreased balance;Decreased endurance   Discharge Recommendations Continue to assess pending progress   Activity Tolerance   Activity Tolerance Patient tolerated treatment well   Physcial Therapy Plan   General Plan 5-7 times per week   Therapy Duration 2 Weeks   PT Plan of Care   Thursday X   Safety Devices   Type of Devices Bed alarm in place;Call light within reach; Left in bed       Electronically signed by Anna Marie Salazar PTA on 1/26/2023 at 3:55 PM

## 2023-01-26 NOTE — PROGRESS NOTES
[address] [facility] Pharmacokinetic Monitoring Service - Vancomycin    Consulting Provider: Pamela Baker   Indication: Bloodstream Infection  Target Concentration: Dosing based on anticipated concentration <15 mg/L due to renal impairment/insufficiency  Day of Therapy: 3  Additional Antimicrobials: Rocephin    Pertinent Laboratory Values: Wt Readings from Last 1 Encounters:   01/23/23 195 lb 11.2 oz (88.8 kg)     Temp Readings from Last 1 Encounters:   01/26/23 97.7 °F (36.5 °C) (Oral)     Estimated Creatinine Clearance: 28 mL/min (A) (based on SCr of 2.2 mg/dL (H)). Recent Labs     01/25/23  0127 01/26/23  0139   CREATININE 2.3* 2.2*   WBC 10.8 7.7     Procalcitonin: N/A    Pertinent Cultures:  Culture Date Source Results   01/23/23 Blood Gram+ cocci  Staph epidermidis   01/23/23 Urine Staph epidermidis   MRSA Nasal Swab: N/A. Non-respiratory infection.     Recent vancomycin administrations                     vancomycin (VANCOCIN) 1000 mg in sodium chloride 0.9% 250 mL IVPB (mg) 1,000 mg New Bag 01/26/23 0442    vancomycin (VANCOCIN) 1,750 mg in sodium chloride 0.9 % 500 mL IVPB (mg) 1,750 mg New Bag 01/24/23 2136                    Assessment:  Date/Time Current Dose Concentration Timing of Concentration (h) AUC   01/26/23 Pulse dosing 10.5 Random level    Note: Serum concentrations collected for AUC dosing may appear elevated if collected in close proximity to the dose administered, this is not necessarily an indication of toxicity    Plan:  Current dosing regimen is therapeutic  Give Vancomycin 1000mg x1 dose today with random level tomorrow morning  Repeat vancomycin concentration ordered for 01/27 @ [address] [facility] will continue to monitor patient and adjust therapy as indicated    Thank you for the consult,  JEFFREY Giron [facility]  1/26/2023 5:30 AM

## 2023-01-26 NOTE — PLAN OF CARE
Problem: Discharge Planning  Goal: Discharge to home or other facility with appropriate resources  Outcome: Progressing  Flowsheets (Taken 1/26/2023 8956)  Discharge to home or other facility with appropriate resources: Identify barriers to discharge with patient and caregiver     Problem: Pain  Goal: Verbalizes/displays adequate comfort level or baseline comfort level  Outcome: Progressing     Problem: Safety - Adult  Goal: Free from fall injury  Outcome: Progressing     Problem: Skin/Tissue Integrity  Goal: Absence of new skin breakdown  Description: 1. Monitor for areas of redness and/or skin breakdown  2. Assess vascular access sites hourly  3. Every 4-6 hours minimum:  Change oxygen saturation probe site  4. Every 4-6 hours:  If on nasal continuous positive airway pressure, respiratory therapy assess nares and determine need for appliance change or resting period.   Outcome: Progressing     Problem: Nutrition Deficit:  Goal: Optimize nutritional status  Outcome: Progressing

## 2023-01-26 NOTE — PROGRESS NOTES
Occupational Therapy     01/26/23 1500   Subjective   Subjective Pt in bed and had just used BSC with nursing. Pt agreeable to participate. Pain Assessment   Pain Assessment None - Denies Pain   Cognition   Overall Cognitive Status WFL   Orientation   Overall Orientation Status WFL   Bed Mobility Training   Bed Mobility Training Yes   Overall Level of Assistance Stand-by assistance   Interventions Verbal cues   Supine to Sit Stand-by assistance   Sit to Supine Stand-by assistance   Scooting Stand-by assistance   Transfer Training   Transfer Training Yes   Overall Level of Assistance Contact-guard assistance   Interventions Verbal cues; Tactile cues;Manual cues   Sit to Stand Contact-guard assistance   Stand to Sit Contact-guard assistance   Toilet Transfer Contact-guard assistance  [facility])   Balance   Sitting Intact   Standing With support  (RW level)   ADL   Feeding Independent   Grooming Independent;Setup   UE Bathing Independent;Setup   LE Bathing Minimal assistance   UE Dressing Independent;Setup   LE Dressing Minimal assistance   Toileting Minimal assistance   Toileting Skilled Clinical Factors for thorough cleaning and clothing management. Assessment   Assessment Tx focused on functional mobility at RW level. Pt required gown and bedding change after toileting task for thorough clean up.    Activity Tolerance Patient tolerated treatment well   Discharge Recommendations Patient would benefit from continued therapy after discharge   Occupational Therapy Plan   Times Per Week 3-5   Times Per Day Once a day   OT Plan of Care   Thursday X   Electronically signed by RAVI Narayanan on 1/26/2023 at 3:57 PM

## 2023-01-26 NOTE — CONSULTS
INFECTIOUS DISEASES CONSULT NOTE    Patient:  Bria Kat 79 y.o. male  ROOM # [unfilled]  YOB: 1940  MRN: [id number]  CSN:  [id number]  Admit date: 1/23/2023   Admitting Physician: Lima Posadas MD  Primary Care Physician: Louis Begum MD  REFERRING PROVIDER: No ref. provider found    Reason for Consultation: Staph aureus bacteremia and UTI. History of Present Illness/Chief Complaint: Pleasant 69-year-old man. He had had some left lower abdominal and left flank discomfort. He had had some difficulty voiding urine. He has not had fever or chills. He had urinalysis that revealed mild pyuria. He was admitted to the hospital for further management. He has been receiving treatment with vancomycin and ceftriaxone. Urine and blood cultures have been both grown Staph epidermidis. He had required García catheter placement for urinary drainage. Infectious disease asked to evaluate and offer recommendations. Current Scheduled Medications:    enoxaparin  30 mg SubCUTAneous Daily    cefTRIAXone (ROCEPHIN) IV  1,000 mg IntraVENous Q24H    vancomycin (VANCOCIN) intermittent dosing (placeholder)   Other RX Placeholder    tamsulosin  0.4 mg Oral Daily    sodium chloride flush  5-40 mL IntraVENous 2 times per day    allopurinol  100 mg Oral Daily    atorvastatin  20 mg Oral Daily    metoprolol succinate  50 mg Oral Daily    pantoprazole  40 mg Oral QAM AC    pregabalin  100 mg Oral BID    docusate sodium  100 mg Oral Daily     Current PRN Medications:  perflutren lipid microspheres, sodium chloride flush, sodium chloride, ondansetron **OR** ondansetron, acetaminophen **OR** acetaminophen, polyethylene glycol, traMADol, morphine **OR** morphine    Allergies: Allergies   Allergen Reactions    Codeine Nausea Only     Past Medical History: Benign prostatic hyperplasia. Hypertension. Gastroesophageal reflux disease. Gout. Idiopathic neuropathy. Hyperlipidemia. Osteoarthritis.   Restless leg syndrome. Past Surgical History: Inguinal hernia repair.  TURP. Social History: No alcohol use. No illicit drug use. Remote history of tobacco use. Family History: Uterine cancer. Coronary artery disease. Chronic obstructive pulmonary disease. Stroke. Exposure History: No close contacts have been ill    Review of Systems: No chest pain or chest pressure. No cough or sputum production. No nausea or vomiting. Vital Signs:  BP (!) 140/87   Pulse 73   Temp 98.2 °F (36.8 °C) (Oral)   Resp 18   Ht 6' (1.829 m)   Wt 195 lb 11.2 oz (88.8 kg)   SpO2 94%   BMI 26.54 kg/m²  Temp (24hrs), Av.9 °F (36.6 °C), Min:97.7 °F (36.5 °C), Max:98.2 °F (36.8 °C)    Physical Exam:   Vital signs reviewed.   Alert, pleasant, no distress  No cervical axillary or inguinal adenopathy  Lungs clear to auscultation without crackles or wheezes  Heart regular rhythm without murmur  Bilateral lower extremity strength is intact  Abdomen is soft and nontender without suprapubic or flank tenderness  Skin without rash    Lab Results:  CBC:   Recent Labs     23  1535 23  0255 23  0127 23  0139   WBC 13.6* 13.1* 10.8 7.7   HGB 14.1 13.3* 10.9* 11.7*   HCT 45.0 42.3 34.9* 39.5*    191 141 136   LYMPHOPCT 7.5*  --   --   --    MONOPCT 6.3  --   --   --      CMP:   Recent Labs     23  0255 23  1518 23  0127 23  0139    140 142 141   K 4.8 5.2* 4.8 4.6    105 108 108   CO2  22   BUN 35* 41* 44* 46*   CREATININE 1.7* 2.1* 2.3* 2.2*   CALCIUM 9.2 9.1 8.3* 8.3*   BILITOT 0.6  --  0.4 0.4   ALKPHOS 116  --  85 83   ALT 14  --  12 15   AST 14  --  22 33   GLUCOSE 117* 139* 106 91     Culture:     Urine culture 2023-Staph epidermidis  Blood cultures 2023-gram-positive cocci in clusters resembling Staphylococcus  Blood cultures 2023-gram-positive cocci in clusters resembling Staphylococcus  Blood cultures done  2023-no growth to date  Blood cultures January 25, 2023-no growth to date    Molecular identification from blood cultures on December 23, 2023-Staph epidermidis    Radiology:     Renal ultrasound done December 25, 2023:  Impression   There is no evidence of hydronephrosis. Both kidneys demonstrate increased cortical echogenicity, suggestive of chronic   medical renal disease. CT scan of the abdomen and pelvis January 23, 2023:  Impression   1. Portions of the mid small bowel suggest mild mucosal thickening. Exact etiology is uncertain. Mild enteritis is not excluded. No fluid-filled bowel loops. 2. Stool filled colon. 3. Simple cystic lesions of the liver and kidneys. 4. Nonobstructing stone within the left renal pelvis. 5. Focal coarse calcification in the left renal parenchyma. This can be from prior inflammatory infectious process. Recommendation: Follow up as clinically indicated. All CT scans at this facility utilize dose modulation, iterative reconstruction, and/or weight based dosing when appropriate to reduce radiation dose to as low as reasonably achievable. Dictated and Electronically Signed by Jyotsna Benton MD, SA CERTIFIED at [id number] 07:03:41 PM EST         Additional Studies Reviewed:     Impression:   1. Urinary tract infection with bacteremia secondary to Staph epidermidis  2. BPH/urinary retention  3. Back and flank pain-improved following García catheter placement.   4.  Renal insufficiency-creatinine on admission 1.7 and now increased to 2.2    Recommendations:    Continue treatment with vancomycin  Pharmacy assisting with vancomycin dosing  Feel ceftriaxone can be discontinued  Urology evaluation for difficulty voiding/urinary retention  Continue to follow  Await susceptibility from blood and urine culture    Tomás Lynch MD  01/26/23  9:49 AM

## 2023-01-26 NOTE — PROGRESS NOTES
Daily Progress Note    Date:2023  Patient: Ken Hilario  : 1940  PEA:[id number]  CODE:Full Code No additional code details  PCP:Bety Cuevas MD    Admit Date: 2023  3:25 PM   LOS: 3 days     Chief Complaint   Patient presents with    Abdominal Pain    Flank Pain     Pt states that he began having flank pain and LQ abdominal pain at 1000 this morning. Pt states that he has not had an episode like this in the past. Pt states he has had nausea, and has had consistent Bms. Pt denies diarrhea. ABCs are intact. Skin wpd. A&ox4. Subjective: García catheter placed yesterday due to urinary retention on bladder scan. Pt tired this AM. Reports that he did not sleep well. Otherwise abdominal and flank pain is somewhat improved. Hospital Summary:  78 yo M with HTN, GERD, HLD, BPH s/p TURP who presented to [address] [place] [address] ED with abdominal pain. CT abdomen in ED showed stool-filled colon, nonobstructing stone within L renal pelvis. Admitted to hospitalist service for UTI/Pyelonephritis. Nephrology consulted due to ALEXANDRO. Blood cultures returned 2/2 + for gram + cocci, PCR showing Staph Epi. ID consulted. Review of Systems   Constitutional:  Negative for chills and fever. Respiratory:  Negative for cough and shortness of breath. Cardiovascular:  Negative for chest pain and palpitations. Gastrointestinal:  Positive for abdominal pain. Negative for nausea and vomiting.      Objective:      Vital signs in last 24 hours:  Patient Vitals for the past 24 hrs:   BP Temp Temp src Pulse Resp SpO2 Height   23 1201 (!) 135/95 98.6 °F (37 °C) Temporal 78 18 94 % --   23 1124 -- -- -- -- -- 95 % --   23 1000 -- -- -- -- -- -- 6' (1.829 m)   23 0826 (!) 140/87 98.2 °F (36.8 °C) Oral 73 18 94 % --   23 0046 137/80 97.7 °F (36.5 °C) Oral 84 20 92 % --   23 1739 116/83 97.7 °F (36.5 °C) Oral 80 18 92 % --   23 1324 111/73 97.9 °F (36.6 °C) Oral 69 -- 92 % -- I/O last 3 completed shifts: In: 2404 [P.O.:810; I.V.:1594]  Out: -   No intake/output data recorded. Physical Exam  Constitutional:       General: He is not in acute distress. Appearance: Normal appearance. He is not toxic-appearing. Cardiovascular:      Rate and Rhythm: Normal rate and regular rhythm. Pulses: Normal pulses. Heart sounds: Normal heart sounds. Pulmonary:      Effort: Pulmonary effort is normal. No respiratory distress. Breath sounds: Normal breath sounds. Abdominal:      General: Bowel sounds are normal. There is no distension. Palpations: Abdomen is soft. Tenderness: There is abdominal tenderness (L flank and LLQ).            Lab Review   Recent Results (from the past 24 hour(s))   Sodium, Urine, Random    Collection Time: 01/25/23  3:30 PM   Result Value Ref Range    Sodium, Ur 34.0 mmol/L   Creatinine, Random Urine    Collection Time: 01/25/23  3:30 PM   Result Value Ref Range    Creatinine, Ur 177.9 4.2 - 622.0 mg/dL   Vancomycin Level, Random    Collection Time: 01/26/23  1:39 AM   Result Value Ref Range    Vancomycin Rm 10.5 ug/mL   Comprehensive Metabolic Panel w/ Reflex to MG    Collection Time: 01/26/23  1:39 AM   Result Value Ref Range    Sodium 141 136 - 145 mmol/L    Potassium reflex Magnesium 4.6 3.5 - 5.0 mmol/L    Chloride 108 98 - 111 mmol/L    CO2 22 22 - 29 mmol/L    Anion Gap 11 7 - 19 mmol/L    Glucose 91 74 - 109 mg/dL    BUN 46 (H) 8 - 23 mg/dL    Creatinine 2.2 (H) 0.5 - 1.2 mg/dL    Est, Glom Filt Rate 29 (A) >60    Calcium 8.3 (L) 8.8 - 10.2 mg/dL    Total Protein 5.6 (L) 6.6 - 8.7 g/dL    Albumin 3.0 (L) 3.5 - 5.2 g/dL    Total Bilirubin 0.4 0.2 - 1.2 mg/dL    Alkaline Phosphatase 83 40 - 130 U/L    ALT 15 5 - 41 U/L    AST 33 5 - 40 U/L   CBC    Collection Time: 01/26/23  1:39 AM   Result Value Ref Range    WBC 7.7 4.8 - 10.8 K/uL    RBC 4.32 (L) 4.70 - 6.10 M/uL    Hemoglobin 11.7 (L) 14.0 - 18.0 g/dL    Hematocrit 39.5 (L) 42.0 - 52.0 %    MCV 91.4 80.0 - 94.0 fL    MCH 27.1 27.0 - 31.0 pg    MCHC 29.6 (L) 33.0 - 37.0 g/dL    RDW 13.6 11.5 - 14.5 %    Platelets 353 747 - 861 K/uL    MPV 10.8 9.4 - 12.4 fL   Uric Acid    Collection Time: 01/26/23  1:39 AM   Result Value Ref Range    Uric Acid, Serum 5.7 3.4 - 7.0 mg/dL   PTH, Intact    Collection Time: 01/26/23  1:39 AM   Result Value Ref Range    .1 (H) 15.0 - 65.0 pg/mL   CK    Collection Time: 01/26/23  1:39 AM   Result Value Ref Range    Total  (H) 39 - 308 U/L             Current Facility-Administered Medications:     perflutren lipid microspheres (DEFINITY) injection 1.5 mL, 1.5 mL, IntraVENous, ONCE PRN, Arnetha Mohs, MD, 1.5 mL at 01/26/23 0800    calcitRIOL (ROCALTROL) capsule 0.25 mcg, 0.25 mcg, Oral, Daily, Regine Whelan MD, 0.25 mcg at 01/26/23 1135    enoxaparin Sodium (LOVENOX) injection 30 mg, 30 mg, SubCUTAneous, Daily, Sherl Pretty, APRN - CNP, 30 mg at 01/25/23 1819    vancomycin (VANCOCIN) intermittent dosing (placeholder), , Other, RX Placeholder, Sherl Pretty, APRN - CNP    tamsulosin (FLOMAX) capsule 0.4 mg, 0.4 mg, Oral, Daily, Sherl Pretty, APRN - CNP, 0.4 mg at 01/26/23 0856    sodium chloride flush 0.9 % injection 5-40 mL, 5-40 mL, IntraVENous, 2 times per day, Sherl Pretty, APRN - CNP, 10 mL at 01/26/23 0856    sodium chloride flush 0.9 % injection 5-40 mL, 5-40 mL, IntraVENous, PRN, Sherl Pretty, APRN - CNP    0.9 % sodium chloride infusion, , IntraVENous, PRN, Sherl Pretty, APRN - CNP    ondansetron (ZOFRAN-ODT) disintegrating tablet 4 mg, 4 mg, Oral, Q8H PRN **OR** ondansetron (ZOFRAN) injection 4 mg, 4 mg, IntraVENous, Q6H PRN, Sherl Pretty, APRN - CNP, 4 mg at 01/25/23 0251    acetaminophen (TYLENOL) tablet 650 mg, 650 mg, Oral, Q6H PRN **OR** acetaminophen (TYLENOL) suppository 650 mg, 650 mg, Rectal, Q6H PRN, Sherl Pretty, APRN - CNP    polyethylene glycol (GLYCOLAX) packet 17 g, 17 g, Oral, Daily PRN, Sherl Pretty, APRN - CNP, 17 g at 01/25/23 1422    0.9 % sodium chloride infusion, , IntraVENous, Continuous, Eugenie Fothergill, MD, Last Rate: 125 mL/hr at 01/26/23 1038, Rate Change at 01/26/23 1038    traMADol (ULTRAM) tablet 50 mg, 50 mg, Oral, Q6H PRN, Shazia Marie MD, 50 mg at 01/24/23 1207    morphine (PF) injection 2 mg, 2 mg, IntraVENous, Q2H PRN **OR** morphine sulfate (PF) injection 4 mg, 4 mg, IntraVENous, Q2H PRN, Shazia Marie MD    allopurinol (ZYLOPRIM) tablet 100 mg, 100 mg, Oral, Daily, Myles Escamilla APRN - CNP, 100 mg at 01/26/23 0856    atorvastatin (LIPITOR) tablet 20 mg, 20 mg, Oral, Daily, Myles Escamilla APRN - CNP, 20 mg at 01/26/23 0856    metoprolol succinate (TOPROL XL) extended release tablet 50 mg, 50 mg, Oral, Daily, Myles Escamilla APRN - CNP, 50 mg at 01/26/23 0856    pantoprazole (PROTONIX) tablet 40 mg, 40 mg, Oral, QAM AC, Myles Escamilla APRN - CNP, 40 mg at 01/26/23 1277    pregabalin (LYRICA) capsule 100 mg, 100 mg, Oral, BID, Myles Escamilla APRN - CNP, 100 mg at 01/26/23 0856    docusate sodium (COLACE) capsule 100 mg, 100 mg, Oral, Daily, GOLD Colon - CNP, 100 mg at 01/26/23 3398      Imaging:  CT ABDOMEN PELVIS W IV CONTRAST Additional Contrast? None    Result Date: 1/23/2023  1. Portions of the mid small bowel suggest mild mucosal thickening. Exact etiology is uncertain. Mild enteritis is not excluded. No fluid-filled bowel loops. 2. Stool filled colon. 3. Simple cystic lesions of the liver and kidneys. 4. Nonobstructing stone within the left renal pelvis. 5. Focal coarse calcification in the left renal parenchyma. This can be from prior inflammatory infectious process. Recommendation: Follow up as clinically indicated. All CT scans at this facility utilize dose modulation, iterative reconstruction, and/or weight based dosing when appropriate to reduce radiation dose to as low as reasonably achievable.  Dictated and Electronically Signed by Leslie Pinto MD, SA CERTIFIED at 23-Jan-2023 07:03:41 PM EST                  Assessment/Plan  Principal Problem:    Pyelonephritis  Active Problems:    Renal stone  Resolved Problems:    * No resolved hospital problems. *       I have reviewed the patient's daily labs, including BMP and CBC with pertinent results discussed below.     Staph Epidermidis bacteremia  Pyelonephritis  -- SIRS 1/4 on admission for WBC 13.6  -- UA w/ 21-30 WBCs, 6-10 RBCs, positive nitrite, mod LE  -- CT abdomen w/ nonobstructing stone in L renal pelvis, coarse calcification in L renal parenchyma  -- Renal US negative for hydronephrosis  -- 2/2 blood cultures + for staph epi on 1/23, sens pending  -- Repeat blood cultures drawn 1/25, NGTD  -- Continue Vancomycin, dc ceftriaxone  -- Follow up urine culture  -- ID consulted, appreciate recommendations, note reviewed    ALEXANDRO  -- Cr plateau at 2.2  -- nonobstructing stone on CT abdomen  -- Renal US negative for hydronephrosis  -- García placed 1/25 due to retention  -- Continue /hr  -- Hollie Denver for K 5.2 > 4.8  -- Consulted nephrology, appreciate recommendations, note reviewed    Nephrolithiasis  BPH with obstruction  -- Nonobstructing on CT abdomen  -- Renal US negative for hydronephrosis 1/25  -- Continue Flomax  -- García placed 1/25  -- Voiding trial tomorrow, outpatient urology referral     Insomnia  -- Melatonin 10 mg QHS  -- Start Mirtazapine 7.5 mg QHS      DVT prophylaxis: Lovenox    DISPOSITION:  TBD, PT/OT eval    Full Code No additional code details        John Holley MD 1/26/2023 12:38 PM

## 2023-01-26 NOTE — PROGRESS NOTES
Physical Therapy  Name: Ken Hilario  MRN:  [id number]  Date of service:  1/26/2023    Attempt this am.  \"Am I ever gonna get any sleep? I doze off and here comes one right after another. \"  Patient declined at this time.     Electronically signed by Romaine Dorsey PTA on 1/26/2023 at 10:13 AM

## 2023-01-27 LAB
ANION GAP SERPL CALCULATED.3IONS-SCNC: 12 MMOL/L (ref 7–19)
BUN BLDV-MCNC: 35 MG/DL (ref 8–23)
CALCIUM SERPL-MCNC: 8.2 MG/DL (ref 8.8–10.2)
CHLORIDE BLD-SCNC: 109 MMOL/L (ref 98–111)
CO2: 18 MMOL/L (ref 22–29)
CREAT SERPL-MCNC: 1.4 MG/DL (ref 0.5–1.2)
GFR SERPL CREATININE-BSD FRML MDRD: 50 ML/MIN/{1.73_M2}
GLUCOSE BLD-MCNC: 98 MG/DL (ref 74–109)
ORGANISM: ABNORMAL
POTASSIUM REFLEX MAGNESIUM: 3.9 MMOL/L (ref 3.5–5)
SODIUM BLD-SCNC: 139 MMOL/L (ref 136–145)
URINE CULTURE, ROUTINE: ABNORMAL
URINE CULTURE, ROUTINE: ABNORMAL
VANCOMYCIN TROUGH: 10.3 UG/ML (ref 10–20)

## 2023-01-27 PROCEDURE — 2580000003 HC RX 258

## 2023-01-27 PROCEDURE — 36415 COLL VENOUS BLD VENIPUNCTURE: CPT

## 2023-01-27 PROCEDURE — 6370000000 HC RX 637 (ALT 250 FOR IP): Performed by: INTERNAL MEDICINE

## 2023-01-27 PROCEDURE — 6360000002 HC RX W HCPCS

## 2023-01-27 PROCEDURE — 94760 N-INVAS EAR/PLS OXIMETRY 1: CPT

## 2023-01-27 PROCEDURE — 51798 US URINE CAPACITY MEASURE: CPT

## 2023-01-27 PROCEDURE — 80202 ASSAY OF VANCOMYCIN: CPT

## 2023-01-27 PROCEDURE — 97116 GAIT TRAINING THERAPY: CPT

## 2023-01-27 PROCEDURE — 6370000000 HC RX 637 (ALT 250 FOR IP)

## 2023-01-27 PROCEDURE — 2580000003 HC RX 258: Performed by: INTERNAL MEDICINE

## 2023-01-27 PROCEDURE — 6370000000 HC RX 637 (ALT 250 FOR IP): Performed by: STUDENT IN AN ORGANIZED HEALTH CARE EDUCATION/TRAINING PROGRAM

## 2023-01-27 PROCEDURE — 80048 BASIC METABOLIC PNL TOTAL CA: CPT

## 2023-01-27 PROCEDURE — 1210000000 HC MED SURG R&B

## 2023-01-27 RX ADMIN — CALCITRIOL CAPSULES 0.25 MCG 0.25 MCG: 0.25 CAPSULE ORAL at 08:01

## 2023-01-27 RX ADMIN — ATORVASTATIN CALCIUM 20 MG: 40 TABLET, FILM COATED ORAL at 08:01

## 2023-01-27 RX ADMIN — METOPROLOL SUCCINATE 50 MG: 50 TABLET, FILM COATED, EXTENDED RELEASE ORAL at 08:03

## 2023-01-27 RX ADMIN — TAMSULOSIN HYDROCHLORIDE 0.4 MG: 0.4 CAPSULE ORAL at 08:02

## 2023-01-27 RX ADMIN — PREGABALIN 100 MG: 50 CAPSULE ORAL at 08:02

## 2023-01-27 RX ADMIN — PANTOPRAZOLE SODIUM 40 MG: 40 TABLET, DELAYED RELEASE ORAL at 05:13

## 2023-01-27 RX ADMIN — ENOXAPARIN SODIUM 30 MG: 100 INJECTION SUBCUTANEOUS at 17:57

## 2023-01-27 RX ADMIN — TRAMADOL HYDROCHLORIDE 50 MG: 50 TABLET, COATED ORAL at 08:02

## 2023-01-27 RX ADMIN — PREGABALIN 100 MG: 50 CAPSULE ORAL at 21:36

## 2023-01-27 RX ADMIN — DOCUSATE SODIUM 100 MG: 100 CAPSULE, LIQUID FILLED ORAL at 08:01

## 2023-01-27 RX ADMIN — SODIUM CHLORIDE: 9 INJECTION, SOLUTION INTRAVENOUS at 00:49

## 2023-01-27 RX ADMIN — SODIUM CHLORIDE, PRESERVATIVE FREE 10 ML: 5 INJECTION INTRAVENOUS at 21:37

## 2023-01-27 RX ADMIN — MIRTAZAPINE 7.5 MG: 7.5 TABLET ORAL at 21:36

## 2023-01-27 RX ADMIN — ALLOPURINOL 100 MG: 100 TABLET ORAL at 08:02

## 2023-01-27 RX ADMIN — ONDANSETRON 4 MG: 2 INJECTION INTRAMUSCULAR; INTRAVENOUS at 15:53

## 2023-01-27 RX ADMIN — VANCOMYCIN HYDROCHLORIDE 1000 MG: 10 INJECTION, POWDER, LYOPHILIZED, FOR SOLUTION INTRAVENOUS at 05:14

## 2023-01-27 ASSESSMENT — PAIN DESCRIPTION - FREQUENCY
FREQUENCY: CONTINUOUS
FREQUENCY: INTERMITTENT

## 2023-01-27 ASSESSMENT — PAIN DESCRIPTION - ORIENTATION
ORIENTATION: RIGHT;LEFT
ORIENTATION: RIGHT;LEFT

## 2023-01-27 ASSESSMENT — PAIN DESCRIPTION - PAIN TYPE: TYPE: CHRONIC PAIN

## 2023-01-27 ASSESSMENT — PAIN DESCRIPTION - DESCRIPTORS
DESCRIPTORS: ACHING
DESCRIPTORS: SHOOTING;SORE

## 2023-01-27 ASSESSMENT — PAIN - FUNCTIONAL ASSESSMENT: PAIN_FUNCTIONAL_ASSESSMENT: PREVENTS OR INTERFERES SOME ACTIVE ACTIVITIES AND ADLS

## 2023-01-27 ASSESSMENT — PAIN SCALES - GENERAL
PAINLEVEL_OUTOF10: 2
PAINLEVEL_OUTOF10: 2
PAINLEVEL_OUTOF10: 8

## 2023-01-27 ASSESSMENT — ENCOUNTER SYMPTOMS
SHORTNESS OF BREATH: 0
NAUSEA: 0
VOMITING: 0
COUGH: 0
ABDOMINAL PAIN: 1

## 2023-01-27 ASSESSMENT — PAIN DESCRIPTION - ONSET
ONSET: ON-GOING
ONSET: ON-GOING

## 2023-01-27 ASSESSMENT — PAIN DESCRIPTION - LOCATION
LOCATION: LEG
LOCATION: LEG

## 2023-01-27 NOTE — CARE COORDINATION
Alejandra offered a bed . No precert required. Can go over the weekend if stable. Will need a covid test prior to dc.   Alejandra  [phone]  Electronically signed by Anayeli Moon RN on 1/27/2023 at 2:32 PM

## 2023-01-27 NOTE — PROGRESS NOTES
Physical Therapy  Name: Bradford Rosario  MRN:  [id number]  Date of service:  1/27/2023 01/27/23 1358   Restrictions/Precautions   Restrictions/Precautions Fall Risk   Subjective   Subjective Some times I feel better and some times i feel awful. Oxygen Therapy   O2 Device None (Room air)   Bed Mobility   Supine to Sit Contact guard assistance   Sit to Supine Contact guard assistance   Transfers   Sit to Stand Contact guard assistance   Stand to Sit Contact guard assistance   Ambulation   Surface Level tile   Device Rolling Walker   Assistance Minimal assistance   Distance 125 feet   Other Activities   Comment assist to and from [address] [facility]   Time Frame for Short Term Goals 2 wks   Short Term Goal 1 amb 200 ft with rw, SBA   Conditions Requiring Skilled Therapeutic Intervention   Body Structures, Functions, Activity Limitations Requiring Skilled Therapeutic Intervention Decreased functional mobility ; Decreased safe awareness;Decreased balance;Decreased endurance   Assessment Patient was slightly off balance this visit. and required mod assist several times to maintain balance. Discharge Recommendations Continue to assess pending progress; Patient would benefit from continued therapy after discharge   Activity Tolerance   Activity Tolerance Patient limited by fatigue   Physcial Therapy Plan   General Plan 5-7 times per week   Therapy Duration 2 Weeks   Current Treatment Recommendations Strengthening;Balance training;Functional mobility training;Transfer training;Gait training; Endurance training; Safety education & training; Therapeutic activities   PT Plan of Care   Friday X   Safety Devices   Type of Devices Call light within reach; Bed alarm in place; Left in bed       Electronically signed by Claudia Gore PTA on 1/27/2023 at 3:36 PM

## 2023-01-27 NOTE — PROGRESS NOTES
Daily Progress Note    Date:2023  Patient: Teri Ulrich  : 1940  UDB:[id number]  CODE:Full Code No additional code details  PCP:Bety Cuevas MD    Admit Date: 2023  3:25 PM   LOS: 4 days     Chief Complaint   Patient presents with    Abdominal Pain    Flank Pain     Pt states that he began having flank pain and LQ abdominal pain at 1000 this morning. Pt states that he has not had an episode like this in the past. Pt states he has had nausea, and has had consistent Bms. Pt denies diarrhea. ABCs are intact. Skin wpd. A&ox4. Subjective: García catheter placed yesterday due to urinary retention on bladder scan. Pt tired this AM. Reports that he did not sleep well. Otherwise abdominal and flank pain is somewhat improved. Hospital Summary:  78 yo M with HTN, GERD, HLD, BPH s/p TURP who presented to [facility] ED with abdominal pain. CT abdomen in ED showed stool-filled colon, nonobstructing stone within L renal pelvis. Admitted to hospitalist service for UTI/Pyelonephritis. Nephrology consulted due to ALEXANDRO. Blood cultures returned 2/2 + for gram + cocci, PCR showing Staph Epi. ID consulted. Review of Systems   Constitutional:  Negative for chills and fever. Respiratory:  Negative for cough and shortness of breath. Cardiovascular:  Negative for chest pain and palpitations. Gastrointestinal:  Positive for abdominal pain. Negative for nausea and vomiting.      Objective:      Vital signs in last 24 hours:  Patient Vitals for the past 24 hrs:   BP Temp Temp src Pulse Resp SpO2   23 1318 -- -- -- 72 18 93 %   23 1239 (!) 140/86 98.8 °F (37.1 °C) Temporal 76 16 96 %   23 0832 -- -- -- -- 18 --   23 0803 (!) 158/88 -- -- 88 -- --   23 0635 (!) 158/88 -- -- -- -- --   23 0634 (!) 148/91 97.5 °F (36.4 °C) -- 88 20 94 %   23 0011 (!) 153/89 97.7 °F (36.5 °C) -- 80 20 96 %   23 1805 136/64 97.7 °F (36.5 °C) Temporal 88 18 97 %         I/O last 3 completed shifts: In: 370 [P.O.:370]  Out: 3250 [Urine:3250]  I/O this shift: In: 26 [P.O.:525]  Out: -     Physical Exam  Constitutional:       General: He is not in acute distress. Appearance: Normal appearance. He is not toxic-appearing. Cardiovascular:      Rate and Rhythm: Normal rate and regular rhythm. Pulses: Normal pulses. Heart sounds: Normal heart sounds. Pulmonary:      Effort: Pulmonary effort is normal. No respiratory distress. Breath sounds: Normal breath sounds. Abdominal:      General: Bowel sounds are normal. There is no distension. Palpations: Abdomen is soft. Tenderness: There is abdominal tenderness (L flank and LLQ).            Lab Review   Recent Results (from the past 24 hour(s))   Vancomycin Level, Trough    Collection Time: 01/27/23  2:27 AM   Result Value Ref Range    Vancomycin Tr 10.3 10.0 - 20.0 ug/mL   Basic Metabolic Panel w/ Reflex to MG    Collection Time: 01/27/23  2:27 AM   Result Value Ref Range    Sodium 139 136 - 145 mmol/L    Potassium reflex Magnesium 3.9 3.5 - 5.0 mmol/L    Chloride 109 98 - 111 mmol/L    CO2 18 (L) 22 - 29 mmol/L    Anion Gap 12 7 - 19 mmol/L    Glucose 98 74 - 109 mg/dL    BUN 35 (H) 8 - 23 mg/dL    Creatinine 1.4 (H) 0.5 - 1.2 mg/dL    Est, Glom Filt Rate 50 (A) >60    Calcium 8.2 (L) 8.8 - 10.2 mg/dL             Current Facility-Administered Medications:     calcitRIOL (ROCALTROL) capsule 0.25 mcg, 0.25 mcg, Oral, Daily, Maureen Santos MD, 0.25 mcg at 01/27/23 0801    mirtazapine (REMERON) tablet 7.5 mg, 7.5 mg, Oral, Nightly, John Connolly MD, 7.5 mg at 01/26/23 1956    enoxaparin Sodium (LOVENOX) injection 30 mg, 30 mg, SubCUTAneous, Daily, GOLD Connelly CNP, 30 mg at 01/26/23 1735    vancomycin (VANCOCIN) intermittent dosing (placeholder), , Other, RX Placeholder, Trenia Bless, APRN - CNP    tamsulosin (FLOMAX) capsule 0.4 mg, 0.4 mg, Oral, Daily, Cheri Miller APRN - CNP, 0.4 mg at 01/27/23 0802    sodium chloride flush 0.9 % injection 5-40 mL, 5-40 mL, IntraVENous, 2 times per day, GOLD Montana - CNP, 10 mL at 01/26/23 0856    sodium chloride flush 0.9 % injection 5-40 mL, 5-40 mL, IntraVENous, PRN, Judith Boo APRN - CNP    0.9 % sodium chloride infusion, , IntraVENous, PRN, Judith Boo APRN - CNP    ondansetron (ZOFRAN-ODT) disintegrating tablet 4 mg, 4 mg, Oral, Q8H PRN **OR** ondansetron (ZOFRAN) injection 4 mg, 4 mg, IntraVENous, Q6H PRN, GOLD Montana - CNP, 4 mg at 01/25/23 0251    acetaminophen (TYLENOL) tablet 650 mg, 650 mg, Oral, Q6H PRN **OR** acetaminophen (TYLENOL) suppository 650 mg, 650 mg, Rectal, Q6H PRN, Judith Boo APRN - CNP    polyethylene glycol (GLYCOLAX) packet 17 g, 17 g, Oral, Daily PRN, Judith Boo APRN - CNP, 17 g at 01/25/23 1422    0.9 % sodium chloride infusion, , IntraVENous, Continuous, Piyush MD Georgi, Last Rate: 75 mL/hr at 01/27/23 1117, Rate Change at 01/27/23 1117    traMADol (ULTRAM) tablet 50 mg, 50 mg, Oral, Q6H PRN, Jazz Reyna MD, 50 mg at 01/27/23 0802    allopurinol (ZYLOPRIM) tablet 100 mg, 100 mg, Oral, Daily, GOLD Motnana - CNP, 100 mg at 01/27/23 0802    atorvastatin (LIPITOR) tablet 20 mg, 20 mg, Oral, Daily, GOLD Montana - CNP, 20 mg at 01/27/23 0801    metoprolol succinate (TOPROL XL) extended release tablet 50 mg, 50 mg, Oral, Daily, Judith Boo APRN - CNP, 50 mg at 01/27/23 0803    pantoprazole (PROTONIX) tablet 40 mg, 40 mg, Oral, QAM AC, Judith Humptulips, APRN - CNP, 40 mg at 01/27/23 0513    pregabalin (LYRICA) capsule 100 mg, 100 mg, Oral, BID, Judith Humptulips, APRN - CNP, 100 mg at 01/27/23 0802    docusate sodium (COLACE) capsule 100 mg, 100 mg, Oral, Daily, Judith Humptulips, APRN - CNP, 100 mg at 01/27/23 0801      Imaging:  CT ABDOMEN PELVIS W IV CONTRAST Additional Contrast? None    Result Date: 1/23/2023  1. Portions of the mid small bowel suggest mild mucosal thickening. Exact etiology is uncertain. Mild enteritis is not excluded. No fluid-filled bowel loops. 2. Stool filled colon. 3. Simple cystic lesions of the liver and kidneys. 4. Nonobstructing stone within the left renal pelvis. 5. Focal coarse calcification in the left renal parenchyma. This can be from prior inflammatory infectious process. Recommendation: Follow up as clinically indicated. All CT scans at this facility utilize dose modulation, iterative reconstruction, and/or weight based dosing when appropriate to reduce radiation dose to as low as reasonably achievable. Dictated and Electronically Signed by Eller Bamberger MD, SA CERTIFIED at [id number] 07:03:41 PM EST                  Assessment/Plan  Principal Problem:    Pyelonephritis  Active Problems:    Renal stone  Resolved Problems:    * No resolved hospital problems. *       I have reviewed the patient's daily labs, including BMP and CBC with pertinent results discussed below.     Staph Epidermidis bacteremia  Pyelonephritis  -- SIRS 1/4 on admission for WBC 13.6  -- UA w/ 21-30 WBCs, 6-10 RBCs, positive nitrite, mod LE  -- CT abdomen w/ nonobstructing stone in L renal pelvis, coarse calcification in L renal parenchyma  -- Renal US negative for hydronephrosis  -- 2/2 blood cultures + for staph epi on 1/23, sensitivity still pending  -- Repeat blood cultures drawn 1/25, NGTD  -- Continue Vancomycin, dc ceftriaxone  -- Urine culture growing staph epi, sensitivities reviewed  -- ID consulted, appreciate recommendations, note reviewed    ALEXANDRO  -- Cr plateau at 2.2  -- nonobstructing stone on CT abdomen  -- Renal US negative for hydronephrosis  -- García placed 1/25 due to retention  -- Continue /hr  -- [place] for K 5.2 > 4.8  -- Consulted nephrology, appreciate recommendations, note reviewed    Nephrolithiasis  BPH with obstruction  -- Nonobstructing on CT abdomen  -- Renal US negative for hydronephrosis 1/25  -- Continue Flomax  -- García placed 1/25  -- Voiding trial today    Insomnia  -- Melatonin 10 mg QHS  -- Start Mirtazapine 7.5 mg QHS      DVT prophylaxis: Lovenox    DISPOSITION:  TBD, PT/OT eval    Full Code No additional code details        Rayne Kapadia MD 1/27/2023 3:04 PM

## 2023-01-27 NOTE — PROGRESS NOTES
Renal Progress Note    Thank you to requesting provider: Dr Luz Elena Nelson, for asking us to see Bria Kat    Reason for consultation:  ALEXANDRO. Chief Complaint:  Left flank pain. History of Presenting Illness      Patient is a very pleasant 78 yo man with pmhx of HTN, pre diabetes, gout, OA, peripheral neuropathy. He was admitted on 1/23 for left flank and lateral abdominal pain. He was also having chills and was not feeling well. He denies recurrent UTI. His abdominal CT in ED revealed non-obstructing left kidney stone of 4-5 mm in size. He was also having positive UA. He was subsequently admitted and was started with IV fluids and antibiotics for acute pyelonephritis. His serum creat was 1.4 on admission (baseline 1.1). It ab to 2.1 and 2.3. Renal service is consulted to manage his ALEXANDRO. Patient denies any prior renal dysfunction. He noticed some drop in his urine output. He also denies NSAIDs abuse. He was found to be bacteriemic to Staph epidermidis form urine origin. Today, he offered no new complaints. His flank pain has improved some. Has god urine output.     Past Medical/Surgical History      Active Ambulatory Problems     Diagnosis Date Noted    Mixed hyperlipidemia 08/16/2017    Essential hypertension 08/16/2017    Restless legs syndrome 08/16/2017    Gastroesophageal reflux disease without esophagitis 08/16/2017    Peripheral neuropathic pain 08/16/2017    Primary osteoarthritis involving multiple joints 08/16/2017    Gout 08/16/2017    Benign non-nodular prostatic hyperplasia with lower urinary tract symptoms 08/16/2017    Elevated PSA 08/16/2017    Idiopathic peripheral neuropathy 02/19/2018    Chronic idiopathic constipation 08/20/2018    Prediabetes 10/14/2022     Resolved Ambulatory Problems     Diagnosis Date Noted    No Resolved Ambulatory Problems     Past Medical History:   Diagnosis Date    Renal stone 1/23/2023         Review of Systems     Constitutional:  No weight loss, + fever/chills  Eyes:  No eye pain, no eye redness  Cardiovascular:  No chest pain, no worsening of edema  Respiratory:  No hemoptysis, no stidor  Gastrointestinal:  No blood in stool, no n/v, no diarrhea  Genitoruinary:  No hematuria, + difficulty with urination  Musculoskeletal:  No joint swelling, no redness  Integumentary:  No Rash, no itching  Neurological:  No focal weakness, No new sensory deficit  Psychiatric:  No depression, no confusion  Endocrine:  No polyuria, no polydipsia       Medications        Current Facility-Administered Medications:     calcitRIOL (ROCALTROL) capsule 0.25 mcg, 0.25 mcg, Oral, Daily, Raquel Moyer MD, 0.25 mcg at 01/27/23 0801    mirtazapine (REMERON) tablet 7.5 mg, 7.5 mg, Oral, Nightly, Daisha Pineda MD, 7.5 mg at 01/26/23 1956    enoxaparin Sodium (LOVENOX) injection 30 mg, 30 mg, SubCUTAneous, Daily, GOLD Angela - CNP, 30 mg at 01/26/23 1735    vancomycin (VANCOCIN) intermittent dosing (placeholder), , Other, RX Placeholder, GOLD Angela - CNP    tamsulosin (FLOMAX) capsule 0.4 mg, 0.4 mg, Oral, Daily, GOLD Angela - CNP, 0.4 mg at 01/27/23 0802    sodium chloride flush 0.9 % injection 5-40 mL, 5-40 mL, IntraVENous, 2 times per day, GOLD Angela - CNP, 10 mL at 01/26/23 0856    sodium chloride flush 0.9 % injection 5-40 mL, 5-40 mL, IntraVENous, PRN, Callie Cabrera APRN - CNP    0.9 % sodium chloride infusion, , IntraVENous, PRN, Callie Cabrera APRN - CNP    ondansetron (ZOFRAN-ODT) disintegrating tablet 4 mg, 4 mg, Oral, Q8H PRN **OR** ondansetron (ZOFRAN) injection 4 mg, 4 mg, IntraVENous, Q6H PRN, GOLD Angela - CNP, 4 mg at 01/25/23 0251    acetaminophen (TYLENOL) tablet 650 mg, 650 mg, Oral, Q6H PRN **OR** acetaminophen (TYLENOL) suppository 650 mg, 650 mg, Rectal, Q6H PRN, GOLD Angela CNP    polyethylene glycol (GLYCOLAX) packet 17 g, 17 g, Oral, Daily PRN, GOLD Angela CNP, 17 g at 01/25/23 1422    0.9 % sodium chloride infusion, , IntraVENous, Continuous, Tonja Cordova MD, Last Rate: 125 mL/hr at 23 0049, New Bag at 23 0049    traMADol (ULTRAM) tablet 50 mg, 50 mg, Oral, Q6H PRN, Len Newton MD, 50 mg at 23 08    allopurinol (ZYLOPRIM) tablet 100 mg, 100 mg, Oral, Daily, Kevin Garces APRN - CNP, 100 mg at 23 08    atorvastatin (LIPITOR) tablet 20 mg, 20 mg, Oral, Daily, Kevin Garces, APRN - CNP, 20 mg at 23 0801    metoprolol succinate (TOPROL XL) extended release tablet 50 mg, 50 mg, Oral, Daily, Kevin Garces, APRN - CNP, 50 mg at 23 0803    pantoprazole (PROTONIX) tablet 40 mg, 40 mg, Oral, QAM AC, Kevin Garces APRN - CNP, 40 mg at 23 0513    pregabalin (LYRICA) capsule 100 mg, 100 mg, Oral, BID, Kevin Garces, APRN - CNP, 100 mg at 23 0802    docusate sodium (COLACE) capsule 100 mg, 100 mg, Oral, Daily, Kevin Garces, APRN - CNP, 100 mg at 23 0801  No outpatient medications have been marked as taking for the 23 encounter [facility] HOSPITAL Encounter). Allergies   Codeine    Family History       Family History   Problem Relation Age of Onset    Uterine Cancer Mother     Coronary Art Dis Father         MI at 62    COPD Sister     Stroke Brother      Family history negative for kidney disease. Social History      Social History     Socioeconomic History    Marital status:       Spouse name: Prashant Salinas    Number of children: 2    Years of education: 12    Highest education level: None   Occupational History     Employer: RETIRED   Tobacco Use    Smoking status: Former     Packs/day: 3.00     Years: 30.00     Pack years: 90.00     Types: Cigarettes     Quit date: 1975     Years since quittin.1    Smokeless tobacco: Never   Substance and Sexual Activity    Alcohol use: No    Drug use: No    Sexual activity: Yes     Partners: Female     Comment: wife     Social Determinants of Health     Financial Resource Strain: Low Risk     Difficulty of Paying Living Expenses: Not hard at all   Food Insecurity: No Food Insecurity    Worried About Running Out of Food in the Last Year: Never true    Ran Out of Food in the Last Year: Never true   Physical Activity: Unknown    Days of Exercise per Week: 0 days   Intimate Partner Violence: Unknown    Fear of Current or Ex-Partner: Patient refused    Emotionally Abused: Patient refused    Physically Abused: Patient refused    Sexually Abused: Patient refused       Physical Exam     Blood pressure (!) 158/88, pulse 88, temperature 97.5 °F (36.4 °C), resp. rate 18, height 6' (1.829 m), weight 195 lb 11.2 oz (88.8 kg), SpO2 94 %. General:  NAD, A+Ox3, ill-appearing, normal body habitus  HEENT:  Atraumatic, normocephalic  Neck:  Supple, normal range of movement  Chest:  CTAB, good respiratory effort, good air movement  CV:  RRR, no murmurs Abdomen:  NTND, soft, +BS, no hepatosplenomegaly  Extremities:  No peripheral edema  Neurological:  Moving all four extremities   Psychiatric:  Normal insight and judgement, good recall    Data     Recent Labs     01/25/23 0127 01/26/23 0139   WBC 10.8 7.7   HGB 10.9* 11.7*   HCT 34.9* 39.5*   MCV 90.4 91.4    136       Recent Labs     01/25/23 0127 01/26/23 0139 01/27/23  0227    141 139   K 4.8 4.6 3.9    108 109   CO2 23 22 18*   GLUCOSE 106 91 98   BUN 44* 46* 35*   CREATININE 2.3* 2.2* 1.4*   LABGLOM 27* 29* 50*         Assessment:  ALEXANDRO- due to ATN  Acute pyelonephritis to staph epidermidis  HTN  Pre- type DM  Left kidney stone  Sec HPTH  Bacteremia to Staph epidermidis  Dysuria/ retention  Hematuria      Plan:  Continue IV fluids at lower rate, also continue Vanco per ID. Follow up labs. Continue oral calcitriol for now.  May consider voiding trial.

## 2023-01-27 NOTE — PROGRESS NOTES
Physical Therapy  Name: Ericka Means  MRN:  [id number]  Date of service:  1/27/2023    Patient in bed and says,\"I just got wrangled around in this bed. I am just so tried right now. \"  He declined gait and out of bed to chair. Physical Therapy will continue to follow and progress as able.     Electronically signed by Anuel Tyler PTA on 1/27/2023 at 11:40 AM

## 2023-01-27 NOTE — CARE COORDINATION
Pt has requested a referral to [facility] for his IV antibiotics. Referral sent. Pt asked that we update his daughter, Ray Briggs, and inform her of this. Placed call to Ray Briggs. All questions answered.    [facility]  [phone]  Electronically signed by Butch Mora on 1/27/2023 at 10:52 AM

## 2023-01-27 NOTE — PROGRESS NOTES
Infectious Diseases Progress Note    Patient:  Bria Kat  YOB: 1940  MRN: [id number]   Admit date: 1/23/2023   Admitting Physician: Lima Posadas MD  Primary Care Physician: Louis Begum MD    Chief Complaint/Interval History: His back discomfort has been showing improvement. He is complaining primarily of some of his neuropathy symptoms on the plantar aspect of both feet. He is without fever. He is hemodynamically stable. In/Out    Intake/Output Summary (Last 24 hours) at 1/27/2023 1625  Last data filed at 1/27/2023 1404  Gross per 24 hour   Intake 645 ml   Output 3650 ml   Net -3005 ml     Allergies:    Allergies   Allergen Reactions    Codeine Nausea Only     Current Meds: calcitRIOL (ROCALTROL) capsule 0.25 mcg, Daily  mirtazapine (REMERON) tablet 7.5 mg, Nightly  enoxaparin Sodium (LOVENOX) injection 30 mg, Daily  vancomycin (VANCOCIN) intermittent dosing (placeholder), RX Placeholder  tamsulosin (FLOMAX) capsule 0.4 mg, Daily  sodium chloride flush 0.9 % injection 5-40 mL, 2 times per day  sodium chloride flush 0.9 % injection 5-40 mL, PRN  0.9 % sodium chloride infusion, PRN  ondansetron (ZOFRAN-ODT) disintegrating tablet 4 mg, Q8H PRN   Or  ondansetron (ZOFRAN) injection 4 mg, Q6H PRN  acetaminophen (TYLENOL) tablet 650 mg, Q6H PRN   Or  acetaminophen (TYLENOL) suppository 650 mg, Q6H PRN  polyethylene glycol (GLYCOLAX) packet 17 g, Daily PRN  0.9 % sodium chloride infusion, Continuous  traMADol (ULTRAM) tablet 50 mg, Q6H PRN  allopurinol (ZYLOPRIM) tablet 100 mg, Daily  atorvastatin (LIPITOR) tablet 20 mg, Daily  metoprolol succinate (TOPROL XL) extended release tablet 50 mg, Daily  pantoprazole (PROTONIX) tablet 40 mg, QAM AC  pregabalin (LYRICA) capsule 100 mg, BID  docusate sodium (COLACE) capsule 100 mg, Daily      Review of Systems see HPI    VitalSigns:  BP (!) 140/86   Pulse 72   Temp 98.8 °F (37.1 °C) (Temporal)   Resp 18   Ht 6' (1.829 m)   Wt 195 lb 11.2 oz (88.8 kg)   SpO2 93%   BMI 26.54 kg/m²      Physical Exam  Line/IV site: No erythema, warmth, induration, or tenderness.  Abdomen is soft and nontender  No suprapubic or flank tenderness    Lab Results:  CBC:   Recent Labs     01/25/23 0127 01/26/23 0139   WBC 10.8 7.7   HGB 10.9* 11.7*    136     BMP:  Recent Labs     01/25/23 0127 01/26/23 0139 01/27/23  0227    141 139   K 4.8 4.6 3.9    108 109   CO2 23 22 18*   BUN 44* 46* 35*   CREATININE 2.3* 2.2* 1.4*   GLUCOSE 106 91 98     CultureResults:  Admission blood cultures positive for Staph epidermidis-susceptibility pending    Radiology: None    Additional Studies Reviewed:  None    Impression:  1.  Urinary tract infection/pyelonephritis with Staph epidermidis-susceptibility pending  2.  BPH/urinary retention-has had catheter placed  3.  Back and flank pain-showing improvement  4.  Renal insufficiency-improving    Recommendations:  Continue vancomycin  Await susceptibility testing  Continue to follow    DEE QUINONES MD

## 2023-01-27 NOTE — CARE COORDINATION
Updated phone # and fax # incase pt dc'd over the weekend.    # [phone] [facility] [phone]  Fax [phone]  Electronically signed by Rocio Man RN on 1/27/2023 at 3:16 PM

## 2023-01-27 NOTE — PLAN OF CARE
Problem: Discharge Planning  Goal: Discharge to home or other facility with appropriate resources  1/27/2023 0118 by Markos Jc RN  Outcome: Progressing  Flowsheets (Taken 1/26/2023 1948)  Discharge to home or other facility with appropriate resources: Identify barriers to discharge with patient and caregiver  1/26/2023 1643 by Debo Rosenbaum RN  Outcome: Progressing  Flowsheets (Taken 1/26/2023 0856)  Discharge to home or other facility with appropriate resources: Identify barriers to discharge with patient and caregiver     Problem: Pain  Goal: Verbalizes/displays adequate comfort level or baseline comfort level  1/27/2023 0118 by Markos Jc RN  Outcome: Progressing  1/26/2023 1643 by Debo Rosenbaum RN  Outcome: Progressing     Problem: Safety - Adult  Goal: Free from fall injury  1/27/2023 0118 by Markos Jc RN  Outcome: Progressing  1/26/2023 1643 by Debo Rosenbaum RN  Outcome: Progressing     Problem: Skin/Tissue Integrity  Goal: Absence of new skin breakdown  Description: 1. Monitor for areas of redness and/or skin breakdown  2. Assess vascular access sites hourly  3. Every 4-6 hours minimum:  Change oxygen saturation probe site  4. Every 4-6 hours:  If on nasal continuous positive airway pressure, respiratory therapy assess nares and determine need for appliance change or resting period.   1/27/2023 0118 by Markos Jc RN  Outcome: Progressing  1/26/2023 1643 by Debo Rosenbaum RN  Outcome: Progressing     Problem: Nutrition Deficit:  Goal: Optimize nutritional status  1/27/2023 0118 by Markos Jc RN  Outcome: Progressing  1/26/2023 1643 by Debo Rosenbaum RN  Outcome: Progressing

## 2023-01-27 NOTE — PROGRESS NOTES
[address] [facility] Pharmacokinetic Monitoring Service - Vancomycin    Consulting Provider: Fior Rogers   Indication: Bloodstream Infection  Target Concentration: Dosing based on anticipated concentration <15 mg/L due to renal impairment/insufficiency  Day of Therapy: 4  Additional Antimicrobials: None    Pertinent Laboratory Values: Wt Readings from Last 1 Encounters:   01/23/23 195 lb 11.2 oz (88.8 kg)     Temp Readings from Last 1 Encounters:   01/27/23 97.7 °F (36.5 °C)     Estimated Creatinine Clearance: 44 mL/min (A) (based on SCr of 1.4 mg/dL (H)). Recent Labs     01/25/23  0127 01/26/23  0139 01/27/23  0227   CREATININE 2.3* 2.2* 1.4*   WBC 10.8 7.7  --      Procalcitonin: N/A    Pertinent Cultures:  Culture Date Source Results   01/23/23 Blood Gram+ cocci  Staph epidermidis   01/23/23 Urine Staph epidermidis   MRSA Nasal Swab: N/A. Non-respiratory infection.     Recent vancomycin administrations                     vancomycin (VANCOCIN) 1000 mg in sodium chloride 0.9% 250 mL IVPB (mg) 1,000 mg New Bag 01/27/23 0514    vancomycin (VANCOCIN) 1000 mg in sodium chloride 0.9% 250 mL IVPB (mg) 1,000 mg New Bag 01/26/23 0442    vancomycin (VANCOCIN) 1,750 mg in sodium chloride 0.9 % 500 mL IVPB (mg) 1,750 mg New Bag 01/24/23 2136                    Assessment:  Date/Time Current Dose Concentration Timing of Concentration (h) AUC   01/27/23 Pulse dosing 10.3 Random level    Note: Serum concentrations collected for AUC dosing may appear elevated if collected in close proximity to the dose administered, this is not necessarily an indication of toxicity    Plan:  Current dosing regimen is therapeutic  Give  Vancomycin 1000mg x1 dose today, with random level tomorrow morning  Repeat vancomycin concentration ordered for 01/28 @ [facility] will continue to monitor patient and adjust therapy as indicated    Thank you for the consult,  Andrez Conway, [facility]  1/27/2023 5:37 AM

## 2023-01-27 NOTE — PLAN OF CARE
Problem: Discharge Planning  Goal: Discharge to home or other facility with appropriate resources  Outcome: Progressing  Flowsheets (Taken 1/27/2023 0803)  Discharge to home or other facility with appropriate resources: Identify barriers to discharge with patient and caregiver     Problem: Pain  Goal: Verbalizes/displays adequate comfort level or baseline comfort level  Outcome: Progressing     Problem: Safety - Adult  Goal: Free from fall injury  Outcome: Progressing     Problem: Skin/Tissue Integrity  Goal: Absence of new skin breakdown  Description: 1. Monitor for areas of redness and/or skin breakdown  2. Assess vascular access sites hourly  3. Every 4-6 hours minimum:  Change oxygen saturation probe site  4. Every 4-6 hours:  If on nasal continuous positive airway pressure, respiratory therapy assess nares and determine need for appliance change or resting period.   Outcome: Progressing     Problem: Nutrition Deficit:  Goal: Optimize nutritional status  Outcome: Progressing

## 2023-01-28 LAB
ANION GAP SERPL CALCULATED.3IONS-SCNC: 12 MMOL/L (ref 7–19)
BLOOD CULTURE, ROUTINE: ABNORMAL
BLOOD CULTURE, ROUTINE: ABNORMAL
BUN BLDV-MCNC: 30 MG/DL (ref 8–23)
CALCIUM SERPL-MCNC: 8.5 MG/DL (ref 8.8–10.2)
CHLORIDE BLD-SCNC: 109 MMOL/L (ref 98–111)
CO2: 20 MMOL/L (ref 22–29)
CREAT SERPL-MCNC: 1.5 MG/DL (ref 0.5–1.2)
CULTURE, BLOOD 2: ABNORMAL
CULTURE, BLOOD 2: ABNORMAL
GFR SERPL CREATININE-BSD FRML MDRD: 46 ML/MIN/{1.73_M2}
GLUCOSE BLD-MCNC: 101 MG/DL (ref 74–109)
GLUCOSE BLD-MCNC: 110 MG/DL (ref 70–99)
ORGANISM: ABNORMAL
ORGANISM: ABNORMAL
PERFORMED ON: ABNORMAL
POTASSIUM REFLEX MAGNESIUM: 3.8 MMOL/L (ref 3.5–5)
SODIUM BLD-SCNC: 141 MMOL/L (ref 136–145)
VANCOMYCIN TROUGH: 11.6 UG/ML (ref 10–20)

## 2023-01-28 PROCEDURE — 6370000000 HC RX 637 (ALT 250 FOR IP)

## 2023-01-28 PROCEDURE — 2580000003 HC RX 258: Performed by: INTERNAL MEDICINE

## 2023-01-28 PROCEDURE — 36415 COLL VENOUS BLD VENIPUNCTURE: CPT

## 2023-01-28 PROCEDURE — 82962 GLUCOSE BLOOD TEST: CPT

## 2023-01-28 PROCEDURE — 94760 N-INVAS EAR/PLS OXIMETRY 1: CPT

## 2023-01-28 PROCEDURE — 1210000000 HC MED SURG R&B

## 2023-01-28 PROCEDURE — 80202 ASSAY OF VANCOMYCIN: CPT

## 2023-01-28 PROCEDURE — 2580000003 HC RX 258

## 2023-01-28 PROCEDURE — 6370000000 HC RX 637 (ALT 250 FOR IP): Performed by: STUDENT IN AN ORGANIZED HEALTH CARE EDUCATION/TRAINING PROGRAM

## 2023-01-28 PROCEDURE — 51702 INSERT TEMP BLADDER CATH: CPT

## 2023-01-28 PROCEDURE — 6360000002 HC RX W HCPCS

## 2023-01-28 PROCEDURE — 80048 BASIC METABOLIC PNL TOTAL CA: CPT

## 2023-01-28 PROCEDURE — 51798 US URINE CAPACITY MEASURE: CPT

## 2023-01-28 PROCEDURE — 6370000000 HC RX 637 (ALT 250 FOR IP): Performed by: INTERNAL MEDICINE

## 2023-01-28 RX ORDER — ENOXAPARIN SODIUM 100 MG/ML
40 INJECTION SUBCUTANEOUS DAILY
Status: DISCONTINUED | OUTPATIENT
Start: 2023-01-28 | End: 2023-01-31

## 2023-01-28 RX ADMIN — SODIUM CHLORIDE, PRESERVATIVE FREE 10 ML: 5 INJECTION INTRAVENOUS at 09:04

## 2023-01-28 RX ADMIN — ALLOPURINOL 100 MG: 100 TABLET ORAL at 09:05

## 2023-01-28 RX ADMIN — CALCITRIOL CAPSULES 0.25 MCG 0.25 MCG: 0.25 CAPSULE ORAL at 09:05

## 2023-01-28 RX ADMIN — ENOXAPARIN SODIUM 40 MG: 100 INJECTION SUBCUTANEOUS at 17:10

## 2023-01-28 RX ADMIN — SODIUM CHLORIDE: 9 INJECTION, SOLUTION INTRAVENOUS at 20:19

## 2023-01-28 RX ADMIN — ATORVASTATIN CALCIUM 20 MG: 40 TABLET, FILM COATED ORAL at 09:04

## 2023-01-28 RX ADMIN — METOPROLOL SUCCINATE 50 MG: 50 TABLET, FILM COATED, EXTENDED RELEASE ORAL at 09:05

## 2023-01-28 RX ADMIN — SODIUM CHLORIDE: 9 INJECTION, SOLUTION INTRAVENOUS at 05:07

## 2023-01-28 RX ADMIN — MIRTAZAPINE 7.5 MG: 7.5 TABLET ORAL at 20:16

## 2023-01-28 RX ADMIN — DOCUSATE SODIUM 100 MG: 100 CAPSULE, LIQUID FILLED ORAL at 09:04

## 2023-01-28 RX ADMIN — TRAMADOL HYDROCHLORIDE 50 MG: 50 TABLET, COATED ORAL at 03:24

## 2023-01-28 RX ADMIN — PREGABALIN 100 MG: 50 CAPSULE ORAL at 09:04

## 2023-01-28 RX ADMIN — PREGABALIN 100 MG: 50 CAPSULE ORAL at 20:15

## 2023-01-28 RX ADMIN — PANTOPRAZOLE SODIUM 40 MG: 40 TABLET, DELAYED RELEASE ORAL at 05:08

## 2023-01-28 RX ADMIN — ONDANSETRON 4 MG: 2 INJECTION INTRAMUSCULAR; INTRAVENOUS at 03:23

## 2023-01-28 RX ADMIN — TAMSULOSIN HYDROCHLORIDE 0.4 MG: 0.4 CAPSULE ORAL at 09:04

## 2023-01-28 RX ADMIN — VANCOMYCIN HYDROCHLORIDE 1250 MG: 10 INJECTION, POWDER, LYOPHILIZED, FOR SOLUTION INTRAVENOUS at 05:08

## 2023-01-28 ASSESSMENT — PAIN DESCRIPTION - DESCRIPTORS: DESCRIPTORS: ACHING

## 2023-01-28 ASSESSMENT — ENCOUNTER SYMPTOMS
SHORTNESS OF BREATH: 0
VOMITING: 0
ABDOMINAL PAIN: 1
COUGH: 0
NAUSEA: 0

## 2023-01-28 ASSESSMENT — PAIN - FUNCTIONAL ASSESSMENT: PAIN_FUNCTIONAL_ASSESSMENT: PREVENTS OR INTERFERES SOME ACTIVE ACTIVITIES AND ADLS

## 2023-01-28 ASSESSMENT — PAIN SCALES - GENERAL
PAINLEVEL_OUTOF10: 7
PAINLEVEL_OUTOF10: 5

## 2023-01-28 ASSESSMENT — PAIN DESCRIPTION - LOCATION: LOCATION: BACK

## 2023-01-28 ASSESSMENT — PAIN DESCRIPTION - ORIENTATION: ORIENTATION: LEFT;RIGHT;LOWER

## 2023-01-28 NOTE — PROGRESS NOTES
Infectious Diseases Progress Note    Patient:  Juana Terrell  YOB: 1940  MRN: [id number]   Admit date: 1/23/2023   Admitting Physician: John Connolly MD  Primary Care Physician: Nery Naranjo MD    Chief Complaint/Interval History: He is uncomfortable today with low back and left flank area pain. He is without fever. Neuropathy pain in the feet which is bothering yesterday seems to be less of a concern today. He is without fever. He is hemodynamically stable. Oxygen saturation on room air remains in the mid 90s. Interval notes reviewed. In/Out    Intake/Output Summary (Last 24 hours) at 1/28/2023 0843  Last data filed at 1/28/2023 7148  Gross per 24 hour   Intake 8715 ml   Output 1722 ml   Net 6993 ml     Allergies:    Allergies   Allergen Reactions    Codeine Nausea Only     Current Meds: vancomycin (VANCOCIN) 1,250 mg in sodium chloride 0.9 % 250 mL IVPB, Q24H  enoxaparin (LOVENOX) injection 40 mg, Daily  calcitRIOL (ROCALTROL) capsule 0.25 mcg, Daily  mirtazapine (REMERON) tablet 7.5 mg, Nightly  vancomycin (VANCOCIN) intermittent dosing (placeholder), RX Placeholder  tamsulosin (FLOMAX) capsule 0.4 mg, Daily  sodium chloride flush 0.9 % injection 5-40 mL, 2 times per day  sodium chloride flush 0.9 % injection 5-40 mL, PRN  0.9 % sodium chloride infusion, PRN  ondansetron (ZOFRAN-ODT) disintegrating tablet 4 mg, Q8H PRN   Or  ondansetron (ZOFRAN) injection 4 mg, Q6H PRN  acetaminophen (TYLENOL) tablet 650 mg, Q6H PRN   Or  acetaminophen (TYLENOL) suppository 650 mg, Q6H PRN  polyethylene glycol (GLYCOLAX) packet 17 g, Daily PRN  0.9 % sodium chloride infusion, Continuous  traMADol (ULTRAM) tablet 50 mg, Q6H PRN  allopurinol (ZYLOPRIM) tablet 100 mg, Daily  atorvastatin (LIPITOR) tablet 20 mg, Daily  metoprolol succinate (TOPROL XL) extended release tablet 50 mg, Daily  pantoprazole (PROTONIX) tablet 40 mg, QAM AC  pregabalin (LYRICA) capsule 100 mg, BID  docusate sodium (COLACE) capsule 100 mg, Daily      Review of Systems no cardiopulmonary symptoms    VitalSigns:  BP (!) 161/80   Pulse 84   Temp 97.7 °F (36.5 °C)   Resp 18   Ht 6' (1.829 m)   Wt 195 lb 11.2 oz (88.8 kg)   SpO2 93%   BMI 26.54 kg/m²      Physical Exam  Line/IV site: No erythema, warmth, induration, or tenderness. There is some tenderness left lower abdomen and left flank area. Lower extremity strength intact  Heart without murmur  Skin without rash    Lab Results:  CBC:   Recent Labs     01/26/23 0139   WBC 7.7   HGB 11.7*        BMP:  Recent Labs     01/26/23 0139 01/27/23  0227 01/28/23  0254    139 141   K 4.6 3.9 3.8    109 109   CO2 22 18* 20*   BUN 46* 35* 30*   CREATININE 2.2* 1.4* 1.5*   GLUCOSE 91 98 101     CultureResults:  Blood cultures January 23, 2023-Staph epidermidis  Blood cultures January 23, 2023-Staph epidermidis  Urine cultures January 23, 2023-Staph epidermidis    Susceptibility testing from blood culture:  Susceptibility    Staphylococcus epidermidis (1)    Antibiotic Interpretation Microscan  Method Status    azithromycin Sensitive   BACTERIAL SUSCEPTIBILITY PANEL BY NGOC     benzylpenicillin Resistant 0.25 mcg/mL BACTERIAL SUSCEPTIBILITY PANEL BY NGOC     clindamycin Sensitive <=0.25 mcg/mL BACTERIAL SUSCEPTIBILITY PANEL BY NGOC     erythromycin Sensitive <=0.25 mcg/mL BACTERIAL SUSCEPTIBILITY PANEL BY NGOC     inducible clindamycin resistance  Neg mcg/mL BACTERIAL SUSCEPTIBILITY PANEL BY NGOC     oxacillin Sensitive <=0.25 mcg/mL BACTERIAL SUSCEPTIBILITY PANEL BY NGOC     tetracycline Intermediate 8 mcg/mL BACTERIAL SUSCEPTIBILITY PANEL BY NGOC     vancomycin Sensitive <=0.5 mcg/mL BACTERIAL SUSCEPTIBILITY PANEL BY NGOC         Radiology:   Renal ultrasound done January 24, 2023:    Impression   There is no evidence of hydronephrosis. Both kidneys demonstrate increased cortical echogenicity, suggestive of chronic   medical renal disease.      CT scan abdomen and pelvis January 23, 2023:    Impression   1. Portions of the mid small bowel suggest mild mucosal thickening. Exact etiology is uncertain. Mild enteritis is not excluded. No fluid-filled bowel loops. 2. Stool filled colon. 3. Simple cystic lesions of the liver and kidneys. 4. Nonobstructing stone within the left renal pelvis. 5. Focal coarse calcification in the left renal parenchyma. This can be from prior inflammatory infectious process. Recommendation: Follow up as clinically indicated. All CT scans at this facility utilize dose modulation, iterative reconstruction, and/or weight based dosing when appropriate to reduce radiation dose to as low as reasonably achievable. Dictated and Electronically Signed by Marc Dwyer MD, SA CERTIFIED at [id number] 07:03:41 PM EST          Additional Studies Reviewed:  None    Impression:  1. Urinary tract infection/pyelonephritis with Staph epidermidis  2. Staph epidermidis bloodstream infection-urinary source  3. BPH/urinary retention-has had a catheter placement  4.   Back and flank pain    Recommendations:  Continue treatment vancomycin  If no improvement in discomfort would repeat imaging to make sure no development of left urinary tract obstruction or renal abscess  If persistent symptoms and above evaluation negative may need MRI of lumbar sacral spine to look for discitis or paraspinal infection  Continue to follow    Almita Harding MD

## 2023-01-28 NOTE — PROGRESS NOTES
Renal Progress Note    Thank you to requesting provider: Dr Naila Coppola, for asking us to see Della Madrigal    Reason for consultation:  ALEXANDRO. Chief Complaint:  Left flank pain. History of Presenting Illness      Patient is a very pleasant 78 yo man with pmhx of HTN, pre diabetes, gout, OA, peripheral neuropathy. He was admitted on 1/23 for left flank and lateral abdominal pain. He was also having chills and was not feeling well. He denies recurrent UTI. His abdominal CT in ED revealed non-obstructing left kidney stone of 4-5 mm in size. He was also having positive UA. He was subsequently admitted and was started with IV fluids and antibiotics for acute pyelonephritis. His serum creat was 1.4 on admission (baseline 1.1). It ab to 2.1 and 2.3. Renal service is consulted to manage his ALEXANDRO. Patient denies any prior renal dysfunction. He noticed some drop in his urine output. He also denies NSAIDs abuse. He was found to be bacteriemic to Staph epidermidis form urine origin. Today, he offered no new complaints. He still likes to sleep most of his day. Has good urine output.     Past Medical/Surgical History      Active Ambulatory Problems     Diagnosis Date Noted    Mixed hyperlipidemia 08/16/2017    Essential hypertension 08/16/2017    Restless legs syndrome 08/16/2017    Gastroesophageal reflux disease without esophagitis 08/16/2017    Peripheral neuropathic pain 08/16/2017    Primary osteoarthritis involving multiple joints 08/16/2017    Gout 08/16/2017    Benign non-nodular prostatic hyperplasia with lower urinary tract symptoms 08/16/2017    Elevated PSA 08/16/2017    Idiopathic peripheral neuropathy 02/19/2018    Chronic idiopathic constipation 08/20/2018    Prediabetes 10/14/2022     Resolved Ambulatory Problems     Diagnosis Date Noted    No Resolved Ambulatory Problems     Past Medical History:   Diagnosis Date    Renal stone 1/23/2023         Review of Systems     Patient is sleepy and not wanting to wake up.        Medications        Current Facility-Administered Medications:     vancomycin (VANCOCIN) 1,250 mg in sodium chloride 0.9 % 250 mL IVPB, 1,250 mg, IntraVENous, Q24H, Saray Self, APRN - CNP, Stopped at 01/28/23 0715    enoxaparin (LOVENOX) injection 40 mg, 40 mg, SubCUTAneous, Daily, Saray Self, APRN - CNP    calcitRIOL (ROCALTROL) capsule 0.25 mcg, 0.25 mcg, Oral, Daily, Miroslava Cruz MD, 0.25 mcg at 01/28/23 5601    mirtazapine (REMERON) tablet 7.5 mg, 7.5 mg, Oral, Nightly, Maday Reyes MD, 7.5 mg at 01/27/23 2136    vancomycin (VANCOCIN) intermittent dosing (placeholder), , Other, RX Placeholder, Saray Self, APRN - CNP    tamsulosin [facility]) capsule 0.4 mg, 0.4 mg, Oral, Daily, Saray Self, APRN - CNP, 0.4 mg at 01/28/23 9767    sodium chloride flush 0.9 % injection 5-40 mL, 5-40 mL, IntraVENous, 2 times per day, Saray Self, APRN - CNP, 10 mL at 01/28/23 0904    sodium chloride flush 0.9 % injection 5-40 mL, 5-40 mL, IntraVENous, PRN, Saray Self, APRN - CNP    0.9 % sodium chloride infusion, , IntraVENous, PRN, Saray Self, APRN - CNP    ondansetron (ZOFRAN-ODT) disintegrating tablet 4 mg, 4 mg, Oral, Q8H PRN **OR** ondansetron (ZOFRAN) injection 4 mg, 4 mg, IntraVENous, Q6H PRN, Saray Self, APRN - CNP, 4 mg at 01/28/23 4292    acetaminophen (TYLENOL) tablet 650 mg, 650 mg, Oral, Q6H PRN **OR** acetaminophen (TYLENOL) suppository 650 mg, 650 mg, Rectal, Q6H PRN, Saray Self, APRN - CNP    polyethylene glycol (GLYCOLAX) packet 17 g, 17 g, Oral, Daily PRN, Saray Self, APRN - CNP, 17 g at 01/25/23 1422    0.9 % sodium chloride infusion, , IntraVENous, Continuous, Miroslava Cruz MD, Last Rate: 75 mL/hr at 01/28/23 0507, New Bag at 01/28/23 0507    traMADol (ULTRAM) tablet 50 mg, 50 mg, Oral, Q6H PRN, Maday Reyes MD, 50 mg at 01/28/23 0201    allopurinol (ZYLOPRIM) tablet 100 mg, 100 mg, Oral, Daily, GOLD Orellana - CNP, 100 mg at 01/28/23 7045 atorvastatin (LIPITOR) tablet 20 mg, 20 mg, Oral, Daily, Sherl Pretty, APRN - CNP, 20 mg at 23 3246    metoprolol succinate (TOPROL XL) extended release tablet 50 mg, 50 mg, Oral, Daily, Sherl Pretty, APRN - CNP, 50 mg at 23 0905    pantoprazole (PROTONIX) tablet 40 mg, 40 mg, Oral, QAM AC, Sherl Pretty, APRN - CNP, 40 mg at 23 0508    pregabalin (LYRICA) capsule 100 mg, 100 mg, Oral, BID, Sherl Pretty, APRN - CNP, 100 mg at 23 6796    docusate sodium (COLACE) capsule 100 mg, 100 mg, Oral, Daily, Sherl Pretty, APRN - CNP, 100 mg at 23 5332  No outpatient medications have been marked as taking for the 23 encounter [facility] Encounter). Allergies   Codeine    Family History       Family History   Problem Relation Age of Onset    Uterine Cancer Mother     Coronary Art Dis Father         MI at 62    COPD Sister     Stroke Brother      Family history negative for kidney disease. Social History      Social History     Socioeconomic History    Marital status:       Spouse name: Adonis Pimentel    Number of children: 2    Years of education: 12    Highest education level: None   Occupational History     Employer: RETIRED   Tobacco Use    Smoking status: Former     Packs/day: 3.00     Years: 30.00     Pack years: 90.00     Types: Cigarettes     Quit date: 1975     Years since quittin.1    Smokeless tobacco: Never   Substance and Sexual Activity    Alcohol use: No    Drug use: No    Sexual activity: Yes     Partners: Female     Comment: wife     Social Determinants of Health     Financial Resource Strain: Low Risk     Difficulty of Paying Living Expenses: Not hard at all   Food Insecurity: No Food Insecurity    Worried About Running Out of Food in the Last Year: Never true    [address] in the Last Year: Never true   Physical Activity: Unknown    Days of Exercise per Week: 0 days   Intimate Partner Violence: Unknown    Fear of Current or Ex-Partner: Patient refused    Emotionally Abused: Patient refused    Physically Abused: Patient refused    Sexually Abused: Patient refused       Physical Exam     Blood pressure (!) 161/80, pulse 84, temperature 97.7 °F (36.5 °C), resp. rate 18, height 6' (1.829 m), weight 195 lb 11.2 oz (88.8 kg), SpO2 93 %. General:  Sleepy, normal body habitus  HEENT:  Atraumatic, normocephalic  Neck:  no masses, no JVD  Chest:  CTAB, good respiratory effort, good air movement  CV:  RRR, no murmurs   Abdomen: soft, no guarding, +BS, no hepatosplenomegaly  Extremities:  No peripheral edema  Neurological:  no  motor deficits    Data     Recent Labs     01/26/23 0139   WBC 7.7   HGB 11.7*   HCT 39.5*   MCV 91.4          Recent Labs     01/26/23  0139 01/27/23  0227 01/28/23  0254    139 141   K 4.6 3.9 3.8    109 109   CO2 22 18* 20*   GLUCOSE 91 98 101   BUN 46* 35* 30*   CREATININE 2.2* 1.4* 1.5*   LABGLOM 29* 50* 46*         Assessment:  ALEXANDRO- due to ATN  Acute pyelonephritis to staph epidermidis  HTN  Pre- type DM  Left kidney stone  Sec HPTH  Bacteremia to Staph epidermidis  Dysuria/ retention  Hematuria      Plan:  Continue gentle hydration, also continue Vanco per ID. Follow up labs and Vanco trough levels. Continue oral calcitriol.  May consider voiding trial.

## 2023-01-28 NOTE — PROGRESS NOTES
Daily Progress Note    Date:2023  Patient: Heather Bennett  : 1940  ZZA:[id number]  CODE:Full Code No additional code details  PCP:Bety Cuevas MD    Admit Date: 2023  3:25 PM   LOS: 5 days     Chief Complaint   Patient presents with    Abdominal Pain    Flank Pain     Pt states that he began having flank pain and LQ abdominal pain at 1000 this morning. Pt states that he has not had an episode like this in the past. Pt states he has had nausea, and has had consistent Bms. Pt denies diarrhea. ABCs are intact. Skin wpd. A&ox4. Subjective: Had voiding trial yesterday. Was unable to urinate, bladder scan showed around 300 and he was straight cathed with 400 out, grove replaced. Is again complaining of L back/flank pain today. This resolved in the past with grove placement. No fevers or chills. Hospital Summary:  78 yo M with HTN, GERD, HLD, BPH s/p TURP who presented to [facility] ED with abdominal pain. CT abdomen in ED showed stool-filled colon, nonobstructing stone within L renal pelvis. Admitted to hospitalist service for UTI/Pyelonephritis. Nephrology consulted due to ALEXANDRO. Blood cultures returned 2/2 + for gram + cocci, PCR showing Staph Epi. ID consulted. ALEXANDRO improved w/ IVF and grove catheter placement. Pt's L flank pain improved after grove was placed. Underwent unsuccessful voiding trial on , grove was replaced. He endorses L flank pain again on . Plan to reimage L kidney if symptoms don't improve. May need MRI L spine if persistent symptoms with normal kidney imaging. Review of Systems   Constitutional:  Negative for chills and fever. Respiratory:  Negative for cough and shortness of breath. Cardiovascular:  Negative for chest pain and palpitations. Gastrointestinal:  Positive for abdominal pain. Negative for nausea and vomiting.      Objective:      Vital signs in last 24 hours:  Patient Vitals for the past 24 hrs:   BP Temp Temp src Pulse Resp SpO2   01/28/23 1137 (!) 161/78 97.5 °F (36.4 °C) Oral 59 16 95 %   01/28/23 0557 (!) 161/80 97.7 °F (36.5 °C) -- 84 18 93 %   01/28/23 0354 -- -- -- -- 16 --   01/28/23 0324 -- -- -- -- 16 --   01/28/23 0014 (!) 140/58 97.3 °F (36.3 °C) Oral 59 16 95 %   01/27/23 1318 -- -- -- 72 18 93 %         I/O last 3 completed shifts: In: 3424 [P.O.:885; I.V.:7830]  Out: 2096 [Urine:3972]  No intake/output data recorded. Physical Exam  Constitutional:       General: He is not in acute distress. Appearance: Normal appearance. He is not toxic-appearing. Cardiovascular:      Rate and Rhythm: Normal rate and regular rhythm. Pulses: Normal pulses. Heart sounds: Normal heart sounds. Pulmonary:      Effort: Pulmonary effort is normal. No respiratory distress. Breath sounds: Normal breath sounds. Abdominal:      General: Bowel sounds are normal. There is no distension. Palpations: Abdomen is soft. Tenderness: There is abdominal tenderness (L flank and LLQ).            Lab Review   Recent Results (from the past 24 hour(s))   Vancomycin Level, Trough    Collection Time: 01/28/23  2:54 AM   Result Value Ref Range    Vancomycin Tr 11.6 10.0 - 20.0 ug/mL   Basic Metabolic Panel w/ Reflex to MG    Collection Time: 01/28/23  2:54 AM   Result Value Ref Range    Sodium 141 136 - 145 mmol/L    Potassium reflex Magnesium 3.8 3.5 - 5.0 mmol/L    Chloride 109 98 - 111 mmol/L    CO2 20 (L) 22 - 29 mmol/L    Anion Gap 12 7 - 19 mmol/L    Glucose 101 74 - 109 mg/dL    BUN 30 (H) 8 - 23 mg/dL    Creatinine 1.5 (H) 0.5 - 1.2 mg/dL    Est, Glom Filt Rate 46 (A) >60    Calcium 8.5 (L) 8.8 - 10.2 mg/dL             Current Facility-Administered Medications:     vancomycin (VANCOCIN) 1,250 mg in sodium chloride 0.9 % 250 mL IVPB, 1,250 mg, IntraVENous, Q24H, GOLD Fox CNP, Stopped at 01/28/23 0715    enoxaparin (LOVENOX) injection 40 mg, 40 mg, SubCUTAneous, Daily, GOLD Fox CNP    calcitRIOL (ROCALTROL) capsule 0.25 mcg, 0.25 mcg, Oral, Daily, Dorinda Moore MD, 0.25 mcg at 01/28/23 0905    mirtazapine (REMERON) tablet 7.5 mg, 7.5 mg, Oral, Nightly, William Vieira MD, 7.5 mg at 01/27/23 2136    vancomycin (VANCOCIN) intermittent dosing (placeholder), , Other, RX Placeholder, Balaji Winter APRN - CNP    tamsulosin [facility]) capsule 0.4 mg, 0.4 mg, Oral, Daily, Balaji Winter APRN - CNP, 0.4 mg at 01/28/23 5238    sodium chloride flush 0.9 % injection 5-40 mL, 5-40 mL, IntraVENous, 2 times per day, Balaji Winter APRN - CNP, 10 mL at 01/28/23 0904    sodium chloride flush 0.9 % injection 5-40 mL, 5-40 mL, IntraVENous, PRN, Balaji Winter APRN - CNP    0.9 % sodium chloride infusion, , IntraVENous, PRN, Balaji Winter APRN - CNP    ondansetron (ZOFRAN-ODT) disintegrating tablet 4 mg, 4 mg, Oral, Q8H PRN **OR** ondansetron (ZOFRAN) injection 4 mg, 4 mg, IntraVENous, Q6H PRN, Balaji Winter, APRN - CNP, 4 mg at 01/28/23 2505    acetaminophen (TYLENOL) tablet 650 mg, 650 mg, Oral, Q6H PRN **OR** acetaminophen (TYLENOL) suppository 650 mg, 650 mg, Rectal, Q6H PRN, Balaji Winter, APRN - CNP    polyethylene glycol (GLYCOLAX) packet 17 g, 17 g, Oral, Daily PRN, Balaji Winter APRN - CNP, 17 g at 01/25/23 1422    0.9 % sodium chloride infusion, , IntraVENous, Continuous, Dorinda Moore MD, Last Rate: 75 mL/hr at 01/28/23 0507, New Bag at 01/28/23 0507    traMADol (ULTRAM) tablet 50 mg, 50 mg, Oral, Q6H PRN, William Vieira MD, 50 mg at 01/28/23 0324    allopurinol (ZYLOPRIM) tablet 100 mg, 100 mg, Oral, Daily, GOLD Ribeiro CNP, 100 mg at 01/28/23 0905    atorvastatin (LIPITOR) tablet 20 mg, 20 mg, Oral, Daily, GOLD Ribeiro CNP, 20 mg at 01/28/23 9545    metoprolol succinate (TOPROL XL) extended release tablet 50 mg, 50 mg, Oral, Daily, GOLD Ribeiro CNP, 50 mg at 01/28/23 0905    pantoprazole (PROTONIX) tablet 40 mg, 40 mg, Oral, JOHNATHAN HDEZ, GOLD Ribeiro CNP, 40 mg at 01/28/23 0508    pregabalin (LYRICA) capsule 100 mg, 100 mg, Oral, BID, Lucy HaleyGOLD - CNP, 100 mg at 01/28/23 2091    docusate sodium (COLACE) capsule 100 mg, 100 mg, Oral, Daily, Lucy HaleyGOLD - CNP, 100 mg at 01/28/23 0751      Imaging:  CT ABDOMEN PELVIS W IV CONTRAST Additional Contrast? None    Result Date: 1/23/2023  1. Portions of the mid small bowel suggest mild mucosal thickening. Exact etiology is uncertain. Mild enteritis is not excluded. No fluid-filled bowel loops. 2. Stool filled colon. 3. Simple cystic lesions of the liver and kidneys. 4. Nonobstructing stone within the left renal pelvis. 5. Focal coarse calcification in the left renal parenchyma. This can be from prior inflammatory infectious process. Recommendation: Follow up as clinically indicated. All CT scans at this facility utilize dose modulation, iterative reconstruction, and/or weight based dosing when appropriate to reduce radiation dose to as low as reasonably achievable. Dictated and Electronically Signed by Aurora Mo MD, SA CERTIFIED at [id number] 07:03:41 PM EST                  Assessment/Plan  Principal Problem:    Pyelonephritis  Active Problems:    Renal stone  Resolved Problems:    * No resolved hospital problems. *       I have reviewed the patient's daily labs, including BMP and CBC with pertinent results discussed below.     L flank/back pain  -- Improved w/ UTI treatment and grove placed previously  -- Recurred this AM after voiding trial yesterday which required grove to be replaced  -- If symptoms do not improve today, would repeat L kidney US   -- If repeat L kidney US is normal, would obtain MRI L spine to rule out abscess/OM/discitis    Staph Epidermidis bacteremia  Pyelonephritis  -- SIRS 1/4 on admission for WBC 13.6  -- UA w/ 21-30 WBCs, 6-10 RBCs, positive nitrite, mod LE  -- CT abdomen w/ nonobstructing stone in L renal pelvis, coarse calcification in L renal parenchyma  -- Renal US negative for hydronephrosis  -- 2/2 blood cultures + for staph epi on 1/23, sensitivity still pending  -- Repeat blood cultures drawn 1/25, NGTD  -- Continue Vancomycin, dc ceftriaxone  -- Urine culture growing staph epi, sensitivities reviewed  -- ID consulted, appreciate recommendations, note reviewed    ALXEANDRO  -- Cr plateau at 2.2  -- nonobstructing stone on CT abdomen  -- Renal US negative for hydronephrosis  -- Grove placed 1/25 due to retention  -- Continue /hr  -- Rondon Chetek for K 5.2 > 4.8  -- Consulted nephrology, appreciate recommendations, note reviewed      Nephrolithiasis  BPH with obstruction  -- Nonobstructing on CT abdomen  -- Renal US negative for hydronephrosis 1/25  -- Continue Flomax  -- Considered starting Finasteride, but he has hx of elevated PSA so will defer to outpatient urologist  -- Grove placed 1/25  -- Voiding trial unsuccessful on 1/27, grove replaced  -- Will need outpatient urology follow up    Insomnia  -- Melatonin 10 mg QHS  -- Start Mirtazapine 7.5 mg QHS      DVT prophylaxis: Lovenox    DISPOSITION:  SNF placement, has a bed. May require PICC placement for IV ABX pending ID recommendations.      Full Code No additional code details        Denise Swanson MD 1/28/2023 12:41 PM

## 2023-01-28 NOTE — PROGRESS NOTES
Recent Labs     01/27/23 0227 01/28/23  0254   BUN 35* 30*       Recent Labs     01/27/23 0227 01/28/23  0254   CREATININE 1.4* 1.5*       Estimated Creatinine Clearance: 41 mL/min (A) (based on SCr of 1.5 mg/dL (H)).       Plan: Changed Lovenox back to 40mg Daily due to patients renal function improving    Electronically signed by Tadeo Hussein, [address] [place] [address] on 1/28/2023 at 5:21 AM

## 2023-01-28 NOTE — PLAN OF CARE
Problem: Discharge Planning  Goal: Discharge to home or other facility with appropriate resources  1/28/2023 0247 by Gisel Looney RN  Outcome: Progressing  Flowsheets (Taken 1/27/2023 2139)  Discharge to home or other facility with appropriate resources: Identify barriers to discharge with patient and caregiver  1/27/2023 1614 by Rochelle Shi RN  Outcome: Progressing  Flowsheets (Taken 1/27/2023 0803)  Discharge to home or other facility with appropriate resources: Identify barriers to discharge with patient and caregiver     Problem: Pain  Goal: Verbalizes/displays adequate comfort level or baseline comfort level  1/28/2023 0247 by Gisel Looney RN  Outcome: Progressing  1/27/2023 1614 by Rochelle Shi RN  Outcome: Progressing     Problem: Safety - Adult  Goal: Free from fall injury  1/28/2023 0247 by Gisel Looney RN  Outcome: Progressing  1/27/2023 1614 by Rochelle Shi RN  Outcome: Progressing     Problem: Skin/Tissue Integrity  Goal: Absence of new skin breakdown  Description: 1. Monitor for areas of redness and/or skin breakdown  2. Assess vascular access sites hourly  3. Every 4-6 hours minimum:  Change oxygen saturation probe site  4. Every 4-6 hours:  If on nasal continuous positive airway pressure, respiratory therapy assess nares and determine need for appliance change or resting period.   1/28/2023 0247 by Gisel Looney RN  Outcome: Progressing  1/27/2023 1614 by Rochelle Shi RN  Outcome: Progressing     Problem: Nutrition Deficit:  Goal: Optimize nutritional status  1/28/2023 0247 by Gisel Looney RN  Outcome: Progressing  1/27/2023 1614 by Rochelle Shi RN  Outcome: Progressing

## 2023-01-28 NOTE — PROGRESS NOTES
[address] [facility] Pharmacokinetic Monitoring Service - Vancomycin    Consulting Provider: Jarvis Drake   Indication: Bloodstream Infection  Target Concentration: Dosing based on anticipated concentration <15 mg/L due to renal impairment/insufficiency  Day of Therapy: 5  Additional Antimicrobials: None    Pertinent Laboratory Values: Wt Readings from Last 1 Encounters:   01/23/23 195 lb 11.2 oz (88.8 kg)     Temp Readings from Last 1 Encounters:   01/28/23 97.3 °F (36.3 °C) (Oral)     Estimated Creatinine Clearance: 41 mL/min (A) (based on SCr of 1.5 mg/dL (H)). Recent Labs     01/26/23  0139 01/27/23  0227 01/28/23  0254   CREATININE 2.2* 1.4* 1.5*   WBC 7.7  --   --      Procalcitonin: N/A    Pertinent Cultures:  Culture Date Source Results   01/23/23 Blood Gram+ cocci  Staph epidermidis   01/23/23 Urine Gram+ cocci   MRSA Nasal Swab: N/A. Non-respiratory infection.     Recent vancomycin administrations                     vancomycin (VANCOCIN) 1000 mg in sodium chloride 0.9% 250 mL IVPB (mg) 1,000 mg New Bag 01/27/23 0514    vancomycin (VANCOCIN) 1000 mg in sodium chloride 0.9% 250 mL IVPB (mg) 1,000 mg New Bag 01/26/23 0442                    Assessment:  Date/Time Current Dose Concentration Timing of Concentration (h) AUC   01/28/23 Pule dosing 11.6 Random level     Note: Serum concentrations collected for AUC dosing may appear elevated if collected in close proximity to the dose administered, this is not necessarily an indication of toxicity    Plan:  Current dosing regimen is therapeutic  Start Vancomycin 1250mg Q24hr  Repeat vancomycin concentration ordered for 01/30/23 @ 0400   Pharmacy will continue to monitor patient and adjust therapy as indicated    Thank you for the consult,  Davi Bales [facility]  1/28/2023 4:45 AM

## 2023-01-29 LAB
ANION GAP SERPL CALCULATED.3IONS-SCNC: 15 MMOL/L (ref 7–19)
BACTERIA: ABNORMAL /HPF
BILIRUBIN URINE: NEGATIVE
BLOOD, URINE: ABNORMAL
BUN BLDV-MCNC: 29 MG/DL (ref 8–23)
CALCIUM SERPL-MCNC: 8.3 MG/DL (ref 8.8–10.2)
CHLORIDE BLD-SCNC: 107 MMOL/L (ref 98–111)
CLARITY: ABNORMAL
CO2: 19 MMOL/L (ref 22–29)
COLOR: YELLOW
CREAT SERPL-MCNC: 1.7 MG/DL (ref 0.5–1.2)
EPITHELIAL CELLS, UA: ABNORMAL /HPF
GFR SERPL CREATININE-BSD FRML MDRD: 39 ML/MIN/{1.73_M2}
GLUCOSE BLD-MCNC: 96 MG/DL (ref 74–109)
GLUCOSE URINE: NEGATIVE MG/DL
KETONES, URINE: ABNORMAL MG/DL
LEUKOCYTE ESTERASE, URINE: ABNORMAL
NITRITE, URINE: NEGATIVE
PH UA: 5.5 (ref 5–8)
POTASSIUM REFLEX MAGNESIUM: 3.8 MMOL/L (ref 3.5–5)
PROTEIN UA: 30 MG/DL
RBC UA: ABNORMAL /HPF (ref 0–2)
SODIUM BLD-SCNC: 141 MMOL/L (ref 136–145)
SPECIFIC GRAVITY UA: 1.02 (ref 1–1.03)
UROBILINOGEN, URINE: 0.2 E.U./DL
WBC UA: ABNORMAL /HPF (ref 0–5)

## 2023-01-29 PROCEDURE — 6370000000 HC RX 637 (ALT 250 FOR IP): Performed by: INTERNAL MEDICINE

## 2023-01-29 PROCEDURE — 6370000000 HC RX 637 (ALT 250 FOR IP)

## 2023-01-29 PROCEDURE — 51702 INSERT TEMP BLADDER CATH: CPT

## 2023-01-29 PROCEDURE — 80048 BASIC METABOLIC PNL TOTAL CA: CPT

## 2023-01-29 PROCEDURE — 2580000003 HC RX 258

## 2023-01-29 PROCEDURE — 6370000000 HC RX 637 (ALT 250 FOR IP): Performed by: STUDENT IN AN ORGANIZED HEALTH CARE EDUCATION/TRAINING PROGRAM

## 2023-01-29 PROCEDURE — 36415 COLL VENOUS BLD VENIPUNCTURE: CPT

## 2023-01-29 PROCEDURE — 6360000002 HC RX W HCPCS: Performed by: INTERNAL MEDICINE

## 2023-01-29 PROCEDURE — 6360000002 HC RX W HCPCS

## 2023-01-29 PROCEDURE — 94760 N-INVAS EAR/PLS OXIMETRY 1: CPT

## 2023-01-29 PROCEDURE — 2580000003 HC RX 258: Performed by: INTERNAL MEDICINE

## 2023-01-29 PROCEDURE — 81001 URINALYSIS AUTO W/SCOPE: CPT

## 2023-01-29 PROCEDURE — 1210000000 HC MED SURG R&B

## 2023-01-29 RX ORDER — POLYETHYLENE GLYCOL 3350 17 G/17G
17 POWDER, FOR SOLUTION ORAL 2 TIMES DAILY
Status: DISCONTINUED | OUTPATIENT
Start: 2023-01-29 | End: 2023-02-05 | Stop reason: HOSPADM

## 2023-01-29 RX ORDER — SODIUM BICARBONATE 650 MG/1
650 TABLET ORAL 2 TIMES DAILY
Status: DISCONTINUED | OUTPATIENT
Start: 2023-01-29 | End: 2023-02-05 | Stop reason: HOSPADM

## 2023-01-29 RX ORDER — METOCLOPRAMIDE HYDROCHLORIDE 5 MG/ML
10 INJECTION INTRAMUSCULAR; INTRAVENOUS EVERY 6 HOURS
Status: COMPLETED | OUTPATIENT
Start: 2023-01-29 | End: 2023-01-30

## 2023-01-29 RX ORDER — SIMETHICONE 80 MG
160 TABLET,CHEWABLE ORAL 4 TIMES DAILY
Status: DISCONTINUED | OUTPATIENT
Start: 2023-01-29 | End: 2023-01-30

## 2023-01-29 RX ORDER — NIFEDIPINE 30 MG/1
60 TABLET, EXTENDED RELEASE ORAL DAILY
Status: DISCONTINUED | OUTPATIENT
Start: 2023-01-29 | End: 2023-02-05 | Stop reason: HOSPADM

## 2023-01-29 RX ADMIN — ATORVASTATIN CALCIUM 20 MG: 40 TABLET, FILM COATED ORAL at 08:44

## 2023-01-29 RX ADMIN — VANCOMYCIN HYDROCHLORIDE 1250 MG: 10 INJECTION, POWDER, LYOPHILIZED, FOR SOLUTION INTRAVENOUS at 05:09

## 2023-01-29 RX ADMIN — MIRTAZAPINE 7.5 MG: 7.5 TABLET ORAL at 21:29

## 2023-01-29 RX ADMIN — TAMSULOSIN HYDROCHLORIDE 0.4 MG: 0.4 CAPSULE ORAL at 08:44

## 2023-01-29 RX ADMIN — POLYETHYLENE GLYCOL 3350 17 G: 17 POWDER, FOR SOLUTION ORAL at 21:29

## 2023-01-29 RX ADMIN — SODIUM CHLORIDE, PRESERVATIVE FREE 10 ML: 5 INJECTION INTRAVENOUS at 21:32

## 2023-01-29 RX ADMIN — METOCLOPRAMIDE 10 MG: 5 INJECTION, SOLUTION INTRAMUSCULAR; INTRAVENOUS at 17:03

## 2023-01-29 RX ADMIN — PANTOPRAZOLE SODIUM 40 MG: 40 TABLET, DELAYED RELEASE ORAL at 05:09

## 2023-01-29 RX ADMIN — PREGABALIN 100 MG: 50 CAPSULE ORAL at 08:45

## 2023-01-29 RX ADMIN — PREGABALIN 100 MG: 50 CAPSULE ORAL at 21:29

## 2023-01-29 RX ADMIN — TRAMADOL HYDROCHLORIDE 50 MG: 50 TABLET, COATED ORAL at 02:16

## 2023-01-29 RX ADMIN — SIMETHICONE 160 MG: 80 TABLET, CHEWABLE ORAL at 21:29

## 2023-01-29 RX ADMIN — CALCITRIOL CAPSULES 0.25 MCG 0.25 MCG: 0.25 CAPSULE ORAL at 08:45

## 2023-01-29 RX ADMIN — ENOXAPARIN SODIUM 40 MG: 100 INJECTION SUBCUTANEOUS at 17:02

## 2023-01-29 RX ADMIN — METOPROLOL SUCCINATE 50 MG: 50 TABLET, FILM COATED, EXTENDED RELEASE ORAL at 08:45

## 2023-01-29 RX ADMIN — METOCLOPRAMIDE 10 MG: 5 INJECTION, SOLUTION INTRAMUSCULAR; INTRAVENOUS at 12:19

## 2023-01-29 RX ADMIN — SODIUM CHLORIDE: 9 INJECTION, SOLUTION INTRAVENOUS at 11:47

## 2023-01-29 RX ADMIN — SODIUM BICARBONATE 650 MG: 650 TABLET ORAL at 13:10

## 2023-01-29 RX ADMIN — SODIUM BICARBONATE 650 MG: 650 TABLET ORAL at 21:29

## 2023-01-29 RX ADMIN — NIFEDIPINE 60 MG: 30 TABLET, EXTENDED RELEASE ORAL at 08:50

## 2023-01-29 RX ADMIN — SODIUM CHLORIDE, PRESERVATIVE FREE 10 ML: 5 INJECTION INTRAVENOUS at 08:45

## 2023-01-29 RX ADMIN — SIMETHICONE 160 MG: 80 TABLET, CHEWABLE ORAL at 17:03

## 2023-01-29 RX ADMIN — DOCUSATE SODIUM 100 MG: 100 CAPSULE, LIQUID FILLED ORAL at 08:45

## 2023-01-29 RX ADMIN — ALLOPURINOL 100 MG: 100 TABLET ORAL at 08:44

## 2023-01-29 RX ADMIN — SIMETHICONE 160 MG: 80 TABLET, CHEWABLE ORAL at 13:10

## 2023-01-29 ASSESSMENT — ENCOUNTER SYMPTOMS
SHORTNESS OF BREATH: 0
COUGH: 0
VOMITING: 0
ABDOMINAL PAIN: 1
NAUSEA: 0

## 2023-01-29 ASSESSMENT — PAIN DESCRIPTION - DESCRIPTORS: DESCRIPTORS: ACHING

## 2023-01-29 ASSESSMENT — PAIN DESCRIPTION - LOCATION
LOCATION: FLANK
LOCATION: FLANK

## 2023-01-29 ASSESSMENT — PAIN DESCRIPTION - ORIENTATION: ORIENTATION: LEFT

## 2023-01-29 ASSESSMENT — PAIN SCALES - GENERAL
PAINLEVEL_OUTOF10: 6
PAINLEVEL_OUTOF10: 4

## 2023-01-29 NOTE — PROGRESS NOTES
Infectious Diseases Progress Note    Patient:  Teri Ulrich  YOB: 1940  MRN: [id number]   Admit date: 1/23/2023   Admitting Physician: Khushboo Catalan MD  Primary Care Physician: Royal Aschoff, MD    Chief Complaint/Interval History: He seems to be feeling some better. Low back and flank pain seem to be better. Not completely resolved but improved. He is without fever. No cardiopulmonary symptoms. In/Out    Intake/Output Summary (Last 24 hours) at 1/29/2023 1618  Last data filed at 1/29/2023 1430  Gross per 24 hour   Intake 932 ml   Output 1500 ml   Net -568 ml     Allergies:    Allergies   Allergen Reactions    Codeine Nausea Only     Current Meds: NIFEdipine (PROCARDIA XL) extended release tablet 60 mg, Daily  simethicone (MYLICON) chewable tablet 160 mg, 4x Daily  polyethylene glycol (GLYCOLAX) packet 17 g, BID  metoclopramide (REGLAN) injection 10 mg, Q6H  sodium bicarbonate tablet 650 mg, BID  vancomycin (VANCOCIN) 1,250 mg in sodium chloride 0.9 % 250 mL IVPB, Q24H  enoxaparin (LOVENOX) injection 40 mg, Daily  calcitRIOL (ROCALTROL) capsule 0.25 mcg, Daily  mirtazapine (REMERON) tablet 7.5 mg, Nightly  vancomycin (VANCOCIN) intermittent dosing (placeholder), RX Placeholder  tamsulosin (FLOMAX) capsule 0.4 mg, Daily  sodium chloride flush 0.9 % injection 5-40 mL, 2 times per day  sodium chloride flush 0.9 % injection 5-40 mL, PRN  0.9 % sodium chloride infusion, PRN  ondansetron (ZOFRAN-ODT) disintegrating tablet 4 mg, Q8H PRN   Or  ondansetron (ZOFRAN) injection 4 mg, Q6H PRN  acetaminophen (TYLENOL) tablet 650 mg, Q6H PRN   Or  acetaminophen (TYLENOL) suppository 650 mg, Q6H PRN  0.9 % sodium chloride infusion, Continuous  traMADol (ULTRAM) tablet 50 mg, Q6H PRN  allopurinol (ZYLOPRIM) tablet 100 mg, Daily  atorvastatin (LIPITOR) tablet 20 mg, Daily  metoprolol succinate (TOPROL XL) extended release tablet 50 mg, Daily  pantoprazole (PROTONIX) tablet 40 mg, QAM AC  pregabalin (LYRICA) capsule 100 mg, BID  docusate sodium (COLACE) capsule 100 mg, Daily      Review of Systems see HPI    VitalSigns:  /72   Pulse 90   Temp 97.9 °F (36.6 °C) (Oral)   Resp 18   Ht 6' (1.829 m)   Wt 195 lb 11.2 oz (88.8 kg)   SpO2 92%   BMI 26.54 kg/m²      Physical Exam  Line/IV site: No erythema, warmth, induration, or tenderness. Abdomen is soft and nontender  No suprapubic or flank tenderness    Lab Results:  CBC: No results for input(s): WBC, HGB, PLT in the last 72 hours. BMP:  Recent Labs     01/27/23  0227 01/28/23  0254 01/29/23  0246    141 141   K 3.9 3.8 3.8    109 107   CO2 18* 20* 19*   BUN 35* 30* 29*   CREATININE 1.4* 1.5* 1.7*   GLUCOSE 98 101 96     CultureResults:    Susceptibility    Staphylococcus epidermidis (1)    Antibiotic Interpretation Microscan  Method Status    azithromycin Sensitive   BACTERIAL SUSCEPTIBILITY PANEL BY NGOC     benzylpenicillin Resistant 0.25 mcg/mL BACTERIAL SUSCEPTIBILITY PANEL BY NGOC     clindamycin Sensitive <=0.25 mcg/mL BACTERIAL SUSCEPTIBILITY PANEL BY NGOC     erythromycin Sensitive <=0.25 mcg/mL BACTERIAL SUSCEPTIBILITY PANEL BY NGOC     inducible clindamycin resistance  Neg mcg/mL BACTERIAL SUSCEPTIBILITY PANEL BY NGOC     oxacillin Sensitive <=0.25 mcg/mL BACTERIAL SUSCEPTIBILITY PANEL BY NGOC     tetracycline Intermediate 8 mcg/mL BACTERIAL SUSCEPTIBILITY PANEL BY NGOC     vancomycin Sensitive <=0.5 mcg/mL BACTERIAL SUSCEPTIBILITY PANEL BY NGOC       Radiology: None    Additional Studies Reviewed:  None    Impression:  1. Urinary tract infection/pyelonephritis with staph epidermis  2. Staph epidermis bloodstream infection-urinary source  3. BPH/urinary retention  4.   Back and flank pain-improving    Recommendations:  Continue vancomycin  Would recommend 2 weeks of vancomycin from initiation  He indicates he had lived at assisted living prior to hospitalization-he may benefit from temporary nursing home placement to complete antibiotic treatment and receive additional physical therapy.   Okay with me for PICC or midline placement to complete antibiotic treatment    Barney Watkins MD

## 2023-01-29 NOTE — PROGRESS NOTES
Physical Therapy  Name: Heather Bennett  MRN:  [id number]  Date of service:  1/29/2023 01/29/23 0943   Subjective   Subjective Attempt: Pt sitting upright in bed, unwilling to work with therapy at this time.          Electronically signed by Lenore Valles PTA on 1/29/2023 at 9:43 AM

## 2023-01-29 NOTE — PLAN OF CARE
Problem: Discharge Planning  Goal: Discharge to home or other facility with appropriate resources  Outcome: Progressing  Flowsheets (Taken 1/29/2023 0840)  Discharge to home or other facility with appropriate resources: Identify barriers to discharge with patient and caregiver     Problem: Pain  Goal: Verbalizes/displays adequate comfort level or baseline comfort level  Outcome: Progressing     Problem: Safety - Adult  Goal: Free from fall injury  Outcome: Progressing     Problem: Skin/Tissue Integrity  Goal: Absence of new skin breakdown  Description: 1. Monitor for areas of redness and/or skin breakdown  2. Assess vascular access sites hourly  3. Every 4-6 hours minimum:  Change oxygen saturation probe site  4. Every 4-6 hours:  If on nasal continuous positive airway pressure, respiratory therapy assess nares and determine need for appliance change or resting period.   Outcome: Progressing     Problem: Nutrition Deficit:  Goal: Optimize nutritional status  Outcome: Progressing

## 2023-01-29 NOTE — PROGRESS NOTES
Daily Progress Note    Date:2023  Patient: Manish Parihk  : 1940  ZZI:[id number]  CODE:Full Code No additional code details  PCP:Bety Cuevas MD    Admit Date: 2023  3:25 PM   LOS: 6 days     Chief Complaint   Patient presents with    Abdominal Pain    Flank Pain     Pt states that he began having flank pain and LQ abdominal pain at 1000 this morning. Pt states that he has not had an episode like this in the past. Pt states he has had nausea, and has had consistent Bms. Pt denies diarrhea. ABCs are intact. Skin wpd. A&ox4. Subjective:    seen and evaluated at bedside along with RN, the patient has distended abdomen, not having bowel movement for the last 3 days since , feeling drowsy, poorly feeding, seen along with nephrology agreed for fluid management due to developing edema and started on bowel preparation    Hospital Summary:  78 yo M with HTN, GERD, HLD, BPH s/p TURP who presented to [facility] ED with abdominal pain. CT abdomen in ED showed stool-filled colon, nonobstructing stone within L renal pelvis. Admitted to hospitalist service for UTI/Pyelonephritis. Nephrology consulted due to ALEXANDRO. Blood cultures returned 2/2 + for gram + cocci, PCR showing Staph Epi. ID consulted. ALEXANDRO improved w/ IVF and grove catheter placement. Pt's L flank pain improved after rgove was placed. Underwent unsuccessful voiding trial on , grove was replaced. He endorses L flank pain again on . Plan to reimage L kidney if symptoms don't improve. May need MRI L spine if persistent symptoms with normal kidney imaging. The patient failed a voiding trial on  put back on Grove catheter and continue on Flomax        Review of Systems   Constitutional:  Negative for chills and fever. Respiratory:  Negative for cough and shortness of breath. Cardiovascular:  Negative for chest pain and palpitations. Gastrointestinal:  Positive for abdominal pain. Negative for nausea and vomiting.     Objective:      Vital signs in last 24 hours:  Patient Vitals for the past 24 hrs:   BP Temp Temp src Pulse Resp SpO2   01/29/23 1304 103/72 97.9 °F (36.6 °C) Oral 90 18 92 %   01/29/23 0922 -- -- -- -- -- 93 %   01/29/23 0548 (!) 166/90 98.1 °F (36.7 °C) Oral 70 14 95 %   01/29/23 0245 (!) 156/79 -- -- 68 18 95 %   01/29/23 0042 (!) 161/95 97.9 °F (36.6 °C) Oral 74 18 93 %   01/28/23 1843 -- -- -- -- -- 96 %   01/28/23 1801 (!) 151/74 97.5 °F (36.4 °C) Oral 76 16 96 %         I/O last 3 completed shifts:  In: 8583.9 [P.O.:240; I.V.:8343.9]  Out: 1822 [Urine:1822]  I/O this shift:  In: 525 [P.O.:525]  Out: 1000 [Urine:1000]    Physical Exam:  Vital Signs: /72   Pulse 90   Temp 97.9 °F (36.6 °C) (Oral)   Resp 18   Ht 6' (1.829 m)   Wt 195 lb 11.2 oz (88.8 kg)   SpO2 92%   BMI 26.54 kg/m²   General appearance:.Lying comfortably in bed ,   HEENT: Normocephalic , Atraumatic, PERRL, JVP not raised  Chest: On inspection no use of accessory muscles of respiration, on auscultation vesicular breath sounds equal bilaterally no rales rhonchi or wheezing   cardiac: Normal heart sounds with regular rate and rhythm, S1, S2 normal. No murmurs, gallops, or rubs auscultated.   Abdomen:The abdomen distended,  firm on palpation, tympanic on percussion over the flank , non-tender; normal bowel sounds, no masses, no organomegaly.  Urogenital : ++ grove, no suprapubic , mild left CVA tenderness, swelling or edema of the preputial skin  Extremities: No clubbing or cyanosis. 1+ peripheral edema. Peripheral pulses palpable.  Neurologic: alert and Cooperative , Grossly intact, DTR equal,   Psychology: No hallucination, no delusion       Lab Review   Recent Results (from the past 24 hour(s))   POCT Glucose    Collection Time: 01/28/23  6:03 PM   Result Value Ref Range    POC Glucose 110 (H) 70 - 99 mg/dl    Performed on AccuChek    Basic Metabolic Panel w/ Reflex to MG    Collection Time: 01/29/23  2:46 AM   Result Value Ref  Range    Sodium 141 136 - 145 mmol/L    Potassium reflex Magnesium 3.8 3.5 - 5.0 mmol/L    Chloride 107 98 - 111 mmol/L    CO2 19 (L) 22 - 29 mmol/L    Anion Gap 15 7 - 19 mmol/L    Glucose 96 74 - 109 mg/dL    BUN 29 (H) 8 - 23 mg/dL    Creatinine 1.7 (H) 0.5 - 1.2 mg/dL    Est, Glom Filt Rate 39 (A) >60    Calcium 8.3 (L) 8.8 - 10.2 mg/dL   Urinalysis with Reflex to Culture    Collection Time: 01/29/23  2:24 PM    Specimen: Urine   Result Value Ref Range    Color, UA YELLOW Straw/Yellow    Clarity, UA CLOUDY (A) Clear    Glucose, Ur Negative Negative mg/dL    Bilirubin Urine Negative Negative    Ketones, Urine TRACE (A) Negative mg/dL    Specific Gravity, UA 1.019 1.005 - 1.030    Blood, Urine MODERATE (A) Negative    pH, UA 5.5 5.0 - 8.0    Protein, UA 30 (A) Negative mg/dL    Urobilinogen, Urine 0.2 <2.0 E.U./dL    Nitrite, Urine Negative Negative    Leukocyte Esterase, Urine MODERATE (A) Negative             Current Facility-Administered Medications:     NIFEdipine (PROCARDIA XL) extended release tablet 60 mg, 60 mg, Oral, Daily, Conchita Reyes MD, 60 mg at 01/29/23 0850    simethicone (MYLICON) chewable tablet 160 mg, 160 mg, Oral, 4x Daily, Conchita Reyes MD, 160 mg at 01/29/23 1310    polyethylene glycol (GLYCOLAX) packet 17 g, 17 g, Oral, BID, Conchita Reyes MD    metoclopramide (REGLAN) injection 10 mg, 10 mg, IntraVENous, Q6H, Conchita Reyes MD, 10 mg at 01/29/23 1219    sodium bicarbonate tablet 650 mg, 650 mg, Oral, BID, Dixie Dodd MD, 650 mg at 01/29/23 1310    vancomycin (VANCOCIN) 1,250 mg in sodium chloride 0.9 % 250 mL IVPB, 1,250 mg, IntraVENous, Q24H, GOLD Mcrae - CNP, Stopped at 01/29/23 0640    enoxaparin (LOVENOX) injection 40 mg, 40 mg, SubCUTAneous, Daily, Henderson Human, APRN - CNP, 40 mg at 01/28/23 1710    calcitRIOL (ROCALTROL) capsule 0.25 mcg, 0.25 mcg, Oral, Daily, Dixie Dodd MD, 0.25 mcg at 01/29/23 0845    mirtazapine (REMERON) tablet 7.5 mg, 7.5 mg, Oral, Nightly, Jesse Manriquez MD, 7.5 mg at 01/28/23 2016    vancomycin (VANCOCIN) intermittent dosing (placeholder), , Other, RX Placeholder, GOLD Encinas CNP    tamsulosin (FLOMAX) capsule 0.4 mg, 0.4 mg, Oral, Daily, GOLD Encinas CNP, 0.4 mg at 01/29/23 0844    sodium chloride flush 0.9 % injection 5-40 mL, 5-40 mL, IntraVENous, 2 times per day, GOLD Encinas CNP, 10 mL at 01/29/23 0845    sodium chloride flush 0.9 % injection 5-40 mL, 5-40 mL, IntraVENous, PRN, GOLD Encinas CNP    0.9 % sodium chloride infusion, , IntraVENous, PRN, GOLD Encinas CNP    ondansetron (ZOFRAN-ODT) disintegrating tablet 4 mg, 4 mg, Oral, Q8H PRN **OR** ondansetron (ZOFRAN) injection 4 mg, 4 mg, IntraVENous, Q6H PRN, GOLD Encinas CNP, 4 mg at 01/28/23 0323    acetaminophen (TYLENOL) tablet 650 mg, 650 mg, Oral, Q6H PRN **OR** acetaminophen (TYLENOL) suppository 650 mg, 650 mg, Rectal, Q6H PRN, GOLD Encinas CNP    0.9 % sodium chloride infusion, , IntraVENous, Continuous, Vera Davis MD, Last Rate: 25 mL/hr at 01/29/23 1218, Rate Change at 01/29/23 1218    traMADol (ULTRAM) tablet 50 mg, 50 mg, Oral, Q6H PRN, Jesse Manriquez MD, 50 mg at 01/29/23 0216    allopurinol (ZYLOPRIM) tablet 100 mg, 100 mg, Oral, Daily, GOLD Encinas CNP, 100 mg at 01/29/23 0844    atorvastatin (LIPITOR) tablet 20 mg, 20 mg, Oral, Daily, GOLD Encinas CNP, 20 mg at 01/29/23 0844    metoprolol succinate (TOPROL XL) extended release tablet 50 mg, 50 mg, Oral, Daily, GOLD Encinas CNP, 50 mg at 01/29/23 0845    pantoprazole (PROTONIX) tablet 40 mg, 40 mg, Oral, QAM AC, GOLD Encinas CNP, 40 mg at 01/29/23 0509    pregabalin (LYRICA) capsule 100 mg, 100 mg, Oral, BID, GOLD Encinas CNP, 100 mg at 01/29/23 0845    docusate sodium (COLACE) capsule 100 mg, 100 mg, Oral, Daily, GOLD Encinas CNP, 100 mg at 01/29/23 0845      Imaging:  CT ABDOMEN PELVIS W IV  CONTRAST Additional Contrast? None    Result Date: 1/23/2023  1. Portions of the mid small bowel suggest mild mucosal thickening. Exact etiology is uncertain. Mild enteritis is not excluded. No fluid-filled bowel loops. 2. Stool filled colon. 3. Simple cystic lesions of the liver and kidneys. 4. Nonobstructing stone within the left renal pelvis. 5. Focal coarse calcification in the left renal parenchyma. This can be from prior inflammatory infectious process. Recommendation: Follow up as clinically indicated. All CT scans at this facility utilize dose modulation, iterative reconstruction, and/or weight based dosing when appropriate to reduce radiation dose to as low as reasonably achievable. Dictated and Electronically Signed by Meghann Escobar MD, SA CERTIFIED at [id number] 07:03:41 PM EST                  Assessment/Plan  Principal Problem:    Pyelonephritis  Active Problems:    Renal stone  Resolved Problems:    * No resolved hospital problems. *       I have reviewed the patient's daily labs, including BMP and CBC with pertinent results discussed below.     L flank/back pain  -- Improved w/ UTI treatment and grove placed previously  -- Recurred this AM after voiding trial yesterday which required grove to be replaced  -- If symptoms do not improve today, would repeat L kidney US   -- If repeat L kidney US is normal, would obtain MRI L spine to rule out abscess/OM/discitis  1/29 reviewed renal ultrasound no evidence of hydronephrosis however suggestive of CKD, left flank pain is mild    Staph Epidermidis bacteremia  Pyelonephritis  -- SIRS 1/4 on admission for WBC 13.6  -- UA w/ 21-30 WBCs, 6-10 RBCs, positive nitrite, mod LE  -- CT abdomen w/ nonobstructing stone in L renal pelvis, coarse calcification in L renal parenchyma  -- Renal US negative for hydronephrosis  -- 2/2 blood cultures + for staph epi on 1/23, sensitivity still pending  -- Repeat blood cultures drawn 1/25, NGTD  -- Continue Vancomycin, dc ceftriaxone  -- Urine culture growing staph epi, sensitivities reviewed  -- ID consulted, appreciate recommendations, note reviewed    ALEXANDRO  -- Cr plateau at 2.2  -- nonobstructing stone on CT abdomen  -- Renal US negative for hydronephrosis  -- Grove placed 1/25 due to retention  -- CAT SM[facility] for K 5.2 > 4.8  -- Consulted nephrology, appreciate recommendations, note reviewed  Creatinine improving 2.2, 1.7    Abdominal distention likely due to constipation  1/29 started on Reglan 10 mg IV every 6 hours for 4 doses  Added simethicone 160 mg every 6 hours for 4 doses  Change MiraLAX to twice daily along with Colace will monitor clinically for bowel movement    Poor appetite could be from constipation with constipation and monitor for bowel movement    Developing volume overload  Reduce IV fluid to 25 cc/h as the patient is poorly eating   we will monitor clinically    Preputial skin edema  Intermittent compressive bandage and ice application    Hypertension  Blood pressure is on the higher side started on nifedipine 60 mg daily and monitor vitals    Nephrolithiasis  BPH with obstruction  -- Nonobstructing on CT abdomen  -- Renal US negative for hydronephrosis 1/25  -- Continue Flomax  -- Considered starting Finasteride, but he has hx of elevated PSA so will defer to outpatient urologist  -- Grove placed 1/25  -- Voiding trial unsuccessful on 1/27, grove replaced  -- Will need outpatient urology follow up    Insomnia  -- Melatonin 10 mg QHS  -- Start Mirtazapine 7.5 mg QHS      DVT prophylaxis: Lovenox    DISPOSITION:  SNF placement, has a bed. May require PICC placement for IV ABX pending ID recommendations.      Full Code No additional code details        Conchita Reyes MD 1/29/2023 3:16 PM

## 2023-01-29 NOTE — PROGRESS NOTES
Renal Progress Note    Thank you to requesting provider: Dr Naila Coppola, for asking us to see Della Madrigal    Reason for consultation:  ALEXANDRO. Chief Complaint:  Left flank pain. History of Presenting Illness      Patient is a very pleasant 78 yo man with pmhx of HTN, pre diabetes, gout, OA, peripheral neuropathy. He was admitted on 1/23 for left flank and lateral abdominal pain. He was also having chills and was not feeling well. He denies recurrent UTI. His abdominal CT in ED revealed non-obstructing left kidney stone of 4-5 mm in size. He was also having positive UA. He was subsequently admitted and was started with IV fluids and antibiotics for acute pyelonephritis. His serum creat was 1.4 on admission (baseline 1.1). It ab to 2.1 and 2.3. Renal service is consulted to manage his ALEXANDRO. Patient denies any prior renal dysfunction. He noticed some drop in his urine output. He also denies NSAIDs abuse. He was found to be bacteriemic to Staph epidermidis form urine origin. Today, he offered no new complaints. He remains somnolent during this hospital stay. Has good urine output. His blood cultures remain positive.      Past Medical/Surgical History      Active Ambulatory Problems     Diagnosis Date Noted    Mixed hyperlipidemia 08/16/2017    Essential hypertension 08/16/2017    Restless legs syndrome 08/16/2017    Gastroesophageal reflux disease without esophagitis 08/16/2017    Peripheral neuropathic pain 08/16/2017    Primary osteoarthritis involving multiple joints 08/16/2017    Gout 08/16/2017    Benign non-nodular prostatic hyperplasia with lower urinary tract symptoms 08/16/2017    Elevated PSA 08/16/2017    Idiopathic peripheral neuropathy 02/19/2018    Chronic idiopathic constipation 08/20/2018    Prediabetes 10/14/2022     Resolved Ambulatory Problems     Diagnosis Date Noted    No Resolved Ambulatory Problems     Past Medical History:   Diagnosis Date    Renal stone 1/23/2023         Review of Systems Constitutional:  No weight loss, + fever/chills  Eyes:  No eye pain, no eye redness  Cardiovascular:  No chest pain, no worsening of edema  Respiratory:  No hemoptysis, no stidor  Gastrointestinal:  No blood in stool, no n/v, no diarrhea  Genitoruinary:  No hematuria, no difficulty with urination  Musculoskeletal:  No joint swelling, no redness  Integumentary:  No Rash, no itching  Neurological:  No focal weakness, No new sensory deficit  Psychiatric:  No depression, no confusion  Endocrine:  No polyuria, no polydipsia       Medications        Current Facility-Administered Medications:     NIFEdipine (PROCARDIA XL) extended release tablet 60 mg, 60 mg, Oral, Daily, Ace De Leon MD, 60 mg at 01/29/23 0850    simethicone (MYLICON) chewable tablet 160 mg, 160 mg, Oral, 4x Daily, Ace De Leon MD    polyethylene glycol (GLYCOLAX) packet 17 g, 17 g, Oral, BID, Ace De Leon MD    metoclopramide (REGLAN) injection 10 mg, 10 mg, IntraVENous, Q6H, Ace De Leon MD, 10 mg at 01/29/23 1219    vancomycin (VANCOCIN) 1,250 mg in sodium chloride 0.9 % 250 mL IVPB, 1,250 mg, IntraVENous, Q24H, GOLD Montana CNP, Stopped at 01/29/23 0640    enoxaparin (LOVENOX) injection 40 mg, 40 mg, SubCUTAneous, Daily, GOLD Montana CNP, 40 mg at 01/28/23 1710    calcitRIOL (ROCALTROL) capsule 0.25 mcg, 0.25 mcg, Oral, Daily, Piyush Laureano MD, 0.25 mcg at 01/29/23 0845    mirtazapine (REMERON) tablet 7.5 mg, 7.5 mg, Oral, Nightly, Jazz Reyna MD, 7.5 mg at 01/28/23 2016    vancomycin (VANCOCIN) intermittent dosing (placeholder), , Other, RX Placeholder, GOLD Montana CNP    tamsulosin (FLOMAX) capsule 0.4 mg, 0.4 mg, Oral, Daily, GOLD Montana CNP, 0.4 mg at 01/29/23 0844    sodium chloride flush 0.9 % injection 5-40 mL, 5-40 mL, IntraVENous, 2 times per day, GOLD Montana - CNP, 10 mL at 01/29/23 0845    sodium chloride flush 0.9 % injection 5-40 mL, 5-40 mL, IntraVENous, PRN, GOLD Montana - CNP    0.9 % sodium chloride infusion, , IntraVENous, PRN, Kevin Garces, APRN - CNP    ondansetron (ZOFRAN-ODT) disintegrating tablet 4 mg, 4 mg, Oral, Q8H PRN **OR** ondansetron (ZOFRAN) injection 4 mg, 4 mg, IntraVENous, Q6H PRN, GOLD Simpson - CNP, 4 mg at 01/28/23 7180    acetaminophen (TYLENOL) tablet 650 mg, 650 mg, Oral, Q6H PRN **OR** acetaminophen (TYLENOL) suppository 650 mg, 650 mg, Rectal, Q6H PRN, GOLD Simpson - CNP    0.9 % sodium chloride infusion, , IntraVENous, Continuous, Laura Ayala MD, Last Rate: 25 mL/hr at 01/29/23 1218, Rate Change at 01/29/23 1218    traMADol (ULTRAM) tablet 50 mg, 50 mg, Oral, Q6H PRN, Len Newton MD, 50 mg at 01/29/23 0216    allopurinol (ZYLOPRIM) tablet 100 mg, 100 mg, Oral, Daily, GOLD Simpson - CNP, 100 mg at 01/29/23 0844    atorvastatin (LIPITOR) tablet 20 mg, 20 mg, Oral, Daily, GOLD Simpson - CNP, 20 mg at 01/29/23 0844    metoprolol succinate (TOPROL XL) extended release tablet 50 mg, 50 mg, Oral, Daily, GOLD Simpson - CNP, 50 mg at 01/29/23 0845    pantoprazole (PROTONIX) tablet 40 mg, 40 mg, Oral, QAM AC, Kevin Garces APRN - CNP, 40 mg at 01/29/23 0509    pregabalin (LYRICA) capsule 100 mg, 100 mg, Oral, BID, GOLD Simpson - CNP, 100 mg at 01/29/23 0845    docusate sodium (COLACE) capsule 100 mg, 100 mg, Oral, Daily, GOLD Simpson - CNP, 100 mg at 01/29/23 0845  No outpatient medications have been marked as taking for the 1/23/23 encounter [facility] Encounter). Allergies   Codeine    Family History       Family History   Problem Relation Age of Onset    Uterine Cancer Mother     Coronary Art Dis Father         MI at 62    COPD Sister     Stroke Brother      Family history negative for kidney disease. Social History      Social History     Socioeconomic History    Marital status:       Spouse name: Prashant Salinas    Number of children: 2    Years of education: 12    Highest education level: None   Occupational History     Employer: RETIRED   Tobacco Use    Smoking status: Former     Packs/day: 3.00     Years: 30.00     Pack years: 90.00     Types: Cigarettes     Quit date: 1975     Years since quittin.1    Smokeless tobacco: Never   Substance and Sexual Activity    Alcohol use: No    Drug use: No    Sexual activity: Yes     Partners: Female     Comment: wife     Social Determinants of Health     Financial Resource Strain: Low Risk     Difficulty of Paying Living Expenses: Not hard at all   Food Insecurity: No Food Insecurity    Worried About Running Out of Food in the Last Year: Never true    Ran Out of Food in the Last Year: Never true   Physical Activity: Unknown    Days of Exercise per Week: 0 days   Intimate Partner Violence: Unknown    Fear of Current or Ex-Partner: Patient refused    Emotionally Abused: Patient refused    Physically Abused: Patient refused    Sexually Abused: Patient refused       Physical Exam     Blood pressure (!) 166/90, pulse 70, temperature 98.1 °F (36.7 °C), temperature source Oral, resp. rate 14, height 6' (1.829 m), weight 195 lb 11.2 oz (88.8 kg), SpO2 93 %. General:  Awake, responsive, normal body habitus  HEENT:  Atraumatic, normocephalic  Neck:  no masses, no JVD  Chest:  CTAB, good respiratory effort, good air movement  CV:  RRR, no murmurs   Abdomen: soft, no guarding, +BS, no hepatosplenomegaly  Extremities:  trace peripheral edema  Neurological:  no  motor deficits    Data     No results for input(s): WBC, HGB, HCT, MCV, PLT in the last 72 hours.     Recent Labs     23  0227 23  0254 23  0246    141 141   K 3.9 3.8 3.8    109 107   CO2 18* 20* 19*   GLUCOSE 98 101 96   BUN 35* 30* 29*   CREATININE 1.4* 1.5* 1.7*   LABGLOM 50* 46* 39*         Assessment:  ALEXANDRO- due to ATN  Acute pyelonephritis to staph epidermidis  HTN  Pre- type DM  Left kidney stone  Sec HPTH  Bacteremia to Staph epidermidis  Dysuria/ retention  Hematuria  Metabolic acidosis      Plan:  May hold IV fluids due to some peripheral edema. Continue Vanco per ID. Follow up labs and Vanco trough levels. Continue oral calcitriol. Will add oral alkali. Repeat UA.

## 2023-01-30 ENCOUNTER — APPOINTMENT (OUTPATIENT)
Dept: GENERAL RADIOLOGY | Age: 83
DRG: 871 | End: 2023-01-30
Payer: MEDICARE

## 2023-01-30 ENCOUNTER — APPOINTMENT (OUTPATIENT)
Dept: MRI IMAGING | Age: 83
DRG: 871 | End: 2023-01-30
Payer: MEDICARE

## 2023-01-30 LAB
ANION GAP SERPL CALCULATED.3IONS-SCNC: 10 MMOL/L (ref 7–19)
BLOOD CULTURE, ROUTINE: NORMAL
BUN BLDV-MCNC: 28 MG/DL (ref 8–23)
CALCIUM SERPL-MCNC: 8.6 MG/DL (ref 8.8–10.2)
CHLORIDE BLD-SCNC: 105 MMOL/L (ref 98–111)
CO2: 23 MMOL/L (ref 22–29)
CREAT SERPL-MCNC: 1.9 MG/DL (ref 0.5–1.2)
CULTURE, BLOOD 2: ABNORMAL
FERRITIN: 169.5 NG/ML (ref 30–400)
GFR SERPL CREATININE-BSD FRML MDRD: 34 ML/MIN/{1.73_M2}
GLUCOSE BLD-MCNC: 94 MG/DL (ref 74–109)
IRON SATURATION: 10 % (ref 14–50)
IRON: 19 UG/DL (ref 59–158)
ORGANISM: ABNORMAL
POTASSIUM REFLEX MAGNESIUM: 3.6 MMOL/L (ref 3.5–5)
SODIUM BLD-SCNC: 138 MMOL/L (ref 136–145)
SODIUM URINE: 81 MMOL/L
TOTAL IRON BINDING CAPACITY: 187 UG/DL (ref 250–400)
UREA NITROGEN, UR: 807 MG/DL
VANCOMYCIN TROUGH: 16.9 UG/ML (ref 10–20)

## 2023-01-30 PROCEDURE — 82728 ASSAY OF FERRITIN: CPT

## 2023-01-30 PROCEDURE — 6370000000 HC RX 637 (ALT 250 FOR IP)

## 2023-01-30 PROCEDURE — 97530 THERAPEUTIC ACTIVITIES: CPT

## 2023-01-30 PROCEDURE — 1210000000 HC MED SURG R&B

## 2023-01-30 PROCEDURE — 2500000003 HC RX 250 WO HCPCS: Performed by: INTERNAL MEDICINE

## 2023-01-30 PROCEDURE — 6370000000 HC RX 637 (ALT 250 FOR IP): Performed by: STUDENT IN AN ORGANIZED HEALTH CARE EDUCATION/TRAINING PROGRAM

## 2023-01-30 PROCEDURE — 6360000002 HC RX W HCPCS

## 2023-01-30 PROCEDURE — 93880 EXTRACRANIAL BILAT STUDY: CPT

## 2023-01-30 PROCEDURE — 74018 RADEX ABDOMEN 1 VIEW: CPT | Performed by: RADIOLOGY

## 2023-01-30 PROCEDURE — 2580000003 HC RX 258: Performed by: INTERNAL MEDICINE

## 2023-01-30 PROCEDURE — 80048 BASIC METABOLIC PNL TOTAL CA: CPT

## 2023-01-30 PROCEDURE — 97116 GAIT TRAINING THERAPY: CPT

## 2023-01-30 PROCEDURE — 84540 ASSAY OF URINE/UREA-N: CPT

## 2023-01-30 PROCEDURE — 80202 ASSAY OF VANCOMYCIN: CPT

## 2023-01-30 PROCEDURE — 6360000002 HC RX W HCPCS: Performed by: INTERNAL MEDICINE

## 2023-01-30 PROCEDURE — 83540 ASSAY OF IRON: CPT

## 2023-01-30 PROCEDURE — 74018 RADEX ABDOMEN 1 VIEW: CPT

## 2023-01-30 PROCEDURE — 83550 IRON BINDING TEST: CPT

## 2023-01-30 PROCEDURE — 2580000003 HC RX 258

## 2023-01-30 PROCEDURE — 36415 COLL VENOUS BLD VENIPUNCTURE: CPT

## 2023-01-30 PROCEDURE — 94760 N-INVAS EAR/PLS OXIMETRY 1: CPT

## 2023-01-30 PROCEDURE — 6370000000 HC RX 637 (ALT 250 FOR IP): Performed by: INTERNAL MEDICINE

## 2023-01-30 PROCEDURE — 84300 ASSAY OF URINE SODIUM: CPT

## 2023-01-30 RX ORDER — METOCLOPRAMIDE HYDROCHLORIDE 5 MG/ML
10 INJECTION INTRAMUSCULAR; INTRAVENOUS EVERY 6 HOURS
Status: DISCONTINUED | OUTPATIENT
Start: 2023-01-30 | End: 2023-01-31

## 2023-01-30 RX ORDER — SIMETHICONE 80 MG
80 TABLET,CHEWABLE ORAL 4 TIMES DAILY
Status: DISCONTINUED | OUTPATIENT
Start: 2023-01-30 | End: 2023-02-05 | Stop reason: HOSPADM

## 2023-01-30 RX ADMIN — METOPROLOL SUCCINATE 50 MG: 50 TABLET, FILM COATED, EXTENDED RELEASE ORAL at 08:17

## 2023-01-30 RX ADMIN — ALLOPURINOL 100 MG: 100 TABLET ORAL at 08:17

## 2023-01-30 RX ADMIN — METOCLOPRAMIDE 10 MG: 5 INJECTION, SOLUTION INTRAMUSCULAR; INTRAVENOUS at 17:20

## 2023-01-30 RX ADMIN — METOCLOPRAMIDE 10 MG: 5 INJECTION, SOLUTION INTRAMUSCULAR; INTRAVENOUS at 23:54

## 2023-01-30 RX ADMIN — METOCLOPRAMIDE 10 MG: 5 INJECTION, SOLUTION INTRAMUSCULAR; INTRAVENOUS at 12:41

## 2023-01-30 RX ADMIN — POLYETHYLENE GLYCOL 3350 17 G: 17 POWDER, FOR SOLUTION ORAL at 08:15

## 2023-01-30 RX ADMIN — ATORVASTATIN CALCIUM 20 MG: 40 TABLET, FILM COATED ORAL at 08:17

## 2023-01-30 RX ADMIN — TAMSULOSIN HYDROCHLORIDE 0.4 MG: 0.4 CAPSULE ORAL at 08:16

## 2023-01-30 RX ADMIN — VANCOMYCIN HYDROCHLORIDE 1000 MG: 10 INJECTION, POWDER, LYOPHILIZED, FOR SOLUTION INTRAVENOUS at 06:21

## 2023-01-30 RX ADMIN — DOCUSATE SODIUM 100 MG: 100 CAPSULE, LIQUID FILLED ORAL at 08:17

## 2023-01-30 RX ADMIN — METOCLOPRAMIDE 10 MG: 5 INJECTION, SOLUTION INTRAMUSCULAR; INTRAVENOUS at 00:37

## 2023-01-30 RX ADMIN — SODIUM BICARBONATE 650 MG: 650 TABLET ORAL at 08:17

## 2023-01-30 RX ADMIN — PREGABALIN 100 MG: 50 CAPSULE ORAL at 08:16

## 2023-01-30 RX ADMIN — SODIUM CHLORIDE, PRESERVATIVE FREE 10 ML: 5 INJECTION INTRAVENOUS at 08:16

## 2023-01-30 RX ADMIN — METOCLOPRAMIDE 10 MG: 5 INJECTION, SOLUTION INTRAMUSCULAR; INTRAVENOUS at 06:19

## 2023-01-30 RX ADMIN — ENOXAPARIN SODIUM 40 MG: 100 INJECTION SUBCUTANEOUS at 17:20

## 2023-01-30 RX ADMIN — SIMETHICONE 80 MG: 80 TABLET, CHEWABLE ORAL at 17:20

## 2023-01-30 RX ADMIN — SODIUM BICARBONATE: 84 INJECTION, SOLUTION INTRAVENOUS at 12:46

## 2023-01-30 RX ADMIN — SIMETHICONE 160 MG: 80 TABLET, CHEWABLE ORAL at 08:16

## 2023-01-30 RX ADMIN — SODIUM CHLORIDE, PRESERVATIVE FREE 10 ML: 5 INJECTION INTRAVENOUS at 22:06

## 2023-01-30 RX ADMIN — CALCITRIOL CAPSULES 0.25 MCG 0.25 MCG: 0.25 CAPSULE ORAL at 08:16

## 2023-01-30 RX ADMIN — SIMETHICONE 80 MG: 80 TABLET, CHEWABLE ORAL at 12:41

## 2023-01-30 RX ADMIN — PANTOPRAZOLE SODIUM 40 MG: 40 TABLET, DELAYED RELEASE ORAL at 06:19

## 2023-01-30 RX ADMIN — NIFEDIPINE 60 MG: 30 TABLET, EXTENDED RELEASE ORAL at 08:17

## 2023-01-30 ASSESSMENT — ENCOUNTER SYMPTOMS
NAUSEA: 0
ABDOMINAL PAIN: 1
SHORTNESS OF BREATH: 0
COUGH: 0
VOMITING: 0

## 2023-01-30 ASSESSMENT — PAIN SCALES - GENERAL: PAINLEVEL_OUTOF10: 0

## 2023-01-30 NOTE — PROGRESS NOTES
Physical Therapy  Name: Javy Junior  MRN:  [id number]  Date of service:  1/30/2023 01/30/23 1145   Restrictions/Precautions   Restrictions/Precautions Fall Risk   Subjective   Subjective I was talking to somebody the other day and they weren't even here. Oxygen Therapy   O2 Device None (Room air)   Bed Mobility   Supine to Sit Contact guard assistance   Transfers   Sit to Stand Contact guard assistance   Stand to Sit Contact guard assistance   Ambulation   Surface Level tile   Device Rolling Walker   Assistance Minimal assistance   Distance 125 feet   Short Term Goals   Time Frame for Short Term Goals 2 wks   Short Term Goal 1 amb 200 ft with rw, SBA   Conditions Requiring Skilled Therapeutic Intervention   Body Structures, Functions, Activity Limitations Requiring Skilled Therapeutic Intervention Decreased functional mobility ; Decreased safe awareness;Decreased balance;Decreased endurance   Assessment Patient more fatigued this visit requiring several standing rest breaks. He pushes walker too far in front and ambulates with flex posture. Discharge Recommendations Continue to assess pending progress; Patient would benefit from continued therapy after discharge   Activity Tolerance   Activity Tolerance Patient limited by fatigue;Patient limited by endurance   Physcial Therapy Plan   General Plan 5-7 times per week   Therapy Duration 2 Weeks   Current Treatment Recommendations Strengthening;Balance training;Functional mobility training;Transfer training;Gait training; Endurance training; Safety education & training; Therapeutic activities   Safety Devices   Type of Devices Call light within reach; Left in chair       Electronically signed by Hollie Klinefelter, PTA on 1/30/2023 at 12:04 PM

## 2023-01-30 NOTE — PROGRESS NOTES
Nephrology ([facility]) Progress Note    Patient:  Ken Hilario  YOB: 1940  Date of Service: 1/30/2023  MRN: [id number]   Acct: [de-identified]   Primary Care Physician: James Poole MD  Advance Directive: Full Code  Admit Date: 1/23/2023       Hospital Day: 7  Referring Provider: Rosaura Costello MD    Patient independently seen and examined, Chart, Consults, Notes, Operative notes, Labs, Cardiology, and Radiology studies reviewed as available. Chief complaint: Abdominal labs. Subjective:  Ken Hilario is a 79 y.o. male for whom we were consulted for evaluation and treatment of acute kidney injury. Patient denies any history of chronic kidney disease. He was admitted with left flank pain radiating to the anterior abdominal wall. He has history of type 2 diabetes, hypertension, osteoarthritis and gout. CT scan of the abdomen in the emergency room consistent with nonobstructing left kidney stone associated with pyelonephritis. Hospital course remarkable for IV antibiotics and IV fluid administration and has worsening of renal function. Nephrology is consulted for evaluation of acute kidney injury. Patient also has staph epidermis bacteremia. This morning he feels well. He denies any pain. His renal functions are worse.     Allergies:  Codeine    Medicines:  Current Facility-Administered Medications   Medication Dose Route Frequency Provider Last Rate Last Admin    vancomycin (VANCOCIN) 1000 mg in sodium chloride 0.9% 250 mL IVPB  1,000 mg IntraVENous Q24H GOLD Benitez - CNP   Stopped at 01/30/23 0750    NIFEdipine (PROCARDIA XL) extended release tablet 60 mg  60 mg Oral Daily Rosaura Costello MD   60 mg at 01/30/23 0817    simethicone (MYLICON) chewable tablet 160 mg  160 mg Oral 4x Daily Rosaura Costello MD   160 mg at 01/30/23 0816    polyethylene glycol (GLYCOLAX) packet 17 g  17 g Oral BID Rosaura Costello MD   17 g at 01/29/23 2129    sodium bicarbonate tablet 650 mg  650 mg Oral BID Melvin Hester MD   650 mg at 01/30/23 0817    enoxaparin (LOVENOX) injection 40 mg  40 mg SubCUTAneous Daily Jackqueline Bun, APRN - CNP   40 mg at 01/29/23 1702    calcitRIOL (ROCALTROL) capsule 0.25 mcg  0.25 mcg Oral Daily Melvin Hester MD   0.25 mcg at 01/30/23 0816    mirtazapine (REMERON) tablet 7.5 mg  7.5 mg Oral Nightly lGoria Figueroa MD   7.5 mg at 01/29/23 2129    vancomycin (VANCOCIN) intermittent dosing (placeholder)   Other RX Placeholder Jackqueline Bun, APRN - CNP        tamsulosin (FLOMAX) capsule 0.4 mg  0.4 mg Oral Daily Jackqueline Bun, APRN - CNP   0.4 mg at 01/30/23 0816    sodium chloride flush 0.9 % injection 5-40 mL  5-40 mL IntraVENous 2 times per day Jackqueline Bun, APRN - CNP   10 mL at 01/30/23 0816    sodium chloride flush 0.9 % injection 5-40 mL  5-40 mL IntraVENous PRN Jackqueline Bun, APRN - CNP        0.9 % sodium chloride infusion   IntraVENous PRN Jackqueline Bun, APRN - CNP        ondansetron (ZOFRAN-ODT) disintegrating tablet 4 mg  4 mg Oral Q8H PRN Jackqueline Bun, APRN - CNP        Or    ondansetron [facility]) injection 4 mg  4 mg IntraVENous Q6H PRN Jackqueline Bun, APRN - CNP   4 mg at 01/28/23 0323    acetaminophen (TYLENOL) tablet 650 mg  650 mg Oral Q6H PRN Jackqueline Bun, APRN - CNP        Or    acetaminophen (TYLENOL) suppository 650 mg  650 mg Rectal Q6H PRN Jackqueline Bun, APRN - CNP        0.9 % sodium chloride infusion   IntraVENous Continuous Leo Alexis MD 25 mL/hr at 01/29/23 1218 Rate Change at 01/29/23 1218    traMADol (ULTRAM) tablet 50 mg  50 mg Oral Q6H PRN Gloria Figueroa MD   50 mg at 01/29/23 0216    allopurinol (ZYLOPRIM) tablet 100 mg  100 mg Oral Daily Jackqueline Bun, APRN - CNP   100 mg at 01/30/23 0817    atorvastatin (LIPITOR) tablet 20 mg  20 mg Oral Daily Jackqueline Bun, APRN - CNP   20 mg at 01/30/23 0817    metoprolol succinate (TOPROL XL) extended release tablet 50 mg  50 mg Oral Daily Jackqueline Bun, APRN - CNP   50 mg at 23 0817    pantoprazole (PROTONIX) tablet 40 mg  40 mg Oral QAM AC Rajiv Osborn APRN - CNP   40 mg at 23 8985    pregabalin (LYRICA) capsule 100 mg  100 mg Oral BID Rajiv Osborn APRN - CNP   100 mg at 23 2508    docusate sodium (COLACE) capsule 100 mg  100 mg Oral Daily Rajiv Osborn APRN - CNP   100 mg at 23 8243       Past Medical History:  Past Medical History:   Diagnosis Date    Benign non-nodular prostatic hyperplasia with lower urinary tract symptoms 2017    Essential hypertension 2017    Gastroesophageal reflux disease without esophagitis 2017    Gout 2017    Idiopathic peripheral neuropathy 2018    Mixed hyperlipidemia 2017    Peripheral neuropathic pain 2017    Primary osteoarthritis involving multiple joints 2017    Renal stone 2023    Restless legs syndrome 2017       Past Surgical History:  Past Surgical History:   Procedure Laterality Date    INGUINAL HERNIA REPAIR Bilateral 1963    TURP         Family History  Family History   Problem Relation Age of Onset    Uterine Cancer Mother     Coronary Art Dis Father         MI at 62    COPD Sister     Stroke Brother        Social History  Social History     Socioeconomic History    Marital status:       Spouse name: Virgin Clarity    Number of children: 2    Years of education: 12    Highest education level: Not on file   Occupational History     Employer: RETIRED   Tobacco Use    Smoking status: Former     Packs/day: 3.00     Years: 30.00     Pack years: 90.00     Types: Cigarettes     Quit date: 1975     Years since quittin.1    Smokeless tobacco: Never   Substance and Sexual Activity    Alcohol use: No    Drug use: No    Sexual activity: Yes     Partners: Female     Comment: wife   Other Topics Concern    Not on file   Social History Narrative    Not on file     Social Determinants of Health     Financial Resource Strain: Low Risk     Difficulty of Paying Living Expenses: Not hard at all   Food Insecurity: No Food Insecurity    Worried About Running Out of Food in the Last Year: Never true    Ran Out of Food in the Last Year: Never true   Transportation Needs: Not on file   Physical Activity: Unknown    Days of Exercise per Week: 0 days    Minutes of Exercise per Session: Not on file   Stress: Not on file   Social Connections: Not on file   Intimate Partner Violence: Unknown    Fear of Current or Ex-Partner: Patient refused    Emotionally Abused: Patient refused    Physically Abused: Patient refused    Sexually Abused: Patient refused   Housing Stability: Not on file         Review of Systems:  History obtained from chart review and the patient  General ROS: No fever or chills  Respiratory ROS: No cough, shortness of breath, wheezing  Cardiovascular ROS: No chest pain or palpitations  Gastrointestinal ROS: No abdominal pain or melena  Genito-Urinary ROS: No dysuria or hematuria  Musculoskeletal ROS: No joint pain or swelling         Objective:  Patient Vitals for the past 24 hrs:   BP Temp Temp src Pulse Resp SpO2   01/30/23 0755 138/83 98.8 °F (37.1 °C) Oral 80 18 91 %   01/30/23 0725 -- -- -- -- -- 90 %   01/30/23 0108 (!) 143/116 99.9 °F (37.7 °C) Temporal 89 16 90 %   01/29/23 1806 (!) 142/86 97.2 °F (36.2 °C) Oral 91 20 93 %   01/29/23 1304 103/72 97.9 °F (36.6 °C) Oral 90 18 92 %       Intake/Output Summary (Last 24 hours) at 1/30/2023 0951  Last data filed at 1/30/2023 9100  Gross per 24 hour   Intake 1311 ml   Output 600 ml   Net 711 ml     General: awake/alert   HEENT: Normocephalic atraumatic head  Neck: Supple with no JVD or carotid bruits.   Chest:  clear to auscultation bilaterally  CVS: regular rate and rhythm  Abdominal: soft, nontender, normal bowel sounds  Extremities: no cyanosis or edema  Skin: warm and dry without rash    Labs:  BMP:   Recent Labs     01/28/23  0254 01/29/23  0246 01/30/23  0333    141 138   K 3.8 3.8 3.6    107 105   CO2 20* 19* 23   BUN 30* 29* 28*   CREATININE 1.5* 1.7* 1.9*   CALCIUM 8.5* 8.3* 8.6*     CBC: No results for input(s): WBC, HGB, HCT, MCV, PLT in the last 72 hours. LIVER PROFILE: No results for input(s): AST, ALT, LIPASE, BILIDIR, BILITOT, ALKPHOS in the last 72 hours. Invalid input(s): AMYLASE,  ALB  PT/INR: No results for input(s): PROTIME, INR in the last 72 hours. APTT: No results for input(s): APTT in the last 72 hours. BNP:  No results for input(s): BNP in the last 72 hours. Ionized Calcium:No results for input(s): IONCA in the last 72 hours. Magnesium:No results for input(s): MG in the last 72 hours. Phosphorus:No results for input(s): PHOS in the last 72 hours. HgbA1C: No results for input(s): LABA1C in the last 72 hours. Hepatic: No results for input(s): ALKPHOS, ALT, AST, PROT, BILITOT, BILIDIR, LABALBU in the last 72 hours. Lactic Acid: No results for input(s): LACTA in the last 72 hours. Troponin: No results for input(s): CKTOTAL, CKMB, TROPONINT in the last 72 hours. ABGs: No results found for: PHART, PO2ART, GCQ0FDL  CRP:  No results for input(s): CRP in the last 72 hours. Sed Rate:  No results for input(s): SEDRATE in the last 72 hours. Culture Results:   Blood Culture Recent:   Recent Labs     01/25/23  0608   BC No growth after 5 days of incubation. Urine Culture Recent :   Recent Labs     01/23/23  1659   LABURIN >100,000 CFU/ml  Mixed skin carol present  *  Heavy growth       Radiology reports as per the Radiologist  Radiology: CT HEAD WO CONTRAST    Result Date: 1/24/2023  Patient: Drake Berry  Time Out: 00:29Exam(s): CT HEAD Without Contrast EXAM:  CT Head Without Intravenous ContrastCLINICAL HISTORY:   Reason for exam: unwitnessed fall. TECHNIQUE:  Axial computed tomography images of the head/brain without intravenous contrast.COMPARISON:   Head CT 4/2/13. FINDINGS:  Brain: Mild atrophy and chronic white matter disease, has progressed, as expected.   No edema or acute infarct. No acute hemorrhage. Ventricles:   No hydrocephalus or midline shift. Bones/joints:   No skull fracture. Soft tissues: No scalp hematoma. Sinuses: Moderate mucosal thickening left maxillary sinus, compatible with chronic sinusitis. Remaining sinuses are clear. No fluid level. Mastoid air cells:   No mastoid effusion. 1.  Mild age-related findings. 2. No acute infarct, bleed, or acute intracranial abnormality. Electronically signed by Flory Hooks M.D. on 01-25-23 at [id number] [facility] RENAL COMPLETE    Result Date: 1/25/2023  RETROPERITONEAL ULTRASOUND: HISTORY: Worsening ALEXANDRO COMPARISON STUDY: CT of abdomen pelvis from 1/23/2023. PROCEDURE: Real-time ultrasound imaging was performed of the retroperitoneum. FINDINGS: The abdominal aorta and IVC were not visualized. The right kidney measures up to to 12.0 cm in length. The left kidney measures up to 12.3 cm in length. There is a cyst seen at the right kidney measuring up to 3.1 x 2.6 x 3.4 cm and at the interpolar region measuring up to 2.9 cm which has a thin septation. There are additional smaller cysts present. There is a small cyst seen at the left kidney measuring up to 1.4 cm. Both kidneys demonstrate mildly increased corticomedullary differentiation with no evidence of hydronephrosis. The visualized portions of the urinary bladder appear unremarkable. No free intraperitoneal fluid is seen. There is no evidence of hydronephrosis. Both kidneys demonstrate increased cortical echogenicity, suggestive of chronic medical renal disease. CT ABDOMEN PELVIS W IV CONTRAST Additional Contrast? None    Result Date: 1/23/2023  NO PRIOR REPORT AVAILABLE Exam: CT OF THE ABDOMEN/PELVIS WITH IV CONTRAST Clinical data: Left LQ pain Technique:Direct contiguousaxial CT images were acquired through the abdomen and pelvis with intravenouscontrastusing soft tissue and bone algorithms. Oral contrast was not administered.  Reformatted/MPR images were performed. Radiation dose: CTDIvol =10.77 mGy, DLP =645.28 mGy x cm. Limitations: Lack of oral contrast limits evaluation of the bowel loops. Prior Studies: No prior studies submitted. Findings: Lung bases: No acute pulmonary process. Mild cardiomegaly. d Liver:Unremarkable size andcontour. Low attenuated lesions within the liver measuring up to 2.1 cm consistent with simple cysts. Normal density. No evidence of mass. No evidence of dilated ducts. Gallbladder Fossa: Unremarkable Spleen: Grossly unremarkable. Pancreas/adrenal glands: Grossly unremarkable size, contour and density. Kidneys: In anatomic position. Grossly unremarkablerenal size, contour and density. Cystic lesions of the kidneys measuring up to 3.9 cm with simple appearance. Coarse calcification within the parenchyma posteriorly within the interpolar left kidney can be from prior inflammatory infectious or chronic scarring. 4-5 mm nonobstructing stone within the left renal pelvis. No evidence of a renal mass. Perinephric space is unremarkable. Retroperitoneum: No enlarged retroperitoneal lymphadenopathy. The aorta and IVC appear unremarkable. Peritoneal cavity: No evidence of free air or ascites. Gastrointestinal tract: No obstruction. Stool filled colon. Portions of the small bowel demonstrate mucosal thickening. Appendix: Unremarkable Pelvis: Solid and hollow viscera grossly unremarkable. Osseous structures: No acute or destructive bony process identified. Degenerative changes of the spine. 1. Portions of the mid small bowel suggest mild mucosal thickening. Exact etiology is uncertain. Mild enteritis is not excluded. No fluid-filled bowel loops. 2. Stool filled colon. 3. Simple cystic lesions of the liver and kidneys. 4. Nonobstructing stone within the left renal pelvis. 5. Focal coarse calcification in the left renal parenchyma. This can be from prior inflammatory infectious process. Recommendation: Follow up as clinically indicated.  All CT scans at this facility utilize dose modulation, iterative reconstruction, and/or weight based dosing when appropriate to reduce radiation dose to as low as reasonably achievable. Dictated and Electronically Signed by Genie Knox MD, [facility] CERTIFIED at [id number] 07:03:41 PM EST                Assessment   1. Acute kidney injury/worsening. 2.  Acute tubular necrosis. 3.  Staph epi bacteremia. 4.  Acute pyelonephritis due to staph epi. 5.  Left renal stone. 6.  Metabolic acidemia. 7.  Secondary hyperparathyroidism. Plan:  1. Change IV fluid composition. 2.  Continue to monitor renal function. 3.  Plan was discussed with the patient.     Donte Carrillo MD  01/30/23  9:51 AM

## 2023-01-30 NOTE — PROGRESS NOTES
Occupational Therapy     01/30/23 1500   Subjective   Subjective Pt in bed upon arrival for therapy. Pt agreeable to participate. Pain Assessment   Pain Assessment None - Denies Pain   Cognition   Overall Cognitive Status WFL   Cognition Comment Awake, alert, verbal. Unable to provide detailed information regarding symptoms   Orientation   Overall Orientation Status WFL   Bed Mobility Training   Bed Mobility Training Yes   Overall Level of Assistance Minimum assistance; Moderate assistance   Interventions Verbal cues; Tactile cues;Manual cues   Supine to Sit Minimum assistance; Moderate assistance   Scooting Minimum assistance   Transfer Training   Transfer Training Yes   Overall Level of Assistance Minimum assistance;Contact-guard assistance   Interventions Verbal cues; Tactile cues;Manual cues   Sit to Stand Minimum assistance;Contact-guard assistance   Stand to Sit Contact-guard assistance   Balance   Sitting Intact   Standing With support  (RW level)   ADL   Feeding Independent   Grooming Independent;Setup   UE Bathing Independent;Setup   LE Bathing Minimal assistance; Moderate assistance   UE Dressing Independent;Setup   LE Dressing Moderate assistance   Toileting Moderate assistance   Additional Comments Pt required increased assistance this day due to stiffness. Pt requires increased assistance to maintain balance and thorough clean up. Assessment   Assessment Tx focused on sitting EOB to perform dressing and h/g tasks. Pt instructed on functional mobility at RW level and patient agreeable to sit up in recliner for lunch this date.    Activity Tolerance Patient limited by endurance   Discharge Recommendations Patient would benefit from continued therapy after discharge   Occupational Therapy Plan   Times Per Week 3-5   Times Per Day Once a day   OT Plan of Care   Monday X   Electronically signed by RAVI Sim on 1/30/2023 at 4:05 PM

## 2023-01-30 NOTE — PROGRESS NOTES
Physical Therapy  Name: Javy Junior  MRN:  [id number]  Date of service:  1/30/2023    Patient declined ambulation at this time. \"I was just about asleep. Come back at lunch. It just seems they put me through the wringer in the mornings. \"  Physical Therapy will continue to follow and progress as able.     Electronically signed by Hollie Klinefelter, PTA on 1/30/2023 at 8:51 AM

## 2023-01-30 NOTE — PROGRESS NOTES
[address] [facility] Pharmacokinetic Monitoring Service - Vancomycin    Consulting Provider: GOLD Fitzgerald   Indication: Bloodstream infection  Target Concentration: Goal trough of 10-15 mg/L and AUC/NGOC <500 mg*hr/L  Day of Therapy: 7  Additional Antimicrobials: none    Pertinent Laboratory Values: Wt Readings from Last 1 Encounters:   01/23/23 195 lb 11.2 oz (88.8 kg)     Temp Readings from Last 1 Encounters:   01/30/23 99.9 °F (37.7 °C) (Temporal)     Estimated Creatinine Clearance: 32 mL/min (A) (based on SCr of 1.9 mg/dL (H)). Recent Labs     01/29/23  0246 01/30/23  0333   CREATININE 1.7* 1.9*     Procalcitonin: no level    Pertinent Cultures:  Culture Date Source Results   01/25/23 Blood  Gram positive cocci in clusters resembling Staphylococcus    01/25/23 Blood  No growth to date   01/23/23 Blood x 2  Staph epidermidis   01/23/23 Urine Staph epidermidis     MRSA Nasal Swab: N/A. Non-respiratory infection. Recent vancomycin administrations                     vancomycin (VANCOCIN) 1,250 mg in sodium chloride 0.9 % 250 mL IVPB (mg) 1,250 mg New Bag 01/29/23 0509     1,250 mg New Bag 01/28/23 0508    vancomycin (VANCOCIN) 1000 mg in sodium chloride 0.9% 250 mL IVPB (mg) 1,000 mg New Bag 01/27/23 0514                    Assessment:  Date/Time Current Dose Concentration Timing of Concentration (h) AUC   01/30 at 0333 Vancomycin 1250 mg IV every 24 hours 16.9 22 hours 24 minutes 615   Note: Serum concentrations collected for AUC dosing may appear elevated if collected in close proximity to the dose administered, this is not necessarily an indication of toxicity    Plan:  Current dosing regimen is supra-therapeutic due to increase in SrCr. Noted its recommended from ID for patient to continue therapy x 2 weeks of Vancomycin from initiation. May need to initiate Vancomycin pulse dosing again. \"Decrease dose to 1000 mg IV every 24 hours .  Will monitor renal function closely  Repeat vancomycin concentration ordered for 01/31 @ 0600   Pharmacy will continue to monitor patient and adjust therapy as indicated    Thank you for the consult,  Carlton Lee [facility]  1/30/2023 4:54 AM

## 2023-01-30 NOTE — PROGRESS NOTES
Comprehensive Nutrition Assessment    Type and Reason for Visit:  Reassess    Nutrition Recommendations/Plan:   Start ONS with D     Malnutrition Assessment:  Malnutrition Status: At risk for malnutrition (Comment) (01/30/23 1053)    Context:  Acute Illness     Findings of the 6 clinical characteristics of malnutrition:  Energy Intake:  50% or less of estimated energy requirements for 5 or more days  Weight Loss:  No significant weight loss     Body Fat Loss:  No significant body fat loss     Muscle Mass Loss:  No significant muscle mass loss    Fluid Accumulation:  No significant fluid accumulation     Strength:  Not Performed    Nutrition Assessment:    Seen for reassessment. Pt reflecting a poor PO intake of 1-25% x 8 days. Electrolytes WNL. Pt states that he drinks about half of his ONS. Will start ONS with dinner to help with oral intake. Current Nutrition Intake & Therapies:    Average Meal Intake: 1-25%  ADULT DIET; Regular; 4 carb choices (60 gm/meal); Low Fat/Low Chol/High Fiber/MAITE  ADULT ORAL NUTRITION SUPPLEMENT; Breakfast, Dinner, Lunch; Low Calorie/High Protein Oral Supplement    Anthropometric Measures:  Height: 6' (182.9 cm)  Ideal Body Weight (IBW): 178 lbs (81 kg)    Current Body Weight: 195 lb (88.5 kg), 109.6 % IBW. Weight Source: Standing Scale  Current BMI (kg/m2): 26.4  Weight Adjustment For: No Adjustment  BMI Categories: Overweight (BMI 25.0-29. 9)    Estimated Daily Nutrient Needs:  Energy Requirements Based On: Kcal/kg  Weight Used for Energy Requirements: Current  Energy (kcal/day): 5994-4231  Weight Used for Protein Requirements: Ideal  Protein (g/day): 105-162  Method Used for Fluid Requirements: 1 ml/kcal  Fluid (ml/day): 3547-8271    Nutrition Diagnosis:   Inadequate oral intake related to acute injury/trauma, other (comment) (nausea and frequent BM) as evidenced by intake 0-25%    Nutrition Interventions:   Food and/or Nutrient Delivery: Continue Current Diet, Continue Oral Nutrition Supplement, Start Oral Nutrition Supplement    Goals:  Goals: Meet at least 75% of estimated needs, PO intake 50% or greater    Nutrition Monitoring and Evaluation:   Food/Nutrient Intake Outcomes: Diet Advancement/Tolerance, Food and Nutrient Intake, Supplement Intake  Physical Signs/Symptoms Outcomes: Biochemical Data, Weight, GI Status, Nausea or Vomiting    Cecil Eller MS, RD, LD  Contact: [phone]

## 2023-01-31 ENCOUNTER — APPOINTMENT (OUTPATIENT)
Dept: MRI IMAGING | Age: 83
DRG: 871 | End: 2023-01-31
Payer: MEDICARE

## 2023-01-31 PROBLEM — H53.9 VISUAL DISTURBANCE: Status: ACTIVE | Noted: 2023-01-31

## 2023-01-31 LAB
ALBUMIN SERPL-MCNC: 3 G/DL (ref 3.5–5.2)
ALP BLD-CCNC: 79 U/L (ref 40–130)
ALT SERPL-CCNC: 18 U/L (ref 5–41)
ANION GAP SERPL CALCULATED.3IONS-SCNC: 14 MMOL/L (ref 7–19)
AST SERPL-CCNC: 22 U/L (ref 5–40)
BILIRUB SERPL-MCNC: 0.5 MG/DL (ref 0.2–1.2)
BUN BLDV-MCNC: 35 MG/DL (ref 8–23)
CALCIUM SERPL-MCNC: 9.2 MG/DL (ref 8.8–10.2)
CHLORIDE BLD-SCNC: 104 MMOL/L (ref 98–111)
CO2: 21 MMOL/L (ref 22–29)
CREAT SERPL-MCNC: 2.7 MG/DL (ref 0.5–1.2)
EKG P AXIS: 34 DEGREES
EKG P-R INTERVAL: 142 MS
EKG Q-T INTERVAL: 360 MS
EKG QRS DURATION: 134 MS
EKG QTC CALCULATION (BAZETT): 435 MS
EKG T AXIS: 88 DEGREES
GFR SERPL CREATININE-BSD FRML MDRD: 23 ML/MIN/{1.73_M2}
GLUCOSE BLD-MCNC: 114 MG/DL (ref 74–109)
GLUCOSE BLD-MCNC: 147 MG/DL (ref 70–99)
HBA1C MFR BLD: 5.8 % (ref 4–6)
HCT VFR BLD CALC: 38.4 % (ref 42–52)
HEMOGLOBIN: 12.6 G/DL (ref 14–18)
MCH RBC QN AUTO: 27.7 PG (ref 27–31)
MCHC RBC AUTO-ENTMCNC: 32.8 G/DL (ref 33–37)
MCV RBC AUTO: 84.4 FL (ref 80–94)
PDW BLD-RTO: 13.2 % (ref 11.5–14.5)
PERFORMED ON: ABNORMAL
PLATELET # BLD: 200 K/UL (ref 130–400)
PMV BLD AUTO: 11.3 FL (ref 9.4–12.4)
POTASSIUM SERPL-SCNC: 3.5 MMOL/L (ref 3.5–5)
RBC # BLD: 4.55 M/UL (ref 4.7–6.1)
SODIUM BLD-SCNC: 139 MMOL/L (ref 136–145)
TOTAL PROTEIN: 6.4 G/DL (ref 6.6–8.7)
VANCOMYCIN TROUGH: 22.4 UG/ML (ref 10–20)
WBC # BLD: 9.2 K/UL (ref 4.8–10.8)

## 2023-01-31 PROCEDURE — 6360000002 HC RX W HCPCS: Performed by: INTERNAL MEDICINE

## 2023-01-31 PROCEDURE — 2580000003 HC RX 258

## 2023-01-31 PROCEDURE — 2500000003 HC RX 250 WO HCPCS: Performed by: INTERNAL MEDICINE

## 2023-01-31 PROCEDURE — 80053 COMPREHEN METABOLIC PANEL: CPT

## 2023-01-31 PROCEDURE — 97530 THERAPEUTIC ACTIVITIES: CPT

## 2023-01-31 PROCEDURE — 85027 COMPLETE CBC AUTOMATED: CPT

## 2023-01-31 PROCEDURE — 99223 1ST HOSP IP/OBS HIGH 75: CPT | Performed by: PSYCHIATRY & NEUROLOGY

## 2023-01-31 PROCEDURE — 6370000000 HC RX 637 (ALT 250 FOR IP): Performed by: INTERNAL MEDICINE

## 2023-01-31 PROCEDURE — 94760 N-INVAS EAR/PLS OXIMETRY 1: CPT

## 2023-01-31 PROCEDURE — 1210000000 HC MED SURG R&B

## 2023-01-31 PROCEDURE — 93010 ELECTROCARDIOGRAM REPORT: CPT | Performed by: INTERNAL MEDICINE

## 2023-01-31 PROCEDURE — 94640 AIRWAY INHALATION TREATMENT: CPT

## 2023-01-31 PROCEDURE — 83036 HEMOGLOBIN GLYCOSYLATED A1C: CPT

## 2023-01-31 PROCEDURE — 97116 GAIT TRAINING THERAPY: CPT

## 2023-01-31 PROCEDURE — 2700000000 HC OXYGEN THERAPY PER DAY

## 2023-01-31 PROCEDURE — 93005 ELECTROCARDIOGRAM TRACING: CPT | Performed by: INTERNAL MEDICINE

## 2023-01-31 PROCEDURE — 70551 MRI BRAIN STEM W/O DYE: CPT

## 2023-01-31 PROCEDURE — 80202 ASSAY OF VANCOMYCIN: CPT

## 2023-01-31 PROCEDURE — 2580000003 HC RX 258: Performed by: INTERNAL MEDICINE

## 2023-01-31 PROCEDURE — 87070 CULTURE OTHR SPECIMN AEROBIC: CPT

## 2023-01-31 PROCEDURE — 6370000000 HC RX 637 (ALT 250 FOR IP)

## 2023-01-31 PROCEDURE — 6360000002 HC RX W HCPCS

## 2023-01-31 PROCEDURE — 87205 SMEAR GRAM STAIN: CPT

## 2023-01-31 PROCEDURE — 87075 CULTR BACTERIA EXCEPT BLOOD: CPT

## 2023-01-31 PROCEDURE — 36410 VNPNXR 3YR/> PHY/QHP DX/THER: CPT

## 2023-01-31 PROCEDURE — 70551 MRI BRAIN STEM W/O DYE: CPT | Performed by: RADIOLOGY

## 2023-01-31 PROCEDURE — 36415 COLL VENOUS BLD VENIPUNCTURE: CPT

## 2023-01-31 RX ORDER — LINEZOLID 600 MG/1
600 TABLET, FILM COATED ORAL EVERY 12 HOURS SCHEDULED
Status: DISCONTINUED | OUTPATIENT
Start: 2023-01-31 | End: 2023-02-05 | Stop reason: HOSPADM

## 2023-01-31 RX ORDER — LORAZEPAM 2 MG/ML
INJECTION INTRAMUSCULAR
Status: COMPLETED
Start: 2023-01-31 | End: 2023-01-31

## 2023-01-31 RX ORDER — METHYLPREDNISOLONE SODIUM SUCCINATE 125 MG/2ML
80 INJECTION, POWDER, LYOPHILIZED, FOR SOLUTION INTRAMUSCULAR; INTRAVENOUS EVERY 8 HOURS
Status: DISCONTINUED | OUTPATIENT
Start: 2023-01-31 | End: 2023-02-01

## 2023-01-31 RX ORDER — BUMETANIDE 0.25 MG/ML
1 INJECTION, SOLUTION INTRAMUSCULAR; INTRAVENOUS EVERY 12 HOURS
Status: DISCONTINUED | OUTPATIENT
Start: 2023-01-31 | End: 2023-02-02

## 2023-01-31 RX ORDER — LORAZEPAM 2 MG/ML
2 INJECTION INTRAMUSCULAR ONCE
Status: COMPLETED | OUTPATIENT
Start: 2023-01-31 | End: 2023-01-31

## 2023-01-31 RX ORDER — ASPIRIN 81 MG/1
81 TABLET, CHEWABLE ORAL DAILY
Status: DISCONTINUED | OUTPATIENT
Start: 2023-01-31 | End: 2023-02-05 | Stop reason: HOSPADM

## 2023-01-31 RX ORDER — ATORVASTATIN CALCIUM 80 MG/1
80 TABLET, FILM COATED ORAL DAILY
Status: DISCONTINUED | OUTPATIENT
Start: 2023-02-01 | End: 2023-02-05 | Stop reason: HOSPADM

## 2023-01-31 RX ORDER — ENOXAPARIN SODIUM 100 MG/ML
30 INJECTION SUBCUTANEOUS DAILY
Status: DISCONTINUED | OUTPATIENT
Start: 2023-01-31 | End: 2023-02-03

## 2023-01-31 RX ORDER — METOCLOPRAMIDE HYDROCHLORIDE 5 MG/ML
5 INJECTION INTRAMUSCULAR; INTRAVENOUS EVERY 6 HOURS
Status: DISCONTINUED | OUTPATIENT
Start: 2023-01-31 | End: 2023-02-03 | Stop reason: ALTCHOICE

## 2023-01-31 RX ORDER — QUETIAPINE FUMARATE 25 MG/1
25 TABLET, FILM COATED ORAL 3 TIMES DAILY
Status: DISCONTINUED | OUTPATIENT
Start: 2023-01-31 | End: 2023-02-01

## 2023-01-31 RX ORDER — IPRATROPIUM BROMIDE AND ALBUTEROL SULFATE 2.5; .5 MG/3ML; MG/3ML
1 SOLUTION RESPIRATORY (INHALATION)
Status: DISCONTINUED | OUTPATIENT
Start: 2023-01-31 | End: 2023-02-05 | Stop reason: HOSPADM

## 2023-01-31 RX ADMIN — SODIUM BICARBONATE 650 MG: 650 TABLET ORAL at 09:36

## 2023-01-31 RX ADMIN — LORAZEPAM 2 MG: 2 INJECTION INTRAMUSCULAR at 12:40

## 2023-01-31 RX ADMIN — SODIUM CHLORIDE, PRESERVATIVE FREE 10 ML: 5 INJECTION INTRAVENOUS at 20:51

## 2023-01-31 RX ADMIN — METOPROLOL SUCCINATE 50 MG: 50 TABLET, FILM COATED, EXTENDED RELEASE ORAL at 09:36

## 2023-01-31 RX ADMIN — ATORVASTATIN CALCIUM 20 MG: 40 TABLET, FILM COATED ORAL at 09:36

## 2023-01-31 RX ADMIN — DOCUSATE SODIUM 100 MG: 100 CAPSULE, LIQUID FILLED ORAL at 09:36

## 2023-01-31 RX ADMIN — METOCLOPRAMIDE 5 MG: 5 INJECTION, SOLUTION INTRAMUSCULAR; INTRAVENOUS at 17:36

## 2023-01-31 RX ADMIN — SIMETHICONE 80 MG: 80 TABLET, CHEWABLE ORAL at 09:36

## 2023-01-31 RX ADMIN — POLYETHYLENE GLYCOL 3350 17 G: 17 POWDER, FOR SOLUTION ORAL at 09:37

## 2023-01-31 RX ADMIN — PREGABALIN 100 MG: 50 CAPSULE ORAL at 09:36

## 2023-01-31 RX ADMIN — BUMETANIDE 1 MG: 0.25 INJECTION INTRAMUSCULAR; INTRAVENOUS at 23:57

## 2023-01-31 RX ADMIN — IPRATROPIUM BROMIDE AND ALBUTEROL SULFATE 1 AMPULE: 2.5; .5 SOLUTION RESPIRATORY (INHALATION) at 19:28

## 2023-01-31 RX ADMIN — METOCLOPRAMIDE 5 MG: 5 INJECTION, SOLUTION INTRAMUSCULAR; INTRAVENOUS at 23:58

## 2023-01-31 RX ADMIN — BUMETANIDE 1 MG: 0.25 INJECTION INTRAMUSCULAR; INTRAVENOUS at 14:00

## 2023-01-31 RX ADMIN — PANTOPRAZOLE SODIUM 40 MG: 40 TABLET, DELAYED RELEASE ORAL at 06:11

## 2023-01-31 RX ADMIN — VANCOMYCIN HYDROCHLORIDE 1000 MG: 10 INJECTION, POWDER, LYOPHILIZED, FOR SOLUTION INTRAVENOUS at 06:10

## 2023-01-31 RX ADMIN — METHYLPREDNISOLONE SODIUM SUCCINATE 80 MG: 125 INJECTION, POWDER, FOR SOLUTION INTRAMUSCULAR; INTRAVENOUS at 14:00

## 2023-01-31 RX ADMIN — NIFEDIPINE 60 MG: 30 TABLET, EXTENDED RELEASE ORAL at 09:36

## 2023-01-31 RX ADMIN — ENOXAPARIN SODIUM 30 MG: 100 INJECTION SUBCUTANEOUS at 17:36

## 2023-01-31 RX ADMIN — SODIUM BICARBONATE: 84 INJECTION, SOLUTION INTRAVENOUS at 04:22

## 2023-01-31 RX ADMIN — LORAZEPAM 2 MG: 2 INJECTION INTRAMUSCULAR at 13:00

## 2023-01-31 RX ADMIN — CALCITRIOL CAPSULES 0.25 MCG 0.25 MCG: 0.25 CAPSULE ORAL at 09:36

## 2023-01-31 RX ADMIN — ALLOPURINOL 100 MG: 100 TABLET ORAL at 09:36

## 2023-01-31 RX ADMIN — IPRATROPIUM BROMIDE AND ALBUTEROL SULFATE 1 AMPULE: 2.5; .5 SOLUTION RESPIRATORY (INHALATION) at 14:15

## 2023-01-31 RX ADMIN — TAMSULOSIN HYDROCHLORIDE 0.4 MG: 0.4 CAPSULE ORAL at 09:36

## 2023-01-31 RX ADMIN — METOCLOPRAMIDE 10 MG: 5 INJECTION, SOLUTION INTRAMUSCULAR; INTRAVENOUS at 06:10

## 2023-01-31 RX ADMIN — METHYLPREDNISOLONE SODIUM SUCCINATE 80 MG: 125 INJECTION, POWDER, FOR SOLUTION INTRAMUSCULAR; INTRAVENOUS at 17:36

## 2023-01-31 RX ADMIN — IPRATROPIUM BROMIDE AND ALBUTEROL SULFATE 1 AMPULE: 2.5; .5 SOLUTION RESPIRATORY (INHALATION) at 11:11

## 2023-01-31 ASSESSMENT — ENCOUNTER SYMPTOMS
COUGH: 0
ABDOMINAL PAIN: 1
VOMITING: 0
NAUSEA: 0
SHORTNESS OF BREATH: 0

## 2023-01-31 NOTE — CONSULTS
[facility] Vascular Access Team:  Consult Note    Patient: Veronica Garcia  YOB: 1940   MRN: [id number]  Room: [address] [place] [address]     Attending Physician: Santiago Padilla MD  Ordering Physician: Santiago Padilla MD    Diagnosis:   Pyelonephritis [N12]  Pyelonephritis of left kidney [N12]  Nausea and vomiting, unspecified vomiting type [R11.2]    Active LDAs:  Peripheral IV 01/31/23 Right Forearm (Active)   Number of days: 0       Urinary Catheter 01/28/23 García (Active)   Number of days: 3       Reason for Consult:  [facility] vascular access team consulted for placement of Ultrasound Guided Peripheral IV. Indication(s):  Veronica Garcia is a 79 y.o. male admitted to [address] [place] [address] for Pyelonephritis. Veronica Garcia was planned to have a Midline or PICC line placed, but order was canceled per Dr. Gallito Coughlin most recent note. Patient lost IV access and is a difficult stick. Findings:  Per most recent infectious disease note 01/31:   Recommendations:  Given increasing creatinine going to discontinue vancomycin  Would hold on PICC or midline placement  Going to transition to oral antibiotic treatment with linezolid    Patient will no longer require a midline or PICC line. A 20G Ultrasound Guide Peripheral IV was placed in patient's right forearm with one attempt and no complications. Patient tolerated well. Patient's arms are swollen and edematous. Veins are >0.5cm deep. Patient tolerated well.       Past Medical History:   Diagnosis Date    Benign non-nodular prostatic hyperplasia with lower urinary tract symptoms 8/16/2017    Essential hypertension 8/16/2017    Gastroesophageal reflux disease without esophagitis 8/16/2017    Gout 8/16/2017    Idiopathic peripheral neuropathy 2/19/2018    Mixed hyperlipidemia 8/16/2017    Peripheral neuropathic pain 8/16/2017    Primary osteoarthritis involving multiple joints 8/16/2017    Renal stone 1/23/2023    Restless legs syndrome 8/16/2017       Recent Labs     Units 01/31/23  0607   WBC K/uL 9. 2   PLT K/uL 200   CREATININE mg/dL 2.7*       Impression/Plan:  1. Ultrasound-Guided Peripheral IV is ready to be used    Thank you for your time and consult.      Electronically Signed By: Mirta Pallas, RN on 1/31/2023 at 1:56 PM

## 2023-01-31 NOTE — PROGRESS NOTES
Physical Therapy  Name: Bal Santamaria  MRN:  [id number]  Date of service:  1/31/2023 01/31/23 0941   Restrictions/Precautions   Restrictions/Precautions Fall Risk   Subjective   Subjective Pt confused, needing to go to the bathroom. Pain Assessment   Pain Assessment None - Denies Pain   Bed Mobility   Supine to Sit Contact guard assistance   Sit to Supine Contact guard assistance   Transfers   Sit to Stand Minimal Assistance   Stand to Sit Contact guard assistance   Ambulation   Surface Level tile   Device Rolling Walker   Assistance Minimal assistance   Distance 10' x2   Other Activities   Comment amb into bathroom and assisted with cleanup, able to stand with CGA for balance   Short Term Goals   Time Frame for Short Term Goals 2 wks   Short Term Goal 1 amb 200 ft with rw, SBA   Conditions Requiring Skilled Therapeutic Intervention   Body Structures, Functions, Activity Limitations Requiring Skilled Therapeutic Intervention Decreased functional mobility ; Decreased safe awareness;Decreased balance;Decreased endurance   Assessment Pt having confusion today and requires frequent cueing for all mobility and safety. Amb into bathroom, had bowel movement and able to maintain standing balance for cleanup. Unsteady with amb and maintains flexed posture. Assisted back to bed and with gown change. Activity Tolerance   Activity Tolerance Treatment limited secondary to decreased cognition   PT Plan of Care   Tuesday X   Safety Devices   Type of Devices Bed alarm in place;Call light within reach; Left in bed         Electronically signed by Julio Goncalves PTA on 1/31/2023 at 9:53 AM

## 2023-01-31 NOTE — PROGRESS NOTES
Pharmacy Renal Adjustment    Erik Stephenson is a 79 y.o. male. Pharmacy has renally adjusted medications per protocol. Recent Labs     01/30/23  0333 01/31/23  0607   BUN 28* 35*       Recent Labs     01/30/23  0333 01/31/23  0607   CREATININE 1.9* 2.7*       Estimated Creatinine Clearance: 23 mL/min (A) (based on SCr of 2.7 mg/dL (H)).     Height:   Ht Readings from Last 1 Encounters:   01/26/23 6' (1.829 m)     Weight:  Wt Readings from Last 1 Encounters:   01/23/23 195 lb 11.2 oz (88.8 kg)       Plan: Adjust the following medications based on renal function:           Change Reglan to 5 mg IV every 6 hours    Electronically signed by Krystina Cheatham, [address] [place] [address] on 1/31/2023 at 12:48 PM

## 2023-01-31 NOTE — PROGRESS NOTES
Nephrology ([facility]) Progress Note    Patient:  Shelby Cartagena  YOB: 1940  Date of Service: 1/31/2023  MRN: [id number]   Acct: [de-identified]   Primary Care Physician: Davion Alvarado MD  Advance Directive: Full Code  Admit Date: 1/23/2023       Hospital Day: 8  Referring Provider: Fransisco Kaur MD    Patient independently seen and examined, Chart, Consults, Notes, Operative notes, Labs, Cardiology, and Radiology studies reviewed as available. Chief complaint: Abdominal labs. Subjective:  Shelby Cartagena is a 79 y.o. male for whom we were consulted for evaluation and treatment of acute kidney injury. Patient denies any history of chronic kidney disease. He was admitted with left flank pain radiating to the anterior abdominal wall. He has history of type 2 diabetes, hypertension, osteoarthritis and gout. CT scan of the abdomen in the emergency room consistent with nonobstructing left kidney stone associated with pyelonephritis. Hospital course remarkable for IV antibiotics and IV fluid administration and has worsening of renal function. Nephrology is consulted for evaluation of acute kidney injury. Patient also has staph epidermis bacteremia. This morning patient is feeling weak. He is also reporting mild shortness of breath. He has urine output but he is retaining lots of fluid.     Allergies:  Codeine    Medicines:  Current Facility-Administered Medications   Medication Dose Route Frequency Provider Last Rate Last Admin    linezolid (ZYVOX) tablet 600 mg  600 mg Oral 2 times per day Quentin Collado MD        QUEtiapine (SEROQUEL) tablet 25 mg  25 mg Oral TID Fransisco Kaur MD        methylPREDNISolone sodium (SOLU-MEDROL) injection 80 mg  80 mg IntraVENous Q8H Fransisco Kaur MD        ipratropium-albuterol (DUONEB) nebulizer solution 1 ampule  1 ampule Inhalation Q4H WA Fransisco Kaur MD        sodium bicarbonate 75 mEq in dextrose 5 % and 0.45 % NaCl 1,000 mL infusion   IntraVENous Continuous Laron Mead  mL/hr at 01/31/23 0422 New Bag at 01/31/23 0422    simethicone (MYLICON) chewable tablet 80 mg  80 mg Oral 4x Daily Eron Sorto MD   80 mg at 01/31/23 0936    metoclopramide (REGLAN) injection 10 mg  10 mg IntraVENous Q6H Eron Sorto MD   10 mg at 01/31/23 0610    NIFEdipine (PROCARDIA XL) extended release tablet 60 mg  60 mg Oral Daily Eron Sorto MD   60 mg at 01/31/23 0936    polyethylene glycol (GLYCOLAX) packet 17 g  17 g Oral BID Eron Sorto MD   17 g at 01/31/23 9213    sodium bicarbonate tablet 650 mg  650 mg Oral BID Darian Dorsey MD   650 mg at 01/31/23 0936    enoxaparin (LOVENOX) injection 40 mg  40 mg SubCUTAneous Daily Rayleen Challenger, APRN - CNP   40 mg at 01/30/23 1720    calcitRIOL (ROCALTROL) capsule 0.25 mcg  0.25 mcg Oral Daily Darian Dorsey MD   0.25 mcg at 01/31/23 0936    mirtazapine (REMERON) tablet 7.5 mg  7.5 mg Oral Nightly Ivory Allen MD   7.5 mg at 01/29/23 2129    tamsulosin (FLOMAX) capsule 0.4 mg  0.4 mg Oral Daily Rayleen Challenger, APRN - CNP   0.4 mg at 01/31/23 0936    sodium chloride flush 0.9 % injection 5-40 mL  5-40 mL IntraVENous 2 times per day Rayleen Challenger, APRN - CNP   10 mL at 01/30/23 2206    sodium chloride flush 0.9 % injection 5-40 mL  5-40 mL IntraVENous PRN Rayleen Challenger, APRN - CNP        0.9 % sodium chloride infusion   IntraVENous PRN Rayleen Challenger, APRN - CNP        ondansetron (ZOFRAN-ODT) disintegrating tablet 4 mg  4 mg Oral Q8H PRN Rayleen Challenger, APRN - CNP        Or    ondansetron [facility]) injection 4 mg  4 mg IntraVENous Q6H PRN Rayleen Challenger, APRN - CNP   4 mg at 01/28/23 0323    acetaminophen (TYLENOL) tablet 650 mg  650 mg Oral Q6H PRN GOLD Cortes - CNP        Or    acetaminophen (TYLENOL) suppository 650 mg  650 mg Rectal Q6H PRN Art Huberr, APRN - MARY ANN        traMADol [place]ose [place]) tablet 50 mg  50 mg Oral Q6H PRN Ivory Allen MD   50 mg at 01/29/23 0216    allopurinol (ZYLOPRIM) tablet 100 mg  100 mg Oral Daily Faith Ramses, APRN - CNP   100 mg at 23 0666    atorvastatin (LIPITOR) tablet 20 mg  20 mg Oral Daily Faith Pearce, APRN - CNP   20 mg at 23 2290    metoprolol succinate (TOPROL XL) extended release tablet 50 mg  50 mg Oral Daily Faith Pearce, APRN - CNP   50 mg at 23 3637    pantoprazole (PROTONIX) tablet 40 mg  40 mg Oral QAM AC Faith Pearce, APRN - CNP   40 mg at 23 9957    pregabalin (LYRICA) capsule 100 mg  100 mg Oral BID Faith Ramses, APRN - CNP   100 mg at 23 3148    docusate sodium (COLACE) capsule 100 mg  100 mg Oral Daily Faith Ramses, APRN - CNP   100 mg at 23 0179       Past Medical History:  Past Medical History:   Diagnosis Date    Benign non-nodular prostatic hyperplasia with lower urinary tract symptoms 2017    Essential hypertension 2017    Gastroesophageal reflux disease without esophagitis 2017    Gout 2017    Idiopathic peripheral neuropathy 2018    Mixed hyperlipidemia 2017    Peripheral neuropathic pain 2017    Primary osteoarthritis involving multiple joints 2017    Renal stone 2023    Restless legs syndrome 2017       Past Surgical History:  Past Surgical History:   Procedure Laterality Date    INGUINAL HERNIA REPAIR Bilateral 1963    TURP         Family History  Family History   Problem Relation Age of Onset    Uterine Cancer Mother     Coronary Art Dis Father         MI at 62    COPD Sister     Stroke Brother        Social History  Social History     Socioeconomic History    Marital status:       Spouse name: Brody Gerber    Number of children: 2    Years of education: 12    Highest education level: Not on file   Occupational History     Employer: RETIRED   Tobacco Use    Smoking status: Former     Packs/day: 3.00     Years: 30.00     Pack years: 90.00     Types: Cigarettes     Quit date: 1975     Years since quittin.1 Smokeless tobacco: Never   Substance and Sexual Activity    Alcohol use: No    Drug use: No    Sexual activity: Yes     Partners: Female     Comment: wife   Other Topics Concern    Not on file   Social History Narrative    Not on file     Social Determinants of Health     Financial Resource Strain: Low Risk     Difficulty of Paying Living Expenses: Not hard at all   Food Insecurity: No Food Insecurity    Worried About Running Out of Food in the Last Year: Never true    Ran Out of Food in the Last Year: Never true   Transportation Needs: Not on file   Physical Activity: Unknown    Days of Exercise per Week: 0 days    Minutes of Exercise per Session: Not on file   Stress: Not on file   Social Connections: Not on file   Intimate Partner Violence: Unknown    Fear of Current or Ex-Partner: Patient refused    Emotionally Abused: Patient refused    Physically Abused: Patient refused    Sexually Abused: Patient refused   Housing Stability: Not on file         Review of Systems:  History obtained from chart review and the patient  General ROS: No fever or chills  Respiratory ROS: No cough, shortness of breath, wheezing  Cardiovascular ROS: No chest pain or palpitations  Gastrointestinal ROS: No abdominal pain or melena  Genito-Urinary ROS: No dysuria or hematuria  Musculoskeletal ROS: No joint pain or swelling         Objective:  Patient Vitals for the past 24 hrs:   BP Temp Temp src Pulse Resp SpO2   01/31/23 0540 (!) 149/80 99.9 °F (37.7 °C) -- (!) 107 20 93 %   01/31/23 0201 (!) 110/57 97.2 °F (36.2 °C) Oral 63 18 95 %   01/30/23 2200 (!) 104/45 -- -- 88 18 --   01/30/23 1813 (!) 152/113 97.5 °F (36.4 °C) Oral 84 16 95 %   01/30/23 1304 95/73 97 °F (36.1 °C) Oral 91 18 93 %       Intake/Output Summary (Last 24 hours) at 1/31/2023 1041  Last data filed at 1/31/2023 0201  Gross per 24 hour   Intake 720 ml   Output 550 ml   Net 170 ml     General: awake/alert   HEENT: Normocephalic atraumatic head  Neck: Supple with no JVD or carotid bruits. Chest:  clear to auscultation bilaterally  CVS: regular rate and rhythm  Abdominal: soft, nontender, normal bowel sounds  Extremities: Bilateral 2+ edema. Skin: warm and dry without rash    Labs:  BMP:   Recent Labs     01/29/23  0246 01/30/23  0333 01/31/23  0607    138 139   K 3.8 3.6 3.5    105 104   CO2 19* 23 21*   BUN 29* 28* 35*   CREATININE 1.7* 1.9* 2.7*   CALCIUM 8.3* 8.6* 9.2     CBC:   Recent Labs     01/31/23  0607   WBC 9.2   HGB 12.6*   HCT 38.4*   MCV 84.4        LIVER PROFILE:   Recent Labs     01/31/23  0607   AST 22   ALT 18   BILITOT 0.5   ALKPHOS 79     PT/INR: No results for input(s): PROTIME, INR in the last 72 hours. APTT: No results for input(s): APTT in the last 72 hours. BNP:  No results for input(s): BNP in the last 72 hours. Ionized Calcium:No results for input(s): IONCA in the last 72 hours. Magnesium:No results for input(s): MG in the last 72 hours. Phosphorus:No results for input(s): PHOS in the last 72 hours. HgbA1C: No results for input(s): LABA1C in the last 72 hours. Hepatic:   Recent Labs     01/31/23  0607   ALKPHOS 79   ALT 18   AST 22   PROT 6.4*   BILITOT 0.5   LABALBU 3.0*     Lactic Acid: No results for input(s): LACTA in the last 72 hours. Troponin: No results for input(s): CKTOTAL, CKMB, TROPONINT in the last 72 hours. ABGs: No results found for: PHART, PO2ART, [id number]  CRP:  No results for input(s): CRP in the last 72 hours. Sed Rate:  No results for input(s): SEDRATE in the last 72 hours. Culture Results:   Blood Culture Recent:   Recent Labs     01/25/23  0608   BC No growth after 5 days of incubation.        Urine Culture Recent :   Recent Labs     01/23/23  1659   LABURIN >100,000 CFU/ml  Mixed skin carol present  *  Heavy growth       Radiology reports as per the Radiologist  Radiology: CT HEAD WO CONTRAST    Result Date: 1/24/2023  Patient: Lyle Morales  Time Out: 00:29Exam(s): CT HEAD Without Contrast EXAM: CT Head Without Intravenous ContrastCLINICAL HISTORY:   Reason for exam: unwitnessed fall. TECHNIQUE:  Axial computed tomography images of the head/brain without intravenous contrast.COMPARISON:   Head CT 4/2/13. FINDINGS:  Brain: Mild atrophy and chronic white matter disease, has progressed, as expected. No edema or acute infarct. No acute hemorrhage. Ventricles:   No hydrocephalus or midline shift. Bones/joints:   No skull fracture. Soft tissues: No scalp hematoma. Sinuses: Moderate mucosal thickening left maxillary sinus, compatible with chronic sinusitis. Remaining sinuses are clear. No fluid level. Mastoid air cells:   No mastoid effusion. 1.  Mild age-related findings. 2. No acute infarct, bleed, or acute intracranial abnormality. Electronically signed by Bronwyn Maldonado M.D. on 01-25-23 at [id number] [facility] [place] [place] RENAL COMPLETE    Result Date: 1/25/2023  RETROPERITONEAL ULTRASOUND: HISTORY: Worsening ALEXANDRO COMPARISON STUDY: CT of abdomen pelvis from 1/23/2023. PROCEDURE: Real-time ultrasound imaging was performed of the retroperitoneum. FINDINGS: The abdominal aorta and IVC were not visualized. The right kidney measures up to to 12.0 cm in length. The left kidney measures up to 12.3 cm in length. There is a cyst seen at the right kidney measuring up to 3.1 x 2.6 x 3.4 cm and at the interpolar region measuring up to 2.9 cm which has a thin septation. There are additional smaller cysts present. There is a small cyst seen at the left kidney measuring up to 1.4 cm. Both kidneys demonstrate mildly increased corticomedullary differentiation with no evidence of hydronephrosis. The visualized portions of the urinary bladder appear unremarkable. No free intraperitoneal fluid is seen. There is no evidence of hydronephrosis. Both kidneys demonstrate increased cortical echogenicity, suggestive of chronic medical renal disease.     CT ABDOMEN PELVIS W IV CONTRAST Additional Contrast? None    Result Date: 1/23/2023  NO PRIOR REPORT AVAILABLE Exam: CT OF THE ABDOMEN/PELVIS WITH IV CONTRAST Clinical data: Left LQ pain Technique:Direct contiguousaxial CT images were acquired through the abdomen and pelvis with intravenouscontrastusing soft tissue and bone algorithms. Oral contrast was not administered. Reformatted/MPR images were performed. Radiation dose: CTDIvol =10.77 mGy, DLP =645.28 mGy x cm. Limitations: Lack of oral contrast limits evaluation of the bowel loops. Prior Studies: No prior studies submitted. Findings: Lung bases: No acute pulmonary process. Mild cardiomegaly. d Liver:Unremarkable size andcontour. Low attenuated lesions within the liver measuring up to 2.1 cm consistent with simple cysts. Normal density. No evidence of mass. No evidence of dilated ducts. Gallbladder Fossa: Unremarkable Spleen: Grossly unremarkable. Pancreas/adrenal glands: Grossly unremarkable size, contour and density. Kidneys: In anatomic position. Grossly unremarkablerenal size, contour and density. Cystic lesions of the kidneys measuring up to 3.9 cm with simple appearance. Coarse calcification within the parenchyma posteriorly within the interpolar left kidney can be from prior inflammatory infectious or chronic scarring. 4-5 mm nonobstructing stone within the left renal pelvis. No evidence of a renal mass. Perinephric space is unremarkable. Retroperitoneum: No enlarged retroperitoneal lymphadenopathy. The aorta and IVC appear unremarkable. Peritoneal cavity: No evidence of free air or ascites. Gastrointestinal tract: No obstruction. Stool filled colon. Portions of the small bowel demonstrate mucosal thickening. Appendix: Unremarkable Pelvis: Solid and hollow viscera grossly unremarkable. Osseous structures: No acute or destructive bony process identified. Degenerative changes of the spine. 1. Portions of the mid small bowel suggest mild mucosal thickening. Exact etiology is uncertain. Mild enteritis is not excluded. No fluid-filled bowel loops.  2. Stool filled colon. 3. Simple cystic lesions of the liver and kidneys. 4. Nonobstructing stone within the left renal pelvis. 5. Focal coarse calcification in the left renal parenchyma. This can be from prior inflammatory infectious process. Recommendation: Follow up as clinically indicated. All CT scans at this facility utilize dose modulation, iterative reconstruction, and/or weight based dosing when appropriate to reduce radiation dose to as low as reasonably achievable. Dictated and Electronically Signed by Florida Haskins MD, [facility] CERTIFIED at [id number] 07:03:41 PM EST                Assessment   1. Acute kidney injury/worsening. 2.  Acute tubular necrosis. 3.  Staph epi bacteremia. 4.  Acute pyelonephritis due to staph epi. 5.  Left renal stone. 6.  Metabolic acidemia. 7.  Secondary hyperparathyroidism. Plan:  1. Discontinue IV fluid. 2.  Intravenous diuretics. 3.  Continue to monitor renal function. May need dialysis if no improvement of renal function. Plan was discussed with patient.     Thomas Antony MD  01/31/23  10:41 AM

## 2023-01-31 NOTE — PROGRESS NOTES
Entered room to find patient confused . Talking about taking a trip down the road . VS obtained Temp 99.3 -88-24- 104/45 Sao2 found to be 82-83 % . Placed on O2 @ 3l/nc. Medications held until patient more alert .

## 2023-01-31 NOTE — CONSULTS
[facility] Neurology Consult        Patient:   Lety Lipscomb  MR#:    [id number]  Account Number:                   [id number]      Room:    0333/333-02   YOB: 1940  Date of Progress Note: 1/31/2023  Time of Note                           8:49 AM  Attending Physician:  Ace De Leon MD  Consulting Physician:   Julian Phillips M.D.        [address]: Visual change      HISTORY OF PRESENT ILLNESS:   This 79 y.o. male with past medical history significant for hypertension, hyperlipidemia, and neuropathy is seen for evaluation of visual disturbance. The patient admitted on 1/23/2023 with abdominal pain. Work-up revealed a nonobstructive stone in the left renal pelvis and is under treatment for pyelonephritis. Neurology consultation is called for visual disturbance. The patient is not the best historian but indicates that about 3 weeks ago he noticed poor vision in his lower visual fields. CT of the head on admission with no clear acute changes noted. It is unclear if this is constant or intermittent. He denies diplopia, dysarthria or dysphagia. He was slightly confused overnight. REVIEW OF SYSTEMS:  Constitutional - No fever or chills. No diaphoresis or significant fatigue. HENT -  No tinnitus or significant hearing loss. Eyes - no sudden vision change or eye pain  Respiratory - no significant shortness of breath or cough  Cardiovascular - no chest pain No palpitations or significant leg swelling  Gastrointestinal - no abdominal swelling or pain. Genitourinary - No difficulty urinating, dysuria  Musculoskeletal - no back pain or myalgia. Skin - no color change or rash  Neurologic - No seizures. No lateralizing weakness. Hematologic - no easy bruising or excessive bleeding. Psychiatric - no severe anxiety or nervousness. All other review of systems are negative.       Past Medical History:      Diagnosis Date    Benign non-nodular prostatic hyperplasia with lower urinary tract symptoms 8/16/2017    Essential hypertension 8/16/2017    Gastroesophageal reflux disease without esophagitis 8/16/2017    Gout 8/16/2017    Idiopathic peripheral neuropathy 2/19/2018    Mixed hyperlipidemia 8/16/2017    Peripheral neuropathic pain 8/16/2017    Primary osteoarthritis involving multiple joints 8/16/2017    Renal stone 1/23/2023    Restless legs syndrome 8/16/2017       Past Surgical History:      Procedure Laterality Date    INGUINAL HERNIA REPAIR Bilateral 1963    TURP  2003       Medications in Hospital:      Current Facility-Administered Medications:     sodium bicarbonate 75 mEq in dextrose 5 % and 0.45 % NaCl 1,000 mL infusion, , IntraVENous, Continuous, Jemal Jarquin MD, Last Rate: 100 mL/hr at 01/31/23 0422, New Bag at 01/31/23 0422    simethicone (MYLICON) chewable tablet 80 mg, 80 mg, Oral, 4x Daily, Joyce Sawyer MD, 80 mg at 01/30/23 1720    metoclopramide (REGLAN) injection 10 mg, 10 mg, IntraVENous, Q6H, Joyce Sawyer MD, 10 mg at 01/31/23 0610    NIFEdipine (PROCARDIA XL) extended release tablet 60 mg, 60 mg, Oral, Daily, Joyce Sawyer MD, 60 mg at 01/30/23 0817    polyethylene glycol (GLYCOLAX) packet 17 g, 17 g, Oral, BID, Joyce Sawyer MD, 17 g at 01/30/23 0815    sodium bicarbonate tablet 650 mg, 650 mg, Oral, BID, King Milan MD, 650 mg at 01/30/23 0817    enoxaparin (LOVENOX) injection 40 mg, 40 mg, SubCUTAneous, Daily, GOLD Neumann CNP, 40 mg at 01/30/23 1720    calcitRIOL (ROCALTROL) capsule 0.25 mcg, 0.25 mcg, Oral, Daily, King Milan MD, 0.25 mcg at 01/30/23 0816    mirtazapine (REMERON) tablet 7.5 mg, 7.5 mg, Oral, Nightly, Sanna Mendez MD, 7.5 mg at 01/29/23 2129    vancomycin (VANCOCIN) intermittent dosing (placeholder), , Other, RX Placeholder, GOLD Neumann - CNP    tamsulosin [facility]) capsule 0.4 mg, 0.4 mg, Oral, Daily, GOLD Neumann - CNP, 0.4 mg at 01/30/23 0816    sodium chloride flush 0.9 % injection 5-40 mL, 5-40 mL, IntraVENous, 2 times per day, GOLD Montana - CNP, 10 mL at 01/30/23 2206    sodium chloride flush 0.9 % injection 5-40 mL, 5-40 mL, IntraVENous, PRN, Judith Boo APRN - CNP    0.9 % sodium chloride infusion, , IntraVENous, PRN, Judith Boo, APRN - CNP    ondansetron (ZOFRAN-ODT) disintegrating tablet 4 mg, 4 mg, Oral, Q8H PRN **OR** ondansetron (ZOFRAN) injection 4 mg, 4 mg, IntraVENous, Q6H PRN, Judith Boo, APRN - CNP, 4 mg at 01/28/23 0194    acetaminophen (TYLENOL) tablet 650 mg, 650 mg, Oral, Q6H PRN **OR** acetaminophen (TYLENOL) suppository 650 mg, 650 mg, Rectal, Q6H PRN, Judith Boo APRN - CNP    traMADol (ULTRAM) tablet 50 mg, 50 mg, Oral, Q6H PRN, Jazz Reyna MD, 50 mg at 01/29/23 0216    allopurinol (ZYLOPRIM) tablet 100 mg, 100 mg, Oral, Daily, Judith Boo, APRN - CNP, 100 mg at 01/30/23 0817    atorvastatin (LIPITOR) tablet 20 mg, 20 mg, Oral, Daily, Judith Ema, APRN - CNP, 20 mg at 01/30/23 0817    metoprolol succinate (TOPROL XL) extended release tablet 50 mg, 50 mg, Oral, Daily, Judith Boo APRN - CNP, 50 mg at 01/30/23 0817    pantoprazole (PROTONIX) tablet 40 mg, 40 mg, Oral, QAM AC, Judith Boo, APRN - CNP, 40 mg at 01/31/23 9750    pregabalin (LYRICA) capsule 100 mg, 100 mg, Oral, BID, Judith Ema, APRN - CNP, 100 mg at 01/30/23 0816    docusate sodium (COLACE) capsule 100 mg, 100 mg, Oral, Daily, Judith Ema, APRN - CNP, 100 mg at 01/30/23 9115    Allergies:  Codeine    Social History:   TOBACCO:   reports that he quit smoking about 48 years ago. His smoking use included cigarettes. He has a 90.00 pack-year smoking history. He has never used smokeless tobacco.  ETOH:   reports no history of alcohol use.     Family History:       Problem Relation Age of Onset    Uterine Cancer Mother     Coronary Art Dis Father         MI at 62    COPD Sister     Stroke Brother            Physical Exam:    Vitals: BP (!) 149/80   Pulse (!) 107   Temp 99.9 °F (37.7 °C)   Resp 20 Ht 6' (1.829 m)   Wt 195 lb 11.2 oz (88.8 kg)   SpO2 93%   BMI 26.54 kg/m²     Constitutional - well developed, well nourished. Eyes - conjunctiva normal.  Pupils not tested  Ear, nose, throat -hearing  intact to finger rub No scars, masses, or lesions over external nose or ears, no atrophy of tongue  Neck-symmetric, no masses noted, no jugular vein distension  Respiration- chest wall appears symmetric, good expansion,   normal effort without use of accessory muscles  Musculoskeletal - no significant wasting of muscles noted, no bony deformities  Extremities-no clubbing, cyanosis or edema  Skin - warm, dry, and intact. No rash, erythema, or pallor.   Psychiatric - mood, affect, and behavior appear normal.      Neurological exam  Awake, alert, fluent oriented x 2 appropriate affect  Attention and concentration appear appropriate  Recent and remote memory appears impaired  Speech normal without dysarthria  No clear issues with language of fund of knowledge    Cranial Nerve Exam   CN II- Visual fields grossly unremarkable  CN III, IV,VI-EOMI, No nystagmus, conjugate eye movements, mild bilateral ptosis  CN V-sensation intact to LT over face  CN VII-no facial assymetry  CN VIII-Hearing  intact to finger rub  CN IX and X- Palate not tested  CN XI-not test shoulder shrug  CN XII-Tongue midline with no fasciculations or fibrillations    Motor Exam  Antigravity throughout upper and lower extremities bilaterally, no cogwheeling, normal tone    Sensory Exam  Sensation intact to light touch and temperature upper and lower extremities bilaterally    Reflexes   Not tested    Tremors- no tremors in hands or head noted    Gait  Not tested    Coordination  Finger to nose-unremarkable        CBC:   Recent Labs     01/31/23  0607   WBC 9.2   HGB 12.6*          BMP:    Recent Labs     01/29/23  0246 01/30/23  0333 01/31/23  0607    138 139   K 3.8 3.6 3.5    105 104   CO2 19* 23 21*   BUN 29* 28* 35* CREATININE 1.7* 1.9* 2.7*   GLUCOSE 96 94 114*       Hepatic:   Recent Labs     01/31/23  0607   AST 22   ALT 18   BILITOT 0.5   ALKPHOS 79       Lipids: No results for input(s): CHOL, HDL in the last 72 hours. Invalid input(s): LDLCALCU    INR: No results for input(s): INR in the last 72 hours. Impression   1. Mild age-related findings. 2. No acute infarct, bleed, or acute intracranial abnormality. Electronically signed by Flory Hooks M.D. on 01-25-23 at 8607       Assessment and Plan     Visual impairment in the lower visual fields although currently exam shows no clear visual field deficit on bedside confrontation exam    Examination  Awake, alert, oriented x2, fluent  Mild ptosis bilaterally  Visual fields grossly intact to confrontation  Nonfocal    Plan  Check MRI of the brain and orbits  Will need outpatient ophthalmology evaluation    Encephalopathy in the setting of urinary tract infection/renal dysfunction  Hyperlipidemia-on statin  GI-bowel regimen/PPI  DVT prophylaxis-Lovenox  Hypertension-on meds monitor  Neuropathy-Lyrica  Renal stone-Flomax/IV fluids  UTI-on antibiotics  Renal dysfunction-on IV fluids  Pain control-tramadol as needed  History of gout-tramadol      PT/OT    Supportive care        Please feel free to call with any questions   [phone] (cell phone)    Dr Haley Ortiz certified in Neurology  Board Certified in [employer] Neurophysiology  Fellowship Trained in [employer] Neurophysiology    EMR Dragon/transcription disclaimer:Significant part of this  encounter note is electronic transcription/translation of spoken language to printed text. The electronic translation of spoken language may be erroneous, or at times, nonsensical words or phrases may be inadvertently transcribed.  Although I have reviewed the note for such errors, some may still exist.

## 2023-01-31 NOTE — PROGRESS NOTES
Automatic Dose Adjustment of                Subcutaneous Anticoagulant for Prophylaxis    Denia Jackson is a 79 y.o. male. Recent Labs     01/30/23  0333 01/31/23  0607   CREATININE 1.9* 2.7*       Estimated Creatinine Clearance: 23 mL/min (A) (based on SCr of 2.7 mg/dL (H)). Weight:  Wt Readings from Last 1 Encounters:   01/23/23 195 lb 11.2 oz (88.8 kg)           Pharmacy has adjusted subcutaneous anticoagulant for prophylaxis to Enoxaparin 30 mg SC daily based on the patient's weight and estimated CrCl per [facility] policy.                Electronically signed by Kiera Ayala, [facility] on 1/31/2023 at 12:53 PM

## 2023-01-31 NOTE — PROGRESS NOTES
Infectious Diseases Progress Note    Patient:  Froilan Alvares  YOB: 1940  MRN: [id number]   Admit date: 1/23/2023   Admitting Physician: Nate Noe MD  Primary Care Physician: Mary Crump MD    Chief Complaint/Interval History: Seen on rounds earlier this morning. Back and flank pain improving. He is without fever. He still feels he has some visual difficulties when he looks down toward his plate of food when he is lying in bed. Nurse does not feel like he grabs for utensils or his contents on his food plate quite as easily as he should. He had some visual symptoms yesterday. He does not have any headache or jaw claudication. He does not report any new localizing symptoms. In/Out    Intake/Output Summary (Last 24 hours) at 1/30/2023 1857  Last data filed at 1/30/2023 1840  Gross per 24 hour   Intake 1069 ml   Output 400 ml   Net 669 ml     Allergies:    Allergies   Allergen Reactions    Codeine Nausea Only     Current Meds: vancomycin (VANCOCIN) 1000 mg in sodium chloride 0.9% 250 mL IVPB, Q24H  sodium bicarbonate 75 mEq in dextrose 5 % and 0.45 % NaCl 1,000 mL infusion, Continuous  simethicone (MYLICON) chewable tablet 80 mg, 4x Daily  metoclopramide (REGLAN) injection 10 mg, Q6H  NIFEdipine (PROCARDIA XL) extended release tablet 60 mg, Daily  polyethylene glycol (GLYCOLAX) packet 17 g, BID  sodium bicarbonate tablet 650 mg, BID  enoxaparin (LOVENOX) injection 40 mg, Daily  calcitRIOL (ROCALTROL) capsule 0.25 mcg, Daily  mirtazapine (REMERON) tablet 7.5 mg, Nightly  vancomycin (VANCOCIN) intermittent dosing (placeholder), RX Placeholder  tamsulosin (FLOMAX) capsule 0.4 mg, Daily  sodium chloride flush 0.9 % injection 5-40 mL, 2 times per day  sodium chloride flush 0.9 % injection 5-40 mL, PRN  0.9 % sodium chloride infusion, PRN  ondansetron (ZOFRAN-ODT) disintegrating tablet 4 mg, Q8H PRN   Or  ondansetron (ZOFRAN) injection 4 mg, Q6H PRN  acetaminophen (TYLENOL) tablet 650 mg, Q6H PRN   Or  acetaminophen (TYLENOL) suppository 650 mg, Q6H PRN  traMADol (ULTRAM) tablet 50 mg, Q6H PRN  allopurinol (ZYLOPRIM) tablet 100 mg, Daily  atorvastatin (LIPITOR) tablet 20 mg, Daily  metoprolol succinate (TOPROL XL) extended release tablet 50 mg, Daily  pantoprazole (PROTONIX) tablet 40 mg, QAM AC  pregabalin (LYRICA) capsule 100 mg, BID  docusate sodium (COLACE) capsule 100 mg, Daily      Review of Systems see HPI    VitalSigns:  BP (!) 152/113   Pulse 84   Temp 97.5 °F (36.4 °C) (Oral)   Resp 16   Ht 6' (1.829 m)   Wt 195 lb 11.2 oz (88.8 kg)   SpO2 95%   BMI 26.54 kg/m²      Physical Exam  Line/IV site: No erythema, warmth, induration, or tenderness. Sclera nonicteric  No conjunctival hemorrhages  Lungs clear to auscultation without crackles  I examined his visual fields. He seems to have intact visual fields in all 4 quadrants of both eyes. I could not tell for sure but there may be some difficulties with central vision involving the right eye. Exam hard to complete at bedside. Did not see any conjunctival hemorrhages. No splinter hemorrhages or Jan[place]ay lesions    Lab Results:  CBC: No results for input(s): WBC, HGB, PLT in the last 72 hours. BMP:  Recent Labs     01/28/23  0254 01/29/23  0246 01/30/23  0333    141 138   K 3.8 3.8 3.6    107 105   CO2 20* 19* 23   BUN 30* 29* 28*   CREATININE 1.5* 1.7* 1.9*   GLUCOSE 101 96 94     CultureResults:  Urine culture January 23, 2023-Staph epidermidis  Blood culture January 23, 2023-Staph epidermidis  Blood cultures January 23, 2023-Staph epidermidis  Blood cultures January 25, 2023-no growth  Blood cultures January 25, 2023-no growth    Radiology: None    Additional Studies Reviewed:  None    Impression:  1. Urinary tract infection/pyelonephritis with Staph epidermidis  2. Staph epidermis bloodstream infection-cultures now clear-suspect urinary source  3.   Some visual changes-seems to involve primarily when he looks down toward his plate of food when sitting in bed. Would recommend ophthalmology evaluation. Did not find other focal neurological findings.   4.  Back and flank pain improving    Recommendations:  Continue vancomycin  Okay with me for PICC line or midline  Suggest 2 weeks of vancomycin from initiation  Would recommend ophthalmology evaluation-as there are no ophthalmologist with hospital privileges-he may need to arrangements for transport to an ophthalmology office  Discussed with Dr. Ann Place to follow    Shemar Mckeon MD

## 2023-01-31 NOTE — PROGRESS NOTES
Daily Progress Note    Date:2023  Patient: Manish Parikh  : 1940  GNK:[id number]  CODE:Full Code No additional code details  PCP:Bety Cuevas MD    Admit Date: 2023  3:25 PM   LOS: 8 days     Chief Complaint   Patient presents with    Abdominal Pain    Flank Pain     Pt states that he began having flank pain and LQ abdominal pain at 1000 this morning. Pt states that he has not had an episode like this in the past. Pt states he has had nausea, and has had consistent Bms. Pt denies diarrhea. ABCs are intact. Skin wpd. A&ox4. Subjective:    seen and evaluated at bedside along with RN, the patient has distended abdomen, not having bowel movement for the last 3 days since , feeling drowsy, poorly feeding, seen along with nephrology agreed for fluid management due to developing edema and started on bowel preparation     patient still complaining of poor vision in the lower visual field, still abdomen distended, no left flank pain, encouraged to participate in physical therapy and out of bed to chair, discussed about ALEXANDRO and obtaining FE urea nephrology on board. Still not having bowel movement     seen along with RN although the patient is awake and oriented to name place and person but is confused about the whole situation, however the patient asked about the hemodialysis and remember that was discussed by the nephrologist before me. Patient has 1 bowel movement last night and also today 2 times. No acute complaint, the RN reported because of confusion the patient cannot be mobilized out of bed to chair. Hospital Summary:  78 yo M with HTN, GERD, HLD, BPH s/p TURP who presented to [facility] ED with abdominal pain. CT abdomen in ED showed stool-filled colon, nonobstructing stone within L renal pelvis. Admitted to hospitalist service for UTI/Pyelonephritis. Nephrology consulted due to ALEXANDRO. Blood cultures returned 2/2 + for gram + cocci, PCR showing Staph Epi. ID consulted. ALEXANDRO improved w/ IVF and grove catheter placement. Pt's L flank pain improved after grove was placed. Underwent unsuccessful voiding trial on 1/27, grove was replaced. He endorses L flank pain again on 1/28. Plan to reimage L kidney if symptoms don't improve. May need MRI L spine if persistent symptoms with normal kidney imaging. The patient failed a voiding trial on 1/27 put back on Grove catheter and continue on Flomax        Review of Systems   Constitutional:  Negative for chills and fever. Respiratory:  Negative for cough and shortness of breath. Cardiovascular:  Negative for chest pain and palpitations. Gastrointestinal:  Positive for abdominal pain. Negative for nausea and vomiting. Objective:      Vital signs in last 24 hours:  Patient Vitals for the past 24 hrs:   BP Temp Temp src Pulse Resp SpO2   01/31/23 1316 108/64 -- -- 86 18 91 %   01/31/23 1113 -- -- -- -- -- 94 %   01/31/23 0540 (!) 149/80 99.9 °F (37.7 °C) -- (!) 107 20 93 %   01/31/23 0201 (!) 110/57 97.2 °F (36.2 °C) Oral 63 18 95 %   01/30/23 2200 (!) 104/45 -- -- 88 18 --   01/30/23 1813 (!) 152/113 97.5 °F (36.4 °C) Oral 84 16 95 %         I/O last 3 completed shifts: In: 1069 [P.O.:930; I.V.:139]  Out: 750 [Urine:750]  No intake/output data recorded. Physical Exam:  Vital Signs: /64   Pulse 86   Temp 99.9 °F (37.7 °C)   Resp 18   Ht 6' (1.829 m)   Wt 195 lb 11.2 oz (88.8 kg)   SpO2 91%   BMI 26.54 kg/m²   General appearance:. Lying comfortably in bed ,   HEENT: Normocephalic , Atraumatic, PERRL, JVP not raised  Chest: On inspection no use of accessory muscles of respiration, on auscultation vesicular breath sounds equal bilaterally no rales rhonchi or wheezing   cardiac: Normal heart sounds with regular rate and rhythm, S1, S2 normal. No murmurs, gallops, or rubs auscultated. Abdomen: The abdomen distended,  firm on palpation, tympanic on percussion over the flank , non-tender; normal bowel sounds, no masses, no organomegaly. Urogenital : ++ grove, no suprapubic , mild left CVA tenderness, swelling or edema of the preputial skin  Extremities: No clubbing or cyanosis. 1+ peripheral edema. Peripheral pulses palpable.   Neurologic: alert and Cooperative , Grossly intact, DTR equal,   Psychology: No hallucination, no delusion       Lab Review   Recent Results (from the past 24 hour(s))   Culture, Wound Aerobic Only    Collection Time: 01/31/23 12:30 AM    Specimen: Cath Site; Arm, Upper   Result Value Ref Range    WOUND/ABSCESS No growth to date     Gram Stain Result       Rare WBC's (Polymorphonuclear)  No organisms seen     Vancomycin Level, Trough    Collection Time: 01/31/23  6:07 AM   Result Value Ref Range    Vancomycin Tr 22.4 (HH) 10.0 - 20.0 ug/mL   CBC    Collection Time: 01/31/23  6:07 AM   Result Value Ref Range    WBC 9.2 4.8 - 10.8 K/uL    RBC 4.55 (L) 4.70 - 6.10 M/uL    Hemoglobin 12.6 (L) 14.0 - 18.0 g/dL    Hematocrit 38.4 (L) 42.0 - 52.0 %    MCV 84.4 80.0 - 94.0 fL    MCH 27.7 27.0 - 31.0 pg    MCHC 32.8 (L) 33.0 - 37.0 g/dL    RDW 13.2 11.5 - 14.5 %    Platelets 554 975 - 934 K/uL    MPV 11.3 9.4 - 12.4 fL   Comprehensive Metabolic Panel    Collection Time: 01/31/23  6:07 AM   Result Value Ref Range    Sodium 139 136 - 145 mmol/L    Potassium 3.5 3.5 - 5.0 mmol/L    Chloride 104 98 - 111 mmol/L    CO2 21 (L) 22 - 29 mmol/L    Anion Gap 14 7 - 19 mmol/L    Glucose 114 (H) 74 - 109 mg/dL    BUN 35 (H) 8 - 23 mg/dL    Creatinine 2.7 (H) 0.5 - 1.2 mg/dL    Est, Glom Filt Rate 23 (A) >60    Calcium 9.2 8.8 - 10.2 mg/dL    Total Protein 6.4 (L) 6.6 - 8.7 g/dL    Albumin 3.0 (L) 3.5 - 5.2 g/dL    Total Bilirubin 0.5 0.2 - 1.2 mg/dL    Alkaline Phosphatase 79 40 - 130 U/L    ALT 18 5 - 41 U/L    AST 22 5 - 40 U/L   Hemoglobin A1C    Collection Time: 01/31/23  6:07 AM   Result Value Ref Range    Hemoglobin A1C 5.8 4.0 - 6.0 %   EKG 12 Lead    Collection Time: 01/31/23 11:32 AM   Result Value Ref Range    P-R Interval 142 ms    QRS Duration 134 ms    Q-T Interval 360 ms    QTc Calculation (Bazett) 435 ms    P Axis 34 degrees    T Axis 88 degrees             Current Facility-Administered Medications:     linezolid (ZYVOX) tablet 600 mg, 600 mg, Oral, 2 times per day, Rafael Piedra MD    QUEtiapine (SEROQUEL) tablet 25 mg, 25 mg, Oral, TID, Bruno Boone MD    methylPREDNISolone sodium (SOLU-MEDROL) injection 80 mg, 80 mg, IntraVENous, Q8H, Bruno Boone MD, 80 mg at 01/31/23 1400    ipratropium-albuterol (DUONEB) nebulizer solution 1 ampule, 1 ampule, Inhalation, Q4H WA, Bruno Boone MD, 1 ampule at 01/31/23 1415    bumetanide (BUMEX) injection 1 mg, 1 mg, IntraVENous, Q12H, Hernan Ballesteros MD, 1 mg at 01/31/23 1400    metoclopramide (REGLAN) injection 5 mg, 5 mg, IntraVENous, Q6H, Bruno Boone MD    enoxaparin Sodium (LOVENOX) injection 30 mg, 30 mg, SubCUTAneous, Daily, GOLD Colbert - CNP    aspirin chewable tablet 81 mg, 81 mg, Oral, Daily, Arlene Mclain MD    simethicone (MYLICON) chewable tablet 80 mg, 80 mg, Oral, 4x Daily, Bruno Boone MD, 80 mg at 01/31/23 0936    NIFEdipine (PROCARDIA XL) extended release tablet 60 mg, 60 mg, Oral, Daily, Bruno Boone MD, 60 mg at 01/31/23 0936    polyethylene glycol (GLYCOLAX) packet 17 g, 17 g, Oral, BID, Bruno Boone MD, 17 g at 01/31/23 2101    sodium bicarbonate tablet 650 mg, 650 mg, Oral, BID, Joe Caceres MD, 650 mg at 01/31/23 1856    calcitRIOL (ROCALTROL) capsule 0.25 mcg, 0.25 mcg, Oral, Daily, Joe Caceres MD, 0.25 mcg at 01/31/23 4212    mirtazapine (REMERON) tablet 7.5 mg, 7.5 mg, Oral, Nightly, Khloe Leaks, MD, 7.5 mg at 01/29/23 2129    tamsulosin (FLOMAX) capsule 0.4 mg, 0.4 mg, Oral, Daily, GOLD Colbert CNP, 0.4 mg at 01/31/23 0936    sodium chloride flush 0.9 % injection 5-40 mL, 5-40 mL, IntraVENous, 2 times per day, GOLD Colbert CNP, 10 mL at 01/30/23 2206    sodium chloride flush 0.9 % injection 5-40 mL, 5-40 mL, IntraVENous, PRN, GOLD Ruvalcaba CNP    0.9 % sodium chloride infusion, , IntraVENous, PRN, GOLD Ruvalcaba - CNP    ondansetron (ZOFRAN-ODT) disintegrating tablet 4 mg, 4 mg, Oral, Q8H PRN **OR** ondansetron (ZOFRAN) injection 4 mg, 4 mg, IntraVENous, Q6H PRN, GOLD Ruvalcaba CNP, 4 mg at 01/28/23 4861    acetaminophen (TYLENOL) tablet 650 mg, 650 mg, Oral, Q6H PRN **OR** acetaminophen (TYLENOL) suppository 650 mg, 650 mg, Rectal, Q6H PRN, GOLD Ruvalcaba CNP    traMADol (ULTRAM) tablet 50 mg, 50 mg, Oral, Q6H PRN, Donny Ramirez MD, 50 mg at 01/29/23 0216    allopurinol (ZYLOPRIM) tablet 100 mg, 100 mg, Oral, Daily, GOLD Ruvalcaba - CNP, 100 mg at 01/31/23 0936    atorvastatin (LIPITOR) tablet 20 mg, 20 mg, Oral, Daily, GOLD Ruvalcaba - CNP, 20 mg at 01/31/23 0936    metoprolol succinate (TOPROL XL) extended release tablet 50 mg, 50 mg, Oral, Daily, GOLD Ruvalcaba - CNP, 50 mg at 01/31/23 0936    pantoprazole (PROTONIX) tablet 40 mg, 40 mg, Oral, QAM AC, GOLD Ruvalcaba - CNP, 40 mg at 01/31/23 9477    pregabalin (LYRICA) capsule 100 mg, 100 mg, Oral, BID, GOLD Ruvalcaba - CNP, 100 mg at 01/31/23 0936    docusate sodium (COLACE) capsule 100 mg, 100 mg, Oral, Daily, GOLD Ruvalcaba - CNP, 100 mg at 01/31/23 0218      Imaging:  CT ABDOMEN PELVIS W IV CONTRAST Additional Contrast? None    Result Date: 1/23/2023  1. Portions of the mid small bowel suggest mild mucosal thickening. Exact etiology is uncertain. Mild enteritis is not excluded. No fluid-filled bowel loops. 2. Stool filled colon. 3. Simple cystic lesions of the liver and kidneys. 4. Nonobstructing stone within the left renal pelvis. 5. Focal coarse calcification in the left renal parenchyma. This can be from prior inflammatory infectious process. Recommendation: Follow up as clinically indicated.  All CT scans at this facility utilize dose modulation, iterative reconstruction, and/or weight based dosing when appropriate to reduce radiation dose to as low as reasonably achievable. Dictated and Electronically Signed by Clara Felix MD, [facility] CERTIFIED at [id number] 07:03:41 PM EST                  Assessment/Plan  Principal Problem:    Pyelonephritis  Active Problems:    Renal stone    Visual disturbance    Mixed hyperlipidemia    Essential hypertension    Restless legs syndrome    Gastroesophageal reflux disease without esophagitis    Peripheral neuropathic pain    Gout    Benign non-nodular prostatic hyperplasia with lower urinary tract symptoms    Elevated PSA    Idiopathic peripheral neuropathy    Chronic idiopathic constipation  Resolved Problems:    * No resolved hospital problems. *       I have reviewed the patient's daily labs, including BMP and CBC with pertinent results discussed below.     L flank/back pain  -- Improved w/ UTI treatment and grove placed previously  -- Recurred this AM after voiding trial yesterday which required grove to be replaced  -- If symptoms do not improve today, would repeat L kidney US   -- If repeat L kidney US is normal, would obtain MRI L spine to rule out abscess/OM/discitis  1/29 reviewed renal ultrasound no evidence of hydronephrosis however suggestive of CKD, left flank pain is mild  1/30 left flank pain resolved    Staph Epidermidis bacteremia  Pyelonephritis  -- SIRS 1/4 on admission for WBC 13.6  -- UA w/ 21-30 WBCs, 6-10 RBCs, positive nitrite, mod LE  -- CT abdomen w/ nonobstructing stone in L renal pelvis, coarse calcification in L renal parenchyma  -- Renal US negative for hydronephrosis  -- 2/2 blood cultures + for staph epi on 1/23, sensitivity still pending  -- Repeat blood cultures drawn 1/25, NGTD  -- Continue Vancomycin, dc ceftriaxone  -- Urine culture growing staph epi, sensitivities reviewed  -- ID consulted, appreciate recommendations, note reviewed  1/31 vancomycin discontinued started on linezolid    ALEXANDRO could be prerenal versus intrinsic renal from vancomycin toxicity  -- Cr plateau at 2.2  -- nonobstructing stone on CT abdomen  -- Renal US negative for hydronephrosis  -- García placed 1/25 due to retention  -- CAT LUCIA [facility] for K 5.2 > 4.8  -- Consulted nephrology, appreciate recommendations, note reviewed  Creatinine improving 2.2, 1.7  1/30 followed up with [facility] urea nephrology change IV fluid to sodium bicarbonate and 5% dextrose and 45% NaCl  1/31 creatinine still trending up 2.7, getting volume overloaded, discontinue IV fluid, stress dose of Lasix nephrology on board plan for hemodialysis if no improvement, blood pressure is on the lower side holding nifedipine however the dose is given today    Abdominal distention likely due to constipation  1/29 started on Reglan 10 mg IV every 6 hours for 4 doses  Added simethicone 160 mg every 6 hours for 4 doses  Change MiraLAX to twice daily along with Colace will monitor clinically for bowel movement  1/30 continue Reglan every 6 hourly, simethicone 80 mg every 6 hourly, continue Colace and MiraLAX  1/31 abdominal distention persisting but constipation resolved with 3 bowel movement so far we will continue the same regimen for now    Acute delirium could be metabolic versus uremic encephalopathy  Presently failing kidney function  1/31 EKG reviewed QTC of 435 started on Seroquel 25 mg p.o. 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, on aspirin increase the atorvastatin dose to 80 mg daily we will obtain fasting lipid panel> might need CT angiogram to rule out source of infarct from the posterior cerebral artery but with CKD angiogram cannot be done, will obtain echocardiogram with bubble study    Poor appetite could be from constipation with constipation and monitor for bowel movement    Developing volume overload  Reduce IV fluid to 25 cc/h as the patient is poorly eating   we will monitor clinically  1/31 discontinue IV fluid    Preputial skin edema  Intermittent compressive bandage and ice application> resolved    Hypertension  Blood pressure is on the higher side started on nifedipine 60 mg daily and monitor vitals    Nephrolithiasis  BPH with obstruction  -- Nonobstructing on CT abdomen  -- Renal US negative for hydronephrosis 1/25  -- Continue Flomax  -- Considered starting Finasteride, but he has hx of elevated PSA so will defer to outpatient urologist  -- Grove placed 1/25  -- Voiding trial unsuccessful on 1/27, grove replaced  -- Will need outpatient urology follow up    Insomnia  -- Melatonin 10 mg QHS  -- Start Mirtazapine 7.5 mg QHS      DVT prophylaxis: Lovenox    DISPOSITION:  SNF placement, has a bed. May require PICC placement for IV ABX pending ID recommendations.      Full Code No additional code details        Nate Noe MD 1/31/2023 4:13 PM

## 2023-01-31 NOTE — PROGRESS NOTES
Patient had no IV access at the start of this RN shift. Unable to gain peripheral access. Message sent to [facility] RN for ultrasound guided IV.      Electronically signed by Kevin Phan RN on 1/31/2023 at 9:57 AM

## 2023-01-31 NOTE — PROGRESS NOTES
Daily Progress Note    Date:2023  Patient: Claudean Leventhal  : 1940  VCJ:[id number]  CODE:Full Code No additional code details  PCP:Bety Cuevas MD    Admit Date: 2023  3:25 PM   LOS: 7 days     Chief Complaint   Patient presents with    Abdominal Pain    Flank Pain     Pt states that he began having flank pain and LQ abdominal pain at 1000 this morning. Pt states that he has not had an episode like this in the past. Pt states he has had nausea, and has had consistent Bms. Pt denies diarrhea. ABCs are intact. Skin wpd. A&ox4. Subjective:    seen and evaluated at bedside along with RN, the patient has distended abdomen, not having bowel movement for the last 3 days since , feeling drowsy, poorly feeding, seen along with nephrology agreed for fluid management due to developing edema and started on bowel preparation     patient still complaining of poor vision in the lower visual field, still abdomen distended, no left flank pain, encouraged to participate in physical therapy and out of bed to chair, discussed about ALEXANDRO and obtaining FE urea nephrology on board. Still not having bowel movement    Hospital Summary:  80 yo M with HTN, GERD, HLD, BPH s/p TURP who presented to [facility] ED with abdominal pain. CT abdomen in ED showed stool-filled colon, nonobstructing stone within L renal pelvis. Admitted to hospitalist service for UTI/Pyelonephritis. Nephrology consulted due to ALEXANDRO. Blood cultures returned 2/2 + for gram + cocci, PCR showing Staph Epi. ID consulted. ALEXANDRO improved w/ IVF and grove catheter placement. Pt's L flank pain improved after grove was placed. Underwent unsuccessful voiding trial on , grove was replaced. He endorses L flank pain again on . Plan to reimage L kidney if symptoms don't improve. May need MRI L spine if persistent symptoms with normal kidney imaging.    The patient failed a voiding trial on  put back on Grove catheter and continue on Flomax        Review of Systems   Constitutional:  Negative for chills and fever. Respiratory:  Negative for cough and shortness of breath. Cardiovascular:  Negative for chest pain and palpitations. Gastrointestinal:  Positive for abdominal pain. Negative for nausea and vomiting. Objective:      Vital signs in last 24 hours:  Patient Vitals for the past 24 hrs:   BP Temp Temp src Pulse Resp SpO2   01/30/23 1813 (!) 152/113 97.5 °F (36.4 °C) Oral 84 16 95 %   01/30/23 1304 95/73 97 °F (36.1 °C) Oral 91 18 93 %   01/30/23 0755 138/83 98.8 °F (37.1 °C) Oral 80 18 91 %   01/30/23 0725 -- -- -- -- -- 90 %   01/30/23 0108 (!) 143/116 99.9 °F (37.7 °C) Temporal 89 16 90 %         I/O last 3 completed shifts: In: 2241 [P.O.:1695; I.V.:546]  Out: 1400 [Urine:1400]  No intake/output data recorded. Physical Exam:  Vital Signs: BP (!) 152/113   Pulse 84   Temp 97.5 °F (36.4 °C) (Oral)   Resp 16   Ht 6' (1.829 m)   Wt 195 lb 11.2 oz (88.8 kg)   SpO2 95%   BMI 26.54 kg/m²   General appearance:. Lying comfortably in bed ,   HEENT: Normocephalic , Atraumatic, PERRL, JVP not raised  Chest: On inspection no use of accessory muscles of respiration, on auscultation vesicular breath sounds equal bilaterally no rales rhonchi or wheezing   cardiac: Normal heart sounds with regular rate and rhythm, S1, S2 normal. No murmurs, gallops, or rubs auscultated. Abdomen: The abdomen distended,  firm on palpation, tympanic on percussion over the flank , non-tender; normal bowel sounds, no masses, no organomegaly. Urogenital : ++ grove, no suprapubic , mild left CVA tenderness, swelling or edema of the preputial skin  Extremities: No clubbing or cyanosis. 1+ peripheral edema. Peripheral pulses palpable.   Neurologic: alert and Cooperative , Grossly intact, DTR equal,   Psychology: No hallucination, no delusion       Lab Review   Recent Results (from the past 24 hour(s))   Vancomycin Level, Trough    Collection Time: 01/30/23 3:33 AM   Result Value Ref Range    Vancomycin Tr 16.9 10.0 - 20.0 ug/mL   Basic Metabolic Panel w/ Reflex to MG    Collection Time: 01/30/23  3:33 AM   Result Value Ref Range    Sodium 138 136 - 145 mmol/L    Potassium reflex Magnesium 3.6 3.5 - 5.0 mmol/L    Chloride 105 98 - 111 mmol/L    CO2 23 22 - 29 mmol/L    Anion Gap 10 7 - 19 mmol/L    Glucose 94 74 - 109 mg/dL    BUN 28 (H) 8 - 23 mg/dL    Creatinine 1.9 (H) 0.5 - 1.2 mg/dL    Est, Glom Filt Rate 34 (A) >60    Calcium 8.6 (L) 8.8 - 10.2 mg/dL   Iron and TIBC    Collection Time: 01/30/23  3:33 AM   Result Value Ref Range    Iron 19 (L) 59 - 158 ug/dL    TIBC 187 (L) 250 - 400 ug/dL    Iron Saturation 10 (L) 14 - 50 %   Ferritin    Collection Time: 01/30/23  3:33 AM   Result Value Ref Range    Ferritin 169.5 30.0 - 400.0 ng/mL   SODIUM, URINE, RANDOM    Collection Time: 01/30/23  1:05 PM   Result Value Ref Range    Sodium, Ur 81.0 mmol/L   Urea Nitrogen, Urine    Collection Time: 01/30/23  1:05 PM   Result Value Ref Range    Urea Nitrogen, Ur 807 mg/dL             Current Facility-Administered Medications:     vancomycin (VANCOCIN) 1000 mg in sodium chloride 0.9% 250 mL IVPB, 1,000 mg, IntraVENous, Q24H, Raquel Matos APRN - CNP, Stopped at 01/30/23 0750    sodium bicarbonate 75 mEq in dextrose 5 % and 0.45 % NaCl 1,000 mL infusion, , IntraVENous, Continuous, Quyen Gutierrez MD, Last Rate: 100 mL/hr at 01/30/23 1246, New Bag at 01/30/23 1246    simethicone (MYLICON) chewable tablet 80 mg, 80 mg, Oral, 4x Daily, Santiago Padilla MD, 80 mg at 01/30/23 1720    metoclopramide (REGLAN) injection 10 mg, 10 mg, IntraVENous, Q6H, Santiago Padilla MD, 10 mg at 01/30/23 1720    NIFEdipine (PROCARDIA XL) extended release tablet 60 mg, 60 mg, Oral, Daily, Santiago Padilla MD, 60 mg at 01/30/23 0817    polyethylene glycol (GLYCOLAX) packet 17 g, 17 g, Oral, BID, Santiago Padilla MD, 17 g at 01/30/23 0815    sodium bicarbonate tablet 650 mg, 650 mg, Oral, BID, Shady Rome MD, 650 mg at 01/30/23 0817    enoxaparin (LOVENOX) injection 40 mg, 40 mg, SubCUTAneous, Daily, Davonna Imus, APRN - CNP, 40 mg at 01/30/23 1720    calcitRIOL (ROCALTROL) capsule 0.25 mcg, 0.25 mcg, Oral, Daily, Annemarie Lopez MD, 0.25 mcg at 01/30/23 0816    mirtazapine (REMERON) tablet 7.5 mg, 7.5 mg, Oral, Nightly, Tracey Lane MD, 7.5 mg at 01/29/23 2129    vancomycin (VANCOCIN) intermittent dosing (placeholder), , Other, RX Placeholder, Chucka Imus, APRN - CNP    tamsulosin [facility]) capsule 0.4 mg, 0.4 mg, Oral, Daily, Davonna Imus, APRN - CNP, 0.4 mg at 01/30/23 0816    sodium chloride flush 0.9 % injection 5-40 mL, 5-40 mL, IntraVENous, 2 times per day, Davonna Imus, APRN - CNP, 10 mL at 01/30/23 0816    sodium chloride flush 0.9 % injection 5-40 mL, 5-40 mL, IntraVENous, PRN, Davonna Imus, APRN - CNP    0.9 % sodium chloride infusion, , IntraVENous, PRN, Davonna Imus, APRN - CNP    ondansetron (ZOFRAN-ODT) disintegrating tablet 4 mg, 4 mg, Oral, Q8H PRN **OR** ondansetron (ZOFRAN) injection 4 mg, 4 mg, IntraVENous, Q6H PRN, Davonna Imus, APRN - CNP, 4 mg at 01/28/23 0811    acetaminophen (TYLENOL) tablet 650 mg, 650 mg, Oral, Q6H PRN **OR** acetaminophen (TYLENOL) suppository 650 mg, 650 mg, Rectal, Q6H PRN, Davonna Imus, APRN - CNP    traMADol (ULTRAM) tablet 50 mg, 50 mg, Oral, Q6H PRN, Tracey Lane MD, 50 mg at 01/29/23 0216    allopurinol (ZYLOPRIM) tablet 100 mg, 100 mg, Oral, Daily, Davonna Imus, APRN - CNP, 100 mg at 01/30/23 0817    atorvastatin (LIPITOR) tablet 20 mg, 20 mg, Oral, Daily, Davonna Imus, APRN - CNP, 20 mg at 01/30/23 0817    metoprolol succinate (TOPROL XL) extended release tablet 50 mg, 50 mg, Oral, Daily, Davonna Imus, APRN - CNP, 50 mg at 01/30/23 0817    pantoprazole (PROTONIX) tablet 40 mg, 40 mg, Oral, QAM AC, Davonna Imus, APRN - CNP, 40 mg at 01/30/23 0361    pregabalin (LYRICA) capsule 100 mg, 100 mg, Oral, BID, Davonna Imus, APRN - CNP, 100 mg at 01/30/23 0816    docusate sodium (COLACE) capsule 100 mg, 100 mg, Oral, Daily, Bg Ang, APRN - CNP, 100 mg at 01/30/23 3906      Imaging:  CT ABDOMEN PELVIS W IV CONTRAST Additional Contrast? None    Result Date: 1/23/2023  1. Portions of the mid small bowel suggest mild mucosal thickening. Exact etiology is uncertain. Mild enteritis is not excluded. No fluid-filled bowel loops. 2. Stool filled colon. 3. Simple cystic lesions of the liver and kidneys. 4. Nonobstructing stone within the left renal pelvis. 5. Focal coarse calcification in the left renal parenchyma. This can be from prior inflammatory infectious process. Recommendation: Follow up as clinically indicated. All CT scans at this facility utilize dose modulation, iterative reconstruction, and/or weight based dosing when appropriate to reduce radiation dose to as low as reasonably achievable. Dictated and Electronically Signed by Ari Bowling MD, SA CERTIFIED at [id number] 07:03:41 PM EST                  Assessment/Plan  Principal Problem:    Pyelonephritis  Active Problems:    Renal stone  Resolved Problems:    * No resolved hospital problems. *       I have reviewed the patient's daily labs, including BMP and CBC with pertinent results discussed below.     L flank/back pain  -- Improved w/ UTI treatment and grove placed previously  -- Recurred this AM after voiding trial yesterday which required grove to be replaced  -- If symptoms do not improve today, would repeat L kidney US   -- If repeat L kidney US is normal, would obtain MRI L spine to rule out abscess/OM/discitis  1/29 reviewed renal ultrasound no evidence of hydronephrosis however suggestive of CKD, left flank pain is mild  1/30 left flank pain resolved    Staph Epidermidis bacteremia  Pyelonephritis  -- SIRS 1/4 on admission for WBC 13.6  -- UA w/ 21-30 WBCs, 6-10 RBCs, positive nitrite, mod LE  -- CT abdomen w/ nonobstructing stone in L renal pelvis, coarse calcification in L renal parenchyma  -- Renal US negative for hydronephrosis  -- 2/2 blood cultures + for staph epi on 1/23, sensitivity still pending  -- Repeat blood cultures drawn 1/25, NGTD  -- Continue Vancomycin, dc ceftriaxone  -- Urine culture growing staph epi, sensitivities reviewed  -- ID consulted, appreciate recommendations, note reviewed    ALEXANDRO  -- Cr plateau at 2.2  -- nonobstructing stone on CT abdomen  -- Renal US negative for hydronephrosis  -- Grove placed 1/25 due to retention  -- CAT LUCIA [facility] for K 5.2 > 4.8  -- Consulted nephrology, appreciate recommendations, note reviewed  Creatinine improving 2.2, 1.7  1/30 followed up with [facility] urea nephrology change IV fluid to sodium bicarbonate and 5% dextrose and 45% NaCl    Abdominal distention likely due to constipation  1/29 started on Reglan 10 mg IV every 6 hours for 4 doses  Added simethicone 160 mg every 6 hours for 4 doses  Change MiraLAX to twice daily along with Colace will monitor clinically for bowel movement  1/30 continue Reglan every 6 hourly, simethicone 80 mg every 6 hourly, continue Colace and MiraLAX    Poor appetite could be from constipation with constipation and monitor for bowel movement    Developing volume overload  Reduce IV fluid to 25 cc/h as the patient is poorly eating   we will monitor clinically    Preputial skin edema  Intermittent compressive bandage and ice application    Hypertension  Blood pressure is on the higher side started on nifedipine 60 mg daily and monitor vitals    Nephrolithiasis  BPH with obstruction  -- Nonobstructing on CT abdomen  -- Renal US negative for hydronephrosis 1/25  -- Continue Flomax  -- Considered starting Finasteride, but he has hx of elevated PSA so will defer to outpatient urologist  -- Grove placed 1/25  -- Voiding trial unsuccessful on 1/27, grove replaced  -- Will need outpatient urology follow up    Insomnia  -- Melatonin 10 mg QHS  -- Start Mirtazapine 7.5 mg QHS      DVT prophylaxis: Lovenox    DISPOSITION:  SNF placement, has a bed. May require PICC placement for IV ABX pending ID recommendations.      Full Code No additional code details        Sky French MD 1/30/2023 7:15 PM

## 2023-01-31 NOTE — PROGRESS NOTES
Infectious Diseases Progress Note    Patient:  Ladonna Campbell  YOB: 1940  MRN: [id number]   Admit date: 1/23/2023   Admitting Physician: Barbara Melvin MD  Primary Care Physician: Sarah Perez MD    Chief Complaint/Interval History: He indicates he feels about the same. He indicates vision about the same. He indicates he had some confusion/hallucinations overnight. No new neurological symptoms. He does not describe paresthesia, anesthesia, or unilateral weakness. He is without fever or chills. There has been an increase in his serum creatinine. In/Out    Intake/Output Summary (Last 24 hours) at 1/31/2023 0855  Last data filed at 1/31/2023 0201  Gross per 24 hour   Intake 930 ml   Output 550 ml   Net 380 ml     Allergies:    Allergies   Allergen Reactions    Codeine Nausea Only     Current Meds: sodium bicarbonate 75 mEq in dextrose 5 % and 0.45 % NaCl 1,000 mL infusion, Continuous  simethicone (MYLICON) chewable tablet 80 mg, 4x Daily  metoclopramide (REGLAN) injection 10 mg, Q6H  NIFEdipine (PROCARDIA XL) extended release tablet 60 mg, Daily  polyethylene glycol (GLYCOLAX) packet 17 g, BID  sodium bicarbonate tablet 650 mg, BID  enoxaparin (LOVENOX) injection 40 mg, Daily  calcitRIOL (ROCALTROL) capsule 0.25 mcg, Daily  mirtazapine (REMERON) tablet 7.5 mg, Nightly  vancomycin (VANCOCIN) intermittent dosing (placeholder), RX Placeholder  tamsulosin (FLOMAX) capsule 0.4 mg, Daily  sodium chloride flush 0.9 % injection 5-40 mL, 2 times per day  sodium chloride flush 0.9 % injection 5-40 mL, PRN  0.9 % sodium chloride infusion, PRN  ondansetron (ZOFRAN-ODT) disintegrating tablet 4 mg, Q8H PRN   Or  ondansetron (ZOFRAN) injection 4 mg, Q6H PRN  acetaminophen (TYLENOL) tablet 650 mg, Q6H PRN   Or  acetaminophen (TYLENOL) suppository 650 mg, Q6H PRN  traMADol (ULTRAM) tablet 50 mg, Q6H PRN  allopurinol (ZYLOPRIM) tablet 100 mg, Daily  atorvastatin (LIPITOR) tablet 20 mg, Daily  metoprolol succinate (TOPROL XL) extended release tablet 50 mg, Daily  pantoprazole (PROTONIX) tablet 40 mg, QAM AC  pregabalin (LYRICA) capsule 100 mg, BID  docusate sodium (COLACE) capsule 100 mg, Daily      Review of Systems    VitalSigns:  BP (!) 149/80   Pulse (!) 107   Temp 99.9 °F (37.7 °C)   Resp 20   Ht 6' (1.829 m)   Wt 195 lb 11.2 oz (88.8 kg)   SpO2 93%   BMI 26.54 kg/m²      Physical Exam  Line/IV site: No erythema, warmth, induration, or tenderness. Sclera nonicteric  Try to reassess visual fields again today-I really could not get much different on exam today relative to yesterday. I get the sense he may have some visual field difficulty. Having a hard time getting a clear picture trying to examine him at the bedside, but my impression remains that he may have some difficulties with primarily vision from the right eye. He is moving both upper and lower extremities equally. He seemed to have intact strength in upper and lower extremities without lateralizing finding. Lab Results:  CBC:   Recent Labs     01/31/23  0607   WBC 9.2   HGB 12.6*        BMP:  Recent Labs     01/29/23  0246 01/30/23  0333 01/31/23  0607    138 139   K 3.8 3.6 3.5    105 104   CO2 19* 23 21*   BUN 29* 28* 35*   CREATININE 1.7* 1.9* 2.7*   GLUCOSE 96 94 114*     CultureResults:  Blood cultures January 23, 2023-Staph epidermidis  Blood cultures January 25, 2023-no growth    Radiology:    Carotid duplex studies done yesterday:      Additional Studies Reviewed:  None    Impression:  1. Urinary tract infection/pyelonephritis with Staph epidermidis  2. Staph epidermidis bloodstream infection-suspect urinary source  3. Acute renal insufficiency-creatinine increased from 1.7/1.9 up to 2.7 today  4. He seems to have some visual changes. Hard to characterize. Ideally would have ophthalmology evaluation.   5.  Back and flank pain-improving    Recommendations:  Given increasing creatinine going to discontinue vancomycin  Would hold on PICC or midline placement  Going to transition to oral antibiotic treatment with linezolid  Neurology evaluating  Agree with additional imaging of brain with MRI  Continue to follow    Funmilayo Maldonado MD

## 2023-01-31 NOTE — PROGRESS NOTES
[id number] [facility] Pharmacokinetic Monitoring Service - Vancomycin    Consulting Provider: patricia gordon   Indication: bloodstream infection  Target Concentration: Dosing based on anticipated concentration <15 mg/L due to renal impairment/insufficiency  Day of Therapy: 7  Additional Antimicrobials: none    Pertinent Laboratory Values: Wt Readings from Last 1 Encounters:   01/23/23 195 lb 11.2 oz (88.8 kg)     Temp Readings from Last 1 Encounters:   01/31/23 99.9 °F (37.7 °C)     Estimated Creatinine Clearance: 23 mL/min (A) (based on SCr of 2.7 mg/dL (H)). Recent Labs     01/30/23  0333 01/31/23  0607   CREATININE 1.9* 2.7*   WBC  --  9.2     Procalcitonin:     Pertinent Cultures:  Culture Date Source Results   01/25/23 blood Staph epidermidis   MRSA Nasal Swab: N/A. Non-respiratory infection.     Recent vancomycin administrations                     vancomycin (VANCOCIN) 1000 mg in sodium chloride 0.9% 250 mL IVPB (mg) 1,000 mg New Bag 01/31/23 0610     1,000 mg New Bag 01/30/23 0621    vancomycin (VANCOCIN) 1,250 mg in sodium chloride 0.9 % 250 mL IVPB (mg) 1,250 mg New Bag 01/29/23 0509                    Assessment:  Date/Time Current Dose Concentration Timing of Concentration (h) AUC   01/31/23 1000 mg 22.4 23 hr 46min 763   Note: Serum concentrations collected for AUC dosing may appear elevated if collected in close proximity to the dose administered, this is not necessarily an indication of toxicity    Plan:  Current dosing regimen is supra-therapeutic  Will pulse dose vancomycin  Repeat vancomycin concentration ordered for 02/01/23 @ 1800   Pharmacy will continue to monitor patient and adjust therapy as indicated    Thank you for the consult,  JEFFREY Wagner [facility]  1/31/2023 7:35 AM

## 2023-02-01 ENCOUNTER — APPOINTMENT (OUTPATIENT)
Dept: MRI IMAGING | Age: 83
DRG: 871 | End: 2023-02-01
Payer: MEDICARE

## 2023-02-01 PROBLEM — N17.9 AKI (ACUTE KIDNEY INJURY) (HCC): Status: ACTIVE | Noted: 2023-02-01

## 2023-02-01 LAB
ALBUMIN SERPL-MCNC: 2.7 G/DL (ref 3.5–5.2)
ALP BLD-CCNC: 78 U/L (ref 40–130)
ALT SERPL-CCNC: 19 U/L (ref 5–41)
ANION GAP SERPL CALCULATED.3IONS-SCNC: 17 MMOL/L (ref 7–19)
AST SERPL-CCNC: 26 U/L (ref 5–40)
BILIRUB SERPL-MCNC: 0.3 MG/DL (ref 0.2–1.2)
BUN BLDV-MCNC: 45 MG/DL (ref 8–23)
CALCIUM SERPL-MCNC: 8.8 MG/DL (ref 8.8–10.2)
CHLORIDE BLD-SCNC: 103 MMOL/L (ref 98–111)
CHOLESTEROL, TOTAL: 130 MG/DL (ref 160–199)
CO2: 20 MMOL/L (ref 22–29)
CREAT SERPL-MCNC: 3.8 MG/DL (ref 0.5–1.2)
GFR SERPL CREATININE-BSD FRML MDRD: 15 ML/MIN/{1.73_M2}
GLUCOSE BLD-MCNC: 145 MG/DL (ref 74–109)
HAV IGM SER IA-ACNC: NORMAL
HBV SURFACE AB TITR SER: NORMAL {TITER}
HCT VFR BLD CALC: 39 % (ref 42–52)
HDLC SERPL-MCNC: 43 MG/DL (ref 55–121)
HEMOGLOBIN: 12.5 G/DL (ref 14–18)
HEPATITIS B CORE IGM ANTIBODY: NORMAL
HEPATITIS B SURFACE ANTIGEN INTERPRETATION: NORMAL
HEPATITIS C ANTIBODY INTERPRETATION: NORMAL
LDL CHOLESTEROL CALCULATED: 65 MG/DL
MCH RBC QN AUTO: 27.1 PG (ref 27–31)
MCHC RBC AUTO-ENTMCNC: 32.1 G/DL (ref 33–37)
MCV RBC AUTO: 84.4 FL (ref 80–94)
PDW BLD-RTO: 13.2 % (ref 11.5–14.5)
PLATELET # BLD: 197 K/UL (ref 130–400)
PMV BLD AUTO: 11.6 FL (ref 9.4–12.4)
POTASSIUM SERPL-SCNC: 4.2 MMOL/L (ref 3.5–5)
RBC # BLD: 4.62 M/UL (ref 4.7–6.1)
SODIUM BLD-SCNC: 140 MMOL/L (ref 136–145)
TOTAL PROTEIN: 6 G/DL (ref 6.6–8.7)
TRIGL SERPL-MCNC: 112 MG/DL (ref 0–149)
WBC # BLD: 8.8 K/UL (ref 4.8–10.8)

## 2023-02-01 PROCEDURE — 6370000000 HC RX 637 (ALT 250 FOR IP): Performed by: INTERNAL MEDICINE

## 2023-02-01 PROCEDURE — 80053 COMPREHEN METABOLIC PANEL: CPT

## 2023-02-01 PROCEDURE — 85027 COMPLETE CBC AUTOMATED: CPT

## 2023-02-01 PROCEDURE — 36415 COLL VENOUS BLD VENIPUNCTURE: CPT

## 2023-02-01 PROCEDURE — 6370000000 HC RX 637 (ALT 250 FOR IP): Performed by: PSYCHIATRY & NEUROLOGY

## 2023-02-01 PROCEDURE — 2700000000 HC OXYGEN THERAPY PER DAY

## 2023-02-01 PROCEDURE — 2580000003 HC RX 258

## 2023-02-01 PROCEDURE — 1210000000 HC MED SURG R&B

## 2023-02-01 PROCEDURE — 6370000000 HC RX 637 (ALT 250 FOR IP)

## 2023-02-01 PROCEDURE — 80061 LIPID PANEL: CPT

## 2023-02-01 PROCEDURE — 70551 MRI BRAIN STEM W/O DYE: CPT

## 2023-02-01 PROCEDURE — 6360000002 HC RX W HCPCS: Performed by: INTERNAL MEDICINE

## 2023-02-01 PROCEDURE — 86706 HEP B SURFACE ANTIBODY: CPT

## 2023-02-01 PROCEDURE — 2500000003 HC RX 250 WO HCPCS: Performed by: INTERNAL MEDICINE

## 2023-02-01 PROCEDURE — 6360000002 HC RX W HCPCS

## 2023-02-01 PROCEDURE — 70551 MRI BRAIN STEM W/O DYE: CPT | Performed by: RADIOLOGY

## 2023-02-01 PROCEDURE — 94640 AIRWAY INHALATION TREATMENT: CPT

## 2023-02-01 PROCEDURE — 99233 SBSQ HOSP IP/OBS HIGH 50: CPT | Performed by: PSYCHIATRY & NEUROLOGY

## 2023-02-01 PROCEDURE — 97530 THERAPEUTIC ACTIVITIES: CPT

## 2023-02-01 PROCEDURE — 82962 GLUCOSE BLOOD TEST: CPT

## 2023-02-01 PROCEDURE — 6370000000 HC RX 637 (ALT 250 FOR IP): Performed by: STUDENT IN AN ORGANIZED HEALTH CARE EDUCATION/TRAINING PROGRAM

## 2023-02-01 PROCEDURE — 94760 N-INVAS EAR/PLS OXIMETRY 1: CPT

## 2023-02-01 PROCEDURE — 80074 ACUTE HEPATITIS PANEL: CPT

## 2023-02-01 RX ORDER — PREDNISONE 10 MG/1
20 TABLET ORAL DAILY
Status: DISCONTINUED | OUTPATIENT
Start: 2023-02-01 | End: 2023-02-02

## 2023-02-01 RX ORDER — QUETIAPINE FUMARATE 25 MG/1
25 TABLET, FILM COATED ORAL NIGHTLY
Status: DISCONTINUED | OUTPATIENT
Start: 2023-02-02 | End: 2023-02-05 | Stop reason: HOSPADM

## 2023-02-01 RX ADMIN — SODIUM CHLORIDE, PRESERVATIVE FREE 10 ML: 5 INJECTION INTRAVENOUS at 09:29

## 2023-02-01 RX ADMIN — METHYLPREDNISOLONE SODIUM SUCCINATE 80 MG: 125 INJECTION, POWDER, FOR SOLUTION INTRAMUSCULAR; INTRAVENOUS at 03:09

## 2023-02-01 RX ADMIN — MIRTAZAPINE 7.5 MG: 7.5 TABLET ORAL at 19:46

## 2023-02-01 RX ADMIN — BUMETANIDE 1 MG: 0.25 INJECTION INTRAMUSCULAR; INTRAVENOUS at 15:38

## 2023-02-01 RX ADMIN — SODIUM CHLORIDE, PRESERVATIVE FREE 10 ML: 5 INJECTION INTRAVENOUS at 19:47

## 2023-02-01 RX ADMIN — METOCLOPRAMIDE 5 MG: 5 INJECTION, SOLUTION INTRAMUSCULAR; INTRAVENOUS at 17:21

## 2023-02-01 RX ADMIN — ASPIRIN 81 MG: 81 TABLET, CHEWABLE ORAL at 09:28

## 2023-02-01 RX ADMIN — TAMSULOSIN HYDROCHLORIDE 0.4 MG: 0.4 CAPSULE ORAL at 09:28

## 2023-02-01 RX ADMIN — QUETIAPINE FUMARATE 25 MG: 25 TABLET ORAL at 09:28

## 2023-02-01 RX ADMIN — POLYETHYLENE GLYCOL 3350 17 G: 17 POWDER, FOR SOLUTION ORAL at 19:46

## 2023-02-01 RX ADMIN — IPRATROPIUM BROMIDE AND ALBUTEROL SULFATE 1 AMPULE: 2.5; .5 SOLUTION RESPIRATORY (INHALATION) at 06:24

## 2023-02-01 RX ADMIN — SODIUM BICARBONATE 650 MG: 650 TABLET ORAL at 09:28

## 2023-02-01 RX ADMIN — SIMETHICONE 80 MG: 80 TABLET, CHEWABLE ORAL at 19:47

## 2023-02-01 RX ADMIN — BUMETANIDE 1 MG: 0.25 INJECTION INTRAMUSCULAR; INTRAVENOUS at 23:12

## 2023-02-01 RX ADMIN — LINEZOLID 600 MG: 600 TABLET, FILM COATED ORAL at 09:28

## 2023-02-01 RX ADMIN — METOPROLOL SUCCINATE 50 MG: 50 TABLET, FILM COATED, EXTENDED RELEASE ORAL at 09:29

## 2023-02-01 RX ADMIN — METOCLOPRAMIDE 5 MG: 5 INJECTION, SOLUTION INTRAMUSCULAR; INTRAVENOUS at 05:56

## 2023-02-01 RX ADMIN — QUETIAPINE FUMARATE 25 MG: 25 TABLET ORAL at 15:36

## 2023-02-01 RX ADMIN — LINEZOLID 600 MG: 600 TABLET, FILM COATED ORAL at 19:46

## 2023-02-01 RX ADMIN — SIMETHICONE 80 MG: 80 TABLET, CHEWABLE ORAL at 09:28

## 2023-02-01 RX ADMIN — POLYETHYLENE GLYCOL 3350 17 G: 17 POWDER, FOR SOLUTION ORAL at 09:30

## 2023-02-01 RX ADMIN — SIMETHICONE 80 MG: 80 TABLET, CHEWABLE ORAL at 17:26

## 2023-02-01 RX ADMIN — IPRATROPIUM BROMIDE AND ALBUTEROL SULFATE 1 AMPULE: 2.5; .5 SOLUTION RESPIRATORY (INHALATION) at 10:35

## 2023-02-01 RX ADMIN — PANTOPRAZOLE SODIUM 40 MG: 40 TABLET, DELAYED RELEASE ORAL at 05:56

## 2023-02-01 RX ADMIN — ALLOPURINOL 100 MG: 100 TABLET ORAL at 09:28

## 2023-02-01 RX ADMIN — IPRATROPIUM BROMIDE AND ALBUTEROL SULFATE 1 AMPULE: 2.5; .5 SOLUTION RESPIRATORY (INHALATION) at 14:14

## 2023-02-01 RX ADMIN — DOCUSATE SODIUM 100 MG: 100 CAPSULE, LIQUID FILLED ORAL at 09:28

## 2023-02-01 RX ADMIN — SODIUM BICARBONATE 650 MG: 650 TABLET ORAL at 19:48

## 2023-02-01 RX ADMIN — PREGABALIN 100 MG: 50 CAPSULE ORAL at 19:47

## 2023-02-01 RX ADMIN — METOCLOPRAMIDE 5 MG: 5 INJECTION, SOLUTION INTRAMUSCULAR; INTRAVENOUS at 23:12

## 2023-02-01 RX ADMIN — METHYLPREDNISOLONE SODIUM SUCCINATE 80 MG: 125 INJECTION, POWDER, FOR SOLUTION INTRAMUSCULAR; INTRAVENOUS at 15:39

## 2023-02-01 RX ADMIN — ENOXAPARIN SODIUM 30 MG: 100 INJECTION SUBCUTANEOUS at 17:21

## 2023-02-01 RX ADMIN — METOCLOPRAMIDE 5 MG: 5 INJECTION, SOLUTION INTRAMUSCULAR; INTRAVENOUS at 15:38

## 2023-02-01 RX ADMIN — PREGABALIN 100 MG: 50 CAPSULE ORAL at 09:29

## 2023-02-01 RX ADMIN — METHYLPREDNISOLONE SODIUM SUCCINATE 80 MG: 125 INJECTION, POWDER, FOR SOLUTION INTRAMUSCULAR; INTRAVENOUS at 17:22

## 2023-02-01 RX ADMIN — SIMETHICONE 80 MG: 80 TABLET, CHEWABLE ORAL at 15:36

## 2023-02-01 RX ADMIN — CALCITRIOL CAPSULES 0.25 MCG 0.25 MCG: 0.25 CAPSULE ORAL at 09:28

## 2023-02-01 RX ADMIN — ATORVASTATIN CALCIUM 80 MG: 80 TABLET, FILM COATED ORAL at 09:29

## 2023-02-01 ASSESSMENT — ENCOUNTER SYMPTOMS
NAUSEA: 0
ABDOMINAL PAIN: 1
SHORTNESS OF BREATH: 0
VOMITING: 0
COUGH: 0

## 2023-02-01 NOTE — PROGRESS NOTES
Nephrology ([facility]) Progress Note    Patient:  Olegario Marshall  YOB: 1940  Date of Service: 2/1/2023  MRN: [id number]   Acct: [de-identified]   Primary Care Physician: Denver Rodriguez MD  Advance Directive: Full Code  Admit Date: 1/23/2023       Hospital Day: 9  Referring Provider: Donny Iraheta MD    Patient independently seen and examined, Chart, Consults, Notes, Operative notes, Labs, Cardiology, and Radiology studies reviewed as available. Chief complaint: Abdominal labs. Subjective:  Olegario Marshall is a 79 y.o. male for whom we were consulted for evaluation and treatment of acute kidney injury. Patient denies any history of chronic kidney disease. He was admitted with left flank pain radiating to the anterior abdominal wall. He has history of type 2 diabetes, hypertension, osteoarthritis and gout. CT scan of the abdomen in the emergency room consistent with nonobstructing left kidney stone associated with pyelonephritis. Hospital course remarkable for IV antibiotics and IV fluid administration and has worsening of renal function. Nephrology is consulted for evaluation of acute kidney injury. Patient also has staph epidermis bacteremia. This morning patient is feeling much better. He denies any shortness of breath or swelling or dyspnea on exertion.     Allergies:  Codeine    Medicines:  Current Facility-Administered Medications   Medication Dose Route Frequency Provider Last Rate Last Admin    linezolid (ZYVOX) tablet 600 mg  600 mg Oral 2 times per day Yari Craig MD   600 mg at 02/01/23 0928    QUEtiapine (SEROQUEL) tablet 25 mg  25 mg Oral TID Donny Iraheta MD   25 mg at 02/01/23 0928    methylPREDNISolone sodium (SOLU-MEDROL) injection 80 mg  80 mg IntraVENous Q8H Donny Iraheta MD   80 mg at 02/01/23 0309    ipratropium-albuterol (DUONEB) nebulizer solution 1 ampule  1 ampule Inhalation Q4H WA Donny Iraheta MD   1 ampule at 02/01/23 0624    bumetanide (BUMEX) injection 1 mg  1 mg IntraVENous Q12H Charles Medina MD   1 mg at 01/31/23 6237    metoclopramide (REGLAN) injection 5 mg  5 mg IntraVENous Q6H Nate Noe MD   5 mg at 02/01/23 0556    enoxaparin Sodium (LOVENOX) injection 30 mg  30 mg SubCUTAneous Daily Elisa Kinsey, APRN - CNP   30 mg at 01/31/23 1736    aspirin chewable tablet 81 mg  81 mg Oral Daily Roni Villanueva MD   81 mg at 02/01/23 0928    atorvastatin (LIPITOR) tablet 80 mg  80 mg Oral Daily Nate Noe MD   80 mg at 02/01/23 0929    simethicone (MYLICON) chewable tablet 80 mg  80 mg Oral 4x Daily Nate Noe MD   80 mg at 02/01/23 7014    [Held by provider] NIFEdipine (PROCARDIA XL) extended release tablet 60 mg  60 mg Oral Daily Nate Noe MD   60 mg at 01/31/23 0936    polyethylene glycol (GLYCOLAX) packet 17 g  17 g Oral BID Nate Noe MD   17 g at 02/01/23 0930    sodium bicarbonate tablet 650 mg  650 mg Oral BID Destiny Iraheta MD   650 mg at 02/01/23 4255    calcitRIOL (ROCALTROL) capsule 0.25 mcg  0.25 mcg Oral Daily Destiny Iraheta MD   0.25 mcg at 02/01/23 3852    mirtazapine (REMERON) tablet 7.5 mg  7.5 mg Oral Nightly Miranda Amaya MD   7.5 mg at 01/29/23 2839    tamsulosin (FLOMAX) capsule 0.4 mg  0.4 mg Oral Daily Elisa Amelie, APRN - CNP   0.4 mg at 02/01/23 6133    sodium chloride flush 0.9 % injection 5-40 mL  5-40 mL IntraVENous 2 times per day Elisa Amelie, APRN - CNP   10 mL at 02/01/23 0929    sodium chloride flush 0.9 % injection 5-40 mL  5-40 mL IntraVENous PRN Elisa Amelie, APRN - CNP        0.9 % sodium chloride infusion   IntraVENous PRN Elisa Amelie, APRN - CNP        ondansetron (ZOFRAN-ODT) disintegrating tablet 4 mg  4 mg Oral Q8H PRN Elisa Amelie, APRN - CNP        Or    ondansetron [facility]) injection 4 mg  4 mg IntraVENous Q6H PRN Elisa Amelie, APRN - CNP   4 mg at 01/28/23 0323    acetaminophen (TYLENOL) tablet 650 mg  650 mg Oral Q6H PRN Elisa Amelie, APRN - CNP        Or    acetaminophen (TYLENOL) suppository 650 mg  650 mg Rectal Q6H PRN Christophe Kellee, APRN - CNP        traMADol Patterson Toña) tablet 50 mg  50 mg Oral Q6H PRN Bernadette Jones MD   50 mg at 01/29/23 0216    allopurinol (ZYLOPRIM) tablet 100 mg  100 mg Oral Daily Christophe Kellee, APRN - CNP   100 mg at 02/01/23 2042    metoprolol succinate (TOPROL XL) extended release tablet 50 mg  50 mg Oral Daily Christophe Kellee, APRN - CNP   50 mg at 02/01/23 7844    pantoprazole (PROTONIX) tablet 40 mg  40 mg Oral QAM AC Christophe Kellee, APRN - CNP   40 mg at 02/01/23 0556    pregabalin (LYRICA) capsule 100 mg  100 mg Oral BID Christophe Kellee, APRN - CNP   100 mg at 02/01/23 7591    docusate sodium (COLACE) capsule 100 mg  100 mg Oral Daily Christophe Kellee, APRN - CNP   100 mg at 02/01/23 0528       Past Medical History:  Past Medical History:   Diagnosis Date    Benign non-nodular prostatic hyperplasia with lower urinary tract symptoms 8/16/2017    Essential hypertension 8/16/2017    Gastroesophageal reflux disease without esophagitis 8/16/2017    Gout 8/16/2017    Idiopathic peripheral neuropathy 2/19/2018    Mixed hyperlipidemia 8/16/2017    Peripheral neuropathic pain 8/16/2017    Primary osteoarthritis involving multiple joints 8/16/2017    Renal stone 1/23/2023    Restless legs syndrome 8/16/2017       Past Surgical History:  Past Surgical History:   Procedure Laterality Date    INGUINAL HERNIA REPAIR Bilateral 1963    TURP  2003       Family History  Family History   Problem Relation Age of Onset    Uterine Cancer Mother     Coronary Art Dis Father         MI at 62    COPD Sister     Stroke Brother        Social History  Social History     Socioeconomic History    Marital status:       Spouse name: Marietta Sands    Number of children: 2    Years of education: 12    Highest education level: Not on file   Occupational History     Employer: RETIRED   Tobacco Use    Smoking status: Former     Packs/day: 3.00     Years: 30.00     Pack years: 90. 00     Types: Cigarettes     Quit date: 1975     Years since quittin.1    Smokeless tobacco: Never   Substance and Sexual Activity    Alcohol use: No    Drug use: No    Sexual activity: Yes     Partners: Female     Comment: wife   Other Topics Concern    Not on file   Social History Narrative    Not on file     Social Determinants of Health     Financial Resource Strain: Low Risk     Difficulty of Paying Living Expenses: Not hard at all   Food Insecurity: No Food Insecurity    Worried About Running Out of Food in the Last Year: Never true    Ran Out of Food in the Last Year: Never true   Transportation Needs: Not on file   Physical Activity: Unknown    Days of Exercise per Week: 0 days    Minutes of Exercise per Session: Not on file   Stress: Not on file   Social Connections: Not on file   Intimate Partner Violence: Unknown    Fear of Current or Ex-Partner: Patient refused    Emotionally Abused: Patient refused    Physically Abused: Patient refused    Sexually Abused: Patient refused   Housing Stability: Not on file         Review of Systems:  History obtained from chart review and the patient  General ROS: No fever or chills  Respiratory ROS: No cough, shortness of breath, wheezing  Cardiovascular ROS: No chest pain or palpitations  Gastrointestinal ROS: No abdominal pain or melena  Genito-Urinary ROS: No dysuria or hematuria  Musculoskeletal ROS: No joint pain or swelling         Objective:  Patient Vitals for the past 24 hrs:   BP Temp Temp src Pulse Resp SpO2   23 0929 122/72 -- -- 93 -- --   23 0734 122/72 97.7 °F (36.5 °C) Axillary 93 18 95 %   23 0624 -- -- -- -- -- 97 %   23 2351 (!) 101/56 97.3 °F (36.3 °C) -- 87 -- 96 %   23 1735 (!) 92/57 98.6 °F (37 °C) Oral 75 16 94 %   23 1316 108/64 -- -- 86 18 91 %   23 1113 -- -- -- -- -- 94 %       Intake/Output Summary (Last 24 hours) at 2023 1035  Last data filed at 2023 0604  Gross per 24 hour Intake 165 ml   Output 900 ml   Net -735 ml     General: awake/alert   HEENT: Normocephalic atraumatic head  Neck: Supple with no JVD or carotid bruits. Chest:  clear to auscultation bilaterally  CVS: regular rate and rhythm  Abdominal: soft, nontender, normal bowel sounds  Extremities: Bilateral 2+ edema. Skin: warm and dry without rash    Labs:  BMP:   Recent Labs     01/30/23  0333 01/31/23  0607 02/01/23 0235    139 140   K 3.6 3.5 4.2    104 103   CO2 23 21* 20*   BUN 28* 35* 45*   CREATININE 1.9* 2.7* 3.8*   CALCIUM 8.6* 9.2 8.8     CBC:   Recent Labs     01/31/23  0607 02/01/23 0235   WBC 9.2 8.8   HGB 12.6* 12.5*   HCT 38.4* 39.0*   MCV 84.4 84.4    197     LIVER PROFILE:   Recent Labs     01/31/23  0607 02/01/23 0235   AST 22 26   ALT 18 19   BILITOT 0.5 0.3   ALKPHOS 79 78     PT/INR: No results for input(s): PROTIME, INR in the last 72 hours. APTT: No results for input(s): APTT in the last 72 hours. BNP:  No results for input(s): BNP in the last 72 hours. Ionized Calcium:No results for input(s): IONCA in the last 72 hours. Magnesium:No results for input(s): MG in the last 72 hours. Phosphorus:No results for input(s): PHOS in the last 72 hours. HgbA1C:   Recent Labs     01/31/23  0607   LABA1C 5.8     Hepatic:   Recent Labs     01/31/23  0607 02/01/23 0235   ALKPHOS 79 78   ALT 18 19   AST 22 26   PROT 6.4* 6.0*   BILITOT 0.5 0.3   LABALBU 3.0* 2.7*     Lactic Acid: No results for input(s): LACTA in the last 72 hours. Troponin: No results for input(s): CKTOTAL, CKMB, TROPONINT in the last 72 hours. ABGs: No results found for: PHART, PO2ART, ASD8LWI  CRP:  No results for input(s): CRP in the last 72 hours. Sed Rate:  No results for input(s): SEDRATE in the last 72 hours. Culture Results:   Blood Culture Recent:   Recent Labs     01/25/23  0608   BC No growth after 5 days of incubation.        Urine Culture Recent :   Recent Labs     01/23/23  1659   LABURIN >100,000 CFU/ml  Mixed skin carol present  *  Heavy growth       Radiology reports as per the Radiologist  Radiology: CT HEAD WO CONTRAST    Result Date: 1/24/2023  Patient: Jeb Strickland  Time Out: 00:29Exam(s): CT HEAD Without Contrast EXAM:  CT Head Without Intravenous ContrastCLINICAL HISTORY:   Reason for exam: unwitnessed fall. TECHNIQUE:  Axial computed tomography images of the head/brain without intravenous contrast.COMPARISON:   Head CT 4/2/13. FINDINGS:  Brain: Mild atrophy and chronic white matter disease, has progressed, as expected. No edema or acute infarct. No acute hemorrhage. Ventricles:   No hydrocephalus or midline shift. Bones/joints:   No skull fracture. Soft tissues: No scalp hematoma. Sinuses: Moderate mucosal thickening left maxillary sinus, compatible with chronic sinusitis. Remaining sinuses are clear. No fluid level. Mastoid air cells:   No mastoid effusion. 1.  Mild age-related findings. 2. No acute infarct, bleed, or acute intracranial abnormality. Electronically signed by Anita Haddad M.D. on 01-25-23 at [address] [facility] RE[facility]TE    Result Date: 1/25/2023  RETROPERITONEAL ULTRASOUND: HISTORY: Worsening ALEXANDRO COMPARISON STUDY: CT of abdomen pelvis from 1/23/2023. PROCEDURE: Real-time ultrasound imaging was performed of the retroperitoneum. FINDINGS: The abdominal aorta and IVC were not visualized. The right kidney measures up to to 12.0 cm in length. The left kidney measures up to 12.3 cm in length. There is a cyst seen at the right kidney measuring up to 3.1 x 2.6 x 3.4 cm and at the interpolar region measuring up to 2.9 cm which has a thin septation. There are additional smaller cysts present. There is a small cyst seen at the left kidney measuring up to 1.4 cm. Both kidneys demonstrate mildly increased corticomedullary differentiation with no evidence of hydronephrosis. The visualized portions of the urinary bladder appear unremarkable.  No free intraperitoneal fluid is seen.    There is no evidence of hydronephrosis. Both kidneys demonstrate increased cortical echogenicity, suggestive of chronic medical renal disease. CT ABDOMEN PELVIS W IV CONTRAST Additional Contrast? None    Result Date: 1/23/2023  NO PRIOR REPORT AVAILABLE Exam: CT OF THE ABDOMEN/PELVIS WITH IV CONTRAST Clinical data: Left LQ pain Technique:Direct contiguousaxial CT images were acquired through the abdomen and pelvis with intravenouscontrastusing soft tissue and bone algorithms. Oral contrast was not administered. Reformatted/MPR images were performed. Radiation dose: CTDIvol =10.77 mGy, DLP =645.28 mGy x cm. Limitations: Lack of oral contrast limits evaluation of the bowel loops. Prior Studies: No prior studies submitted. Findings: Lung bases: No acute pulmonary process. Mild cardiomegaly. d Liver:Unremarkable size andcontour. Low attenuated lesions within the liver measuring up to 2.1 cm consistent with simple cysts. Normal density. No evidence of mass. No evidence of dilated ducts. Gallbladder Fossa: Unremarkable Spleen: Grossly unremarkable. Pancreas/adrenal glands: Grossly unremarkable size, contour and density. Kidneys: In anatomic position. Grossly unremarkablerenal size, contour and density. Cystic lesions of the kidneys measuring up to 3.9 cm with simple appearance. Coarse calcification within the parenchyma posteriorly within the interpolar left kidney can be from prior inflammatory infectious or chronic scarring. 4-5 mm nonobstructing stone within the left renal pelvis. No evidence of a renal mass. Perinephric space is unremarkable. Retroperitoneum: No enlarged retroperitoneal lymphadenopathy. The aorta and IVC appear unremarkable. Peritoneal cavity: No evidence of free air or ascites. Gastrointestinal tract: No obstruction. Stool filled colon. Portions of the small bowel demonstrate mucosal thickening. Appendix: Unremarkable Pelvis: Solid and hollow viscera grossly unremarkable.  Osseous structures: No acute or destructive bony process identified. Degenerative changes of the spine. 1. Portions of the mid small bowel suggest mild mucosal thickening. Exact etiology is uncertain. Mild enteritis is not excluded. No fluid-filled bowel loops. 2. Stool filled colon. 3. Simple cystic lesions of the liver and kidneys. 4. Nonobstructing stone within the left renal pelvis. 5. Focal coarse calcification in the left renal parenchyma. This can be from prior inflammatory infectious process. Recommendation: Follow up as clinically indicated. All CT scans at this facility utilize dose modulation, iterative reconstruction, and/or weight based dosing when appropriate to reduce radiation dose to as low as reasonably achievable. Dictated and Electronically Signed by Corazon Blas MD, [facility] CERTIFIED at [id number] 07:03:41 PM EST                Assessment   1. Acute kidney injury/worsening. 2.  Acute tubular necrosis. 3.  Staph epi bacteremia. 4.  Acute pyelonephritis due to staph epi. 5.  Left renal stone. 6.  Metabolic acidemia. 7.  Secondary hyperparathyroidism. Plan:  1. Continue intravenous diuretics. 2.  Continue IV antibiotics.   3.  May need dialysis, will hold his son to discuss dialysis option    Neisha Adam MD  02/01/23  10:35 AM

## 2023-02-01 NOTE — CONSULTS
Vascular Surgery  Consultation for PermCath    Mr. Ale Hernnadez is an 79year old male who presented to the ER with complaints of left sided abdominal pain and flank pain. He complained of associated nausea and vomiting. Work-up in the ER, patient found to have elevated CR 1.4, along with leukocytosis. BC were + staph. Urinalysis + UTI. CT abdomen/pelvis demonstrated findings suspicious for pyelonephritis. Patient was started on IVF resuscitation, antibiotics and admitted to the hospitalist service. Patient noted to have fallen out bed during the night, hitting his head. CT head was negative. ID was also consulted for assistance with management of antibiotics for bacteremia. Renal function continued to decline, therefore, nephrology was consulted. He was seen by Dr. Richard Perez, Nephrology, review of CT abdomen/pelvis - non-obstructing left renal stone of 4-5 mm in size. Patient's renal function has continued to decline, therefore, vascular surgery has been asked to place Permcath to initiate hemodialysis.      Lab Results   Component Value Date    CREATININE 3.8 (H) 02/01/2023    BUN 45 (H) 02/01/2023     02/01/2023    K 4.2 02/01/2023     02/01/2023    CO2 20 (L) 02/01/2023       Dakota Faust is a 79 y.o. male with the following history reviewed and recorded in [facility]:  Patient Active Problem List    Diagnosis Date Noted    ALEXANDRO (acute kidney injury) ([facility] [address] 02/01/2023     Priority: Medium    Visual disturbance 01/31/2023     Priority: Medium    Pyelonephritis 01/23/2023     Priority: Medium    Renal stone 01/23/2023     Priority: Medium    Prediabetes 10/14/2022     Priority: Medium    Chronic idiopathic constipation 08/20/2018    Idiopathic peripheral neuropathy 02/19/2018    Mixed hyperlipidemia 08/16/2017    Essential hypertension 08/16/2017    Restless legs syndrome 08/16/2017    Gastroesophageal reflux disease without esophagitis 08/16/2017    Peripheral neuropathic pain 08/16/2017 Primary osteoarthritis involving multiple joints 08/16/2017    Gout 08/16/2017    Benign non-nodular prostatic hyperplasia with lower urinary tract symptoms 08/16/2017    Elevated PSA 08/16/2017     Current Facility-Administered Medications   Medication Dose Route Frequency Provider Last Rate Last Admin    linezolid (ZYVOX) tablet 600 mg  600 mg Oral 2 times per day Samantha Lentz MD   600 mg at 02/01/23 0928    QUEtiapine (SEROQUEL) tablet 25 mg  25 mg Oral TID Conchiat Reyes MD   25 mg at 02/01/23 1874    methylPREDNISolone sodium (SOLU-MEDROL) injection 80 mg  80 mg IntraVENous Q8H Conchita Reyes MD   80 mg at 02/01/23 0309    ipratropium-albuterol (DUONEB) nebulizer solution 1 ampule  1 ampule Inhalation Q4H WA Cocnhita Reyes MD   1 ampule at 02/01/23 1035    bumetanide (BUMEX) injection 1 mg  1 mg IntraVENous Q12H Barbra Leblanc MD   1 mg at 01/31/23 2357    metoclopramide (REGLAN) injection 5 mg  5 mg IntraVENous Q6H Conchita Reyes MD   5 mg at 02/01/23 0556    enoxaparin Sodium (LOVENOX) injection 30 mg  30 mg SubCUTAneous Daily GOLD Mcrae  CNP   30 mg at 01/31/23 1736    aspirin chewable tablet 81 mg  81 mg Oral Daily Penelope Montana MD   81 mg at 02/01/23 0928    atorvastatin (LIPITOR) tablet 80 mg  80 mg Oral Daily Conchita Reyes MD   80 mg at 02/01/23 0929    simethicone (MYLICON) chewable tablet 80 mg  80 mg Oral 4x Daily Conchita Reyes MD   80 mg at 02/01/23 7282    [Held by provider] NIFEdipine (PROCARDIA XL) extended release tablet 60 mg  60 mg Oral Daily Conchita Reyes MD   60 mg at 01/31/23 0936    polyethylene glycol (GLYCOLAX) packet 17 g  17 g Oral BID Conchita Reyes MD   17 g at 02/01/23 0930    sodium bicarbonate tablet 650 mg  650 mg Oral BID Dixie Dodd MD   650 mg at 02/01/23 6796    calcitRIOL (ROCALTROL) capsule 0.25 mcg  0.25 mcg Oral Daily Dixie Dodd MD   0.25 mcg at 02/01/23 8972    mirtazapine (REMERON) tablet 7.5 mg  7.5 mg Oral Nightly Rayne Kapadia MD   7.5 mg at 01/29/23 8813 tamsulosin (FLOMAX) capsule 0.4 mg  0.4 mg Oral Daily GOLD Dean - CNP   0.4 mg at 02/01/23 9780    sodium chloride flush 0.9 % injection 5-40 mL  5-40 mL IntraVENous 2 times per day Faith Pearce APRN - CNP   10 mL at 02/01/23 6958    sodium chloride flush 0.9 % injection 5-40 mL  5-40 mL IntraVENous PRN GOLD Dean - CNP        0.9 % sodium chloride infusion   IntraVENous PRN Faith Pearce APRN - CNP        ondansetron (ZOFRAN-ODT) disintegrating tablet 4 mg  4 mg Oral Q8H PRN GOLD Dean - MARY ANN        Or    ondans[facility]ron [facility]) injection 4 mg  4 mg IntraVENous Q6H PRN Faith Pearce APRN - CNP   4 mg at 01/28/23 2593    acetaminophen (TYLENOL) tablet 650 mg  650 mg Oral Q6H PRN GOLD Dean - CNP        Or    acetaminophen (TYLENOL) suppository 650 mg  650 mg Rectal Q6H PRN Faith Pearce APRN - CNP        traMADol (ULTRAM) tablet 50 mg  50 mg Oral Q6H PRN Wally Patel MD   50 mg at 01/29/23 0216    allopurinol (ZYLOPRIM) tablet 100 mg  100 mg Oral Daily Faith Pearce APRN - CNP   100 mg at 02/01/23 7540    metoprolol succinate (TOPROL XL) extended release tablet 50 mg  50 mg Oral Daily Faith Pearce APRN - CNP   50 mg at 02/01/23 0929    pantoprazole (PROTONIX) tablet 40 mg  40 mg Oral QAM AC Faith Pearce APRN - CNP   40 mg at 02/01/23 0556    pregabalin (LYRICA) capsule 100 mg  100 mg Oral BID Faith Pearce APRN - CNP   100 mg at 02/01/23 2267    docusate sodium (COLACE) capsule 100 mg  100 mg Oral Daily Faith Pearce APRN - CNP   100 mg at 02/01/23 9441     Allergies: Codeine  Past Medical History:   Diagnosis Date    Benign non-nodular prostatic hyperplasia with lower urinary tract symptoms 8/16/2017    Essential hypertension 8/16/2017    Gastroesophageal reflux disease without esophagitis 8/16/2017    Gout 8/16/2017    Idiopathic peripheral neuropathy 2/19/2018    Mixed hyperlipidemia 8/16/2017    Peripheral neuropathic pain 8/16/2017    Primary osteoarthritis involving multiple joints 2017    Renal stone 2023    Restless legs syndrome 2017     Past Surgical History:   Procedure Laterality Date    INGUINAL HERNIA REPAIR Bilateral 1963    TURP       Family History   Problem Relation Age of Onset    Uterine Cancer Mother     Coronary Art Dis Father         MI at 62    COPD Sister     Stroke Brother      Social History     Tobacco Use    Smoking status: Former     Packs/day: 3.00     Years: 30.00     Pack years: 90.00     Types: Cigarettes     Quit date: 1975     Years since quittin.1    Smokeless tobacco: Never   Substance Use Topics    Alcohol use: No       Old records have been obtained from the referring provider. These records have been reviewed and summarized. Review of Systems  Difficult to obtain as patient could not stay on task of answering questions and would ramble on. Physical Exam    /72   Pulse 93   Temp 97.7 °F (36.5 °C) (Axillary)   Resp 18   Ht 6' (1.829 m)   Wt 195 lb 11.2 oz (88.8 kg)   SpO2 95%   BMI 26.54 kg/m²     Constitutional - Well developed, well nourished. No diaphoresis or acute distress. HEENT - Normocephalic. Atraumatic. TAY. Neck - Supple. No JVD. Chest - Clear bilateral breath sounds, diminished in bases. Cardiovascular - Regular rate and rhythm. Heart sounds are normal. No murmur, rub, or gallop. Abdomen - Obese, soft, non-tender with active BS x 4. Extremities - No cyanosis or clubbing. Mild BLE edema. Scab on right anterior shin. No signs atheroembolic event. Palpable PT pedal pulses. Neurologic - Oriented to name only. Thinks he is in [facility]. Does vaguely remember someone talking to him about dialysis. Assessment    ALEXANDRO 2' to Pyelonephritis  Pyelonephritis  Staph Epi Bacteremia  Metabolic Encephalopathy  Mixed Hyperlipidemia  GERD      Plan    Proceed with permacath placement in the morning. Will need to get consent from patient's son. Spoke with Dr. Crystal Vallejo regarding proceeding with Permcath versus temporary line with [facility] - ok with proceeding with Permcath. Recommended Ancef for preop antibiotic.       GOLD Carrera-BC

## 2023-02-01 NOTE — PROGRESS NOTES
Occupational Therapy     02/01/23 1200   Subjective   Subjective Pt in bed upon arrival for therapy. Pt presents with tremors and increased confusion this date. Pain Assessment   Pain Assessment None - Denies Pain   Cognition   Overall Cognitive Status Exceptions   Arousal/Alertness Delayed responses to stimuli   Following Commands Follows one step commands with increased time   Attention Span Attends with cues to redirect   Memory Decreased recall of recent events   Safety Judgement Decreased awareness of need for safety   Problem Solving Assistance required to generate solutions;Assistance required to correct errors made   Insights Decreased awareness of deficits   Initiation Requires cues for all   Sequencing Requires cues for all   Orientation   Overall Orientation Status WFL   Bed Mobility Training   Bed Mobility Training Yes   Overall Level of Assistance Moderate assistance   Interventions Verbal cues; Tactile cues;Manual cues   Supine to Sit Moderate assistance   Scooting Minimum assistance;Contact-guard assistance   Transfer Training   Transfer Training Yes   Overall Level of Assistance Minimum assistance; Moderate assistance   Interventions Verbal cues; Tactile cues;Manual cues   Sit to Stand Minimum assistance; Moderate assistance   Stand to Sit Minimum assistance   Balance   Sitting Intact   Standing With support   ADL   Feeding Setup;Minimal assistance   Feeding Skilled Clinical Factors low vision   Grooming Setup;Minimal assistance   Grooming Skilled Clinical Factors low vision   UE Bathing Minimal assistance   LE Bathing Maximum assistance   UE Dressing Minimal assistance   LE Dressing Maximum assistance   Toileting Maximum assistance   Toileting Skilled Clinical Factors for thorough cleaning and clothing management. Assessment   Assessment Tx focused on sitting EOB to perform dressing and h/g tasks. Pt instructed on t/f training at RW level from bed to transport bed (going to MRI).  Pt required increased assistance with transfer this date. Presents with posterior lean when standing.    Activity Tolerance Patient tolerated treatment well   Discharge Recommendations Patient would benefit from continued therapy after discharge   Occupational Therapy Plan   Times Per Week 3-5   Times Per Day Once a day   OT Plan of Care   Wednesday X   Electronically signed by RAVI Herrera on 2/1/2023 at 12:27 PM

## 2023-02-01 NOTE — PROGRESS NOTES
Daily Progress Note    Date:2023  Patient: Chares Dakin  : 1940  DCS:[id number]  CODE:Full Code No additional code details  PCP:Bety Cuevas MD    Admit Date: 2023  3:25 PM   LOS: 9 days     Chief Complaint   Patient presents with    Abdominal Pain    Flank Pain     Pt states that he began having flank pain and LQ abdominal pain at 1000 this morning. Pt states that he has not had an episode like this in the past. Pt states he has had nausea, and has had consistent Bms. Pt denies diarrhea. ABCs are intact. Skin wpd. A&ox4. Hospital Summary:  78 yo M with HTN, GERD, HLD, BPH s/p TURP who presented to [facility] ED with abdominal pain. CT abdomen in ED showed stool-filled colon, nonobstructing stone within L renal pelvis. Admitted to hospitalist service for UTI/Pyelonephritis. Nephrology consulted due to ALEXANDRO. Blood cultures returned 2/ + for gram + cocci, PCR showing Staph Epi. ID consulted. ALEXANDRO improved w/ IVF and grove catheter placement. Pt's L flank pain improved after grove was placed. Underwent unsuccessful voiding trial on , grove was replaced. He endorses L flank pain again on . Plan to reimage L kidney if symptoms don't improve. May need MRI L spine if persistent symptoms with normal kidney imaging.    The patient failed a voiding trial on  put back on Grove catheter and continue on Flomax  Interval history>   the patient has been treated for constipation and distended abdomen which is getting better  Worsening renal function initially given IV fluid followed by diuretics nephrology discussed with family planning for hemodialysis  Infectious disease change IV vancomycin to linezolid  Visual field defect evaluated by neurologist found to have suspected acute stroke> need MRI without contrast    Subjective:    the patient has episode of confusion last night however in the morning the patient is alert and oriented, has bowel movement today, seen along with RN discussed about the worsening renal function and need hemodialysis, family informed by nephrologist, vascular consulted      Review of Systems   Constitutional:  Negative for chills and fever. Respiratory:  Negative for cough and shortness of breath. Cardiovascular:  Negative for chest pain and palpitations. Gastrointestinal:  Positive for abdominal pain. Negative for nausea and vomiting. Objective:      Vital signs in last 24 hours:  Patient Vitals for the past 24 hrs:   BP Temp Temp src Pulse Resp SpO2   02/01/23 1231 126/74 -- -- 94 15 94 %   02/01/23 0929 122/72 -- -- 93 -- --   02/01/23 0734 122/72 97.7 °F (36.5 °C) Axillary 93 18 95 %   02/01/23 0624 -- -- -- -- -- 97 %   01/31/23 2351 (!) 101/56 97.3 °F (36.3 °C) -- 87 -- 96 %         I/O last 3 completed shifts: In: 365 [P.O.:360; I.V.:5]  Out: 1250 [Urine:1250]  No intake/output data recorded. Physical Exam:  Vital Signs: /74   Pulse 94   Temp 97.7 °F (36.5 °C) (Axillary)   Resp 15   Ht 6' (1.829 m)   Wt 195 lb 11.2 oz (88.8 kg)   SpO2 94%   BMI 26.54 kg/m²   General appearance:. Lying comfortably in bed ,   HEENT: Normocephalic , Atraumatic, PERRL, JVP not raised  Chest: On inspection no use of accessory muscles of respiration, on auscultation vesicular breath sounds equal bilaterally no rales rhonchi or wheezing   cardiac: Normal heart sounds with regular rate and rhythm, S1, S2 normal. No murmurs, gallops, or rubs auscultated. Abdomen: Abdominal distention improving, soft to firm on palpation, tympanic on percussion over the flank , non-tender; normal bowel sounds, no masses, no organomegaly. Urogenital : ++ grove, no suprapubic , no left CVA tenderness, swelling or edema of the preputial skin  Extremities: No clubbing or cyanosis. 2+ peripheral edema. Peripheral pulses palpable.   Neurologic: alert and Cooperative , Grossly intact, DTR equal,   Psychology: No hallucination, no delusion       Lab Review   Recent Results (from the past 24 hour(s))   POCT Glucose    Collection Time: 01/31/23 11:53 PM   Result Value Ref Range    POC Glucose 147 (H) 70 - 99 mg/dl    Performed on AccuChek    CBC    Collection Time: 02/01/23  2:35 AM   Result Value Ref Range    WBC 8.8 4.8 - 10.8 K/uL    RBC 4.62 (L) 4.70 - 6.10 M/uL    Hemoglobin 12.5 (L) 14.0 - 18.0 g/dL    Hematocrit 39.0 (L) 42.0 - 52.0 %    MCV 84.4 80.0 - 94.0 fL    MCH 27.1 27.0 - 31.0 pg    MCHC 32.1 (L) 33.0 - 37.0 g/dL    RDW 13.2 11.5 - 14.5 %    Platelets 087 222 - 614 K/uL    MPV 11.6 9.4 - 12.4 fL   Comprehensive Metabolic Panel    Collection Time: 02/01/23  2:35 AM   Result Value Ref Range    Sodium 140 136 - 145 mmol/L    Potassium 4.2 3.5 - 5.0 mmol/L    Chloride 103 98 - 111 mmol/L    CO2 20 (L) 22 - 29 mmol/L    Anion Gap 17 7 - 19 mmol/L    Glucose 145 (H) 74 - 109 mg/dL    BUN 45 (H) 8 - 23 mg/dL    Creatinine 3.8 (H) 0.5 - 1.2 mg/dL    Est, Glom Filt Rate 15 (A) >60    Calcium 8.8 8.8 - 10.2 mg/dL    Total Protein 6.0 (L) 6.6 - 8.7 g/dL    Albumin 2.7 (L) 3.5 - 5.2 g/dL    Total Bilirubin 0.3 0.2 - 1.2 mg/dL    Alkaline Phosphatase 78 40 - 130 U/L    ALT 19 5 - 41 U/L    AST 26 5 - 40 U/L   Lipid Panel    Collection Time: 02/01/23  2:35 AM   Result Value Ref Range    Cholesterol, Total 130 (L) 160 - 199 mg/dL    Triglycerides 112 0 - 149 mg/dL    HDL 43 (L) 55 - 121 mg/dL    LDL Calculated 65 <100 mg/dL   Hepatitis Panel, Acute    Collection Time: 02/01/23  2:35 AM   Result Value Ref Range    Hep A IgM Non-Reactive Non-reactive    Hep B Core Ab, IgM Non-Reactive Non-reactive    Hep B S Ag Interp Non-Reactive Non-reactive    Hep C Ab Interp Non-Reactive Non-Reactive   Hepatitis B Surface Antibody    Collection Time: 02/01/23  2:35 AM   Result Value Ref Range    Hep B S Ab Non-Reactive              Current Facility-Administered Medications:     [START ON 2/2/2023] chlorhexidine gluconate (ANTISEPTIC SKIN CLEANSER) 4 % solution, , Topical, Once, Carson Currie DO    linezolid (ZYVOX) tablet 600 mg, 600 mg, Oral, 2 times per day, Yari Craig MD, 600 mg at 02/01/23 5647    QUEtiapine (SEROQUEL) tablet 25 mg, 25 mg, Oral, TID, Donny Iraheta MD, 25 mg at 02/01/23 1536    methylPREDNISolone sodium (SOLU-MEDROL) injection 80 mg, 80 mg, IntraVENous, Q8H, Donny Iraheta MD, 80 mg at 02/01/23 1722    ipratropium-albuterol (DUONEB) nebulizer solution 1 ampule, 1 ampule, Inhalation, Q4H WA, Donny Iraheta MD, 1 ampule at 02/01/23 1414    bumetanide (BUMEX) injection 1 mg, 1 mg, IntraVENous, Q12H, Dahlia Freeman MD, 1 mg at 02/01/23 1538    metoclopramide (REGLAN) injection 5 mg, 5 mg, IntraVENous, Q6H, oDnny Iraheta MD, 5 mg at 02/01/23 1721    enoxaparin Sodium (LOVENOX) injection 30 mg, 30 mg, SubCUTAneous, Daily, GOLD Chester - CNP, 30 mg at 02/01/23 1721    aspirin chewable tablet 81 mg, 81 mg, Oral, Daily, Christian Perez MD, 81 mg at 02/01/23 0928    atorvastatin (LIPITOR) tablet 80 mg, 80 mg, Oral, Daily, Donny Iraheta MD, 80 mg at 02/01/23 0929    simethicone (MYLICON) chewable tablet 80 mg, 80 mg, Oral, 4x Daily, Donny Iraheta MD, 80 mg at 02/01/23 1726    [Held by provider] NIFEdipine (PROCARDIA XL) extended release tablet 60 mg, 60 mg, Oral, Daily, Donny Iraheta MD, 60 mg at 01/31/23 0936    polyethylene glycol (GLYCOLAX) packet 17 g, 17 g, Oral, BID, Donny Iraheta MD, 17 g at 02/01/23 0930    sodium bicarbonate tablet 650 mg, 650 mg, Oral, BID, Sera Willingham MD, 650 mg at 02/01/23 7082    calcitRIOL (ROCALTROL) capsule 0.25 mcg, 0.25 mcg, Oral, Daily, Sera Willingham MD, 0.25 mcg at 02/01/23 1360    mirtazapine (REMERON) tablet 7.5 mg, 7.5 mg, Oral, Nightly, Ran Hopkins MD, 7.5 mg at 01/29/23 2129    tamsulosin (FLOMAX) capsule 0.4 mg, 0.4 mg, Oral, Daily, GOLD Chester - CNP, 0.4 mg at 02/01/23 4140    sodium chloride flush 0.9 % injection 5-40 mL, 5-40 mL, IntraVENous, 2 times per day, GOLD Chester - CNP, 10 mL at 02/01/23 0955    sodium chloride flush 0.9 % injection 5-40 mL, 5-40 mL, IntraVENous, PRN, Saray Self, APRN - CNP    0.9 % sodium chloride infusion, , IntraVENous, PRN, Saray Self, APRN - CNP    ondansetron (ZOFRAN-ODT) disintegrating tablet 4 mg, 4 mg, Oral, Q8H PRN **OR** ondansetron (ZOFRAN) injection 4 mg, 4 mg, IntraVENous, Q6H PRN, Saray Self, APRN - CNP, 4 mg at 01/28/23 0105    acetaminophen (TYLENOL) tablet 650 mg, 650 mg, Oral, Q6H PRN **OR** acetaminophen (TYLENOL) suppository 650 mg, 650 mg, Rectal, Q6H PRN, Saray Self, APRN - CNP    traMADol (ULTRAM) tablet 50 mg, 50 mg, Oral, Q6H PRN, Maday Reyes MD, 50 mg at 01/29/23 0216    allopurinol (ZYLOPRIM) tablet 100 mg, 100 mg, Oral, Daily, Saray Self, APRN - CNP, 100 mg at 02/01/23 3719    metoprolol succinate (TOPROL XL) extended release tablet 50 mg, 50 mg, Oral, Daily, Saray Self, APRN - CNP, 50 mg at 02/01/23 0929    pantoprazole (PROTONIX) tablet 40 mg, 40 mg, Oral, QAM AC, Saray Self, APRN - CNP, 40 mg at 02/01/23 0556    pregabalin (LYRICA) capsule 100 mg, 100 mg, Oral, BID, Saray Self, APRN - CNP, 100 mg at 02/01/23 6741    docusate sodium (COLACE) capsule 100 mg, 100 mg, Oral, Daily, Saray Self, APRN - CNP, 100 mg at 02/01/23 8092      Imaging:  CT ABDOMEN PELVIS W IV CONTRAST Additional Contrast? None    Result Date: 1/23/2023  1. Portions of the mid small bowel suggest mild mucosal thickening. Exact etiology is uncertain. Mild enteritis is not excluded. No fluid-filled bowel loops. 2. Stool filled colon. 3. Simple cystic lesions of the liver and kidneys. 4. Nonobstructing stone within the left renal pelvis. 5. Focal coarse calcification in the left renal parenchyma. This can be from prior inflammatory infectious process. Recommendation: Follow up as clinically indicated. All CT scans at this facility utilize dose modulation, iterative reconstruction, and/or weight based dosing when appropriate to reduce radiation dose to as low as reasonably achievable.  Dictated and Electronically Signed by Leslie Pinto MD, SA CERTIFIED at [id number] 07:03:41 PM EST                  Assessment/Plan  Principal Problem:    Pyelonephritis  Active Problems:    Renal stone    Visual disturbance    ALEXANDRO (acute kidney injury) ([address]    Mixed hyperlipidemia    Essential hypertension    Restless legs syndrome    Gastroesophageal reflux disease without esophagitis    Peripheral neuropathic pain    Gout    Benign non-nodular prostatic hyperplasia with lower urinary tract symptoms    Elevated PSA    Idiopathic peripheral neuropathy    Chronic idiopathic constipation  Resolved Problems:    * No resolved hospital problems. *       I have reviewed the patient's daily labs, including BMP and CBC with pertinent results discussed below.     L flank/back pain  -- Improved w/ UTI treatment and grove placed previously  -- Recurred this AM after voiding trial yesterday which required grove to be replaced  -- If symptoms do not improve today, would repeat L kidney US   -- If repeat L kidney US is normal, would obtain MRI L spine to rule out abscess/OM/discitis  1/29 reviewed renal ultrasound no evidence of hydronephrosis however suggestive of CKD, left flank pain is mild  1/30 left flank pain resolved    Staph Epidermidis bacteremia  Pyelonephritis  -- SIRS 1/4 on admission for WBC 13.6  -- UA w/ 21-30 WBCs, 6-10 RBCs, positive nitrite, mod LE  -- CT abdomen w/ nonobstructing stone in L renal pelvis, coarse calcification in L renal parenchyma  -- Renal US negative for hydronephrosis  -- 2/2 blood cultures + for staph epi on 1/23, sensitivity still pending  -- Repeat blood cultures drawn 1/25, NGTD  -- Continue Vancomycin, dc ceftriaxone  -- Urine culture growing staph epi, sensitivities reviewed  -- ID consulted, appreciate recommendations, note reviewed  1/31 vancomycin discontinued started on linezolid    ALEXANDRO could be prerenal versus intrinsic renal from vancomycin toxicity  -- Cr plateau at 2.2  -- nonobstructing stone on CT abdomen  -- Renal US negative for hydronephrosis  -- García placed 1/25 due to retention  -- Nancy Randolph for K 5.2 > 4.8  -- Consulted nephrology, appreciate recommendations, note reviewed  Creatinine improving 2.2, 1.7  1/30 followed up with [facility] urea nephrology change IV fluid to sodium bicarbonate and 5% dextrose and 45% NaCl  1/31 creatinine still trending up 2.7, getting volume overloaded, discontinue IV fluid, stress dose of Lasix nephrology on board plan for hemodialysis if no improvement, blood pressure is on the lower side holding nifedipine however the dose is given today  2/1 with diuretics creatinine jumped up to 3.8 plan for hemodialysis    Abdominal distention likely due to constipation> less likely from uremia  1/29 started on Reglan 10 mg IV every 6 hours for 4 doses  Added simethicone 160 mg every 6 hours for 4 doses  Change MiraLAX to twice daily along with Colace will monitor clinically for bowel movement  1/30 continue Reglan every 6 hourly, simethicone 80 mg every 6 hourly, continue Colace and MiraLAX  1/31 abdominal distention persisting but constipation resolved with 3 bowel movement so far we will continue the same regimen for now  2/1 distention improving with simethicone and Reglan will continue for now    Acute delirium could be metabolic versus uremic encephalopathy  Presently failing kidney function  1/31 EKG reviewed QTC of 435 started on Seroquel 25 mg p.o. 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, on aspirin increase the atorvastatin dose to 80 mg daily we will obtain fasting lipid panel> might need CT angiogram to rule out source of infarct from the posterior cerebral artery but with CKD angiogram cannot be done, will obtain echocardiogram with bubble study  2/1 need another MRI with contrast to confirm the infarct of the posterior cerebral artery circulation, neurology following    Poor appetite could be from constipation with constipation and monitor for bowel movement    Developing volume overload  Reduce IV fluid to 25 cc/h as the patient is poorly eating   we will monitor clinically  1/31 discontinue IV fluid    Preputial skin edema  Intermittent compressive bandage and ice application> resolved    Hypertension  Blood pressure is on the higher side started on nifedipine 60 mg daily and monitor vitals    Nephrolithiasis  BPH with obstruction  -- Nonobstructing on CT abdomen  -- Renal US negative for hydronephrosis 1/25  -- Continue Flomax  -- Considered starting Finasteride, but he has hx of elevated PSA so will defer to outpatient urologist  -- Grove placed 1/25  -- Voiding trial unsuccessful on 1/27, grove replaced  -- Will need outpatient urology follow up    Insomnia  -- Melatonin 10 mg QHS  -- Start Mirtazapine 7.5 mg QHS      DVT prophylaxis: Lovenox    DISPOSITION:  SNF placement, has a bed. May require PICC placement for IV ABX pending ID recommendations.      Full Code No additional code details        Giselle Tucker MD 2/1/2023 5:53 PM

## 2023-02-01 NOTE — PROGRESS NOTES
Patient:   Jess Peace  MR#:    [id number]   Room:    0333/333-02   YOB: 1940  Date of Progress Note: 2/1/2023  Time of Note                           7:45 AM  Consulting Physician:   Celestino Claros M.D. Attending Physician:  Umer Dong MD     Chief complaint Visual change    S:This 79 y.o. male  with past medical history significant for hypertension, hyperlipidemia, and neuropathy is seen for evaluation of visual disturbance. The patient admitted on 1/23/2023 with abdominal pain. Work-up revealed a nonobstructive stone in the left renal pelvis and is under treatment for pyelonephritis. Neurology consultation is called for visual disturbance. The patient is not the best historian but indicates that about 3 weeks ago he noticed poor vision in his lower visual fields. CT of the head on admission with no clear acute changes noted. It is unclear if this is constant or intermittent. He denies diplopia, dysarthria or dysphagia. Slightly confused.     REVIEW OF SYSTEMS:  Constitutional: No fevers No chills  Neck:No stiffness  Respiratory: No shortness of breath  Cardiovascular: No chest pain No palpitations  Gastrointestinal: No abdominal pain    Genitourinary: No Dysuria  Neurological: No headache, + confusion    Past Medical History:      Diagnosis Date    Benign non-nodular prostatic hyperplasia with lower urinary tract symptoms 8/16/2017    Essential hypertension 8/16/2017    Gastroesophageal reflux disease without esophagitis 8/16/2017    Gout 8/16/2017    Idiopathic peripheral neuropathy 2/19/2018    Mixed hyperlipidemia 8/16/2017    Peripheral neuropathic pain 8/16/2017    Primary osteoarthritis involving multiple joints 8/16/2017    Renal stone 1/23/2023    Restless legs syndrome 8/16/2017       Past Surgical History:      Procedure Laterality Date    INGUINAL HERNIA REPAIR Bilateral 1963    TURP  2003       Medications in Hospital:      Current Facility-Administered Medications: linezolid (ZYVOX) tablet 600 mg, 600 mg, Oral, 2 times per day, Brock Baltazar MD    QUEtiapine (SEROQUEL) tablet 25 mg, 25 mg, Oral, TID, Terry Acuña MD    methylPREDNISolone sodium (SOLU-MEDROL) injection 80 mg, 80 mg, IntraVENous, Q8H, Terry Acuña MD, 80 mg at 02/01/23 0309    ipratropium-albuterol (DUONEB) nebulizer solution 1 ampule, 1 ampule, Inhalation, Q4H WA, Terry Acuña MD, 1 ampule at 02/01/23 0624    bumetanide (BUMEX) injection 1 mg, 1 mg, IntraVENous, Q12H, Breezy Alvarez MD, 1 mg at 01/31/23 2357    metoclopramide (REGLAN) injection 5 mg, 5 mg, IntraVENous, Q6H, Terry Acuña MD, 5 mg at 02/01/23 0556    enoxaparin Sodium (LOVENOX) injection 30 mg, 30 mg, SubCUTAneous, Daily, GOLD Dougherty - CNP, 30 mg at 01/31/23 1736    aspirin chewable tablet 81 mg, 81 mg, Oral, Daily, Sundeep Poon MD    atorvastatin (LIPITOR) tablet 80 mg, 80 mg, Oral, Daily, Terry Acuña MD    simethicone (MYLICON) chewable tablet 80 mg, 80 mg, Oral, 4x Daily, Terry Acuña MD, 80 mg at 01/31/23 0936    [Held by provider] NIFEdipine (PROCARDIA XL) extended release tablet 60 mg, 60 mg, Oral, Daily, Terry Acuña MD, 60 mg at 01/31/23 0936    polyethylene glycol (GLYCOLAX) packet 17 g, 17 g, Oral, BID, Terry Acuña MD, 17 g at 01/31/23 5743    sodium bicarbonate tablet 650 mg, 650 mg, Oral, BID, Katherine Mheta MD, 650 mg at 01/31/23 9114    calcitRIOL (ROCALTROL) capsule 0.25 mcg, 0.25 mcg, Oral, Daily, Katherine Mehta MD, 0.25 mcg at 01/31/23 5959    mirtazapine (REMERON) tablet 7.5 mg, 7.5 mg, Oral, Nightly, Alejandra Bauer MD, 7.5 mg at 01/29/23 2129    tamsulosin (FLOMAX) capsule 0.4 mg, 0.4 mg, Oral, Daily, GOLD Dougherty CNP, 0.4 mg at 01/31/23 0936    sodium chloride flush 0.9 % injection 5-40 mL, 5-40 mL, IntraVENous, 2 times per day, GOLD Dougherty CNP, 10 mL at 01/31/23 2051    sodium chloride flush 0.9 % injection 5-40 mL, 5-40 mL, IntraVENous, PRN, GOLD Dougherty CNP    0.9 % sodium chloride infusion, , IntraVENous, PRN, Jackqueline Bun, APRN - CNP    ondansetron (ZOFRAN-ODT) disintegrating tablet 4 mg, 4 mg, Oral, Q8H PRN **OR** ondansetron (ZOFRAN) injection 4 mg, 4 mg, IntraVENous, Q6H PRN, Jackqueline Bun, APRN - CNP, 4 mg at 01/28/23 8975    acetaminophen (TYLENOL) tablet 650 mg, 650 mg, Oral, Q6H PRN **OR** acetaminophen (TYLENOL) suppository 650 mg, 650 mg, Rectal, Q6H PRN, Jackqueline Bun, APRN - CNP    traMADol (ULTRAM) tablet 50 mg, 50 mg, Oral, Q6H PRN, Gloria Figueroa MD, 50 mg at 01/29/23 0216    allopurinol (ZYLOPRIM) tablet 100 mg, 100 mg, Oral, Daily, Jackqueline Bun, APRN - CNP, 100 mg at 01/31/23 0936    metoprolol succinate (TOPROL XL) extended release tablet 50 mg, 50 mg, Oral, Daily, Jackqueline Bun, APRN - CNP, 50 mg at 01/31/23 0936    pantoprazole (PROTONIX) tablet 40 mg, 40 mg, Oral, QAM AC, Jackqueline Bun, APRN - CNP, 40 mg at 02/01/23 0556    pregabalin (LYRICA) capsule 100 mg, 100 mg, Oral, BID, Jackqueline Bun, APRN - CNP, 100 mg at 01/31/23 1895    docusate sodium (COLACE) capsule 100 mg, 100 mg, Oral, Daily, Jackqueline Bun, APRN - CNP, 100 mg at 01/31/23 1382    Allergies:  Codeine    Social History:   TOBACCO:   reports that he quit smoking about 48 years ago. His smoking use included cigarettes. He has a 90.00 pack-year smoking history. He has never used smokeless tobacco.  ETOH:   reports no history of alcohol use. Family History:       Problem Relation Age of Onset    Uterine Cancer Mother     Coronary Art Dis Father         MI at 62    COPD Sister     Stroke Brother          PHYSICAL EXAM:  /72   Pulse 93   Temp 97.7 °F (36.5 °C) (Axillary)   Resp 18   Ht 6' (1.829 m)   Wt 195 lb 11.2 oz (88.8 kg)   SpO2 95%   BMI 26.54 kg/m²     Constitutional - well developed, well nourished.    Eyes - conjunctiva normal.   Ear, nose, throat - No scars, masses, or lesions over external nose or ears, no atrophy of tongue  Neck-symmetric, no masses noted, no jugular vein distension  Respiration- chest wall appears symmetric, good expansion,   normal effort without use of accessory muscles  Musculoskeletal - no significant wasting of muscles noted, no bony deformities  Extremities-no clubbing, cyanosis or edema  Skin - warm, dry, and intact. No rash, erythema, or pallor. Psychiatric - mood, affect, and behavior appear      Neurological exam  Awake, alert, fluent oriented x 1-2 slightly confused difficulty following complex commands  Attention and concentration appear slightly impaired  Recent and remote memory appears impaired  Speech normal without dysarthria  No clear issues with language of fund of knowledge     Cranial Nerve Exam     CN III, IV,VI-EOMI, No nystagmus, conjugate eye movements, no ptosis    CN VII-no facial assymetry       Motor Exam  antigravity throughout upper and lower extremities bilaterally      Tremors- no tremors in hands or head noted     Gait  Not tested     Nursing/pcp notes, imaging,labs and vitals reviewed. PT,OT and/or speech notes reviewed    Lab Results   Component Value Date    WBC 8.8 02/01/2023    HGB 12.5 (L) 02/01/2023    HCT 39.0 (L) 02/01/2023    MCV 84.4 02/01/2023     02/01/2023     Lab Results   Component Value Date     02/01/2023    K 4.2 02/01/2023     02/01/2023    CO2 20 (L) 02/01/2023    BUN 45 (H) 02/01/2023    CREATININE 3.8 (H) 02/01/2023    GLUCOSE 145 (H) 02/01/2023    CALCIUM 8.8 02/01/2023    PROT 6.0 (L) 02/01/2023    LABALBU 2.7 (L) 02/01/2023    BILITOT 0.3 02/01/2023    ALKPHOS 78 02/01/2023    AST 26 02/01/2023    ALT 19 02/01/2023    LABGLOM 15 (A) 02/01/2023    GFRAA >59 05/19/2022     Lab Results   Component Value Date    INR 1.10 05/19/2022    PROTIME 14.2 05/19/2022       MRI BRAIN WO CONTRAST [[id number]]    Resulted: 01/31/23 1245    Updated: 01/31/23 1307    Narrative:     Exam: MRI OF THE BRAIN WITHOUTINTRAVENOUS CONTRAST   Clinical data:Loss of lower field vision.    Technique: Multiplanar multi-sequence MRI of the brain without intravenous gadolinium. Diffusion-weighted imaging is submitted. Prior studies: CT scan of brain dated 01/24/2023. Findings:  Patchy hyperintense T2 signal throughout the periventricular subcortical white matter, nonspecific but compatible with chronic microvascular ischemic changes. Abnormal signal in the left roc in the left superior cerebellum which is   hyperintense on the diffusion-weighted sequence and hypointense on the ADC map, potentially acute infarction in the left roc and the left superior cerebellum; however, artifact is not excluded, as the abnormality is not confirmed on the T2 weighted   sequence. Motion artifact throughout the exam limits evaluation. No evidence of acute intracranial hemorrhage. No obvious mass or mass effect. The ventricles and sulci are slightly prominent but are symmetric. No hydrocephalus or focal extra-axial fluid collection is detected. Left maxillary sinus inflammatory   changes; the paranasal sinuses and mastoid air cells are otherwise grossly clear. Extensive motion artifact exam limits evaluation. Impression:     1. The appearance of restricted diffusion in the left roc and the superior left cerebellum, compatible with acute infarction, although potentially artifactual, with extensive motion throughout the exam.   2. No evidence of acute intracranial hemorrhage. 3. Chronic microvascular ischemic changes with age-appropriate parenchymal volume loss is also present. 4. Left maxillary sinus inflammatory changes. Recommendation: Follow up is recommended; consider repeat MRI with sedation and including T2 coronal and diffusion coronal sequences.    Dictated and Electronically Signed by Karie Aviles MD at 31-Jan-2023 01:45:09 PM EST              Narrative & Impression    Transthoracic Echocardiography Report (TTE)      Demographics      Patient Name   [facility]  Date of Study            01/26/2023      MRN [id number]           Gender                   Male      Date of Birth  1940       Room Number              [facility]HL-0333      Age            79 year(s)      Height:        72 inches        Referring Physician      Tami Najera      Weight:        195 pounds       Sonographer              Serena Encarnacion      BSA:           2.11 m^2         Interpreting Physician   Vernon Coelho MD      BMI:           26.45 kg/m^2     Procedure     Type of Study      TTE procedure:ECHO 2D W/DOPPLER/COLOR/CONTRAST. Study Location: Echo Lab  Technical Quality: Limited visualization     Patient Status: Inpatient     Contrast Medium: Definity.      HR: 62 bpm BP: 137/80 mmHg      Conclusions      Summary   Normal left ventricular size with low normal systolic function without   distinct segmental wall motion abnormality -estimated ejection fraction 50   to 55%   Mild concentric left ventricular hypertrophy with mild [grade 1] diastolic   dysfunction   Normal right-sided chambers with preserved right ventricular systolic   function   Normal left atrial size   IVC dimension falls within normal limits without ability to assess   inspiratory motion   Trace pulmonic valvular insufficiency   No tricuspid regurgitation seen with which to estimate RVSP   Thickened aortic valve which appears to be tricuspid with adequate cusp   separation, no stenosis, and trace insufficiency [cannot exclude   vegetation]   Normally mobile mitral valve with mild regurgitation   Aortic root measurements fall within normal limits with ascending aorta   measuring upper normal at 3.3 cm   No significant pericardial effusion   Definity contrast utilized to better define endocardial borders      Signature      ----------------------------------------------------------------   Electronically signed by Vernon Coelho MD(Interpreting physician)   on 01/26/2023 09:04 AM   ----------------------------------------------------------------       RECORD REVIEW: Previous medical records, medications were reviewed at today's visit    IMPRESSION:   Visual impairment in the lower visual fields although currently exam shows no clear visual field deficit on bedside confrontation exam     Initial examination  Awake, alert, oriented x2, fluent  Mild ptosis bilaterally  Visual fields grossly intact to confrontation  Nonfocal       MRI of the brain and orbits-possible infarct in left roc and left cerebellum-we will repeat MRI with diffusion sequence only to ensure this is not artifactual  Carotid ultrasound less than 50% bilateral internal carotids  2D echo-left ventricular ejection fraction 50 to 55%/normal left atrium    On aspirin/statin  Will need ZIO Holter monitor on DC x 2 weeks    Will need outpatient ophthalmology evaluation     Encephalopathy in the setting of urinary tract infection/renal dysfunction  Hyperlipidemia-on statin  GI-bowel regimen/PPI  DVT prophylaxis-Lovenox  Hypertension-on meds monitor  Neuropathy-Lyrica  Renal stone-Flomax/IV fluids  UTI-on antibiotics  Renal dysfunction-on IV fluids  Pain control-tramadol as needed  History of gout-tramadol        PT/OT     Supportive care as noted         CALL WITH ANY QUESTIONS  [phone] CELL  Dr García Townsend

## 2023-02-01 NOTE — PROGRESS NOTES
02/01/23 1200   Subjective   Subjective Attempt patient out for HD, Per Edin Loza, MESERET         Electronically signed by Sadaf Johnson PTA on 2/1/2023 at 12:15 PM

## 2023-02-02 ENCOUNTER — APPOINTMENT (OUTPATIENT)
Dept: INTERVENTIONAL RADIOLOGY/VASCULAR | Age: 83
DRG: 871 | End: 2023-02-02
Payer: MEDICARE

## 2023-02-02 PROBLEM — N12 PYELONEPHRITIS: Status: ACTIVE | Noted: 2023-02-02

## 2023-02-02 LAB
ALBUMIN SERPL-MCNC: 2.5 G/DL (ref 3.5–5.2)
ALP BLD-CCNC: 71 U/L (ref 40–130)
ALT SERPL-CCNC: 20 U/L (ref 5–41)
ANION GAP SERPL CALCULATED.3IONS-SCNC: 14 MMOL/L (ref 7–19)
AST SERPL-CCNC: 24 U/L (ref 5–40)
BILIRUB SERPL-MCNC: 0.3 MG/DL (ref 0.2–1.2)
BUN BLDV-MCNC: 56 MG/DL (ref 8–23)
CALCIUM SERPL-MCNC: 8.9 MG/DL (ref 8.8–10.2)
CHLORIDE BLD-SCNC: 106 MMOL/L (ref 98–111)
CO2: 19 MMOL/L (ref 22–29)
CREAT SERPL-MCNC: 4 MG/DL (ref 0.5–1.2)
GFR SERPL CREATININE-BSD FRML MDRD: 14 ML/MIN/{1.73_M2}
GLUCOSE BLD-MCNC: 148 MG/DL (ref 74–109)
HCT VFR BLD CALC: 40.6 % (ref 42–52)
HEMOGLOBIN: 13.4 G/DL (ref 14–18)
MCH RBC QN AUTO: 27.4 PG (ref 27–31)
MCHC RBC AUTO-ENTMCNC: 33 G/DL (ref 33–37)
MCV RBC AUTO: 83 FL (ref 80–94)
PDW BLD-RTO: 13.3 % (ref 11.5–14.5)
PLATELET # BLD: 266 K/UL (ref 130–400)
PMV BLD AUTO: 10.9 FL (ref 9.4–12.4)
POTASSIUM SERPL-SCNC: 3.7 MMOL/L (ref 3.5–5)
RBC # BLD: 4.89 M/UL (ref 4.7–6.1)
SODIUM BLD-SCNC: 139 MMOL/L (ref 136–145)
TOTAL PROTEIN: 5.8 G/DL (ref 6.6–8.7)
WBC # BLD: 19.3 K/UL (ref 4.8–10.8)

## 2023-02-02 PROCEDURE — 6360000002 HC RX W HCPCS

## 2023-02-02 PROCEDURE — C1769 GUIDE WIRE: HCPCS

## 2023-02-02 PROCEDURE — 94760 N-INVAS EAR/PLS OXIMETRY 1: CPT

## 2023-02-02 PROCEDURE — 6360000002 HC RX W HCPCS: Performed by: INTERNAL MEDICINE

## 2023-02-02 PROCEDURE — 6370000000 HC RX 637 (ALT 250 FOR IP): Performed by: INTERNAL MEDICINE

## 2023-02-02 PROCEDURE — 36558 INSERT TUNNELED CV CATH: CPT

## 2023-02-02 PROCEDURE — 77001 FLUOROGUIDE FOR VEIN DEVICE: CPT | Performed by: SURGERY

## 2023-02-02 PROCEDURE — 6370000000 HC RX 637 (ALT 250 FOR IP): Performed by: SURGERY

## 2023-02-02 PROCEDURE — 76937 US GUIDE VASCULAR ACCESS: CPT

## 2023-02-02 PROCEDURE — 6360000002 HC RX W HCPCS: Performed by: SURGERY

## 2023-02-02 PROCEDURE — 36415 COLL VENOUS BLD VENIPUNCTURE: CPT

## 2023-02-02 PROCEDURE — 94640 AIRWAY INHALATION TREATMENT: CPT

## 2023-02-02 PROCEDURE — 6370000000 HC RX 637 (ALT 250 FOR IP)

## 2023-02-02 PROCEDURE — B543ZZA ULTRASONOGRAPHY OF RIGHT JUGULAR VEINS, GUIDANCE: ICD-10-PCS | Performed by: SURGERY

## 2023-02-02 PROCEDURE — 6370000000 HC RX 637 (ALT 250 FOR IP): Performed by: STUDENT IN AN ORGANIZED HEALTH CARE EDUCATION/TRAINING PROGRAM

## 2023-02-02 PROCEDURE — 99152 MOD SED SAME PHYS/QHP 5/>YRS: CPT

## 2023-02-02 PROCEDURE — 36558 INSERT TUNNELED CV CATH: CPT | Performed by: SURGERY

## 2023-02-02 PROCEDURE — 76937 US GUIDE VASCULAR ACCESS: CPT | Performed by: SURGERY

## 2023-02-02 PROCEDURE — 1210000000 HC MED SURG R&B

## 2023-02-02 PROCEDURE — 0JH63XZ INSERTION OF TUNNELED VASCULAR ACCESS DEVICE INTO CHEST SUBCUTANEOUS TISSUE AND FASCIA, PERCUTANEOUS APPROACH: ICD-10-PCS | Performed by: SURGERY

## 2023-02-02 PROCEDURE — 93308 TTE F-UP OR LMTD: CPT

## 2023-02-02 PROCEDURE — 8010000000 HC HEMODIALYSIS ACUTE INPT

## 2023-02-02 PROCEDURE — 2580000003 HC RX 258

## 2023-02-02 PROCEDURE — 80053 COMPREHEN METABOLIC PANEL: CPT

## 2023-02-02 PROCEDURE — 77001 FLUOROGUIDE FOR VEIN DEVICE: CPT

## 2023-02-02 PROCEDURE — 99153 MOD SED SAME PHYS/QHP EA: CPT

## 2023-02-02 PROCEDURE — 5A1D70Z PERFORMANCE OF URINARY FILTRATION, INTERMITTENT, LESS THAN 6 HOURS PER DAY: ICD-10-PCS | Performed by: SURGERY

## 2023-02-02 PROCEDURE — 6370000000 HC RX 637 (ALT 250 FOR IP): Performed by: PSYCHIATRY & NEUROLOGY

## 2023-02-02 PROCEDURE — 99233 SBSQ HOSP IP/OBS HIGH 50: CPT | Performed by: PSYCHIATRY & NEUROLOGY

## 2023-02-02 PROCEDURE — 05HM33Z INSERTION OF INFUSION DEVICE INTO RIGHT INTERNAL JUGULAR VEIN, PERCUTANEOUS APPROACH: ICD-10-PCS | Performed by: SURGERY

## 2023-02-02 PROCEDURE — 85027 COMPLETE CBC AUTOMATED: CPT

## 2023-02-02 RX ORDER — HEPARIN SODIUM 5000 [USP'U]/ML
INJECTION, SOLUTION INTRAVENOUS; SUBCUTANEOUS
Status: COMPLETED | OUTPATIENT
Start: 2023-02-02 | End: 2023-02-02

## 2023-02-02 RX ORDER — PREDNISONE 10 MG/1
5 TABLET ORAL DAILY
Status: COMPLETED | OUTPATIENT
Start: 2023-02-03 | End: 2023-02-03

## 2023-02-02 RX ORDER — BUMETANIDE 1 MG/1
1 TABLET ORAL DAILY
Status: DISCONTINUED | OUTPATIENT
Start: 2023-02-02 | End: 2023-02-03

## 2023-02-02 RX ORDER — FENTANYL CITRATE 50 UG/ML
INJECTION, SOLUTION INTRAMUSCULAR; INTRAVENOUS
Status: COMPLETED | OUTPATIENT
Start: 2023-02-02 | End: 2023-02-02

## 2023-02-02 RX ORDER — HEPARIN SODIUM 1000 [USP'U]/ML
3600 INJECTION, SOLUTION INTRAVENOUS; SUBCUTANEOUS
Status: COMPLETED | OUTPATIENT
Start: 2023-02-02 | End: 2023-02-02

## 2023-02-02 RX ADMIN — PANTOPRAZOLE SODIUM 40 MG: 40 TABLET, DELAYED RELEASE ORAL at 05:38

## 2023-02-02 RX ADMIN — QUETIAPINE FUMARATE 25 MG: 25 TABLET ORAL at 21:13

## 2023-02-02 RX ADMIN — MIRTAZAPINE 7.5 MG: 7.5 TABLET ORAL at 21:13

## 2023-02-02 RX ADMIN — IPRATROPIUM BROMIDE AND ALBUTEROL SULFATE 1 AMPULE: 2.5; .5 SOLUTION RESPIRATORY (INHALATION) at 14:35

## 2023-02-02 RX ADMIN — METOCLOPRAMIDE 5 MG: 5 INJECTION, SOLUTION INTRAMUSCULAR; INTRAVENOUS at 15:01

## 2023-02-02 RX ADMIN — METOPROLOL SUCCINATE 50 MG: 50 TABLET, FILM COATED, EXTENDED RELEASE ORAL at 15:00

## 2023-02-02 RX ADMIN — LINEZOLID 600 MG: 600 TABLET, FILM COATED ORAL at 21:14

## 2023-02-02 RX ADMIN — SIMETHICONE 80 MG: 80 TABLET, CHEWABLE ORAL at 17:25

## 2023-02-02 RX ADMIN — ASPIRIN 81 MG: 81 TABLET, CHEWABLE ORAL at 15:00

## 2023-02-02 RX ADMIN — FENTANYL CITRATE 25 MCG: 50 INJECTION, SOLUTION INTRAMUSCULAR; INTRAVENOUS at 08:36

## 2023-02-02 RX ADMIN — POLYETHYLENE GLYCOL 3350 17 G: 17 POWDER, FOR SOLUTION ORAL at 21:12

## 2023-02-02 RX ADMIN — ENOXAPARIN SODIUM 30 MG: 100 INJECTION SUBCUTANEOUS at 17:25

## 2023-02-02 RX ADMIN — SIMETHICONE 80 MG: 80 TABLET, CHEWABLE ORAL at 21:13

## 2023-02-02 RX ADMIN — TAMSULOSIN HYDROCHLORIDE 0.4 MG: 0.4 CAPSULE ORAL at 15:00

## 2023-02-02 RX ADMIN — BUMETANIDE 1 MG: 1 TABLET ORAL at 15:01

## 2023-02-02 RX ADMIN — METOCLOPRAMIDE 5 MG: 5 INJECTION, SOLUTION INTRAMUSCULAR; INTRAVENOUS at 17:25

## 2023-02-02 RX ADMIN — IPRATROPIUM BROMIDE AND ALBUTEROL SULFATE 1 AMPULE: 2.5; .5 SOLUTION RESPIRATORY (INHALATION) at 05:57

## 2023-02-02 RX ADMIN — LINEZOLID 600 MG: 600 TABLET, FILM COATED ORAL at 15:00

## 2023-02-02 RX ADMIN — IPRATROPIUM BROMIDE AND ALBUTEROL SULFATE 1 AMPULE: 2.5; .5 SOLUTION RESPIRATORY (INHALATION) at 19:37

## 2023-02-02 RX ADMIN — SIMETHICONE 80 MG: 80 TABLET, CHEWABLE ORAL at 15:00

## 2023-02-02 RX ADMIN — SODIUM BICARBONATE 650 MG: 650 TABLET ORAL at 21:13

## 2023-02-02 RX ADMIN — DOCUSATE SODIUM 100 MG: 100 CAPSULE, LIQUID FILLED ORAL at 15:00

## 2023-02-02 RX ADMIN — METOCLOPRAMIDE 5 MG: 5 INJECTION, SOLUTION INTRAMUSCULAR; INTRAVENOUS at 05:38

## 2023-02-02 RX ADMIN — SODIUM CHLORIDE, PRESERVATIVE FREE 10 ML: 5 INJECTION INTRAVENOUS at 15:10

## 2023-02-02 RX ADMIN — ATORVASTATIN CALCIUM 80 MG: 80 TABLET, FILM COATED ORAL at 15:00

## 2023-02-02 RX ADMIN — PREGABALIN 100 MG: 50 CAPSULE ORAL at 21:13

## 2023-02-02 RX ADMIN — HEPARIN SODIUM 5000 UNITS: 5000 INJECTION INTRAVENOUS; SUBCUTANEOUS at 08:32

## 2023-02-02 RX ADMIN — CHLORHEXIDINE GLUCONATE 237 ML: 4 LIQUID TOPICAL at 05:40

## 2023-02-02 RX ADMIN — HEPARIN SODIUM 3600 UNITS: 1000 INJECTION INTRAVENOUS; SUBCUTANEOUS at 13:40

## 2023-02-02 RX ADMIN — ALLOPURINOL 100 MG: 100 TABLET ORAL at 15:00

## 2023-02-02 RX ADMIN — SODIUM BICARBONATE 650 MG: 650 TABLET ORAL at 15:00

## 2023-02-02 RX ADMIN — PREGABALIN 100 MG: 50 CAPSULE ORAL at 15:00

## 2023-02-02 RX ADMIN — CALCITRIOL CAPSULES 0.25 MCG 0.25 MCG: 0.25 CAPSULE ORAL at 15:01

## 2023-02-02 RX ADMIN — IPRATROPIUM BROMIDE AND ALBUTEROL SULFATE 1 AMPULE: 2.5; .5 SOLUTION RESPIRATORY (INHALATION) at 10:20

## 2023-02-02 ASSESSMENT — ENCOUNTER SYMPTOMS
NAUSEA: 0
COUGH: 0
SHORTNESS OF BREATH: 0
ABDOMINAL PAIN: 1
VOMITING: 0

## 2023-02-02 ASSESSMENT — PAIN SCALES - GENERAL: PAINLEVEL_OUTOF10: 0

## 2023-02-02 NOTE — CARE COORDINATION
Tentative HD schedule will be TTS at 1220.     Electronically signed by Carmina Sarmiento RN on 2/2/2023 at 4:19 PM

## 2023-02-02 NOTE — PROGRESS NOTES
Physical Therapy  Name: Bria Kat  MRN:  [id number]  Date of service:  2/2/2023    Patient out of room this am.  Physical Therapy will continue to follow and progress as able.     Electronically signed by Mariano Gunter PTA on 2/2/2023 at 9:16 AM

## 2023-02-02 NOTE — PROGRESS NOTES
Daily Progress Note    Date:2023  Patient: Jacki Nunes  : 1940  FLX:[id number]  CODE:Full Code No additional code details  PCP:Bety Cuevas MD    Admit Date: 2023  3:25 PM   LOS: 10 days     Chief Complaint   Patient presents with    Abdominal Pain    Flank Pain     Pt states that he began having flank pain and LQ abdominal pain at 1000 this morning. Pt states that he has not had an episode like this in the past. Pt states he has had nausea, and has had consistent Bms. Pt denies diarrhea. ABCs are intact. Skin wpd. A&ox4. Hospital Summary:  80 yo M with HTN, GERD, HLD, BPH s/p TURP who presented to [facility] ED with abdominal pain. CT abdomen in ED showed stool-filled colon, nonobstructing stone within L renal pelvis. Admitted to hospitalist service for UTI/Pyelonephritis. Nephrology consulted due to ALEXANDRO. Blood cultures returned 2/2 + for gram + cocci, PCR showing Staph Epi. ID consulted. ALEXANDRO improved w/ IVF and grove catheter placement. Pt's L flank pain improved after grove was placed. Underwent unsuccessful voiding trial on , grove was replaced. He endorses L flank pain again on . Plan to reimage L kidney if symptoms don't improve. May need MRI L spine if persistent symptoms with normal kidney imaging.    The patient failed a voiding trial on  put back on Grove catheter and continue on Flomax  Interval history>   the patient has been treated for constipation and distended abdomen which is getting better  Worsening renal function initially given IV fluid followed by diuretics nephrology discussed with family planning for hemodialysis  Infectious disease change IV vancomycin to linezolid  Visual field defect evaluated by neurologist found to have suspected acute stroke> need MRI without contrast    Subjective:    the patient has episode of confusion last night however in the morning the patient is alert and oriented, has bowel movement today, seen along with RN discussed about the worsening renal function and need hemodialysis, family informed by nephrologist, vascular consulted    2/2 seen at the dialysis unit, patient got permacath placed today morning, the patient answered the questions appropriately, he did not have adequate sleep last night, did not have bowel movement today however he remembered that he had bowel movement yesterday,      Review of Systems   Constitutional:  Negative for chills and fever. Respiratory:  Negative for cough and shortness of breath. Cardiovascular:  Negative for chest pain and palpitations. Gastrointestinal:  Positive for abdominal pain. Negative for nausea and vomiting. Objective:      Vital signs in last 24 hours:  Patient Vitals for the past 24 hrs:   BP Temp Temp src Pulse Resp SpO2   02/02/23 1025 -- -- -- -- -- 95 %   02/02/23 0923 -- -- -- 91 14 --   02/02/23 0921 (!) 175/148 -- -- 95 20 (!) 87 %   02/02/23 0916 (!) 166/89 -- -- 98 16 --   02/02/23 0912 (!) 147/81 -- -- 98 14 94 %   02/02/23 0907 -- -- -- 95 16 96 %   02/02/23 0906 (!) 177/108 -- -- 100 11 96 %   02/02/23 0901 (!) 157/87 -- -- 95 17 96 %   02/02/23 0856 (!) 140/98 -- -- 100 16 96 %   02/02/23 0852 (!) 160/87 -- -- 97 19 97 %   02/02/23 0847 (!) 169/99 -- -- 97 12 97 %   02/02/23 0844 (!) 172/98 -- -- 96 17 99 %   02/02/23 0842 -- -- -- 97 17 98 %   02/02/23 0841 -- -- -- 99 16 (!) 78 %   02/02/23 0837 (!) 177/96 -- -- 97 18 94 %   02/02/23 0832 (!) 173/131 -- -- (!) 103 14 93 %   02/02/23 0600 -- -- -- -- -- 93 %   02/02/23 0544 (!) 143/93 97.5 °F (36.4 °C) -- 85 18 92 %   02/02/23 0104 (!) 143/88 97.3 °F (36.3 °C) Temporal 96 20 96 %   02/01/23 1900 137/74 97.4 °F (36.3 °C) Temporal 65 16 92 %   02/01/23 1231 126/74 -- -- 94 15 94 %         I/O last 3 completed shifts: In: 165 [P.O.:160; I.V.:5]  Out: 1850 [Urine:1850]  No intake/output data recorded.     Physical Exam:  Vital Signs: BP (!) 175/148 Comment: LOC 0  Pulse 91 Comment: LOC 0  Temp 97.5 °F (36.4 °C) Resp 14 Comment: LOC 0  Ht 6' (1.829 m)   Wt 195 lb 11.2 oz (88.8 kg)   SpO2 95%   BMI 26.54 kg/m²   General appearance:. Lying comfortably in bed ,   HEENT: Normocephalic , Atraumatic, PERRL, JVP not raised  Chest: On inspection no use of accessory muscles of respiration, on auscultation vesicular breath sounds equal bilaterally no rales rhonchi or wheezing   cardiac: Normal heart sounds with regular rate and rhythm, S1, S2 normal. No murmurs, gallops, or rubs auscultated. Abdomen: Abdominal distention improving, soft to firm on palpation, tympanic on percussion over the flank , non-tender; normal bowel sounds, no masses, no organomegaly. Urogenital : ++ grove, no suprapubic , no left CVA tenderness, swelling or edema of the preputial skin  Extremities: No clubbing or cyanosis. 2+ peripheral edema bilateral extremities, edema in the dependent area of the body. Peripheral pulses palpable.   Neurologic: alert and Cooperative , Grossly intact, DTR equal,   Psychology: No hallucination, no delusion       Lab Review   Recent Results (from the past 24 hour(s))   CBC    Collection Time: 02/02/23  1:24 AM   Result Value Ref Range    WBC 19.3 (H) 4.8 - 10.8 K/uL    RBC 4.89 4.70 - 6.10 M/uL    Hemoglobin 13.4 (L) 14.0 - 18.0 g/dL    Hematocrit 40.6 (L) 42.0 - 52.0 %    MCV 83.0 80.0 - 94.0 fL    MCH 27.4 27.0 - 31.0 pg    MCHC 33.0 33.0 - 37.0 g/dL    RDW 13.3 11.5 - 14.5 %    Platelets 542 997 - 781 K/uL    MPV 10.9 9.4 - 12.4 fL   Comprehensive Metabolic Panel    Collection Time: 02/02/23  1:24 AM   Result Value Ref Range    Sodium 139 136 - 145 mmol/L    Potassium 3.7 3.5 - 5.0 mmol/L    Chloride 106 98 - 111 mmol/L    CO2 19 (L) 22 - 29 mmol/L    Anion Gap 14 7 - 19 mmol/L    Glucose 148 (H) 74 - 109 mg/dL    BUN 56 (H) 8 - 23 mg/dL    Creatinine 4.0 (H) 0.5 - 1.2 mg/dL    Est, Glom Filt Rate 14 (A) >60    Calcium 8.9 8.8 - 10.2 mg/dL    Total Protein 5.8 (L) 6.6 - 8.7 g/dL    Albumin 2.5 (L) 3.5 - 5.2 g/dL Total Bilirubin 0.3 0.2 - 1.2 mg/dL    Alkaline Phosphatase 71 40 - 130 U/L    ALT 20 5 - 41 U/L    AST 24 5 - 40 U/L             Current Facility-Administered Medications:     [START ON 2/3/2023] predniSONE (DELTASONE) tablet 5 mg, 5 mg, Oral, Daily, Amy Asencio MD    bumetanide (BUMEX) tablet 1 mg, 1 mg, Oral, Daily, Amy Asencio MD    QUEtiapine (SEROQUEL) tablet 25 mg, 25 mg, Oral, Nightly, Rosaura Costello MD    linezolid (ZYVOX) tablet 600 mg, 600 mg, Oral, 2 times per day, Kristin Hermosillo MD, 600 mg at 02/01/23 1946    ipratropium-albuterol (DUONEB) nebulizer solution 1 ampule, 1 ampule, Inhalation, Q4H WA, Rosaura Costello MD, 1 ampule at 02/02/23 1020    metoclopramide (REGLAN) injection 5 mg, 5 mg, IntraVENous, Q6H, Rosaura Costello MD, 5 mg at 02/02/23 0538    enoxaparin Sodium (LOVENOX) injection 30 mg, 30 mg, SubCUTAneous, Daily, GOLD Benitez - CNP, 30 mg at 02/01/23 1721    aspirin chewable tablet 81 mg, 81 mg, Oral, Daily, García Townsend MD, 81 mg at 02/01/23 0928    atorvastatin (LIPITOR) tablet 80 mg, 80 mg, Oral, Daily, Rosaura Costello MD, 80 mg at 02/01/23 0929    simethicone (MYLICON) chewable tablet 80 mg, 80 mg, Oral, 4x Daily, Rosaura Costello MD, 80 mg at 02/01/23 1947    [Held by provider] NIFEdipine (PROCARDIA XL) extended release tablet 60 mg, 60 mg, Oral, Daily, Rosaura Costello MD, 60 mg at 01/31/23 0936    polyethylene glycol (GLYCOLAX) packet 17 g, 17 g, Oral, BID, Rosaura Costello MD, 17 g at 02/01/23 1946    sodium bicarbonate tablet 650 mg, 650 mg, Oral, BID, Adi Jama MD, 650 mg at 02/01/23 1948    calcitRIOL (ROCALTROL) capsule 0.25 mcg, 0.25 mcg, Oral, Daily, Adi Jama MD, 0.25 mcg at 02/01/23 0534    mirtazapine (REMERON) tablet 7.5 mg, 7.5 mg, Oral, Nightly, Bernadette Jones MD, 7.5 mg at 02/01/23 1946    tamsulosin (FLOMAX) capsule 0.4 mg, 0.4 mg, Oral, Daily, Christophe Goodson, APRN - CNP, 0.4 mg at 02/01/23 3439    sodium chloride flush 0.9 % injection 5-40 mL, 5-40 mL, IntraVENous, 2 times per day, GOLD Casanova CNP, 10 mL at 02/01/23 1947    sodium chloride flush 0.9 % injection 5-40 mL, 5-40 mL, IntraVENous, PRN, GOLD Casanova CNP    0.9 % sodium chloride infusion, , IntraVENous, PRN, GOLD Casanova CNP    ondansetron (ZOFRAN-ODT) disintegrating tablet 4 mg, 4 mg, Oral, Q8H PRN **OR** ondansetron (ZOFRAN) injection 4 mg, 4 mg, IntraVENous, Q6H PRN, GOLD Casanova CNP, 4 mg at 01/28/23 5519    acetaminophen (TYLENOL) tablet 650 mg, 650 mg, Oral, Q6H PRN **OR** acetaminophen (TYLENOL) suppository 650 mg, 650 mg, Rectal, Q6H PRN, GOLD Casanova CNP    traMADol (ULTRAM) tablet 50 mg, 50 mg, Oral, Q6H PRN, Sharyle Riffle, MD, 50 mg at 01/29/23 0216    allopurinol (ZYLOPRIM) tablet 100 mg, 100 mg, Oral, Daily, GOLD Casanova CNP, 100 mg at 02/01/23 9613    metoprolol succinate (TOPROL XL) extended release tablet 50 mg, 50 mg, Oral, Daily, GOLD Casanova CNP, 50 mg at 02/01/23 0929    pantoprazole (PROTONIX) tablet 40 mg, 40 mg, Oral, QAM AC, GOLD Casanova CNP, 40 mg at 02/02/23 0538    pregabalin (LYRICA) capsule 100 mg, 100 mg, Oral, BID, GOLD Casanova CNP, 100 mg at 02/01/23 1947    docusate sodium (COLACE) capsule 100 mg, 100 mg, Oral, Daily, GOLD Casanova CNP, 100 mg at 02/01/23 5106      Imaging:  CT ABDOMEN PELVIS W IV CONTRAST Additional Contrast? None    Result Date: 1/23/2023  1. Portions of the mid small bowel suggest mild mucosal thickening. Exact etiology is uncertain. Mild enteritis is not excluded. No fluid-filled bowel loops. 2. Stool filled colon. 3. Simple cystic lesions of the liver and kidneys. 4. Nonobstructing stone within the left renal pelvis. 5. Focal coarse calcification in the left renal parenchyma. This can be from prior inflammatory infectious process. Recommendation: Follow up as clinically indicated.  All CT scans at this facility utilize dose modulation, iterative reconstruction, and/or weight based dosing when appropriate to reduce radiation dose to as low as reasonably achievable. Dictated and Electronically Signed by Louis Dueñas MD, SA CERTIFIED at [id number] 07:03:41 PM EST                  Assessment/Plan  Principal Problem:    Pyelonephritis of left kidney  Active Problems:    Renal stone    Visual disturbance    ALEXANDRO (acute kidney injury) ([facility] [address]    Pyelonephritis    Mixed hyperlipidemia    Essential hypertension    Restless legs syndrome    Gastroesophageal reflux disease without esophagitis    Peripheral neuropathic pain    Gout    Benign non-nodular prostatic hyperplasia with lower urinary tract symptoms    Elevated PSA    Idiopathic peripheral neuropathy    Chronic idiopathic constipation  Resolved Problems:    * No resolved hospital problems. *       I have reviewed the patient's daily labs, including BMP and CBC with pertinent results discussed below.     L flank/back pain  -- Improved w/ UTI treatment and grove placed previously  -- Recurred this AM after voiding trial yesterday which required grove to be replaced  -- If symptoms do not improve today, would repeat L kidney US   -- If repeat L kidney US is normal, would obtain MRI L spine to rule out abscess/OM/discitis  1/29 reviewed renal ultrasound no evidence of hydronephrosis however suggestive of CKD, left flank pain is mild  1/30 left flank pain resolved    Staph Epidermidis bacteremia  Pyelonephritis  -- SIRS 1/4 on admission for WBC 13.6  -- UA w/ 21-30 WBCs, 6-10 RBCs, positive nitrite, mod LE  -- CT abdomen w/ nonobstructing stone in L renal pelvis, coarse calcification in L renal parenchyma  -- Renal US negative for hydronephrosis  -- 2/2 blood cultures + for staph epi on 1/23,  -- Repeat blood cultures drawn 1/25, NGTD  -- Continue Vancomycin, dc ceftriaxone  -- Urine culture growing staph epi, sensitivities reviewed  -- ID consulted, appreciate recommendations, note reviewed  1/31 vancomycin discontinued started on linezolid    Leukocytosis trending up could be from infection  2/2 ID on board presently getting linezolid for the above infection we will trend CBC daily    ALEXANDRO could be prerenal versus intrinsic renal from vancomycin toxicity  -- Cr plateau at 2.2  -- nonobstructing stone on CT abdomen  -- Renal US negative for hydronephrosis  -- García placed 1/25 due to retention  -- CAT [facility] for K 5.2 > 4.8  -- Consulted nephrology, appreciate recommendations, note reviewed  Creatinine improving 2.2, 1.7  1/30 followed up with [facility] urea nephrology change IV fluid to sodium bicarbonate and 5% dextrose and 45% NaCl  1/31 creatinine still trending up 2.7, getting volume overloaded, discontinue IV fluid, stress dose of Lasix nephrology on board plan for hemodialysis if no improvement, blood pressure is on the lower side holding nifedipine however the dose is given today  2/1 with diuretics creatinine jumped up to 3.8 plan for hemodialysis  2/2 creatinine for today status post permacath placement in the morning and hemodialysis today    Abdominal distention likely due to constipation> less likely from uremia  1/29 started on Reglan 10 mg IV every 6 hours for 4 doses  Added simethicone 160 mg every 6 hours for 4 doses  Change MiraLAX to twice daily along with Colace will monitor clinically for bowel movement  1/30 continue Reglan every 6 hourly, simethicone 80 mg every 6 hourly, continue Colace and MiraLAX  1/31 abdominal distention persisting but constipation resolved with 3 bowel movement so far we will continue the same regimen for now  2/1 distention improving with simethicone and Reglan will continue for now    Acute delirium could be metabolic versus uremic encephalopathy  Presently failing kidney function  1/31 EKG reviewed QTC of 435 started on Seroquel 25 mg p.o. 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, on aspirin increase the atorvastatin dose to 80 mg daily we will obtain fasting lipid panel> might need CT angiogram to rule out source of infarct from the posterior cerebral artery but with CKD angiogram cannot be done, will obtain echocardiogram with bubble study  2/1 need another MRI with contrast to confirm the infarct of the posterior cerebral artery circulation, neurology following  2/2 repeat MRI negative for any acute stroke    Poor appetite could be from constipation with constipation and monitor for bowel movement    Developing volume overload  Reduce IV fluid to 25 cc/h as the patient is poorly eating   we will monitor clinically  1/31 discontinue IV fluid    Preputial skin edema  Intermittent compressive bandage and ice application> resolved    Hypertension  Blood pressure is on the higher side started on nifedipine 60 mg daily and monitor vitals    Nephrolithiasis  BPH with obstruction  -- Nonobstructing on CT abdomen  -- Renal US negative for hydronephrosis 1/25  -- Continue Flomax  -- Considered starting Finasteride, but he has hx of elevated PSA so will defer to outpatient urologist  -- Grove placed 1/25  -- Voiding trial unsuccessful on 1/27, grove replaced  -- Will need outpatient urology follow up    Insomnia  -- Melatonin 10 mg QHS  -- Start Mirtazapine 7.5 mg QHS      DVT prophylaxis: Lovenox    DISPOSITION:  SNF placement, has a bed. May require PICC placement for IV ABX pending ID recommendations.      Full Code No additional code details        Sky French MD 2/2/2023 11:30 AM

## 2023-02-02 NOTE — PROGRESS NOTES
Nephrology ([facility]) Progress Note    Patient:  Kavin Jones  YOB: 1940  Date of Service: 2/2/2023  MRN: [id number]   Acct: [de-identified]   Primary Care Physician: Rosemary Green MD  Advance Directive: Full Code  Admit Date: 1/23/2023       Hospital Day: 10  Referring Provider: Derek Bonds MD    Patient independently seen and examined, Chart, Consults, Notes, Operative notes, Labs, Cardiology, and Radiology studies reviewed as available. Chief complaint: Abdominal labs. Subjective:  Kavin Jones is a 79 y.o. male for whom we were consulted for evaluation and treatment of acute kidney injury. Patient denies any history of chronic kidney disease. He was admitted with left flank pain radiating to the anterior abdominal wall. He has history of type 2 diabetes, hypertension, osteoarthritis and gout. CT scan of the abdomen in the emergency room consistent with nonobstructing left kidney stone associated with pyelonephritis. Hospital course remarkable for IV antibiotics and IV fluid administration and has worsening of renal function. Nephrology is consulted for evaluation of acute kidney injury. Patient also has staph epidermis bacteremia. His renal function continues to worsen despite IV fluid resuscitation. Finally patient and family agreed to proceed with dialysis. On February 2 he had a permacatheter placement.     Currently seen on hemodialysis  Hemodialysis access: Permacath  Hemodialysis: 3-1/2   Ultrafiltration 3500 cc  2K bath  Blood flow rate is 300 cc/min    Allergies:  Codeine    Medicines:  Current Facility-Administered Medications   Medication Dose Route Frequency Provider Last Rate Last Admin    predniSONE (DELTASONE) tablet 20 mg  20 mg Oral Daily Derek Bonds MD        QUEtiapine (SEROQUEL) tablet 25 mg  25 mg Oral Nightly Derek Bonds MD        linezolid (ZYVOX) tablet 600 mg  600 mg Oral 2 times per day Ghassan Thompson MD   600 mg at 02/01/23 1946    ipratropium-albuterol (DUONEB) nebulizer solution 1 ampule  1 ampule Inhalation Q4H WA Rosaura Costello MD   1 ampule at 02/02/23 0557    bumetanide (BUMEX) injection 1 mg  1 mg IntraVENous Q12H Amy Asencio MD   1 mg at 02/01/23 2312    metoclopramide (REGLAN) injection 5 mg  5 mg IntraVENous Q6H Rosaura Costello MD   5 mg at 02/02/23 0538    enoxaparin Sodium (LOVENOX) injection 30 mg  30 mg SubCUTAneous Daily Christophe Kellee, APRN - CNP   30 mg at 02/01/23 1721    aspirin chewable tablet 81 mg  81 mg Oral Daily García Townsend MD   81 mg at 02/01/23 0928    atorvastatin (LIPITOR) tablet 80 mg  80 mg Oral Daily Rosaura Costello MD   80 mg at 02/01/23 0929    simethicone (MYLICON) chewable tablet 80 mg  80 mg Oral 4x Daily Rosaura Costello MD   80 mg at 02/01/23 1947    [Held by provider] NIFEdipine (PROCARDIA XL) extended release tablet 60 mg  60 mg Oral Daily Rosaura Costello MD   60 mg at 01/31/23 0936    polyethylene glycol (GLYCOLAX) packet 17 g  17 g Oral BID Rosaura Costello MD   17 g at 02/01/23 1946    sodium bicarbonate tablet 650 mg  650 mg Oral BID Adi Jama MD   650 mg at 02/01/23 1948    calcitRIOL (ROCALTROL) capsule 0.25 mcg  0.25 mcg Oral Daily Adi Jama MD   0.25 mcg at 02/01/23 9455    mirtazapine (REMERON) tablet 7.5 mg  7.5 mg Oral Nightly Bernadette Jones MD   7.5 mg at 02/01/23 1946    tamsulosin (FLOMAX) capsule 0.4 mg  0.4 mg Oral Daily Christophe Kellee, APRN - CNP   0.4 mg at 02/01/23 4201    sodium chloride flush 0.9 % injection 5-40 mL  5-40 mL IntraVENous 2 times per day Christophe Kellee, APRN - CNP   10 mL at 02/01/23 1947    sodium chloride flush 0.9 % injection 5-40 mL  5-40 mL IntraVENous PRN Christophe Kellee, APRN - CNP        0.9 % sodium chloride infusion   IntraVENous PRN Christophe Kellee, APRN - CNP        ondansetron (ZOFRAN-ODT) disintegrating tablet 4 mg  4 mg Oral Q8H PRN Christophe Kellee, APRN - CNP        Or    [facility] injection 4 mg  4 mg IntraVENous Q6H PRN Christophe Kellee, APRN - CNP   4 mg at 01/28/23 0323    acetaminophen (TYLENOL) tablet 650 mg  650 mg Oral Q6H PRN GOLD Angela CNP        Or    acetaminophen (TYLENOL) suppository 650 mg  650 mg Rectal Q6H PRN Callie Cabrera APRN - CNP        traMADol Babetta Lionel) tablet 50 mg  50 mg Oral Q6H PRN Daisha Pineda MD   50 mg at 01/29/23 0216    allopurinol (ZYLOPRIM) tablet 100 mg  100 mg Oral Daily Callie Cabrera APRN - CNP   100 mg at 02/01/23 7737    metoprolol succinate (TOPROL XL) extended release tablet 50 mg  50 mg Oral Daily Callie Cabrera APRN - CNP   50 mg at 02/01/23 8973    pantoprazole (PROTONIX) tablet 40 mg  40 mg Oral QAM AC Callie Cabrera, APRN - CNP   40 mg at 02/02/23 0538    pregabalin (LYRICA) capsule 100 mg  100 mg Oral BID Callie Cabrera APRN - CNP   100 mg at 02/01/23 1947    docusate sodium (COLACE) capsule 100 mg  100 mg Oral Daily Callie Cabrera APRN - CNP   100 mg at 02/01/23 9687       Past Medical History:  Past Medical History:   Diagnosis Date    Benign non-nodular prostatic hyperplasia with lower urinary tract symptoms 8/16/2017    Essential hypertension 8/16/2017    Gastroesophageal reflux disease without esophagitis 8/16/2017    Gout 8/16/2017    Idiopathic peripheral neuropathy 2/19/2018    Mixed hyperlipidemia 8/16/2017    Peripheral neuropathic pain 8/16/2017    Primary osteoarthritis involving multiple joints 8/16/2017    Renal stone 1/23/2023    Restless legs syndrome 8/16/2017       Past Surgical History:  Past Surgical History:   Procedure Laterality Date    INGUINAL HERNIA REPAIR Bilateral 1963    TURP  2003       Family History  Family History   Problem Relation Age of Onset    Uterine Cancer Mother     Coronary Art Dis Father         MI at 62    COPD Sister     Stroke Brother        Social History  Social History     Socioeconomic History    Marital status:       Spouse name: Herlinda Ewing    Number of children: 2    Years of education: 12    Highest education level: Not on file   Occupational History     Employer: RETIRED   Tobacco Use    Smoking status: Former     Packs/day: 3.00     Years: 30.00     Pack years: 90.00     Types: Cigarettes     Quit date: 1975     Years since quittin.1    Smokeless tobacco: Never   Substance and Sexual Activity    Alcohol use: No    Drug use: No    Sexual activity: Yes     Partners: Female     Comment: wife   Other Topics Concern    Not on file   Social History Narrative    Not on file     Social Determinants of Health     Financial Resource Strain: Low Risk     Difficulty of Paying Living Expenses: Not hard at all   Food Insecurity: No Food Insecurity    Worried About Running Out of Food in the Last Year: Never true    Ran Out of Food in the Last Year: Never true   Transportation Needs: Not on file   Physical Activity: Unknown    Days of Exercise per Week: 0 days    Minutes of Exercise per Session: Not on file   Stress: Not on file   Social Connections: Not on file   Intimate Partner Violence: Unknown    Fear of Current or Ex-Partner: Patient refused    Emotionally Abused: Patient refused    Physically Abused: Patient refused    Sexually Abused: Patient refused   Housing Stability: Not on file         Review of Systems:  History obtained from chart review and the patient  General ROS: Fatigue and lack of energy.   Respiratory ROS: No cough, shortness of breath, wheezing  Cardiovascular ROS: No chest pain or palpitations  Gastrointestinal ROS: No abdominal pain or melena  Genito-Urinary ROS: No dysuria or hematuria  Musculoskeletal ROS: No joint pain or swelling         Objective:  Patient Vitals for the past 24 hrs:   BP Temp Temp src Pulse Resp SpO2   23 -- -- -- 91 14 --   2321 (!) 175/148 -- -- 95 20 (!) 87 %   23 0916 (!) 166/89 -- -- 98 16 --   23 0912 (!) 147/81 -- -- 98 14 94 %   23 0907 -- -- -- 95 16 96 %   23 0906 (!) 177/108 -- -- 100 11 96 %   23 0901 (!) 157/87 -- -- 95 17 96 %   02/02/23 0856 (!) 140/98 -- -- 100 16 96 %   02/02/23 0852 (!) 160/87 -- -- 97 19 97 %   02/02/23 0847 (!) 169/99 -- -- 97 12 97 %   02/02/23 0844 (!) 172/98 -- -- 96 17 99 %   02/02/23 0842 -- -- -- 97 17 98 %   02/02/23 0841 -- -- -- 99 16 (!) 78 %   02/02/23 0837 (!) 177/96 -- -- 97 18 94 %   02/02/23 0832 (!) 173/131 -- -- (!) 103 14 93 %   02/02/23 0600 -- -- -- -- -- 93 %   02/02/23 0544 (!) 143/93 97.5 °F (36.4 °C) -- 85 18 92 %   02/02/23 0104 (!) 143/88 97.3 °F (36.3 °C) Temporal 96 20 96 %   02/01/23 1900 137/74 97.4 °F (36.3 °C) Temporal 65 16 92 %   02/01/23 1231 126/74 -- -- 94 15 94 %       Intake/Output Summary (Last 24 hours) at 2/2/2023 0934  Last data filed at 2/2/2023 0340  Gross per 24 hour   Intake --   Output 1450 ml   Net -1450 ml     General: awake/alert   HEENT: Normocephalic atraumatic head  Neck: Supple with no JVD or carotid bruits. Chest:  clear to auscultation bilaterally  CVS: regular rate and rhythm  Abdominal: soft, nontender, normal bowel sounds  Extremities: Bilateral 2+ edema. Skin: warm and dry without rash    Labs:  BMP:   Recent Labs     01/31/23  0607 02/01/23  0235 02/02/23  0124    140 139   K 3.5 4.2 3.7    103 106   CO2 21* 20* 19*   BUN 35* 45* 56*   CREATININE 2.7* 3.8* 4.0*   CALCIUM 9.2 8.8 8.9     CBC:   Recent Labs     01/31/23  0607 02/01/23  0235 02/02/23  0124   WBC 9.2 8.8 19.3*   HGB 12.6* 12.5* 13.4*   HCT 38.4* 39.0* 40.6*   MCV 84.4 84.4 83.0    197 266     LIVER PROFILE:   Recent Labs     01/31/23  0607 02/01/23  0235 02/02/23  0124   AST 22 26 24   ALT 18 19 20   BILITOT 0.5 0.3 0.3   ALKPHOS 79 78 71     PT/INR: No results for input(s): PROTIME, INR in the last 72 hours. APTT: No results for input(s): APTT in the last 72 hours. BNP:  No results for input(s): BNP in the last 72 hours. Ionized Calcium:No results for input(s): IONCA in the last 72 hours. Magnesium:No results for input(s): MG in the last 72 hours.   Phosphorus:No results for input(s): PHOS in the last 72 hours. HgbA1C:   Recent Labs     01/31/23  0607   LABA1C 5.8     Hepatic:   Recent Labs     01/31/23  0607 02/01/23  0235 02/02/23  0124   ALKPHOS 79 78 71   ALT 18 19 20   AST 22 26 24   PROT 6.4* 6.0* 5.8*   BILITOT 0.5 0.3 0.3   LABALBU 3.0* 2.7* 2.5*     Lactic Acid: No results for input(s): LACTA in the last 72 hours. Troponin: No results for input(s): CKTOTAL, CKMB, TROPONINT in the last 72 hours. ABGs: No results found for: PHART, PO2ART, [id number]  CRP:  No results for input(s): CRP in the last 72 hours. Sed Rate:  No results for input(s): SEDRATE in the last 72 hours. Culture Results:   Blood Culture Recent:   Recent Labs     01/25/23  0608   BC No growth after 5 days of incubation. Urine Culture Recent :   Recent Labs     01/23/23  1659   LABURIN >100,000 CFU/ml  Mixed skin carol present  *  Heavy growth       Radiology reports as per the Radiologist  Radiology: CT HEAD WO CONTRAST    Result Date: 1/24/2023  Patient: Mukund Ulloa  Time Out: 00:29Exam(s): CT HEAD Without Contrast EXAM:  CT Head Without Intravenous ContrastCLINICAL HISTORY:   Reason for exam: unwitnessed fall. TECHNIQUE:  Axial computed tomography images of the head/brain without intravenous contrast.COMPARISON:   Head CT 4/2/13. FINDINGS:  Brain: Mild atrophy and chronic white matter disease, has progressed, as expected. No edema or acute infarct. No acute hemorrhage. Ventricles:   No hydrocephalus or midline shift. Bones/joints:   No skull fracture. Soft tissues: No scalp hematoma. Sinuses: Moderate mucosal thickening left maxillary sinus, compatible with chronic sinusitis. Remaining sinuses are clear. No fluid level. Mastoid air cells:   No mastoid effusion. 1.  Mild age-related findings. 2. No acute infarct, bleed, or acute intracranial abnormality. Electronically signed by Dee Gregory M.D. on 01-25-23 at [address]    Result Date: 1/25/2023  RETROPERITONEAL ULTRASOUND: HISTORY: Worsening ALEXANDRO COMPARISON STUDY: CT of abdomen pelvis from 1/23/2023. PROCEDURE: Real-time ultrasound imaging was performed of the retroperitoneum. FINDINGS: The abdominal aorta and IVC were not visualized. The right kidney measures up to to 12.0 cm in length. The left kidney measures up to 12.3 cm in length. There is a cyst seen at the right kidney measuring up to 3.1 x 2.6 x 3.4 cm and at the interpolar region measuring up to 2.9 cm which has a thin septation. There are additional smaller cysts present. There is a small cyst seen at the left kidney measuring up to 1.4 cm. Both kidneys demonstrate mildly increased corticomedullary differentiation with no evidence of hydronephrosis. The visualized portions of the urinary bladder appear unremarkable. No free intraperitoneal fluid is seen. There is no evidence of hydronephrosis. Both kidneys demonstrate increased cortical echogenicity, suggestive of chronic medical renal disease. CT ABDOMEN PELVIS W IV CONTRAST Additional Contrast? None    Result Date: 1/23/2023  NO PRIOR REPORT AVAILABLE Exam: CT OF THE ABDOMEN/PELVIS WITH IV CONTRAST Clinical data: Left LQ pain Technique:Direct contiguousaxial CT images were acquired through the abdomen and pelvis with intravenouscontrastusing soft tissue and bone algorithms. Oral contrast was not administered. Reformatted/MPR images were performed. Radiation dose: CTDIvol =10.77 mGy, DLP =645.28 mGy x cm. Limitations: Lack of oral contrast limits evaluation of the bowel loops. Prior Studies: No prior studies submitted. Findings: Lung bases: No acute pulmonary process. Mild cardiomegaly. d Liver:Unremarkable size andcontour. Low attenuated lesions within the liver measuring up to 2.1 cm consistent with simple cysts. Normal density. No evidence of mass. No evidence of dilated ducts. Gallbladder Fossa: Unremarkable Spleen: Grossly unremarkable.  Pancreas/adrenal glands: Grossly unremarkable size, contour and density. Kidneys: In anatomic position. Grossly unremarkablerenal size, contour and density. Cystic lesions of the kidneys measuring up to 3.9 cm with simple appearance. Coarse calcification within the parenchyma posteriorly within the interpolar left kidney can be from prior inflammatory infectious or chronic scarring. 4-5 mm nonobstructing stone within the left renal pelvis. No evidence of a renal mass. Perinephric space is unremarkable. Retroperitoneum: No enlarged retroperitoneal lymphadenopathy. The aorta and IVC appear unremarkable. Peritoneal cavity: No evidence of free air or ascites. Gastrointestinal tract: No obstruction. Stool filled colon. Portions of the small bowel demonstrate mucosal thickening. Appendix: Unremarkable Pelvis: Solid and hollow viscera grossly unremarkable. Osseous structures: No acute or destructive bony process identified. Degenerative changes of the spine. 1. Portions of the mid small bowel suggest mild mucosal thickening. Exact etiology is uncertain. Mild enteritis is not excluded. No fluid-filled bowel loops. 2. Stool filled colon. 3. Simple cystic lesions of the liver and kidneys. 4. Nonobstructing stone within the left renal pelvis. 5. Focal coarse calcification in the left renal parenchyma. This can be from prior inflammatory infectious process. Recommendation: Follow up as clinically indicated. All CT scans at this facility utilize dose modulation, iterative reconstruction, and/or weight based dosing when appropriate to reduce radiation dose to as low as reasonably achievable. Dictated and Electronically Signed by Mariel Ellis MD, SA CERTIFIED at [id number] 07:03:41 PM EST                Assessment   1. Acute kidney injury/worsening/dialysis dependent/currently seen on hemodialysis. [place] 2.  Acute tubular necrosis. 3.  Staph epi bacteremia. 4.  Acute pyelonephritis due to staph epi. 5.  Left renal stone. 6.  Metabolic acidemia.   7. Secondary hyperparathyroidism. Plan:  1. Tolerating hemodialysis well. 2.  Ultrafiltration up to 3500 cc. 3.  Repeat hemodialysis treatment tomorrow  4.   Increase blood pressure medications    Jenaro Raya MD  02/02/23  9:34 AM

## 2023-02-02 NOTE — OP NOTE
Operative Note      Patient: Froilan Alvares  YOB: 1940  MRN: [id number]      2/2/2023    Preoperative Diagnosis:    1. ALEXANDRO requiring hemodialysis     Postoperative Diagnosis:  Same    Operative Procedure:    1. Ultrasound guided cannulation of right internal jugular vein   2. Placement of right internal jugular vein tunneled dialysis catheter (Bard Glidepath  23 cm tip to cuff). Surgeon:  Micheal Alfonso DO    Anesthesia:  Local and Moderate Sedation    EBL:  Less than 50 ml    Findings:  1. The right internal jugular vein is patent. 2.  The dialysis catheter tips are in the right atrium/superior vena cava junction. Procedure in Detail:    After the patient was consented, and given intravenous antibiotics, the patient was brought to angiography and placed on the table in the supine position. Moderate sedation was administered and the patient's Right neck and chest were prepped and draped in the usual sterile fashion. Skin and subcutaneous tissues over the internal jugular vein were anesthetized with licocaine. The right internal jugular vein was cannulated under ultrasound guidance with a micropuncture needle. Seldinger technique was utilized to place an 0.035 wire into the inferior vena cava under fluoroscopic visualization. The Right neck and chest were anesthetized with lidocaine. An incision was made in the right neck over the wire with knife. A separate incision was made in the right chest with knife. The catheter was tunneled from the chest to the neck incision. Dilators were passed over the wire under fluoroscopic visualization. A dilator/tear-away sheath was placed over the wire under fluoroscopic visualization and the dilator and wire were removed. The catheter was placed through the tear-away sheath and down to the right atrium under fluoroscopic visualization.   The tear-away sheath was removed and the catheter was withdrawn until the tips were in the superior vena cava/right atrial junction. The right neck wound was closed with 4-0 Monocryl and dermabond skin adhesive. The catheter itself was secured to the chest with two 2-0 nylon sutures. Sterile dressings were placed. Both ports of the catheter aspirated and flushed easily with heparinized saline and heparin lock. The catheter is ready for use.          Electronically signed by Wallace Delgado DO on 2/2/2023 at 8:54 AM

## 2023-02-02 NOTE — CARE COORDINATION
Pt still has a bed at [facility] upon dc. Pt now new to Hemodialysis. Permcath placed and has started HD. All info faxed to [place] intake for set up. Will follow for chair time when approved.   Electronically signed by Rocio Man RN on 2/2/2023 at 2:08 PM

## 2023-02-02 NOTE — PROGRESS NOTES
Patient:   Bradford Rosario  MR#:    [id number]   Room:    0333/333-02   YOB: 1940  Date of Progress Note: 2/2/2023  Time of Note                           9:20 AM  Consulting Physician:   Awilda Villagran M.D. Attending Physician:  Sky French MD     Chief complaint Visual change    S:This 79 y.o. male  with past medical history significant for hypertension, hyperlipidemia, and neuropathy is seen for evaluation of visual disturbance. The patient admitted on 1/23/2023 with abdominal pain. Work-up revealed a nonobstructive stone in the left renal pelvis and is under treatment for pyelonephritis. Neurology consultation is called for visual disturbance. The patient is not the best historian but indicates that about 3 weeks ago he noticed poor vision in his lower visual fields. CT of the head on admission with no clear acute changes noted. It is unclear if this is constant or intermittent. He denies diplopia, dysarthria or dysphagia.   Not in room this morning    REVIEW OF SYSTEMS:  Not obtained    Past Medical History:      Diagnosis Date    Benign non-nodular prostatic hyperplasia with lower urinary tract symptoms 8/16/2017    Essential hypertension 8/16/2017    Gastroesophageal reflux disease without esophagitis 8/16/2017    Gout 8/16/2017    Idiopathic peripheral neuropathy 2/19/2018    Mixed hyperlipidemia 8/16/2017    Peripheral neuropathic pain 8/16/2017    Primary osteoarthritis involving multiple joints 8/16/2017    Renal stone 1/23/2023    Restless legs syndrome 8/16/2017       Past Surgical History:      Procedure Laterality Date    INGUINAL HERNIA REPAIR Bilateral 1963    TURP  2003       Medications in Hospital:      Current Facility-Administered Medications:     predniSONE (DELTASONE) tablet 20 mg, 20 mg, Oral, Daily, Sky French MD    QUEtiapine (SEROQUEL) tablet 25 mg, 25 mg, Oral, Nightly, Sky rFench MD    linezolid (ZYVOX) tablet 600 mg, 600 mg, Oral, 2 times per day, Maria Elena Olivia MD, 600 mg at 02/01/23 1946    ipratropium-albuterol (DUONEB) nebulizer solution 1 ampule, 1 ampule, Inhalation, Q4H WA, Vera Davis MD, 1 ampule at 02/02/23 0557    bumetanide (BUMEX) injection 1 mg, 1 mg, IntraVENous, Q12H, Mookie Garcia MD, 1 mg at 02/01/23 2312    metoclopramide (REGLAN) injection 5 mg, 5 mg, IntraVENous, Q6H, Vera Davis MD, 5 mg at 02/02/23 0538    enoxaparin Sodium (LOVENOX) injection 30 mg, 30 mg, SubCUTAneous, Daily, GOLD Encinas - CNP, 30 mg at 02/01/23 1721    aspirin chewable tablet 81 mg, 81 mg, Oral, Daily, Arun Ang MD, 81 mg at 02/01/23 0928    atorvastatin (LIPITOR) tablet 80 mg, 80 mg, Oral, Daily, Vera Davis MD, 80 mg at 02/01/23 0929    simethicone (MYLICON) chewable tablet 80 mg, 80 mg, Oral, 4x Daily, Vera Davis MD, 80 mg at 02/01/23 1947    [Held by provider] NIFEdipine (PROCARDIA XL) extended release tablet 60 mg, 60 mg, Oral, Daily, Vera Davis MD, 60 mg at 01/31/23 0936    polyethylene glycol (GLYCOLAX) packet 17 g, 17 g, Oral, BID, Vera Davis MD, 17 g at 02/01/23 1946    sodium bicarbonate tablet 650 mg, 650 mg, Oral, BID, Reji Andrews MD, 650 mg at 02/01/23 1948    calcitRIOL (ROCALTROL) capsule 0.25 mcg, 0.25 mcg, Oral, Daily, Reji Andrews MD, 0.25 mcg at 02/01/23 0928    mirtazapine (REMERON) tablet 7.5 mg, 7.5 mg, Oral, Nightly, Jesse Manriquez MD, 7.5 mg at 02/01/23 1946    tamsulosin (FLOMAX) capsule 0.4 mg, 0.4 mg, Oral, Daily, GOLD Encinas - CNP, 0.4 mg at 02/01/23 0928    sodium chloride flush 0.9 % injection 5-40 mL, 5-40 mL, IntraVENous, 2 times per day, GOLD Encinas - CNP, 10 mL at 02/01/23 1947    sodium chloride flush 0.9 % injection 5-40 mL, 5-40 mL, IntraVENous, PRN, GOLD Encinas CNP    0.9 % sodium chloride infusion, , IntraVENous, PRN, GOLD Encinas - CNP    ondansetron (ZOFRAN-ODT) disintegrating tablet 4 mg, 4 mg, Oral, Q8H PRN **OR** ondansetron (ZOFRAN) injection 4 mg, 4 mg, IntraVENous, Q6H  PRN, Blanche Candaceer, APRN - CNP, 4 mg at 01/28/23 9208    acetaminophen (TYLENOL) tablet 650 mg, 650 mg, Oral, Q6H PRN **OR** acetaminophen (TYLENOL) suppository 650 mg, 650 mg, Rectal, Q6H PRN, Blanche Maser, APRN - CNP    traMADol (ULTRAM) tablet 50 mg, 50 mg, Oral, Q6H PRN, Destiny Klein MD, 50 mg at 01/29/23 0216    allopurinol (ZYLOPRIM) tablet 100 mg, 100 mg, Oral, Daily, Blanche Maser, APRN - CNP, 100 mg at 02/01/23 8934    metoprolol succinate (TOPROL XL) extended release tablet 50 mg, 50 mg, Oral, Daily, Blanche Maser, APRN - CNP, 50 mg at 02/01/23 0929    pantoprazole (PROTONIX) tablet 40 mg, 40 mg, Oral, QAM AC, Blanche Maser, APRN - CNP, 40 mg at 02/02/23 0538    pregabalin (LYRICA) capsule 100 mg, 100 mg, Oral, BID, Blanche Maser, APRN - CNP, 100 mg at 02/01/23 1947    docusate sodium (COLACE) capsule 100 mg, 100 mg, Oral, Daily, Blanche Maser, APRN - CNP, 100 mg at 02/01/23 8372    Allergies:  Codeine    Social History:   TOBACCO:   reports that he quit smoking about 48 years ago. His smoking use included cigarettes. He has a 90.00 pack-year smoking history. He has never used smokeless tobacco.  ETOH:   reports no history of alcohol use. Family History:       Problem Relation Age of Onset    Uterine Cancer Mother     Coronary Art Dis Father         MI at 62    COPD Sister     Stroke Brother          PHYSICAL EXAM:  BP (!) 166/89 Comment: 94% LOC 0  Pulse 98 Comment: 94% LOC 0  Temp 97.5 °F (36.4 °C)   Resp 16 Comment: 94% LOC 0  Ht 6' (1.829 m)   Wt 195 lb 11.2 oz (88.8 kg)   SpO2 94% Comment: LOC 0  BMI 26.54 kg/m²         Nursing/pcp notes, imaging,labs and vitals reviewed.      PT,OT and/or speech notes reviewed    Lab Results   Component Value Date    WBC 19.3 (H) 02/02/2023    HGB 13.4 (L) 02/02/2023    HCT 40.6 (L) 02/02/2023    MCV 83.0 02/02/2023     02/02/2023     Lab Results   Component Value Date     02/02/2023    K 3.7 02/02/2023     02/02/2023    CO2 19 (L) 02/02/2023    BUN 56 (H) 02/02/2023    CREATININE 4.0 (H) 02/02/2023    GLUCOSE 148 (H) 02/02/2023    CALCIUM 8.9 02/02/2023    PROT 5.8 (L) 02/02/2023    LABALBU 2.5 (L) 02/02/2023    BILITOT 0.3 02/02/2023    ALKPHOS 71 02/02/2023    AST 24 02/02/2023    ALT 20 02/02/2023    LABGLOM 14 (A) 02/02/2023    GFRAA >59 05/19/2022     Lab Results   Component Value Date    INR 1.10 05/19/2022    PROTIME 14.2 05/19/2022       MRI BRAIN WO CONTRAST [[id number]]    Resulted: 01/31/23 1245    Updated: 01/31/23 1307    Narrative:     Exam: MRI OF THE BRAIN WITHOUTINTRAVENOUS CONTRAST   Clinical data:Loss of lower field vision. Technique: Multiplanar multi-sequence MRI of the brain without intravenous gadolinium. Diffusion-weighted imaging is submitted. Prior studies: CT scan of brain dated 01/24/2023. Findings:  Patchy hyperintense T2 signal throughout the periventricular subcortical white matter, nonspecific but compatible with chronic microvascular ischemic changes. Abnormal signal in the left roc in the left superior cerebellum which is   hyperintense on the diffusion-weighted sequence and hypointense on the ADC map, potentially acute infarction in the left roc and the left superior cerebellum; however, artifact is not excluded, as the abnormality is not confirmed on the T2 weighted   sequence. Motion artifact throughout the exam limits evaluation. No evidence of acute intracranial hemorrhage. No obvious mass or mass effect. The ventricles and sulci are slightly prominent but are symmetric. No hydrocephalus or focal extra-axial fluid collection is detected. Left maxillary sinus inflammatory   changes; the paranasal sinuses and mastoid air cells are otherwise grossly clear. Extensive motion artifact exam limits evaluation. Impression:     1.  The appearance of restricted diffusion in the left roc and the superior left cerebellum, compatible with acute infarction, although potentially artifactual, with extensive motion throughout the exam.   2. No evidence of acute intracranial hemorrhage. 3. Chronic microvascular ischemic changes with age-appropriate parenchymal volume loss is also present. 4. Left maxillary sinus inflammatory changes. Recommendation: Follow up is recommended; consider repeat MRI with sedation and including T2 coronal and diffusion coronal sequences. Dictated and Electronically Signed by Magda Padilla MD at 31-Jan-2023 01:45:09 PM EST              Narrative & Impression    Transthoracic Echocardiography Report (TTE)      Demographics      Patient Name   Jase Ellsworth  Date of Study            01/26/2023      MRN            [id number]           Gender                   Male      Date of Birth  1940       Room Number              [facility]-0333      Age            79 year(s)      Height:        72 inches        Referring Physician      Oliver Cavazos      Weight:        195 pounds       Sonographer              Fidelia Molina      BSA:           2.11 m^2         Interpreting Physician   Ernesto Youngblood MD      BMI:           26.45 kg/m^2     Procedure     Type of Study      TTE procedure:ECHO 2D W/DOPPLER/COLOR/CONTRAST. Study Location: Echo Lab  Technical Quality: Limited visualization     Patient Status: Inpatient     Contrast Medium: Definity.      HR: 62 bpm BP: 137/80 mmHg      Conclusions      Summary   Normal left ventricular size with low normal systolic function without   distinct segmental wall motion abnormality -estimated ejection fraction 50   to 55%   Mild concentric left ventricular hypertrophy with mild [grade 1] diastolic   dysfunction   Normal right-sided chambers with preserved right ventricular systolic   function   Normal left atrial size   IVC dimension falls within normal limits without ability to assess   inspiratory motion   Trace pulmonic valvular insufficiency   No tricuspid regurgitation seen with which to estimate RVSP   Thickened aortic valve which appears to be tricuspid with adequate cusp   separation, no stenosis, and trace insufficiency [cannot exclude   vegetation]   Normally mobile mitral valve with mild regurgitation   Aortic root measurements fall within normal limits with ascending aorta   measuring upper normal at 3.3 cm   No significant pericardial effusion   Definity contrast utilized to better define endocardial borders      Signature      ----------------------------------------------------------------   Electronically signed by En Greene MD(Interpreting physician)   on 01/26/2023 09:04 AM   ----------------------------------------------------------------     MRI BRAIN WO CONTRAST [[id number]]    Resulted: 02/01/23 1812    Updated: 02/01/23 1214    Narrative:     CLINICAL HISTORY: Diffusion sequence only, compared to previous MRI   TECHNIQUE: Diffusion only MRI sequences of the brain are acquired. Comparison to brain MRI from 1/31/2023   FINDINGS:   No restriction diffusion identified. Within the limitations of the sequences   obtained, there does not appear to be mass-effect, midline shift, or   hydrocephalus. There is opacification of the left maxillary sinus as before. Impression: 1. No evidence of acute infarct.      RECORD REVIEW: Previous medical records, medications were reviewed at today's visit    IMPRESSION:   Visual impairment in the lower visual fields although currently exam shows no clear visual field deficit on bedside confrontation exam     Initial examination  Awake, alert, oriented x2, fluent  Mild ptosis bilaterally  Visual fields grossly intact to confrontation  Nonfocal       Initial MRI of the brain and orbits-possible infarct in left roc and left cerebellum-repeat MRI with diffusion sequence only -no evidence of acute infarct-appears initial MRI findings were artifactual  Carotid ultrasound less than 50% bilateral internal carotids  2D echo-left ventricular ejection fraction 50 to 55%/normal left atrium    On aspirin/statin  Will need ZIO Holter monitor on DC x 2 weeks    Will need outpatient ophthalmology evaluation     Encephalopathy in the setting of urinary tract infection/renal dysfunction  Hyperlipidemia-on statin  GI-bowel regimen/PPI  DVT prophylaxis-Lovenox  Hypertension-on meds monitor  Neuropathy-Lyrica  Renal stone-Flomax/IV fluids  UTI-on antibiotics  Renal dysfunction-on IV fluids  Pain control-tramadol as needed  History of gout-tramadol        PT/OT     Supportive care as noted         CALL WITH ANY QUESTIONS  [phone] CELL  Dr oRni Villanueva

## 2023-02-02 NOTE — PROGRESS NOTES
Physical Therapy  Name: Kady Campos  MRN:  [id number]  Date of service:  2/2/2023    Patient out for dialysis most of the day today. He was slightly confused afterward. Physical Therapy will continue to follow and progress as able.     Electronically signed by Hazel Tyler PTA on 2/2/2023 at 3:41 PM

## 2023-02-02 NOTE — PROGRESS NOTES
Infectious Diseases Progress Note    Patient:  Peng Norton  YOB: 1940  MRN: [id number]   Admit date: 1/23/2023   Admitting Physician: Hernan Fortune MD  Primary Care Physician: Yuly Jerez MD    Chief Complaint/Interval History: He indicates no pain or discomfort at present. He is not having back pain at this time. It was a little hard to get interval history from him. He did answer some questions. He did mumble a little bit at times. It was hard to get consistent answers/information. He does appear comfortable. He does appear improved in that standpoint. He seemed to indicate his vision was a little bit better. He did not describe any new neurological complaints. In/Out    Intake/Output Summary (Last 24 hours) at 2/1/2023 1829  Last data filed at 2/1/2023 0900  Gross per 24 hour   Intake 165 ml   Output 400 ml   Net -235 ml     Allergies:    Allergies   Allergen Reactions    Codeine Nausea Only     Current Meds: [START ON 2/2/2023] chlorhexidine gluconate (ANTISEPTIC SKIN CLEANSER) 4 % solution, Once  predniSONE (DELTASONE) tablet 20 mg, Daily  [START ON 2/2/2023] QUEtiapine (SEROQUEL) tablet 25 mg, Nightly  linezolid (ZYVOX) tablet 600 mg, 2 times per day  ipratropium-albuterol (DUONEB) nebulizer solution 1 ampule, Q4H WA  bumetanide (BUMEX) injection 1 mg, Q12H  metoclopramide (REGLAN) injection 5 mg, Q6H  enoxaparin Sodium (LOVENOX) injection 30 mg, Daily  aspirin chewable tablet 81 mg, Daily  atorvastatin (LIPITOR) tablet 80 mg, Daily  simethicone (MYLICON) chewable tablet 80 mg, 4x Daily  [Held by provider] NIFEdipine (PROCARDIA XL) extended release tablet 60 mg, Daily  polyethylene glycol (GLYCOLAX) packet 17 g, BID  sodium bicarbonate tablet 650 mg, BID  calcitRIOL (ROCALTROL) capsule 0.25 mcg, Daily  mirtazapine (REMERON) tablet 7.5 mg, Nightly  tamsulosin (FLOMAX) capsule 0.4 mg, Daily  sodium chloride flush 0.9 % injection 5-40 mL, 2 times per day  sodium chloride flush 0.9 % injection 5-40 mL, PRN  0.9 % sodium chloride infusion, PRN  ondansetron (ZOFRAN-ODT) disintegrating tablet 4 mg, Q8H PRN   Or  ondansetron (ZOFRAN) injection 4 mg, Q6H PRN  acetaminophen (TYLENOL) tablet 650 mg, Q6H PRN   Or  acetaminophen (TYLENOL) suppository 650 mg, Q6H PRN  traMADol (ULTRAM) tablet 50 mg, Q6H PRN  allopurinol (ZYLOPRIM) tablet 100 mg, Daily  metoprolol succinate (TOPROL XL) extended release tablet 50 mg, Daily  pantoprazole (PROTONIX) tablet 40 mg, QAM AC  pregabalin (LYRICA) capsule 100 mg, BID  docusate sodium (COLACE) capsule 100 mg, Daily      Review of Systems see HPI    VitalSigns:  /74   Pulse 94   Temp 97.7 °F (36.5 °C) (Axillary)   Resp 15   Ht 6' (1.829 m)   Wt 195 lb 11.2 oz (88.8 kg)   SpO2 94%   BMI 26.54 kg/m²      Physical Exam  Line/IV site: No erythema, warmth, induration, or tenderness. Lower extremity strength intact  No lateralizing findings  Cranial nerves appear to be intact  Really could not get a very good visual field assessment with confrontation this evening. He was able to see out of both eyes. Lab Results:  CBC:   Recent Labs     01/31/23  0607 02/01/23  0235   WBC 9.2 8.8   HGB 12.6* 12.5*    197     BMP:  Recent Labs     01/30/23  0333 01/31/23  0607 02/01/23  0235    139 140   K 3.6 3.5 4.2    104 103   CO2 23 21* 20*   BUN 28* 35* 45*   CREATININE 1.9* 2.7* 3.8*   GLUCOSE 94 114* 145*     CultureResults:  Urine culture January 23, 2023-Staph epidermidis  Blood cultures January 23, 2023-Staph epidermidis  Blood cultures January 23, 2023-Staph epidermidis  Blood cultures January 25, 2023-Staph epidermidis  Blood cultures January 25, 2023-no growth  Catheter site left forearm culture from January 31, 2023-no growth    Radiology:     MRI Brain without gadolinium  Impression   1.  The appearance of restricted diffusion in the left roc and the superior left cerebellum, compatible with acute infarction, although potentially artifactual, with extensive motion throughout the exam.   2. No evidence of acute intracranial hemorrhage. 3. Chronic microvascular ischemic changes with age-appropriate parenchymal volume loss is also present. 4. Left maxillary sinus inflammatory changes. Recommendation: Follow up is recommended; consider repeat MRI with sedation and including T2 coronal and diffusion coronal sequences. Dictated and Electronically Signed by Aren Colon MD at 31-Jan-2023 01:45:09 PM EST       Additional Studies Reviewed:  None    Impression:  1. Urinary tract infection/pyelonephritis with Staph epidermidis-on treatment with Zyvox  2. Staph epidermis bloodstream infection-Suspect urinary source. 3.  Acute renal insufficiency-further increase in creatinine to 3.8. Talked with vascular nurse today. Not planning additional IV antibiotic treatment. From ID standpoint okay with me for him to get dialysis catheter if necessary.   4.  Back and flank pain-improving    Recommendations:  No plan for additional IV antibiotic treatment at present  Continue treatment with oral linezolid  Neuro evaluation ongoing  No new recommendations at present    Dk Dia MD

## 2023-02-03 PROBLEM — E43 SEVERE MALNUTRITION (HCC): Status: ACTIVE | Noted: 2023-02-03

## 2023-02-03 LAB
ALBUMIN SERPL-MCNC: 2.5 G/DL (ref 3.5–5.2)
ALP BLD-CCNC: 63 U/L (ref 40–130)
ALT SERPL-CCNC: 18 U/L (ref 5–41)
ANION GAP SERPL CALCULATED.3IONS-SCNC: 10 MMOL/L (ref 7–19)
AST SERPL-CCNC: 23 U/L (ref 5–40)
BILIRUB SERPL-MCNC: <0.2 MG/DL (ref 0.2–1.2)
BUN BLDV-MCNC: 35 MG/DL (ref 8–23)
CALCIUM SERPL-MCNC: 8.6 MG/DL (ref 8.8–10.2)
CHLORIDE BLD-SCNC: 103 MMOL/L (ref 98–111)
CO2: 28 MMOL/L (ref 22–29)
CREAT SERPL-MCNC: 2.9 MG/DL (ref 0.5–1.2)
GFR SERPL CREATININE-BSD FRML MDRD: 21 ML/MIN/{1.73_M2}
GLUCOSE BLD-MCNC: 131 MG/DL (ref 74–109)
HCT VFR BLD CALC: 35.6 % (ref 42–52)
HEMOGLOBIN: 11.9 G/DL (ref 14–18)
MCH RBC QN AUTO: 27.5 PG (ref 27–31)
MCHC RBC AUTO-ENTMCNC: 33.4 G/DL (ref 33–37)
MCV RBC AUTO: 82.4 FL (ref 80–94)
PDW BLD-RTO: 13.4 % (ref 11.5–14.5)
PLATELET # BLD: 264 K/UL (ref 130–400)
PMV BLD AUTO: 10.9 FL (ref 9.4–12.4)
POTASSIUM SERPL-SCNC: 3.2 MMOL/L (ref 3.5–5)
RBC # BLD: 4.32 M/UL (ref 4.7–6.1)
SODIUM BLD-SCNC: 141 MMOL/L (ref 136–145)
TOTAL PROTEIN: 5.2 G/DL (ref 6.6–8.7)
WBC # BLD: 11.9 K/UL (ref 4.8–10.8)

## 2023-02-03 PROCEDURE — 6370000000 HC RX 637 (ALT 250 FOR IP): Performed by: INTERNAL MEDICINE

## 2023-02-03 PROCEDURE — 97116 GAIT TRAINING THERAPY: CPT

## 2023-02-03 PROCEDURE — 85027 COMPLETE CBC AUTOMATED: CPT

## 2023-02-03 PROCEDURE — 6360000002 HC RX W HCPCS: Performed by: SURGERY

## 2023-02-03 PROCEDURE — 80053 COMPREHEN METABOLIC PANEL: CPT

## 2023-02-03 PROCEDURE — 1210000000 HC MED SURG R&B

## 2023-02-03 PROCEDURE — 2580000003 HC RX 258

## 2023-02-03 PROCEDURE — 6360000002 HC RX W HCPCS: Performed by: INTERNAL MEDICINE

## 2023-02-03 PROCEDURE — 6370000000 HC RX 637 (ALT 250 FOR IP): Performed by: PSYCHIATRY & NEUROLOGY

## 2023-02-03 PROCEDURE — 94760 N-INVAS EAR/PLS OXIMETRY 1: CPT

## 2023-02-03 PROCEDURE — 36415 COLL VENOUS BLD VENIPUNCTURE: CPT

## 2023-02-03 PROCEDURE — 2580000003 HC RX 258: Performed by: SURGERY

## 2023-02-03 PROCEDURE — 83516 IMMUNOASSAY NONANTIBODY: CPT

## 2023-02-03 PROCEDURE — 99233 SBSQ HOSP IP/OBS HIGH 50: CPT | Performed by: PSYCHIATRY & NEUROLOGY

## 2023-02-03 PROCEDURE — 6370000000 HC RX 637 (ALT 250 FOR IP): Performed by: STUDENT IN AN ORGANIZED HEALTH CARE EDUCATION/TRAINING PROGRAM

## 2023-02-03 PROCEDURE — 94640 AIRWAY INHALATION TREATMENT: CPT

## 2023-02-03 PROCEDURE — 83519 RIA NONANTIBODY: CPT

## 2023-02-03 PROCEDURE — 8010000000 HC HEMODIALYSIS ACUTE INPT

## 2023-02-03 PROCEDURE — 6370000000 HC RX 637 (ALT 250 FOR IP)

## 2023-02-03 RX ORDER — SODIUM CHLORIDE 0.9 % (FLUSH) 0.9 %
5-40 SYRINGE (ML) INJECTION PRN
Status: DISCONTINUED | OUTPATIENT
Start: 2023-02-03 | End: 2023-02-05 | Stop reason: HOSPADM

## 2023-02-03 RX ORDER — HEPARIN SODIUM 5000 [USP'U]/ML
5000 INJECTION, SOLUTION INTRAVENOUS; SUBCUTANEOUS EVERY 8 HOURS SCHEDULED
Status: DISCONTINUED | OUTPATIENT
Start: 2023-02-03 | End: 2023-02-05 | Stop reason: HOSPADM

## 2023-02-03 RX ORDER — SODIUM CHLORIDE 0.9 % (FLUSH) 0.9 %
5-40 SYRINGE (ML) INJECTION EVERY 12 HOURS SCHEDULED
Status: DISCONTINUED | OUTPATIENT
Start: 2023-02-03 | End: 2023-02-05 | Stop reason: HOSPADM

## 2023-02-03 RX ORDER — SODIUM CHLORIDE 9 MG/ML
INJECTION, SOLUTION INTRAVENOUS PRN
Status: DISCONTINUED | OUTPATIENT
Start: 2023-02-03 | End: 2023-02-05 | Stop reason: HOSPADM

## 2023-02-03 RX ORDER — ENOXAPARIN SODIUM 100 MG/ML
30 INJECTION SUBCUTANEOUS DAILY
Status: DISCONTINUED | OUTPATIENT
Start: 2023-02-03 | End: 2023-02-03

## 2023-02-03 RX ORDER — METOCLOPRAMIDE 5 MG/1
5 TABLET ORAL
Status: DISCONTINUED | OUTPATIENT
Start: 2023-02-03 | End: 2023-02-05 | Stop reason: HOSPADM

## 2023-02-03 RX ORDER — HEPARIN SODIUM 1000 [USP'U]/ML
3600 INJECTION, SOLUTION INTRAVENOUS; SUBCUTANEOUS
Status: COMPLETED | OUTPATIENT
Start: 2023-02-03 | End: 2023-02-03

## 2023-02-03 RX ADMIN — PREDNISONE 5 MG: 10 TABLET ORAL at 07:21

## 2023-02-03 RX ADMIN — PANTOPRAZOLE SODIUM 40 MG: 40 TABLET, DELAYED RELEASE ORAL at 05:48

## 2023-02-03 RX ADMIN — BUMETANIDE 1 MG: 1 TABLET ORAL at 07:21

## 2023-02-03 RX ADMIN — TAMSULOSIN HYDROCHLORIDE 0.4 MG: 0.4 CAPSULE ORAL at 07:21

## 2023-02-03 RX ADMIN — MIRTAZAPINE 7.5 MG: 7.5 TABLET ORAL at 21:00

## 2023-02-03 RX ADMIN — IPRATROPIUM BROMIDE AND ALBUTEROL SULFATE 1 AMPULE: 2.5; .5 SOLUTION RESPIRATORY (INHALATION) at 10:45

## 2023-02-03 RX ADMIN — SIMETHICONE 80 MG: 80 TABLET, CHEWABLE ORAL at 15:25

## 2023-02-03 RX ADMIN — METOCLOPRAMIDE 5 MG: 5 INJECTION, SOLUTION INTRAMUSCULAR; INTRAVENOUS at 05:48

## 2023-02-03 RX ADMIN — SODIUM CHLORIDE, PRESERVATIVE FREE 10 ML: 5 INJECTION INTRAVENOUS at 07:22

## 2023-02-03 RX ADMIN — HEPARIN SODIUM 5000 UNITS: 5000 INJECTION INTRAVENOUS; SUBCUTANEOUS at 15:25

## 2023-02-03 RX ADMIN — LINEZOLID 600 MG: 600 TABLET, FILM COATED ORAL at 07:21

## 2023-02-03 RX ADMIN — CEFAZOLIN SODIUM 1000 MG: 1 INJECTION, POWDER, FOR SOLUTION INTRAMUSCULAR; INTRAVENOUS at 09:01

## 2023-02-03 RX ADMIN — SODIUM CHLORIDE, PRESERVATIVE FREE 10 ML: 5 INJECTION INTRAVENOUS at 08:42

## 2023-02-03 RX ADMIN — DOCUSATE SODIUM 100 MG: 100 CAPSULE, LIQUID FILLED ORAL at 07:21

## 2023-02-03 RX ADMIN — CALCITRIOL CAPSULES 0.25 MCG 0.25 MCG: 0.25 CAPSULE ORAL at 07:20

## 2023-02-03 RX ADMIN — METOCLOPRAMIDE 5 MG: 5 TABLET ORAL at 15:25

## 2023-02-03 RX ADMIN — PREGABALIN 100 MG: 50 CAPSULE ORAL at 20:37

## 2023-02-03 RX ADMIN — SIMETHICONE 80 MG: 80 TABLET, CHEWABLE ORAL at 12:50

## 2023-02-03 RX ADMIN — SODIUM CHLORIDE, PRESERVATIVE FREE 5 ML: 5 INJECTION INTRAVENOUS at 00:42

## 2023-02-03 RX ADMIN — QUETIAPINE FUMARATE 25 MG: 25 TABLET ORAL at 20:38

## 2023-02-03 RX ADMIN — IPRATROPIUM BROMIDE AND ALBUTEROL SULFATE 1 AMPULE: 2.5; .5 SOLUTION RESPIRATORY (INHALATION) at 07:23

## 2023-02-03 RX ADMIN — SODIUM BICARBONATE 650 MG: 650 TABLET ORAL at 20:38

## 2023-02-03 RX ADMIN — HEPARIN SODIUM 5000 UNITS: 5000 INJECTION INTRAVENOUS; SUBCUTANEOUS at 20:37

## 2023-02-03 RX ADMIN — SODIUM BICARBONATE 650 MG: 650 TABLET ORAL at 07:21

## 2023-02-03 RX ADMIN — ASPIRIN 81 MG: 81 TABLET, CHEWABLE ORAL at 07:21

## 2023-02-03 RX ADMIN — SIMETHICONE 80 MG: 80 TABLET, CHEWABLE ORAL at 07:21

## 2023-02-03 RX ADMIN — LINEZOLID 600 MG: 600 TABLET, FILM COATED ORAL at 20:38

## 2023-02-03 RX ADMIN — HEPARIN SODIUM 3600 UNITS: 1000 INJECTION INTRAVENOUS; SUBCUTANEOUS at 11:35

## 2023-02-03 RX ADMIN — ATORVASTATIN CALCIUM 80 MG: 80 TABLET, FILM COATED ORAL at 07:21

## 2023-02-03 RX ADMIN — METOCLOPRAMIDE 5 MG: 5 TABLET ORAL at 20:37

## 2023-02-03 RX ADMIN — PREGABALIN 100 MG: 50 CAPSULE ORAL at 07:20

## 2023-02-03 RX ADMIN — METOCLOPRAMIDE 5 MG: 5 INJECTION, SOLUTION INTRAMUSCULAR; INTRAVENOUS at 00:24

## 2023-02-03 RX ADMIN — ALLOPURINOL 100 MG: 100 TABLET ORAL at 07:21

## 2023-02-03 RX ADMIN — IPRATROPIUM BROMIDE AND ALBUTEROL SULFATE 1 AMPULE: 2.5; .5 SOLUTION RESPIRATORY (INHALATION) at 14:52

## 2023-02-03 RX ADMIN — SIMETHICONE 80 MG: 80 TABLET, CHEWABLE ORAL at 20:38

## 2023-02-03 RX ADMIN — IPRATROPIUM BROMIDE AND ALBUTEROL SULFATE 1 AMPULE: 2.5; .5 SOLUTION RESPIRATORY (INHALATION) at 18:22

## 2023-02-03 RX ADMIN — METOCLOPRAMIDE 5 MG: 5 TABLET ORAL at 12:50

## 2023-02-03 ASSESSMENT — ENCOUNTER SYMPTOMS
COUGH: 0
SHORTNESS OF BREATH: 0
ABDOMINAL PAIN: 1
NAUSEA: 0
VOMITING: 0

## 2023-02-03 NOTE — PLAN OF CARE
Post RIJ Permcath Placement on 02/02/2023    Patient seen while in dialysis unit. Tolerating hemodialysis with good flow rates. Right IJ permcath site clean with dressing dry and intact. Vascular surgery will sign off. Call again if needed. Will follow-up in the office to discuss hemodialysis fistula planning.        GOLD Edwards-BC

## 2023-02-03 NOTE — PROGRESS NOTES
Daily Progress Note    Date:2/3/2023  Patient: Bria Kat  : 1940  GCQ:[id number]  CODE:Full Code No additional code details  PCP:Bety Cuevas MD    Admit Date: 2023  3:25 PM   LOS: 11 days     Chief Complaint   Patient presents with    Abdominal Pain    Flank Pain     Pt states that he began having flank pain and LQ abdominal pain at 1000 this morning. Pt states that he has not had an episode like this in the past. Pt states he has had nausea, and has had consistent Bms. Pt denies diarrhea. ABCs are intact. Skin wpd. A&ox4. Hospital Summary:  80 yo M with HTN, GERD, HLD, BPH s/p TURP who presented to [facility] ED with abdominal pain. CT abdomen in ED showed stool-filled colon, nonobstructing stone within L renal pelvis. Admitted to hospitalist service for UTI/Pyelonephritis. Nephrology consulted due to ALEXANDRO. Blood cultures returned  + for gram + cocci, PCR showing Staph Epi. ID consulted. ALEXANDRO improved w/ IVF and grove catheter placement. Pt's L flank pain improved after grove was placed. Underwent unsuccessful voiding trial on , grove was replaced. He endorses L flank pain again on . Plan to reimage L kidney if symptoms don't improve. May need MRI L spine if persistent symptoms with normal kidney imaging.    The patient failed a voiding trial on  put back on Grove catheter and continue on Flomax    Interval history>   the patient has been treated for constipation and distended abdomen which is getting better  Worsening renal function initially given IV fluid followed by diuretics nephrology discussed with family planning for hemodialysis  Infectious disease change IV vancomycin to linezolid  Visual field defect evaluated by neurologist found to have suspected acute stroke> need MRI without contrast> negative for acute stroke neurology signed off   patient placed on hemodialysis, left visual field defect significantly improved after 2 hemodialysis treatment  Constipation resolved with laxative    Subjective:   2/3 seen in the hemodialysis unit getting hemodialysis today, the patient has been bowel movement last night, he is more alert and oriented, the visual field confrontation test negative for any field defect could able to count finger. Discussed with RN to continue bowel regimen and hold if more than 3 bowel movement in a 24-hour period. Review of Systems   Constitutional:  Negative for chills and fever. Respiratory:  Negative for cough and shortness of breath. Cardiovascular:  Negative for chest pain and palpitations. Gastrointestinal:  Positive for abdominal pain. Negative for nausea and vomiting. Objective:      Vital signs in last 24 hours:  Patient Vitals for the past 24 hrs:   BP Temp Temp src Pulse Resp SpO2   02/03/23 1206 108/73 97.9 °F (36.6 °C) Axillary 98 16 94 %   02/03/23 0723 -- -- -- 80 15 94 %   02/03/23 0722 138/60 -- -- 86 -- --   02/03/23 0603 138/80 97.7 °F (36.5 °C) Oral 88 14 90 %   02/03/23 0012 109/65 98.4 °F (36.9 °C) Axillary 90 16 90 %   02/02/23 2130 -- -- -- -- -- 95 %   02/02/23 1749 (!) 146/73 97.3 °F (36.3 °C) Temporal 95 18 96 %   02/02/23 1453 -- -- -- 97 -- --         I/O last 3 completed shifts: In: 120 [P.O.:120]  Out: 5350 [Urine:1850; Other:3500]  I/O this shift:  In: -   Out: 800 [Urine:800]    Physical Exam:  Vital Signs: /73   Pulse 98   Temp 97.9 °F (36.6 °C) (Axillary)   Resp 16   Ht 6' (1.829 m)   Wt 195 lb 11.2 oz (88.8 kg)   SpO2 94%   BMI 26.54 kg/m²   General appearance:. Lying comfortably in bed ,   HEENT: Normocephalic , Atraumatic, PERRL, JVP not raised  Chest: On inspection no use of accessory muscles of respiration, on auscultation vesicular breath sounds equal bilaterally no rales rhonchi or wheezing   cardiac: Normal heart sounds with regular rate and rhythm, S1, S2 normal. No murmurs, gallops, or rubs auscultated.    Abdomen: Abdominal distention improving, soft to firm on palpation, tympanic on percussion over the flank , non-tender; normal bowel sounds, no masses, no organomegaly. Urogenital : ++ grove, no suprapubic , no left CVA tenderness, no swelling or edema of the preputial skin  Extremities: No clubbing or cyanosis. trace+ peripheral edema bilateral extremities, edema in the dependent area of the body. Peripheral pulses palpable.   Neurologic: alert and Cooperative , Grossly intact, DTR equal,   Psychology: No hallucination, no delusion       Lab Review   Recent Results (from the past 24 hour(s))   CBC    Collection Time: 02/03/23  1:21 AM   Result Value Ref Range    WBC 11.9 (H) 4.8 - 10.8 K/uL    RBC 4.32 (L) 4.70 - 6.10 M/uL    Hemoglobin 11.9 (L) 14.0 - 18.0 g/dL    Hematocrit 35.6 (L) 42.0 - 52.0 %    MCV 82.4 80.0 - 94.0 fL    MCH 27.5 27.0 - 31.0 pg    MCHC 33.4 33.0 - 37.0 g/dL    RDW 13.4 11.5 - 14.5 %    Platelets 484 500 - 344 K/uL    MPV 10.9 9.4 - 12.4 fL   Comprehensive Metabolic Panel    Collection Time: 02/03/23  1:21 AM   Result Value Ref Range    Sodium 141 136 - 145 mmol/L    Potassium 3.2 (L) 3.5 - 5.0 mmol/L    Chloride 103 98 - 111 mmol/L    CO2 28 22 - 29 mmol/L    Anion Gap 10 7 - 19 mmol/L    Glucose 131 (H) 74 - 109 mg/dL    BUN 35 (H) 8 - 23 mg/dL    Creatinine 2.9 (H) 0.5 - 1.2 mg/dL    Est, Glom Filt Rate 21 (A) >60    Calcium 8.6 (L) 8.8 - 10.2 mg/dL    Total Protein 5.2 (L) 6.6 - 8.7 g/dL    Albumin 2.5 (L) 3.5 - 5.2 g/dL    Total Bilirubin <0.2 0.2 - 1.2 mg/dL    Alkaline Phosphatase 63 40 - 130 U/L    ALT 18 5 - 41 U/L    AST 23 5 - 40 U/L             Current Facility-Administered Medications:     sodium chloride flush 0.9 % injection 5-40 mL, 5-40 mL, IntraVENous, 2 times per day, [facility] Chriss, DO, 10 mL at 02/03/23 0842    sodium chloride flush 0.9 % injection 5-40 mL, 5-40 mL, IntraVENous, PRN, Birder Yunier, DO    0.9 % sodium chloride infusion, , IntraVENous, PRN, Birder Yunier, DO    heparin (porcine) injection 5,000 Units, 5,000 Units, SubCUTAneous, 3 times per day, Lola Wagner DO    metoclopramide (REGLAN) tablet 5 mg, 5 mg, Oral, 4x Daily AC & HS, Lena Hernandez MD, 5 mg at 02/03/23 1250    QUEtiapine (SEROQUEL) tablet 25 mg, 25 mg, Oral, Nightly, Lena Hernandez MD, 25 mg at 02/02/23 2113    linezolid (ZYVOX) tablet 600 mg, 600 mg, Oral, 2 times per day, Maximiliano Allen MD, 600 mg at 02/03/23 7849    ipratropium-albuterol (DUONEB) nebulizer solution 1 ampule, 1 ampule, Inhalation, Q4H WA, Lena Hernandez MD, 1 ampule at 02/03/23 1045    aspirin chewable tablet 81 mg, 81 mg, Oral, Daily, Marguerite Kapadia MD, 81 mg at 02/03/23 0721    atorvastatin (LIPITOR) tablet 80 mg, 80 mg, Oral, Daily, Lena Hernandez MD, 80 mg at 02/03/23 0721    simethicone (MYLICON) chewable tablet 80 mg, 80 mg, Oral, 4x Daily, Lena Hernnadez MD, 80 mg at 02/03/23 1250    [Held by provider] NIFEdipine (PROCARDIA XL) extended release tablet 60 mg, 60 mg, Oral, Daily, Lena Hernandez MD, 60 mg at 01/31/23 0936    polyethylene glycol (GLYCOLAX) packet 17 g, 17 g, Oral, BID, Lena Hernandez MD, 17 g at 02/02/23 2112    sodium bicarbonate tablet 650 mg, 650 mg, Oral, BID, Marcina Phoenix, MD, 650 mg at 02/03/23 8909    calcitRIOL (ROCALTROL) capsule 0.25 mcg, 0.25 mcg, Oral, Daily, Marcina Phoenix, MD, 0.25 mcg at 02/03/23 0720    mirtazapine (REMERON) tablet 7.5 mg, 7.5 mg, Oral, Nightly, Vicente Bose MD, 7.5 mg at 02/02/23 2113    tamsulosin (FLOMAX) capsule 0.4 mg, 0.4 mg, Oral, Daily, GOLD Hampton CNP, 0.4 mg at 02/03/23 0721    ondansetron (ZOFRAN-ODT) disintegrating tablet 4 mg, 4 mg, Oral, Q8H PRN **OR** ondansetron (ZOFRAN) injection 4 mg, 4 mg, IntraVENous, Q6H PRN, GOLD Hampton CNP, 4 mg at 01/28/23 8201    acetaminophen (TYLENOL) tablet 650 mg, 650 mg, Oral, Q6H PRN **OR** acetaminophen (TYLENOL) suppository 650 mg, 650 mg, Rectal, Q6H PRN, GOLD Hampton CNP    traMADol (ULTRAM) tablet 50 mg, 50 mg, Oral, Q6H PRN, Vicente Bose MD, 50 mg at 01/29/23 0216    allopurinol (ZYLOPRIM) tablet 100 mg, 100 mg, Oral, Daily, Christophe Kellee, APRN - CNP, 100 mg at 02/03/23 1903    metoprolol succinate (TOPROL XL) extended release tablet 50 mg, 50 mg, Oral, Daily, Christophe Kellee, APRN - CNP, 50 mg at 02/02/23 1500    pantoprazole (PROTONIX) tablet 40 mg, 40 mg, Oral, QAM AC, Christophe Kellee, APRN - CNP, 40 mg at 02/03/23 0548    pregabalin (LYRICA) capsule 100 mg, 100 mg, Oral, BID, Christophe Kellee, APRN - CNP, 100 mg at 02/03/23 0720    docusate sodium (COLACE) capsule 100 mg, 100 mg, Oral, Daily, Christophe Kellee, APRN - CNP, 100 mg at 02/03/23 1297      Imaging:  CT ABDOMEN PELVIS W IV CONTRAST Additional Contrast? None    Result Date: 1/23/2023  1. Portions of the mid small bowel suggest mild mucosal thickening. Exact etiology is uncertain. Mild enteritis is not excluded. No fluid-filled bowel loops. 2. Stool filled colon. 3. Simple cystic lesions of the liver and kidneys. 4. Nonobstructing stone within the left renal pelvis. 5. Focal coarse calcification in the left renal parenchyma. This can be from prior inflammatory infectious process. Recommendation: Follow up as clinically indicated. All CT scans at this facility utilize dose modulation, iterative reconstruction, and/or weight based dosing when appropriate to reduce radiation dose to as low as reasonably achievable.  Dictated and Electronically Signed by Monika Collins MD, SA CERTIFIED at [id number] 07:03:41 PM EST                  Assessment/Plan  Principal Problem:    Pyelonephritis of left kidney  Active Problems:    Renal stone    Visual disturbance    ALEXANDRO (acute kidney injury) ([facility] [address]    Pyelonephritis    Severe malnutrition (HCC)    Mixed hyperlipidemia    Essential hypertension    Restless legs syndrome    Gastroesophageal reflux disease without esophagitis    Peripheral neuropathic pain    Gout    Benign non-nodular prostatic hyperplasia with lower urinary tract symptoms    Elevated PSA    Idiopathic peripheral neuropathy    Chronic idiopathic constipation  Resolved Problems:    * No resolved hospital problems. *       I have reviewed the patient's daily labs, including BMP and CBC with pertinent results discussed below.     L flank/back pain  -- Improved w/ UTI treatment and grove placed previously  -- Recurred this AM after voiding trial yesterday which required grove to be replaced  -- If symptoms do not improve today, would repeat L kidney US   -- If repeat L kidney US is normal, would obtain MRI L spine to rule out abscess/OM/discitis  1/29 reviewed renal ultrasound no evidence of hydronephrosis however suggestive of CKD, left flank pain is mild  1/30 left flank pain resolved    Staph Epidermidis bacteremia  Pyelonephritis  -- SIRS 1/4 on admission for WBC 13.6  -- UA w/ 21-30 WBCs, 6-10 RBCs, positive nitrite, mod LE  -- CT abdomen w/ nonobstructing stone in L renal pelvis, coarse calcification in L renal parenchyma  -- Renal US negative for hydronephrosis  -- 2/2 blood cultures + for staph epi on 1/23,  -- Repeat blood cultures drawn 1/25, NGTD  -- Continue Vancomycin, dc ceftriaxone  -- Urine culture growing staph epi, sensitivities reviewed  -- ID consulted, appreciate recommendations, note reviewed  1/31 vancomycin discontinued started on linezolid  2/3 reviewed ID recommendation continue linezolid up to 2/10 and follow-up as outpatient    Leukocytosis trending up could be from infection  2/2 ID on board presently getting linezolid for the above infection we will trend CBC daily  2/3 leukocytosis significantly improved 19, 11.9     ALEXANDRO could be prerenal versus intrinsic renal from vancomycin toxicity  -- Cr plateau at 2.2  -- nonobstructing stone on CT abdomen  -- Renal US negative for hydronephrosis  -- Grove placed 1/25 due to retention  -- Florentino Toney for K 5.2 > 4.8  -- Consulted nephrology, appreciate recommendations, note reviewed  Creatinine improving 2.2, 1.7  1/30 followed up with [facility] urea nephrology change IV fluid to sodium bicarbonate and 5% dextrose and 45% NaCl  1/31 creatinine still trending up 2.7, getting volume overloaded, discontinue IV fluid, stress dose of Lasix nephrology on board plan for hemodialysis if no improvement, blood pressure is on the lower side holding nifedipine however the dose is given today  2/1 with diuretics creatinine jumped up to 3.8 plan for hemodialysis  2/2 creatinine for today status post permacath placement in the morning and hemodialysis today  2/3 nephrology arrange for hemodialysis as outpatient on Tuesday Thursday and Saturday schedule patient will be getting the third phase of hemodialysis tomorrow    Abdominal distention likely due to constipation> less likely from uremia  1/29 started on Reglan 10 mg IV every 6 hours for 4 doses  Added simethicone 160 mg every 6 hours for 4 doses  Change MiraLAX to twice daily along with Colace will monitor clinically for bowel movement  1/30 continue Reglan every 6 hourly, simethicone 80 mg every 6 hourly, continue Colace and MiraLAX  1/31 abdominal distention persisting but constipation resolved with 3 bowel movement so far we will continue the same regimen for now  2/1 distention improving with simethicone and Reglan will continue for now  2/3 the abdominal distention improved we will discontinue Reglan    Acute delirium could be metabolic versus uremic encephalopathy  Presently failing kidney function  1/31 EKG reviewed QTC of 435 started on Seroquel 25 mg p.o. 3 times daily Will monitor clinically frequent reorientation  2/1 delirium improved we will change Seroquel at at bedtime  2/3 no further episode of delirium Seroquel discontinued    No history of COPD but acutely wheezing could be fluid overload  1/31 IV fluid discontinued given diuretics, will start on Solu-Medrol 80 mg daily 8 hourly and monitor clinically also started on DuoNeb  2/1 the wheezing improved with Solu-Medrol changed to prednisone    Poor field of vision over the lower visual field  1/30 neurology consulted obtained MRI   1/31 MRI positive for acute infarct over the left roc, on aspirin increase the atorvastatin dose to 80 mg daily we will obtain fasting lipid panel> might need CT angiogram to rule out source of infarct from the posterior cerebral artery but with CKD angiogram cannot be done, will obtain echocardiogram with bubble study  2/1 need another MRI with contrast to confirm the infarct of the posterior cerebral artery circulation, neurology following  2/2 repeat MRI negative for any acute stroke  2/3 the visual field defect resolved, neurology signed off    Poor appetite could be from constipation with constipation and monitor for bowel movement    Developing volume overload  Reduce IV fluid to 25 cc/h as the patient is poorly eating   we will monitor clinically  1/31 discontinue IV fluid    Preputial skin edema> resolved with volume management with hemodialysis    Hypertension  Blood pressure is on the higher side started on nifedipine 60 mg daily and monitor vitals    Nephrolithiasis  BPH with obstruction  -- Nonobstructing on CT abdomen  -- Renal US negative for hydronephrosis 1/25  -- Continue Flomax  -- Considered starting Finasteride, but he has hx of elevated PSA so will defer to outpatient urologist  -- Grove placed 1/25  -- Voiding trial unsuccessful on 1/27, grove replaced  -- Will need outpatient urology follow up    Insomnia  -- Melatonin 10 mg QHS  -- Start Mirtazapine 7.5 mg QHS      DVT prophylaxis: Lovenox    DISPOSITION:  SNF placement, has a bed.   Discussed with  the patient can be discharged on Saturday afternoon after getting the third hemodialysis or Sunday morning the facility can accept admission in the weekend      Full Code         Khushboo Catalan MD 2/3/2023 2:49 PM

## 2023-02-03 NOTE — PROGRESS NOTES
Infectious Diseases Progress Note    Patient:  Reyna Finley  YOB: 1940  MRN: [id number]   Admit date: 1/23/2023   Admitting Physician: Denia Reid MD  Primary Care Physician: Gianni Miner MD    Chief Complaint/Interval History: He seems to be doing better. He seems to have some intermittent confusion. He did not describe visual problems today. I was able to get a much better exam today. Did not seem to have visual field defect with testing via confrontation at bedside. He looks comfortable. He is not describing back pain. In/Out    Intake/Output Summary (Last 24 hours) at 2/3/2023 1325  Last data filed at 2/3/2023 0724  Gross per 24 hour   Intake 120 ml   Output 4700 ml   Net -4580 ml     Allergies:    Allergies   Allergen Reactions    Codeine Nausea Only     Current Meds: sodium chloride flush 0.9 % injection 5-40 mL, 2 times per day  sodium chloride flush 0.9 % injection 5-40 mL, PRN  0.9 % sodium chloride infusion, PRN  heparin (porcine) injection 5,000 Units, 3 times per day  metoclopramide (REGLAN) tablet 5 mg, 4x Daily AC & HS  QUEtiapine (SEROQUEL) tablet 25 mg, Nightly  linezolid (ZYVOX) tablet 600 mg, 2 times per day  ipratropium-albuterol (DUONEB) nebulizer solution 1 ampule, Q4H WA  aspirin chewable tablet 81 mg, Daily  atorvastatin (LIPITOR) tablet 80 mg, Daily  simethicone (MYLICON) chewable tablet 80 mg, 4x Daily  [Held by provider] NIFEdipine (PROCARDIA XL) extended release tablet 60 mg, Daily  polyethylene glycol (GLYCOLAX) packet 17 g, BID  sodium bicarbonate tablet 650 mg, BID  calcitRIOL (ROCALTROL) capsule 0.25 mcg, Daily  mirtazapine (REMERON) tablet 7.5 mg, Nightly  tamsulosin (FLOMAX) capsule 0.4 mg, Daily  ondansetron (ZOFRAN-ODT) disintegrating tablet 4 mg, Q8H PRN   Or  ondansetron (ZOFRAN) injection 4 mg, Q6H PRN  acetaminophen (TYLENOL) tablet 650 mg, Q6H PRN   Or  acetaminophen (TYLENOL) suppository 650 mg, Q6H PRN  traMADol (ULTRAM) tablet 50 mg, Q6H PRN  allopurinol (ZYLOPRIM) tablet 100 mg, Daily  metoprolol succinate (TOPROL XL) extended release tablet 50 mg, Daily  pantoprazole (PROTONIX) tablet 40 mg, QAM AC  pregabalin (LYRICA) capsule 100 mg, BID  docusate sodium (COLACE) capsule 100 mg, Daily      Review of Systems    VitalSigns:  /73   Pulse 98   Temp 97.9 °F (36.6 °C) (Axillary)   Resp 16   Ht 6' (1.829 m)   Wt 195 lb 11.2 oz (88.8 kg)   SpO2 94%   BMI 26.54 kg/m²      Physical Exam  Line/IV site: No erythema, warmth, induration, or tenderness. García catheter remains in place  No suprapubic or flank tenderness  Heart without apparent murmur  Check visual fields with confrontation  He seemed to have intact visual fields in all 4 quadrants of both eyes. Did not get the sense of a central field problem as I did for 5 days ago.     Lab Results:  CBC:   Recent Labs     02/01/23 0235 02/02/23 0124 02/03/23  0121   WBC 8.8 19.3* 11.9*   HGB 12.5* 13.4* 11.9*    266 264     BMP:  Recent Labs     02/01/23 0235 02/02/23 0124 02/03/23  0121    139 141   K 4.2 3.7 3.2*    106 103   CO2 20* 19* 28   BUN 45* 56* 35*   CREATININE 3.8* 4.0* 2.9*   GLUCOSE 145* 148* 131*     CultureResults:  Blood cultures January 23, 2023-Staph epidermidis  Blood cultures January 23, 2023-Staph epidermidis  Blood cultures January 25, 2023-Staph epidermidis  Blood cultures January 20 50,023-no growth    Radiology: None    Additional Studies Reviewed:  None    Impression:  Urinary tract infection/pyelonephritis with Staph epidermidis-continued improvement  Acute renal failure-stable  Had had a sense of some visual field difficulty on the right previously-currently seems to be resolved-much better exam today  Back pain-improved  He has some confusion/encephalopathy-suspect may be multifactorial    Recommendations:  Recommend continuing linezolid through February 10, 2023  Okay to discontinue antibiotic treatment at that point from ID standpoint  He can follow-up with his primary physician and nursing home attending  Would be happy to reassess if additional symptoms, no ongoing improvement, or if new problems develop  Can otherwise follow-up with infectious diseases as needed  Will sign off for now  Please call if any questions for infectious diseases    Renee Arenas MD

## 2023-02-03 NOTE — PROGRESS NOTES
Infectious Diseases Progress Note    Patient:  Genevieve Lesches  YOB: 1940  MRN: [id number]   Admit date: 1/23/2023   Admitting Physician: Rukhsana Frye MD  Primary Care Physician: Caden Rey MD    Chief Complaint/Interval History: He is without fever. He has not had hypotension. He remains on room air. He seems to be comfortable at present. He denies back pain at this time. Mental status waxes and wanes. Not able to get much interval history from him at present. Interval notes reviewed. In/Out    Intake/Output Summary (Last 24 hours) at 2/2/2023 1910  Last data filed at 2/2/2023 1453  Gross per 24 hour   Intake --   Output 5350 ml   Net -5350 ml     Allergies:    Allergies   Allergen Reactions    Codeine Nausea Only     Current Meds: [START ON 2/3/2023] predniSONE (DELTASONE) tablet 5 mg, Daily  bumetanide (BUMEX) tablet 1 mg, Daily  QUEtiapine (SEROQUEL) tablet 25 mg, Nightly  linezolid (ZYVOX) tablet 600 mg, 2 times per day  ipratropium-albuterol (DUONEB) nebulizer solution 1 ampule, Q4H WA  metoclopramide (REGLAN) injection 5 mg, Q6H  enoxaparin Sodium (LOVENOX) injection 30 mg, Daily  aspirin chewable tablet 81 mg, Daily  atorvastatin (LIPITOR) tablet 80 mg, Daily  simethicone (MYLICON) chewable tablet 80 mg, 4x Daily  [Held by provider] NIFEdipine (PROCARDIA XL) extended release tablet 60 mg, Daily  polyethylene glycol (GLYCOLAX) packet 17 g, BID  sodium bicarbonate tablet 650 mg, BID  calcitRIOL (ROCALTROL) capsule 0.25 mcg, Daily  mirtazapine (REMERON) tablet 7.5 mg, Nightly  tamsulosin (FLOMAX) capsule 0.4 mg, Daily  sodium chloride flush 0.9 % injection 5-40 mL, 2 times per day  sodium chloride flush 0.9 % injection 5-40 mL, PRN  0.9 % sodium chloride infusion, PRN  ondansetron (ZOFRAN-ODT) disintegrating tablet 4 mg, Q8H PRN   Or  ondansetron (ZOFRAN) injection 4 mg, Q6H PRN  acetaminophen (TYLENOL) tablet 650 mg, Q6H PRN   Or  acetaminophen (TYLENOL) suppository 650 mg, Q6H PRN  traMADol (ULTRAM) tablet 50 mg, Q6H PRN  allopurinol (ZYLOPRIM) tablet 100 mg, Daily  metoprolol succinate (TOPROL XL) extended release tablet 50 mg, Daily  pantoprazole (PROTONIX) tablet 40 mg, QAM AC  pregabalin (LYRICA) capsule 100 mg, BID  docusate sodium (COLACE) capsule 100 mg, Daily      Review of Systems see HPI    VitalSigns:  BP (!) 146/73   Pulse 95   Temp 97.3 °F (36.3 °C) (Temporal)   Resp 18   Ht 6' (1.829 m)   Wt 195 lb 11.2 oz (88.8 kg)   SpO2 96%   BMI 26.54 kg/m²      Physical Exam  Line/IV site: No erythema, warmth, induration, or tenderness. Are without murmur  Lungs without crackles    Lab Results:  CBC:   Recent Labs     01/31/23  0607 02/01/23  0235 02/02/23  0124   WBC 9.2 8.8 19.3*   HGB 12.6* 12.5* 13.4*    197 266     BMP:  Recent Labs     01/31/23  0607 02/01/23  0235 02/02/23  0124    140 139   K 3.5 4.2 3.7    103 106   CO2 21* 20* 19*   BUN 35* 45* 56*   CREATININE 2.7* 3.8* 4.0*   GLUCOSE 114* 145* 148*     CultureResults:  Urine culture January 23, 2023-Staph epidermidis  Blood cultures January 23, 2023-Staph epidermidis  Blood cultures January 23, 2023-Staph epidermidis  Blood cultures January 25, 2023-Staph epidermidis  Blood cultures January 25, 2023-no growth  Catheter site left forearm culture from January 31, 2023-no growth    Radiology: None    Additional Studies Reviewed:  None    Impression:  Urinary tract infection/pyelonephritis-Staph epidermidis  Acute renal insufficiency-creatinine for 4 today where it was 3.8 yesterday. Hopefully leveling off.   Back flank pain-improving    Recommendations:  No new infectious disease findings of present  Continue treatment with linezolid for his staph epi bloodstream infection/pyelonephritis  Continue to follow    Katie Ye MD

## 2023-02-03 NOTE — DISCHARGE INSTRUCTIONS
[facility] PLACEMENT HOME INSTRUCTIONS:    1. Expect to be sore for a few days. You may use acetaminophen (tylenol) or Motrin for this temporary discomfort. Ice packs may help with swelling and discomfort. (Please refer to your nephrologist for further questions about medications or dosing). 2.  A small amount of blood or drainage oozing from the insertion site is normal. If the dressing becomes saturated with blood, hold firm and direct pressure on the insertion site and sit up at a 90 degree angle for 20 minutes. 3.  Prevent tugging or pulling the catheter. If it is dislodged it may not work properly and may need to be replaced. 4.  Do not put antibiotic ointment or anything over the insertion area. The dialysis center will clean and dress your catheter at your dialysis appointment. 5.  Keep the catheter dry. You may not shower. Sitting in a tub is permissible as long as the water level is below the catheter. No swimming or hot tubs! 6. Avoid lifting anything weighing more than 10 pounds for the next three days. Ten pounds is about the weight of two phone books or a gallon of milk. Lifting may put a strain on the incision before it has had a chance to heal.  7.  Do not change the dressing yourself. Permcath care should be provided by your dialysis unit and must be done with a sterile technique to prevent infection. PROBLEMS OR CONCERNS    Call the vascular surgery office at [phone] with any of the followin. Continued bleeding after 20 minutes of firm, direct pressure and sitting upright. 2.  Fever or chills, redness, heat or sudden swelling around insertion site. 3.  Catheter position changes or catheter pulled out.

## 2023-02-03 NOTE — PROGRESS NOTES
Comprehensive Nutrition Assessment    Type and Reason for Visit:  Reassess    Nutrition Recommendations/Plan:   Continue POC     Malnutrition Assessment:  Malnutrition Status:  Severe malnutrition (02/03/23 0941)    Context:  Acute Illness     Findings of the 6 clinical characteristics of malnutrition:  Energy Intake:  50% or less of estimated energy requirements for 5 or more days  Weight Loss:  No significant weight loss     Body Fat Loss:  No significant body fat loss     Muscle Mass Loss:  No significant muscle mass loss    Fluid Accumulation:  Moderate to Severe Generalized   Strength:  Not Performed    Nutrition Assessment:    Oral intake of meals is slowly improving. Pt receives ONS TID. He currently tolerates a Regular diet. Last BM noted 2/2. Aware of possible D/C today. Continue POC. Nutrition Related Findings:      Wound Type: None       Current Nutrition Intake & Therapies:    Average Meal Intake: 26-50%  Average Supplements Intake: 26-50%, 51-75%  ADULT DIET; Regular  ADULT ORAL NUTRITION SUPPLEMENT; Breakfast, Lunch, Dinner; Low Calorie/High Protein Oral Supplement    Anthropometric Measures:  Height: 6' (182.9 cm)  Ideal Body Weight (IBW): 178 lbs (81 kg)    Current Body Weight: 195 lb (88.5 kg), 109.6 % IBW. Weight Source: Standing Scale  Current BMI (kg/m2): 26.4  Weight Adjustment For: No Adjustment  BMI Categories: Overweight (BMI 25.0-29. 9)    Estimated Daily Nutrient Needs:  Energy Requirements Based On: Kcal/kg  Weight Used for Energy Requirements: Current  Energy (kcal/day): 0401-9684  Weight Used for Protein Requirements: Ideal  Protein (g/day): 105-162  Method Used for Fluid Requirements: 1 ml/kcal  Fluid (ml/day): 0934-8953    Nutrition Diagnosis:   Severe malnutrition related to acute injury/trauma as evidenced by intake 26-50%, localized or generalized fluid accumulation    Nutrition Interventions:   Food and/or Nutrient Delivery: Continue Current Diet, Continue Oral Nutrition Supplement    Goals:  Previous Goal Met: Progressing toward Goal(s)  Goals: Meet at least 75% of estimated needs, PO intake 50% or greater    Nutrition Monitoring and Evaluation:   Food/Nutrient Intake Outcomes: Food and Nutrient Intake, Supplement Intake  Physical Signs/Symptoms Outcomes: Biochemical Data, GI Status, Nausea or Vomiting, Nutrition Focused Physical Findings, Weight    Refugio Mcdonough MS, RD, LD  Contact: [phone]

## 2023-02-03 NOTE — PROGRESS NOTES
Patient:   Mishel Prieto  MR#:    [id number]   Room:    0333/333-02   YOB: 1940  Date of Progress Note: 2/3/2023  Time of Note                           7:43 AM  Consulting Physician:   Noa Hinds M.D. Attending Physician:  Eron Sorto MD     Chief complaint Visual change    S:This 79 y.o. male  with past medical history significant for hypertension, hyperlipidemia, and neuropathy is seen for evaluation of visual disturbance. The patient admitted on 1/23/2023 with abdominal pain. Work-up revealed a nonobstructive stone in the left renal pelvis and is under treatment for pyelonephritis. Neurology consultation is called for visual disturbance. The patient is not the best historian but indicates that about 3 weeks ago he noticed poor vision in his lower visual fields. CT of the head on admission with no clear acute changes noted. It is unclear if this is constant or intermittent. He denies diplopia, dysarthria or dysphagia. Feels well this morning. No new complaints.       REVIEW OF SYSTEMS:  Denies chest pain, shortness of breath or abdominal pain    Past Medical History:      Diagnosis Date    Benign non-nodular prostatic hyperplasia with lower urinary tract symptoms 8/16/2017    Essential hypertension 8/16/2017    Gastroesophageal reflux disease without esophagitis 8/16/2017    Gout 8/16/2017    Idiopathic peripheral neuropathy 2/19/2018    Mixed hyperlipidemia 8/16/2017    Peripheral neuropathic pain 8/16/2017    Primary osteoarthritis involving multiple joints 8/16/2017    Renal stone 1/23/2023    Restless legs syndrome 8/16/2017       Past Surgical History:      Procedure Laterality Date    INGUINAL HERNIA REPAIR Bilateral 1963    TURP  2003       Medications in Hospital:      Current Facility-Administered Medications:     bumetanide (BUMEX) tablet 1 mg, 1 mg, Oral, Daily, Laron Mead MD, 1 mg at 02/03/23 0721    QUEtiapine (SEROQUEL) tablet 25 mg, 25 mg, Oral, Nightly, Vera Vieira MD, 25 mg at 02/02/23 2113    linezolid (ZYVOX) tablet 600 mg, 600 mg, Oral, 2 times per day, Kristin Hermosillo MD, 600 mg at 02/03/23 9967    ipratropium-albuterol (DUONEB) nebulizer solution 1 ampule, 1 ampule, Inhalation, Q4H WA, Rosaura Costello MD, 1 ampule at 02/03/23 0723    metoclopramide (REGLAN) injection 5 mg, 5 mg, IntraVENous, Q6H, Rosaura Costello MD, 5 mg at 02/03/23 0548    enoxaparin Sodium (LOVENOX) injection 30 mg, 30 mg, SubCUTAneous, Daily, GOLD Benitez CNP, 30 mg at 02/02/23 1725    aspirin chewable tablet 81 mg, 81 mg, Oral, Daily, García Townsend MD, 81 mg at 02/03/23 0721    atorvastatin (LIPITOR) tablet 80 mg, 80 mg, Oral, Daily, Rosaura Costello MD, 80 mg at 02/03/23 0721    simethicone (MYLICON) chewable tablet 80 mg, 80 mg, Oral, 4x Daily, Rosaura Costello MD, 80 mg at 02/03/23 1556    [Held by provider] NIFEdipine (PROCARDIA XL) extended release tablet 60 mg, 60 mg, Oral, Daily, Rosaura Costello MD, 60 mg at 01/31/23 0936    polyethylene glycol (GLYCOLAX) packet 17 g, 17 g, Oral, BID, Rosaura Costello MD, 17 g at 02/02/23 2112    sodium bicarbonate tablet 650 mg, 650 mg, Oral, BID, Adi Jama MD, 650 mg at 02/03/23 1891    calcitRIOL (ROCALTROL) capsule 0.25 mcg, 0.25 mcg, Oral, Daily, Adi Jama MD, 0.25 mcg at 02/03/23 0720    mirtazapine (REMERON) tablet 7.5 mg, 7.5 mg, Oral, Nightly, Bernadette Jones MD, 7.5 mg at 02/02/23 2113    tamsulosin (FLOMAX) capsule 0.4 mg, 0.4 mg, Oral, Daily, GOLD Benitez CNP, 0.4 mg at 02/03/23 0339    sodium chloride flush 0.9 % injection 5-40 mL, 5-40 mL, IntraVENous, 2 times per day, Christophe Kellee, APRN - CNP, 10 mL at 02/03/23 4802    sodium chloride flush 0.9 % injection 5-40 mL, 5-40 mL, IntraVENous, PRN, Christophe Kellee, APRN - CNP    0.9 % sodium chloride infusion, , IntraVENous, PRN, Christophe Kellee, APRN - CNP    ondansetron (ZOFRAN-ODT) disintegrating tablet 4 mg, 4 mg, Oral, Q8H PRN **OR** ondansetron (ZOFRAN) injection 4 mg, 4 mg, IntraVENous, Q6H PRN, Arianne Sprout, APRN - CNP, 4 mg at 01/28/23 3133    acetaminophen (TYLENOL) tablet 650 mg, 650 mg, Oral, Q6H PRN **OR** acetaminophen (TYLENOL) suppository 650 mg, 650 mg, Rectal, Q6H PRN, Arianne Sprout, APRN - CNP    traMADol (ULTRAM) tablet 50 mg, 50 mg, Oral, Q6H PRN, Ran Hopkins MD, 50 mg at 01/29/23 0216    allopurinol (ZYLOPRIM) tablet 100 mg, 100 mg, Oral, Daily, Arianne Sprout, APRN - CNP, 100 mg at 02/03/23 2759    metoprolol succinate (TOPROL XL) extended release tablet 50 mg, 50 mg, Oral, Daily, Arianne Sprout, APRN - CNP, 50 mg at 02/02/23 1500    pantoprazole (PROTONIX) tablet 40 mg, 40 mg, Oral, QAM AC, Arianne Sprout, APRN - CNP, 40 mg at 02/03/23 0548    pregabalin (LYRICA) capsule 100 mg, 100 mg, Oral, BID, Arianne Sprout, APRN - CNP, 100 mg at 02/03/23 0720    docusate sodium (COLACE) capsule 100 mg, 100 mg, Oral, Daily, Arianne Sprout, APRN - CNP, 100 mg at 02/03/23 8024    Allergies:  Codeine    Social History:   TOBACCO:   reports that he quit smoking about 48 years ago. His smoking use included cigarettes. He has a 90.00 pack-year smoking history. He has never used smokeless tobacco.  ETOH:   reports no history of alcohol use. Family History:       Problem Relation Age of Onset    Uterine Cancer Mother     Coronary Art Dis Father         MI at 62    COPD Sister     Stroke Brother          PHYSICAL EXAM:  /60   Pulse 80   Temp 97.7 °F (36.5 °C) (Oral)   Resp 15   Ht 6' (1.829 m)   Wt 195 lb 11.2 oz (88.8 kg)   SpO2 94%   BMI 26.54 kg/m²     Constitutional - well developed, well nourished.    Eyes - conjunctiva normal.   Ear, nose, throat - No scars, masses, or lesions over external nose or ears, no atrophy of tongue  Neck-symmetric, no masses noted, no jugular vein distension  Respiration- chest wall appears symmetric, good expansion,   normal effort without use of accessory muscles  Musculoskeletal - no significant wasting of muscles noted, no bony deformities  Extremities-no clubbing, cyanosis or edema  Skin - warm, dry, and intact. No rash, erythema, or pallor. Psychiatric - mood, affect, and behavior appear normal.      Neurological exam  Awake, alert, fluent oriented x 2 slightly slowed  Attention and concentration appear appropriate  Recent and remote memory appears impaired  Speech normal without dysarthria  No clear issues with language of fund of knowledge     Cranial Nerve Exam     CN III, IV,VI-EOMI, No nystagmus, conjugate eye movements, no ptosis    CN VII-no facial assymetry       Motor Exam  antigravity throughout upper and lower extremities bilaterally      Tremors- no tremors in hands or head noted     Gait  Not tested     Nursing/pcp notes, imaging,labs and vitals reviewed. PT,OT and/or speech notes reviewed    Lab Results   Component Value Date    WBC 11.9 (H) 02/03/2023    HGB 11.9 (L) 02/03/2023    HCT 35.6 (L) 02/03/2023    MCV 82.4 02/03/2023     02/03/2023     Lab Results   Component Value Date     02/03/2023    K 3.2 (L) 02/03/2023     02/03/2023    CO2 28 02/03/2023    BUN 35 (H) 02/03/2023    CREATININE 2.9 (H) 02/03/2023    GLUCOSE 131 (H) 02/03/2023    CALCIUM 8.6 (L) 02/03/2023    PROT 5.2 (L) 02/03/2023    LABALBU 2.5 (L) 02/03/2023    BILITOT <0.2 02/03/2023    ALKPHOS 63 02/03/2023    AST 23 02/03/2023    ALT 18 02/03/2023    LABGLOM 21 (A) 02/03/2023    GFRAA >59 05/19/2022     Lab Results   Component Value Date    INR 1.10 05/19/2022    PROTIME 14.2 05/19/2022       MRI BRAIN WO CONTRAST [[id number]]    Resulted: 01/31/23 1245    Updated: 01/31/23 1307    Narrative:     Exam: MRI OF THE BRAIN WITHOUTINTRAVENOUS CONTRAST   Clinical data:Loss of lower field vision. Technique: Multiplanar multi-sequence MRI of the brain without intravenous gadolinium. Diffusion-weighted imaging is submitted. Prior studies: CT scan of brain dated 01/24/2023.    Findings:  Patchy hyperintense T2 signal throughout the periventricular subcortical white matter, nonspecific but compatible with chronic microvascular ischemic changes. Abnormal signal in the left roc in the left superior cerebellum which is   hyperintense on the diffusion-weighted sequence and hypointense on the ADC map, potentially acute infarction in the left roc and the left superior cerebellum; however, artifact is not excluded, as the abnormality is not confirmed on the T2 weighted   sequence. Motion artifact throughout the exam limits evaluation. No evidence of acute intracranial hemorrhage. No obvious mass or mass effect. The ventricles and sulci are slightly prominent but are symmetric. No hydrocephalus or focal extra-axial fluid collection is detected. Left maxillary sinus inflammatory   changes; the paranasal sinuses and mastoid air cells are otherwise grossly clear. Extensive motion artifact exam limits evaluation. Impression:     1. The appearance of restricted diffusion in the left roc and the superior left cerebellum, compatible with acute infarction, although potentially artifactual, with extensive motion throughout the exam.   2. No evidence of acute intracranial hemorrhage. 3. Chronic microvascular ischemic changes with age-appropriate parenchymal volume loss is also present. 4. Left maxillary sinus inflammatory changes. Recommendation: Follow up is recommended; consider repeat MRI with sedation and including T2 coronal and diffusion coronal sequences.    Dictated and Electronically Signed by Glenda Cogan MD at 31-Jan-2023 01:45:09 PM EST              Narrative & Impression    Transthoracic Echocardiography Report (TTE)      Demographics      Patient Name   [facility]  Date of Study            01/26/2023      MRN            [id number]           Gender                   Male      Date of Birth  1940       Room Number              MHL-46      Age            79 year(s)      Height:        72 inches        Referring Physician Jacquie Ontiveros      Weight:        195 pounds       Sonographer              Serena Encarnacion      BSA:           2.11 m^2         Interpreting Physician   Sandy Byrnes MD      BMI:           26.45 kg/m^2     Procedure     Type of Study      TTE procedure:ECHO 2D W/DOPPLER/COLOR/CONTRAST. Study Location: Echo Lab  Technical Quality: Limited visualization     Patient Status: Inpatient     Contrast Medium: Definity. HR: 62 bpm BP: 137/80 mmHg      Conclusions      Summary   Normal left ventricular size with low normal systolic function without   distinct segmental wall motion abnormality -estimated ejection fraction 50   to 55%   Mild concentric left ventricular hypertrophy with mild [grade 1] diastolic   dysfunction   Normal right-sided chambers with preserved right ventricular systolic   function   Normal left atrial size   IVC dimension falls within normal limits without ability to assess   inspiratory motion   Trace pulmonic valvular insufficiency   No tricuspid regurgitation seen with which to estimate RVSP   Thickened aortic valve which appears to be tricuspid with adequate cusp   separation, no stenosis, and trace insufficiency [cannot exclude   vegetation]   Normally mobile mitral valve with mild regurgitation   Aortic root measurements fall within normal limits with ascending aorta   measuring upper normal at 3.3 cm   No significant pericardial effusion   Definity contrast utilized to better define endocardial borders      Signature      ----------------------------------------------------------------   Electronically signed by Sandy Byrnes MD(Interpreting physician)   on 01/26/2023 09:04 AM   ----------------------------------------------------------------     MRI BRAIN WO CONTRAST [[id number]]    Resulted: 02/01/23 1812    Updated: 02/01/23 1214    Narrative:     CLINICAL HISTORY: Diffusion sequence only, compared to previous MRI   TECHNIQUE: Diffusion only MRI sequences of the brain are acquired. Comparison to brain MRI from 1/31/2023   FINDINGS:   No restriction diffusion identified. Within the limitations of the sequences   obtained, there does not appear to be mass-effect, midline shift, or   hydrocephalus. There is opacification of the left maxillary sinus as before. Impression: 1. No evidence of acute infarct.      RECORD REVIEW: Previous medical records, medications were reviewed at today's visit    IMPRESSION:   Visual impairment in the lower visual fields although currently exam shows no clear visual field deficit on bedside confrontation exam     Initial examination  Awake, alert, oriented x2, fluent  Mild ptosis bilaterally  Visual fields grossly intact to confrontation  Nonfocal       Initial MRI of the brain and orbits-possible infarct in left roc and left cerebellum-repeat MRI with diffusion sequence only -no evidence of acute infarct-appears initial MRI findings were artifactual  Carotid ultrasound less than 50% bilateral internal carotids  2D echo-left ventricular ejection fraction 50 to 55%/normal left atrium    Check myasthenia crisis panel for completeness    On aspirin/statin  Will need ZIO Holter monitor on DC x 2 weeks    Will need outpatient ophthalmology evaluation     Encephalopathy in the setting of urinary tract infection/renal dysfunction  Hyperlipidemia-on statin  GI-bowel regimen/PPI  DVT prophylaxis-Lovenox  Hypertension-on meds monitor  Neuropathy-Lyrica  Renal stone-Flomax/IV fluids  UTI-on antibiotics  Renal dysfunction-on IV fluids  Pain control-tramadol as needed  History of gout-tramadol        PT/OT     Supportive care as noted     Okay to DC from neuro standpoint  Follow-up as needed    [address] [facility]  Dr Christen Josue no depression/no anxiety

## 2023-02-03 NOTE — PROGRESS NOTES
Pharmacy Intravenous to Oral Protocol    Medication changed per [address] IV to PO protocol: Metoclopramide    Patient meets the following inclusion criteria and none of the exclusion criteria:    Inclusion criteria:  - IV therapy > 24 hours (antibiotics only)  - Tolerating diet more advanced than clear liquids  - Tolerating PO medications  - No vasopressor blood pressure support (ie no signs of shock)  - Patient hasn't had a seizure for 72 hrs (antiepileptic medications only)    Exclusion criteria:  - Infections requiring IV therapy (ie meningitis, endocarditis, osteomyelitis, pancreatitis)   - Nausea and/or vomiting or severe diarrhea within past 24 hours   - Has gastrectomy, ileus, gastric outlet or bowel obstruction, or malabsorption syndromes   - Has significant painful oral ulceration   - TPN with an NPO order   - Active GI bleed   - Unable to swallow   - NPO   - Febrile in the last 24 hours (antibiotics only)   - Clinical deteriorating or unstable (antibiotics only)   - Pediatric patients and patients who are not euthyroid (not on oral levothyroxine/not stabilized on oral levothyroxine)- Levothyroxine only     Electronically signed by JEFFREY To [facility] on 2/3/2023 at 8:38 AM

## 2023-02-03 NOTE — CARE COORDINATION
PT can dc to [facility] on Saturday after HD pending covid test.  Pt will need to be able to sit in a chair and ride in a vehicle to be able to get to an from HD. Will follow therapy notes. [facility] aware of poss dc on Saturday.   Electronically signed by Kevan Lentz RN on 2/3/2023 at 11:41 AM

## 2023-02-03 NOTE — PROGRESS NOTES
Physical Therapy  Name: Della Madrigal  MRN:  [id number]  Date of service:  2/3/2023       02/03/23 1417   Restrictions/Precautions   Restrictions/Precautions Fall Risk   Subjective   Subjective We can try   Oxygen Therapy   O2 Device None (Room air)   Orientation   Overall Orientation Status WFL   Bed Mobility   Supine to Sit Contact guard assistance   Transfers   Sit to Stand Contact guard assistance   Stand to Sit Contact guard assistance   Ambulation   Surface Level tile   Device Rolling Walker   Assistance Minimal assistance   Distance 65 feet   Short Term Goals   Time Frame for Short Term Goals 2 wks   Short Term Goal 1 amb 200 ft with rw, SBA   Conditions Requiring Skilled Therapeutic Intervention   Body Structures, Functions, Activity Limitations Requiring Skilled Therapeutic Intervention Decreased functional mobility ; Decreased safe awareness;Decreased balance;Decreased endurance   Assessment Patient more fatigued this visit requiring min assist for balance. He pushes walker too far in front and ambulates with flex posture. Discharge Recommendations Continue to assess pending progress; Patient would benefit from continued therapy after discharge   Activity Tolerance   Activity Tolerance Patient limited by endurance   Physcial Therapy Plan   General Plan 5-7 times per week   Therapy Duration 2 Weeks   Current Treatment Recommendations Strengthening;Balance training;Functional mobility training;Transfer training;Gait training; Endurance training; Safety education & training; Therapeutic activities   Safety Devices   Type of Devices Call light within reach; Left in chair;Nurse notified; Chair alarm in place       Electronically signed by Jabari Jose PTA on 2/3/2023 at 2:54 PM

## 2023-02-03 NOTE — PROGRESS NOTES
Nephrology ([facility]) Progress Note    Patient:  Monica Oliveira  YOB: 1940  Date of Service: 2/3/2023  MRN: [id number]   Acct: [de-identified]   Primary Care Physician: Brandi Thompson MD  Advance Directive: Full Code  Admit Date: 1/23/2023       Hospital Day: 11  Referring Provider: Shikha Singh MD    Patient independently seen and examined, Chart, Consults, Notes, Operative notes, Labs, Cardiology, and Radiology studies reviewed as available. Chief complaint: Abdominal labs. Subjective:  Monica Oliveira is a 79 y.o. male for whom we were consulted for evaluation and treatment of acute kidney injury. Patient denies any history of chronic kidney disease. He was admitted with left flank pain radiating to the anterior abdominal wall. He has history of type 2 diabetes, hypertension, osteoarthritis and gout. CT scan of the abdomen in the emergency room consistent with nonobstructing left kidney stone associated with pyelonephritis. Hospital course remarkable for IV antibiotics and IV fluid administration and has worsening of renal function. Nephrology is consulted for evaluation of acute kidney injury. Patient also has staph epidermis bacteremia. His renal function continues to worsen despite IV fluid resuscitation. Finally patient and family agreed to proceed with dialysis. On February 2 he had a permacatheter placement. This morning patient is receiving second hemodialysis treatment. He feels much better and lighter.     Currently seen on hemodialysis  Hemodialysis access: Permacath  Hemodialysis: 3-1/2   Ultrafiltration 3500 cc  2K bath  Blood flow rate is 300 cc/min    Allergies:  Codeine    Medicines:  Current Facility-Administered Medications   Medication Dose Route Frequency Provider Last Rate Last Admin    sodium chloride flush 0.9 % injection 5-40 mL  5-40 mL IntraVENous 2 times per day Rodger Standing, DO   10 mL at 02/03/23 0842    sodium chloride flush 0.9 % injection 5-40 mL  5-40 mL IntraVENous PRN Eddie Newriconstantin, DO        0.9 % sodium chloride infusion   IntraVENous PRN Eddie Servin DO        heparin (porcine) injection 5,000 Units  5,000 Units SubCUTAneous 3 times per day Callie Banerjee DO        metoclopramide (REGLAN) tablet 5 mg  5 mg Oral 4x Daily AC & HS Ricco Villareal MD        heparin (porcine) injection 3,600 Units  3,600 Units IntraCATHeter Once in dialysis Stefan Garsia MD        bumetanide (BUMEX) tablet 1 mg  1 mg Oral Daily Stefan Gasria MD   1 mg at 02/03/23 0721    QUEtiapine (SEROQUEL) tablet 25 mg  25 mg Oral Nightly Ricco Villareal MD   25 mg at 02/02/23 2113    linezolid (ZYVOX) tablet 600 mg  600 mg Oral 2 times per day Adonna Closs, MD   600 mg at 02/03/23 0721    ipratropium-albuterol (DUONEB) nebulizer solution 1 ampule  1 ampule Inhalation Q4H WA Ricco Villareal MD   1 ampule at 02/03/23 1665    aspirin chewable tablet 81 mg  81 mg Oral Daily Christen Josue MD   81 mg at 02/03/23 0721    atorvastatin (LIPITOR) tablet 80 mg  80 mg Oral Daily Ricco Villareal MD   80 mg at 02/03/23 0721    simethicone (MYLICON) chewable tablet 80 mg  80 mg Oral 4x Daily Ricco Villareal MD   80 mg at 02/03/23 4025    [Held by provider] NIFEdipine (PROCARDIA XL) extended release tablet 60 mg  60 mg Oral Daily Ricco Villareal MD   60 mg at 01/31/23 0936    polyethylene glycol (GLYCOLAX) packet 17 g  17 g Oral BID Ricco Villareal MD   17 g at 02/02/23 2112    sodium bicarbonate tablet 650 mg  650 mg Oral BID Dorinda Moore MD   650 mg at 02/03/23 8630    calcitRIOL (ROCALTROL) capsule 0.25 mcg  0.25 mcg Oral Daily Dorinda Moore MD   0.25 mcg at 02/03/23 0720    mirtazapine (REMERON) tablet 7.5 mg  7.5 mg Oral Nightly William Vieira MD   7.5 mg at 02/02/23 2113    tamsulosin (FLOMAX) capsule 0.4 mg  0.4 mg Oral Daily GOLD Ribeiro CNP   0.4 mg at 02/03/23 0721    ondansetron (ZOFRAN-ODT) disintegrating tablet 4 mg  4 mg Oral Q8H PRN Balaji Winter, APRN - CNP        Or    ondansetron (ZOFRAN) injection 4 mg  4 mg IntraVENous Q6H PRN Gallo Pill, APRN - CNP   4 mg at 01/28/23 3775    acetaminophen (TYLENOL) tablet 650 mg  650 mg Oral Q6H PRN Gallo Pill, APRN - CNP        Or    acetaminophen (TYLENOL) suppository 650 mg  650 mg Rectal Q6H PRN Gallo Pill, APRN - CNP        traMADol Quique Plush) tablet 50 mg  50 mg Oral Q6H PRN Lima Posadas MD   50 mg at 01/29/23 0216    allopurinol (ZYLOPRIM) tablet 100 mg  100 mg Oral Daily Gallo Pill, APRN - CNP   100 mg at 02/03/23 5761    metoprolol succinate (TOPROL XL) extended release tablet 50 mg  50 mg Oral Daily Gallo Pill, APRN - CNP   50 mg at 02/02/23 1500    pantoprazole (PROTONIX) tablet 40 mg  40 mg Oral QAM AC Gallo Pill, APRN - CNP   40 mg at 02/03/23 0548    pregabalin (LYRICA) capsule 100 mg  100 mg Oral BID Gallo Pill, APRN - CNP   100 mg at 02/03/23 0720    docusate sodium (COLACE) capsule 100 mg  100 mg Oral Daily Gallo Pill, APRN - CNP   100 mg at 02/03/23 1999       Past Medical History:  Past Medical History:   Diagnosis Date    Benign non-nodular prostatic hyperplasia with lower urinary tract symptoms 8/16/2017    Essential hypertension 8/16/2017    Gastroesophageal reflux disease without esophagitis 8/16/2017    Gout 8/16/2017    Idiopathic peripheral neuropathy 2/19/2018    Mixed hyperlipidemia 8/16/2017    Peripheral neuropathic pain 8/16/2017    Primary osteoarthritis involving multiple joints 8/16/2017    Renal stone 1/23/2023    Restless legs syndrome 8/16/2017       Past Surgical History:  Past Surgical History:   Procedure Laterality Date    INGUINAL HERNIA REPAIR Bilateral 1963    TURP  2003       Family History  Family History   Problem Relation Age of Onset    Uterine Cancer Mother     Coronary Art Dis Father         MI at 62    COPD Sister     Stroke Brother        Social History  Social History     Socioeconomic History    Marital status:       Spouse name: Wally Jewell    Number of children: 2    Years of education: 12    Highest education level: Not on file   Occupational History     Employer: RETIRED   Tobacco Use    Smoking status: Former     Packs/day: 3.00     Years: 30.00     Pack years: 90.00     Types: Cigarettes     Quit date: 1975     Years since quittin.1    Smokeless tobacco: Never   Substance and Sexual Activity    Alcohol use: No    Drug use: No    Sexual activity: Yes     Partners: Female     Comment: wife   Other Topics Concern    Not on file   Social History Narrative    Not on file     Social Determinants of Health     Financial Resource Strain: Low Risk     Difficulty of Paying Living Expenses: Not hard at all   Food Insecurity: No Food Insecurity    Worried About Running Out of Food in the Last Year: Never true    Ran Out of Food in the Last Year: Never true   Transportation Needs: Not on file   Physical Activity: Unknown    Days of Exercise per Week: 0 days    Minutes of Exercise per Session: Not on file   Stress: Not on file   Social Connections: Not on file   Intimate Partner Violence: Unknown    Fear of Current or Ex-Partner: Patient refused    Emotionally Abused: Patient refused    Physically Abused: Patient refused    Sexually Abused: Patient refused   Housing Stability: Not on file         Review of Systems:  History obtained from chart review and the patient  General ROS: Fatigue and lack of energy.   Respiratory ROS: No cough, shortness of breath, wheezing  Cardiovascular ROS: No chest pain or palpitations  Gastrointestinal ROS: No abdominal pain or melena  Genito-Urinary ROS: No dysuria or hematuria  Musculoskeletal ROS: No joint pain or swelling         Objective:  Patient Vitals for the past 24 hrs:   BP Temp Temp src Pulse Resp SpO2   23 0723 -- -- -- 80 15 94 %   23 0722 138/60 -- -- 86 -- --   23 0603 138/80 97.7 °F (36.5 °C) Oral 88 14 90 %   23 0012 109/65 98.4 °F (36.9 °C) Axillary 90 16 90 %   02/02/23 2130 -- -- -- -- -- 95 %   02/02/23 1749 (!) 146/73 97.3 °F (36.3 °C) Temporal 95 18 96 %   02/02/23 1453 -- -- -- 97 -- --   02/02/23 1438 -- -- -- -- -- 95 %   02/02/23 1347 (!) 155/92 (!) 96.7 °F (35.9 °C) Temporal 97 18 --   02/02/23 1025 -- -- -- -- -- 95 %       Intake/Output Summary (Last 24 hours) at 2/3/2023 1002  Last data filed at 2/3/2023 2111  Gross per 24 hour   Intake 120 ml   Output 4700 ml   Net -4580 ml     General: awake/alert   HEENT: Normocephalic atraumatic head  Neck: Supple with no JVD or carotid bruits. Chest:  clear to auscultation bilaterally  CVS: regular rate and rhythm  Abdominal: soft, nontender, normal bowel sounds  Extremities: Bilateral 2+ edema. Skin: warm and dry without rash    Labs:  BMP:   Recent Labs     02/01/23 0235 02/02/23 0124 02/03/23 0121    139 141   K 4.2 3.7 3.2*    106 103   CO2 20* 19* 28   BUN 45* 56* 35*   CREATININE 3.8* 4.0* 2.9*   CALCIUM 8.8 8.9 8.6*     CBC:   Recent Labs     02/01/23 0235 02/02/23 0124 02/03/23 0121   WBC 8.8 19.3* 11.9*   HGB 12.5* 13.4* 11.9*   HCT 39.0* 40.6* 35.6*   MCV 84.4 83.0 82.4    266 264     LIVER PROFILE:   Recent Labs     02/01/23 0235 02/02/23 0124 02/03/23 0121   AST 26 24 23   ALT 19 20 18   BILITOT 0.3 0.3 <0.2   ALKPHOS 78 71 63     PT/INR: No results for input(s): PROTIME, INR in the last 72 hours. APTT: No results for input(s): APTT in the last 72 hours. BNP:  No results for input(s): BNP in the last 72 hours. Ionized Calcium:No results for input(s): IONCA in the last 72 hours. Magnesium:No results for input(s): MG in the last 72 hours. Phosphorus:No results for input(s): PHOS in the last 72 hours. HgbA1C:   No results for input(s): LABA1C in the last 72 hours.     Hepatic:   Recent Labs     02/01/23  0235 02/02/23  0124 02/03/23  0121   ALKPHOS 78 71 63   ALT 19 20 18   AST 26 24 23   PROT 6.0* 5.8* 5.2*   BILITOT 0.3 0.3 <0.2   LABALBU 2.7* 2.5* 2.5*     Lactic Acid: No results for input(s): LACTA in the last 72 hours. Troponin: No results for input(s): CKTOTAL, CKMB, TROPONINT in the last 72 hours. ABGs: No results found for: PHART, PO2ART, KAO2XSI  CRP:  No results for input(s): CRP in the last 72 hours. Sed Rate:  No results for input(s): SEDRATE in the last 72 hours. Culture Results:   Blood Culture Recent:   Recent Labs     01/25/23  0608   BC No growth after 5 days of incubation. Urine Culture Recent :   Recent Labs     01/23/23  1659   LABURIN >100,000 CFU/ml  Mixed skin carol present  *  Heavy growth       Radiology reports as per the Radiologist  Radiology: CT HEAD WO CONTRAST    Result Date: 1/24/2023  Patient: Barney Garcia  Time Out: 00:29Exam(s): CT HEAD Without Contrast EXAM:  CT Head Without Intravenous ContrastCLINICAL HISTORY:   Reason for exam: unwitnessed fall. TECHNIQUE:  Axial computed tomography images of the head/brain without intravenous contrast.COMPARISON:   Head CT 4/2/13. FINDINGS:  Brain: Mild atrophy and chronic white matter disease, has progressed, as expected. No edema or acute infarct. No acute hemorrhage. Ventricles:   No hydrocephalus or midline shift. Bones/joints:   No skull fracture. Soft tissues: No scalp hematoma. Sinuses: Moderate mucosal thickening left maxillary sinus, compatible with chronic sinusitis. Remaining sinuses are clear. No fluid level. Mastoid air cells:   No mastoid effusion. 1.  Mild age-related findings. 2. No acute infarct, bleed, or acute intracranial abnormality. Electronically signed by Yoandy Estrella M.D. on 01-25-23 at [id number] [facility]    Result Date: 1/25/2023  RETROPERITONEAL ULTRASOUND: HISTORY: Worsening ALEXANDRO COMPARISON STUDY: CT of abdomen pelvis from 1/23/2023. PROCEDURE: Real-time ultrasound imaging was performed of the retroperitoneum. FINDINGS: The abdominal aorta and IVC were not visualized. The right kidney measures up to to 12.0 cm in length.  The left kidney measures up to 12.3 cm in length. There is a cyst seen at the right kidney measuring up to 3.1 x 2.6 x 3.4 cm and at the interpolar region measuring up to 2.9 cm which has a thin septation. There are additional smaller cysts present. There is a small cyst seen at the left kidney measuring up to 1.4 cm. Both kidneys demonstrate mildly increased corticomedullary differentiation with no evidence of hydronephrosis. The visualized portions of the urinary bladder appear unremarkable. No free intraperitoneal fluid is seen. There is no evidence of hydronephrosis. Both kidneys demonstrate increased cortical echogenicity, suggestive of chronic medical renal disease. CT ABDOMEN PELVIS W IV CONTRAST Additional Contrast? None    Result Date: 1/23/2023  NO PRIOR REPORT AVAILABLE Exam: CT OF THE ABDOMEN/PELVIS WITH IV CONTRAST Clinical data: Left LQ pain Technique:Direct contiguousaxial CT images were acquired through the abdomen and pelvis with intravenouscontrastusing soft tissue and bone algorithms. Oral contrast was not administered. Reformatted/MPR images were performed. Radiation dose: CTDIvol =10.77 mGy, DLP =645.28 mGy x cm. Limitations: Lack of oral contrast limits evaluation of the bowel loops. Prior Studies: No prior studies submitted. Findings: Lung bases: No acute pulmonary process. Mild cardiomegaly. d Liver:Unremarkable size andcontour. Low attenuated lesions within the liver measuring up to 2.1 cm consistent with simple cysts. Normal density. No evidence of mass. No evidence of dilated ducts. Gallbladder Fossa: Unremarkable Spleen: Grossly unremarkable. Pancreas/adrenal glands: Grossly unremarkable size, contour and density. Kidneys: In anatomic position. Grossly unremarkablerenal size, contour and density. Cystic lesions of the kidneys measuring up to 3.9 cm with simple appearance.  Coarse calcification within the parenchyma posteriorly within the interpolar left kidney can be from prior inflammatory infectious or chronic scarring. 4-5 mm nonobstructing stone within the left renal pelvis. No evidence of a renal mass. Perinephric space is unremarkable. Retroperitoneum: No enlarged retroperitoneal lymphadenopathy. The aorta and IVC appear unremarkable. Peritoneal cavity: No evidence of free air or ascites. Gastrointestinal tract: No obstruction. Stool filled colon. Portions of the small bowel demonstrate mucosal thickening. Appendix: Unremarkable Pelvis: Solid and hollow viscera grossly unremarkable. Osseous structures: No acute or destructive bony process identified. Degenerative changes of the spine. 1. Portions of the mid small bowel suggest mild mucosal thickening. Exact etiology is uncertain. Mild enteritis is not excluded. No fluid-filled bowel loops. 2. Stool filled colon. 3. Simple cystic lesions of the liver and kidneys. 4. Nonobstructing stone within the left renal pelvis. 5. Focal coarse calcification in the left renal parenchyma. This can be from prior inflammatory infectious process. Recommendation: Follow up as clinically indicated. All CT scans at this facility utilize dose modulation, iterative reconstruction, and/or weight based dosing when appropriate to reduce radiation dose to as low as reasonably achievable. Dictated and Electronically Signed by Meghann Escobar MD, [facility] CERTIFIED at [id number] 07:03:41 PM EST                Assessment   1. Acute kidney injury/worsening/dialysis dependent/currently seen on hemodialysis. Charly Mealing 2.  Acute tubular necrosis. 3.  Staph epi bacteremia. 4.  Acute pyelonephritis due to staph epi. 5.  Left renal stone. 6.  Metabolic acidemia. 7.  Secondary hyperparathyroidism. Plan:  1. Tolerating hemodialysis well. 2.  Ultrafiltration up to 3500 cc./3K bath. 3.  Repeat hemodialysis treatment tomorrow  4. Outpatient hemodialysis set up completed at [facility]. May go home after tomorrow's dialysis.     Ingrid Salgado MD  02/03/23  10:02 AM

## 2023-02-04 VITALS
HEART RATE: 101 BPM | RESPIRATION RATE: 18 BRPM | WEIGHT: 199.3 LBS | SYSTOLIC BLOOD PRESSURE: 133 MMHG | BODY MASS INDEX: 26.99 KG/M2 | OXYGEN SATURATION: 96 % | HEIGHT: 72 IN | DIASTOLIC BLOOD PRESSURE: 83 MMHG | TEMPERATURE: 97.7 F

## 2023-02-04 LAB
ALBUMIN SERPL-MCNC: 2.8 G/DL (ref 3.5–5.2)
ALP BLD-CCNC: 68 U/L (ref 40–130)
ALT SERPL-CCNC: 25 U/L (ref 5–41)
ANION GAP SERPL CALCULATED.3IONS-SCNC: 12 MMOL/L (ref 7–19)
AST SERPL-CCNC: 29 U/L (ref 5–40)
BILIRUB SERPL-MCNC: 0.3 MG/DL (ref 0.2–1.2)
BUN BLDV-MCNC: 28 MG/DL (ref 8–23)
CALCIUM SERPL-MCNC: 8.8 MG/DL (ref 8.8–10.2)
CHLORIDE BLD-SCNC: 102 MMOL/L (ref 98–111)
CO2: 28 MMOL/L (ref 22–29)
CREAT SERPL-MCNC: 2.5 MG/DL (ref 0.5–1.2)
GFR SERPL CREATININE-BSD FRML MDRD: 25 ML/MIN/{1.73_M2}
GLUCOSE BLD-MCNC: 118 MG/DL (ref 74–109)
GRAM STAIN RESULT: NORMAL
HCT VFR BLD CALC: 38.5 % (ref 42–52)
HEMOGLOBIN: 12.4 G/DL (ref 14–18)
MCH RBC QN AUTO: 27.1 PG (ref 27–31)
MCHC RBC AUTO-ENTMCNC: 32.2 G/DL (ref 33–37)
MCV RBC AUTO: 84.1 FL (ref 80–94)
PDW BLD-RTO: 13.6 % (ref 11.5–14.5)
PLATELET # BLD: 227 K/UL (ref 130–400)
PMV BLD AUTO: 11 FL (ref 9.4–12.4)
POTASSIUM SERPL-SCNC: 3.7 MMOL/L (ref 3.5–5)
RBC # BLD: 4.58 M/UL (ref 4.7–6.1)
SARS-COV-2, NAAT: NOT DETECTED
SODIUM BLD-SCNC: 142 MMOL/L (ref 136–145)
TOTAL PROTEIN: 5.1 G/DL (ref 6.6–8.7)
WBC # BLD: 12.3 K/UL (ref 4.8–10.8)
WOUND/ABSCESS: NORMAL

## 2023-02-04 PROCEDURE — 2580000003 HC RX 258: Performed by: SURGERY

## 2023-02-04 PROCEDURE — 6370000000 HC RX 637 (ALT 250 FOR IP): Performed by: INTERNAL MEDICINE

## 2023-02-04 PROCEDURE — 6370000000 HC RX 637 (ALT 250 FOR IP)

## 2023-02-04 PROCEDURE — 87635 SARS-COV-2 COVID-19 AMP PRB: CPT

## 2023-02-04 PROCEDURE — 6370000000 HC RX 637 (ALT 250 FOR IP): Performed by: PSYCHIATRY & NEUROLOGY

## 2023-02-04 PROCEDURE — 6370000000 HC RX 637 (ALT 250 FOR IP): Performed by: STUDENT IN AN ORGANIZED HEALTH CARE EDUCATION/TRAINING PROGRAM

## 2023-02-04 PROCEDURE — 36415 COLL VENOUS BLD VENIPUNCTURE: CPT

## 2023-02-04 PROCEDURE — 94640 AIRWAY INHALATION TREATMENT: CPT

## 2023-02-04 PROCEDURE — 80053 COMPREHEN METABOLIC PANEL: CPT

## 2023-02-04 PROCEDURE — 85027 COMPLETE CBC AUTOMATED: CPT

## 2023-02-04 PROCEDURE — 1210000000 HC MED SURG R&B

## 2023-02-04 PROCEDURE — 99232 SBSQ HOSP IP/OBS MODERATE 35: CPT | Performed by: PSYCHIATRY & NEUROLOGY

## 2023-02-04 PROCEDURE — 94760 N-INVAS EAR/PLS OXIMETRY 1: CPT

## 2023-02-04 PROCEDURE — 6360000002 HC RX W HCPCS: Performed by: SURGERY

## 2023-02-04 PROCEDURE — 8010000000 HC HEMODIALYSIS ACUTE INPT

## 2023-02-04 RX ORDER — QUETIAPINE FUMARATE 25 MG/1
25 TABLET, FILM COATED ORAL NIGHTLY
Qty: 60 TABLET | Refills: 3 | Status: SHIPPED | OUTPATIENT
Start: 2023-02-04

## 2023-02-04 RX ORDER — SIMETHICONE 80 MG
80 TABLET,CHEWABLE ORAL EVERY 6 HOURS PRN
Qty: 180 TABLET | Refills: 3 | Status: SHIPPED | OUTPATIENT
Start: 2023-02-04

## 2023-02-04 RX ORDER — MIRTAZAPINE 7.5 MG/1
7.5 TABLET, FILM COATED ORAL NIGHTLY
Qty: 30 TABLET | Refills: 3 | Status: SHIPPED | OUTPATIENT
Start: 2023-02-04

## 2023-02-04 RX ORDER — CALCITRIOL 0.25 UG/1
0.25 CAPSULE, LIQUID FILLED ORAL DAILY
Qty: 30 CAPSULE | Refills: 3 | Status: SHIPPED | OUTPATIENT
Start: 2023-02-05

## 2023-02-04 RX ORDER — PREGABALIN 25 MG/1
25 CAPSULE ORAL DAILY
Status: DISCONTINUED | OUTPATIENT
Start: 2023-02-05 | End: 2023-02-05 | Stop reason: HOSPADM

## 2023-02-04 RX ORDER — SODIUM BICARBONATE 650 MG/1
650 TABLET ORAL 2 TIMES DAILY
Qty: 60 TABLET | Refills: 1 | Status: SHIPPED | OUTPATIENT
Start: 2023-02-04

## 2023-02-04 RX ORDER — LINEZOLID 600 MG/1
600 TABLET, FILM COATED ORAL EVERY 12 HOURS SCHEDULED
Qty: 15 TABLET | Refills: 0 | Status: SHIPPED | OUTPATIENT
Start: 2023-02-04 | End: 2023-02-12

## 2023-02-04 RX ORDER — ASPIRIN 81 MG/1
81 TABLET, CHEWABLE ORAL DAILY
Qty: 30 TABLET | Refills: 3 | Status: SHIPPED | OUTPATIENT
Start: 2023-02-05

## 2023-02-04 RX ORDER — POLYETHYLENE GLYCOL 3350 17 G/17G
17 POWDER, FOR SOLUTION ORAL 2 TIMES DAILY
Qty: 527 G | Refills: 1 | Status: SHIPPED | OUTPATIENT
Start: 2023-02-04 | End: 2023-03-06

## 2023-02-04 RX ORDER — PREGABALIN 25 MG/1
25 CAPSULE ORAL DAILY
Qty: 30 CAPSULE | Refills: 0 | Status: SHIPPED | OUTPATIENT
Start: 2023-02-04 | End: 2023-03-06

## 2023-02-04 RX ORDER — PSEUDOEPHEDRINE HCL 30 MG
100 TABLET ORAL DAILY
Qty: 30 CAPSULE | Refills: 1 | Status: SHIPPED | OUTPATIENT
Start: 2023-02-05

## 2023-02-04 RX ADMIN — PREGABALIN 100 MG: 50 CAPSULE ORAL at 09:17

## 2023-02-04 RX ADMIN — HEPARIN SODIUM 5000 UNITS: 5000 INJECTION INTRAVENOUS; SUBCUTANEOUS at 14:30

## 2023-02-04 RX ADMIN — SODIUM BICARBONATE 650 MG: 650 TABLET ORAL at 19:46

## 2023-02-04 RX ADMIN — HEPARIN SODIUM 5000 UNITS: 5000 INJECTION INTRAVENOUS; SUBCUTANEOUS at 05:16

## 2023-02-04 RX ADMIN — LINEZOLID 600 MG: 600 TABLET, FILM COATED ORAL at 09:17

## 2023-02-04 RX ADMIN — IPRATROPIUM BROMIDE AND ALBUTEROL SULFATE 1 AMPULE: 2.5; .5 SOLUTION RESPIRATORY (INHALATION) at 13:56

## 2023-02-04 RX ADMIN — ALLOPURINOL 100 MG: 100 TABLET ORAL at 09:17

## 2023-02-04 RX ADMIN — METOPROLOL SUCCINATE 50 MG: 50 TABLET, FILM COATED, EXTENDED RELEASE ORAL at 09:17

## 2023-02-04 RX ADMIN — PANTOPRAZOLE SODIUM 40 MG: 40 TABLET, DELAYED RELEASE ORAL at 05:16

## 2023-02-04 RX ADMIN — ASPIRIN 81 MG: 81 TABLET, CHEWABLE ORAL at 09:16

## 2023-02-04 RX ADMIN — SIMETHICONE 80 MG: 80 TABLET, CHEWABLE ORAL at 14:30

## 2023-02-04 RX ADMIN — MIRTAZAPINE 7.5 MG: 7.5 TABLET ORAL at 19:46

## 2023-02-04 RX ADMIN — IPRATROPIUM BROMIDE AND ALBUTEROL SULFATE 1 AMPULE: 2.5; .5 SOLUTION RESPIRATORY (INHALATION) at 19:30

## 2023-02-04 RX ADMIN — CALCITRIOL CAPSULES 0.25 MCG 0.25 MCG: 0.25 CAPSULE ORAL at 09:17

## 2023-02-04 RX ADMIN — SODIUM BICARBONATE 650 MG: 650 TABLET ORAL at 09:17

## 2023-02-04 RX ADMIN — METOCLOPRAMIDE 5 MG: 5 TABLET ORAL at 19:46

## 2023-02-04 RX ADMIN — SIMETHICONE 80 MG: 80 TABLET, CHEWABLE ORAL at 09:16

## 2023-02-04 RX ADMIN — IPRATROPIUM BROMIDE AND ALBUTEROL SULFATE 1 AMPULE: 2.5; .5 SOLUTION RESPIRATORY (INHALATION) at 10:24

## 2023-02-04 RX ADMIN — QUETIAPINE FUMARATE 25 MG: 25 TABLET ORAL at 19:47

## 2023-02-04 RX ADMIN — LINEZOLID 600 MG: 600 TABLET, FILM COATED ORAL at 19:46

## 2023-02-04 RX ADMIN — TAMSULOSIN HYDROCHLORIDE 0.4 MG: 0.4 CAPSULE ORAL at 09:16

## 2023-02-04 RX ADMIN — SODIUM CHLORIDE, PRESERVATIVE FREE 10 ML: 5 INJECTION INTRAVENOUS at 09:17

## 2023-02-04 RX ADMIN — METOCLOPRAMIDE 5 MG: 5 TABLET ORAL at 11:10

## 2023-02-04 RX ADMIN — SIMETHICONE 80 MG: 80 TABLET, CHEWABLE ORAL at 19:46

## 2023-02-04 RX ADMIN — IPRATROPIUM BROMIDE AND ALBUTEROL SULFATE 1 AMPULE: 2.5; .5 SOLUTION RESPIRATORY (INHALATION) at 06:26

## 2023-02-04 RX ADMIN — METOCLOPRAMIDE 5 MG: 5 TABLET ORAL at 05:16

## 2023-02-04 RX ADMIN — ATORVASTATIN CALCIUM 80 MG: 80 TABLET, FILM COATED ORAL at 09:17

## 2023-02-04 NOTE — PROGRESS NOTES
Patient:   Ladonna Campbell  MR#:    [id number]   Room:    0333/333-02   YOB: 1940  Date of Progress Note: 2/4/2023  Time of Note                           9:14 AM  Consulting Physician:   Mortimer Spring, M.D. Attending Physician:  Catalina Posada MD     Chief complaint Visual change    S:This 79 y.o. male  with past medical history significant for hypertension, hyperlipidemia, and neuropathy is seen for evaluation of visual disturbance. The patient admitted on 1/23/2023 with abdominal pain. Work-up revealed a nonobstructive stone in the left renal pelvis and is under treatment for pyelonephritis. Neurology consultation is called for visual disturbance. The patient is not the best historian but indicates that about 3 weeks ago he noticed poor vision in his lower visual fields. CT of the head on admission with no clear acute changes noted. It is unclear if this is constant or intermittent. He denies diplopia, dysarthria or dysphagia. Feels well this morning. No new complaints.       REVIEW OF SYSTEMS:  Denies chest pain, shortness of breath or abdominal pain    Past Medical History:      Diagnosis Date    Benign non-nodular prostatic hyperplasia with lower urinary tract symptoms 8/16/2017    Essential hypertension 8/16/2017    Gastroesophageal reflux disease without esophagitis 8/16/2017    Gout 8/16/2017    Idiopathic peripheral neuropathy 2/19/2018    Mixed hyperlipidemia 8/16/2017    Peripheral neuropathic pain 8/16/2017    Primary osteoarthritis involving multiple joints 8/16/2017    Renal stone 1/23/2023    Restless legs syndrome 8/16/2017       Past Surgical History:      Procedure Laterality Date    INGUINAL HERNIA REPAIR Bilateral 1963    TURP  2003       Medications in Hospital:      Current Facility-Administered Medications:     sodium chloride flush 0.9 % injection 5-40 mL, 5-40 mL, IntraVENous, 2 times per day, Fernanda Cleveland DO, 10 mL at 02/03/23 0842    sodium chloride flush 0.9 % injection 5-40 mL, 5-40 mL, IntraVENous, PRN, Carson Akersir, DO    0.9 % sodium chloride infusion, , IntraVENous, PRN, Carson Sebago, DO    heparin (porcine) injection 5,000 Units, 5,000 Units, SubCUTAneous, 3 times per day, Carson Currie DO, 5,000 Units at 02/04/23 0516    metoclopramide (REGLAN) tablet 5 mg, 5 mg, Oral, 4x Daily AC & HS, Fransisco Kaur MD, 5 mg at 02/04/23 0516    QUEtiapine (SEROQUEL) tablet 25 mg, 25 mg, Oral, Nightly, Fransisco Kaur MD, 25 mg at 02/03/23 2038    linezolid (ZYVOX) tablet 600 mg, 600 mg, Oral, 2 times per day, Quentin Collado MD, 600 mg at 02/03/23 2038    ipratropium-albuterol (DUONEB) nebulizer solution 1 ampule, 1 ampule, Inhalation, Q4H WA, Fransisco Kaur MD, 1 ampule at 02/04/23 0972    aspirin chewable tablet 81 mg, 81 mg, Oral, Daily, Walter Moore MD, 81 mg at 02/03/23 0721    atorvastatin (LIPITOR) tablet 80 mg, 80 mg, Oral, Daily, Fransisco Kaur MD, 80 mg at 02/03/23 0721    simethicone (MYLICON) chewable tablet 80 mg, 80 mg, Oral, 4x Daily, Fransisco Kaur MD, 80 mg at 02/03/23 2038    [Held by provider] NIFEdipine (PROCARDIA XL) extended release tablet 60 mg, 60 mg, Oral, Daily, Fransisco Kaur MD, 60 mg at 01/31/23 0936    polyethylene glycol (GLYCOLAX) packet 17 g, 17 g, Oral, BID, Fransisco Kaur MD, 17 g at 02/02/23 2112    sodium bicarbonate tablet 650 mg, 650 mg, Oral, BID, Marilee Calixto MD, 650 mg at 02/03/23 2038    calcitRIOL (ROCALTROL) capsule 0.25 mcg, 0.25 mcg, Oral, Daily, Marilee Calixto MD, 0.25 mcg at 02/03/23 0720    mirtazapine (REMERON) tablet 7.5 mg, 7.5 mg, Oral, Nightly, Gudelia Orellana MD, 7.5 mg at 02/03/23 2100    tamsulosin (FLOMAX) capsule 0.4 mg, 0.4 mg, Oral, Daily, GOLD Nam CNP, 0.4 mg at 02/03/23 0721    ondansetron (ZOFRAN-ODT) disintegrating tablet 4 mg, 4 mg, Oral, Q8H PRN **OR** ondansetron (ZOFRAN) injection 4 mg, 4 mg, IntraVENous, Q6H PRN, GOLD Nam CNP, 4 mg at 01/28/23 0323    acetaminophen (TYLENOL) tablet 650 mg, 650 mg, Oral, Q6H PRN **OR** acetaminophen (TYLENOL) suppository 650 mg, 650 mg, Rectal, Q6H PRN, Sara Sellar, APRN - CNP    traMADol (ULTRAM) tablet 50 mg, 50 mg, Oral, Q6H PRN, Alejandra Bauer MD, 50 mg at 01/29/23 0216    allopurinol (ZYLOPRIM) tablet 100 mg, 100 mg, Oral, Daily, Sara Sellar, APRN - CNP, 100 mg at 02/03/23 7061    metoprolol succinate (TOPROL XL) extended release tablet 50 mg, 50 mg, Oral, Daily, Sara Sellar, APRN - CNP, 50 mg at 02/02/23 1500    pantoprazole (PROTONIX) tablet 40 mg, 40 mg, Oral, QAM AC, Sara Sellar, APRN - CNP, 40 mg at 02/04/23 0516    pregabalin (LYRICA) capsule 100 mg, 100 mg, Oral, BID, Sara Sellar, APRN - CNP, 100 mg at 02/03/23 2037    docusate sodium (COLACE) capsule 100 mg, 100 mg, Oral, Daily, Sara Sellar, APRN - CNP, 100 mg at 02/03/23 6032    Allergies:  Codeine    Social History:   TOBACCO:   reports that he quit smoking about 48 years ago. His smoking use included cigarettes. He has a 90.00 pack-year smoking history. He has never used smokeless tobacco.  ETOH:   reports no history of alcohol use. Family History:       Problem Relation Age of Onset    Uterine Cancer Mother     Coronary Art Dis Father         MI at 62    COPD Sister     Stroke Brother          PHYSICAL EXAM:  BP (!) 158/89   Pulse 96   Temp 97.5 °F (36.4 °C)   Resp 18   Ht 6' (1.829 m)   Wt 199 lb 4.8 oz (90.4 kg)   SpO2 95%   BMI 27.03 kg/m²     Constitutional - well developed, well nourished. Eyes - conjunctiva normal.   Ear, nose, throat - No scars, masses, or lesions over external nose or ears, no atrophy of tongue  Neck-symmetric, no masses noted, no jugular vein distension  Respiration- chest wall appears symmetric, good expansion,   normal effort without use of accessory muscles  Musculoskeletal - no significant wasting of muscles noted, no bony deformities  Extremities-no clubbing, cyanosis or edema  Skin - warm, dry, and intact.  No rash, erythema, or pallor. Psychiatric - mood, affect, and behavior appear normal.      Neurological exam  Awake, alert, fluent oriented x 2 slightly slowed  Attention and concentration appear appropriate  Recent and remote memory appears impaired  Speech normal without dysarthria  No clear issues with language of fund of knowledge     Cranial Nerve Exam     CN III, IV,VI-EOMI, No nystagmus, conjugate eye movements, no ptosis    CN VII-no facial assymetry       Motor Exam  antigravity throughout upper and lower extremities bilaterally      Tremors- no tremors in hands or head noted     Gait  Not tested     Nursing/pcp notes, imaging,labs and vitals reviewed. PT,OT and/or speech notes reviewed    Lab Results   Component Value Date    WBC 12.3 (H) 02/04/2023    HGB 12.4 (L) 02/04/2023    HCT 38.5 (L) 02/04/2023    MCV 84.1 02/04/2023     02/04/2023     Lab Results   Component Value Date     02/04/2023    K 3.7 02/04/2023     02/04/2023    CO2 28 02/04/2023    BUN 28 (H) 02/04/2023    CREATININE 2.5 (H) 02/04/2023    GLUCOSE 118 (H) 02/04/2023    CALCIUM 8.8 02/04/2023    PROT 5.1 (L) 02/04/2023    LABALBU 2.8 (L) 02/04/2023    BILITOT 0.3 02/04/2023    ALKPHOS 68 02/04/2023    AST 29 02/04/2023    ALT 25 02/04/2023    LABGLOM 25 (A) 02/04/2023    GFRAA >59 05/19/2022     Lab Results   Component Value Date    INR 1.10 05/19/2022    PROTIME 14.2 05/19/2022       MRI BRAIN WO CONTRAST [[id number]]    Resulted: 01/31/23 1245    Updated: 01/31/23 1307    Narrative:     Exam: MRI OF THE BRAIN WITHOUTINTRAVENOUS CONTRAST   Clinical data:Loss of lower field vision. Technique: Multiplanar multi-sequence MRI of the brain without intravenous gadolinium. Diffusion-weighted imaging is submitted. Prior studies: CT scan of brain dated 01/24/2023. Findings:  Patchy hyperintense T2 signal throughout the periventricular subcortical white matter, nonspecific but compatible with chronic microvascular ischemic changes. Abnormal signal in the left roc in the left superior cerebellum which is   hyperintense on the diffusion-weighted sequence and hypointense on the ADC map, potentially acute infarction in the left roc and the left superior cerebellum; however, artifact is not excluded, as the abnormality is not confirmed on the T2 weighted   sequence. Motion artifact throughout the exam limits evaluation. No evidence of acute intracranial hemorrhage. No obvious mass or mass effect. The ventricles and sulci are slightly prominent but are symmetric. No hydrocephalus or focal extra-axial fluid collection is detected. Left maxillary sinus inflammatory   changes; the paranasal sinuses and mastoid air cells are otherwise grossly clear. Extensive motion artifact exam limits evaluation. Impression:     1. The appearance of restricted diffusion in the left roc and the superior left cerebellum, compatible with acute infarction, although potentially artifactual, with extensive motion throughout the exam.   2. No evidence of acute intracranial hemorrhage. 3. Chronic microvascular ischemic changes with age-appropriate parenchymal volume loss is also present. 4. Left maxillary sinus inflammatory changes. Recommendation: Follow up is recommended; consider repeat MRI with sedation and including T2 coronal and diffusion coronal sequences.    Dictated and Electronically Signed by Jace Reyes MD at 31-Jan-2023 01:45:09 PM EST              Narrative & Impression    Transthoracic Echocardiography Report (TTE)      Demographics      Patient Name   [facility]  Date of Study            01/26/2023      MRN            [id number]           Gender                   Male      Date of Birth  1940       Room Number              [facility]-46      Age            79 year(s)      Height:        72 inches        Referring Physician      Genny Rachel      Weight:        195 pounds       Sonographer              Serena Encarnacion      BSA: 2.11 m^2         Interpreting Physician   Aron Thapa MD      BMI:           26.45 kg/m^2     Procedure     Type of Study      TTE procedure:ECHO 2D W/DOPPLER/COLOR/CONTRAST. Study Location: Echo Lab  Technical Quality: Limited visualization     Patient Status: Inpatient     Contrast Medium: Definity. HR: 62 bpm BP: 137/80 mmHg      Conclusions      Summary   Normal left ventricular size with low normal systolic function without   distinct segmental wall motion abnormality -estimated ejection fraction 50   to 55%   Mild concentric left ventricular hypertrophy with mild [grade 1] diastolic   dysfunction   Normal right-sided chambers with preserved right ventricular systolic   function   Normal left atrial size   IVC dimension falls within normal limits without ability to assess   inspiratory motion   Trace pulmonic valvular insufficiency   No tricuspid regurgitation seen with which to estimate RVSP   Thickened aortic valve which appears to be tricuspid with adequate cusp   separation, no stenosis, and trace insufficiency [cannot exclude   vegetation]   Normally mobile mitral valve with mild regurgitation   Aortic root measurements fall within normal limits with ascending aorta   measuring upper normal at 3.3 cm   No significant pericardial effusion   Definity contrast utilized to better define endocardial borders      Signature      ----------------------------------------------------------------   Electronically signed by Aron Thapa MD(Interpreting physician)   on 01/26/2023 09:04 AM   ----------------------------------------------------------------     MRI BRAIN WO CONTRAST [[id number]]    Resulted: 02/01/23 1812    Updated: 02/01/23 1214    Narrative:     CLINICAL HISTORY: Diffusion sequence only, compared to previous MRI   TECHNIQUE: Diffusion only MRI sequences of the brain are acquired. Comparison to brain MRI from 1/31/2023   FINDINGS:   No restriction diffusion identified. Within the limitations of the sequences   obtained, there does not appear to be mass-effect, midline shift, or   hydrocephalus. There is opacification of the left maxillary sinus as before. Impression: 1. No evidence of acute infarct.      RECORD REVIEW: Previous medical records, medications were reviewed at today's visit    IMPRESSION:   Visual impairment in the lower visual fields although currently exam shows no clear visual field deficit on bedside confrontation exam     Initial examination  Awake, alert, oriented x2, fluent  Mild ptosis bilaterally  Visual fields grossly intact to confrontation  Nonfocal       Initial MRI of the brain and orbits-possible infarct in left roc and left cerebellum-repeat MRI with diffusion sequence only -no evidence of acute infarct-appears initial MRI findings were artifactual  Carotid ultrasound less than 50% bilateral internal carotids  2D echo-left ventricular ejection fraction 50 to 55%/normal left atrium    Check myasthenia crisis panel for completeness    On aspirin/statin  Will need ZIO Holter monitor on DC x 2 weeks    Will need outpatient ophthalmology evaluation     Encephalopathy in the setting of urinary tract infection/renal dysfunction  Hyperlipidemia-on statin  GI-bowel regimen/PPI  DVT prophylaxis-Lovenox  Hypertension-on meds monitor  Neuropathy-Lyrica  Renal stone-Flomax/IV fluids  UTI-on antibiotics  Renal dysfunction-on IV fluids  Pain control-tramadol as needed  History of gout-tramadol        PT/OT     Continue supportive care     Okay to DC from neuro standpoint  Follow-up as needed    [address] [place]  Dr Ellis Kirby

## 2023-02-04 NOTE — PROGRESS NOTES
Nephrology ([facility]) Progress Note    Patient:  Genevieve Lesches  YOB: 1940  Date of Service: 2/4/2023  MRN: [id number]   Acct: [de-identified]   Primary Care Physician: Caden Rey MD  Advance Directive: Full Code  Admit Date: 1/23/2023       Hospital Day: 12  Referring Provider: Caleb Tapia MD    Patient independently seen and examined, Chart, Consults, Notes, Operative notes, Labs, Cardiology, and Radiology studies reviewed as available. Chief complaint: Abdominal labs. Subjective:  Genevieve Lesches is a 79 y.o. male for whom we were consulted for evaluation and treatment of acute kidney injury. Patient denies any history of chronic kidney disease. He was admitted with left flank pain radiating to the anterior abdominal wall. He has history of type 2 diabetes, hypertension, osteoarthritis and gout. CT scan of the abdomen in the emergency room consistent with nonobstructing left kidney stone associated with pyelonephritis. Hospital course remarkable for IV antibiotics and IV fluid administration and has worsening of renal function. Nephrology is consulted for evaluation of acute kidney injury. Patient also has staph epidermis bacteremia. His renal function continues to worsen despite IV fluid resuscitation. Finally patient and family agreed to proceed with dialysis. On February 2 he had a permacatheter placement. This morning patient is feeling much better. He is more alert and awake. He is scheduled to have third hemodialysis treatment today followed by discharge to nursing home to continue next treatment on Tuesday as an outpatient at [facility].     Allergies:  Codeine    Medicines:  Current Facility-Administered Medications   Medication Dose Route Frequency Provider Last Rate Last Admin    sodium chloride flush 0.9 % injection 5-40 mL  5-40 mL IntraVENous 2 times per day Rachel Hess, DO   10 mL at 02/04/23 0917    sodium chloride flush 0.9 % injection 5-40 mL  5-40 mL IntraVENous PRN Rachel Rhymgold, DO        0.9 % sodium chloride infusion   IntraVENous PRN Rachel Sparks DO        heparin (porcine) injection 5,000 Units  5,000 Units SubCUTAneous 3 times per day Rachel Sparks DO   5,000 Units at 02/04/23 0516    metoclopramide (REGLAN) tablet 5 mg  5 mg Oral 4x Daily AC & HS Riccardo Hendricks MD   5 mg at 02/04/23 0516    QUEtiapine (SEROQUEL) tablet 25 mg  25 mg Oral Nightly Riccardo Hendricks MD   25 mg at 02/03/23 2038    linezolid (ZYVOX) tablet 600 mg  600 mg Oral 2 times per day Lida Taylor MD   600 mg at 02/04/23 0917    ipratropium-albuterol (DUONEB) nebulizer solution 1 ampule  1 ampule Inhalation Q4H WA Riccardo Hendricks MD   1 ampule at 02/04/23 1024    aspirin chewable tablet 81 mg  81 mg Oral Daily Darlin Monson MD   81 mg at 02/04/23 0916    atorvastatin (LIPITOR) tablet 80 mg  80 mg Oral Daily Riccardo Hendricks MD   80 mg at 02/04/23 0917    simethicone (MYLICON) chewable tablet 80 mg  80 mg Oral 4x Daily Riccardo Hendricks MD   80 mg at 02/04/23 0916    [Held by provider] NIFEdipine (PROCARDIA XL) extended release tablet 60 mg  60 mg Oral Daily Riccardo Hendricks MD   60 mg at 01/31/23 0936    polyethylene glycol (GLYCOLAX) packet 17 g  17 g Oral BID Riccardo Hendricks MD   17 g at 02/02/23 2112    sodium bicarbonate tablet 650 mg  650 mg Oral BID Jenni Neal MD   650 mg at 02/04/23 8574    calcitRIOL (ROCALTROL) capsule 0.25 mcg  0.25 mcg Oral Daily Jenni Neal MD   0.25 mcg at 02/04/23 6380    mirtazapine (REMERON) tablet 7.5 mg  7.5 mg Oral Nightly Kaylee Cross MD   7.5 mg at 02/03/23 2100    tamsulosin (FLOMAX) capsule 0.4 mg  0.4 mg Oral Daily Jo Risk, APRN - CNP   0.4 mg at 02/04/23 0916    ondansetron (ZOFRAN-ODT) disintegrating tablet 4 mg  4 mg Oral Q8H PRN Jo Risk, APRN - CNP        Or    ondansetron [facility]) injection 4 mg  4 mg IntraVENous Q6H PRN Jo Risk, APRN - CNP   4 mg at 01/28/23 5144 acetaminophen (TYLENOL) tablet 650 mg  650 mg Oral Q6H PRN Cherise Hernandezs, APRN - CNP        Or    acetaminophen (TYLENOL) suppository 650 mg  650 mg Rectal Q6H PRN Cherise Asters, APRN - CNP        traMADol Josh Autumn) tablet 50 mg  50 mg Oral Q6H PRN Melita Arevalo MD   50 mg at 01/29/23 0216    allopurinol (ZYLOPRIM) tablet 100 mg  100 mg Oral Daily Cherise Asters, APRN - CNP   100 mg at 02/04/23 8556    metoprolol succinate (TOPROL XL) extended release tablet 50 mg  50 mg Oral Daily Cherise Asters, APRN - CNP   50 mg at 02/04/23 4472    pantoprazole (PROTONIX) tablet 40 mg  40 mg Oral QAM AC Cherise Asters, APRN - CNP   40 mg at 02/04/23 0516    pregabalin (LYRICA) capsule 100 mg  100 mg Oral BID Cherise Asters, APRN - CNP   100 mg at 02/04/23 6404    docusate sodium (COLACE) capsule 100 mg  100 mg Oral Daily Cherise Asters, APRN - CNP   100 mg at 02/03/23 8461       Past Medical History:  Past Medical History:   Diagnosis Date    Benign non-nodular prostatic hyperplasia with lower urinary tract symptoms 8/16/2017    Essential hypertension 8/16/2017    Gastroesophageal reflux disease without esophagitis 8/16/2017    Gout 8/16/2017    Idiopathic peripheral neuropathy 2/19/2018    Mixed hyperlipidemia 8/16/2017    Peripheral neuropathic pain 8/16/2017    Primary osteoarthritis involving multiple joints 8/16/2017    Renal stone 1/23/2023    Restless legs syndrome 8/16/2017       Past Surgical History:  Past Surgical History:   Procedure Laterality Date    INGUINAL HERNIA REPAIR Bilateral 1963    TURP  2003       Family History  Family History   Problem Relation Age of Onset    Uterine Cancer Mother     Coronary Art Dis Father         MI at 62    COPD Sister     Stroke Brother        Social History  Social History     Socioeconomic History    Marital status:       Spouse name: Christophe Hickey    Number of children: 2    Years of education: 12    Highest education level: Not on file   Occupational History Employer: RETIRED   Tobacco Use    Smoking status: Former     Packs/day: 3.00     Years: 30.00     Pack years: 90.00     Types: Cigarettes     Quit date: 1975     Years since quittin.1    Smokeless tobacco: Never   Substance and Sexual Activity    Alcohol use: No    Drug use: No    Sexual activity: Yes     Partners: Female     Comment: wife   Other Topics Concern    Not on file   Social History Narrative    Not on file     Social Determinants of Health     Financial Resource Strain: Low Risk     Difficulty of Paying Living Expenses: Not hard at all   Food Insecurity: No Food Insecurity    Worried About Running Out of Food in the Last Year: Never true    Ran Out of Food in the Last Year: Never true   Transportation Needs: Not on file   Physical Activity: Unknown    Days of Exercise per Week: 0 days    Minutes of Exercise per Session: Not on file   Stress: Not on file   Social Connections: Not on file   Intimate Partner Violence: Unknown    Fear of Current or Ex-Partner: Patient refused    Emotionally Abused: Patient refused    Physically Abused: Patient refused    Sexually Abused: Patient refused   Housing Stability: Not on file         Review of Systems:  History obtained from chart review and the patient  General ROS: Fatigue and lack of energy.   Respiratory ROS: No cough, shortness of breath, wheezing  Cardiovascular ROS: No chest pain or palpitations  Gastrointestinal ROS: No abdominal pain or melena  Genito-Urinary ROS: No dysuria or hematuria  Musculoskeletal ROS: No joint pain or swelling         Objective:  Patient Vitals for the past 24 hrs:   BP Temp Temp src Pulse Resp SpO2 Weight   23 0635 -- -- -- -- -- -- 199 lb 4.8 oz (90.4 kg)   23 0627 -- -- -- -- -- 95 % --   23 0550 (!) 158/89 97.5 °F (36.4 °C) -- 96 18 91 % --   23 0039 -- -- -- 95 -- -- --   23 0009 (!) 114/56 97.7 °F (36.5 °C) -- (!) 105 18 92 % --   23 2130 -- -- -- -- -- 94 % --   23 1813 -- -- -- 95 -- 94 % --   02/03/23 1452 -- -- -- 92 18 95 % --   02/03/23 1206 108/73 97.9 °F (36.6 °C) Axillary 98 16 94 % --       Intake/Output Summary (Last 24 hours) at 2/4/2023 1031  Last data filed at 2/4/2023 4496  Gross per 24 hour   Intake 1040 ml   Output 550 ml   Net 490 ml     General: awake/alert   HEENT: Normocephalic atraumatic head  Neck: Supple with no JVD or carotid bruits. Chest:  clear to auscultation bilaterally  CVS: regular rate and rhythm  Abdominal: soft, nontender, normal bowel sounds  Extremities: Bilateral 2+ edema. Skin: warm and dry without rash    Labs:  BMP:   Recent Labs     02/02/23 0124 02/03/23 0121 02/04/23 0225    141 142   K 3.7 3.2* 3.7    103 102   CO2 19* 28 28   BUN 56* 35* 28*   CREATININE 4.0* 2.9* 2.5*   CALCIUM 8.9 8.6* 8.8     CBC:   Recent Labs     02/02/23 0124 02/03/23 0121 02/04/23 0225   WBC 19.3* 11.9* 12.3*   HGB 13.4* 11.9* 12.4*   HCT 40.6* 35.6* 38.5*   MCV 83.0 82.4 84.1    264 227     LIVER PROFILE:   Recent Labs     02/02/23 0124 02/03/23 0121 02/04/23 0225   AST 24 23 29   ALT 20 18 25   BILITOT 0.3 <0.2 0.3   ALKPHOS 71 63 68     PT/INR: No results for input(s): PROTIME, INR in the last 72 hours. APTT: No results for input(s): APTT in the last 72 hours. BNP:  No results for input(s): BNP in the last 72 hours. Ionized Calcium:No results for input(s): IONCA in the last 72 hours. Magnesium:No results for input(s): MG in the last 72 hours. Phosphorus:No results for input(s): PHOS in the last 72 hours. HgbA1C:   No results for input(s): LABA1C in the last 72 hours. Hepatic:   Recent Labs     02/02/23  0124 02/03/23  0121 02/04/23  0225   ALKPHOS 71 63 68   ALT 20 18 25   AST 24 23 29   PROT 5.8* 5.2* 5.1*   BILITOT 0.3 <0.2 0.3   LABALBU 2.5* 2.5* 2.8*     Lactic Acid: No results for input(s): LACTA in the last 72 hours. Troponin: No results for input(s): CKTOTAL, CKMB, TROPONINT in the last 72 hours.   ABGs: No results found for: PHART, PO2ART, PLS4JAR  CRP:  No results for input(s): CRP in the last 72 hours. Sed Rate:  No results for input(s): SEDRATE in the last 72 hours. Culture Results:   Blood Culture Recent:   Recent Labs     01/25/23  0608   BC No growth after 5 days of incubation. Urine Culture Recent :   Recent Labs     01/23/23  1659   LABURIN >100,000 CFU/ml  Mixed skin carol present  *  Heavy growth       Radiology reports as per the Radiologist  Radiology: CT HEAD WO CONTRAST    Result Date: 1/24/2023  Patient: Lyndsey Addison  Time Out: 00:29Exam(s): CT HEAD Without Contrast EXAM:  CT Head Without Intravenous ContrastCLINICAL HISTORY:   Reason for exam: unwitnessed fall. TECHNIQUE:  Axial computed tomography images of the head/brain without intravenous contrast.COMPARISON:   Head CT 4/2/13. FINDINGS:  Brain: Mild atrophy and chronic white matter disease, has progressed, as expected. No edema or acute infarct. No acute hemorrhage. Ventricles:   No hydrocephalus or midline shift. Bones/joints:   No skull fracture. Soft tissues: No scalp hematoma. Sinuses: Moderate mucosal thickening left maxillary sinus, compatible with chronic sinusitis. Remaining sinuses are clear. No fluid level. Mastoid air cells:   No mastoid effusion. 1.  Mild age-related findings. 2. No acute infarct, bleed, or acute intracranial abnormality. Electronically signed by Evangelista Fortune M.D. on 01-25-23 at [address] [facility] RE[facility]TE    Result Date: 1/25/2023  RETROPERITONEAL ULTRASOUND: HISTORY: Worsening ALEXANDRO COMPARISON STUDY: CT of abdomen pelvis from 1/23/2023. PROCEDURE: Real-time ultrasound imaging was performed of the retroperitoneum. FINDINGS: The abdominal aorta and IVC were not visualized. The right kidney measures up to to 12.0 cm in length. The left kidney measures up to 12.3 cm in length.  There is a cyst seen at the right kidney measuring up to 3.1 x 2.6 x 3.4 cm and at the interpolar region measuring up to 2.9 cm which has a thin septation. There are additional smaller cysts present. There is a small cyst seen at the left kidney measuring up to 1.4 cm. Both kidneys demonstrate mildly increased corticomedullary differentiation with no evidence of hydronephrosis. The visualized portions of the urinary bladder appear unremarkable. No free intraperitoneal fluid is seen. There is no evidence of hydronephrosis. Both kidneys demonstrate increased cortical echogenicity, suggestive of chronic medical renal disease. CT ABDOMEN PELVIS W IV CONTRAST Additional Contrast? None    Result Date: 1/23/2023  NO PRIOR REPORT AVAILABLE Exam: CT OF THE ABDOMEN/PELVIS WITH IV CONTRAST Clinical data: Left LQ pain Technique:Direct contiguousaxial CT images were acquired through the abdomen and pelvis with intravenouscontrastusing soft tissue and bone algorithms. Oral contrast was not administered. Reformatted/MPR images were performed. Radiation dose: CTDIvol =10.77 mGy, DLP =645.28 mGy x cm. Limitations: Lack of oral contrast limits evaluation of the bowel loops. Prior Studies: No prior studies submitted. Findings: Lung bases: No acute pulmonary process. Mild cardiomegaly. d Liver:Unremarkable size andcontour. Low attenuated lesions within the liver measuring up to 2.1 cm consistent with simple cysts. Normal density. No evidence of mass. No evidence of dilated ducts. Gallbladder Fossa: Unremarkable Spleen: Grossly unremarkable. Pancreas/adrenal glands: Grossly unremarkable size, contour and density. Kidneys: In anatomic position. Grossly unremarkablerenal size, contour and density. Cystic lesions of the kidneys measuring up to 3.9 cm with simple appearance. Coarse calcification within the parenchyma posteriorly within the interpolar left kidney can be from prior inflammatory infectious or chronic scarring. 4-5 mm nonobstructing stone within the left renal pelvis. No evidence of a renal mass. Perinephric space is unremarkable.  Retroperitoneum: No enlarged retroperitoneal lymphadenopathy. The aorta and IVC appear unremarkable. Peritoneal cavity: No evidence of free air or ascites. Gastrointestinal tract: No obstruction. Stool filled colon. Portions of the small bowel demonstrate mucosal thickening. Appendix: Unremarkable Pelvis: Solid and hollow viscera grossly unremarkable. Osseous structures: No acute or destructive bony process identified. Degenerative changes of the spine. 1. Portions of the mid small bowel suggest mild mucosal thickening. Exact etiology is uncertain. Mild enteritis is not excluded. No fluid-filled bowel loops. 2. Stool filled colon. 3. Simple cystic lesions of the liver and kidneys. 4. Nonobstructing stone within the left renal pelvis. 5. Focal coarse calcification in the left renal parenchyma. This can be from prior inflammatory infectious process. Recommendation: Follow up as clinically indicated. All CT scans at this facility utilize dose modulation, iterative reconstruction, and/or weight based dosing when appropriate to reduce radiation dose to as low as reasonably achievable. Dictated and Electronically Signed by Eller Bamberger MD, [facility] CERTIFIED at [id number] 07:03:41 PM EST                Assessment   1. Acute kidney injury/worsening/dialysis dependent  2. Acute tubular necrosis. 3.  Staph epi bacteremia. 4.  Acute pyelonephritis due to staph epi. 5.  Left renal stone. 6.  Metabolic acidemia. 7.  Secondary hyperparathyroidism. Plan:  1. Routine hemodialysis treatment today. 2.  Ultrafiltration up to 3000 cc  3. Okay to discharge him to nursing home today postdialysis. 4.  Plan was discussed with the patient.     Maia Maldonado MD  02/04/23  10:31 AM

## 2023-02-04 NOTE — DISCHARGE SUMMARY
[place], [place], [address]    DEPARTMENT OF HOSPITALIST MEDICINE      DISCHARGE SUMMARY:      PATIENT NAME:  Della Madrigal  :  1940  MRN:  [id number]    Admission Date:   2023  3:25 PM Attending: Sanna Mendez MD   Discharge Date:   2023              PCP: Kim Cobian MD  Length of Stay: 12 days     Chief Complaint on Admission:   Chief Complaint   Patient presents with    Abdominal Pain    Flank Pain     Pt states that he began having flank pain and LQ abdominal pain at 1000 this morning. Pt states that he has not had an episode like this in the past. Pt states he has had nausea, and has had consistent Bms. Pt denies diarrhea. ABCs are intact. Skin wpd. A&ox4. Consultants:     IP CONSULT TO NEPHROLOGY  IP CONSULT TO INFECTIOUS DISEASES  IP CONSULT TO NEUROLOGY  IP CONSULT TO IV TEAM  IP CONSULT TO VASCULAR SURGERY       CUMULATIVE  HOSPITAL  COURSE  AND  TREATMENT:  80 yo M with HTN, GERD, HLD, BPH s/p TURP who presented to [address] [place] [address] ED with abdominal pain. CT abdomen in ED showed stool-filled colon, nonobstructing stone within L renal pelvis. Admitted to hospitalist service for UTI/Pyelonephritis. Nephrology consulted due to ALEXANDRO. Blood cultures returned 2/ + for gram + cocci, PCR showing Staph Epi. ID consulted. Vancomycin transitioned to PO Linezolid which he will continue through 2/10/23. Nephrology was consulted for ALEXANDRO. ALEXANDRO initially improved w/ IVF and grove catheter placement. Pt's L flank pain improved after grove was placed. Underwent unsuccessful voiding trial on , grove was replaced. Renal function then progressively worsened and pt eventually was initiated on hemodialysis. He had altered mentation and lower visual field defect. Neurology was consulted and ordered MRI brain which showed possible L pontine and cerebellar infarct vs motion artifact. MRI was reported 2 days later and negative for acute infarction.  Symptoms were believed to be related to uremia as they improved following initiation of hemodialysis. Lyrica dosage was decreased to 25 mg daily due to renal function. Outpatient HD was arranged and pt was discharged to SNF in stable condition. Discharge Problem List:   Principal Problem:    Pyelonephritis of left kidney  Active Problems:    Renal stone    Visual disturbance    ALEXANDRO (acute kidney injury) ([address]    Pyelonephritis    Severe malnutrition (HCC)    Mixed hyperlipidemia    Essential hypertension    Restless legs syndrome    Gastroesophageal reflux disease without esophagitis    Peripheral neuropathic pain    Gout    Benign non-nodular prostatic hyperplasia with lower urinary tract symptoms    Elevated PSA    Idiopathic peripheral neuropathy    Chronic idiopathic constipation  Resolved Problems:    * No resolved hospital problems. *         Last dated Assessment and Plan . .. 2/3/23        OBJECTIVE:  BP (!) 158/89   Pulse 96   Temp 97.5 °F (36.4 °C)   Resp 18   Ht 6' (1.829 m)   Wt 199 lb 4.8 oz (90.4 kg)   SpO2 95%   BMI 27.03 kg/m²       Heart: RRR   Lungs: Bilateral fair air entry   Abdomen: Soft, non-tender   Extremities: No edema   Neurologic: Alert and oriented   Skin: Warm and dry          Laboratory Data:  Recent Labs     02/02/23 0124 02/03/23 0121 02/04/23 0225   WBC 19.3* 11.9* 12.3*   HGB 13.4* 11.9* 12.4*    264 227     Recent Labs     02/02/23 0124 02/03/23 0121 02/04/23 0225    141 142   K 3.7 3.2* 3.7    103 102   CO2 19* 28 28   BUN 56* 35* 28*   CREATININE 4.0* 2.9* 2.5*   GLUCOSE 148* 131* 118*     Recent Labs     02/02/23 0124 02/03/23 0121 02/04/23 0225   AST 24 23 29   ALT 20 18 25   BILITOT 0.3 <0.2 0.3   ALKPHOS 71 63 68     Troponin T: No results for input(s): TROPONINI in the last 72 hours. Pro-BNP: No results for input(s): BNP in the last 72 hours. INR: No results for input(s): INR in the last 72 hours.   UA:No results for input(s): NITRITE, COLORU, PHUR, LABCAST, [facility], [address] [place] [address], MUCUS, TRICHOMONAS, YEAST, BACTERIA, CLARITYU, SPECGRAV, LEUKOCYTESUR, UROBILINOGEN, BILIRUBINUR, BLOODU, GLUCOSEU, AMORPHOUS in the last 72 hours. Invalid input(s): Seretha Doyne  A1C: No results for input(s): LABA1C in the last 72 hours. ABG:No results for input(s): PHART, IMA8PMU, PO2ART, OCV8XDH, BEART, HGBAE, E3TDLISI, CARBOXHGBART in the last 72 hours. Impressions of imaging performed in 48 hours before discharge:    XR ABDOMEN (KUB) (SINGLE AP VIEW)    Result Date: 1/31/2023  NO PRIOR REPORT AVAILABLE Exam:X-RAYS OF THE ABDOMEN, KUB Clinical data:Constipation. Technique: Three views of the abdomen were submitted. Prior studies: Renal ultrasound dated 01/25/2023. CT scan of the abdomen and pelvis dated 01/23/2023. Findings: There is a nonspecific nonobstructed bowel gas pattern with no significant amount of fecal residue in colon. No abnormal calcifications or organomegaly are present. The soft tissues and bony structures are unremarkable. Nonspecific nonobstructive bowel gas pattern with no significant amount of fecal residue in colon. Recommendation: Follow up as clinically indicated. Dictated and Electronically Signed by Sunni Aviles MD at 31-Jan-2023 09:39:06 AM EST             VL DUP CAROTID BILATERAL    Result Date: 1/30/2023  Vascular Carotid Procedure  Demographics   Patient Name     Matias Oppenheim Age                   79   Patient Number   [id number]          Gender                Male   Visit Number     [id number]       Interpreting          Sanchez Witt MD                                   Physician   Date of Birth    1940      Referring Physician   Tracee Elena   Accession Number [id number]      [address] [place] [address] [facility]  Procedure Type of Study:   Cerebral:Carotid, VL CAROTID BILATERAL. Indications for Study:Vision loss. Risk Factors   - The patient's risk factor(s) include: dyslipidemia and arterial     hypertension.   - The patient has a former tobacco history. Impression   There is mixed plaque visualized in the bilateral internal carotid  arteries. There is less than 50% stenosis in the right internal carotid artery. There is less than 50% stenosis of the left internal carotid artery. There is normal antegrade flow in the bilateral vertebral arteries. Signature   ----------------------------------------------------------------  Electronically signed by Harry Scott MD(Interpreting  physician) on 01/30/2023 06:37 PM  ----------------------------------------------------------------  Blood Pressure:Right arm 95/73 mmHg. Velocities are measured in cm/s ; Diameters are measured in mm Carotid Right Measurements +------------+-------+-------+--------+-------+------------+---------------+ ! Location    ! PSV    ! EDV    ! Angle   ! RI     !%Stenosis   ! Tortuosity     ! +------------+-------+-------+--------+-------+------------+---------------+ ! Prox CCA    !120    !       !60      !       !            !               ! +------------+-------+-------+--------+-------+------------+---------------+ ! Mid CCA     !119    !18.7   ! 60      !0.84   !            !               ! +------------+-------+-------+--------+-------+------------+---------------+ ! Prox ICA    !92     !15.5   !60      !0.83   !            !               ! +------------+-------+-------+--------+-------+------------+---------------+ ! Mid ICA     !94.1   !21.2   ! 60      !0.77   !            !               ! +------------+-------+-------+--------+-------+------------+---------------+ ! Dist ICA    ! 65.9   !15.2   !0       !0.77   !            !               ! +------------+-------+-------+--------+-------+------------+---------------+ ! Prox ECA    !176    !       !61      !       !            !               ! +------------+-------+-------+--------+-------+------------+---------------+ ! Vertebral   !56.3   !15.6   !60      !0.72   !            !               ! +------------+-------+-------+--------+-------+------------+---------------+   - There is antegrade vertebral flow noted on the right side. - Additional Measurements:ICAPSV/CCAPSV 0.78. Carotid Left Measurements +------------+-------+-------+--------+-------+------------+---------------+ ! Location    ! PSV    ! EDV    ! Angle   ! RI     !%Stenosis   ! Tortuosity     ! +------------+-------+-------+--------+-------+------------+---------------+ ! Prox CCA    !118    !26.9   !60      !0.77   !            !               ! +------------+-------+-------+--------+-------+------------+---------------+ ! Mid CCA     !119    !12.1   ! 60      !0.9    ! !               ! +------------+-------+-------+--------+-------+------------+---------------+ ! Prox ICA    !91     !21.7   !60      !0.76   !            !               ! +------------+-------+-------+--------+-------+------------+---------------+ ! Mid ICA     ! 64.7   !18.3   !52      !0.72   !            !               ! +------------+-------+-------+--------+-------+------------+---------------+ ! Dist ICA    !65.5   !18.6   !48      !0.72   !            !               ! +------------+-------+-------+--------+-------+------------+---------------+ ! Prox ECA    ! 156    !       !61      !       !            !               ! +------------+-------+-------+--------+-------+------------+---------------+ ! Vertebral   !64.6   !16.8   !60      !0.74   !            !               ! +------------+-------+-------+--------+-------+------------+---------------+   - There is antegrade vertebral flow noted on the left side. - Additional Measurements:ICAPSV/CCAPSV 0.77. ICAEDV/CCAEDV 0.81. MRI BRAIN WO CONTRAST    Result Date: 2/1/2023  CLINICAL HISTORY: Diffusion sequence only, compared to previous MRI TECHNIQUE: Diffusion only MRI sequences of the brain are acquired. Comparison to brain MRI from 1/31/2023 FINDINGS: No restriction diffusion identified. Within the limitations of the sequences obtained, there does not appear to be mass-effect, midline shift, or hydrocephalus. There is opacification of the left maxillary sinus as before. 1.No evidence of acute infarct. MRI BRAIN WO CONTRAST    Result Date: 1/31/2023  Exam: MRI OF THE BRAIN WITHOUTINTRAVENOUS CONTRAST Clinical data:Loss of lower field vision. Technique: Multiplanar multi-sequence MRI of the brain without intravenous gadolinium. Diffusion-weighted imaging is submitted. Prior studies: CT scan of brain dated 01/24/2023. Findings:  Patchy hyperintense T2 signal throughout the periventricular subcortical white matter, nonspecific but compatible with chronic microvascular ischemic changes. Abnormal signal in the left roc in the left superior cerebellum which is hyperintense on the diffusion-weighted sequence and hypointense on the ADC map, potentially acute infarction in the left roc and the left superior cerebellum; however, artifact is not excluded, as the abnormality is not confirmed on the T2 weighted sequence. Motion artifact throughout the exam limits evaluation. No evidence of acute intracranial hemorrhage. No obvious mass or mass effect. The ventricles and sulci are slightly prominent but are symmetric. No hydrocephalus or focal extra-axial fluid collection is detected. Left maxillary sinus inflammatory changes; the paranasal sinuses and mastoid air cells are otherwise grossly clear. Extensive motion artifact exam limits evaluation. 1. The appearance of restricted diffusion in the left roc and the superior left cerebellum, compatible with acute infarction, although potentially artifactual, with extensive motion throughout the exam. 2. No evidence of acute intracranial hemorrhage. 3. Chronic microvascular ischemic changes with age-appropriate parenchymal volume loss is also present. 4. Left maxillary sinus inflammatory changes.  Recommendation: Follow up is recommended; consider repeat MRI with sedation and including T2 coronal and diffusion coronal sequences. Dictated and Electronically Signed by Aren Colon MD at 31-Jan-2023 01:45:09 PM EST                   Medication List        START taking these medications      aspirin 81 MG chewable tablet  Take 1 tablet by mouth daily  Start taking on: February 5, 2023     calcitRIOL 0.25 MCG capsule  Commonly known as: ROCALTROL  Take 1 capsule by mouth daily  Start taking on: February 5, 2023     docusate 100 MG Caps  Commonly known as: COLACE, DULCOLAX  Take 100 mg by mouth daily  Start taking on: February 5, 2023     linezolid 600 MG tablet  Commonly known as: ZYVOX  Take 1 tablet by mouth every 12 hours for 15 doses     mirtazapine 7.5 MG tablet  Commonly known as: REMERON  Take 1 tablet by mouth nightly     polyethylene glycol 17 g packet  Commonly known as: GLYCOLAX  Take 17 g by mouth 2 times daily     QUEtiapine 25 MG tablet  Commonly known as: SEROQUEL  Take 1 tablet by mouth nightly     simethicone 80 MG chewable tablet  Commonly known as: MYLICON  Take 1 tablet by mouth every 6 hours as needed for Flatulence     sodium bicarbonate 650 MG tablet  Take 1 tablet by mouth 2 times daily            CHANGE how you take these medications      pregabalin 25 MG capsule  Commonly known as: Lyrica  Take 1 capsule by mouth daily for 30 days. Max Daily Amount: 25 mg  What changed:   medication strength  how much to take  when to take this  Another medication with the same name was removed. Continue taking this medication, and follow the directions you see here.             CONTINUE taking these medications      allopurinol 100 MG tablet  Commonly known as: ZYLOPRIM  Take 1 tablet by mouth daily     atorvastatin 20 MG tablet  Commonly known as: LIPITOR  Take 1 tablet by mouth daily     metoprolol succinate 50 MG extended release tablet  Commonly known as: TOPROL XL  Take 1 tablet by mouth once daily     omeprazole 20 MG delayed release capsule  Commonly known as: PRILOSEC  Take 1 capsule by mouth daily     tamsulosin 0.4 MG capsule  Commonly known as: FLOMAX  Take 1 capsule by mouth daily     vitamin D 1.25 MG (97308 UT) Caps capsule  Commonly known as: ERGOCALCIFEROL  Take 1 capsule by mouth once a week            STOP taking these medications      etodolac 400 MG tablet  Commonly known as: LODINE               Where to Get Your Medications        These medications were sent to [facility] [place] - [address] -  [phone] - F [phone]  [address] [place] [address] [address], [place] [address]      Phone: [phone]   aspirin 81 MG chewable tablet  calcitRIOL 0.25 MCG capsule  docusate 100 MG Caps  linezolid 600 MG tablet  mirtazapine 7.5 MG tablet  polyethylene glycol 17 g packet  pregabalin 25 MG capsule  QUEtiapine 25 MG tablet  simethicone 80 MG chewable tablet  sodium bicarbonate 650 MG tablet           ISSUES TO BE ADDRESSED AT HOSPITAL FOLLOW-UP VISIT:    Follow up with outpatient HD as scheduled.     Advised patient to follow-up closely with PCP upon discharge for management of chronic medical issues    Lyrica dose decreased to 25 mg daily due to his new hemodialysis requirement. He also had AMS while taking the higher dosage.     Condition on Discharge: stable  Discharge Disposition: Skilled Facility    Recommended Follow Up:  Christine Gaona PA-C  [address] [facility] [address]  [facility] [address]  [phone]    Follow up on 2/27/2023  at 930 am - discuss vein mapping for hemodialysis    Followup Appointments Scheduled at Time of Discharge:  Future Appointments   Date Time Provider Department Center   2/27/2023  9:30 AM [facility]   4/14/2023 10:45 AM MD JULIANA Cuello [facility]        Discharge Instructions:   Please see the discharge paperwork.    Patient was seen at bedside today, and the examination shows improvement since yesterday.    Diet Instructions:  ADULT DIET; Regular  ADULT ORAL NUTRITION SUPPLEMENT;  Breakfast, Lunch, Dinner; Low Calorie/High Protein Oral Supplement     Detailed discharge directions delivered to the patient by myself and our nursing staff, who verbalizes understanding and is satisfied with the plan. Patient has been advised to continue all medications as prescribed and advised, and f/u with PCP within 1 week. Patient is stable from medical standpoint to be discharged. Total time spent during patient evaluation and assessment, discussion with the nurse/family, addressing discharge medications/scripts and coordination of care for safe discharge was in excess of 35 minutes.       Signed Electronically:    Shazia Marie MD  11:35 AM 2/4/2023

## 2023-02-04 NOTE — PLAN OF CARE
Problem: Discharge Planning  Goal: Discharge to home or other facility with appropriate resources  2/4/2023 1225 by Margarita Frey RN  Outcome: Completed  2/4/2023 1220 by Margarita Frey RN  Outcome: Progressing     Problem: Pain  Goal: Verbalizes/displays adequate comfort level or baseline comfort level  2/4/2023 1225 by Margarita Frey RN  Outcome: Completed  2/4/2023 1220 by Margarita Frey RN  Outcome: Progressing     Problem: Safety - Adult  Goal: Free from fall injury  2/4/2023 1225 by Margarita Frey RN  Outcome: Completed  2/4/2023 1220 by Margarita Frey RN  Outcome: Progressing     Problem: Skin/Tissue Integrity  Goal: Absence of new skin breakdown  Description: 1. Monitor for areas of redness and/or skin breakdown  2. Assess vascular access sites hourly  3. Every 4-6 hours minimum:  Change oxygen saturation probe site  4. Every 4-6 hours:  If on nasal continuous positive airway pressure, respiratory therapy assess nares and determine need for appliance change or resting period.   2/4/2023 1225 by Margarita Frey RN  Outcome: Completed  2/4/2023 1220 by Margarita Frey RN  Outcome: Progressing     Problem: Nutrition Deficit:  Goal: Optimize nutritional status  2/4/2023 1225 by Margarita Frey RN  Outcome: Completed  2/4/2023 1220 by Maragrita Frey RN  Outcome: Progressing

## 2023-02-05 LAB
ANAEROBIC CULTURE: NORMAL
CULTURE CATHETER TIP: NORMAL

## 2023-02-06 ENCOUNTER — TELEPHONE (OUTPATIENT)
Dept: INTERNAL MEDICINE | Age: 83
End: 2023-02-06

## 2023-02-06 NOTE — TELEPHONE ENCOUNTER
SKILLED NURSING FACILITY:   INITIAL CALL POST-HOSPITAL DISCHARGE    SNF: [facility]    PHONE NUMBER: [phone]     / :    THERAPY:    ANTICIPATED LENGTH OF STAY: unknown

## 2023-02-07 ENCOUNTER — TELEPHONE (OUTPATIENT)
Dept: INTERNAL MEDICINE | Age: 83
End: 2023-02-07

## 2023-02-07 NOTE — PROGRESS NOTES
Physician Progress Note      PATIENT:               Maegan Sage  CSN #:                  [id number]  :                       1940  ADMIT DATE:       2023 3:25 PM  [address] [place] DATE:        2023 8:10 PM  RESPONDING  PROVIDER #:        Kaylee Cross MD          QUERY TEXT:    Pt admitted with acute pyelonephritis. Pt noted to have sepsis indicators. If   possible, please document in the progress notes and discharge summary if you   are evaluating and /or treating any of the following: The medical record reflects the following:  Risk Factors: Acute pyelonephritis  Clinical Indicators: Urine culture and blood culture positive for   staphylococcus epidermis. WBC 13.6 13.1. On  HR 96 96. On  temp 99.9   . On 2/3 Temp 96.7 HR 97. On 2/3 & ,   95 96 100 101. Treatment: Rocephin 1 g IV q 24 hr., Vanco with pharmacy placeholder for   intermittent dosing. Zyvox 600 mg q 12 hr. Blood Cultures, UA and URC. Options provided:  -- Staph sepsis, present on admission  -- Staph sepsis, present on admission, now resolved  -- Staph sepsis, developed following admission  -- Sepsis was ruled out  -- Other - I will add my own diagnosis  -- Disagree - Not applicable / Not valid  -- Disagree - Clinically unable to determine / Unknown  -- Refer to Clinical Documentation Reviewer    PROVIDER RESPONSE TEXT:    This patient has staph sepsis which was present on admission.     Query created by: Desi Inman on 2023 12:26 PM      Electronically signed by:  Kaylee Cross MD 2023 12:45 PM

## 2023-02-07 NOTE — TELEPHONE ENCOUNTER
[place], [phone]    Per Estrella Moser, nursing staff at [facility], patient is doing well. Patient continues to participate in therapy. No known discharge plans at this time.

## 2023-02-10 LAB
ACETYLCHOLINE BINDING ANTIBODY: 0 NMOL/L (ref 0–0.4)
ACETYLCHOLINE BLOCKING AB: 3 % (ref 0–26)

## 2023-02-12 ENCOUNTER — HOSPITAL ENCOUNTER (INPATIENT)
Age: 83
LOS: 7 days | Discharge: NURSING, REHAB OR LONG-TERM CARE | DRG: 659 | End: 2023-02-20
Attending: HOSPITALIST | Admitting: HOSPITALIST
Payer: MEDICARE

## 2023-02-12 ENCOUNTER — APPOINTMENT (OUTPATIENT)
Dept: CT IMAGING | Age: 83
DRG: 659 | End: 2023-02-12
Payer: MEDICARE

## 2023-02-12 DIAGNOSIS — N20.1 URETERIC STONE: ICD-10-CM

## 2023-02-12 DIAGNOSIS — R34 DECREASED URINE OUTPUT: ICD-10-CM

## 2023-02-12 DIAGNOSIS — N18.6 STAGE 5 CHRONIC KIDNEY DISEASE ON CHRONIC DIALYSIS (HCC): ICD-10-CM

## 2023-02-12 DIAGNOSIS — G60.9 IDIOPATHIC PERIPHERAL NEUROPATHY: ICD-10-CM

## 2023-02-12 DIAGNOSIS — Z99.2 STAGE 5 CHRONIC KIDNEY DISEASE ON CHRONIC DIALYSIS (HCC): ICD-10-CM

## 2023-02-12 DIAGNOSIS — N20.1 LEFT URETERAL CALCULUS: ICD-10-CM

## 2023-02-12 DIAGNOSIS — K86.89 PANCREATIC MASS: ICD-10-CM

## 2023-02-12 DIAGNOSIS — R10.84 GENERALIZED ABDOMINAL PAIN: Primary | ICD-10-CM

## 2023-02-12 LAB
ALBUMIN SERPL-MCNC: 3 G/DL (ref 3.5–5.2)
ALP BLD-CCNC: 73 U/L (ref 40–130)
ALT SERPL-CCNC: 44 U/L (ref 5–41)
AMORPHOUS: ABNORMAL /HPF
ANION GAP SERPL CALCULATED.3IONS-SCNC: 11 MMOL/L (ref 7–19)
AST SERPL-CCNC: 44 U/L (ref 5–40)
BACTERIA: ABNORMAL /HPF
BASOPHILS ABSOLUTE: 0 K/UL (ref 0–0.2)
BASOPHILS RELATIVE PERCENT: 0.3 % (ref 0–1)
BILIRUB SERPL-MCNC: 0.7 MG/DL (ref 0.2–1.2)
BILIRUBIN URINE: NEGATIVE
BLOOD, URINE: ABNORMAL
BUN BLDV-MCNC: 38 MG/DL (ref 8–23)
CALCIUM SERPL-MCNC: 8.7 MG/DL (ref 8.8–10.2)
CHLORIDE BLD-SCNC: 93 MMOL/L (ref 98–111)
CLARITY: ABNORMAL
CO2: 31 MMOL/L (ref 22–29)
COARSE CASTS, UA: ABNORMAL /LPF (ref 0–5)
COLOR: YELLOW
CREAT SERPL-MCNC: 3.4 MG/DL (ref 0.5–1.2)
CRYSTALS, UA: ABNORMAL /HPF
EOSINOPHILS ABSOLUTE: 0.1 K/UL (ref 0–0.6)
EOSINOPHILS RELATIVE PERCENT: 0.8 % (ref 0–5)
EPITHELIAL CELLS, UA: 1 /HPF (ref 0–5)
EPITHELIAL CELLS, UA: ABNORMAL /HPF
GFR SERPL CREATININE-BSD FRML MDRD: 17 ML/MIN/{1.73_M2}
GLUCOSE BLD-MCNC: 100 MG/DL (ref 74–109)
GLUCOSE URINE: NEGATIVE MG/DL
HCT VFR BLD CALC: 36.9 % (ref 42–52)
HEMOGLOBIN: 11.8 G/DL (ref 14–18)
HYALINE CASTS: 8 /HPF (ref 0–8)
HYALINE CASTS: ABNORMAL /LPF (ref 0–5)
IMMATURE GRANULOCYTES #: 0 K/UL
KETONES, URINE: ABNORMAL MG/DL
LEUKOCYTE ESTERASE, URINE: ABNORMAL
LIPASE: 94 U/L (ref 13–60)
LYMPHOCYTES ABSOLUTE: 1 K/UL (ref 1.1–4.5)
LYMPHOCYTES RELATIVE PERCENT: 14 % (ref 20–40)
MCH RBC QN AUTO: 27.1 PG (ref 27–31)
MCHC RBC AUTO-ENTMCNC: 32 G/DL (ref 33–37)
MCV RBC AUTO: 84.6 FL (ref 80–94)
MONOCYTES ABSOLUTE: 0.6 K/UL (ref 0–0.9)
MONOCYTES RELATIVE PERCENT: 8.6 % (ref 0–10)
MUCUS: ABNORMAL /LPF
NEUTROPHILS ABSOLUTE: 5.6 K/UL (ref 1.5–7.5)
NEUTROPHILS RELATIVE PERCENT: 75.9 % (ref 50–65)
NITRITE, URINE: NEGATIVE
PDW BLD-RTO: 13.7 % (ref 11.5–14.5)
PH UA: 5 (ref 5–8)
PLATELET # BLD: 110 K/UL (ref 130–400)
PMV BLD AUTO: 10.7 FL (ref 9.4–12.4)
POTASSIUM SERPL-SCNC: 3.4 MMOL/L (ref 3.5–5)
PROTEIN UA: 100 MG/DL
RBC # BLD: 4.36 M/UL (ref 4.7–6.1)
RBC UA: ABNORMAL /HPF (ref 0–2)
SODIUM BLD-SCNC: 135 MMOL/L (ref 136–145)
SPECIFIC GRAVITY UA: 1.02 (ref 1–1.03)
TOTAL PROTEIN: 5.8 G/DL (ref 6.6–8.7)
UROBILINOGEN, URINE: 0.2 E.U./DL
WBC # BLD: 7.4 K/UL (ref 4.8–10.8)
WBC UA: 26 /HPF (ref 0–5)
WBC UA: ABNORMAL /HPF (ref 0–5)
YEAST: PRESENT /HPF

## 2023-02-12 PROCEDURE — 36415 COLL VENOUS BLD VENIPUNCTURE: CPT

## 2023-02-12 PROCEDURE — 85025 COMPLETE CBC W/AUTO DIFF WBC: CPT

## 2023-02-12 PROCEDURE — 74176 CT ABD & PELVIS W/O CONTRAST: CPT

## 2023-02-12 PROCEDURE — 51798 US URINE CAPACITY MEASURE: CPT

## 2023-02-12 PROCEDURE — 99285 EMERGENCY DEPT VISIT HI MDM: CPT

## 2023-02-12 PROCEDURE — 80053 COMPREHEN METABOLIC PANEL: CPT

## 2023-02-12 PROCEDURE — 83690 ASSAY OF LIPASE: CPT

## 2023-02-12 ASSESSMENT — PAIN - FUNCTIONAL ASSESSMENT: PAIN_FUNCTIONAL_ASSESSMENT: NONE - DENIES PAIN

## 2023-02-13 PROBLEM — N13.5 OBSTRUCTION OF RIGHT URETER: Status: ACTIVE | Noted: 2023-02-13

## 2023-02-13 PROBLEM — N18.6 ESRD ON DIALYSIS (HCC): Status: ACTIVE | Noted: 2023-02-01

## 2023-02-13 PROBLEM — R33.9 URINARY RETENTION: Status: ACTIVE | Noted: 2023-02-13

## 2023-02-13 PROBLEM — N39.0 COMPLICATED UTI (URINARY TRACT INFECTION): Status: ACTIVE | Noted: 2023-02-13

## 2023-02-13 PROBLEM — Z99.2 ESRD ON DIALYSIS (HCC): Status: ACTIVE | Noted: 2023-02-01

## 2023-02-13 LAB
ANION GAP SERPL CALCULATED.3IONS-SCNC: 8 MMOL/L (ref 7–19)
BUN BLDV-MCNC: 40 MG/DL (ref 8–23)
CALCIUM SERPL-MCNC: 8.3 MG/DL (ref 8.8–10.2)
CHLORIDE BLD-SCNC: 94 MMOL/L (ref 98–111)
CO2: 29 MMOL/L (ref 22–29)
CREAT SERPL-MCNC: 3.5 MG/DL (ref 0.5–1.2)
GFR SERPL CREATININE-BSD FRML MDRD: 17 ML/MIN/{1.73_M2}
GLUCOSE BLD-MCNC: 95 MG/DL (ref 74–109)
POTASSIUM REFLEX MAGNESIUM: 3.6 MMOL/L (ref 3.5–5)
SARS-COV-2, NAAT: NOT DETECTED
SODIUM BLD-SCNC: 131 MMOL/L (ref 136–145)

## 2023-02-13 PROCEDURE — 87086 URINE CULTURE/COLONY COUNT: CPT

## 2023-02-13 PROCEDURE — 87106 FUNGI IDENTIFICATION YEAST: CPT

## 2023-02-13 PROCEDURE — 87186 SC STD MICRODIL/AGAR DIL: CPT

## 2023-02-13 PROCEDURE — 6360000002 HC RX W HCPCS: Performed by: STUDENT IN AN ORGANIZED HEALTH CARE EDUCATION/TRAINING PROGRAM

## 2023-02-13 PROCEDURE — 1210000000 HC MED SURG R&B

## 2023-02-13 PROCEDURE — 6370000000 HC RX 637 (ALT 250 FOR IP): Performed by: HOSPITALIST

## 2023-02-13 PROCEDURE — 5A1D70Z PERFORMANCE OF URINARY FILTRATION, INTERMITTENT, LESS THAN 6 HOURS PER DAY: ICD-10-PCS | Performed by: INTERNAL MEDICINE

## 2023-02-13 PROCEDURE — 36415 COLL VENOUS BLD VENIPUNCTURE: CPT

## 2023-02-13 PROCEDURE — 6370000000 HC RX 637 (ALT 250 FOR IP): Performed by: NURSE PRACTITIONER

## 2023-02-13 PROCEDURE — 2580000003 HC RX 258: Performed by: NURSE PRACTITIONER

## 2023-02-13 PROCEDURE — 80048 BASIC METABOLIC PNL TOTAL CA: CPT

## 2023-02-13 PROCEDURE — 2580000003 HC RX 258: Performed by: STUDENT IN AN ORGANIZED HEALTH CARE EDUCATION/TRAINING PROGRAM

## 2023-02-13 PROCEDURE — 81001 URINALYSIS AUTO W/SCOPE: CPT

## 2023-02-13 PROCEDURE — 87635 SARS-COV-2 COVID-19 AMP PRB: CPT

## 2023-02-13 RX ORDER — TAMSULOSIN HYDROCHLORIDE 0.4 MG/1
0.8 CAPSULE ORAL DAILY
Status: DISCONTINUED | OUTPATIENT
Start: 2023-02-13 | End: 2023-02-20 | Stop reason: HOSPADM

## 2023-02-13 RX ORDER — SODIUM CHLORIDE 9 MG/ML
INJECTION, SOLUTION INTRAVENOUS PRN
Status: DISCONTINUED | OUTPATIENT
Start: 2023-02-13 | End: 2023-02-20 | Stop reason: HOSPADM

## 2023-02-13 RX ORDER — SODIUM CHLORIDE 0.9 % (FLUSH) 0.9 %
5-40 SYRINGE (ML) INJECTION EVERY 12 HOURS SCHEDULED
Status: DISCONTINUED | OUTPATIENT
Start: 2023-02-13 | End: 2023-02-20 | Stop reason: HOSPADM

## 2023-02-13 RX ORDER — HYDROCODONE BITARTRATE AND ACETAMINOPHEN 10; 325 MG/1; MG/1
1 TABLET ORAL EVERY 4 HOURS PRN
Status: DISCONTINUED | OUTPATIENT
Start: 2023-02-13 | End: 2023-02-20 | Stop reason: HOSPADM

## 2023-02-13 RX ORDER — SODIUM CHLORIDE 0.9 % (FLUSH) 0.9 %
5-40 SYRINGE (ML) INJECTION PRN
Status: DISCONTINUED | OUTPATIENT
Start: 2023-02-13 | End: 2023-02-20 | Stop reason: HOSPADM

## 2023-02-13 RX ORDER — ENOXAPARIN SODIUM 100 MG/ML
30 INJECTION SUBCUTANEOUS DAILY
Status: DISCONTINUED | OUTPATIENT
Start: 2023-02-13 | End: 2023-02-18 | Stop reason: SDUPTHER

## 2023-02-13 RX ORDER — ASPIRIN 81 MG/1
81 TABLET, CHEWABLE ORAL DAILY
Status: DISCONTINUED | OUTPATIENT
Start: 2023-02-13 | End: 2023-02-20 | Stop reason: HOSPADM

## 2023-02-13 RX ORDER — TAMSULOSIN HYDROCHLORIDE 0.4 MG/1
0.4 CAPSULE ORAL ONCE
Status: COMPLETED | OUTPATIENT
Start: 2023-02-13 | End: 2023-02-13

## 2023-02-13 RX ORDER — TRAMADOL HYDROCHLORIDE 50 MG/1
50 TABLET ORAL EVERY 8 HOURS PRN
Status: ON HOLD | COMMUNITY
End: 2023-02-19 | Stop reason: HOSPADM

## 2023-02-13 RX ORDER — MIRTAZAPINE 7.5 MG/1
7.5 TABLET, FILM COATED ORAL NIGHTLY
Status: DISCONTINUED | OUTPATIENT
Start: 2023-02-13 | End: 2023-02-20 | Stop reason: HOSPADM

## 2023-02-13 RX ORDER — ONDANSETRON 4 MG/1
4 TABLET, FILM COATED ORAL EVERY 6 HOURS PRN
Status: ON HOLD | COMMUNITY

## 2023-02-13 RX ORDER — ALLOPURINOL 100 MG/1
100 TABLET ORAL DAILY
Status: DISCONTINUED | OUTPATIENT
Start: 2023-02-13 | End: 2023-02-20 | Stop reason: HOSPADM

## 2023-02-13 RX ORDER — QUETIAPINE FUMARATE 25 MG/1
25 TABLET, FILM COATED ORAL NIGHTLY
Status: DISCONTINUED | OUTPATIENT
Start: 2023-02-13 | End: 2023-02-20 | Stop reason: HOSPADM

## 2023-02-13 RX ORDER — CALCITRIOL 0.25 UG/1
0.25 CAPSULE, LIQUID FILLED ORAL DAILY
Status: DISCONTINUED | OUTPATIENT
Start: 2023-02-13 | End: 2023-02-18

## 2023-02-13 RX ORDER — DOCUSATE SODIUM 100 MG/1
100 CAPSULE, LIQUID FILLED ORAL DAILY
Status: DISCONTINUED | OUTPATIENT
Start: 2023-02-13 | End: 2023-02-20 | Stop reason: HOSPADM

## 2023-02-13 RX ORDER — PANTOPRAZOLE SODIUM 20 MG/1
20 TABLET, DELAYED RELEASE ORAL
Status: DISCONTINUED | OUTPATIENT
Start: 2023-02-13 | End: 2023-02-20 | Stop reason: HOSPADM

## 2023-02-13 RX ORDER — POLYETHYLENE GLYCOL 3350 17 G/17G
17 POWDER, FOR SOLUTION ORAL 2 TIMES DAILY
Status: DISCONTINUED | OUTPATIENT
Start: 2023-02-13 | End: 2023-02-20 | Stop reason: HOSPADM

## 2023-02-13 RX ORDER — MEROPENEM AND SODIUM CHLORIDE 1 G/50ML
1000 INJECTION, SOLUTION INTRAVENOUS ONCE
Status: COMPLETED | OUTPATIENT
Start: 2023-02-13 | End: 2023-02-13

## 2023-02-13 RX ORDER — HYDROMORPHONE HYDROCHLORIDE 1 MG/ML
0.5 INJECTION, SOLUTION INTRAMUSCULAR; INTRAVENOUS; SUBCUTANEOUS
Status: DISCONTINUED | OUTPATIENT
Start: 2023-02-13 | End: 2023-02-20

## 2023-02-13 RX ORDER — ONDANSETRON 4 MG/1
4 TABLET, ORALLY DISINTEGRATING ORAL EVERY 8 HOURS PRN
Status: DISCONTINUED | OUTPATIENT
Start: 2023-02-13 | End: 2023-02-20 | Stop reason: HOSPADM

## 2023-02-13 RX ORDER — ACETAMINOPHEN 325 MG/1
650 TABLET ORAL EVERY 6 HOURS PRN
Status: ON HOLD | COMMUNITY

## 2023-02-13 RX ORDER — ATROPA BELLADONNA AND OPIUM 16.2; 6 MG/1; MG/1
60 SUPPOSITORY RECTAL EVERY 8 HOURS PRN
Status: DISCONTINUED | OUTPATIENT
Start: 2023-02-13 | End: 2023-02-13 | Stop reason: RX

## 2023-02-13 RX ORDER — ONDANSETRON 2 MG/ML
4 INJECTION INTRAMUSCULAR; INTRAVENOUS EVERY 6 HOURS PRN
Status: DISCONTINUED | OUTPATIENT
Start: 2023-02-13 | End: 2023-02-20 | Stop reason: HOSPADM

## 2023-02-13 RX ORDER — PREGABALIN 25 MG/1
25 CAPSULE ORAL DAILY
Status: DISCONTINUED | OUTPATIENT
Start: 2023-02-13 | End: 2023-02-20 | Stop reason: HOSPADM

## 2023-02-13 RX ORDER — 0.9 % SODIUM CHLORIDE 0.9 %
500 INTRAVENOUS SOLUTION INTRAVENOUS ONCE
Status: COMPLETED | OUTPATIENT
Start: 2023-02-13 | End: 2023-02-13

## 2023-02-13 RX ORDER — SODIUM BICARBONATE 650 MG/1
650 TABLET ORAL 2 TIMES DAILY
Status: DISCONTINUED | OUTPATIENT
Start: 2023-02-13 | End: 2023-02-18

## 2023-02-13 RX ORDER — MEROPENEM AND SODIUM CHLORIDE 500 MG/50ML
500 INJECTION, SOLUTION INTRAVENOUS EVERY 12 HOURS
Status: DISCONTINUED | OUTPATIENT
Start: 2023-02-13 | End: 2023-02-14

## 2023-02-13 RX ORDER — SIMETHICONE 80 MG
80 TABLET,CHEWABLE ORAL EVERY 6 HOURS PRN
Status: DISCONTINUED | OUTPATIENT
Start: 2023-02-13 | End: 2023-02-20 | Stop reason: HOSPADM

## 2023-02-13 RX ORDER — SODIUM CHLORIDE 9 MG/ML
INJECTION, SOLUTION INTRAVENOUS CONTINUOUS
Status: DISCONTINUED | OUTPATIENT
Start: 2023-02-13 | End: 2023-02-15

## 2023-02-13 RX ORDER — ATORVASTATIN CALCIUM 20 MG/1
20 TABLET, FILM COATED ORAL NIGHTLY
Status: DISCONTINUED | OUTPATIENT
Start: 2023-02-13 | End: 2023-02-20 | Stop reason: HOSPADM

## 2023-02-13 RX ORDER — METOPROLOL SUCCINATE 50 MG/1
50 TABLET, EXTENDED RELEASE ORAL DAILY
Status: DISCONTINUED | OUTPATIENT
Start: 2023-02-13 | End: 2023-02-20 | Stop reason: HOSPADM

## 2023-02-13 RX ADMIN — MEROPENEM AND SODIUM CHLORIDE 1000 MG: 1 INJECTION, SOLUTION INTRAVENOUS at 10:00

## 2023-02-13 RX ADMIN — TAMSULOSIN HYDROCHLORIDE 0.4 MG: 0.4 CAPSULE ORAL at 10:08

## 2023-02-13 RX ADMIN — SODIUM CHLORIDE: 9 INJECTION, SOLUTION INTRAVENOUS at 10:00

## 2023-02-13 RX ADMIN — POLYETHYLENE GLYCOL 3350 17 G: 17 POWDER, FOR SOLUTION ORAL at 21:33

## 2023-02-13 RX ADMIN — METOPROLOL SUCCINATE 50 MG: 50 TABLET, EXTENDED RELEASE ORAL at 10:04

## 2023-02-13 RX ADMIN — SODIUM CHLORIDE: 9 INJECTION, SOLUTION INTRAVENOUS at 18:22

## 2023-02-13 RX ADMIN — MIRTAZAPINE 7.5 MG: 7.5 TABLET ORAL at 21:33

## 2023-02-13 RX ADMIN — ATORVASTATIN CALCIUM 20 MG: 20 TABLET, FILM COATED ORAL at 21:33

## 2023-02-13 RX ADMIN — DOCUSATE SODIUM 100 MG: 100 CAPSULE, LIQUID FILLED ORAL at 10:03

## 2023-02-13 RX ADMIN — QUETIAPINE FUMARATE 25 MG: 25 TABLET ORAL at 21:33

## 2023-02-13 RX ADMIN — CALCITRIOL CAPSULES 0.25 MCG 0.25 MCG: 0.25 CAPSULE ORAL at 10:04

## 2023-02-13 RX ADMIN — SODIUM BICARBONATE 650 MG: 650 TABLET ORAL at 10:04

## 2023-02-13 RX ADMIN — PANTOPRAZOLE SODIUM 20 MG: 20 TABLET, DELAYED RELEASE ORAL at 10:09

## 2023-02-13 RX ADMIN — TAMSULOSIN HYDROCHLORIDE 0.4 MG: 0.4 CAPSULE ORAL at 01:05

## 2023-02-13 RX ADMIN — POLYETHYLENE GLYCOL 3350 17 G: 17 POWDER, FOR SOLUTION ORAL at 09:58

## 2023-02-13 RX ADMIN — Medication 5000 UNITS: at 10:03

## 2023-02-13 RX ADMIN — ALLOPURINOL 100 MG: 100 TABLET ORAL at 10:03

## 2023-02-13 RX ADMIN — SODIUM CHLORIDE 500 ML: 9 INJECTION, SOLUTION INTRAVENOUS at 01:09

## 2023-02-13 RX ADMIN — SODIUM BICARBONATE 650 MG: 650 TABLET ORAL at 21:33

## 2023-02-13 RX ADMIN — MEROPENEM AND SODIUM CHLORIDE 500 MG: 500 INJECTION, SOLUTION INTRAVENOUS at 21:35

## 2023-02-13 ASSESSMENT — ENCOUNTER SYMPTOMS
NAUSEA: 1
ABDOMINAL PAIN: 1
VOMITING: 1
SHORTNESS OF BREATH: 1
BACK PAIN: 1

## 2023-02-13 NOTE — PROGRESS NOTES
4 Eyes Skin Assessment    Josh Matthews is being assessed upon: Admission    I agree that Xochilt Zurita RN, along with Germania Fry RN  have performed a thorough Head to Toe Skin Assessment on the patient. ALL assessment sites listed below have been assessed. Areas assessed by both nurses:     [x]   Head, Face, and Ears   [x]   Shoulders, Back, and Chest  [x]   Arms, Elbows, and Hands   [x]   Coccyx, Sacrum, and Ischium  [x]   Legs, Feet, and Heels    Does the Patient have Skin Breakdown?  No    Isiah Prevention initiated: No  Wound Care Orders initiated: No    WOC nurse consulted for Pressure Injury (Stage 3,4, Unstageable, DTI, NWPT, and Complex wounds) and New or Established Ostomies: No        Primary Nurse eSignature: Jayce Ware RN on 2/13/2023 at 4:25 PM      Co-Signer eSignature: Electronically signed by Germania Fry RN on 2/14/23 at 1:01 PM CST

## 2023-02-13 NOTE — ED PROVIDER NOTES
[facility]  eMERGENCY dEPARTMENT eNCOUnter      Pt Name: Yuliya Bailon  MRN: [id number]  [place] 1940  Date of evaluation: 2/12/2023  Provider: GOLD Moore    CHIEF COMPLAINT       Chief Complaint   Patient presents with    Urinary Retention     Pt arrives to ED via EMS from [facility] with c/o urinary retention. EMS reports that pt is a dialysis pt. Pt arrives with urinary catheter with approx 200mL in bag. Bladder scanned at facility with reading of 0mL    Abdominal Pain     Pt c.o abdominal pain onset today. HISTORY OF PRESENT ILLNESS   (Location/Symptom, Timing/Onset,Context/Setting, Quality, Duration, Modifying Factors, Severity)  Note limiting factors. Yuliya Bailon is a 79 y.o. male who presents to the emergency department with abd pain. He tells me they gave him a laxative at the nursing home. No vomiting. No constipation now. Complains of all over abd pain that isn't so bad now. HAs a grove cath and is a dialysis pt. No vomiting  or fever. Pt's last dialysis was sat. Concern for his grove not draining. Just spent 2 weeks here in the hospital          The history is provided by the patient. Abdominal Pain  Pain location:  Generalized  Pain severity:  Unable to specify  Onset quality:  Unable to specify  Timing:  Unable to specify  Progression:  Unable to specify  Chronicity:  New  Associated symptoms: no fever      NursingNotes were reviewed. REVIEW OF SYSTEMS    (2-9 systems for level 4, 10 or more for level 5)     Review of Systems   Constitutional:  Negative for fever. Gastrointestinal:  Positive for abdominal pain. Genitourinary:  Positive for difficulty urinating and flank pain. Except as noted above the remainder of the review of systems was reviewed and negative.        PAST MEDICAL HISTORY     Past Medical History:   Diagnosis Date    Benign non-nodular prostatic hyperplasia with lower urinary tract symptoms 8/16/2017    Essential hypertension 8/16/2017    Gastroesophageal reflux disease without esophagitis 8/16/2017    Gout 8/16/2017    Idiopathic peripheral neuropathy 2/19/2018    Mixed hyperlipidemia 8/16/2017    Peripheral neuropathic pain 8/16/2017    Primary osteoarthritis involving multiple joints 8/16/2017    Renal stone 1/23/2023    Restless legs syndrome 8/16/2017         SURGICALHISTORY       Past Surgical History:   Procedure Laterality Date    INGUINAL HERNIA REPAIR Bilateral 1963    TURP  2003         CURRENT MEDICATIONS       Previous Medications    ALLOPURINOL (ZYLOPRIM) 100 MG TABLET    Take 1 tablet by mouth daily    ASPIRIN 81 MG CHEWABLE TABLET    Take 1 tablet by mouth daily    ATORVASTATIN (LIPITOR) 20 MG TABLET    Take 1 tablet by mouth daily    CALCITRIOL (ROCALTROL) 0.25 MCG CAPSULE    Take 1 capsule by mouth daily    DOCUSATE SODIUM (COLACE, DULCOLAX) 100 MG CAPS    Take 100 mg by mouth daily    METOPROLOL SUCCINATE (TOPROL XL) 50 MG EXTENDED RELEASE TABLET    Take 1 tablet by mouth once daily    MIRTAZAPINE (REMERON) 7.5 MG TABLET    Take 1 tablet by mouth nightly    OMEPRAZOLE (PRILOSEC) 20 MG DELAYED RELEASE CAPSULE    Take 1 capsule by mouth daily    POLYETHYLENE GLYCOL (GLYCOLAX) 17 G PACKET    Take 17 g by mouth 2 times daily    PREGABALIN (LYRICA) 25 MG CAPSULE    Take 1 capsule by mouth daily for 30 days.  Max Daily Amount: 25 mg    QUETIAPINE (SEROQUEL) 25 MG TABLET    Take 1 tablet by mouth nightly    SIMETHICONE (MYLICON) 80 MG CHEWABLE TABLET    Take 1 tablet by mouth every 6 hours as needed for Flatulence    SODIUM BICARBONATE 650 MG TABLET    Take 1 tablet by mouth 2 times daily    TAMSULOSIN (FLOMAX) 0.4 MG CAPSULE    Take 1 capsule by mouth daily    VITAMIN D (ERGOCALCIFEROL) 1.25 MG (55796 UT) CAPS CAPSULE    Take 1 capsule by mouth once a week            Codeine    FAMILY HISTORY       Family History   Problem Relation Age of Onset    Uterine Cancer Mother     Coronary Art Dis Father         MI at 62    COPD Sister     Stroke Brother           SOCIAL HISTORY       Social History     Socioeconomic History    Marital status:      Spouse name: Josey Arias    Number of children: 2    Years of education: 12    Highest education level: None   Occupational History     Employer: RETIRED   Tobacco Use    Smoking status: Former     Packs/day: 3.00     Years: 30.00     Pack years: 90.00     Types: Cigarettes     Quit date: 1975     Years since quittin.1    Smokeless tobacco: Never   Substance and Sexual Activity    Alcohol use: No    Drug use: No    Sexual activity: Yes     Partners: Female     Comment: wife     Social Determinants of Health     Financial Resource Strain: Low Risk     Difficulty of Paying Living Expenses: Not hard at all   Food Insecurity: No Food Insecurity    Worried About Running Out of Food in the Last Year: Never true    Ran Out of Food in the Last Year: Never true   Physical Activity: Unknown    Days of Exercise per Week: 0 days   Intimate Partner Violence: Unknown    Fear of Current or Ex-Partner: Patient refused    Emotionally Abused: Patient refused    Physically Abused: Patient refused    Sexually Abused: Patient refused       SCREENINGS    [place] Coma Scale  Eye Opening: Spontaneous  Best Verbal Response: Oriented  Best Motor Response: Obeys commands  [place] Coma Scale Score: 15        PHYSICAL EXAM    (up to 7 for level 4, 8 or more for level 5)     ED Triage Vitals [23 191]   BP Temp Temp src Heart Rate Resp SpO2 Height Weight   132/72 97.7 °F (36.5 °C) -- 73 18 95 % 6' (1.829 m) 204 lb (92.5 kg)       Physical Exam  Vitals and nursing note reviewed. Constitutional:       Appearance: Normal appearance. He is well-developed. HENT:      Head: Normocephalic and atraumatic. Eyes:      General: No scleral icterus. Right eye: No discharge. Left eye: No discharge. Cardiovascular:      Rate and Rhythm: Normal rate.    Pulmonary:      Effort: No respiratory distress. Abdominal:      General: Abdomen is protuberant. Bowel sounds are normal.      Palpations: Abdomen is soft. Tenderness: There is generalized abdominal tenderness. Hernia: No hernia is present. Genitourinary:     Comments: García with about 100cc yellow urine in bag  Musculoskeletal:      Cervical back: Normal range of motion and neck supple. Neurological:      Mental Status: He is alert. He is disoriented. Comments: Pt gets confused at times   Psychiatric:         Behavior: Behavior normal.       RESULTS     EKG: All EKG's are interpreted by the Emergency Department Physician who either signs or Co-signsthis chart in the absence of a cardiologist.        RADIOLOGY:   Hurtado Stevo such as CT, Ultrasound and MRI are read by the radiologist. Dar Zepeda radiographic images are visualized and preliminarily interpreted by the emergency physician with the below findings:      Interpretation per the Radiologist below, if available at the time of this note:    CT ABDOMEN PELVIS WO CONTRAST Additional Contrast? None   Final Result   1 cm obstructing left mid ureteral calculus. Mild left hydronephrosis. Punctate nonobstructing right-sided nephrolithiasis. Soft tissue fullness at the pancreatic tail. Cannot exclude underlying mass. Correlation with contrast-enhanced pancreatic protocol MRI or CT is suggested a   possible. Scattered hepatic hypoattenuating lesion, which are compatible with cysts while   others are too small to accurately characterize. Additional findings per body of the report.             ED BEDSIDEULTRASOUND:   Performed by ED Physician -none    LABS:  Labs Reviewed   CBC WITH AUTO DIFFERENTIAL - Abnormal; Notable for the following components:       Result Value    RBC 4.36 (*)     Hemoglobin 11.8 (*)     Hematocrit 36.9 (*)     MCHC 32.0 (*)     Platelets 389 (*)     Neutrophils % 75.9 (*)     Lymphocytes % 14.0 (*)     Lymphocytes Absolute 1.0 (*)     All other components within normal limits   COMPREHENSIVE METABOLIC PANEL - Abnormal; Notable for the following components:    Sodium 135 (*)     Potassium 3.4 (*)     Chloride 93 (*)     CO2 31 (*)     BUN 38 (*)     Creatinine 3.4 (*)     Est, Glom Filt Rate 17 (*)     Calcium 8.7 (*)     Total Protein 5.8 (*)     Albumin 3.0 (*)     ALT 44 (*)     AST 44 (*)     All other components within normal limits   LIPASE - Abnormal; Notable for the following components:    Lipase 94 (*)     All other components within normal limits   URINALYSIS - Abnormal; Notable for the following components:    Clarity, UA CLOUDY (*)     Ketones, Urine TRACE (*)     Blood, Urine MODERATE (*)     Protein,  (*)     Leukocyte Esterase, Urine MODERATE (*)     All other components within normal limits   MICROSCOPIC URINALYSIS - Abnormal; Notable for the following components:    Mucus, UA 1+ (*)     Bacteria, UA 1+ (*)     Amorphous, UA Rare (*)     Yeast, UA Present (*)     Crystals, UA 1+ Ca. Oxalate (*)     WBC, UA 26 (*)     All other components within normal limits   COVID-19, RAPID       All other labs were within normal range or not returned as of this dictation. EMERGENCY DEPARTMENT COURSE and DIFFERENTIALDIAGNOSIS/MDM:   Vitals:    Vitals:    02/12/23 2130 02/12/23 2300 02/13/23 0000 02/13/23 0030   BP: (!) 141/76 133/72 115/68 117/67   Pulse: 79 82  84   Resp: 19 18  18   Temp:    97.7 °F (36.5 °C)   SpO2: 94% 90% 93% 94%   Weight:       Height:         Spoke with pt and Nahomy Garciau his daughter about the fact that urology will not be available until Tuesday. They are willing to accept the risk of a worsing condition or death while waiting for urology service on tuesday.   This was witnessed by nurse Ciera Causey      MDM  Number of Diagnoses or Management Options  Decreased urine output: new and requires workup  Generalized abdominal pain: new and requires workup  Pancreatic mass: new and requires workup  Stage 5 chronic kidney disease on chronic dialysis [facility]): established and worsening  Ureteric stone: new and requires workup  Diagnosis management comments: Pt has been at the nursing home for a week. New dialysis pt with a grove. Now pt has a 1cm left miduretal calculus. Has not see urology in a long time. Pt also has fullness to the pancreatitic tail that needs to be looked at closer and will need dialysis. Spoke to Susan Tesfaye for admission       Amount and/or Complexity of Data Reviewed  Clinical lab tests: ordered and reviewed  Tests in the radiology section of CPT®: ordered               CONSULTS:  None    PROCEDURES:  Unless otherwise noted below, none     Procedures    FINAL IMPRESSION      1. Generalized abdominal pain    2. Ureteric stone    3. Stage 5 chronic kidney disease on chronic dialysis ([address]    4. Decreased urine output    5. Pancreatic mass        DISPOSITION/PLAN   DISPOSITION Decision To Admit 02/12/2023 11:48:49 PM      PATIENT REFERRED TO:  No follow-up provider specified.     DISCHARGE MEDICATIONS:  New Prescriptions    No medications on file          (Please note that portions of this note were completed with a voice recognitionprogram.  Efforts were made to edit the dictations but occasionally words are mis-transcribed.)    GOLD Montana (electronically signed)          GOLD Montana  02/13/23 5069

## 2023-02-13 NOTE — PROGRESS NOTES
Pharmacy Adjustment per [facility] protocol    Kierra Berger is a 79 y.o. male. Pharmacy has adjusted medications per [facility] protocol. Recent Labs     02/12/23 2142   BUN 38*       Recent Labs     02/12/23 2142   CREATININE 3.4*       Estimated Creatinine Clearance: 18 mL/min (A) (based on SCr of 3.4 mg/dL (H)).     Height:   Ht Readings from Last 1 Encounters:   02/12/23 6' (1.829 m)     Weight:  Wt Readings from Last 1 Encounters:   02/12/23 204 lb (92.5 kg)         Plan: Adjust the following medications based on [facility] protocol:           Meropenem to 1000 mg IV once over 30 minutes followed by 500 mg IV every 12 hours extended infusion over 180 minutes      Electronically signed by Merlene Baker [facility] on 2/13/2023 at 9:08 AM

## 2023-02-13 NOTE — PLAN OF CARE
1cm nephrolith in ureter  Unable lima pass on own  Needs urology - not on call    If has seen Dr Laura Alvarado before we can admit  Marylee Bear needs transfer

## 2023-02-13 NOTE — CONSULTS
Renal Consult Note    Thank you to requesting provider: Dr Jenaro Valle, for asking us to see Patricio Markham    Reason for consultation:  ESRD    Chief Complaint:  Abdominal pain, urine retention. History of Presenting Illness      Mr. Patricio Markham is a pleasant 79year old man who presented with abdominal pain and was found with obstructive 1cm stone in mid left ureter with mild left hydronephrosis. He has a history of prior kidney stones. He has recently started dialysis for ALEXANDRO after pyelonephritis and sepsis. He is current comfortable. He is awaiting to see urology. He is non-oliguric.      Past Medical/Surgical History      Active Ambulatory Problems     Diagnosis Date Noted    Mixed hyperlipidemia 08/16/2017    Essential hypertension 08/16/2017    Restless legs syndrome 08/16/2017    Gastroesophageal reflux disease without esophagitis 08/16/2017    Peripheral neuropathic pain 08/16/2017    Primary osteoarthritis involving multiple joints 08/16/2017    Gout 08/16/2017    Benign non-nodular prostatic hyperplasia with lower urinary tract symptoms 08/16/2017    Elevated PSA 08/16/2017    Idiopathic peripheral neuropathy 02/19/2018    Chronic idiopathic constipation 08/20/2018    Prediabetes 10/14/2022    Pyelonephritis of left kidney 01/23/2023    Renal stone 01/23/2023    Visual disturbance 01/31/2023    ESRD on dialysis ([facility] [address] 02/01/2023    Pyelonephritis 02/02/2023    Severe malnutrition ([facility] [address] 02/03/2023     Resolved Ambulatory Problems     Diagnosis Date Noted    No Resolved Ambulatory Problems     No Additional Past Medical History         Review of Systems     Constitutional:  No weight loss, no fever/chills  Eyes:  No eye pain, no eye redness  Cardiovascular:  No chest pain, no worsening of edema  Respiratory:  No hemoptysis, no stidor  Gastrointestinal:  No blood in stool, no n/v, no diarrhea  Genitoruinary:  No hematuria, + difficulty with urination  Musculoskeletal:  No joint swelling, no redness  Integumentary:  No Rash, no itching  Neurological:  No focal weakness, No new sensory deficit  Psychiatric:  No depression, no confusion  Endocrine:  No polyuria, no polydipsia       Medications        Current Facility-Administered Medications:     tamsulosin (FLOMAX) capsule 0.8 mg, 0.8 mg, Oral, Daily, Anastasiya Sweeney MD, 0.4 mg at 02/13/23 1008    sodium chloride flush 0.9 % injection 5-40 mL, 5-40 mL, IntraVENous, 2 times per day, Anastasiya Sweeney MD    sodium chloride flush 0.9 % injection 5-40 mL, 5-40 mL, IntraVENous, PRN, Anastasiya Sweeney MD    0.9 % sodium chloride infusion, , IntraVENous, PRN, Anastasiya Sweeney MD    [facility] by provider] enoxaparin Sodium (LOVENOX) injection 30 mg, 30 mg, SubCUTAneous, Daily, Anastasiya Sweeney MD    ondansetron (ZOFRAN-ODT) disintegrating tablet 4 mg, 4 mg, Oral, Q8H PRN **OR** ondansetron (ZOFRAN) injection 4 mg, 4 mg, IntraVENous, Q6H PRN, Anastasiya Sweeney MD    [Held by provider] aspirin chewable tablet 81 mg, 81 mg, Oral, Daily, Anastasiya Sweeney MD    atorvastatin (LIPITOR) tablet 20 mg, 20 mg, Oral, Nightly, Anastasiya Sweeney MD    calcitRIOL (ROCALTROL) capsule 0.25 mcg, 0.25 mcg, Oral, Daily, Anastasiya Sweeney MD, 0.25 mcg at 02/13/23 1004    docusate sodium (COLACE) capsule 100 mg, 100 mg, Oral, Daily, Anastasiya Sweeney MD, 100 mg at 02/13/23 1003    metoprolol succinate (TOPROL XL) extended release tablet 50 mg, 50 mg, Oral, Daily, Anastasiya Sweeney MD, 50 mg at 02/13/23 1004    pregabalin (LYRICA) capsule 25 mg, 25 mg, Oral, Daily, Anastasiya Sweeney MD    sodium bicarbonate tablet 650 mg, 650 mg, Oral, BID, Anastasiya Sweeney MD, 650 mg at 02/13/23 1004    simethicone (MYLICON) chewable tablet 80 mg, 80 mg, Oral, Q6H PRN, Anastasiya Sweeney MD    QUEtiapine (SEROQUEL) tablet 25 mg, 25 mg, Oral, Nightly, Anastasiya Sweeney MD    vitamin D (CHOLECALCIFEROL) capsule 5,000 Units, 5,000 Units, Oral, Daily, Anastasiya Sweeney MD, 5,000 Units at 02/13/23 1003    allopurinol (ZYLOPRIM) tablet 100 mg, 100 mg, Oral, Daily, Sam Sidhu MD, 100 mg at 23 1003    pantoprazole (PROTONIX) tablet 20 mg, 20 mg, Oral, QAM AC, Sam Sidhu MD, 20 mg at 23 1009    mirtazapine (REMERON) tablet 7.5 mg, 7.5 mg, Oral, Nightly, Sam Sidhu MD    polyethylene glycol (GLYCOLAX) packet 17 g, 17 g, Oral, BID, Sam Sidhu MD, 17 g at 23 0958    0.9 % sodium chloride infusion, , IntraVENous, Continuous, Andry Canas MD, Last Rate: 150 mL/hr at 23 1000, New Bag at 23 1000    HYDROmorphone HCl PF (DILAUDID) injection 0.5 mg, 0.5 mg, IntraVENous, Q3H PRN, Andry Canas MD    HYDROcodone-acetaminophen (NORCO)  MG per tablet 1 tablet, 1 tablet, Oral, Q4H PRN, Andry Canas MD    meropenem (MERREM) 500 mg in sodium chloride 0.9% 50 mL (duplex), 500 mg, IntraVENous, Q12H, Andry Canas MD  Outpatient Medications Marked as Taking for the 23 encounter [facility] Encounter)   Medication Sig Dispense Refill    acetaminophen (TYLENOL) 325 MG tablet Take 650 mg by mouth every 6 hours as needed for Pain      ondansetron (ZOFRAN) 4 MG tablet Take 4 mg by mouth every 6 hours as needed for Nausea or Vomiting      traMADol (ULTRAM) 50 MG tablet Take 50 mg by mouth every 8 hours as needed for Pain. Allergies   Codeine    Family History       Family History   Problem Relation Age of Onset    Uterine Cancer Mother          at age 52    Coronary Art Dis Father         MI at 62, smoker    COPD Sister         smoker    COPD Sister         smoker    Heart Failure Sister     COPD Sister         smoker    Stroke Brother         smoker    Coronary Art Dis Brother         open heart surgry, MI at 80- yo    No Known Problems Son     No Known Problems Daughter      Family history negative for kidney disease. 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     Employer: RETIRED     Comment: retired 2005   Tobacco Use    Smoking status: Former     Packs/day: 3.00     Years: 35.00     Pack years: 105.00     Types: Cigarettes, Pipe     Start date:      Quit date:      Years since quittin.1    Smokeless tobacco: Never   Vaping Use    Vaping Use: Never used   Substance and Sexual Activity    Alcohol use: Not Currently     Comment: \"not vbery much\", \"beer every 15 years\"    Drug use: Never    Sexual activity: Yes     Partners: Female     Comment: wife, has 2 kids   Social History Narrative    CODE STATUS: Full Code    HEALTH CARE PROXY: his children    1.) Chayo Moe, his daughter, [phone]    2.) Cuba Rucker, his son, [phone]    AMBULATES: independently, sometimes with cane    DOMICILED: has 3 story home but he has an elevator     Social Determinants of Health     Financial Resource Strain: Low Risk     Difficulty of Paying Living Expenses: Not hard at all   Food Insecurity: No Food Insecurity    Worried About Running Out of Food in the Last Year: Never true    [address] in the Last Year: Never true   Physical Activity: Unknown    Days of Exercise per Week: 0 days   Intimate Partner Violence: Unknown    Fear of Current or Ex-Partner: Patient refused    Emotionally Abused: Patient refused    Physically Abused: Patient refused    Sexually Abused: Patient refused       Physical Exam     Blood pressure (!) 145/76, pulse (!) 103, temperature 98.6 °F (37 °C), temperature source Temporal, resp. rate 18, height 6' (1.829 m), weight 204 lb (92.5 kg), SpO2 90 %.     General:  NAD, A+Ox3, ill-appearing, normal body habitus  HEENT:  atraumatic, normocephalic  Neck:  no masses, no JVD  Chest:  CTAB, good respiratory effort, good air movement  CV:  RRR, soft systolic murmur    Abdomen:  NTND, soft, +BS, no hepatosplenomegaly  Extremities:  No peripheral edema  Neurological:  Moving all four extremities   Lymphatics:  No palpable lymph nodes   Skin:  No rash, no jaundice  Psychiatric:  Normal insight and judgement, good recall    Data     Recent Labs     02/12/23 2142   WBC 7.4   HGB 11.8*   HCT 36.9*   MCV 84.6   *     Recent Labs     02/12/23 2142 02/13/23  0853   * 131*   K 3.4* 3.6   CL 93* 94*   CO2 31* 29   GLUCOSE 100 95   BUN 38* 40*   CREATININE 3.4* 3.5*   LABGLOM 17* 17*       Assessment:  ALEXANDRO- ATN requiring dialysis  Recent pyelonephritis and sepsis  Abdominal pain  Left ureter stone with mild left hydronephrosis  Hypokalemia  Hyponatremia  CKD stage 3a      Plan: Will continue to provide dialysis on TTS per outpatient schedule. Follow up labs. Awaiting urology evaluation and possible intervention. Follow up labs.      Thank you for asking us to participate in the management of your patient, please do not hesitate to contact me for any concerns regarding my recommendations as outlined above.    -----------------------------  Electronically signed by David Adams MD on 2/13/23 at 1:02 PM CST

## 2023-02-13 NOTE — H&P
[facility] History and Physical    Patient Information:  Patient: Tasha Yoder  MRN: [id number]   Acct: [de-identified]  YOB: 1940  Admit Date: 2/13/2023  Primary Care Physician: Patrick Faustin MD  Advance Directive: Full Code  Health Care Proxy: his children  1.) Nany Barragan, his daughter, [phone]  2.) Missy Mckeon, his son, [phone]         SUBJECTIVE:    Chief Complaint   Patient presents with    Urinary Retention     Pt arrives to ED via EMS from [place] with c/o urinary retention. EMS reports that pt is a dialysis pt. Pt arrives with urinary catheter with approx 200mL in bag. Bladder scanned at facility with reading of 0mL    Abdominal Pain     Pt c.o abdominal pain onset today. HPI:  Mr. Tasha Yoder is a pleasant ypoung for age 79year old man. He presented with an obstructive 1cm stone in right ureter. Patient has not seen Dr. Isaura Jones and so would have to wait until the 14th at 8am before he could likely be seen. The patient and his wife have both expressed understanding that he could lose organ function, end up on hemodialysis, or even die. An EMTALA form detailing his refusal along with the risks of this was filled in as per prior Urology request.  He has a history of prior kidney stones. Review of Systems:   Review of Systems   Constitutional:  Positive for chills, diaphoresis and fatigue. Negative for fever. Respiratory:  Positive for shortness of breath (chronic and unchanged). Cardiovascular:  Negative for chest pain. Gastrointestinal:  Positive for abdominal pain, nausea and vomiting (\"dry heaves\" but also states trouble with water). Genitourinary:  Positive for hematuria. Negative for dysuria. Has indwelling catheter x 7 days   Musculoskeletal:  Positive for back pain. Neurological:  Positive for weakness and light-headedness. Negative for syncope. Psychiatric/Behavioral:  Positive for confusion.       Past Medical History:   Diagnosis Date    Benign non-nodular prostatic hyperplasia with lower urinary tract symptoms 2017    Essential hypertension 2017    Gastroesophageal reflux disease without esophagitis 2017    Gout 2017    Idiopathic peripheral neuropathy 2018    Mixed hyperlipidemia 2017    Peripheral neuropathic pain 2017    Primary osteoarthritis involving multiple joints 2017    Renal stone 2023    Renal stone 2023    Restless legs syndrome 2017     Past Psychiatric History:  Denies any    Past Surgical History:   Procedure Laterality Date    INGUINAL HERNIA REPAIR Bilateral 1963    TURP       Social History       Tobacco History       Smoking Status  Former Smoking Start Date   Quit Date   Smoking Frequency  3.00 packs/day for 35.00 years (105.00 pk-yrs)    Smoking Tobacco Type  Cigarettes from  to , Pipe      Smokeless Tobacco Use  Never              Alcohol History       Alcohol Use Status  Not Currently Comment  \"not vbery much\", \"beer every 15 years\"              Drug Use       Drug Use Status  Never              Sexual Activity       Sexually Active  Yes Partners  Female Comment  wife, has 2 kids             CODE STATUS: Full Code  HEALTH CARE PROXY: his children  1.) Chayo Moe, his daughter, [phone]  2.) Cuba Rucker, his son, [phone]  AMBULATES: independently, sometimes with cane  DOMICILED: has 3 story home but he has an elevator     Family History   Problem Relation Age of Onset    Uterine Cancer Mother          at age 52    Coronary Art Dis Father         MI at 62, smoker    COPD Sister         smoker    COPD Sister         smoker    Heart Failure Sister     COPD Sister         smoker    Stroke Brother         smoker    Coronary Art Dis Brother         open heart surgry, MI at 80- yo    No Known Problems Son     No Known Problems Daughter      Allergies   Allergen Reactions    Codeine Nausea Only     Home Medications:  Prior to Admission medications    Medication Sig Start Date End Date Taking? Authorizing Provider   acetaminophen (TYLENOL) 325 MG tablet Take 650 mg by mouth every 6 hours as needed for Pain   Yes Historical Provider, MD   ondansetron (ZOFRAN) 4 MG tablet Take 4 mg by mouth every 6 hours as needed for Nausea or Vomiting   Yes Historical Provider, MD   traMADol (ULTRAM) 50 MG tablet Take 50 mg by mouth every 8 hours as needed for Pain. Yes Historical Provider, MD   pregabalin (LYRICA) 25 MG capsule Take 1 capsule by mouth daily for 30 days.  Max Daily Amount: 25 mg 2/4/23 3/6/23  Niyah Marin MD   mirtazapine (REMERON) 7.5 MG tablet Take 1 tablet by mouth nightly 2/4/23   Niyah Marin MD   QUEtiapine (SEROQUEL) 25 MG tablet Take 1 tablet by mouth nightly 2/4/23   Niyah Marin MD   docusate sodium (COLACE, DULCOLAX) 100 MG CAPS Take 100 mg by mouth daily 2/5/23   Niyah Marin MD   polyethylene glycol (GLYCOLAX) 17 g packet Take 17 g by mouth 2 times daily 2/4/23 3/6/23  Niyah Marin MD   calcitRIOL (ROCALTROL) 0.25 MCG capsule Take 1 capsule by mouth daily 2/5/23   Niyah Marin MD   simethicone (MYLICON) 80 MG chewable tablet Take 1 tablet by mouth every 6 hours as needed for Flatulence 2/4/23   Niyah Marin MD   aspirin 81 MG chewable tablet Take 1 tablet by mouth daily 2/5/23   Niyah Marin MD   sodium bicarbonate 650 MG tablet Take 1 tablet by mouth 2 times daily 2/4/23   Niyah Marin MD   tamsulosin [facility]) 0.4 MG capsule Take 1 capsule by mouth daily 10/2/22   Reshma Thorne MD   metoprolol succinate (TOPROL XL) 50 MG extended release tablet Take 1 tablet by mouth once daily 9/13/22   Reshma Thorne MD   allopurinol (ZYLOPRIM) 100 MG tablet Take 1 tablet by mouth daily 4/1/22   Reshma Thorne MD   atorvastatin (LIPITOR) 20 MG tablet Take 1 tablet by mouth daily 4/1/22   Reshma Thorne MD   omeprazole (PRILOSEC) 20 MG delayed release capsule Take 1 capsule by mouth daily 4/1/22   Dusty Ordaz MD   vitamin D (ERGOCALCIFEROL) 1.25 MG (47371 UT) CAPS capsule Take 1 capsule by mouth once a week 4/1/22   Dusty Ordaz MD         OBJECTIVE:    Vitals:    02/13/23 0030   BP: 117/67   Pulse: 84   Resp: 18   Temp: 97.7 °F (36.5 °C)   SpO2: 94%   Breathing on room air    /67   Pulse 84   Temp 97.7 °F (36.5 °C)   Resp 18   Ht 6' (1.829 m)   Wt 204 lb (92.5 kg)   SpO2 94%   BMI 27.67 kg/m²     No intake or output data in the 24 hours ending 02/13/23 0251    Physical Exam  Vitals reviewed. Constitutional:       General: He is not in acute distress. Appearance: Normal appearance. He is normal weight. He is not ill-appearing or toxic-appearing. HENT:      Head: Normocephalic. Comments: Has some excoriations on fore head     Nose: No congestion or rhinorrhea. Eyes:      General:         Right eye: No discharge. Left eye: No discharge. Neck:      Comments: Supple, trachea appears midline  Cardiovascular:      Rate and Rhythm: Normal rate and regular rhythm. Heart sounds: No murmur heard. No friction rub. No gallop. Pulmonary:      Effort: No respiratory distress. Breath sounds: No stridor. No wheezing, rhonchi or rales. Chest:      Chest wall: No tenderness. Abdominal:      General: Bowel sounds are normal.      Tenderness: There is no abdominal tenderness. There is no guarding or rebound. Musculoskeletal:         General: No tenderness or signs of injury. Right lower leg: No edema. Left lower leg: No edema. Skin:     General: Skin is warm. Comments: nondiaphoretic   Neurological:      Mental Status: He is alert and oriented to person, place, and time.    Psychiatric:         Mood and Affect: Mood normal.         Behavior: Behavior normal.        LABORATORY DATA:    CBC:   Recent Labs     02/12/23 2142   WBC 7.4   HGB 11.8*   HCT 36.9*   *     BMP:   Recent Labs     02/12/23 2142   *   K 3.4*   CL 93*   CO2 31* BUN 38*   CREATININE 3.4*   CALCIUM 8.7*     Hepatic Profile:   Recent Labs     02/12/23 2142   AST 44*   ALT 44*   BILITOT 0.7   ALKPHOS 73     Urinalysis:   Lab Results   Component Value Date/Time    NITRU Negative 02/12/2023 11:05 PM    WBCUA 26 02/12/2023 11:05 PM    WBCUA 10-15 02/12/2023 11:05 PM    BACTERIA 1+ 02/12/2023 11:05 PM    RBCUA 2-4 02/12/2023 11:05 PM    BLOODU MODERATE 02/12/2023 11:05 PM    SPECGRAV 1.018 02/12/2023 11:05 PM    GLUCOSEU Negative 02/12/2023 11:05 PM     IMAGING:  CT ABDOMEN PELVIS WO CONTRAST Additional Contrast? None  Result Date: 2/13/2023  1 cm obstructing left mid ureteral calculus. Mild left hydronephrosis. Punctate nonobstructing right-sided nephrolithiasis. Soft tissue fullness at the pancreatic tail. Cannot exclude underlying mass. Correlation with contrast-enhanced pancreatic protocol MRI or CT is suggested a possible. Scattered hepatic hypoattenuating lesion, which are compatible with cysts while others are too small to accurately characterize. Additional findings per body of the report.         ASESSMENTS & PLANS:    Right Ureter Obstructive Nephrolithiasis:  Urology consultation on the 15th when they come back on service, have not placed order in EMR  Avoid NSAIDs and other nephrotoxins  BMP with Mg reflex    Chronic medical problems:  Continue home regimen as indicated   tamsulosin  0.8 mg Oral Daily    aspirin  81 mg Oral Daily    atorvastatin  20 mg Oral Nightly    calcitRIOL  0.25 mcg Oral Daily    docusate sodium  100 mg Oral Daily    metoprolol succinate  50 mg Oral Daily    pregabalin  25 mg Oral Daily    sodium bicarbonate  650 mg Oral BID    QUEtiapine  25 mg Oral Nightly    vitamin D  5,000 Units Oral Daily    allopurinol  100 mg Oral Daily    pantoprazole  20 mg Oral QAM AC    mirtazapine  7.5 mg Oral Nightly    polyethylene glycol  17 g Oral BID     Supportive and Prophylactic Txx:  DVT PPx: Lovenox SQ  GI (PUD) PPx: not indicated  PT: not indicated  Regular diet with diabetic and cardiac modifiers  simethicone      Care time of 50 minutes  Pt seen/examined and admitted to inpatient status. Inpatient status is used for patients with an expected LOS extending past two midnights due to medical therapy and or critical care needs, otherwise patients are placed to OBServation status. Signed:  Electronically signed by Lucy Guzman MD on 2/13/23 at 03:00 AM CST.

## 2023-02-13 NOTE — ED NOTES
Catheter tubing and bag changed to collect urine, no drainage noted at this time. Bladder scan completed and showed 0mL in bladder at this time.        Octaviano Capellan RN  02/12/23 5622       Octaviano Capellan RN  02/12/23 7892

## 2023-02-14 ENCOUNTER — ANESTHESIA EVENT (OUTPATIENT)
Dept: OPERATING ROOM | Age: 83
End: 2023-02-14
Payer: MEDICARE

## 2023-02-14 ENCOUNTER — ANESTHESIA (OUTPATIENT)
Dept: OPERATING ROOM | Age: 83
End: 2023-02-14
Payer: MEDICARE

## 2023-02-14 ENCOUNTER — APPOINTMENT (OUTPATIENT)
Dept: GENERAL RADIOLOGY | Age: 83
DRG: 659 | End: 2023-02-14
Payer: MEDICARE

## 2023-02-14 PROBLEM — N17.0 ACUTE RENAL FAILURE WITH ACUTE TUBULAR NECROSIS SUPERIMPOSED ON CHRONIC KIDNEY DISEASE, ON CHRONIC DIALYSIS (HCC): Status: ACTIVE | Noted: 2023-02-01

## 2023-02-14 PROBLEM — N18.9 ACUTE RENAL FAILURE WITH ACUTE TUBULAR NECROSIS SUPERIMPOSED ON CHRONIC KIDNEY DISEASE, ON CHRONIC DIALYSIS (HCC): Status: ACTIVE | Noted: 2023-02-01

## 2023-02-14 LAB
ANION GAP SERPL CALCULATED.3IONS-SCNC: 13 MMOL/L (ref 7–19)
ATYPICAL LYMPHOCYTE RELATIVE PERCENT: 2 % (ref 0–8)
BASOPHILS ABSOLUTE: 0 K/UL (ref 0–0.2)
BASOPHILS RELATIVE PERCENT: 0 % (ref 0–1)
BUN BLDV-MCNC: 39 MG/DL (ref 8–23)
CALCIUM SERPL-MCNC: 7.6 MG/DL (ref 8.8–10.2)
CHLORIDE BLD-SCNC: 100 MMOL/L (ref 98–111)
CO2: 27 MMOL/L (ref 22–29)
CREAT SERPL-MCNC: 3.1 MG/DL (ref 0.5–1.2)
EOSINOPHILS ABSOLUTE: 0 K/UL (ref 0–0.6)
EOSINOPHILS RELATIVE PERCENT: 0 % (ref 0–5)
GFR SERPL CREATININE-BSD FRML MDRD: 19 ML/MIN/{1.73_M2}
GLUCOSE BLD-MCNC: 90 MG/DL (ref 74–109)
HCT VFR BLD CALC: 32 % (ref 42–52)
HEMOGLOBIN: 10.2 G/DL (ref 14–18)
IMMATURE GRANULOCYTES #: 0.1 K/UL
LYMPHOCYTES ABSOLUTE: 0.9 K/UL (ref 1.1–4.5)
LYMPHOCYTES RELATIVE PERCENT: 13 % (ref 20–40)
MAGNESIUM: 2.1 MG/DL (ref 1.6–2.4)
MCH RBC QN AUTO: 27.3 PG (ref 27–31)
MCHC RBC AUTO-ENTMCNC: 31.9 G/DL (ref 33–37)
MCV RBC AUTO: 85.6 FL (ref 80–94)
MONOCYTES ABSOLUTE: 0.2 K/UL (ref 0–0.9)
MONOCYTES RELATIVE PERCENT: 4 % (ref 0–10)
NEUTROPHILS ABSOLUTE: 4.7 K/UL (ref 1.5–7.5)
NEUTROPHILS RELATIVE PERCENT: 81 % (ref 50–65)
PDW BLD-RTO: 13.7 % (ref 11.5–14.5)
PLATELET # BLD: 99 K/UL (ref 130–400)
PLATELET SLIDE REVIEW: ABNORMAL
PMV BLD AUTO: 10 FL (ref 9.4–12.4)
POTASSIUM REFLEX MAGNESIUM: 3.4 MMOL/L (ref 3.5–5)
RBC # BLD: 3.74 M/UL (ref 4.7–6.1)
SODIUM BLD-SCNC: 140 MMOL/L (ref 136–145)
WBC # BLD: 5.8 K/UL (ref 4.8–10.8)

## 2023-02-14 PROCEDURE — 80048 BASIC METABOLIC PNL TOTAL CA: CPT

## 2023-02-14 PROCEDURE — 1210000000 HC MED SURG R&B

## 2023-02-14 PROCEDURE — 74420 UROGRAPHY RTRGR +-KUB: CPT | Performed by: UROLOGY

## 2023-02-14 PROCEDURE — 6360000002 HC RX W HCPCS: Performed by: UROLOGY

## 2023-02-14 PROCEDURE — 36415 COLL VENOUS BLD VENIPUNCTURE: CPT

## 2023-02-14 PROCEDURE — 74420 UROGRAPHY RTRGR +-KUB: CPT

## 2023-02-14 PROCEDURE — BT1F1ZZ FLUOROSCOPY OF LEFT KIDNEY, URETER AND BLADDER USING LOW OSMOLAR CONTRAST: ICD-10-PCS | Performed by: UROLOGY

## 2023-02-14 PROCEDURE — 8010000000 HC HEMODIALYSIS ACUTE INPT

## 2023-02-14 PROCEDURE — C1758 CATHETER, URETERAL: HCPCS | Performed by: UROLOGY

## 2023-02-14 PROCEDURE — 2709999900 HC NON-CHARGEABLE SUPPLY: Performed by: UROLOGY

## 2023-02-14 PROCEDURE — C2617 STENT, NON-COR, TEM W/O DEL: HCPCS | Performed by: UROLOGY

## 2023-02-14 PROCEDURE — 6360000002 HC RX W HCPCS: Performed by: STUDENT IN AN ORGANIZED HEALTH CARE EDUCATION/TRAINING PROGRAM

## 2023-02-14 PROCEDURE — 83735 ASSAY OF MAGNESIUM: CPT

## 2023-02-14 PROCEDURE — 3700000001 HC ADD 15 MINUTES (ANESTHESIA): Performed by: UROLOGY

## 2023-02-14 PROCEDURE — 7100000000 HC PACU RECOVERY - FIRST 15 MIN: Performed by: UROLOGY

## 2023-02-14 PROCEDURE — 2580000003 HC RX 258: Performed by: UROLOGY

## 2023-02-14 PROCEDURE — 2580000003 HC RX 258: Performed by: ANESTHESIOLOGY

## 2023-02-14 PROCEDURE — 85025 COMPLETE CBC W/AUTO DIFF WBC: CPT

## 2023-02-14 PROCEDURE — 87086 URINE CULTURE/COLONY COUNT: CPT

## 2023-02-14 PROCEDURE — 6360000002 HC RX W HCPCS: Performed by: INTERNAL MEDICINE

## 2023-02-14 PROCEDURE — 6370000000 HC RX 637 (ALT 250 FOR IP): Performed by: UROLOGY

## 2023-02-14 PROCEDURE — 6360000002 HC RX W HCPCS

## 2023-02-14 PROCEDURE — 99222 1ST HOSP IP/OBS MODERATE 55: CPT | Performed by: UROLOGY

## 2023-02-14 PROCEDURE — 7100000001 HC PACU RECOVERY - ADDTL 15 MIN: Performed by: UROLOGY

## 2023-02-14 PROCEDURE — C1769 GUIDE WIRE: HCPCS | Performed by: UROLOGY

## 2023-02-14 PROCEDURE — 2500000003 HC RX 250 WO HCPCS

## 2023-02-14 PROCEDURE — 3600000004 HC SURGERY LEVEL 4 BASE: Performed by: UROLOGY

## 2023-02-14 PROCEDURE — 3700000000 HC ANESTHESIA ATTENDED CARE: Performed by: UROLOGY

## 2023-02-14 PROCEDURE — 0T778DZ DILATION OF LEFT URETER WITH INTRALUMINAL DEVICE, VIA NATURAL OR ARTIFICIAL OPENING ENDOSCOPIC: ICD-10-PCS | Performed by: UROLOGY

## 2023-02-14 PROCEDURE — 6370000000 HC RX 637 (ALT 250 FOR IP): Performed by: HOSPITALIST

## 2023-02-14 PROCEDURE — 3600000014 HC SURGERY LEVEL 4 ADDTL 15MIN: Performed by: UROLOGY

## 2023-02-14 PROCEDURE — 52332 CYSTOSCOPY AND TREATMENT: CPT | Performed by: UROLOGY

## 2023-02-14 DEVICE — URETERAL STENT
Type: IMPLANTABLE DEVICE | Site: URETER | Status: FUNCTIONAL
Brand: POLARIS™ ULTRA

## 2023-02-14 RX ORDER — SODIUM CHLORIDE 0.9 % (FLUSH) 0.9 %
5-40 SYRINGE (ML) INJECTION EVERY 12 HOURS SCHEDULED
Status: DISCONTINUED | OUTPATIENT
Start: 2023-02-14 | End: 2023-02-14 | Stop reason: HOSPADM

## 2023-02-14 RX ORDER — LEVOFLOXACIN 5 MG/ML
250 INJECTION, SOLUTION INTRAVENOUS
Status: COMPLETED | OUTPATIENT
Start: 2023-02-14 | End: 2023-02-14

## 2023-02-14 RX ORDER — SODIUM CHLORIDE 0.9 % (FLUSH) 0.9 %
5-40 SYRINGE (ML) INJECTION PRN
Status: DISCONTINUED | OUTPATIENT
Start: 2023-02-14 | End: 2023-02-14 | Stop reason: HOSPADM

## 2023-02-14 RX ORDER — SODIUM CHLORIDE 9 MG/ML
INJECTION, SOLUTION INTRAVENOUS PRN
Status: DISCONTINUED | OUTPATIENT
Start: 2023-02-14 | End: 2023-02-14 | Stop reason: HOSPADM

## 2023-02-14 RX ORDER — LIDOCAINE HYDROCHLORIDE 10 MG/ML
INJECTION, SOLUTION EPIDURAL; INFILTRATION; INTRACAUDAL; PERINEURAL PRN
Status: DISCONTINUED | OUTPATIENT
Start: 2023-02-14 | End: 2023-02-14 | Stop reason: SDUPTHER

## 2023-02-14 RX ORDER — MIDAZOLAM HYDROCHLORIDE 2 MG/2ML
2 INJECTION, SOLUTION INTRAMUSCULAR; INTRAVENOUS
Status: DISCONTINUED | OUTPATIENT
Start: 2023-02-14 | End: 2023-02-14 | Stop reason: HOSPADM

## 2023-02-14 RX ORDER — HEPARIN SODIUM 1000 [USP'U]/ML
3600 INJECTION, SOLUTION INTRAVENOUS; SUBCUTANEOUS
Status: COMPLETED | OUTPATIENT
Start: 2023-02-14 | End: 2023-02-14

## 2023-02-14 RX ORDER — ROCURONIUM BROMIDE 10 MG/ML
INJECTION, SOLUTION INTRAVENOUS PRN
Status: DISCONTINUED | OUTPATIENT
Start: 2023-02-14 | End: 2023-02-14 | Stop reason: SDUPTHER

## 2023-02-14 RX ORDER — HYDROMORPHONE HYDROCHLORIDE 1 MG/ML
0.25 INJECTION, SOLUTION INTRAMUSCULAR; INTRAVENOUS; SUBCUTANEOUS EVERY 5 MIN PRN
Status: DISCONTINUED | OUTPATIENT
Start: 2023-02-14 | End: 2023-02-14 | Stop reason: HOSPADM

## 2023-02-14 RX ORDER — ONDANSETRON 2 MG/ML
INJECTION INTRAMUSCULAR; INTRAVENOUS PRN
Status: DISCONTINUED | OUTPATIENT
Start: 2023-02-14 | End: 2023-02-14 | Stop reason: SDUPTHER

## 2023-02-14 RX ORDER — DIPHENHYDRAMINE HYDROCHLORIDE 50 MG/ML
12.5 INJECTION INTRAMUSCULAR; INTRAVENOUS
Status: DISCONTINUED | OUTPATIENT
Start: 2023-02-14 | End: 2023-02-14 | Stop reason: HOSPADM

## 2023-02-14 RX ORDER — FLUCONAZOLE 2 MG/ML
200 INJECTION, SOLUTION INTRAVENOUS EVERY 24 HOURS
Status: DISCONTINUED | OUTPATIENT
Start: 2023-02-14 | End: 2023-02-17

## 2023-02-14 RX ORDER — PROPOFOL 10 MG/ML
INJECTION, EMULSION INTRAVENOUS PRN
Status: DISCONTINUED | OUTPATIENT
Start: 2023-02-14 | End: 2023-02-14 | Stop reason: SDUPTHER

## 2023-02-14 RX ORDER — HYDROMORPHONE HYDROCHLORIDE 1 MG/ML
0.5 INJECTION, SOLUTION INTRAMUSCULAR; INTRAVENOUS; SUBCUTANEOUS EVERY 5 MIN PRN
Status: DISCONTINUED | OUTPATIENT
Start: 2023-02-14 | End: 2023-02-14 | Stop reason: HOSPADM

## 2023-02-14 RX ORDER — LIDOCAINE HYDROCHLORIDE 10 MG/ML
1 INJECTION, SOLUTION EPIDURAL; INFILTRATION; INTRACAUDAL; PERINEURAL
Status: DISCONTINUED | OUTPATIENT
Start: 2023-02-14 | End: 2023-02-14 | Stop reason: HOSPADM

## 2023-02-14 RX ADMIN — METOPROLOL SUCCINATE 50 MG: 50 TABLET, EXTENDED RELEASE ORAL at 12:24

## 2023-02-14 RX ADMIN — PANTOPRAZOLE SODIUM 20 MG: 20 TABLET, DELAYED RELEASE ORAL at 06:24

## 2023-02-14 RX ADMIN — QUETIAPINE FUMARATE 25 MG: 25 TABLET ORAL at 20:50

## 2023-02-14 RX ADMIN — ROCURONIUM BROMIDE 50 MG: 10 INJECTION, SOLUTION INTRAVENOUS at 14:32

## 2023-02-14 RX ADMIN — HEPARIN SODIUM 3600 UNITS: 1000 INJECTION INTRAVENOUS; SUBCUTANEOUS at 12:27

## 2023-02-14 RX ADMIN — ATORVASTATIN CALCIUM 20 MG: 20 TABLET, FILM COATED ORAL at 20:50

## 2023-02-14 RX ADMIN — PHENYLEPHRINE HYDROCHLORIDE 100 MCG: 10 INJECTION INTRAVENOUS at 14:48

## 2023-02-14 RX ADMIN — PROPOFOL 150 MG: 10 INJECTION, EMULSION INTRAVENOUS at 14:32

## 2023-02-14 RX ADMIN — SODIUM CHLORIDE: 9 INJECTION, SOLUTION INTRAVENOUS at 14:23

## 2023-02-14 RX ADMIN — MIRTAZAPINE 7.5 MG: 7.5 TABLET ORAL at 20:50

## 2023-02-14 RX ADMIN — PHENYLEPHRINE HYDROCHLORIDE 100 MCG: 10 INJECTION INTRAVENOUS at 14:43

## 2023-02-14 RX ADMIN — LIDOCAINE HYDROCHLORIDE 50 MG: 10 INJECTION, SOLUTION EPIDURAL; INFILTRATION; INTRACAUDAL; PERINEURAL at 14:32

## 2023-02-14 RX ADMIN — PHENYLEPHRINE HYDROCHLORIDE 200 MCG: 10 INJECTION INTRAVENOUS at 14:52

## 2023-02-14 RX ADMIN — FLUCONAZOLE IN SODIUM CHLORIDE 200 MG: 2 INJECTION, SOLUTION INTRAVENOUS at 17:26

## 2023-02-14 RX ADMIN — SODIUM CHLORIDE, PRESERVATIVE FREE 10 ML: 5 INJECTION INTRAVENOUS at 20:51

## 2023-02-14 RX ADMIN — SODIUM BICARBONATE 650 MG: 650 TABLET ORAL at 20:50

## 2023-02-14 RX ADMIN — LEVOFLOXACIN 500 MG: 5 INJECTION, SOLUTION INTRAVENOUS at 14:40

## 2023-02-14 RX ADMIN — PHENYLEPHRINE HYDROCHLORIDE 100 MCG: 10 INJECTION INTRAVENOUS at 14:40

## 2023-02-14 RX ADMIN — ONDANSETRON 4 MG: 2 INJECTION INTRAMUSCULAR; INTRAVENOUS at 14:49

## 2023-02-14 RX ADMIN — MEROPENEM AND SODIUM CHLORIDE 500 MG: 500 INJECTION, SOLUTION INTRAVENOUS at 12:36

## 2023-02-14 RX ADMIN — SUGAMMADEX 250 MG: 100 INJECTION, SOLUTION INTRAVENOUS at 14:59

## 2023-02-14 SDOH — ECONOMIC STABILITY: HOUSING INSECURITY
IN THE LAST 12 MONTHS, WAS THERE A TIME WHEN YOU DID NOT HAVE A STEADY PLACE TO SLEEP OR SLEPT IN A SHELTER (INCLUDING NOW)?: NO

## 2023-02-14 SDOH — ECONOMIC STABILITY: INCOME INSECURITY: IN THE LAST 12 MONTHS, WAS THERE A TIME WHEN YOU WERE NOT ABLE TO PAY THE MORTGAGE OR RENT ON TIME?: NO

## 2023-02-14 SDOH — HEALTH STABILITY: PHYSICAL HEALTH: ON AVERAGE, HOW MANY MINUTES DO YOU ENGAGE IN EXERCISE AT THIS LEVEL?: 10 MIN

## 2023-02-14 SDOH — ECONOMIC STABILITY: TRANSPORTATION INSECURITY
IN THE PAST 12 MONTHS, HAS LACK OF TRANSPORTATION KEPT YOU FROM MEETINGS, WORK, OR FROM GETTING THINGS NEEDED FOR DAILY LIVING?: NO

## 2023-02-14 SDOH — HEALTH STABILITY: PHYSICAL HEALTH: ON AVERAGE, HOW MANY DAYS PER WEEK DO YOU ENGAGE IN MODERATE TO STRENUOUS EXERCISE (LIKE A BRISK WALK)?: 2 DAYS

## 2023-02-14 SDOH — ECONOMIC STABILITY: HOUSING INSECURITY: IN THE LAST 12 MONTHS, HOW MANY PLACES HAVE YOU LIVED?: 2

## 2023-02-14 SDOH — ECONOMIC STABILITY: TRANSPORTATION INSECURITY
IN THE PAST 12 MONTHS, HAS THE LACK OF TRANSPORTATION KEPT YOU FROM MEDICAL APPOINTMENTS OR FROM GETTING MEDICATIONS?: NO

## 2023-02-14 ASSESSMENT — SOCIAL DETERMINANTS OF HEALTH (SDOH)
WITHIN THE LAST YEAR, HAVE YOU BEEN KICKED, HIT, SLAPPED, OR OTHERWISE PHYSICALLY HURT BY YOUR PARTNER OR EX-PARTNER?: NO
WITHIN THE LAST YEAR, HAVE YOU BEEN HUMILIATED OR EMOTIONALLY ABUSED IN OTHER WAYS BY YOUR PARTNER OR EX-PARTNER?: NO
IN A TYPICAL WEEK, HOW MANY TIMES DO YOU TALK ON THE PHONE WITH FAMILY, FRIENDS, OR NEIGHBORS?: MORE THAN THREE TIMES A WEEK
WITHIN THE LAST YEAR, HAVE YOU BEEN AFRAID OF YOUR PARTNER OR EX-PARTNER?: NO
HOW OFTEN DO YOU GET TOGETHER WITH FRIENDS OR RELATIVES?: TWICE A WEEK
DO YOU BELONG TO ANY CLUBS OR ORGANIZATIONS SUCH AS CHURCH GROUPS UNIONS, FRATERNAL OR ATHLETIC GROUPS, OR SCHOOL GROUPS?: NO
HOW OFTEN DO YOU ATTENT MEETINGS OF THE CLUB OR ORGANIZATION YOU BELONG TO?: NEVER
WITHIN THE LAST YEAR, HAVE TO BEEN RAPED OR FORCED TO HAVE ANY KIND OF SEXUAL ACTIVITY BY YOUR PARTNER OR EX-PARTNER?: NO
HOW OFTEN DO YOU ATTEND CHURCH OR RELIGIOUS SERVICES?: 1 TO 4 TIMES PER YEAR

## 2023-02-14 ASSESSMENT — PATIENT HEALTH QUESTIONNAIRE - PHQ9
9. THOUGHTS THAT YOU WOULD BE BETTER OFF DEAD, OR OF HURTING YOURSELF: NOT AT ALL
8. MOVING OR SPEAKING SO SLOWLY THAT OTHER PEOPLE COULD HAVE NOTICED. OR THE OPPOSITE, BEING SO FIGETY OR RESTLESS THAT YOU HAVE BEEN MOVING AROUND A LOT MORE THAN USUAL: NOT AT ALL
10. IF YOU CHECKED OFF ANY PROBLEMS, HOW DIFFICULT HAVE THESE PROBLEMS MADE IT FOR YOU TO DO YOUR WORK, TAKE CARE OF THINGS AT HOME, OR GET ALONG WITH OTHER PEOPLE: NOT DIFFICULT AT ALL
SUM OF ALL RESPONSES TO PHQ9 QUESTIONS 1 & 2: 3
2. FEELING DOWN, DEPRESSED OR HOPELESS: NOT AT ALL
6. FEELING BAD ABOUT YOURSELF - OR THAT YOU ARE A FAILURE OR HAVE LET YOURSELF OR YOUR FAMILY DOWN: NOT AT ALL
7. TROUBLE CONCENTRATING ON THINGS, SUCH AS READING THE NEWSPAPER OR WATCHING TELEVISION: NOT AT ALL
3. TROUBLE FALLING OR STAYING ASLEEP: NOT AT ALL
1. LITTLE INTEREST OR PLEASURE IN DOING THINGS: SEVERAL DAYS
1. LITTLE INTEREST OR PLEASURE IN DOING THINGS: NEARLY EVERY DAY
2. FEELING DOWN, DEPRESSED OR HOPELESS: NOT AT ALL
4. FEELING TIRED OR HAVING LITTLE ENERGY: NEARLY EVERY DAY
SUM OF ALL RESPONSES TO PHQ9 QUESTIONS 1 & 2: 1
5. POOR APPETITE OR OVEREATING: NEARLY EVERY DAY
SUM OF ALL RESPONSES TO PHQ QUESTIONS 1-9: 7

## 2023-02-14 ASSESSMENT — ENCOUNTER SYMPTOMS
ALLERGIC/IMMUNOLOGIC NEGATIVE: 1
SHORTNESS OF BREATH: 1
ABDOMINAL PAIN: 1
EYES NEGATIVE: 1
SHORTNESS OF BREATH: 0
VOMITING: 0
COUGH: 0
NAUSEA: 1
BLOOD IN STOOL: 0
RESPIRATORY NEGATIVE: 1
DIARRHEA: 0

## 2023-02-14 ASSESSMENT — LIFESTYLE VARIABLES
HOW OFTEN DO YOU HAVE A DRINK CONTAINING ALCOHOL: MONTHLY OR LESS
HOW MANY STANDARD DRINKS CONTAINING ALCOHOL DO YOU HAVE ON A TYPICAL DAY: PATIENT DOES NOT DRINK
SMOKING_STATUS: 0

## 2023-02-14 NOTE — ANESTHESIA PRE PROCEDURE
Department of Anesthesiology  Preprocedure Note       Name:  Ar David   Age:  79 y.o.  :  1940                                          MRN:  [id number]         Date:  2023      Surgeon: Marlene Kearns):  Sonu Lo MD    Procedure: Procedure(s):  CYSTOSCOPY URETERAL STENT INSERTION  POSSIBLE URETERSCOPY LITHOTRIPSY LASER    Medications prior to admission:   Prior to Admission medications    Medication Sig Start Date End Date Taking? Authorizing Provider   acetaminophen (TYLENOL) 325 MG tablet Take 650 mg by mouth every 6 hours as needed for Pain   Yes Historical Provider, MD   ondansetron (ZOFRAN) 4 MG tablet Take 4 mg by mouth every 6 hours as needed for Nausea or Vomiting   Yes Historical Provider, MD   traMADol (ULTRAM) 50 MG tablet Take 50 mg by mouth every 8 hours as needed for Pain. Yes Historical Provider, MD   pregabalin (LYRICA) 25 MG capsule Take 1 capsule by mouth daily for 30 days.  Max Daily Amount: 25 mg 2/4/23 3/6/23  Perez Perrin MD   mirtazapine (REMERON) 7.5 MG tablet Take 1 tablet by mouth nightly 23   Perez Perrin MD   QUEtiapine (SEROQUEL) 25 MG tablet Take 1 tablet by mouth nightly 23   Perez Perrin MD   docusate sodium (COLACE, DULCOLAX) 100 MG CAPS Take 100 mg by mouth daily 23   Perez Perrin MD   polyethylene glycol (GLYCOLAX) 17 g packet Take 17 g by mouth 2 times daily 2/4/23 3/6/23  Perez Perrin MD   calcitRIOL (ROCALTROL) 0.25 MCG capsule Take 1 capsule by mouth daily 23   Perez Perrin MD   simethicone (MYLICON) 80 MG chewable tablet Take 1 tablet by mouth every 6 hours as needed for Flatulence 23   Perez Perrin MD   aspirin 81 MG chewable tablet Take 1 tablet by mouth daily 23   Perez Perrin MD   sodium bicarbonate 650 MG tablet Take 1 tablet by mouth 2 times daily 23   Perez Perrin MD   tamsulosin [facility]) 0.4 MG capsule Take 1 capsule by mouth daily 10/2/22   Kip Romero MD   metoprolol succinate (TOPROL XL) 50 MG extended release tablet Take 1 tablet by mouth once daily 9/13/22   Kip Romero MD   allopurinol (ZYLOPRIM) 100 MG tablet Take 1 tablet by mouth daily 4/1/22   Kip Romero MD   atorvastatin (LIPITOR) 20 MG tablet Take 1 tablet by mouth daily 4/1/22   Kip Romero MD   omeprazole (PRILOSEC) 20 MG delayed release capsule Take 1 capsule by mouth daily 4/1/22   Kip Romero MD   vitamin D (ERGOCALCIFEROL) 1.25 MG (21388 UT) CAPS capsule Take 1 capsule by mouth once a week 4/1/22   Kip Romero MD       Current medications:    Current Facility-Administered Medications   Medication Dose Route Frequency Provider Last Rate Last Admin    levoFLOXacin (LEVAQUIN) 250 MG/50ML infusion 250 mg  250 mg IntraVENous On Call to Nikia Abdul MD        [START ON 2/15/2023] meropenem (MERREM) 500 mg in sodium chloride 0.9 % 100 mL IVPB ([id number])  500 mg IntraVENous Q24H Pam Toscano MD        [facility]) capsule 0.8 mg  0.8 mg Oral Daily Pam Toscano MD   0.4 mg at 02/13/23 1008    sodium chloride flush 0.9 % injection 5-40 mL  5-40 mL IntraVENous 2 times per day Pam Toscano MD        sodium chloride flush 0.9 % injection 5-40 mL  5-40 mL IntraVENous PRN Pam Toscano MD        0.9 % sodium chloride infusion   IntraVENous PRN MD Elizabeth Patricia [Held by provider] enoxaparin Sodium (LOVENOX) injection 30 mg  30 mg SubCUTAneous Daily Pam Toscano MD        ondansetron (ZOFRAN-ODT) disintegrating tablet 4 mg  4 mg Oral Q8H PRN Pam Toscano MD        Or    [facility]ron [facility]) injection 4 mg  4 mg IntraVENous Q6H PRN Pam Toscano MD        [Held by provider] aspirin chewable tablet 81 mg  81 mg Oral Daily Pam Toscano MD        atorvastatin (LIPITOR) tablet 20 mg  20 mg Oral Nightly Pam Toscano MD   20 mg at 02/13/23 2133    calcitRIOL (ROCALTROL) capsule 0.25 mcg  0.25 mcg Oral Daily Pam Toscano MD   0.25 mcg at 02/13/23 1004    docusate sodium (COLACE) capsule 100 mg  100 mg Oral Daily Karma Heard MD   100 mg at 02/13/23 1003    metoprolol succinate (TOPROL XL) extended release tablet 50 mg  50 mg Oral Daily Karma Heard MD   50 mg at 02/14/23 1224    pregabalin (LYRICA) capsule 25 mg  25 mg Oral Daily Karma Heard MD        sodium bicarbonate tablet 650 mg  650 mg Oral BID Karma Heard MD   650 mg at 02/13/23 2133    simethicone (MYLICON) chewable tablet 80 mg  80 mg Oral Q6H PRN Karma Heard MD        QUEtiapine (SEROQUEL) tablet 25 mg  25 mg Oral Nightly Karma Heard MD   25 mg at 02/13/23 2133    vitamin D (CHOLECALCIFEROL) capsule 5,000 Units  5,000 Units Oral Daily Karma Heard MD   5,000 Units at 02/13/23 1003    allopurinol (ZYLOPRIM) tablet 100 mg  100 mg Oral Daily Karma Heard MD   100 mg at 02/13/23 1003    pantoprazole (PROTONIX) tablet 20 mg  20 mg Oral QAM AC Karma Heard MD   20 mg at 02/14/23 7990    mirtazapine (REMERON) tablet 7.5 mg  7.5 mg Oral Nightly Karma Heard MD   7.5 mg at 02/13/23 2133    polyethylene glycol (GLYCOLAX) packet 17 g  17 g Oral BID Karma Heard MD   17 g at 02/13/23 2133    0.9 % sodium chloride infusion   IntraVENous Continuous Galen Rodriguez  mL/hr at 02/14/23 0757 Rate Change at 02/14/23 0757    HYDROmorphone HCl PF (DILAUDID) injection 0.5 mg  0.5 mg IntraVENous Q3H PRN Galen Rodriguez MD        HYDROcodone-acetaminophen (NORCO)  MG per tablet 1 tablet  1 tablet Oral Q4H PRN Galen Rodriguez MD           Allergies:     Allergies   Allergen Reactions    Codeine Nausea Only       Problem List:    Patient Active Problem List   Diagnosis Code    Mixed hyperlipidemia E78.2    Essential hypertension I10    Restless legs syndrome G25.81    Gastroesophageal reflux disease without esophagitis K21.9    Peripheral neuropathic pain M79.2    Primary osteoarthritis involving multiple joints M15.9    Gout M10.9    Benign non-nodular prostatic hyperplasia with lower urinary tract symptoms N40.1    Elevated PSA R97.20    Idiopathic peripheral neuropathy G60.9    Chronic idiopathic constipation K59.04    Prediabetes R73.03    Pyelonephritis of left kidney N12    Renal stone N20.0    Visual disturbance H53.9    Acute renal failure with acute tubular necrosis superimposed on chronic kidney disease, on chronic dialysis (HCC) N17.0, N18.9, Z99.2    Pyelonephritis N12    Severe malnutrition ([address] E43    Obstruction of left ureter I19.0    Complicated UTI (urinary tract infection) N39.0    Urinary retention R33.9       Past Medical History:        Diagnosis Date    Benign non-nodular prostatic hyperplasia with lower urinary tract symptoms 2017    Essential hypertension 2017    Gastroesophageal reflux disease without esophagitis 2017    Gout 2017    Idiopathic peripheral neuropathy 2018    Mixed hyperlipidemia 2017    Peripheral neuropathic pain 2017    Primary osteoarthritis involving multiple joints 2017    Renal stone 2023    Renal stone 2023    Restless legs syndrome 2017       Past Surgical History:        Procedure Laterality Date    INGUINAL HERNIA REPAIR Bilateral 1963    TURP         Social History:    Social History     Tobacco Use    Smoking status: Former     Packs/day: 3.00     Years: 35.00     Pack years: 105.00     Types: Cigarettes, Pipe     Start date:      Quit date:      Years since quittin.1    Smokeless tobacco: Never   Substance Use Topics    Alcohol use: Not Currently     Comment: \"not vbery much\", \"beer every 15 years\"                                Counseling given: Not Answered      Vital Signs (Current):   Vitals:    23 1552 23 2136 23 0505 23 1224   BP: (!) 151/79 (!) 155/78 (!) 157/81 129/67   Pulse: 87 92 99 88   Resp: 18 16 18 18   Temp: 98.7 °F (37.1 °C) 99.1 °F (37.3 °C) 99 °F (37.2 °C) 98.1 °F (36.7 °C)   TempSrc: Oral Temporal Temporal Temporal SpO2: 91% 93% 97% 97%   Weight:       Height:                                                  BP Readings from Last 3 Encounters:   02/14/23 129/67   02/04/23 133/83   10/14/22 114/68       NPO Status:                                                                                 BMI:   Wt Readings from Last 3 Encounters:   02/12/23 204 lb (92.5 kg)   02/04/23 199 lb 4.8 oz (90.4 kg)   10/14/22 200 lb (90.7 kg)     Body mass index is 27.67 kg/m². CBC:   Lab Results   Component Value Date/Time    WBC 5.8 02/14/2023 03:05 AM    RBC 3.74 02/14/2023 03:05 AM    HGB 10.2 02/14/2023 03:05 AM    HCT 32.0 02/14/2023 03:05 AM    MCV 85.6 02/14/2023 03:05 AM    RDW 13.7 02/14/2023 03:05 AM    PLT 99 02/14/2023 03:05 AM       CMP:   Lab Results   Component Value Date/Time     02/14/2023 03:05 AM    K 3.4 02/14/2023 03:05 AM     02/14/2023 03:05 AM    CO2 27 02/14/2023 03:05 AM    BUN 39 02/14/2023 03:05 AM    CREATININE 3.1 02/14/2023 03:05 AM    GFRAA >59 05/19/2022 03:38 PM    LABGLOM 19 02/14/2023 03:05 AM    GLUCOSE 90 02/14/2023 03:05 AM    PROT 5.8 02/12/2023 09:42 PM    CALCIUM 7.6 02/14/2023 03:05 AM    BILITOT 0.7 02/12/2023 09:42 PM    ALKPHOS 73 02/12/2023 09:42 PM    AST 44 02/12/2023 09:42 PM    ALT 44 02/12/2023 09:42 PM       POC Tests: No results for input(s): POCGLU, POCNA, POCK, POCCL, POCBUN, POCHEMO, POCHCT in the last 72 hours.     Coags:   Lab Results   Component Value Date/Time    PROTIME 14.2 05/19/2022 03:38 PM    INR 1.10 05/19/2022 03:38 PM    APTT 31.2 05/19/2022 03:38 PM       HCG (If Applicable): No results found for: PREGTESTUR, PREGSERUM, HCG, HCGQUANT     ABGs: No results found for: PHART, PO2ART, VHY5LNJ, WQN3VGT, BEART, [id number]     Type & Screen (If Applicable):  No results found for: LABABO, LABRH    Drug/Infectious Status (If Applicable):  No results found for: HIV, HEPCAB    COVID-19 Screening (If Applicable):   Lab Results   Component Value Date/Time    COVID19 Not Detected 02/12/2023 11:53 PM    COVID19 DETECTED 05/19/2022 04:10 PM           Anesthesia Evaluation  Patient summary reviewed and Nursing notes reviewed no history of anesthetic complications:   Airway: Mallampati: II  TM distance: >3 FB   Neck ROM: full  Mouth opening: > = 3 FB   Dental:          Pulmonary:   (+) COPD:  shortness of breath: chronic,      (-) asthma, sleep apnea and not a current smoker                           Cardiovascular:  Exercise tolerance: no interval change,   (+) hypertension:, hyperlipidemia    (-) pacemaker, past MI, CAD, CABG/stent, dysrhythmias and  angina    ECG reviewed      Echocardiogram reviewed         Beta Blocker:  Dose within 24 Hrs      ROS comment: Echo 1/2023:   Summary   Normal left ventricular size with low normal systolic function without   distinct segmental wall motion abnormality -estimated ejection fraction 50   to 55%   Mild concentric left ventricular hypertrophy with mild (grade 1) diastolic   dysfunction   Normal right-sided chambers with preserved right ventricular systolic   function   Normal left atrial size   IVC dimension falls within normal limits without ability to assess   inspiratory motion   Trace pulmonic valvular insufficiency   No tricuspid regurgitation seen with which to estimate RVSP   Thickened aortic valve which appears to be tricuspid with adequate cusp   separation, no stenosis, and trace insufficiency (cannot exclude   vegetation)   Normally mobile mitral valve with mild regurgitation   Aortic root measurements fall within normal limits with ascending aorta   measuring upper normal at 3.3 cm   No significant pericardial effusion   Definity contrast utilized to better define endocardial borders     Neuro/Psych:   (+) neuromuscular disease (peripheral neuropathy):,    (-) seizures and CVA           GI/Hepatic/Renal:   (+) GERD: well controlled, renal disease (dialysis treatment T/Th/ Sat): dialysis and ARF,          ROS comment: pyelonephritis. Endo/Other:    (+) blood dyscrasia (anemia)::., electrolyte abnormalities, no malignancy/cancer. (-) diabetes mellitus, no malignancy/cancer               Abdominal:             Vascular:     - DVT and PE.       ROS comment: Carotid duplex US:  Impression      There is mixed plaque visualized in the bilateral internal carotid   arteries.  Daphane Flies is less than 50% stenosis in the right internal carotid artery.  Daphane Flies is less than 50% stenosis of the left internal carotid artery.   There is normal antegrade flow in the bilateral vertebral arteries. . Other Findings: García in place          Anesthesia Plan      general     ASA 4       Induction: intravenous. MIPS: Postoperative opioids intended and Prophylactic antiemetics administered. Anesthetic plan and risks discussed with patient. Use of blood products discussed with patient whom consented to blood products.                      La Stephen DO   2/14/2023

## 2023-02-14 NOTE — PROGRESS NOTES
Renal Progress Note    Thank you to requesting provider: Dr Tita Chan, for asking us to see Sreedhar Johnson    Reason for consultation:  ESRD    Chief Complaint:  Abdominal pain, urine retention. History of Presenting Illness      Mr. Sreedhar Johnson is a pleasant 79year old man who presented with abdominal pain and was found with obstructive 1cm stone in mid left ureter with mild left hydronephrosis. He has a history of prior kidney stones. He has recently started dialysis for ALEXANDRO after pyelonephritis and sepsis. He is currently comfortable. He was seen by urology. He is non-oliguric.      Dialysis   Pt was seen on RRT  Modality: Hemodialysis  Access: Catheter  Location: right upper  QB: 400  QD: 600  UF: 3 liters  Past Medical/Surgical History      Active Ambulatory Problems     Diagnosis Date Noted    Mixed hyperlipidemia 08/16/2017    Essential hypertension 08/16/2017    Restless legs syndrome 08/16/2017    Gastroesophageal reflux disease without esophagitis 08/16/2017    Peripheral neuropathic pain 08/16/2017    Primary osteoarthritis involving multiple joints 08/16/2017    Gout 08/16/2017    Benign non-nodular prostatic hyperplasia with lower urinary tract symptoms 08/16/2017    Elevated PSA 08/16/2017    Idiopathic peripheral neuropathy 02/19/2018    Chronic idiopathic constipation 08/20/2018    Prediabetes 10/14/2022    Pyelonephritis of left kidney 01/23/2023    Renal stone 01/23/2023    Visual disturbance 01/31/2023    Acute renal failure with acute tubular necrosis superimposed on chronic kidney disease, on chronic dialysis ([address] 02/01/2023    Pyelonephritis 02/02/2023    Severe malnutrition ([facility] [address] 02/03/2023     Resolved Ambulatory Problems     Diagnosis Date Noted    No Resolved Ambulatory Problems     No Additional Past Medical History         Review of Systems     Constitutional:  No weight loss, no fever/chills  Eyes:  No eye pain, no eye redness  Cardiovascular:  No chest pain, no worsening of edema  Respiratory:  No hemoptysis, no stidor  Gastrointestinal:  No blood in stool, no n/v, no diarrhea  Genitoruinary:  No hematuria, + difficulty with urination  Musculoskeletal:  No joint swelling, no redness  Integumentary:  No Rash, no itching  Neurological:  No focal weakness, No new sensory deficit  Psychiatric:  No depression, no confusion  Endocrine:  No polyuria, no polydipsia       Medications        Current Facility-Administered Medications:     levoFLOXacin (LEVAQUIN) 250 MG/50ML infusion 250 mg, 250 mg, IntraVENous, On Call to OR, Malvin Vaz MD    heparin (porcine) injection 3,600 Units, 3,600 Units, IntraCATHeter, Once in dialysis, Julissa Kinsey MD    [facility]) capsule 0.8 mg, 0.8 mg, Oral, Daily, Angelika Rincon MD, 0.4 mg at 02/13/23 1008    sodium chloride flush 0.9 % injection 5-40 mL, 5-40 mL, IntraVENous, 2 times per day, Angelika Rincon MD    sodium chloride flush 0.9 % injection 5-40 mL, 5-40 mL, IntraVENous, PRN, Angelika Rincon MD    0.9 % sodium chloride infusion, , IntraVENous, PRN, Angelika Rincon MD    [facility] [place] by provider] enoxaparin Sodium (LOVENOX) injection 30 mg, 30 mg, SubCUTAneous, Daily, Angelika Rincon MD    ondansetron (ZOFRAN-ODT) disintegrating tablet 4 mg, 4 mg, Oral, Q8H PRN **OR** ondansetron (ZOFRAN) injection 4 mg, 4 mg, IntraVENous, Q6H PRN, Angelika Rincon MD    [Held by provider] aspirin chewable tablet 81 mg, 81 mg, Oral, Daily, Angelika Rincon MD    atorvastatin (LIPITOR) tablet 20 mg, 20 mg, Oral, Nightly, Angelika Rincon MD, 20 mg at 02/13/23 2133    calcitRIOL (ROCALTROL) capsule 0.25 mcg, 0.25 mcg, Oral, Daily, Angelika Rincon MD, 0.25 mcg at 02/13/23 1004    docusate sodium (COLACE) capsule 100 mg, 100 mg, Oral, Daily, Angelika Rincon MD, 100 mg at 02/13/23 1003    metoprolol succinate (TOPROL XL) extended release tablet 50 mg, 50 mg, Oral, Daily, Angelika Rincon MD, 50 mg at 02/13/23 1004    pregabalin (LYRICA) capsule 25 mg, 25 mg, Oral, Daily, Albert Torres MD    sodium bicarbonate tablet 650 mg, 650 mg, Oral, BID, Albert Torres MD, 650 mg at 23    simethicone (MYLICON) chewable tablet 80 mg, 80 mg, Oral, Q6H PRN, Albert Torres MD    QUEtiapine (SEROQUEL) tablet 25 mg, 25 mg, Oral, Nightly, Albert Torres MD, 25 mg at 23    vitamin D (CHOLECALCIFEROL) capsule 5,000 Units, 5,000 Units, Oral, Daily, Albert Torres MD, 5,000 Units at 23 1003    allopurinol (ZYLOPRIM) tablet 100 mg, 100 mg, Oral, Daily, Albert Torres MD, 100 mg at 23 1003    pantoprazole (PROTONIX) tablet 20 mg, 20 mg, Oral, QAM AC, Albert Torres MD, 20 mg at 23 3706    mirtazapine (REMERON) tablet 7.5 mg, 7.5 mg, Oral, Nightly, Albert Torres MD, 7.5 mg at 23    polyethylene glycol (GLYCOLAX) packet 17 g, 17 g, Oral, BID, Albert Torres MD, 17 g at 23    0.9 % sodium chloride infusion, , IntraVENous, Continuous, Tessa Singer MD, Last Rate: 125 mL/hr at 23 0757, Rate Change at 23 0757    HYDROmorphone HCl PF (DILAUDID) injection 0.5 mg, 0.5 mg, IntraVENous, Q3H PRN, Tessa Singer MD    HYDROcodone-acetaminophen (NORCO)  MG per tablet 1 tablet, 1 tablet, Oral, Q4H PRN, Tessa Singer MD    meropenem (MERREM) 500 mg in sodium chloride 0.9% 50 mL (duplex), 500 mg, IntraVENous, Q12H, Tessa Singer MD, Stopped at 23 0048  Outpatient Medications Marked as Taking for the [facility] encounter [facility])   Medication Sig Dispense Refill    acetaminophen (TYLENOL) 325 MG tablet Take 650 mg by mouth every 6 hours as needed for Pain      ondansetron (ZOFRAN) 4 MG tablet Take 4 mg by mouth every 6 hours as needed for Nausea or Vomiting      traMADol (ULTRAM) 50 MG tablet Take 50 mg by mouth every 8 hours as needed for Pain.          Allergies   Codeine    Family History       Family History   Problem Relation Age of Onset    Uterine Cancer Mother          at age 52    Coronary Art Dis Father MI at 62, smoker    COPD Sister         smoker    COPD Sister         smoker    Heart Failure Sister     COPD Sister         smoker    Stroke Brother         smoker    Coronary Art Dis Brother         open heart surgry, MI at 80- yo    No Known Problems Son     No Known Problems Daughter      Family history negative for kidney disease. 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     Employer: RETIRED     Comment: retired    Tobacco Use    Smoking status: Former     Packs/day: 3.00     Years: 35.00     Pack years: 105.00     Types: Cigarettes, Pipe     Start date:      Quit date:      Years since quittin.1    Smokeless tobacco: Never   Vaping Use    Vaping Use: Never used   Substance and Sexual Activity    Alcohol use: Not Currently     Comment: \"not vbery much\", \"beer every 15 years\"    Drug use: Never    Sexual activity: Yes     Partners: Female     Comment: wife, has 2 kids   Social History Narrative    CODE STATUS: Full Code    HEALTH CARE PROXY: his children    1.) Gibran Cooper, his daughter, [phone]    2.) Kleber Zhao, his son, [phone]    AMBULATES: independently, sometimes with cane    DOMICILED: has 3 story home but he has an elevator     Social Determinants of Health     Financial Resource Strain: Low Risk     Difficulty of Paying Living Expenses: Not hard at all   Food Insecurity: No Food Insecurity    Worried About Running Out of Food in the Last Year: Never true    [address] in the Last Year: Never true   Physical Activity: Unknown    Days of Exercise per Week: 0 days   Intimate Partner Violence: Not At Risk    Fear of Current or Ex-Partner: No    Emotionally Abused: No    Physically Abused: No    Sexually Abused: No   Housing Stability: Unknown    Unable to Pay for Housing in the Last Year: No    Unstable Housing in the Last Year: No       Physical Exam     Blood pressure (!) 157/81, pulse 99, temperature 99 °F (37.2 °C), temperature source Temporal, resp. rate 18, height 6' (1.829 m), weight 204 lb (92.5 kg), SpO2 97 %. General:  NAD, A+Ox3, ill-appearing, normal body habitus  HEENT:  atraumatic, normocephalic  Neck:  no masses, no JVD  Chest:  CTAB, good respiratory effort, good air movement  CV:  RRR, soft systolic murmur    Abdomen:  NTND, soft, +BS, no hepatosplenomegaly  Extremities:  No peripheral edema  Neurological:  Moving all four extremities   Lymphatics:  No palpable lymph nodes   Skin:  No rash, no jaundice  Psychiatric:  Normal insight and judgement, good recall    Data     Recent Labs     02/12/23 2142 02/14/23  0305   WBC 7.4 5.8   HGB 11.8* 10.2*   HCT 36.9* 32.0*   MCV 84.6 85.6   * 99*       Recent Labs     02/12/23 2142 02/13/23  0853 02/14/23  0305   * 131* 140   K 3.4* 3.6 3.4*   CL 93* 94* 100   CO2 31* 29 27   GLUCOSE 100 95 90   MG  --   --  2.1   BUN 38* 40* 39*   CREATININE 3.4* 3.5* 3.1*   LABGLOM 17* 17* 19*         Assessment:  ALEXANDRO- ATN requiring dialysis  Recent pyelonephritis and sepsis  Abdominal pain  Left ureter stone with mild left hydronephrosis  Hypokalemia  Hyponatremia  CKD stage 3a  Urine retention      Plan: Will continue to provide dialysis on TTS per outpatient schedule. Follow up labs. Awaiting urology recommendations and possible intervention. Follow up labs and watch for possible renal function recovery.

## 2023-02-14 NOTE — PROGRESS NOTES
Daily Progress Note    Date:2023  Patient: Brigitte Martínez  : 1940  NIY:[id number]  CODE:Full Code No additional code details  PCP:Bety Moore MD    Admit Date: 2023  7:14 PM   LOS: 1 day       Subjective:    50-year-old male with BPH/urinary retention with García in place, history of prior kidney stones, recent hospitalization with sepsis and pyelonephritis with acute renal failure requiring initiation of dialysis, presenting back with some lower abdominal discomfort and concern that García may not be draining properly, ultimately found to have obstructing left ureteral stone on CT abdomen/pelvis. Patient/family or apparently not agreeable to transfer for more urgent urologic intervention, however was evaluated by urology want service available on  to undergo cystoscopy with ureteral stent placement. Patient remained in renal failure on initial evaluation, no improvement noted since last hospitalization, nephrology consulted, maintaining on dialysis. He was found to have UTI and treated on empiric IV antibiotics. García maintained. Patient seen and examined while in dialysis today. Tolerating well without issue. Denies any worsening abdominal pain.         Objective:      Vital signs in last 24 hours:  Patient Vitals for the past 24 hrs:   BP Temp Temp src Pulse Resp SpO2   23 1224 129/67 98.1 °F (36.7 °C) Temporal 88 18 97 %   23 0505 (!) 157/81 99 °F (37.2 °C) Temporal 99 18 97 %   23 2136 (!) 155/78 99.1 °F (37.3 °C) Temporal 92 16 93 %   23 1552 (!) 151/79 98.7 °F (37.1 °C) Oral 87 18 91 %     Physical exam    General: No acute distress  Cardiac: Normal rate  Respiratory: No wheezes or rales  Abdomen: Nontender, no CVA tenderness, bowel sounds present  Extremities: No edema  : García in place      Lab Review   Recent Results (from the past 24 hour(s))   Culture, Urine    Collection Time: 23  4:33 PM    Specimen: Urine, indwelling catheter Result Value Ref Range    Urine Culture, Routine >100,000 CFU/ml (A)     Organism Yeast (A)     Urine Culture, Routine       Moderate growth  Additional incubation in progress     CBC with Auto Differential    Collection Time: 02/14/23  3:05 AM   Result Value Ref Range    WBC 5.8 4.8 - 10.8 K/uL    RBC 3.74 (L) 4.70 - 6.10 M/uL    Hemoglobin 10.2 (L) 14.0 - 18.0 g/dL    Hematocrit 32.0 (L) 42.0 - 52.0 %    MCV 85.6 80.0 - 94.0 fL    MCH 27.3 27.0 - 31.0 pg    MCHC 31.9 (L) 33.0 - 37.0 g/dL    RDW 13.7 11.5 - 14.5 %    Platelets 99 (L) 615 - 400 K/uL    MPV 10.0 9.4 - 12.4 fL    PLATELET SLIDE REVIEW Decreased     Neutrophils % 81.0 (H) 50.0 - 65.0 %    Lymphocytes % 13.0 (L) 20.0 - 40.0 %    Monocytes % 4.0 0.0 - 10.0 %    Eosinophils % 0.0 0.0 - 5.0 %    Basophils % 0.0 0.0 - 1.0 %    Neutrophils Absolute 4.7 1.5 - 7.5 K/uL    Immature Granulocytes # 0.1 K/uL    Lymphocytes Absolute 0.9 (L) 1.1 - 4.5 K/uL    Monocytes Absolute 0.20 0.00 - 0.90 K/uL    Eosinophils Absolute 0.00 0.00 - 0.60 K/uL    Basophils Absolute 0.00 0.00 - 0.20 K/uL    Atypical Lymphocytes Relative 2 0 - 8 %   Basic Metabolic Panel w/ Reflex to MG    Collection Time: 02/14/23  3:05 AM   Result Value Ref Range    Sodium 140 136 - 145 mmol/L    Potassium reflex Magnesium 3.4 (L) 3.5 - 5.0 mmol/L    Chloride 100 98 - 111 mmol/L    CO2 27 22 - 29 mmol/L    Anion Gap 13 7 - 19 mmol/L    Glucose 90 74 - 109 mg/dL    BUN 39 (H) 8 - 23 mg/dL    Creatinine 3.1 (H) 0.5 - 1.2 mg/dL    Est, Glom Filt Rate 19 (A) >60    Calcium 7.6 (L) 8.8 - 10.2 mg/dL   Magnesium    Collection Time: 02/14/23  3:05 AM   Result Value Ref Range    Magnesium 2.1 1.6 - 2.4 mg/dL       I/O last 3 completed shifts: In: 2198 [P.O.:40; I.V.:2158]  Out: 1440 [Urine:1440]  No intake/output data recorded.       Current Facility-Administered Medications:     levoFLOXacin (LEVAQUIN) 250 MG/50ML infusion 250 mg, 250 mg, IntraVENous, On Call to OR, Galen Marin MD    [START ON 2/15/2023] meropenem (MERREM) 500 mg in sodium chloride 0.9 % 100 mL IVPB ([id number]), 500 mg, IntraVENous, Q24H, Sam Sidhu MD    tamsulosin [facility]) capsule 0.8 mg, 0.8 mg, Oral, Daily, Sam Sidhu MD, 0.4 mg at 02/13/23 1008    sodium chloride flush 0.9 % injection 5-40 mL, 5-40 mL, IntraVENous, 2 times per day, Sam Sidhu MD    sodium chloride flush 0.9 % injection 5-40 mL, 5-40 mL, IntraVENous, PRN, Sam Sidhu MD    0.9 % sodium chloride infusion, , IntraVENous, PRN, Sam Sidhu MD    [facility] [place] by provider] enoxaparin Sodium (LOVENOX) injection 30 mg, 30 mg, SubCUTAneous, Daily, Sam Sidhu MD    ondansetron (ZOFRAN-ODT) disintegrating tablet 4 mg, 4 mg, Oral, Q8H PRN **OR** ondansetron (ZOFRAN) injection 4 mg, 4 mg, IntraVENous, Q6H PRN, Sam Sidhu MD    [Held by provider] aspirin chewable tablet 81 mg, 81 mg, Oral, Daily, Sam Sidhu MD    atorvastatin (LIPITOR) tablet 20 mg, 20 mg, Oral, Nightly, Sam Sidhu MD, 20 mg at 02/13/23 2133    calcitRIOL (ROCALTROL) capsule 0.25 mcg, 0.25 mcg, Oral, Daily, Sam Sidhu MD, 0.25 mcg at 02/13/23 1004    docusate sodium (COLACE) capsule 100 mg, 100 mg, Oral, Daily, Sam Sidhu MD, 100 mg at 02/13/23 1003    metoprolol succinate (TOPROL XL) extended release tablet 50 mg, 50 mg, Oral, Daily, Sam Sidhu MD, 50 mg at 02/14/23 1224    pregabalin (LYRICA) capsule 25 mg, 25 mg, Oral, Daily, Sam Sidhu MD    sodium bicarbonate tablet 650 mg, 650 mg, Oral, BID, Sam Sidhu MD, 650 mg at 02/13/23 2133    simethicone (MYLICON) chewable tablet 80 mg, 80 mg, Oral, Q6H PRN, Sam Sidhu MD    QUEtiapine (SEROQUEL) tablet 25 mg, 25 mg, Oral, Nightly, Sam Sidhu MD, 25 mg at 02/13/23 2133    vitamin D (CHOLECALCIFEROL) capsule 5,000 Units, 5,000 Units, Oral, Daily, Sam Sidhu MD, 5,000 Units at 02/13/23 1003    allopurinol (ZYLOPRIM) tablet 100 mg, 100 mg, Oral, Daily, Sam Sidhu MD, 100 mg at 02/13/23 1003    pantoprazole (PROTONIX) tablet 20 mg, 20 mg, Oral, QAM AC, Carley Jacobs MD, 20 mg at 02/14/23 4773    mirtazapine (REMERON) tablet 7.5 mg, 7.5 mg, Oral, Nightly, Carley Jacobs MD, 7.5 mg at 02/13/23 2133    polyethylene glycol (GLYCOLAX) packet 17 g, 17 g, Oral, BID, Carley Jacobs MD, 17 g at 02/13/23 2133    0.9 % sodium chloride infusion, , IntraVENous, Continuous, Bryson Ruby MD, Last Rate: 125 mL/hr at 02/14/23 0757, Rate Change at 02/14/23 0757    HYDROmorphone HCl PF (DILAUDID) injection 0.5 mg, 0.5 mg, IntraVENous, Q3H PRN, Bryson Ruby MD    HYDROcodone-acetaminophen (NORCO)  MG per tablet 1 tablet, 1 tablet, Oral, Q4H PRN, Bryson Ruby MD        Reviewed chart, history, vitals, labs, diagnostics and medications        Assessment/plan  Principal Problem:    Obstruction of left ureter  Active Problems:    Acute renal failure with acute tubular necrosis superimposed on chronic kidney disease, on chronic dialysis ([facility] [address]    Complicated UTI (urinary tract infection)    Urinary retention  Resolved Problems:    * No resolved hospital problems.  *    Obstruction with left ureteral calculus  --Maintenance IV fluid  - Urology consult, appreciate recommendations  -N.p.o. for cystoscopy, left ureteral stent placement  --Holding aspirin and Lovenox for surgical intervention    Complicated UTI in setting of García catheter and ureteral obstruction  - Continue IV antibiotics on meropenem empirically  - Follow-up final results of urine culture, currently growing yeast, this may be colonization, however may need antifungal coverage given upcoming ureteral stent placement    Acute renal failure with ATN requiring dialysis  Urinary retention with BPH  Ureteral obstruction  Urologic intervention as noted above  --Maintain García in place, monitor urine output  --Continue Flomax  --Continue dialysis as per nephrology    Constipation  - Continue bowel regimen including Colace and MiraLAX    Mood disorder  Continue Seroquel and Remeron    Dementia  Supportive care      Morbidity and mortality: High risk  Medical decision making: High complexity      From SNF    Plan to discharge back to SNF likely later this week          Simba Pierre MD 2/14/2023 1:18 PM

## 2023-02-14 NOTE — CONSULTS
INFECTIOUS DISEASES CONSULT NOTE    Patient:  Wily Menard 79 y.o. male  ROOM # [unfilled]  YOB: 1940  MRN: [id number]  [facility]:  [id number]  Admit date: 2/12/2023   Admitting Physician: Marie Pennington MD  Primary Care Physician: Jumana Echavarria MD  REFERRING PROVIDER: No ref. provider found    Reason for Consultation: See previously. Recommendations for antibiotic treatment. History of Present Illness/Chief Complaint: Pleasant 79-year-old man. Seen during his prior admission. He had had an obstructing stone. He had grown staph epi from urine. He received antibiotic treatment. He completed his prior antibiotic treatment. He had had a stent in place. He was going to undergo stent removal or exchange and stone removal.  Due to the presence of some purulence in the urine, the stone could not be removed. He has a stent and a García to remain in place. Infectious disease asked to evaluate and offer antibiotic recommendations. Current Scheduled Medications:    [START ON 2/15/2023] meropenem  500 mg IntraVENous Q24H    fluconazole  200 mg IntraVENous Q24H    tamsulosin  0.8 mg Oral Daily    sodium chloride flush  5-40 mL IntraVENous 2 times per day    [Held by provider] enoxaparin  30 mg SubCUTAneous Daily    [Held by provider] aspirin  81 mg Oral Daily    atorvastatin  20 mg Oral Nightly    calcitRIOL  0.25 mcg Oral Daily    docusate sodium  100 mg Oral Daily    metoprolol succinate  50 mg Oral Daily    pregabalin  25 mg Oral Daily    sodium bicarbonate  650 mg Oral BID    QUEtiapine  25 mg Oral Nightly    vitamin D  5,000 Units Oral Daily    allopurinol  100 mg Oral Daily    pantoprazole  20 mg Oral QAM AC    mirtazapine  7.5 mg Oral Nightly    polyethylene glycol  17 g Oral BID     Current PRN Medications:  sodium chloride flush, sodium chloride, ondansetron **OR** ondansetron, simethicone, HYDROmorphone, HYDROcodone-acetaminophen    Allergies:     Allergies   Allergen Reactions    Codeine Nausea Only     Past Medical History: Recent staph epi UTI. Benign prostatic hyperplasia. Hypertension. Gastroesophageal reflux disease. Gout. Idiopathic neuropathy. Hyperlipidemia. Osteoarthritis. Restless leg syndrome. Past Surgical History: Inguinal hernia repair.  TURP. Social History: No alcohol use. No illicit drug use. Remote history of tobacco use. Family History: Uterine cancer. Coronary artery disease. Chronic obstructive pulmonary disease. Stroke. Exposure History: No close contacts have been ill     Review of Systems: No chest pain or chest pressure. No cough or sputum production. No nausea or vomiting. Vital Signs:  BP (!) 147/73   Pulse 77   Temp 98.6 °F (37 °C) (Temporal)   Resp 16   Ht 6' (1.829 m)   Wt 204 lb (92.5 kg)   SpO2 98%   BMI 27.67 kg/m²  Temp (24hrs), Av.3 °F (36.8 °C), Min:97.8 °F (36.6 °C), Max:99.1 °F (37.3 °C)    Physical Exam:   Vital signs reviewed. Seen in recovery room  He is without fever  He appears to be stable  Lungs without crackles  Heart without murmur  Abdomen is soft and nontender  Intact sensation and motor strength in both lower extremities    Lab Results:  CBC:   Recent Labs     23  0305   WBC 7.4 5.8   HGB 11.8* 10.2*   HCT 36.9* 32.0*   * 99*   LYMPHOPCT 14.0* 13.0*   MONOPCT 8.6 4.0     CMP:   Recent Labs     232 23  0853 23  0305   * 131* 140   K 3.4* 3.6 3.4*   CL 93* 94* 100   CO2 31* 29 27   BUN 38* 40* 39*   CREATININE 3.4* 3.5* 3.1*   CALCIUM 8.7* 8.3* 7.6*   BILITOT 0.7  --   --    ALKPHOS 73  --   --    ALT 44*  --   --    AST 44*  --   --    GLUCOSE 100 95 90     Urinalysis 2023 yeast present. 1+ calcium oxalate. 26 white blood cells.     Culture:   Urine culture yesterday-greater than 100,000 colony-forming units of yeast    Radiology:     CT scan of the abdomen and pelvis done 2023:  Impression   1 cm obstructing left mid ureteral calculus. Mild left hydronephrosis. Punctate nonobstructing right-sided nephrolithiasis. Soft tissue fullness at the pancreatic tail. Cannot exclude underlying mass. Correlation with contrast-enhanced pancreatic protocol MRI or CT is suggested a   possible. Scattered hepatic hypoattenuating lesion, which are compatible with cysts while   others are too small to accurately characterize. Additional findings per body of the report. Additional Studies Reviewed:     Impression:   1. Urinary tract infection-yeast  2. Nephrolithiasis-left mid ureteral calculus with mild left hydronephrosis  3. Previous back and flank pain-has shown some improvement  4.   Acute on chronic renal insufficiency-he has been requiring dialysis    Recommendations:    Continue antibiotic treatment with meropenem and fluconazole  If no gram-negative's recovered from urine we will discontinue Merrem  Asking lab to report ID and susceptibility for the yeast  Continue to follow    De Billings MD  02/14/23  3:52 PM

## 2023-02-14 NOTE — OP NOTE
Brief operative Note      Patient: Joellen Gosselin  YOB: 1940  MRN: [id number]    Date of Procedure: 2/14/2023    Pre-Op Diagnosis: Left ureteral calculus [N20.1]    Post-Op Diagnosis: Same       Procedure(s):  CYSTOSCOPY LEFT URETERAL CATHETERIZATION, LEFT RETROGRADE PYELOGRAM, LEFT URETERAL STENT INSERTION  5 [place] by 28 cm      Surgeon(s):  Greg Russell MD    Assistant:   * No surgical staff found *    Anesthesia: General    Estimated Blood Loss (mL): 0    Complications: None    Specimens:   ID Type Source Tests Collected by Time Destination   1 : left kidney urine Urine Kidney CULTURE, URINE Greg Russell MD 2/14/2023 1451        Implants:  * No implants in log *      Drains:   Urinary Catheter (Active)   Catheter Indications Urinary retention (acute or chronic), continuous bladder irrigation or bladder outlet obstruction 02/14/23 0758   Site Assessment Other (Comment); No urethral drainage 02/14/23 0758   Urine Color Yellow;Straw 02/14/23 0758   Urine Appearance Cloudy 02/14/23 0758   Collection Container Standard 02/14/23 0758   Securement Method Leg strap 02/14/23 0758   Catheter Care  Soap and water 02/14/23 1235   Catheter Best Practices  Catheter secured to thigh; Tamper seal intact; Bag below bladder;Bag not on floor; Lack of dependent loop in tubing;Drainage bag less than half full 02/14/23 0758   Status Draining;Patent 02/14/23 0758   Output (mL) 350 mL 02/14/23 0505       [REMOVED] Urinary Catheter 01/25/23 Coude (Removed)   $ Urethral catheter insertion $ Not inserted for procedure 01/26/23 1948   Catheter Indications Urinary retention (acute or chronic), continuous bladder irrigation or bladder outlet obstruction 01/27/23 0803   Site Assessment No urethral drainage 01/27/23 0803   Urine Color Yellow 01/27/23 0803   Urine Appearance Hazy 01/27/23 0803   Collection Container Standard 01/27/23 0803   Securement Method Securing device (Describe) 01/27/23 0803   Catheter Care  Yes 01/27/23 0803   Catheter Best Practices  Drainage tube clipped to bed;Catheter secured to thigh; Tamper seal intact; Bag below bladder;Bag not on floor; Lack of dependent loop in tubing;Drainage bag less than half full 01/27/23 0803   Status Draining 01/27/23 0803   Output (mL) 400 mL 01/27/23 1100   Discontinuation Reason Per provider order 01/27/23 1100       [REMOVED] Urinary Catheter 01/28/23 García (Removed)   $ Urethral catheter insertion $ Not inserted for procedure 01/29/23 2120   Catheter Indications Urinary retention (acute or chronic), continuous bladder irrigation or bladder outlet obstruction 02/04/23 0900   Site Assessment No urethral drainage 02/04/23 0900   Urine Color Yellow 02/04/23 1319   Urine Appearance Clear 02/04/23 1319   Urine Odor Fruity 02/04/23 0039   Collection Container Standard 02/04/23 1319   Securement Method Securing device (Describe) 02/04/23 1319   Catheter Care  Soap and water 02/04/23 1319   Catheter Best Practices  Drainage tube clipped to bed;Catheter secured to thigh; Bag below bladder;Bag not on floor; Lack of dependent loop in tubing;Drainage bag less than half full 02/04/23 1319   Status Draining;Patent 02/04/23 1319   Output (mL) 300 mL 02/04/23 1319       Findings: Proximal left ureteral calculus with proximal hydroureteronephrosis. Grossly cloudy to purulent urine aspirated from the left kidney. A stent was placed 5 [place] by 28 cm. I did not treat the stone this will have to be treated at a later date after coverage with appropriate antimicrobial therapy. Prostatic fossa was widely open no stricture some mild nonsignificant residual prostate adenoma but otherwise good TUR opening  and wide open bladder neck no contracture. No obvious prostatic or outlet obstruction.   12 [place] García catheter placed    Detailed Description of Procedure:   [id number]    Disposition to PACU then back to fifth floor García catheter placed for maximal external genitalia voiding trial when clinically appropriate. Continue tamsulosin.   Left ureteral stent in place will be treated with culture specific antibiotics follow-up on cultures and definitively treat the stone after at least a 10-14 day course of appropriate antimicrobial therapy    Electronically signed by Kurt Mortimer, MD on 2/14/2023 at 3:00 PM

## 2023-02-14 NOTE — CONSULTS
Consult Note            Date:2/14/2023        Patient Nelida Chou     YOB: 1940     Age:83 y.o. Inpatient consult to Urology  Consult performed by: J Luis Matos MD  Consult ordered by: Shereen Claude, MD  Reason for consult: Obstructing ureteral calculus        Chief Complaint     Chief Complaint   Patient presents with    Urinary Retention     Pt arrives to ED via EMS from [facility] with c/o urinary retention. EMS reports that pt is a dialysis pt. Pt arrives with urinary catheter with approx 200mL in bag. Bladder scanned at facility with reading of 0mL    Abdominal Pain     Pt c.o abdominal pain onset today. History Obtained From   patient, electronic medical record    History of Present Illness   Patient 27-year-old gentleman who was just hospitalized first part of November was treated for staph epi pyelonephritis. He also had ALEXANDRO his creatinine up to the mid threes and required dialysis which he still is maintained on. He has a history of BPH fact he used to be a patient of Dr. Shaniqua Martin last seen by him in June 2019. In the remote past he has had a GLAP followed by TURP done by Dr. Serjio Jones. He has had an episode of urinary retention in the past as well and had cystoscopy by Dr. Shaniqua Martin that showed residual prostatic adenoma or regrowth of adenoma in 2019. During his last hospitalization first of February patient had urinary retention and failed voiding trial apparently has had an indwelling catheter since that time. CT scan done on 1/23/2023 during that hospitalization showed a 5 mm nonobstructing stone within the left renal pelvis. Patient return to the ER now on 2/12/2023 from the nursing home complaining of abdominal pain with acute onset. No fever no nausea vomiting. CT scan was done on 2/12/2023 shows a previous stone in the left renal pelvis is now dropped down to the left mid ureter causing obstruction. His creatinine was 3.4 which is basically his baseline. White blood cell count was normal at 7.4. Analysis showed moderate blood negative nitrite moderate leukocyte Estrace 10-15 WBCs 4 RBCs 1+ bacteria. A urine culture from his indwelling catheter was sent which is now pending. He was started on Merrem. He is essentially an afebrile maybe low-grade temp up to 99. Past Medical History     Past Medical History:   Diagnosis Date    Benign non-nodular prostatic hyperplasia with lower urinary tract symptoms 08/16/2017    Essential hypertension 08/16/2017    Gastroesophageal reflux disease without esophagitis 08/16/2017    Gout 08/16/2017    Idiopathic peripheral neuropathy 02/19/2018    Mixed hyperlipidemia 08/16/2017    Peripheral neuropathic pain 08/16/2017    Primary osteoarthritis involving multiple joints 08/16/2017    Renal stone 01/23/2023    Renal stone 02/12/2023    Restless legs syndrome 08/16/2017        Past Surgical History     Past Surgical History:   Procedure Laterality Date    INGUINAL HERNIA REPAIR Bilateral 1963    TURP  2003        Medications     Prior to Admission medications    Medication Sig Start Date End Date Taking? Authorizing Provider   acetaminophen (TYLENOL) 325 MG tablet Take 650 mg by mouth every 6 hours as needed for Pain   Yes Historical Provider, MD   ondansetron (ZOFRAN) 4 MG tablet Take 4 mg by mouth every 6 hours as needed for Nausea or Vomiting   Yes Historical Provider, MD   traMADol (ULTRAM) 50 MG tablet Take 50 mg by mouth every 8 hours as needed for Pain. Yes Historical Provider, MD   pregabalin (LYRICA) 25 MG capsule Take 1 capsule by mouth daily for 30 days.  Max Daily Amount: 25 mg 2/4/23 3/6/23  Bob Mota MD   mirtazapine (REMERON) 7.5 MG tablet Take 1 tablet by mouth nightly 2/4/23   Bob Mota MD   QUEtiapine (SEROQUEL) 25 MG tablet Take 1 tablet by mouth nightly 2/4/23   Bob Mota MD   docusate sodium (COLACE, DULCOLAX) 100 MG CAPS Take 100 mg by mouth daily 2/5/23   Bob Mota MD   polyethylene glycol (GLYCOLAX) 17 g packet Take 17 g by mouth 2 times daily 2/4/23 3/6/23  Srinivas Man MD   calcitRIOL (ROCALTROL) 0.25 MCG capsule Take 1 capsule by mouth daily 2/5/23   Srinivas Man MD   [facility]) 80 MG chewable tablet Take 1 tablet by mouth every 6 hours as needed for Flatulence 2/4/23   Srinivas Man MD   aspirin 81 MG chewable tablet Take 1 tablet by mouth daily 2/5/23   Srinivas Man MD   sodium bicarbonate 650 MG tablet Take 1 tablet by mouth 2 times daily 2/4/23   Srinivas Man MD   tamsulosin [facility]) 0.4 MG capsule Take 1 capsule by mouth daily 10/2/22   Jessica Vega MD   metoprolol succinate (TOPROL XL) 50 MG extended release tablet Take 1 tablet by mouth once daily 9/13/22   Jessica Vega MD   allopurinol (ZYLOPRIM) 100 MG tablet Take 1 tablet by mouth daily 4/1/22   Jessica Vega MD   atorvastatin (LIPITOR) 20 MG tablet Take 1 tablet by mouth daily 4/1/22   Jessica Vega MD   omeprazole (PRILOSEC) 20 MG delayed release capsule Take 1 capsule by mouth daily 4/1/22   Jessica Vega MD   vitamin D (ERGOCALCIFEROL) 1.25 MG (95193 UT) CAPS capsule Take 1 capsule by mouth once a week 4/1/22   Jessica Vega MD        levoFLOXacin (LEVAQUIN) 250 MG/50ML infusion 250 mg, On Call to OR  heparin (porcine) injection 3,600 Units, Once in dialysis  tamsulosin (FLOMAX) capsule 0.8 mg, Daily  sodium chloride flush 0.9 % injection 5-40 mL, 2 times per day  sodium chloride flush 0.9 % injection 5-40 mL, PRN  0.9 % sodium chloride infusion, PRN  [Held by provider] enoxaparin Sodium (LOVENOX) injection 30 mg, Daily  ondansetron (ZOFRAN-ODT) disintegrating tablet 4 mg, Q8H PRN   Or  ondansetron (ZOFRAN) injection 4 mg, Q6H PRN  [Held by provider] aspirin chewable tablet 81 mg, Daily  atorvastatin (LIPITOR) tablet 20 mg, Nightly  calcitRIOL (ROCALTROL) capsule 0.25 mcg, Daily  docusate sodium (COLACE) capsule 100 mg, Daily  metoprolol succinate (TOPROL XL) extended release tablet 50 mg, Daily  pregabalin (LYRICA) capsule 25 mg, Daily  sodium bicarbonate tablet 650 mg, BID  simethicone (MYLICON) chewable tablet 80 mg, Q6H PRN  QUEtiapine (SEROQUEL) tablet 25 mg, Nightly  vitamin D (CHOLECALCIFEROL) capsule 5,000 Units, Daily  allopurinol (ZYLOPRIM) tablet 100 mg, Daily  pantoprazole (PROTONIX) tablet 20 mg, QAM AC  mirtazapine (REMERON) tablet 7.5 mg, Nightly  polyethylene glycol (GLYCOLAX) packet 17 g, BID  0.9 % sodium chloride infusion, Continuous  HYDROmorphone HCl PF (DILAUDID) injection 0.5 mg, Q3H PRN  HYDROcodone-acetaminophen (NORCO)  MG per tablet 1 tablet, Q4H PRN  meropenem (MERREM) 500 mg in sodium chloride 0.9% 50 mL (duplex), Q12H        Allergies   Codeine    Social History     Social History       Tobacco History       Smoking Status  Former Smoking Start Date   Quit Date   Smoking Frequency  3.00 packs/day for 35.00 years (105.00 pk-yrs)    Smoking Tobacco Type  Cigarettes from  to , Pipe      Smokeless Tobacco Use  Never              Alcohol History       Alcohol Use Status  Not Currently Comment  \"not vbery much\", \"beer every 15 years\"              Drug Use       Drug Use Status  Never              Sexual Activity       Sexually Active  Yes Partners  Female Comment  wife, has 2 kids                    Family History     Family History   Problem Relation Age of Onset    Uterine Cancer Mother          at age 52    Coronary Art Dis Father         MI at 62, smoker    COPD Sister         smoker    COPD Sister         smoker    Heart Failure Sister     COPD Sister         smoker    Stroke Brother         smoker    Coronary Art Dis Brother         open heart surgry, MI at 80- yo    No Known Problems Son     No Known Problems Daughter        Review of Systems   Review of Systems   Constitutional:  Positive for fatigue. Negative for chills and fever. HENT: Negative. Eyes: Negative. Respiratory: Negative. Negative for cough and shortness of breath.     Cardiovascular: Negative. Negative for chest pain, palpitations and leg swelling. Gastrointestinal:  Positive for abdominal pain and nausea. Negative for blood in stool, diarrhea and vomiting. Genitourinary:  Positive for decreased urine volume, difficulty urinating and flank pain. Negative for dysuria and hematuria. Skin: Negative. Allergic/Immunologic: Negative. Neurological:  Positive for weakness and light-headedness. Hematological: Negative. Psychiatric/Behavioral: Negative. Physical Exam   BP (!) 157/81   Pulse 99   Temp 99 °F (37.2 °C) (Temporal)   Resp 18   Ht 6' (1.829 m)   Wt 204 lb (92.5 kg)   SpO2 97%   BMI 27.67 kg/m²      Physical Exam  Vitals reviewed. Constitutional:       General: He is not in acute distress. Appearance: Normal appearance. He is well-developed. He is ill-appearing. He is not toxic-appearing. HENT:      Head: Normocephalic and atraumatic. Nose: Nose normal.   Eyes:      General: No scleral icterus. Conjunctiva/sclera: Conjunctivae normal.      Pupils: Pupils are equal, round, and reactive to light. Neck:      Trachea: No tracheal deviation. Cardiovascular:      Rate and Rhythm: Normal rate and regular rhythm. Pulses: Normal pulses. Pulmonary:      Effort: Pulmonary effort is normal. No respiratory distress. Breath sounds: No stridor. Abdominal:      General: There is no distension. Palpations: Abdomen is soft. There is no mass. Tenderness: There is no abdominal tenderness. There is no right CVA tenderness or left CVA tenderness. Genitourinary:     Penis: Normal.       Testes: Normal.      Comments:  15 [place] García catheter in place with some dried bloody drainage at the meatus. This is currently draining clear urine  Musculoskeletal:         General: No tenderness or deformity. Normal range of motion. Cervical back: Normal range of motion and neck supple. Lymphadenopathy:      Cervical: No cervical adenopathy. Skin:     General: Skin is warm and dry. Findings: No erythema. Neurological:      General: No focal deficit present. Mental Status: He is alert and oriented to person, place, and time. Psychiatric:         Behavior: Behavior normal.         Judgment: Judgment normal.       Labs    CBC:  Recent Labs     02/12/23 2142 02/14/23  0305   WBC 7.4 5.8   RBC 4.36* 3.74*   HGB 11.8* 10.2*   HCT 36.9* 32.0*   MCV 84.6 85.6   RDW 13.7 13.7   * 99*     CHEMISTRIES:  Recent Labs     02/12/23 2142 02/13/23  0853 02/14/23  0305   * 131* 140   K 3.4* 3.6 3.4*   CL 93* 94* 100   CO2 31* 29 27   BUN 38* 40* 39*   CREATININE 3.4* 3.5* 3.1*   GLUCOSE 100 95 90   MG  --   --  2.1     PT/INR:No results for input(s): PROTIME, INR in the last 72 hours. APTT:No results for input(s): APTT in the last 72 hours. LIVER PROFILE:  Recent Labs     02/12/23 2142   AST 44*   ALT 44*   BILITOT 0.7   ALKPHOS 73       Imaging/Diagnostics   CT ABDOMEN PELVIS WO CONTRAST Additional Contrast? None    Result Date: 2/13/2023  1 cm obstructing left mid ureteral calculus. Mild left hydronephrosis. Punctate nonobstructing right-sided nephrolithiasis. Soft tissue fullness at the pancreatic tail. Cannot exclude underlying mass. Correlation with contrast-enhanced pancreatic protocol MRI or CT is suggested a possible. Scattered hepatic hypoattenuating lesion, which are compatible with cysts while others are too small to accurately characterize. Additional findings per body of the report. I reviewed the CT scan which does show the stone previously seen at 1 February up in the left renal pelvis and now dropped down into the ureter appears to be obstructing at the level of the left ureter.       Assessment      Hospital Problems             Last Modified POA    * (Principal) Obstruction of left ureter 2/13/2023 Yes    Acute renal failure with acute tubular necrosis superimposed on chronic kidney disease, on chronic dialysis ([facility] [address] 3/77/4447 Yes    Complicated UTI (urinary tract infection) 2/13/2023 Yes    Urinary retention 2/13/2023 Yes       Plan   1. Obstructing left mid ureteral calculus. Treatment for pyelonephritis. Currently he does not appear to have obstructive pyelonephritis as he is not febrile he does not have leukocytosis but he does have some bacteria in the urine he is covered with Merrem. I will continue antibiotics as you are. We will plan to taken the operating room for cystoscopy left ureteral stent placement and provided there is no cloudy or purulent drainage may proceed with left ureteroscopy laser lithotripsy to definitively treat the stone otherwise stent will be placed to provide for drainage and the stone could be treated at a later date and is planned staged fashion. 2.  Urinary retention BPH. Patient reports this is fairly recent and acute onset however this is documented that he has had this multiple times previously. He is currently on tamsulosin. He had been on finasteride in the documented by Dr. Jarrod Lundy in 2019. We will assess for any kind of contracture or blockage or stricture regrowth or recurrent adenoma at the time of his cystoscopy make appropriate recommendations pending the results.   Otherwise continue García catheter and tamsulosin    Electronically signed by Ridge Rodas MD on 2/14/23 at 11:50 AM CST

## 2023-02-14 NOTE — PROGRESS NOTES
Pharmacy Renal Adjustment    Odilia Rehman is a 79 y.o. male. Pharmacy has renally adjusted medications per protocol. Recent Labs     02/13/23  0853 02/14/23  0305   BUN 40* 39*       Recent Labs     02/13/23  0853 02/14/23  0305   CREATININE 3.5* 3.1*       Estimated Creatinine Clearance: 19 mL/min (A) (based on SCr of 3.1 mg/dL (H)). Height:   Ht Readings from Last 1 Encounters:   02/12/23 6' (1.829 m)     Weight:  Wt Readings from Last 1 Encounters:   02/12/23 204 lb (92.5 kg)       CKD stage: HD    Plan: Adjust the following medications based on renal function:           Change Merrem to 500mg IV q 24 hours x 5 more days.     Gina Blanchard, PHARM D, 2/14/2023, 12:43 PM

## 2023-02-14 NOTE — ANESTHESIA POSTPROCEDURE EVALUATION
Department of Anesthesiology  Postprocedure Note    Patient: Brandi Escalante  MRN: [id number]  YOB: 1940  Date of evaluation: 2/14/2023      Procedure Summary     Date: 02/14/23 Room / Location: [facility] OR [facility]    Anesthesia Start: 4118 Anesthesia Stop: 4323    Procedure: CYSTOSCOPY; RETROGRADE PYLEGRAM URETERAL STENT INSERTION (Left: Ureter) Diagnosis:       Left ureteral calculus      (Left ureteral calculus [N20.1])    Surgeons: Nany Penny MD Responsible Provider: GOLD Retana CRNA    Anesthesia Type: general ASA Status: 4          Anesthesia Type: No value filed.     Genie Phase I: Genie Score: 8    Genie Phase II:        Anesthesia Post Evaluation    Patient location during evaluation: PACU  Patient participation: complete - patient participated  Level of consciousness: sleepy but conscious  Pain score: 0  Airway patency: patent  Nausea & Vomiting: no vomiting and no nausea  Complications: no  Cardiovascular status: hemodynamically stable  Respiratory status: acceptable and nasal cannula  Hydration status: stable

## 2023-02-14 NOTE — CARE COORDINATION
Patient is from [facility] but DOES NOT have a bed hold.     [facility]  [phone] P  [address]  Electronically signed by Russell Bauer RN, BSN on 2/14/2023 at 9:39 AM

## 2023-02-15 LAB
ANION GAP SERPL CALCULATED.3IONS-SCNC: 10 MMOL/L (ref 7–19)
BANDED NEUTROPHILS RELATIVE PERCENT: 2 % (ref 0–5)
BASOPHILS ABSOLUTE: 0 K/UL (ref 0–0.2)
BASOPHILS RELATIVE PERCENT: 0 % (ref 0–1)
BUN BLDV-MCNC: 22 MG/DL (ref 8–23)
CALCIUM SERPL-MCNC: 7.3 MG/DL (ref 8.8–10.2)
CHLORIDE BLD-SCNC: 106 MMOL/L (ref 98–111)
CO2: 25 MMOL/L (ref 22–29)
CREAT SERPL-MCNC: 1.6 MG/DL (ref 0.5–1.2)
EOSINOPHILS ABSOLUTE: 0 K/UL (ref 0–0.6)
EOSINOPHILS RELATIVE PERCENT: 0 % (ref 0–5)
GFR SERPL CREATININE-BSD FRML MDRD: 42 ML/MIN/{1.73_M2}
GLUCOSE BLD-MCNC: 89 MG/DL (ref 74–109)
HCT VFR BLD CALC: 30.3 % (ref 42–52)
HEMOGLOBIN: 9.7 G/DL (ref 14–18)
IMMATURE GRANULOCYTES #: 0 K/UL
LYMPHOCYTES ABSOLUTE: 0.6 K/UL (ref 1.1–4.5)
LYMPHOCYTES RELATIVE PERCENT: 11 % (ref 20–40)
MAGNESIUM: 1.8 MG/DL (ref 1.6–2.4)
MCH RBC QN AUTO: 27 PG (ref 27–31)
MCHC RBC AUTO-ENTMCNC: 32 G/DL (ref 33–37)
MCV RBC AUTO: 84.4 FL (ref 80–94)
MONOCYTES ABSOLUTE: 0.3 K/UL (ref 0–0.9)
MONOCYTES RELATIVE PERCENT: 5 % (ref 0–10)
NEUTROPHILS ABSOLUTE: 4.6 K/UL (ref 1.5–7.5)
NEUTROPHILS RELATIVE PERCENT: 82 % (ref 50–65)
PDW BLD-RTO: 13.8 % (ref 11.5–14.5)
PLATELET # BLD: 122 K/UL (ref 130–400)
PLATELET SLIDE REVIEW: ABNORMAL
PMV BLD AUTO: 10.1 FL (ref 9.4–12.4)
POTASSIUM REFLEX MAGNESIUM: 3.5 MMOL/L (ref 3.5–5)
RBC # BLD: 3.59 M/UL (ref 4.7–6.1)
RBC # BLD: NORMAL 10*6/UL
SODIUM BLD-SCNC: 141 MMOL/L (ref 136–145)
WBC # BLD: 5.5 K/UL (ref 4.8–10.8)

## 2023-02-15 PROCEDURE — 6370000000 HC RX 637 (ALT 250 FOR IP): Performed by: UROLOGY

## 2023-02-15 PROCEDURE — 80048 BASIC METABOLIC PNL TOTAL CA: CPT

## 2023-02-15 PROCEDURE — 2700000000 HC OXYGEN THERAPY PER DAY

## 2023-02-15 PROCEDURE — 6360000002 HC RX W HCPCS: Performed by: UROLOGY

## 2023-02-15 PROCEDURE — 83735 ASSAY OF MAGNESIUM: CPT

## 2023-02-15 PROCEDURE — 1210000000 HC MED SURG R&B

## 2023-02-15 PROCEDURE — 36415 COLL VENOUS BLD VENIPUNCTURE: CPT

## 2023-02-15 PROCEDURE — 94760 N-INVAS EAR/PLS OXIMETRY 1: CPT

## 2023-02-15 PROCEDURE — 99231 SBSQ HOSP IP/OBS SF/LOW 25: CPT | Performed by: NURSE PRACTITIONER

## 2023-02-15 PROCEDURE — 85025 COMPLETE CBC W/AUTO DIFF WBC: CPT

## 2023-02-15 RX ORDER — MECOBALAMIN 5000 MCG
5 TABLET,DISINTEGRATING ORAL NIGHTLY PRN
Status: DISCONTINUED | OUTPATIENT
Start: 2023-02-15 | End: 2023-02-20 | Stop reason: HOSPADM

## 2023-02-15 RX ORDER — MEROPENEM AND SODIUM CHLORIDE 500 MG/50ML
500 INJECTION, SOLUTION INTRAVENOUS EVERY 24 HOURS
Status: DISCONTINUED | OUTPATIENT
Start: 2023-02-15 | End: 2023-02-15

## 2023-02-15 RX ADMIN — QUETIAPINE FUMARATE 25 MG: 25 TABLET ORAL at 19:59

## 2023-02-15 RX ADMIN — ATORVASTATIN CALCIUM 20 MG: 20 TABLET, FILM COATED ORAL at 19:58

## 2023-02-15 RX ADMIN — SODIUM BICARBONATE 650 MG: 650 TABLET ORAL at 19:58

## 2023-02-15 RX ADMIN — FLUCONAZOLE IN SODIUM CHLORIDE 200 MG: 2 INJECTION, SOLUTION INTRAVENOUS at 21:30

## 2023-02-15 RX ADMIN — PANTOPRAZOLE SODIUM 20 MG: 20 TABLET, DELAYED RELEASE ORAL at 09:45

## 2023-02-15 RX ADMIN — METOPROLOL SUCCINATE 50 MG: 50 TABLET, EXTENDED RELEASE ORAL at 09:45

## 2023-02-15 RX ADMIN — MIRTAZAPINE 7.5 MG: 7.5 TABLET ORAL at 19:58

## 2023-02-15 RX ADMIN — ALLOPURINOL 100 MG: 100 TABLET ORAL at 09:45

## 2023-02-15 RX ADMIN — SODIUM BICARBONATE 650 MG: 650 TABLET ORAL at 09:45

## 2023-02-15 RX ADMIN — Medication 5000 UNITS: at 09:45

## 2023-02-15 RX ADMIN — CALCITRIOL CAPSULES 0.25 MCG 0.25 MCG: 0.25 CAPSULE ORAL at 09:45

## 2023-02-15 RX ADMIN — POLYETHYLENE GLYCOL 3350 17 G: 17 POWDER, FOR SOLUTION ORAL at 09:44

## 2023-02-15 RX ADMIN — POLYETHYLENE GLYCOL 3350 17 G: 17 POWDER, FOR SOLUTION ORAL at 19:59

## 2023-02-15 RX ADMIN — DOCUSATE SODIUM 100 MG: 100 CAPSULE, LIQUID FILLED ORAL at 09:45

## 2023-02-15 RX ADMIN — MEROPENEM AND SODIUM CHLORIDE 500 MG: 500 INJECTION, SOLUTION INTRAVENOUS at 16:16

## 2023-02-15 RX ADMIN — HYDROCODONE BITARTRATE AND ACETAMINOPHEN 1 TABLET: 10; 325 TABLET ORAL at 00:24

## 2023-02-15 RX ADMIN — TAMSULOSIN HYDROCHLORIDE 0.8 MG: 0.4 CAPSULE ORAL at 09:45

## 2023-02-15 RX ADMIN — PREGABALIN 25 MG: 25 CAPSULE ORAL at 09:45

## 2023-02-15 ASSESSMENT — PAIN SCALES - GENERAL: PAINLEVEL_OUTOF10: 4

## 2023-02-15 NOTE — PROGRESS NOTES
Renal Progress Note    Thank you to requesting provider: Dr Malka Mejía, for asking us to see Katrin Mcintyre    Reason for consultation:  ESRD    Chief Complaint:  Abdominal pain, urine retention. History of Presenting Illness      Mr. Katrin Mcintyre is a pleasant 79year old man who presented with abdominal pain and was found with obstructive 1 cm stone in mid left ureter with mild left hydronephrosis. He has a history of prior kidney stones. He has recently started dialysis for ALEXANDRO after pyelonephritis and sepsis. He is s/p left ureteral stent placement today by Dr Tong Luna. Patient is currently comfortable. He is non-oliguric. His repeat serum creatinine was 1.6 today. He is growing yeast in his urine.       Past Medical/Surgical History      Active Ambulatory Problems     Diagnosis Date Noted    Mixed hyperlipidemia 08/16/2017    Essential hypertension 08/16/2017    Restless legs syndrome 08/16/2017    Gastroesophageal reflux disease without esophagitis 08/16/2017    Peripheral neuropathic pain 08/16/2017    Primary osteoarthritis involving multiple joints 08/16/2017    Gout 08/16/2017    Benign non-nodular prostatic hyperplasia with lower urinary tract symptoms 08/16/2017    Elevated PSA 08/16/2017    Idiopathic peripheral neuropathy 02/19/2018    Chronic idiopathic constipation 08/20/2018    Prediabetes 10/14/2022    Pyelonephritis of left kidney 01/23/2023    Renal stone 01/23/2023    Visual disturbance 01/31/2023    Acute renal failure with acute tubular necrosis superimposed on chronic kidney disease, on chronic dialysis ([address] 02/01/2023    Pyelonephritis 02/02/2023    Severe malnutrition ([address] 02/03/2023     Resolved Ambulatory Problems     Diagnosis Date Noted    No Resolved Ambulatory Problems     No Additional Past Medical History         Review of Systems     Constitutional:  No weight loss, no fever/chills  Eyes:  No eye pain, no eye redness  Cardiovascular:  No chest pain, no worsening of edema  Respiratory:  No hemoptysis, no stidor  Gastrointestinal:  No blood in stool, no n/v, no diarrhea  Genitoruinary:  No hematuria, + difficulty with urination  Musculoskeletal:  No joint swelling, no redness  Integumentary:  No Rash, no itching  Neurological:  No focal weakness, No new sensory deficit  Psychiatric:  No depression, no confusion  Endocrine:  No polyuria, no polydipsia       Medications        Current Facility-Administered Medications:     melatonin disintegrating tablet 5 mg, 5 mg, Oral, Nightly PRN, Chanell Albarran MD    meropenem (MERREM) 500 mg in sodium chloride 0.9% 50 mL (duplex), 500 mg, IntraVENous, Q24H, Jennifer Mcdonough MD    fluconazole (DIFLUCAN) in 0.9 % sodium chloride IVPB 200 mg, 200 mg, IntraVENous, Q24H, Jennifer Mcdonough MD, Last Rate: 100 mL/hr at 02/14/23 1726, 200 mg at 02/14/23 1726    tamsulosin (FLOMAX) capsule 0.8 mg, 0.8 mg, Oral, Daily, Jennifer Mcdonough MD, 0.8 mg at 02/15/23 0945    sodium chloride flush 0.9 % injection 5-40 mL, 5-40 mL, IntraVENous, 2 times per day, Jennifer Mcdonough MD, 10 mL at 02/14/23 2051    sodium chloride flush 0.9 % injection 5-40 mL, 5-40 mL, IntraVENous, PRN, Jennifer Mcdonough MD    0.9 % sodium chloride infusion, , IntraVENous, PRN, Jennifer Mcdonough MD    [facility] AT [place] by provider] enoxaparin Sodium (LOVENOX) injection 30 mg, 30 mg, SubCUTAneous, Daily, Chanell Albarran MD    ondansetron (ZOFRAN-ODT) disintegrating tablet 4 mg, 4 mg, Oral, Q8H PRN **OR** ondansetron (ZOFRAN) injection 4 mg, 4 mg, IntraVENous, Q6H PRN, Jennifer Mcdonough MD    [Held by provider] aspirin chewable tablet 81 mg, 81 mg, Oral, Daily, Chanell Albarran MD    atorvastatin (LIPITOR) tablet 20 mg, 20 mg, Oral, Nightly, Jennifer Mcdonough MD, 20 mg at 02/14/23 2050    calcitRIOL (ROCALTROL) capsule 0.25 mcg, 0.25 mcg, Oral, Daily, Jennifer Mcdonough MD, 0.25 mcg at 02/15/23 0945    docusate sodium (COLACE) capsule 100 mg, 100 mg, Oral, Daily, Jennifer Mcdonough MD, 100 mg at 02/15/23 0945    metoprolol succinate (TOPROL XL) extended release tablet 50 mg, 50 mg, Oral, Daily, Christopher Stiles MD, 50 mg at 02/15/23 0945    pregabalin (LYRICA) capsule 25 mg, 25 mg, Oral, Daily, Christopher Stiles MD, 25 mg at 02/15/23 0945    sodium bicarbonate tablet 650 mg, 650 mg, Oral, BID, Christopher Stiles MD, 650 mg at 02/15/23 0945    simethicone (MYLICON) chewable tablet 80 mg, 80 mg, Oral, Q6H PRN, Christopher Stiles MD    QUEtiapine (SEROQUEL) tablet 25 mg, 25 mg, Oral, Nightly, Christopher Stiles MD, 25 mg at 23    vitamin D (CHOLECALCIFEROL) capsule 5,000 Units, 5,000 Units, Oral, Daily, Christopher Stiles MD, 5,000 Units at 02/15/23 0945    allopurinol (ZYLOPRIM) tablet 100 mg, 100 mg, Oral, Daily, Christopher Stiles MD, 100 mg at 02/15/23 0945    pantoprazole (PROTONIX) tablet 20 mg, 20 mg, Oral, QAM AC, Christopher Stiles MD, 20 mg at 02/15/23 0945    mirtazapine (REMERON) tablet 7.5 mg, 7.5 mg, Oral, Nightly, Christopher Stiles MD, 7.5 mg at 23    polyethylene glycol (GLYCOLAX) packet 17 g, 17 g, Oral, BID, Christopher Stiles MD, 17 g at 02/15/23 0944    HYDROmorphone HCl PF (DILAUDID) injection 0.5 mg, 0.5 mg, IntraVENous, Q3H PRN, Christopher Stiles MD    HYDROcodone-acetaminophen [facility])  MG per tablet 1 tablet, 1 tablet, Oral, Q4H PRN, Christopher Stiles MD, 1 tablet at 02/15/23 0024  Outpatient Medications Marked as Taking for the 23 encounter [facility] Encounter)   Medication Sig Dispense Refill    acetaminophen (TYLENOL) 325 MG tablet Take 650 mg by mouth every 6 hours as needed for Pain      ondansetron (ZOFRAN) 4 MG tablet Take 4 mg by mouth every 6 hours as needed for Nausea or Vomiting      traMADol (ULTRAM) 50 MG tablet Take 50 mg by mouth every 8 hours as needed for Pain.          Allergies   Codeine    Family History       Family History   Problem Relation Age of Onset    Uterine Cancer Mother          at age 52    Coronary Art Dis Father         MI at 62, smoker    COPD Sister         smoker    COPD Sister         smoker    Heart Failure Sister     COPD Sister         smoker    Stroke Brother         smoker    Coronary Art Dis Brother         open heart surgry, MI at 80- yo    No Known Problems Son     No Known Problems Daughter      Family history negative for kidney disease. 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     Employer: RETIRED     Comment: retired    Tobacco Use    Smoking status: Former     Packs/day: 3.00     Years: 35.00     Pack years: 105.00     Types: Cigarettes, Pipe     Start date:      Quit date:      Years since quittin.1    Smokeless tobacco: Never   Vaping Use    Vaping Use: Never used   Substance and Sexual Activity    Alcohol use: Not Currently     Comment: \"not vbery much\", \"beer every 15 years\"    Drug use: Never    Sexual activity: Yes     Partners: Female     Comment: wife, has 2 kids   Social History Narrative    CODE STATUS: Full Code    HEALTH CARE PROXY: his children    1.) Sharlet Bamberger, his daughter, [phone]    2.) Citlalli Leigh, his son, [phone]    AMBULATES: independently, sometimes with cane    DOMICILED: has 3 story home but he has an elevator     Social Determinants of Health     Financial Resource Strain: Low Risk     Difficulty of Paying Living Expenses: Not hard at all   Food Insecurity: No Food Insecurity    Worried About [address] in the Last Year: Never true    [address] in the Last Year: Never true   Transportation Needs: No Transportation Needs    Lack of Transportation (Medical): No    Lack of Transportation (Non-Medical):  No   Physical Activity: Insufficiently Active    Days of Exercise per Week: 2 days    Minutes of Exercise per Session: 10 min   Stress: No Stress Concern Present    Feeling of Stress : Not at all   Social Connections: Moderately Isolated    Frequency of Communication with Friends and Family: More than three times a week    Frequency of Social Gatherings with Friends and Family: Twice a week    Attends Baptism Services: 1 to 4 times per year    Active Member of [employer] or Organizations: No    Attends Club or Organization Meetings: Never    Marital Status:    Intimate Partner Violence: Not At Risk    Fear of Current or Ex-Partner: No    Emotionally Abused: No    Physically Abused: No    Sexually Abused: No   Housing Stability: Low Risk     Unable to Pay for Housing in the Last Year: No    Number of Places Lived in the Last Year: 2    Unstable Housing in the Last Year: No       Physical Exam     Blood pressure 135/70, pulse 80, temperature 96.8 °F (36 °C), temperature source Temporal, resp. rate 18, height 6' (1.829 m), weight 206 lb 3.2 oz (93.5 kg), SpO2 97 %.     General:  NAD, A+Ox3, ill-appearing, normal body habitus  HEENT:  atraumatic, normocephalic  Neck:  no masses, no JVD  Chest:  CTAB, good respiratory effort, good air movement  CV:  RRR, soft systolic murmur    Abdomen:  NTND, soft, +BS, no hepatosplenomegaly  Extremities:  No peripheral edema  Neurological:  Moving all four extremities   Lymphatics:  No palpable lymph nodes   Skin:  No rash, no jaundice  Psychiatric:  Normal insight and judgement, good recall    Data     Recent Labs     02/12/23  2142 02/14/23  0305 02/15/23  0237   WBC 7.4 5.8 5.5   HGB 11.8* 10.2* 9.7*   HCT 36.9* 32.0* 30.3*   MCV 84.6 85.6 84.4   * 99* 122*       Recent Labs     02/13/23  0853 02/14/23  0305 02/15/23  0237   * 140 141   K 3.6 3.4* 3.5   CL 94* 100 106   CO2 29 27 25   GLUCOSE 95 90 89   MG  --  2.1 1.8   BUN 40* 39* 22   CREATININE 3.5* 3.1* 1.6*   LABGLOM 17* 19* 42*         Assessment:  ALEXANDRO- ATN and obstruction  Recent pyelonephritis and sepsis  Abdominal pain  Left ureter stone with mild left hydronephrosis  Hypokalemia  Hyponatremia  CKD stage 3a  Urine retention  Urinary fungal infection      Plan:  Would hold dialysis for now and follow up labs. Patient may be having renal function recovery. Continue diflucan.

## 2023-02-15 NOTE — PROGRESS NOTES
Daily Progress Note    Date:2/15/2023  Patient: Jordan Toro  : 1940  PUD:[id number]  CODE:Full Code No additional code details  PCP:Bety Duarte MD    Admit Date: 2023  7:14 PM   LOS: 2 days       Subjective:    79-year-old male with BPH/urinary retention with García in place, history of prior kidney stones, recent hospitalization with sepsis and pyelonephritis with acute renal failure requiring initiation of dialysis, presenting back from SNF with some lower abdominal discomfort and concern that García may not be draining properly, ultimately found to have obstructing left ureteral stone on CT abdomen/pelvis as well as UTI. Patient/family or apparently not agreeable to transfer for more urgent urologic intervention, however was evaluated by urology want service available on  to undergo cystoscopy with ureteral stent placement completed on . Treated on empiric antibiotics. Noted yeast from urine culture, fluconazole added and ID consulted. Patient remained in renal failure on initial evaluation, no improvement noted since last hospitalization, nephrology consulted, maintaining on dialysis. No acute events overnight. Patient resting comfortably. No recurrence of abdominal or flank pain. No fevers noted overnight.         Objective:      Vital signs in last 24 hours:  Patient Vitals for the past 24 hrs:   BP Temp Temp src Pulse Resp SpO2 Weight   02/15/23 0809 135/70 96.8 °F (36 °C) Temporal 80 18 97 % --   02/15/23 0605 -- -- -- -- -- -- 206 lb 3.2 oz (93.5 kg)   02/15/23 0515 131/75 96.8 °F (36 °C) Temporal 81 16 96 % --   02/15/23 0014 128/70 97.7 °F (36.5 °C) Temporal 92 17 97 % --   23 (!) 123/51 98.4 °F (36.9 °C) Temporal 87 20 100 % --   23 1849 (!) 149/87 97.7 °F (36.5 °C) Temporal 85 14 97 % --   23 1742 (!) 153/75 98.1 °F (36.7 °C) Temporal 78 16 96 % --   23 1642 (!) 149/73 98.1 °F (36.7 °C) Temporal 74 14 98 % --   23 3455 (!) 147/73 98.6 °F (37 °C) Temporal 77 16 98 % --   02/14/23 1535 134/77 -- -- 75 23 94 % --   02/14/23 1530 133/81 97.8 °F (36.6 °C) Temporal 75 20 94 % --   02/14/23 1525 134/76 -- -- 73 23 94 % --   02/14/23 1520 125/76 -- -- 75 23 92 % --   02/14/23 1515 133/74 -- -- 76 25 93 % --   02/14/23 1510 122/74 -- -- 86 15 95 % --   02/14/23 1507 -- 97.9 °F (36.6 °C) Temporal -- -- -- --   02/14/23 1505 125/64 97.9 °F (36.6 °C) Temporal 81 27 91 % --   02/14/23 1420 -- -- -- 80 26 97 % --   02/14/23 1415 137/73 -- -- 81 16 97 % --       Physical exam    General: No acute distress  Cardiac: Normal rate  Respiratory: No wheezes or rales  Abdomen: Nontender, no CVA tenderness, bowel sounds present  Extremities: No edema  : García in place      Lab Review   Recent Results (from the past 24 hour(s))   CBC with Auto Differential    Collection Time: 02/15/23  2:37 AM   Result Value Ref Range    WBC 5.5 4.8 - 10.8 K/uL    RBC 3.59 (L) 4.70 - 6.10 M/uL    Hemoglobin 9.7 (L) 14.0 - 18.0 g/dL    Hematocrit 30.3 (L) 42.0 - 52.0 %    MCV 84.4 80.0 - 94.0 fL    MCH 27.0 27.0 - 31.0 pg    MCHC 32.0 (L) 33.0 - 37.0 g/dL    RDW 13.8 11.5 - 14.5 %    Platelets 868 (L) 579 - 400 K/uL    MPV 10.1 9.4 - 12.4 fL    PLATELET SLIDE REVIEW Decreased     Neutrophils % 82.0 (H) 50.0 - 65.0 %    Lymphocytes % 11.0 (L) 20.0 - 40.0 %    Monocytes % 5.0 0.0 - 10.0 %    Eosinophils % 0.0 0.0 - 5.0 %    Basophils % 0.0 0.0 - 1.0 %    Neutrophils Absolute 4.6 1.5 - 7.5 K/uL    Immature Granulocytes # 0.0 K/uL    Lymphocytes Absolute 0.6 (L) 1.1 - 4.5 K/uL    Monocytes Absolute 0.30 0.00 - 0.90 K/uL    Eosinophils Absolute 0.00 0.00 - 0.60 K/uL    Basophils Absolute 0.00 0.00 - 0.20 K/uL    Bands Relative 2 0 - 5 %    RBC Morphology Normal    Basic Metabolic Panel w/ Reflex to MG    Collection Time: 02/15/23  2:37 AM   Result Value Ref Range    Sodium 141 136 - 145 mmol/L    Potassium reflex Magnesium 3.5 3.5 - 5.0 mmol/L    Chloride 106 98 - 111 mmol/L CO2 25 22 - 29 mmol/L    Anion Gap 10 7 - 19 mmol/L    Glucose 89 74 - 109 mg/dL    BUN 22 8 - 23 mg/dL    Creatinine 1.6 (H) 0.5 - 1.2 mg/dL    Est, Glom Filt Rate 42 (A) >60    Calcium 7.3 (L) 8.8 - 10.2 mg/dL   Magnesium    Collection Time: 02/15/23  2:37 AM   Result Value Ref Range    Magnesium 1.8 1.6 - 2.4 mg/dL       I/O last 3 completed shifts: In: 5311.8 [P.O.:240;  I.V.:5071.8]  Out: 1360 [Urine:1350; Blood:10]  I/O this shift:  In: 240 [P.O.:240]  Out: 350 [Urine:350]      Current Facility-Administered Medications:     melatonin disintegrating tablet 5 mg, 5 mg, Oral, Nightly PRN, Drew Ruelas MD    meropenem (MERREM) 500 mg in sodium chloride 0.9% 50 mL (duplex), 500 mg, IntraVENous, Q24H, Mallorie Peterson MD    fluconazole (DIFLUCAN) in 0.9 % sodium chloride IVPB 200 mg, 200 mg, IntraVENous, Q24H, Mallorie Peterson MD, Last Rate: 100 mL/hr at 02/14/23 1726, 200 mg at 02/14/23 1726    tamsulosin (FLOMAX) capsule 0.8 mg, 0.8 mg, Oral, Daily, Mallorie Peterson MD, 0.8 mg at 02/15/23 0945    sodium chloride flush 0.9 % injection 5-40 mL, 5-40 mL, IntraVENous, 2 times per day, Mallorie Peterson MD, 10 mL at 02/14/23 2051    sodium chloride flush 0.9 % injection 5-40 mL, 5-40 mL, IntraVENous, PRN, Mallorie Peterson MD    0.9 % sodium chloride infusion, , IntraVENous, PRN, Mallorie Peterson MD    [facility] AT [address] by provider] enoxaparin Sodium (LOVENOX) injection 30 mg, 30 mg, SubCUTAneous, Daily, Drew Ruelas MD    ondansetron (ZOFRAN-ODT) disintegrating tablet 4 mg, 4 mg, Oral, Q8H PRN **OR** ondansetron (ZOFRAN) injection 4 mg, 4 mg, IntraVENous, Q6H PRN, Mallorie Peterson MD    [Held by provider] aspirin chewable tablet 81 mg, 81 mg, Oral, Daily, Drew Console, MD    atorvastatin (LIPITOR) tablet 20 mg, 20 mg, Oral, Nightly, Mallorie Peterson MD, 20 mg at 02/14/23 2050    calcitRIOL (ROCALTROL) capsule 0.25 mcg, 0.25 mcg, Oral, Daily, Mallorie Peterson MD, 0.25 mcg at 02/15/23 0963    docusate sodium (COLACE) capsule 100 mg, 100 mg, Oral, Daily, George Zafar MD, 100 mg at 02/15/23 0945    metoprolol succinate (TOPROL XL) extended release tablet 50 mg, 50 mg, Oral, Daily, George Zafar MD, 50 mg at 02/15/23 0945    pregabalin (LYRICA) capsule 25 mg, 25 mg, Oral, Daily, George Zafar MD, 25 mg at 02/15/23 0945    sodium bicarbonate tablet 650 mg, 650 mg, Oral, BID, George Zafar MD, 650 mg at 02/15/23 0945    simethicone (MYLICON) chewable tablet 80 mg, 80 mg, Oral, Q6H PRN, George Zafar MD    QUEtiapine (SEROQUEL) tablet 25 mg, 25 mg, Oral, Nightly, George Zafar MD, 25 mg at 02/14/23 2050    vitamin D (CHOLECALCIFEROL) capsule 5,000 Units, 5,000 Units, Oral, Daily, George Zafar MD, 5,000 Units at 02/15/23 0945    allopurinol (ZYLOPRIM) tablet 100 mg, 100 mg, Oral, Daily, George Zafar MD, 100 mg at 02/15/23 0945    pantoprazole (PROTONIX) tablet 20 mg, 20 mg, Oral, QAM AC, George Zafar MD, 20 mg at 02/15/23 0945    mirtazapine (REMERON) tablet 7.5 mg, 7.5 mg, Oral, Nightly, George Zafar MD, 7.5 mg at 02/14/23 2050    polyethylene glycol (GLYCOLAX) packet 17 g, 17 g, Oral, BID, George Zafar MD, 17 g at 02/15/23 0944    HYDROmorphone HCl PF (DILAUDID) injection 0.5 mg, 0.5 mg, IntraVENous, Q3H PRN, George Zafar MD    HYDROcodone-acetaminophen [facility])  MG per tablet 1 tablet, 1 tablet, Oral, Q4H PRN, George Zafar MD, 1 tablet at 02/15/23 0024        Reviewed chart, history, vitals, labs, diagnostics and medications        Assessment/plan  Principal Problem:    Obstruction of left ureter  Active Problems:    Acute renal failure with acute tubular necrosis superimposed on chronic kidney disease, on chronic dialysis ([address]    Complicated UTI (urinary tract infection)    Urinary retention  Resolved Problems:    * No resolved hospital problems.  *    Obstruction with left ureteral calculus  -Urology following  - S/p cystoscopy with left ureteral stent placement, will need definitive treatment of stone on outpatient basis    Complicated UTI in setting of García catheter and ureteral obstruction  - ID following  - On meropenem empirically  - Fluconazole for fungal infection, follow-up final culture speciation and susceptibilities    Acute renal failure with ATN requiring dialysis  Urinary retention with BPH  Ureteral obstruction  Urologic intervention as noted above  --Maintain García in place, monitor urine output  --Continue Flomax  --Continue dialysis as per nephrology  --Continue to monitor for renal recovery    Constipation  - Continue bowel regimen including Colace and MiraLAX    Mood disorder  Continue Seroquel and Remeron    Dementia  Supportive care      Plan to discharge back to SNF likely later this week pending final antibiotic recommendations from ID.   Will need outpatient follow-up with urology          Gabi Rebollar MD 2/15/2023 12:58 PM

## 2023-02-15 NOTE — OP NOTE
[facility] [address] [facility]                                OPERATIVE REPORT    PATIENT NAME: Pool Huston                    :        1940  MED REC NO:   [id number]                              ROOM:       [facility]  ACCOUNT NO:   [de-identified]                           ADMIT DATE: 2023  PROVIDER:     Tash Roper MD    DATE OF PROCEDURE:  2023    TITLE OF OPERATION:  1. Cystoscopy, left ureteral catheterization, left retrograde  pyelogram.  2.  Placement of a left 5 Sudanese x 28 cm left double-J ureteral stent. PREOPERATIVE DIAGNOSES:  1. Left proximal ureteral calculus. 2.  _____, left pyonephrosis. POSTOPERATIVE DIAGNOSES:  1. Left proximal ureteral calculus. 2.  _____, left pyonephrosis. ANESTHETIC:  General anesthetic. ATTENDING SURGEON:  Tash Roper MD    ESTIMATED BLOOD LOSS:  0 mL. HISTORY:  The patient is an 70-year-old gentleman who was just released  from the hospital after being treated for pyelonephritis. At that time,  CT scan showed that the stone was up into the left renal pelvis. He  returned to the ER now with abdominal pain. CT scan showed that the  stone has dropped down into the proximal left ureter with hydronephrosis  and obstruction. The patient does have acute kidney injury and  currently has been on dialysis since his last admission. He did not  have renal recovery and he maintains a creatinine which is basically  unchanged at this point at 3.4. He has a history of urinary retention  as well. He has had a TUR in the past.  We will plan to evaluate his  outlet to see if there is any obvious obstruction. The risks and  complications of the procedure were discussed with him. He does  understand that we may not be able to treat the stone definitively at  this time and that we may place a stent and he will require a separate  secondary or staged procedure at a later date. The other risks  including the risk of injury to the ureter; need for subsequent,  adjuvant or repeat procedures; need for stenting; need for stent  removal; infection; etc. were discussed. DESCRIPTION OF PROCEDURE:  The patient was brought to the operating  room, underwent general anesthetic. He was placed in the lithotomy  position. His genitalia was prepped and draped per routine sterile  fashion. The patient received a preoperative antibiotic. Time-out was preformed. A 22-Ugandan cystoscope was inserted into the  meatus. This was advanced under direct vision. Penile and bulbar  urethra appeared to be normal.  No stricture. Entering into the  prostatic urethra, he really had a wide opened prostatic fossa. I did  not really see any obstructive residual or regrowth of adenomatous  tissue and the bladder neck was widely open. Entered the patient's  bladder. He had some catheter irritation in the dome of his bladder. Other than that, the bladder showed some trabeculation, 2 to 3+. Otherwise, no significant other papillary changes. The right and left  ureteral orifices were identified and they appeared normal.    I identified the left ureteral orifice, intubated with a 5-Ugandan  open-ended ureteral catheter. Contrast injected retrograde for  retrograde pyelogram.  This showed that the distal and middle one-third  of the ureter appeared to be normal; however, at the proximal ureter,  there was an irregular to a square _____ filling defect consistent with  the stone about the L3 level. Above this, the ureter was dilated but  there was not a lot of dilation of the renal pelvis. I passed a 0.035  sensor-tip guidewire and this went up, past the stone easily. I then  passed a 5-Ugandan open-ended ureteral catheter, past the stone up into  the renal pelvis.   I removed the guidewire and then aspirated about 10  mL of grossly cloudy urine from this left kidney, was near purulent but  most of it just cloudy. Therefore, I felt that manipulating the stone  was not in the best interest until this clears. So, I replaced the  guidewire and measured for the stent. I removed the catheter and then  over the guidewire, a 5-[place]an x 28 cm left double-J ureteral stent was  advanced using cystoscopic and fluoroscopic guidance. This went up past  the stone easily and this was coiled in the renal pelvis and coiled in  the bladder in good position. A string was not left on. I then  withdrew the scope and then a 16-[place]an García catheter was inserted  without difficulty into the patient's bladder. A 10 mL was placed in  the balloon. The procedure was then concluded. Estimated blood loss  was 0 mL. He was awakened from his anesthetic and taken to the recovery  room in stable condition. The urine aspirated from the left kidney was  sent for culture.         Sherolyn Gilford, MD    D: 02/14/2023 16:11:52      T: 02/15/2023 1:03:16     [email or web address]  Job#: [id number]     Doc#: [id number]    CC:

## 2023-02-15 NOTE — OP NOTE
NAHID MARK [facility] TABITHA [address], [address] [facility]                                OPERATIVE REPORT    PATIENT NAME: Kwame Maria                    :        1940  MED REC NO:   [id number]                              ROOM:       [facility]  ACCOUNT NO:   [de-identified]                           ADMIT DATE: 2023  PROVIDER:     Dajuan Diehl MD    DATE OF PROCEDURE:  2023    RADIOGRAPHIC INTERPRETATION OF LEFT RETROGRADE PYELOGRAM    1. Left retrograde pyelogram.    INTERPRETATION:  The left ureter is intubated with ureteral catheter. Contrast injected retrograde. This shows the distal and middle  one-third of the ureter being normal.  The ureter then reveals a filling  defect about the L3 level. Above this, the ureter was dilated. This  was consistent with the stone seen previously on CT scan. The left  renal pelvis itself was not completely opacified.     IMPRESSION:  Obstructing proximal left ureteral calculus on retrograde  pyelogram.        Jose Garsia MD    D: 2023 16:11:52      T: 02/15/2023 1:07:31     [email or web address]  Job#: [id number]     Doc#: [id number]    CC:

## 2023-02-15 NOTE — PROGRESS NOTES
Renal Progress Note    Thank you to requesting provider: Dr Harrison Orosco, for asking us to see Roni Salinas    Reason for consultation:  ESRD    Chief Complaint:  Abdominal pain, urine retention. History of Presenting Illness      Mr. Roni Salinas is a pleasant 79year old man who presented with abdominal pain and was found with obstructive 1 cm stone in mid left ureter with mild left hydronephrosis. He has a history of prior kidney stones. He has recently started dialysis for ALEXANDRO after pyelonephritis and sepsis. He is s/p left ureteral stent placement today by Dr José Miguel Novoa. Patient is currently comfortable. He is non-oliguric. His repeat serum creatinine was 1.6 today.      Past Medical/Surgical History      Active Ambulatory Problems     Diagnosis Date Noted    Mixed hyperlipidemia 08/16/2017    Essential hypertension 08/16/2017    Restless legs syndrome 08/16/2017    Gastroesophageal reflux disease without esophagitis 08/16/2017    Peripheral neuropathic pain 08/16/2017    Primary osteoarthritis involving multiple joints 08/16/2017    Gout 08/16/2017    Benign non-nodular prostatic hyperplasia with lower urinary tract symptoms 08/16/2017    Elevated PSA 08/16/2017    Idiopathic peripheral neuropathy 02/19/2018    Chronic idiopathic constipation 08/20/2018    Prediabetes 10/14/2022    Pyelonephritis of left kidney 01/23/2023    Renal stone 01/23/2023    Visual disturbance 01/31/2023    Acute renal failure with acute tubular necrosis superimposed on chronic kidney disease, on chronic dialysis ([facility] [address] 02/01/2023    Pyelonephritis 02/02/2023    Severe malnutrition ([facility] [address] 02/03/2023     Resolved Ambulatory Problems     Diagnosis Date Noted    No Resolved Ambulatory Problems     No Additional Past Medical History         Review of Systems     Constitutional:  No weight loss, no fever/chills  Eyes:  No eye pain, no eye redness  Cardiovascular:  No chest pain, no worsening of edema  Respiratory:  No hemoptysis, no stidor  Gastrointestinal:  No blood in stool, no n/v, no diarrhea  Genitoruinary:  No hematuria, + difficulty with urination  Musculoskeletal:  No joint swelling, no redness  Integumentary:  No Rash, no itching  Neurological:  No focal weakness, No new sensory deficit  Psychiatric:  No depression, no confusion  Endocrine:  No polyuria, no polydipsia       Medications        Current Facility-Administered Medications:     melatonin disintegrating tablet 5 mg, 5 mg, Oral, Nightly PRN, Aristides Gupta MD    meropenem (MERREM) 500 mg in sodium chloride 0.9% 50 mL (duplex), 500 mg, IntraVENous, Q24H, Delmar Rice MD    fluconazole (DIFLUCAN) in 0.9 % sodium chloride IVPB 200 mg, 200 mg, IntraVENous, Q24H, Delmar Rice MD, Last Rate: 100 mL/hr at 02/14/23 1726, 200 mg at 02/14/23 1726    tamsulosin (FLOMAX) capsule 0.8 mg, 0.8 mg, Oral, Daily, Delmar Rice MD, 0.8 mg at 02/15/23 0945    sodium chloride flush 0.9 % injection 5-40 mL, 5-40 mL, IntraVENous, 2 times per day, Delmar Rice MD, 10 mL at 02/14/23 2051    sodium chloride flush 0.9 % injection 5-40 mL, 5-40 mL, IntraVENous, PRN, Delmar Rice MD    0.9 % sodium chloride infusion, , IntraVENous, PRN, Delmar Rice MD    [Held by provider] enoxaparin Sodium (LOVENOX) injection 30 mg, 30 mg, SubCUTAneous, Daily, Aristides Gupta MD    ondansetron (ZOFRAN-ODT) disintegrating tablet 4 mg, 4 mg, Oral, Q8H PRN **OR** ondansetron (ZOFRAN) injection 4 mg, 4 mg, IntraVENous, Q6H PRN, Delmar Rice MD    [Held by provider] aspirin chewable tablet 81 mg, 81 mg, Oral, Daily, Aristides Gupta MD    atorvastatin (LIPITOR) tablet 20 mg, 20 mg, Oral, Nightly, Delmar Rice MD, 20 mg at 02/14/23 2050    calcitRIOL (ROCALTROL) capsule 0.25 mcg, 0.25 mcg, Oral, Daily, Delmar Rice MD, 0.25 mcg at 02/15/23 0945    docusate sodium (COLACE) capsule 100 mg, 100 mg, Oral, Daily, Delmar Rice MD, 100 mg at 02/15/23 0945    metoprolol  succinate (TOPROL XL) extended release tablet 50 mg, 50 mg, Oral, Daily, Kristin Chapa MD, 50 mg at 02/15/23 0945    pregabalin (LYRICA) capsule 25 mg, 25 mg, Oral, Daily, Kristin Chapa MD, 25 mg at 02/15/23 0945    sodium bicarbonate tablet 650 mg, 650 mg, Oral, BID, Kristin Chapa MD, 650 mg at 02/15/23 0945    simethicone (MYLICON) chewable tablet 80 mg, 80 mg, Oral, Q6H PRN, Kristin Chapa MD    QUEtiapine (SEROQUEL) tablet 25 mg, 25 mg, Oral, Nightly, Kristin Chapa MD, 25 mg at 23    vitamin D (CHOLECALCIFEROL) capsule 5,000 Units, 5,000 Units, Oral, Daily, Kristin Chapa MD, 5,000 Units at 02/15/23 0945    allopurinol (ZYLOPRIM) tablet 100 mg, 100 mg, Oral, Daily, Kristin Chapa MD, 100 mg at 02/15/23 0945    pantoprazole (PROTONIX) tablet 20 mg, 20 mg, Oral, QAM AC, Kristin Chapa MD, 20 mg at 02/15/23 0945    mirtazapine (REMERON) tablet 7.5 mg, 7.5 mg, Oral, Nightly, Kristin Chapa MD, 7.5 mg at 23    polyethylene glycol (GLYCOLAX) packet 17 g, 17 g, Oral, BID, Kristin Chapa MD, 17 g at 02/15/23 0944    HYDROmorphone HCl PF (DILAUDID) injection 0.5 mg, 0.5 mg, IntraVENous, Q3H PRN, Kristin Chapa MD    HYDROcodone-acetaminophen [facility])  MG per tablet 1 tablet, 1 tablet, Oral, Q4H PRN, Kristin Chapa MD, 1 tablet at 02/15/23 0024  Outpatient Medications Marked as Taking for the 23 encounter [facility] Encounter)   Medication Sig Dispense Refill    acetaminophen (TYLENOL) 325 MG tablet Take 650 mg by mouth every 6 hours as needed for Pain      ondansetron (ZOFRAN) 4 MG tablet Take 4 mg by mouth every 6 hours as needed for Nausea or Vomiting      traMADol (ULTRAM) 50 MG tablet Take 50 mg by mouth every 8 hours as needed for Pain.          Allergies   Codeine    Family History       Family History   Problem Relation Age of Onset    Uterine Cancer Mother          at age 52    Coronary Art Dis Father         MI at 62, smoker COPD Sister         smoker    COPD Sister         smoker    Heart Failure Sister     COPD Sister         smoker    Stroke Brother         smoker    Coronary Art Dis Brother         open heart surgry, MI at 80- yo    No Known Problems Son     No Known Problems Daughter      Family history negative for kidney disease. 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     Employer: RETIRED     Comment: retired    Tobacco Use    Smoking status: Former     Packs/day: 3.00     Years: 35.00     Pack years: 105.00     Types: Cigarettes, Pipe     Start date:      Quit date:      Years since quittin.1    Smokeless tobacco: Never   Vaping Use    Vaping Use: Never used   Substance and Sexual Activity    Alcohol use: Not Currently     Comment: \"not vbery much\", \"beer every 15 years\"    Drug use: Never    Sexual activity: Yes     Partners: Female     Comment: wife, has 2 kids   Social History Narrative    CODE STATUS: Full Code    HEALTH CARE PROXY: his children    1.) Benson Sweet, his daughter, [phone]    2.) Vera Hilario, his son, [phone]    AMBULATES: independently, sometimes with cane    DOMICILED: has 3 story home but he has an elevator     Social Determinants of Health     Financial Resource Strain: Low Risk     Difficulty of Paying Living Expenses: Not hard at all   Food Insecurity: No Food Insecurity    Worried About [address] in the Last Year: Never true    [address] [facility] [address] in the Last Year: Never true   Transportation Needs: No Transportation Needs    Lack of Transportation (Medical): No    Lack of Transportation (Non-Medical):  No   Physical Activity: Insufficiently Active    Days of Exercise per Week: 2 days    Minutes of Exercise per Session: 10 min   Stress: No Stress Concern Present    Feeling of Stress : Not at all   Social Connections: Moderately Isolated    Frequency of Communication with Friends and Family: More than three times a week    Frequency of Social Gatherings with Friends and Family: Twice a week    Attends Jewish Services: 1 to 4 times per year    Active Member of [employer] or Organizations: No    Attends Club or Organization Meetings: Never    Marital Status:    Intimate Partner Violence: Not At Risk    Fear of Current or Ex-Partner: No    Emotionally Abused: No    Physically Abused: No    Sexually Abused: No   Housing Stability: Low Risk     Unable to Pay for Housing in the Last Year: No    Number of Places Lived in the Last Year: 2    Unstable Housing in the Last Year: No       Physical Exam     Blood pressure 135/70, pulse 80, temperature 96.8 °F (36 °C), temperature source Temporal, resp. rate 18, height 6' (1.829 m), weight 206 lb 3.2 oz (93.5 kg), SpO2 97 %.     General:  NAD, A+Ox3, ill-appearing, normal body habitus  HEENT:  atraumatic, normocephalic  Neck:  no masses, no JVD  Chest:  CTAB, good respiratory effort, good air movement  CV:  RRR, soft systolic murmur    Abdomen:  NTND, soft, +BS, no hepatosplenomegaly  Extremities:  No peripheral edema  Neurological:  Moving all four extremities   Lymphatics:  No palpable lymph nodes   Skin:  No rash, no jaundice  Psychiatric:  Normal insight and judgement, good recall    Data     Recent Labs     02/12/23  2142 02/14/23  0305 02/15/23  0237   WBC 7.4 5.8 5.5   HGB 11.8* 10.2* 9.7*   HCT 36.9* 32.0* 30.3*   MCV 84.6 85.6 84.4   * 99* 122*       Recent Labs     02/13/23  0853 02/14/23  0305 02/15/23  0237   * 140 141   K 3.6 3.4* 3.5   CL 94* 100 106   CO2 29 27 25   GLUCOSE 95 90 89   MG  --  2.1 1.8   BUN 40* 39* 22   CREATININE 3.5* 3.1* 1.6*   LABGLOM 17* 19* 42*         Assessment:  ALEXANDRO- ATN and obstruction  Recent pyelonephritis and sepsis  Abdominal pain  Left ureter stone with mild left hydronephrosis  Hypokalemia  Hyponatremia  CKD stage 3a  Urine retention      Plan:  Would hold dialysis for now and follow up labs. Patient may be having renal function recovery.

## 2023-02-15 NOTE — PROGRESS NOTES
Urology Progress Note      SUBJECTIVE:  No  complaints. Patient somewhat drowsy this morning. OBJECTIVE:   Review of Systems     Physical  VITALS:  /70   Pulse 80   Temp 96.8 °F (36 °C) (Temporal)   Resp 18   Ht 6' (1.829 m)   Wt 206 lb 3.2 oz (93.5 kg)   SpO2 97%   BMI 27.97 kg/m²   TEMPERATURE:  Current - Temp: 96.8 °F (36 °C); Max - Temp  Av.8 °F (36.6 °C)  Min: 96.8 °F (36 °C)  Max: 98.6 °F (37 °C)   24 HR I&O   Intake/Output Summary (Last 24 hours) at 2/15/2023 0849  Last data filed at 2/15/2023 0519  Gross per 24 hour   Intake 3153.82 ml   Output 760 ml   Net 2393.82 ml       Physical Exam   BACK: not done  ABDOMEN:  non-distended, and non-tender  HEART:  normal rate  CHEST: Normal respiratory effort  GENITAL/URINARY: García catheter to bedside draining cloudy yellow urine    Data  CBC:   Recent Labs     23  2142 23  0305 02/15/23  0237   WBC 7.4 5.8 5.5   HGB 11.8* 10.2* 9.7*   HCT 36.9* 32.0* 30.3*   * 99* 122*     BMP:    Recent Labs     23  0853 23  0305 02/15/23  0237   * 140 141   K 3.6 3.4* 3.5   CL 94* 100 106   CO2 29 27 25   BUN 40* 39* 22   CREATININE 3.5* 3.1* 1.6*   GLUCOSE 95 90 89       Recent Labs     23  1633   LABURIN >100,000 CFU/ml*  Moderate growth  ID and Sensitivity to follow       No results for input(s): BC in the last 72 hours. No results for input(s): Ladell Clarke in the last 72 hours.       U/A:    Lab Results   Component Value Date/Time    COLORU YELLOW 2023 11:05 PM    PHUR 5.0 2023 11:05 PM    WBCUA 26 2023 11:05 PM    WBCUA 10-15 2023 11:05 PM    RBCUA 2-4 2023 11:05 PM    MUCUS 1+ 2023 11:05 PM    YEAST Present 2023 11:05 PM    BACTERIA 1+ 2023 11:05 PM    CLARITYU CLOUDY 2023 11:05 PM    SPECGRAV 1.018 2023 11:05 PM    LEUKOCYTESUR MODERATE 2023 11:05 PM    UROBILINOGEN 0.2 2023 11:05 PM    BILIRUBINUR Negative 2023 11:05 PM    BLOODU MODERATE 02/12/2023 11:05 PM    GLUCOSEU Negative 02/12/2023 11:05 PM    AMORPHOUS Rare 02/12/2023 11:05 PM       Radiology:   CT ABDOMEN PELVIS WO CONTRAST Additional Contrast? None    Result Date: 2/13/2023  1 cm obstructing left mid ureteral calculus. Mild left hydronephrosis. Punctate nonobstructing right-sided nephrolithiasis. Soft tissue fullness at the pancreatic tail. Cannot exclude underlying mass. Correlation with contrast-enhanced pancreatic protocol MRI or CT is suggested a possible. Scattered hepatic hypoattenuating lesion, which are compatible with cysts while others are too small to accurately characterize. Additional findings per body of the report. ASSESSMENT AND PLAN    Patient Active Problem List   Diagnosis    Mixed hyperlipidemia    Essential hypertension    Restless legs syndrome    Gastroesophageal reflux disease without esophagitis    Peripheral neuropathic pain    Primary osteoarthritis involving multiple joints    Gout    Benign non-nodular prostatic hyperplasia with lower urinary tract symptoms    Elevated PSA    Idiopathic peripheral neuropathy    Chronic idiopathic constipation    Prediabetes    Pyelonephritis of left kidney    Renal stone    Visual disturbance    Acute renal failure with acute tubular necrosis superimposed on chronic kidney disease, on chronic dialysis (HCC)    Pyelonephritis    Severe malnutrition (HCC)    Obstruction of left ureter    Complicated UTI (urinary tract infection)    Urinary retention       1. Left proximal obstructing ureteral calculus. Patient is status post left ureteral stent placement. Urine culture pending. Previous urine culture obtained through catheter on 12/13/2023 reveals yeast.  Infectious disease following patient. It did appear cloudy therefore stone was not treated. Patient will need at least a 14-day course of culture specific antibiotics prior to definitive treatment of the stone. Currently maintained on Merrem and Diflucan.   He is remained afebrile overnight, no leukocytosis. He did get dialyzed yesterday and his creatinine has improved is now 1.6.    2.  BPH with urinary retention. Cystoscopy showed no stricture and no significant residual prostate adenoma. García catheter remains in place for now. We can attempt a voiding trial tomorrow morning remove the catheter. If he is unable to void recommendations would be for In-N-Out catheterization 4 times per day and as needed. If the nursing home is unwilling to accept our recommendations only other option would be to place an indwelling chronic García catheter. Continue Flomax.     Ml Benedict, APRN - CNP  2/15/2023 8:49 AM no

## 2023-02-16 LAB
ANION GAP SERPL CALCULATED.3IONS-SCNC: 19 MMOL/L (ref 7–19)
BASOPHILS ABSOLUTE: 0 K/UL (ref 0–0.2)
BASOPHILS RELATIVE PERCENT: 0.4 % (ref 0–1)
BUN BLDV-MCNC: 24 MG/DL (ref 8–23)
CALCIUM SERPL-MCNC: 7.7 MG/DL (ref 8.8–10.2)
CHLORIDE BLD-SCNC: 102 MMOL/L (ref 98–111)
CO2: 19 MMOL/L (ref 22–29)
CREAT SERPL-MCNC: 1.6 MG/DL (ref 0.5–1.2)
EOSINOPHILS ABSOLUTE: 0.1 K/UL (ref 0–0.6)
EOSINOPHILS RELATIVE PERCENT: 2.3 % (ref 0–5)
GFR SERPL CREATININE-BSD FRML MDRD: 42 ML/MIN/{1.73_M2}
GLUCOSE BLD-MCNC: 86 MG/DL (ref 74–109)
HCT VFR BLD CALC: 31.6 % (ref 42–52)
HEMOGLOBIN: 10.1 G/DL (ref 14–18)
IMMATURE GRANULOCYTES #: 0.1 K/UL
LYMPHOCYTES ABSOLUTE: 1.2 K/UL (ref 1.1–4.5)
LYMPHOCYTES RELATIVE PERCENT: 23.2 % (ref 20–40)
MCH RBC QN AUTO: 27.4 PG (ref 27–31)
MCHC RBC AUTO-ENTMCNC: 32 G/DL (ref 33–37)
MCV RBC AUTO: 85.6 FL (ref 80–94)
MONOCYTES ABSOLUTE: 0.5 K/UL (ref 0–0.9)
MONOCYTES RELATIVE PERCENT: 9.7 % (ref 0–10)
NEUTROPHILS ABSOLUTE: 3.3 K/UL (ref 1.5–7.5)
NEUTROPHILS RELATIVE PERCENT: 63.4 % (ref 50–65)
ORGANISM: ABNORMAL
ORGANISM: ABNORMAL
PDW BLD-RTO: 14 % (ref 11.5–14.5)
PLATELET # BLD: 141 K/UL (ref 130–400)
PMV BLD AUTO: 10 FL (ref 9.4–12.4)
POTASSIUM REFLEX MAGNESIUM: 3.8 MMOL/L (ref 3.5–5)
RBC # BLD: 3.69 M/UL (ref 4.7–6.1)
SODIUM BLD-SCNC: 140 MMOL/L (ref 136–145)
URINE CULTURE, ROUTINE: ABNORMAL
WBC # BLD: 5.3 K/UL (ref 4.8–10.8)

## 2023-02-16 PROCEDURE — 99231 SBSQ HOSP IP/OBS SF/LOW 25: CPT | Performed by: UROLOGY

## 2023-02-16 PROCEDURE — 6360000002 HC RX W HCPCS: Performed by: INTERNAL MEDICINE

## 2023-02-16 PROCEDURE — 36415 COLL VENOUS BLD VENIPUNCTURE: CPT

## 2023-02-16 PROCEDURE — 2580000003 HC RX 258: Performed by: UROLOGY

## 2023-02-16 PROCEDURE — 85025 COMPLETE CBC W/AUTO DIFF WBC: CPT

## 2023-02-16 PROCEDURE — 2700000000 HC OXYGEN THERAPY PER DAY

## 2023-02-16 PROCEDURE — 6370000000 HC RX 637 (ALT 250 FOR IP): Performed by: UROLOGY

## 2023-02-16 PROCEDURE — 51798 US URINE CAPACITY MEASURE: CPT

## 2023-02-16 PROCEDURE — 6360000002 HC RX W HCPCS: Performed by: UROLOGY

## 2023-02-16 PROCEDURE — 1210000000 HC MED SURG R&B

## 2023-02-16 PROCEDURE — 94760 N-INVAS EAR/PLS OXIMETRY 1: CPT

## 2023-02-16 PROCEDURE — 80048 BASIC METABOLIC PNL TOTAL CA: CPT

## 2023-02-16 PROCEDURE — 51701 INSERT BLADDER CATHETER: CPT

## 2023-02-16 PROCEDURE — 93971 EXTREMITY STUDY: CPT

## 2023-02-16 PROCEDURE — 2580000003 HC RX 258: Performed by: INTERNAL MEDICINE

## 2023-02-16 RX ADMIN — TAMSULOSIN HYDROCHLORIDE 0.8 MG: 0.4 CAPSULE ORAL at 08:11

## 2023-02-16 RX ADMIN — POLYETHYLENE GLYCOL 3350 17 G: 17 POWDER, FOR SOLUTION ORAL at 22:15

## 2023-02-16 RX ADMIN — SODIUM CHLORIDE 200 MG: 9 INJECTION, SOLUTION INTRAVENOUS at 15:57

## 2023-02-16 RX ADMIN — ATORVASTATIN CALCIUM 20 MG: 20 TABLET, FILM COATED ORAL at 22:15

## 2023-02-16 RX ADMIN — PANTOPRAZOLE SODIUM 20 MG: 20 TABLET, DELAYED RELEASE ORAL at 05:12

## 2023-02-16 RX ADMIN — FLUCONAZOLE IN SODIUM CHLORIDE 200 MG: 2 INJECTION, SOLUTION INTRAVENOUS at 19:10

## 2023-02-16 RX ADMIN — SODIUM BICARBONATE 650 MG: 650 TABLET ORAL at 22:15

## 2023-02-16 RX ADMIN — Medication 5000 UNITS: at 08:12

## 2023-02-16 RX ADMIN — SODIUM BICARBONATE 650 MG: 650 TABLET ORAL at 08:12

## 2023-02-16 RX ADMIN — DOCUSATE SODIUM 100 MG: 100 CAPSULE, LIQUID FILLED ORAL at 08:12

## 2023-02-16 RX ADMIN — POLYETHYLENE GLYCOL 3350 17 G: 17 POWDER, FOR SOLUTION ORAL at 08:11

## 2023-02-16 RX ADMIN — SODIUM CHLORIDE: 9 INJECTION, SOLUTION INTRAVENOUS at 08:49

## 2023-02-16 RX ADMIN — MIRTAZAPINE 7.5 MG: 7.5 TABLET ORAL at 22:15

## 2023-02-16 RX ADMIN — PREGABALIN 25 MG: 25 CAPSULE ORAL at 08:11

## 2023-02-16 RX ADMIN — CALCITRIOL CAPSULES 0.25 MCG 0.25 MCG: 0.25 CAPSULE ORAL at 08:12

## 2023-02-16 RX ADMIN — SODIUM CHLORIDE, PRESERVATIVE FREE 10 ML: 5 INJECTION INTRAVENOUS at 08:12

## 2023-02-16 RX ADMIN — METOPROLOL SUCCINATE 50 MG: 50 TABLET, EXTENDED RELEASE ORAL at 08:11

## 2023-02-16 RX ADMIN — QUETIAPINE FUMARATE 25 MG: 25 TABLET ORAL at 22:15

## 2023-02-16 RX ADMIN — ALLOPURINOL 100 MG: 100 TABLET ORAL at 08:12

## 2023-02-16 ASSESSMENT — PAIN SCALES - GENERAL: PAINLEVEL_OUTOF10: 0

## 2023-02-16 NOTE — PROGRESS NOTES
Vascular Lab Prelim  Duplex venous scan of RLE shows no evidence for dvt, svt, reflux noted at this time. Final report pending.

## 2023-02-16 NOTE — PROGRESS NOTES
Urology Progress Note      SUBJECTIVE: No complaints the patient states passing gas but no bowel movement and not getting up out of bed    OBJECTIVE: Amanda bArams DC'maryjo    REVIEW OF SYSTEMS:   Review of Systems      Physical  VITALS:  BP (!) 121/58   Pulse 90   Temp 98.4 °F (36.9 °C) (Temporal)   Resp 14   Ht 6' (1.829 m)   Wt 208 lb 8 oz (94.6 kg)   SpO2 99%   BMI 28.28 kg/m²   TEMPERATURE:  Current - Temp: 98.4 °F (36.9 °C); Max - Temp  Av.8 °F (36.6 °C)  Min: 96.8 °F (36 °C)  Max: 98.4 °F (36.9 °C)   24 HR I&O   Intake/Output Summary (Last 24 hours) at 2023 0743  Last data filed at 2023 0418  Gross per 24 hour   Intake 1130 ml   Output 1575 ml   Net -445 ml     BACK: no tenderness in spine or flanks  ABDOMEN:  soft, non-distended, and non-tender  HEART:  normal rate and regular rhythm  CHEST: Normal respiratory effort  GENITAL/URINARY:  phallus meatus non-circumcised García catheter in place urine slightly cloudy no blood    Data       CBC:   Recent Labs     23  0305 02/15/23  0237 23  0248   WBC 5.8 5.5 5.3   HGB 10.2* 9.7* 10.1*   HCT 32.0* 30.3* 31.6*   PLT 99* 122* 141     BMP:    Recent Labs     23  0305 02/15/23  0237 23  0248    141 140   K 3.4* 3.5 3.8    106 102   CO2 27 25 19*   BUN 39* 22 24*   CREATININE 3.1* 1.6* 1.6*   GLUCOSE 90 89 86       Recent Labs     23  1351   LABURIN >100,000 CFU/ml  Heavy growth of Yeast, additional incubation required       No results for input(s): BC in the last 72 hours. No results for input(s): Serge Staple in the last 72 hours.     Radiology:      Imaging studies: No new studies    ASSESSMENT AND PLAN    Patient Active Problem List   Diagnosis    Mixed hyperlipidemia    Essential hypertension    Restless legs syndrome    Gastroesophageal reflux disease without esophagitis    Peripheral neuropathic pain    Primary osteoarthritis involving multiple joints    Gout    Benign non-nodular prostatic hyperplasia with lower urinary tract symptoms    Elevated PSA    Idiopathic peripheral neuropathy    Chronic idiopathic constipation    Prediabetes    Pyelonephritis of left kidney    Renal stone    Visual disturbance    Acute renal failure with acute tubular necrosis superimposed on chronic kidney disease, on chronic dialysis (HCC)    Pyelonephritis    Severe malnutrition (HCC)    Obstruction of left ureter    Complicated UTI (urinary tract infection)    Urinary retention       1. Fungal UTI with culture positive from obstructed left kidney. Patient now on Diflucan per infectious disease. 2.  Left obstructing proximal ureteral calculus status post left ureteral stent placement. We will plan definitive treatment of the stone after patient has finished a course of his antibiotic fungal microbial.    3.  CKD. After stent placement his creatinine has remained stable. Patient followed by nephrology    4. Urinary retention. Cystoscopy showed no obvious cause for outlet obstruction large wide open prostatic fossa and bladder neck without obstruction or contracture. DC García catheter today for voiding trial with In-N-Out catheterization for residuals greater than 300 by bladder scan.   Continue tamsulosin    Carmen Leigh MD

## 2023-02-16 NOTE — PROGRESS NOTES
Infectious Diseases Progress Note    Patient:  Emeli Patel  YOB: 1940  MRN: [id number]   Admit date: 2/12/2023   Admitting Physician: Chente Ontiveros MD  Primary Care Physician: Cammie Nolasco MD    Chief Complaint/Interval History: He is without fever. Hemodynamically stable. No new localizing symptoms. In/Out    Intake/Output Summary (Last 24 hours) at 2/16/2023 0730  Last data filed at 2/16/2023 0418  Gross per 24 hour   Intake 1130 ml   Output 1575 ml   Net -445 ml     Allergies:    Allergies   Allergen Reactions    Codeine Nausea Only     Current Meds: melatonin disintegrating tablet 5 mg, Nightly PRN  fluconazole (DIFLUCAN) in 0.9 % sodium chloride IVPB 200 mg, Q24H  tamsulosin (FLOMAX) capsule 0.8 mg, Daily  sodium chloride flush 0.9 % injection 5-40 mL, 2 times per day  sodium chloride flush 0.9 % injection 5-40 mL, PRN  0.9 % sodium chloride infusion, PRN  [Held by provider] enoxaparin Sodium (LOVENOX) injection 30 mg, Daily  ondansetron (ZOFRAN-ODT) disintegrating tablet 4 mg, Q8H PRN   Or  ondansetron (ZOFRAN) injection 4 mg, Q6H PRN  [Held by provider] aspirin chewable tablet 81 mg, Daily  atorvastatin (LIPITOR) tablet 20 mg, Nightly  calcitRIOL (ROCALTROL) capsule 0.25 mcg, Daily  docusate sodium (COLACE) capsule 100 mg, Daily  metoprolol succinate (TOPROL XL) extended release tablet 50 mg, Daily  pregabalin (LYRICA) capsule 25 mg, Daily  sodium bicarbonate tablet 650 mg, BID  simethicone (MYLICON) chewable tablet 80 mg, Q6H PRN  QUEtiapine (SEROQUEL) tablet 25 mg, Nightly  vitamin D (CHOLECALCIFEROL) capsule 5,000 Units, Daily  allopurinol (ZYLOPRIM) tablet 100 mg, Daily  pantoprazole (PROTONIX) tablet 20 mg, QAM AC  mirtazapine (REMERON) tablet 7.5 mg, Nightly  polyethylene glycol (GLYCOLAX) packet 17 g, BID  HYDROmorphone HCl PF (DILAUDID) injection 0.5 mg, Q3H PRN  HYDROcodone-acetaminophen (NORCO)  MG per tablet 1 tablet, Q4H PRN      Review of Systems see HPI    VitalSigns:  BP (!) 121/58   Pulse 90   Temp 98.4 °F (36.9 °C) (Temporal)   Resp 14   Ht 6' (1.829 m)   Wt 208 lb 8 oz (94.6 kg)   SpO2 99%   BMI 28.28 kg/m²      Physical Exam  Line/IV site: No erythema, warmth, induration, or tenderness. Abdomen is soft and nontender  No suprapubic or flank tenderness    Lab Results:  CBC:   Recent Labs     02/14/23  0305 02/15/23  0237 02/16/23  0248   WBC 5.8 5.5 5.3   HGB 10.2* 9.7* 10.1*   PLT 99* 122* 141     BMP:  Recent Labs     02/14/23  0305 02/15/23  0237 02/16/23  0248    141 140   K 3.4* 3.5 3.8    106 102   CO2 27 25 19*   BUN 39* 22 24*   CREATININE 3.1* 1.6* 1.6*   GLUCOSE 90 89 86     CultureResults:  Preliminary identification on yeast from urine-Candida guilliermondi    Radiology: None    Additional Studies Reviewed:  None    Impression:  1. Fungal urinary tract infection/pyelonephritis  2. Nephrolithiasis  3. Previous back and flank pain-improved  4.   Acute renal insufficiency-improved    Recommendations:  Continue fluconazole  Add micafungin  Await susceptibility of the Candida species identified in urine  Continue to follow    Cl Mejia MD

## 2023-02-16 NOTE — PROGRESS NOTES
Renal Progress Note    Thank you to requesting provider: Dr Carlos Dong, for asking us to see Odilia Rehman    Reason for consultation:  ALEXANDRO    Chief Complaint:  Abdominal pain, urine retention. History of Presenting Illness      Mr. Odilia Rehman is a pleasant 79year old man who presented with abdominal pain and was found with obstructive 1 cm stone in mid left ureter with mild left hydronephrosis. He has a history of prior kidney stones. He has recently started dialysis for ALEXANDRO after pyelonephritis and sepsis. He is s/p left ureteral stent placement today by Dr Obey Partida. Patient is currently comfortable. He is non-oliguric. His repeat serum creatinine was 1.6. He is growing yeast in his urine. His grove catheter was removed.      Past Medical/Surgical History      Active Ambulatory Problems     Diagnosis Date Noted    Mixed hyperlipidemia 08/16/2017    Essential hypertension 08/16/2017    Restless legs syndrome 08/16/2017    Gastroesophageal reflux disease without esophagitis 08/16/2017    Peripheral neuropathic pain 08/16/2017    Primary osteoarthritis involving multiple joints 08/16/2017    Gout 08/16/2017    Benign non-nodular prostatic hyperplasia with lower urinary tract symptoms 08/16/2017    Elevated PSA 08/16/2017    Idiopathic peripheral neuropathy 02/19/2018    Chronic idiopathic constipation 08/20/2018    Prediabetes 10/14/2022    Pyelonephritis of left kidney 01/23/2023    Renal stone 01/23/2023    Visual disturbance 01/31/2023    Acute renal failure with acute tubular necrosis superimposed on chronic kidney disease, on chronic dialysis ([address] 02/01/2023    Pyelonephritis 02/02/2023    Severe malnutrition ([address] 02/03/2023     Resolved Ambulatory Problems     Diagnosis Date Noted    No Resolved Ambulatory Problems     No Additional Past Medical History         Review of Systems     Constitutional:  No weight loss, no fever/chills  Eyes:  No eye pain, no eye redness  Cardiovascular:  No chest pain, no worsening of edema  Respiratory:  No hemoptysis, no stidor  Gastrointestinal:  No blood in stool, no n/v, no diarrhea  Genitoruinary:  No hematuria, + difficulty with urination  Musculoskeletal:  No joint swelling, no redness  Integumentary:  No Rash, no itching  Neurological:  No focal weakness, No new sensory deficit  Psychiatric:  No depression, no confusion  Endocrine:  No polyuria, no polydipsia       Medications        Current Facility-Administered Medications:     melatonin disintegrating tablet 5 mg, 5 mg, Oral, Nightly PRN, Deana Lerma MD    fluconazole (DIFLUCAN) in 0.9 % sodium chloride IVPB 200 mg, 200 mg, IntraVENous, Q24H, Avni Tejada MD, Last Rate: 100 mL/hr at 02/15/23 2130, 200 mg at 02/15/23 2130    tamsulosin (FLOMAX) capsule 0.8 mg, 0.8 mg, Oral, Daily, Avni Tejada MD, 0.8 mg at 02/16/23 6173    sodium chloride flush 0.9 % injection 5-40 mL, 5-40 mL, IntraVENous, 2 times per day, Avni Tejada MD, 10 mL at 02/16/23 6197    sodium chloride flush 0.9 % injection 5-40 mL, 5-40 mL, IntraVENous, PRN, Avni Tejada MD    0.9 % sodium chloride infusion, , IntraVENous, PRN, Avni Tejada MD, Last Rate: 15 mL/hr at 02/16/23 0849, New Bag at 02/16/23 0849    [Held by provider] enoxaparin Sodium (LOVENOX) injection 30 mg, 30 mg, SubCUTAneous, Daily, Deana Lerma MD    ondansetron (ZOFRAN-ODT) disintegrating tablet 4 mg, 4 mg, Oral, Q8H PRN **OR** ondansetron (ZOFRAN) injection 4 mg, 4 mg, IntraVENous, Q6H PRN, Avni Tejada MD    [Held by provider] aspirin chewable tablet 81 mg, 81 mg, Oral, Daily, Deana Lerma MD    atorvastatin (LIPITOR) tablet 20 mg, 20 mg, Oral, Nightly, Avni Tejada MD, 20 mg at 02/15/23 1958    calcitRIOL (ROCALTROL) capsule 0.25 mcg, 0.25 mcg, Oral, Daily, Avni Tejada MD, 0.25 mcg at 02/16/23 5528    docusate sodium (COLACE) capsule 100 mg, 100 mg, Oral, Daily, Avni Tejada MD, 100 mg at 02/16/23 0812    metoprolol succinate (TOPROL XL) extended release tablet 50 mg, 50 mg, Oral, Daily, Leonel Reddy MD, 50 mg at 23 0811    pregabalin (LYRICA) capsule 25 mg, 25 mg, Oral, Daily, Leonel Reddy MD, 25 mg at 23 9971    sodium bicarbonate tablet 650 mg, 650 mg, Oral, BID, Leonel Reddy MD, 650 mg at 23 4784    simethicone (MYLICON) chewable tablet 80 mg, 80 mg, Oral, Q6H PRN, Leonel Reddy MD    QUEtiapine (SEROQUEL) tablet 25 mg, 25 mg, Oral, Nightly, Leonel Reddy MD, 25 mg at 02/15/23 1959    vitamin D (CHOLECALCIFEROL) capsule 5,000 Units, 5,000 Units, Oral, Daily, Leonel Reddy MD, 5,000 Units at 23 1186    allopurinol (ZYLOPRIM) tablet 100 mg, 100 mg, Oral, Daily, Leonel Reddy MD, 100 mg at 23 2460    pantoprazole (PROTONIX) tablet 20 mg, 20 mg, Oral, QAM AC, Leonel Reddy MD, 20 mg at 23 9656    mirtazapine (REMERON) tablet 7.5 mg, 7.5 mg, Oral, Nightly, Leonel Reddy MD, 7.5 mg at 02/15/23 1958    polyethylene glycol (GLYCOLAX) packet 17 g, 17 g, Oral, BID, Leonel Reddy MD, 17 g at 23 0811    HYDROmorphone HCl PF (DILAUDID) injection 0.5 mg, 0.5 mg, IntraVENous, Q3H PRN, Leonel Reddy MD    HYDROcodone-acetaminophen [facility])  MG per tablet 1 tablet, 1 tablet, Oral, Q4H PRN, Leonel Reddy MD, 1 tablet at 02/15/23 0024  Outpatient Medications Marked as Taking for the 23 encounter [facility] Encounter)   Medication Sig Dispense Refill    acetaminophen (TYLENOL) 325 MG tablet Take 650 mg by mouth every 6 hours as needed for Pain      ondansetron (ZOFRAN) 4 MG tablet Take 4 mg by mouth every 6 hours as needed for Nausea or Vomiting      traMADol (ULTRAM) 50 MG tablet Take 50 mg by mouth every 8 hours as needed for Pain.          Allergies   Codeine    Family History       Family History   Problem Relation Age of Onset    Uterine Cancer Mother          at age 52    Coronary Art Dis Father         MI at 62, smoker COPD Sister         smoker    COPD Sister         smoker    Heart Failure Sister     COPD Sister         smoker    Stroke Brother         smoker    Coronary Art Dis Brother         open heart surgry, MI at 80- yo    No Known Problems Son     No Known Problems Daughter      Family history negative for kidney disease. 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     Employer: RETIRED     Comment: retired    Tobacco Use    Smoking status: Former     Packs/day: 3.00     Years: 35.00     Pack years: 105.00     Types: Cigarettes, Pipe     Start date:      Quit date:      Years since quittin.1    Smokeless tobacco: Never   Vaping Use    Vaping Use: Never used   Substance and Sexual Activity    Alcohol use: Not Currently     Comment: \"not vbery much\", \"beer every 15 years\"    Drug use: Never    Sexual activity: Yes     Partners: Female     Comment: wife, has 2 kids   Social History Narrative    CODE STATUS: Full Code    HEALTH CARE PROXY: his children    1.) Benson Sweet, his daughter, [phone]    2.) Vera Hilario, his son, [phone]    AMBULATES: independently, sometimes with cane    DOMICILED: has 3 story home but he has an elevator     Social Determinants of Health     Financial Resource Strain: Low Risk     Difficulty of Paying Living Expenses: Not hard at all   Food Insecurity: No Food Insecurity    Worried About [address] in the Last Year: Never true    [address] in the Last Year: Never true   Transportation Needs: No Transportation Needs    Lack of Transportation (Medical): No    Lack of Transportation (Non-Medical):  No   Physical Activity: Insufficiently Active    Days of Exercise per Week: 2 days    Minutes of Exercise per Session: 10 min   Stress: No Stress Concern Present    Feeling of Stress : Not at all   Social Connections: Moderately Isolated    Frequency of Communication with Friends and Family: More than three times a week    Frequency of Social Gatherings with Friends and Family: Twice a week    Attends Christian Services: 1 to 4 times per year    Active Member of [employer] or Organizations: No    Attends Club or Organization Meetings: Never    Marital Status:    Intimate Partner Violence: Not At Risk    Fear of Current or Ex-Partner: No    Emotionally Abused: No    Physically Abused: No    Sexually Abused: No   Housing Stability: Low Risk     Unable to Pay for Housing in the Last Year: No    Number of Places Lived in the Last Year: 2    Unstable Housing in the Last Year: No       Physical Exam     Blood pressure (!) 121/58, pulse 90, temperature 98.4 °F (36.9 °C), temperature source Temporal, resp. rate 14, height 6' (1.829 m), weight 208 lb 8 oz (94.6 kg), SpO2 99 %.     General:  NAD, A+Ox3, ill-appearing, normal body habitus  HEENT:  atraumatic, normocephalic  Neck:  no masses, no JVD  Chest:  CTAB, good respiratory effort, good air movement  CV:  RRR, soft systolic murmur    Abdomen:  NTND, soft, +BS, no hepatosplenomegaly  Extremities:  No peripheral edema  Neurological:  Moving all four extremities   Lymphatics:  No palpable lymph nodes   Skin:  No rash, no jaundice  Psychiatric:  Normal insight and judgement, good recall    Data     Recent Labs     02/14/23  0305 02/15/23  0237 02/16/23  0248   WBC 5.8 5.5 5.3   HGB 10.2* 9.7* 10.1*   HCT 32.0* 30.3* 31.6*   MCV 85.6 84.4 85.6   PLT 99* 122* 141       Recent Labs     02/14/23  0305 02/15/23  0237 02/16/23  0248    141 140   K 3.4* 3.5 3.8    106 102   CO2 27 25 19*   GLUCOSE 90 89 86   MG 2.1 1.8  --    BUN 39* 22 24*   CREATININE 3.1* 1.6* 1.6*   LABGLOM 19* 42* 42*         Assessment:  ALEXANDRO- ATN and obstruction  Recent pyelonephritis and sepsis  Abdominal pain  Left ureter stone with mild left hydronephrosis  Hypokalemia  Hyponatremia  CKD stage 3a  Urine retention  Urinary fungal infection      Plan:  Would continue to hold dialysis for now and follow up labs. Continue diflucan. Will consider removing Permcath soon.

## 2023-02-16 NOTE — PROGRESS NOTES
Infectious Diseases Progress Note    Patient:  Aristides Young  YOB: 1940  MRN: [id number]   Admit date: 2/12/2023   Admitting Physician: Brandon Hardy MD  Primary Care Physician: Jinny Alicea MD    Chief Complaint/Interval History: He feels okay. He is without new symptoms. He is without fever. No abdominal or flank pain. In/Out    Intake/Output Summary (Last 24 hours) at 2/15/2023 1801  Last data filed at 2/15/2023 1746  Gross per 24 hour   Intake 3133.82 ml   Output 1525 ml   Net 1608.82 ml     Allergies:    Allergies   Allergen Reactions    Codeine Nausea Only     Current Meds: melatonin disintegrating tablet 5 mg, Nightly PRN  meropenem (MERREM) 500 mg in sodium chloride 0.9% 50 mL (duplex), Q24H  fluconazole (DIFLUCAN) in 0.9 % sodium chloride IVPB 200 mg, Q24H  tamsulosin (FLOMAX) capsule 0.8 mg, Daily  sodium chloride flush 0.9 % injection 5-40 mL, 2 times per day  sodium chloride flush 0.9 % injection 5-40 mL, PRN  0.9 % sodium chloride infusion, PRN  [Held by provider] enoxaparin Sodium (LOVENOX) injection 30 mg, Daily  ondansetron (ZOFRAN-ODT) disintegrating tablet 4 mg, Q8H PRN   Or  ondansetron (ZOFRAN) injection 4 mg, Q6H PRN  [Held by provider] aspirin chewable tablet 81 mg, Daily  atorvastatin (LIPITOR) tablet 20 mg, Nightly  calcitRIOL (ROCALTROL) capsule 0.25 mcg, Daily  docusate sodium (COLACE) capsule 100 mg, Daily  metoprolol succinate (TOPROL XL) extended release tablet 50 mg, Daily  pregabalin (LYRICA) capsule 25 mg, Daily  sodium bicarbonate tablet 650 mg, BID  simethicone (MYLICON) chewable tablet 80 mg, Q6H PRN  QUEtiapine (SEROQUEL) tablet 25 mg, Nightly  vitamin D (CHOLECALCIFEROL) capsule 5,000 Units, Daily  allopurinol (ZYLOPRIM) tablet 100 mg, Daily  pantoprazole (PROTONIX) tablet 20 mg, QAM AC  mirtazapine (REMERON) tablet 7.5 mg, Nightly  polyethylene glycol (GLYCOLAX) packet 17 g, BID  HYDROmorphone HCl PF (DILAUDID) injection 0.5 mg, Q3H PRN  HYDROcodone-acetaminophen (NORCO)  MG per tablet 1 tablet, Q4H PRN      Review of Systems see HPI    VitalSigns:  BP (!) 114/52   Pulse 74   Temp 98.2 °F (36.8 °C) (Temporal)   Resp 18   Ht 6' (1.829 m)   Wt 206 lb 3.2 oz (93.5 kg)   SpO2 96%   BMI 27.97 kg/m²      Physical Exam  Line/IV site: No erythema, warmth, induration, or tenderness. Abdomen is soft and nontender. No suprapubic or flank tenderness. Lab Results:  CBC:   Recent Labs     02/12/23  2142 02/14/23  0305 02/15/23  0237   WBC 7.4 5.8 5.5   HGB 11.8* 10.2* 9.7*   * 99* 122*     BMP:  Recent Labs     02/13/23  0853 02/14/23  0305 02/15/23  0237   * 140 141   K 3.6 3.4* 3.5   CL 94* 100 106   CO2 29 27 25   BUN 40* 39* 22   CREATININE 3.5* 3.1* 1.6*   GLUCOSE 95 90 89     CultureResults:  Urine culture February 13, 2023-yeast  Urine culture yesterday-ureteral stent placement-yeast    Radiology: None    Additional Studies Reviewed:  None    Impression:  1. Fungal urinary tract infection/pyelonephritis  2. Nephrolithiasis-left mid ureteral calculus-underwent ureteral stenting for left hydronephrosis  3. Previous back and flank pain-improved  4.   Acute renal insufficiency-had been requiring dialysis-May be improving    Recommendations:  Continue fluconazole  Awaiting ID and susceptibility of yeast  Discontinue meropenem  Continue to follow    Josefa Wall MD

## 2023-02-16 NOTE — PROGRESS NOTES
Daily Progress Note    Date:2023  Patient: Brigitte Martínez  : 1940  TNK:[id number]  CODE:Full Code No additional code details  PCP:Bety Moore MD    Admit Date: 2023  7:14 PM   LOS: 3 days          68-year-old male with BPH/urinary retention with Grove in place, history of prior kidney stones, recent hospitalization with sepsis and pyelonephritis with acute renal failure requiring initiation of dialysis, presenting back from SNF with some lower abdominal discomfort and concern that Grove may not be draining properly, ultimately found to have obstructing left ureteral stone on CT abdomen/pelvis as well as UTI. Patient/family or apparently not agreeable to transfer for more urgent urologic intervention, however was evaluated by urology want service available on  to undergo cystoscopy with ureteral stent placement completed on . Treated on empiric antibiotics. Noted yeast from urine culture, fluconazole added and ID consulted. Patient remained in renal failure on initial evaluation, no improvement noted since last hospitalization, nephrology consulted, maintaining on dialysis. Subjective:       Pt awake and alert. Feels his appetite is improved and his taste is coming back. Denies abd pain per say. No BM in 2 days.      Had grove replaced this am.         Objective:      Vital signs in last 24 hours:  Patient Vitals for the past 24 hrs:   BP Temp Temp src Pulse Resp SpO2 Weight   23 0716 (!) 121/58 98.4 °F (36.9 °C) Temporal 90 14 99 % --   23 0418 137/77 97.5 °F (36.4 °C) Temporal 73 16 99 % 208 lb 8 oz (94.6 kg)   02/15/23 1950 -- -- -- -- -- 94 % --   02/15/23 1934 129/77 98.2 °F (36.8 °C) Temporal 85 18 94 % --   02/15/23 1547 (!) 114/52 98.2 °F (36.8 °C) Temporal 74 18 96 % --       Physical exam    General: No acute distress  Cardiac: Normal rate  Respiratory: No wheezes or rales  Abdomen: Nontender, no CVA tenderness, bowel sounds present  Extremities: No edema  : García in place      Lab Review   Recent Results (from the past 24 hour(s))   CBC with Auto Differential    Collection Time: 02/16/23  2:48 AM   Result Value Ref Range    WBC 5.3 4.8 - 10.8 K/uL    RBC 3.69 (L) 4.70 - 6.10 M/uL    Hemoglobin 10.1 (L) 14.0 - 18.0 g/dL    Hematocrit 31.6 (L) 42.0 - 52.0 %    MCV 85.6 80.0 - 94.0 fL    MCH 27.4 27.0 - 31.0 pg    MCHC 32.0 (L) 33.0 - 37.0 g/dL    RDW 14.0 11.5 - 14.5 %    Platelets 636 072 - 600 K/uL    MPV 10.0 9.4 - 12.4 fL    Neutrophils % 63.4 50.0 - 65.0 %    Lymphocytes % 23.2 20.0 - 40.0 %    Monocytes % 9.7 0.0 - 10.0 %    Eosinophils % 2.3 0.0 - 5.0 %    Basophils % 0.4 0.0 - 1.0 %    Neutrophils Absolute 3.3 1.5 - 7.5 K/uL    Immature Granulocytes # 0.1 K/uL    Lymphocytes Absolute 1.2 1.1 - 4.5 K/uL    Monocytes Absolute 0.50 0.00 - 0.90 K/uL    Eosinophils Absolute 0.10 0.00 - 0.60 K/uL    Basophils Absolute 0.00 0.00 - 0.20 K/uL   Basic Metabolic Panel w/ Reflex to MG    Collection Time: 02/16/23  2:48 AM   Result Value Ref Range    Sodium 140 136 - 145 mmol/L    Potassium reflex Magnesium 3.8 3.5 - 5.0 mmol/L    Chloride 102 98 - 111 mmol/L    CO2 19 (L) 22 - 29 mmol/L    Anion Gap 19 7 - 19 mmol/L    Glucose 86 74 - 109 mg/dL    BUN 24 (H) 8 - 23 mg/dL    Creatinine 1.6 (H) 0.5 - 1.2 mg/dL    Est, Glom Filt Rate 42 (A) >60    Calcium 7.7 (L) 8.8 - 10.2 mg/dL       I/O last 3 completed shifts: In: 3543.8 [P.O.:480; I.V.:3063.8]  Out: 2347 [Urine:2325]  I/O this shift:   In: 700 [P.O.:700]  Out: 400 [Urine:400]      Current Facility-Administered Medications:     melatonin disintegrating tablet 5 mg, 5 mg, Oral, Nightly PRN, Carley Jacobs MD    fluconazole (DIFLUCAN) in 0.9 % sodium chloride IVPB 200 mg, 200 mg, IntraVENous, Q24H, Iliana Streeter MD, Last Rate: 100 mL/hr at 02/15/23 2130, 200 mg at 02/15/23 2130    tamsulosin (FLOMAX) capsule 0.8 mg, 0.8 mg, Oral, Daily, Iliana Streeter MD, 0.8 mg at 02/16/23 0811    sodium chloride flush 0.9 % injection 5-40 mL, 5-40 mL, IntraVENous, 2 times per day, Kristin Chapa MD, 10 mL at 02/16/23 1789    sodium chloride flush 0.9 % injection 5-40 mL, 5-40 mL, IntraVENous, PRN, Kristin Chapa MD    0.9 % sodium chloride infusion, , IntraVENous, PRN, Kristin Chapa MD, Last Rate: 15 mL/hr at 02/16/23 0849, New Bag at 02/16/23 0849    [Held by provider] enoxaparin Sodium (LOVENOX) injection 30 mg, 30 mg, SubCUTAneous, Daily, Francis Danielson MD    ondansetron (ZOFRAN-ODT) disintegrating tablet 4 mg, 4 mg, Oral, Q8H PRN **OR** ondansetron (ZOFRAN) injection 4 mg, 4 mg, IntraVENous, Q6H PRN, Kristin Chapa MD    [Held by provider] aspirin chewable tablet 81 mg, 81 mg, Oral, Daily, Francis Dainelson MD    atorvastatin (LIPITOR) tablet 20 mg, 20 mg, Oral, Nightly, Kristin Chapa MD, 20 mg at 02/15/23 1958    calcitRIOL (ROCALTROL) capsule 0.25 mcg, 0.25 mcg, Oral, Daily, Kristin Chapa MD, 0.25 mcg at 02/16/23 2589    docusate sodium (COLACE) capsule 100 mg, 100 mg, Oral, Daily, Kristin Chapa MD, 100 mg at 02/16/23 4772    metoprolol succinate (TOPROL XL) extended release tablet 50 mg, 50 mg, Oral, Daily, Kristin Chapa MD, 50 mg at 02/16/23 0811    pregabalin (LYRICA) capsule 25 mg, 25 mg, Oral, Daily, Kristin Chapa MD, 25 mg at 02/16/23 6219    sodium bicarbonate tablet 650 mg, 650 mg, Oral, BID, Kristin Chapa MD, 650 mg at 02/16/23 6418    simethicone (MYLICON) chewable tablet 80 mg, 80 mg, Oral, Q6H PRN, Kristin Chapa MD    QUEtiapine (SEROQUEL) tablet 25 mg, 25 mg, Oral, Nightly, Kristin Chapa MD, 25 mg at 02/15/23 1959    vitamin D (CHOLECALCIFEROL) capsule 5,000 Units, 5,000 Units, Oral, Daily, Kristin Chapa MD, 5,000 Units at 02/16/23 4485    allopurinol (ZYLOPRIM) tablet 100 mg, 100 mg, Oral, Daily, Kristin Chapa MD, 100 mg at 02/16/23 0812    pantoprazole (PROTONIX) tablet 20 mg, 20 mg, Oral, [facility], Kristin Chapa MD, 20 mg at 02/16/23 0059 mirtazapine (REMERON) tablet 7.5 mg, 7.5 mg, Oral, Nightly, Fox Mejia MD, 7.5 mg at 02/15/23 1958    polyethylene glycol (GLYCOLAX) packet 17 g, 17 g, Oral, BID, Fox Mejia MD, 17 g at 02/16/23 0505    HYDROmorphone HCl PF (DILAUDID) injection 0.5 mg, 0.5 mg, IntraVENous, Q3H PRN, Fox Mejia MD    HYDROcodone-acetaminophen [facility])  MG per tablet 1 tablet, 1 tablet, Oral, Q4H PRN, Fox Mejia MD, 1 tablet at 02/15/23 0024        Reviewed chart, history, vitals, labs, diagnostics and medications        Assessment/plan  Principal Problem:    Obstruction of left ureter  Active Problems:    Acute renal failure with acute tubular necrosis superimposed on chronic kidney disease, on chronic dialysis ([address]    Complicated UTI (urinary tract infection)    Urinary retention  Resolved Problems:    * No resolved hospital problems. *    Obstructive Uropathy with left ureteral calculus  -Urology team involved. - S/p cystoscopy with left ureteral stent placement, will need definitive treatment of stone on outpatient basis   - urology team report of wide open prostatic fossa and bladder neck without obstruction. They recommend to d/c grove and PRN straight cath for >300cc residuals. Cont flomax. Complicated UTI in setting of Grove catheter and left ureteral obstruction  - ID following  - On meropenem empirically - this was stopped per ID  - Fluconazole for fungal infection      Acute renal failure with ATN requiring dialysis  Ureteral obstruction  Urologic intervention as noted above  --Continue Flomax  --Continue dialysis as per nephrology  --Continue to monitor for renal recovery      Constipation  - Continue bowel regimen including Colace and MiraLAX      Mood disorder  Continue Seroquel and Remeron      Dementia  Supportive care      Plan to discharge back to SNF likely later this week pending final antibiotic recommendations from ID.   Will need outpatient follow-up with urology.           Reyes Haymaker, MD 2/16/2023 12:30 PM

## 2023-02-17 LAB
ANION GAP SERPL CALCULATED.3IONS-SCNC: 14 MMOL/L (ref 7–19)
BUN BLDV-MCNC: 25 MG/DL (ref 8–23)
CALCIUM SERPL-MCNC: 7.8 MG/DL (ref 8.8–10.2)
CHLORIDE BLD-SCNC: 100 MMOL/L (ref 98–111)
CO2: 27 MMOL/L (ref 22–29)
CREAT SERPL-MCNC: 1.5 MG/DL (ref 0.5–1.2)
GFR SERPL CREATININE-BSD FRML MDRD: 46 ML/MIN/{1.73_M2}
GLUCOSE BLD-MCNC: 98 MG/DL (ref 74–109)
MAGNESIUM: 1.6 MG/DL (ref 1.6–2.4)
POTASSIUM REFLEX MAGNESIUM: 3.4 MMOL/L (ref 3.5–5)
SODIUM BLD-SCNC: 141 MMOL/L (ref 136–145)

## 2023-02-17 PROCEDURE — 2580000003 HC RX 258: Performed by: UROLOGY

## 2023-02-17 PROCEDURE — 6360000002 HC RX W HCPCS: Performed by: INTERNAL MEDICINE

## 2023-02-17 PROCEDURE — 83735 ASSAY OF MAGNESIUM: CPT

## 2023-02-17 PROCEDURE — 51701 INSERT BLADDER CATHETER: CPT

## 2023-02-17 PROCEDURE — 51798 US URINE CAPACITY MEASURE: CPT

## 2023-02-17 PROCEDURE — 1210000000 HC MED SURG R&B

## 2023-02-17 PROCEDURE — 2500000003 HC RX 250 WO HCPCS: Performed by: SURGERY

## 2023-02-17 PROCEDURE — 36415 COLL VENOUS BLD VENIPUNCTURE: CPT

## 2023-02-17 PROCEDURE — 6370000000 HC RX 637 (ALT 250 FOR IP): Performed by: HOSPITALIST

## 2023-02-17 PROCEDURE — 94760 N-INVAS EAR/PLS OXIMETRY 1: CPT

## 2023-02-17 PROCEDURE — 6370000000 HC RX 637 (ALT 250 FOR IP): Performed by: UROLOGY

## 2023-02-17 PROCEDURE — 80048 BASIC METABOLIC PNL TOTAL CA: CPT

## 2023-02-17 PROCEDURE — 0JPW3XZ REMOVAL OF TUNNELED VASCULAR ACCESS DEVICE FROM LOWER EXTREMITY SUBCUTANEOUS TISSUE AND FASCIA, PERCUTANEOUS APPROACH: ICD-10-PCS | Performed by: SURGERY

## 2023-02-17 RX ORDER — LIDOCAINE HYDROCHLORIDE AND EPINEPHRINE 10; 10 MG/ML; UG/ML
20 INJECTION, SOLUTION INFILTRATION; PERINEURAL ONCE
Status: COMPLETED | OUTPATIENT
Start: 2023-02-17 | End: 2023-02-17

## 2023-02-17 RX ORDER — FLUCONAZOLE 2 MG/ML
200 INJECTION, SOLUTION INTRAVENOUS EVERY 24 HOURS
Status: DISCONTINUED | OUTPATIENT
Start: 2023-02-17 | End: 2023-02-19

## 2023-02-17 RX ORDER — MAGNESIUM SULFATE IN WATER 40 MG/ML
2000 INJECTION, SOLUTION INTRAVENOUS ONCE
Status: COMPLETED | OUTPATIENT
Start: 2023-02-17 | End: 2023-02-17

## 2023-02-17 RX ADMIN — METOPROLOL SUCCINATE 50 MG: 50 TABLET, EXTENDED RELEASE ORAL at 09:16

## 2023-02-17 RX ADMIN — Medication 5000 UNITS: at 09:15

## 2023-02-17 RX ADMIN — QUETIAPINE FUMARATE 25 MG: 25 TABLET ORAL at 21:31

## 2023-02-17 RX ADMIN — TAMSULOSIN HYDROCHLORIDE 0.8 MG: 0.4 CAPSULE ORAL at 09:15

## 2023-02-17 RX ADMIN — SODIUM CHLORIDE, PRESERVATIVE FREE 10 ML: 5 INJECTION INTRAVENOUS at 11:21

## 2023-02-17 RX ADMIN — LIDOCAINE HYDROCHLORIDE,EPINEPHRINE BITARTRATE 20 ML: 10; .01 INJECTION, SOLUTION INFILTRATION; PERINEURAL at 13:46

## 2023-02-17 RX ADMIN — PANTOPRAZOLE SODIUM 20 MG: 20 TABLET, DELAYED RELEASE ORAL at 06:15

## 2023-02-17 RX ADMIN — ATORVASTATIN CALCIUM 20 MG: 20 TABLET, FILM COATED ORAL at 21:31

## 2023-02-17 RX ADMIN — SODIUM BICARBONATE 650 MG: 650 TABLET ORAL at 21:31

## 2023-02-17 RX ADMIN — FLUCONAZOLE IN SODIUM CHLORIDE 200 MG: 2 INJECTION, SOLUTION INTRAVENOUS at 17:21

## 2023-02-17 RX ADMIN — Medication 5 MG: at 21:31

## 2023-02-17 RX ADMIN — MAGNESIUM SULFATE HEPTAHYDRATE 2000 MG: 40 INJECTION, SOLUTION INTRAVENOUS at 09:15

## 2023-02-17 RX ADMIN — CALCITRIOL CAPSULES 0.25 MCG 0.25 MCG: 0.25 CAPSULE ORAL at 09:15

## 2023-02-17 RX ADMIN — HYDROCODONE BITARTRATE AND ACETAMINOPHEN 1 TABLET: 10; 325 TABLET ORAL at 21:31

## 2023-02-17 RX ADMIN — MIRTAZAPINE 7.5 MG: 7.5 TABLET ORAL at 21:31

## 2023-02-17 RX ADMIN — ALLOPURINOL 100 MG: 100 TABLET ORAL at 09:15

## 2023-02-17 RX ADMIN — POLYETHYLENE GLYCOL 3350 17 G: 17 POWDER, FOR SOLUTION ORAL at 21:31

## 2023-02-17 RX ADMIN — SODIUM BICARBONATE 650 MG: 650 TABLET ORAL at 09:15

## 2023-02-17 RX ADMIN — PREGABALIN 25 MG: 25 CAPSULE ORAL at 09:15

## 2023-02-17 ASSESSMENT — PAIN DESCRIPTION - ORIENTATION: ORIENTATION: RIGHT

## 2023-02-17 ASSESSMENT — PAIN SCALES - GENERAL: PAINLEVEL_OUTOF10: 4

## 2023-02-17 ASSESSMENT — PAIN DESCRIPTION - LOCATION: LOCATION: CHEST

## 2023-02-17 ASSESSMENT — PAIN DESCRIPTION - DESCRIPTORS: DESCRIPTORS: DULL;PRESSURE

## 2023-02-17 ASSESSMENT — PAIN - FUNCTIONAL ASSESSMENT: PAIN_FUNCTIONAL_ASSESSMENT: ACTIVITIES ARE NOT PREVENTED

## 2023-02-17 NOTE — CONSULTS
Date of service: 2/17/2023    Patient name: Ailya Farfan  MRN: [id number]  YOB: 1940    Reason for vascular consultation: Removal PermCath    HPI: Mr. Cassi Bravo is a very pleasant 50-year-old gentleman with acute renal insufficiency, who no longer needs dialysis per our nephrology team.  We have been consulted to remove his PermCath. Risks benefits alternatives to PermCath removal were explained to the patient. He is agreeable to proceed. Informed consent was obtained. Allergies: Codeine    Medications: Zyloprim 100 mg daily, aspirin 81 mg daily, Lipitor 20 mg nightly, calcitriol 0.25 mcg daily, Colace 100 mg daily, Lovenox 30 mg subcu daily, Diflucan 200 mg IV every 24 hours, melatonin 5 mg nightly, Toprol-XL 50 mg daily, Remeron 7.5 mg nightly, Protonix 20 mg daily, GlycoLax 17 g twice daily, Lyrica 25 mg daily, Seroquel 25 mg nightly, simethicone 80 mg every 6 hours as needed, sodium bicarb 650 mg twice daily, Flomax 0.8 mg daily, vitamin D 5000 units daily.     PMH: Frequent UTIs, BPH, hypertension, GERD, gout, HLD, OA, RLS, idiopathic neuropathy    PSH: IHR, TURP    SH: Denies alcohol or illicit drug use, no current tobacco use but history of tobacco use in the past.    FH: Cancer, heart disease, lung disease, CVA    Review of systems:  Denies weight loss weight gain fevers or chills  Denies headache seizures focal neurologic deficits  Denies shortness of breath or hemoptysis, denies cough  Denies chest pain palpitations  Denies abdominal pain, denies nausea vomiting diarrhea, positive constipation, denies blood in the stool  Positive for history of UTIs, no current dysuria reported  Denies claudication or leg swelling, positive for neuropathy and restless leg  Denies bleeding or clotting disorders, positive for easy bruising  Denies polyuria or polydipsia, denies heat or cold intolerance  Denies anxiety or depression    Physical exam:  Vital signs temp 97.9 °F HR 91 RR 16 /91 SPO2 94% room air  Alert and oriented x3, no acute distress, normocephalic  Neck supple, no carotid bruits  Clear to auscultation bilaterally  Regular rate and rhythm  Abdomen soft nontender nondistended  Lower extremities warm and well-perfused, no swelling noted    Assessment and plan: Mr. Mely Aviles is a very pleasant 50-year-old gentleman who was recently on dialysis for acute renal insufficiency and no longer requires a PermCath for dialysis. 1.  Plan for PermCath removal today. 2.  Post removal, keep head of bed elevated greater than 45 degrees to diminish venous pressure in the jugular vein and lower risk of bruising and bleeding. Patient should avoid bending over at the waist for the next 24 hours if possible. Okay to remove dressing tomorrow and shower, and leave area open to air if no drainage present. If drainage is present, okay to place a clean dressing tomorrow after shower. We appreciate the opportunity to participate in the care of this patient.

## 2023-02-17 NOTE — PROGRESS NOTES
Daily Progress Note    Date:2023  Patient: Elier Denton  : 1940  ISJ:[id number]  CODE:Full Code No additional code details  PCP:Bety Coffey MD    Admit Date: 2023  7:14 PM   LOS: 4 days          79-year-old male with BPH/urinary retention with García in place, history of prior kidney stones, recent hospitalization with sepsis and pyelonephritis with acute renal failure requiring initiation of dialysis, presenting back from SNF with some lower abdominal discomfort and concern that García may not be draining properly, ultimately found to have obstructing left ureteral stone on CT abdomen/pelvis as well as UTI. Patient/family or apparently not agreeable to transfer for more urgent urologic intervention, however was evaluated by urology want service available on  to undergo cystoscopy with ureteral stent placement completed on . Treated on empiric antibiotics. Noted yeast from urine culture, fluconazole added and ID consulted. Patient remained in renal failure on initial evaluation, no improvement noted since last hospitalization, nephrology consulted, maintaining on dialysis. Subjective: García removed yesterday - pt has not voided - has been getting straight cath for high residuals as per urology instruction.        Objective:      Vital signs in last 24 hours:  Patient Vitals for the past 24 hrs:   BP Temp Temp src Pulse Resp SpO2   23 1041 -- -- -- -- -- 95 %   23 0802 (!) 165/91 97.9 °F (36.6 °C) Temporal 91 16 94 %   23 0432 (!) 173/91 97.3 °F (36.3 °C) Temporal 91 16 95 %   23 2127 139/73 97.5 °F (36.4 °C) Temporal 83 18 96 %   23 1559 (!) 122/55 98.1 °F (36.7 °C) Temporal 78 14 96 %       Physical exam    General: No acute distress  Cardiac: Normal rate  Respiratory: No wheezes or rales  Abdomen: Nontender, no CVA tenderness, bowel sounds present  Extremities: No edema  : García in place      Lab Review   Recent Results (from the past 24 hour(s))   Basic Metabolic Panel w/ Reflex to MG    Collection Time: 02/17/23  2:07 AM   Result Value Ref Range    Sodium 141 136 - 145 mmol/L    Potassium reflex Magnesium 3.4 (L) 3.5 - 5.0 mmol/L    Chloride 100 98 - 111 mmol/L    CO2 27 22 - 29 mmol/L    Anion Gap 14 7 - 19 mmol/L    Glucose 98 74 - 109 mg/dL    BUN 25 (H) 8 - 23 mg/dL    Creatinine 1.5 (H) 0.5 - 1.2 mg/dL    Est, Glom Filt Rate 46 (A) >60    Calcium 7.8 (L) 8.8 - 10.2 mg/dL   Magnesium    Collection Time: 02/17/23  2:07 AM   Result Value Ref Range    Magnesium 1.6 1.6 - 2.4 mg/dL       I/O last 3 completed shifts: In: 8450 [P.O.:840;  I.V.:762]  Out: 2750 [Urine:2750]  I/O this shift:  In: -   Out: 575 [Urine:575]      Current Facility-Administered Medications:     fluconazole (DIFLUCAN) in 0.9 % sodium chloride IVPB 200 mg, 200 mg, IntraVENous, Q24H, Gabriela Riley MD    melatonin disintegrating tablet 5 mg, 5 mg, Oral, Nightly PRN, Philip French MD    [facility]) capsule 0.8 mg, 0.8 mg, Oral, Daily, Sandie Hansen MD, 0.8 mg at 02/17/23 0915    sodium chloride flush 0.9 % injection 5-40 mL, 5-40 mL, IntraVENous, 2 times per day, Sandie Hansen MD, 10 mL at 02/17/23 1121    sodium chloride flush 0.9 % injection 5-40 mL, 5-40 mL, IntraVENous, PRN, Sandie Hansen MD    0.9 % sodium chloride infusion, , IntraVENous, PRN, Sandie Hasnen MD, Last Rate: 15 mL/hr at 02/16/23 0849, New Bag at 02/16/23 0849    [Held by provider] enoxaparin Sodium (LOVENOX) injection 30 mg, 30 mg, SubCUTAneous, Daily, Philip French MD    ondansetron (ZOFRAN-ODT) disintegrating tablet 4 mg, 4 mg, Oral, Q8H PRN **OR** ondansetron (ZOFRAN) injection 4 mg, 4 mg, IntraVENous, Q6H PRN, Sandie Hansen MD    [Held by provider] aspirin chewable tablet 81 mg, 81 mg, Oral, Daily, Philip French MD    atorvastatin (LIPITOR) tablet 20 mg, 20 mg, Oral, Nightly, Sandie Hansen MD, 20 mg at 02/16/23 1269    calcitRIOL (ROCALTROL) capsule 0.25 mcg, 0.25 mcg, Oral, Daily, J Luis Matos MD, 0.25 mcg at 02/17/23 0915    docusate sodium (COLACE) capsule 100 mg, 100 mg, Oral, Daily, J Luis Matos MD, 100 mg at 02/16/23 7374    metoprolol succinate (TOPROL XL) extended release tablet 50 mg, 50 mg, Oral, Daily, J Luis Matos MD, 50 mg at 02/17/23 0916    pregabalin (LYRICA) capsule 25 mg, 25 mg, Oral, Daily, J Luis Matos MD, 25 mg at 02/17/23 0915    sodium bicarbonate tablet 650 mg, 650 mg, Oral, BID, J Luis Matos MD, 650 mg at 02/17/23 0915    simethicone (MYLICON) chewable tablet 80 mg, 80 mg, Oral, Q6H PRN, J Luis Matos MD    QUEtiapine (SEROQUEL) tablet 25 mg, 25 mg, Oral, Nightly, J Luis Matos MD, 25 mg at 02/16/23 2215    vitamin D (CHOLECALCIFEROL) capsule 5,000 Units, 5,000 Units, Oral, Daily, J Luis Matos MD, 5,000 Units at 02/17/23 0915    allopurinol (ZYLOPRIM) tablet 100 mg, 100 mg, Oral, Daily, J Luis Matos MD, 100 mg at 02/17/23 0915    pantoprazole (PROTONIX) tablet 20 mg, 20 mg, Oral, QAM AC, J Luis Matos MD, 20 mg at 02/17/23 0615    mirtazapine (REMERON) tablet 7.5 mg, 7.5 mg, Oral, Nightly, J Luis Matos MD, 7.5 mg at 02/16/23 2215    polyethylene glycol (GLYCOLAX) packet 17 g, 17 g, Oral, BID, J Luis Matos MD, 17 g at 02/16/23 2215    HYDROmorphone HCl PF (DILAUDID) injection 0.5 mg, 0.5 mg, IntraVENous, Q3H PRN, J Luis Matos MD    HYDROcodone-acetaminophen [facility])  MG per tablet 1 tablet, 1 tablet, Oral, Q4H PRN, J Luis Matos MD, 1 tablet at 02/15/23 0024        Reviewed chart, history, vitals, labs, diagnostics and medications        Assessment/plan  Principal Problem:    Obstruction of left ureter  Active Problems:    Acute renal failure with acute tubular necrosis superimposed on chronic kidney disease, on chronic dialysis ([facility] [address]    Complicated UTI (urinary tract infection)    Urinary retention  Resolved Problems:    * No resolved hospital problems. *        Obstructive Uropathy with left ureteral calculus  -Urology team involved. - S/p cystoscopy with left ureteral stent placement, will need definitive treatment of stone on outpatient basis   - urology team report of wide open prostatic fossa and bladder neck without obstruction. They recommend to d/c grove and PRN straight cath for >300cc residuals. Cont flomax.    - grove removed - pt not voiding - cont PRN straight cath as per urology recs. Complicated UTI in setting of Grove catheter and left ureteral obstruction  - ID following  - On meropenem empirically - this was stopped per ID  - Fluconazole for fungal infection - recs for 21 days of PO treatment - switch to PO on discharge. Acute renal failure with ATN requiring dialysis  Ureteral obstruction  Urologic intervention as noted above  --Continue Flomax  --Continue dialysis as per nephrology   - pt Cr actually holding steady without HD   - plan to remove HD catheter today as per nephrology instruction.    --Continue to monitor closely      Constipation  - Continue bowel regimen including Colace and MiraLAX      Mood disorder  Continue Seroquel and Remeron      Dementia  Supportive care          Lili Humphrey MD 2/17/2023 2:43 PM

## 2023-02-17 NOTE — PROCEDURES
Date of procedure: 2/17/2023    Patient name: Shane Archuleta  MRN: [id number]  YOB: 1940    Preprocedure diagnosis: Tunneled PermCath, no longer needed for dialysis    Post procedure diagnosis: Same    Procedure: Removal PermCath    Surgeon: Dakota Quiroga MD    Anesthesia: 9 cc local anesthesia lidocaine with epinephrine    Indication for procedure: Mr. Houston Rodriguez is a very pleasant 79-year-old gentleman who was receiving dialysis for acute renal insufficiency, now no longer requiring dialysis per the nephrology team.  We have been consulted to remove his PermCath. Risk benefits and alternatives were explained to the patient and he was agreeable to proceed. Informed consent was obtained. Procedure: The right neck and chest including the PermCath were prepped and draped in a sterile fashion. A timeout was performed. Local anesthesia was administered to the skin and subcutaneous tissue over at the exit site of the catheter. Blunt dissection was used to loosen the catheter in the cuff from the subcutaneous tissue. Pressure was held over the jugular access site and the catheter was removed. The entire catheter was removed, including the tips in the cough, and no portion was left behind. Pressure was held for 10 minutes for good hemostasis and a sterile dressing was applied. The patient tolerated the procedure well. There were no complications. Estimated blood loss was less than 1 cc. Plan: Our plan is for the patient to remain with head of bed greater than 45 degrees today, and he should avoid bending over at the waist, in order to prevent increased pressure in the jugular vein. This should help diminish the chance of bleeding and/or bruising after PermCath removal.  Okay to shower tomorrow with the dressing off, and replace dressing with a dry dressing if any drainage present. We appreciate the opportunity to participate in the care of this patient.

## 2023-02-17 NOTE — PROGRESS NOTES
Renal Progress Note    Thank you to requesting provider: Dr Terrace Skiff, for asking us to see Elier Denton    Reason for consultation:  ALEXANDRO    Chief Complaint:  Abdominal pain, urine retention. History of Presenting Illness      Mr. Elier Denton is a pleasant 79year old man who presented with abdominal pain and was found with obstructive 1 cm stone in mid left ureter with mild left hydronephrosis. He has a history of prior kidney stones. He has recently started dialysis for ALEXANDRO after pyelonephritis and sepsis. He is s/p left ureteral stent placement today by Dr Heloise Cheadle. Patient is currently comfortable. He is non-oliguric. His repeat serum creatinine was 1.6. He is growing yeast in his urine. His grove catheter was removed on 2/16 but has since required several intermittent bladder catheterization for urinary retention.      Past Medical/Surgical History      Active Ambulatory Problems     Diagnosis Date Noted    Mixed hyperlipidemia 08/16/2017    Essential hypertension 08/16/2017    Restless legs syndrome 08/16/2017    Gastroesophageal reflux disease without esophagitis 08/16/2017    Peripheral neuropathic pain 08/16/2017    Primary osteoarthritis involving multiple joints 08/16/2017    Gout 08/16/2017    Benign non-nodular prostatic hyperplasia with lower urinary tract symptoms 08/16/2017    Elevated PSA 08/16/2017    Idiopathic peripheral neuropathy 02/19/2018    Chronic idiopathic constipation 08/20/2018    Prediabetes 10/14/2022    Pyelonephritis of left kidney 01/23/2023    Renal stone 01/23/2023    Visual disturbance 01/31/2023    Acute renal failure with acute tubular necrosis superimposed on chronic kidney disease, on chronic dialysis ([address] 02/01/2023    Pyelonephritis 02/02/2023    Severe malnutrition ([facility] [address] 02/03/2023     Resolved Ambulatory Problems     Diagnosis Date Noted    No Resolved Ambulatory Problems     No Additional Past Medical History         Review of Systems     Constitutional:  No weight loss, no fever/chills  Eyes:  No eye pain, no eye redness  Cardiovascular:  No chest pain, no worsening of edema  Respiratory:  No hemoptysis, no stidor  Gastrointestinal:  No blood in stool, no n/v, no diarrhea  Genitoruinary:  No hematuria, + difficulty with urination  Musculoskeletal:  No joint swelling, no redness  Integumentary:  No Rash, no itching  Neurological:  No focal weakness, No new sensory deficit  Psychiatric:  No depression, no confusion  Endocrine:  No polyuria, no polydipsia       Medications        Current Facility-Administered Medications:     fluconazole (DIFLUCAN) in 0.9 % sodium chloride IVPB 200 mg, 200 mg, IntraVENous, Q24H, Alvarez Castaneda MD    melatonin disintegrating tablet 5 mg, 5 mg, Oral, Nightly PRN, Michelle Matson MD    [facility]) capsule 0.8 mg, 0.8 mg, Oral, Daily, George Zafar MD, 0.8 mg at 02/17/23 0915    sodium chloride flush 0.9 % injection 5-40 mL, 5-40 mL, IntraVENous, 2 times per day, George Zafar MD, 10 mL at 02/17/23 1121    sodium chloride flush 0.9 % injection 5-40 mL, 5-40 mL, IntraVENous, PRN, George Zafar MD    0.9 % sodium chloride infusion, , IntraVENous, PRN, George Zafar MD, Last Rate: 15 mL/hr at 02/16/23 0849, New Bag at 02/16/23 0849    [Held by provider] enoxaparin Sodium (LOVENOX) injection 30 mg, 30 mg, SubCUTAneous, Daily, Michelle Matson MD    ondansetron (ZOFRAN-ODT) disintegrating tablet 4 mg, 4 mg, Oral, Q8H PRN **OR** ondansetron (ZOFRAN) injection 4 mg, 4 mg, IntraVENous, Q6H PRN, George Zafar MD    [Held by provider] aspirin chewable tablet 81 mg, 81 mg, Oral, Daily, Michelle Matson MD    atorvastatin (LIPITOR) tablet 20 mg, 20 mg, Oral, Nightly, George Zafar MD, 20 mg at 02/16/23 9535    calcitRIOL (ROCALTROL) capsule 0.25 mcg, 0.25 mcg, Oral, Daily, George Zafar MD, 0.25 mcg at 02/17/23 0915    docusate sodium (COLACE) capsule 100 mg, 100 mg, Oral, Daily, George Zafar MD, 100 mg at 23 0812    metoprolol succinate (TOPROL XL) extended release tablet 50 mg, 50 mg, Oral, Daily, Corey Du MD, 50 mg at 23    pregabalin (LYRICA) capsule 25 mg, 25 mg, Oral, Daily, Corey Du MD, 25 mg at 23    sodium bicarbonate tablet 650 mg, 650 mg, Oral, BID, Corey Du MD, 650 mg at 23    simethicone (MYLICON) chewable tablet 80 mg, 80 mg, Oral, Q6H PRN, Corey Du MD    QUEtiapine (SEROQUEL) tablet 25 mg, 25 mg, Oral, Nightly, Corey Du MD, 25 mg at 23    vitamin D (CHOLECALCIFEROL) capsule 5,000 Units, 5,000 Units, Oral, Daily, Corey Du MD, 5,000 Units at 23    allopurinol (ZYLOPRIM) tablet 100 mg, 100 mg, Oral, Daily, Corey Du MD, 100 mg at 23    pantoprazole (PROTONIX) tablet 20 mg, 20 mg, Oral, QAM AC, Corey Du MD, 20 mg at 23    mirtazapine (REMERON) tablet 7.5 mg, 7.5 mg, Oral, Nightly, Corey Du MD, 7.5 mg at 23    polyethylene glycol (GLYCOLAX) packet 17 g, 17 g, Oral, BID, Corey Du MD, 17 g at 23    HYDROmorphone HCl PF (DILAUDID) injection 0.5 mg, 0.5 mg, IntraVENous, Q3H PRN, Corey Du MD    HYDROcodone-acetaminophen [facility])  MG per tablet 1 tablet, 1 tablet, Oral, Q4H PRN, Corey Du MD, 1 tablet at 02/15/23 0024  Outpatient Medications Marked as Taking for the 23 encounter [facility] Encounter)   Medication Sig Dispense Refill    acetaminophen (TYLENOL) 325 MG tablet Take 650 mg by mouth every 6 hours as needed for Pain      ondansetron (ZOFRAN) 4 MG tablet Take 4 mg by mouth every 6 hours as needed for Nausea or Vomiting      traMADol (ULTRAM) 50 MG tablet Take 50 mg by mouth every 8 hours as needed for Pain.          Allergies   Codeine    Family History       Family History   Problem Relation Age of Onset    Uterine Cancer Mother          at age 52    Coronary Art Dis Father MI at 62, smoker    COPD Sister         smoker    COPD Sister         smoker    Heart Failure Sister     COPD Sister         smoker    Stroke Brother         smoker    Coronary Art Dis Brother         open heart surgry, MI at 80- yo    No Known Problems Son     No Known Problems Daughter      Family history negative for kidney disease. 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     Employer: RETIRED     Comment: retired    Tobacco Use    Smoking status: Former     Packs/day: 3.00     Years: 35.00     Pack years: 105.00     Types: Cigarettes, Pipe     Start date:      Quit date:      Years since quittin.1    Smokeless tobacco: Never   Vaping Use    Vaping Use: Never used   Substance and Sexual Activity    Alcohol use: Not Currently     Comment: \"not vbery much\", \"beer every 15 years\"    Drug use: Never    Sexual activity: Yes     Partners: Female     Comment: wife, has 2 kids   Social History Narrative    CODE STATUS: Full Code    HEALTH CARE PROXY: his children    1.) Wilmer Milan, his daughter, [phone]    2.) Dayanara Moore, his son, [phone]    AMBULATES: independently, sometimes with cane    DOMICILED: has 3 story home but he has an elevator     Social Determinants of Health     Financial Resource Strain: Low Risk     Difficulty of Paying Living Expenses: Not hard at all   Food Insecurity: No Food Insecurity    Worried About [address] in the Last Year: Never true    [address] in the Last Year: Never true   Transportation Needs: No Transportation Needs    Lack of Transportation (Medical): No    Lack of Transportation (Non-Medical):  No   Physical Activity: Insufficiently Active    Days of Exercise per Week: 2 days Minutes of Exercise per Session: 10 min   Stress: No Stress Concern Present    Feeling of Stress : Not at all   Social Connections: Moderately Isolated    Frequency of Communication with Friends and Family: More than three times a week    Frequency of Social Gatherings with Friends and Family: Twice a week    Attends [employer] Services: 1 to 4 times per year    Active Member of [address] [place] [address] [employer] or Organizations: No    Attends Club or Organization Meetings: Never    Marital Status:    Intimate Partner Violence: Not At Risk    Fear of Current or Ex-Partner: No    Emotionally Abused: No    Physically Abused: No    Sexually Abused: No   Housing Stability: Low Risk     Unable to Pay for Housing in the Last Year: No    Number of Places Lived in the Last Year: 2    Unstable Housing in the Last Year: No       Physical Exam     Blood pressure (!) 165/91, pulse 91, temperature 97.9 °F (36.6 °C), temperature source Temporal, resp. rate 16, height 6' (1.829 m), weight 208 lb 8 oz (94.6 kg), SpO2 95 %.     General:  NAD, A+Ox3, ill-appearing, normal body habitus  HEENT:  atraumatic, normocephalic  Neck:  no masses, no JVD  Chest:  CTAB, good respiratory effort, good air movement  CV:  RRR, soft systolic murmur    Abdomen:  NTND, soft, +BS, no hepatosplenomegaly  Extremities:  No peripheral edema  Neurological:  Moving all four extremities   Lymphatics:  No palpable lymph nodes   Skin:  No rash, no jaundice  Psychiatric:  Normal insight and judgement, good recall    Data     Recent Labs     02/15/23  0237 02/16/23  0248   WBC 5.5 5.3   HGB 9.7* 10.1*   HCT 30.3* 31.6*   MCV 84.4 85.6   * 141       Recent Labs     02/15/23  0237 02/16/23  0248 02/17/23  0207    140 141   K 3.5 3.8 3.4*    102 100   CO2 25 19* 27   GLUCOSE 89 86 98   MG 1.8  --  1.6   BUN 22 24* 25*   CREATININE 1.6* 1.6* 1.5*   LABGLOM 42* 42* 46*         Assessment:  ALEXANDRO- ATN and obstruction  Recent pyelonephritis and sepsis  Abdominal pain  Left ureter stone with mild left hydronephrosis  Hypokalemia  Hyponatremia  CKD stage 3a  Urine retention  Urinary fungal infection      Plan:  Would continue to hold dialysis for now and follow up labs. Continue diflucan. Will ask vascular surgery to remove Permcath.

## 2023-02-17 NOTE — DISCHARGE INSTRUCTIONS
After Dialysis Catheter (PermaCath) Removal:    1. Expect to be sore for a few days. You may use acetaminophen (tylenol) for this temporary discomfort. Please refer to your nephrologist for further questions about medications or dosing. 2.  A small amount of blood or drainage oozing from the wound is normal.  If the dressing becomes saturated with blood, hold firm and direct pressure on the insertion site and sit up at a 90 degree angle for 20 minutes. If bleeding continues, call the office. If the office is closed, you may be directed to go to the nearest emergency department for evaluation. 3.  You can shower the day after the catheter has been removed. No swimming or submerging the wound in water for one week. 4.  You do not need to use antibiotic ointment over the wounds. Just cover the wound(s) with a new bandage daily after shower/bath. PROBLEMS OR CONCERNS:    Call the vascular surgery office at [phone] with any of the followin. Continued bleeding after 20 minutes of firm, direct pressure and sitting upright. If the office is closed, you may be directed to go to the nearest emergency department for evaluation. 2.  Fever/chills, redness, heat, pus, or sudden swelling around your wound(s).

## 2023-02-17 NOTE — PROGRESS NOTES
Infectious Diseases Progress Note    Patient:  Rick Massey  YOB: 1940  MRN: [id number]   Admit date: 2/12/2023   Admitting Physician: Ena Patel MD  Primary Care Physician: Nakul Chambers MD    Chief Complaint/Interval History: He is without fever. He is getting intermittent catheterization. No suprapubic or flank pain. He seems to be maintaining adequate oral intake. No rash or skin itching. No cardiopulmonary complaints. Discussed with Dr. Jatinder Priest. In/Out    Intake/Output Summary (Last 24 hours) at 2/17/2023 0609  Last data filed at 2/16/2023 1730  Gross per 24 hour   Intake 952 ml   Output 950 ml   Net 2 ml     Allergies:    Allergies   Allergen Reactions    Codeine Nausea Only     Current Meds: anidulafungin (ERAXIS) 100 mg in sodium chloride 0.9 % 130 mL IVPB, Q24H  melatonin disintegrating tablet 5 mg, Nightly PRN  fluconazole (DIFLUCAN) in 0.9 % sodium chloride IVPB 200 mg, Q24H  tamsulosin (FLOMAX) capsule 0.8 mg, Daily  sodium chloride flush 0.9 % injection 5-40 mL, 2 times per day  sodium chloride flush 0.9 % injection 5-40 mL, PRN  0.9 % sodium chloride infusion, PRN  [Held by provider] enoxaparin Sodium (LOVENOX) injection 30 mg, Daily  ondansetron (ZOFRAN-ODT) disintegrating tablet 4 mg, Q8H PRN   Or  ondansetron (ZOFRAN) injection 4 mg, Q6H PRN  [Held by provider] aspirin chewable tablet 81 mg, Daily  atorvastatin (LIPITOR) tablet 20 mg, Nightly  calcitRIOL (ROCALTROL) capsule 0.25 mcg, Daily  docusate sodium (COLACE) capsule 100 mg, Daily  metoprolol succinate (TOPROL XL) extended release tablet 50 mg, Daily  pregabalin (LYRICA) capsule 25 mg, Daily  sodium bicarbonate tablet 650 mg, BID  simethicone (MYLICON) chewable tablet 80 mg, Q6H PRN  QUEtiapine (SEROQUEL) tablet 25 mg, Nightly  vitamin D (CHOLECALCIFEROL) capsule 5,000 Units, Daily  allopurinol (ZYLOPRIM) tablet 100 mg, Daily  pantoprazole (PROTONIX) tablet 20 mg, QAM AC  mirtazapine (REMERON) tablet 7.5 mg, Nightly  polyethylene glycol (GLYCOLAX) packet 17 g, BID  HYDROmorphone HCl PF (DILAUDID) injection 0.5 mg, Q3H PRN  HYDROcodone-acetaminophen (NORCO)  MG per tablet 1 tablet, Q4H PRN      Review of Systems see HPI. No cardiopulmonary symptoms. VitalSigns:  BP (!) 173/91   Pulse 91   Temp 97.3 °F (36.3 °C) (Temporal)   Resp 16   Ht 6' (1.829 m)   Wt 208 lb 8 oz (94.6 kg)   SpO2 95%   BMI 28.28 kg/m²      Physical Exam  Line/IV (peripheral) site: No erythema, warmth, induration, or tenderness. Abdomen is soft and nontender. No guarding or rebound. No suprapubic tenderness  No flank tenderness  Heart without murmur    Lab Results:  CBC:   Recent Labs     02/15/23  0237 02/16/23  0248   WBC 5.5 5.3   HGB 9.7* 10.1*   * 141     BMP:  Recent Labs     02/15/23  0237 02/16/23  0248 02/17/23  0207    140 141   K 3.5 3.8 3.4*    102 100   CO2 25 19* 27   BUN 22 24* 25*   CREATININE 1.6* 1.6* 1.5*   GLUCOSE 89 86 98     CultureResults:  Urine culture from February 13, 2023 with Candida species outlined below. Culture from renal pelvis at time of cystoscopy on February 14, 2023 with yeast which suspect is the same Genasense species as what was recovered from urine. Urine culture results:  Susceptibility    Candida guilliermondii (1)    Antibiotic Interpretation Microscan  Method Status    Caspofungin acetate Sensitive <=0.12 mcg/mL BACTERIAL SUSCEPTIBILITY PANEL BY NGOC     Fluconazole Sensitive <=0.5 mcg/mL BACTERIAL SUSCEPTIBILITY PANEL BY NGOC     Micafungin Sensitive <=0.06 mcg/mL BACTERIAL SUSCEPTIBILITY PANEL BY NGOC     Voriconazole Sensitive <=0.12 mcg/mL BACTERIAL SUSCEPTIBILITY PANEL BY NGOC       Radiology: None    Additional Studies Reviewed:  None    Impression:  1. 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 Nephrolithiasis  3. Previous back and flank pain-resolved  4.   Chronic kidney disease-stable    Recommendations:  Discontinue anidulafungin  Continue fluconazole 200 mg orally daily for an additional 3 weeks  Okay with me for discharge when ready for release per others  Would suggest follow-up with urology within the next few weeks to address his nephrolithiasis/stone  Would like to see him back in 1 month  Would be happy to reassess sooner if any new or worsening problems in the interim  Will sign off for now  Please call if additional questions for infectious diseases    Ladi Garrett MD

## 2023-02-17 NOTE — ACP (ADVANCE CARE PLANNING)
Advance Care Planning   Healthcare Decision Maker:    Primary Decision Maker: [employer] - Child - [phone]    Secondary Decision Maker: Stella Khan - Child - [phone]    Patient already has decision makers based on a document on file.       Electronically signed by Sang Shabazz  SalineCRAiLAR on 2/17/2023 at 1:09 PM

## 2023-02-18 LAB
ANION GAP SERPL CALCULATED.3IONS-SCNC: 10 MMOL/L (ref 7–19)
BUN BLDV-MCNC: 21 MG/DL (ref 8–23)
CALCIUM SERPL-MCNC: 8.1 MG/DL (ref 8.8–10.2)
CHLORIDE BLD-SCNC: 102 MMOL/L (ref 98–111)
CO2: 29 MMOL/L (ref 22–29)
CREAT SERPL-MCNC: 1.2 MG/DL (ref 0.5–1.2)
GFR SERPL CREATININE-BSD FRML MDRD: 60 ML/MIN/{1.73_M2}
GLUCOSE BLD-MCNC: 92 MG/DL (ref 74–109)
MAGNESIUM: 1.8 MG/DL (ref 1.6–2.4)
POTASSIUM REFLEX MAGNESIUM: 3.4 MMOL/L (ref 3.5–5)
SODIUM BLD-SCNC: 141 MMOL/L (ref 136–145)

## 2023-02-18 PROCEDURE — 51701 INSERT BLADDER CATHETER: CPT

## 2023-02-18 PROCEDURE — 1210000000 HC MED SURG R&B

## 2023-02-18 PROCEDURE — 94760 N-INVAS EAR/PLS OXIMETRY 1: CPT

## 2023-02-18 PROCEDURE — 6370000000 HC RX 637 (ALT 250 FOR IP): Performed by: UROLOGY

## 2023-02-18 PROCEDURE — 36415 COLL VENOUS BLD VENIPUNCTURE: CPT

## 2023-02-18 PROCEDURE — 6360000002 HC RX W HCPCS: Performed by: INTERNAL MEDICINE

## 2023-02-18 PROCEDURE — 83735 ASSAY OF MAGNESIUM: CPT

## 2023-02-18 PROCEDURE — 80048 BASIC METABOLIC PNL TOTAL CA: CPT

## 2023-02-18 PROCEDURE — 51798 US URINE CAPACITY MEASURE: CPT

## 2023-02-18 PROCEDURE — 6370000000 HC RX 637 (ALT 250 FOR IP): Performed by: HOSPITALIST

## 2023-02-18 PROCEDURE — 6370000000 HC RX 637 (ALT 250 FOR IP): Performed by: INTERNAL MEDICINE

## 2023-02-18 RX ORDER — ENOXAPARIN SODIUM 100 MG/ML
40 INJECTION SUBCUTANEOUS DAILY
Status: DISCONTINUED | OUTPATIENT
Start: 2023-02-18 | End: 2023-02-20 | Stop reason: HOSPADM

## 2023-02-18 RX ORDER — MAGNESIUM SULFATE IN WATER 40 MG/ML
2000 INJECTION, SOLUTION INTRAVENOUS ONCE
Status: COMPLETED | OUTPATIENT
Start: 2023-02-18 | End: 2023-02-18

## 2023-02-18 RX ADMIN — POTASSIUM BICARBONATE 20 MEQ: 782 TABLET, EFFERVESCENT ORAL at 20:24

## 2023-02-18 RX ADMIN — TAMSULOSIN HYDROCHLORIDE 0.8 MG: 0.4 CAPSULE ORAL at 09:00

## 2023-02-18 RX ADMIN — POLYETHYLENE GLYCOL 3350 17 G: 17 POWDER, FOR SOLUTION ORAL at 20:24

## 2023-02-18 RX ADMIN — Medication 5 MG: at 20:24

## 2023-02-18 RX ADMIN — POTASSIUM BICARBONATE 20 MEQ: 782 TABLET, EFFERVESCENT ORAL at 09:00

## 2023-02-18 RX ADMIN — CALCITRIOL CAPSULES 0.25 MCG 0.25 MCG: 0.25 CAPSULE ORAL at 09:00

## 2023-02-18 RX ADMIN — HYDROCODONE BITARTRATE AND ACETAMINOPHEN 1 TABLET: 10; 325 TABLET ORAL at 20:24

## 2023-02-18 RX ADMIN — QUETIAPINE FUMARATE 25 MG: 25 TABLET ORAL at 20:24

## 2023-02-18 RX ADMIN — ENOXAPARIN SODIUM 40 MG: 100 INJECTION SUBCUTANEOUS at 14:10

## 2023-02-18 RX ADMIN — PREGABALIN 25 MG: 25 CAPSULE ORAL at 09:00

## 2023-02-18 RX ADMIN — MIRTAZAPINE 7.5 MG: 7.5 TABLET ORAL at 20:24

## 2023-02-18 RX ADMIN — DOCUSATE SODIUM 100 MG: 100 CAPSULE, LIQUID FILLED ORAL at 09:00

## 2023-02-18 RX ADMIN — METOPROLOL SUCCINATE 50 MG: 50 TABLET, EXTENDED RELEASE ORAL at 09:00

## 2023-02-18 RX ADMIN — FLUCONAZOLE IN SODIUM CHLORIDE 200 MG: 2 INJECTION, SOLUTION INTRAVENOUS at 16:31

## 2023-02-18 RX ADMIN — ATORVASTATIN CALCIUM 20 MG: 20 TABLET, FILM COATED ORAL at 20:24

## 2023-02-18 RX ADMIN — Medication 5000 UNITS: at 09:00

## 2023-02-18 RX ADMIN — SODIUM BICARBONATE 650 MG: 650 TABLET ORAL at 09:00

## 2023-02-18 RX ADMIN — PANTOPRAZOLE SODIUM 20 MG: 20 TABLET, DELAYED RELEASE ORAL at 06:11

## 2023-02-18 RX ADMIN — POLYETHYLENE GLYCOL 3350 17 G: 17 POWDER, FOR SOLUTION ORAL at 09:01

## 2023-02-18 RX ADMIN — ALLOPURINOL 100 MG: 100 TABLET ORAL at 09:00

## 2023-02-18 RX ADMIN — MAGNESIUM SULFATE HEPTAHYDRATE 2000 MG: 40 INJECTION, SOLUTION INTRAVENOUS at 09:09

## 2023-02-18 NOTE — PROGRESS NOTES
Automatic Dose Adjustment of                Subcutaneous Anticoagulant for Prophylaxis    Jordan Toro is a 79 y.o. male. Recent Labs     02/17/23  0207 02/18/23  0441   CREATININE 1.5* 1.2       Estimated Creatinine Clearance: 55 mL/min (based on SCr of 1.2 mg/dL). Weight:  Wt Readings from Last 1 Encounters:   02/16/23 208 lb 8 oz (94.6 kg)           Pharmacy has adjusted subcutaneous anticoagulant for prophylaxis to Enoxaparin 40 mg SC daily based on the patient's weight and estimated CrCl per [address] policy.                Electronically signed by Akilah Everett, [facility] on 2/18/2023 at 1:29 PM

## 2023-02-18 NOTE — PROGRESS NOTES
Urology Attending Progress Note      Subjective: Patient having breakfast. Not in pain. Complains of uncomfortable feeling bladder area. No dysuria. Patient from assisted living, mildly confused. Nurse states patient not able to void. On in and out catheter. Residual 400- 650mls. On diflucan for fungal urinary infection. Vitals:  BP (!) 155/84   Pulse 84   Temp 97.3 °F (36.3 °C)   Resp 18   Ht 6' (1.829 m)   Wt 208 lb 8 oz (94.6 kg)   SpO2 96%   BMI 28.28 kg/m²   Temp  Av.8 °F (36.6 °C)  Min: 97.3 °F (36.3 °C)  Max: 98.6 °F (37 °C)    Intake/Output Summary (Last 24 hours) at 2023 0836  Last data filed at 2023 0546  Gross per 24 hour   Intake --   Output 1215 ml   Net -1215 ml       Exam: Patient not in distress. Comfortable  Abdomen: soft, bladder not distended. No tenderness. External genitalia normal.    Labs:  WBC:    Lab Results   Component Value Date/Time    WBC 5.3 2023 02:48 AM     Hemoglobin/Hematocrit:    Lab Results   Component Value Date/Time    HGB 10.1 2023 02:48 AM    HCT 31.6 2023 02:48 AM     BMP:    Lab Results   Component Value Date/Time     2023 04:41 AM    K 3.4 2023 04:41 AM     2023 04:41 AM    CO2 29 2023 04:41 AM    BUN 21 2023 04:41 AM    LABALBU 3.0 2023 09:42 PM    CREATININE 1.2 2023 04:41 AM    CALCIUM 8.1 2023 04:41 AM    GFRAA >59 2022 03:38 PM    LABGLOM 60 2023 04:41 AM     PT/INR:    Lab Results   Component Value Date/Time    PROTIME 14.2 2022 03:38 PM    INR 1.10 2022 03:38 PM     PTT:    Lab Results   Component Value Date/Time    APTT 31.2 2022 03:38 PM   [APTT        A & P:      1. Fungal UTI with culture positive from obstructed left kidney. Patient now on Diflucan per infectious disease. Dr. Laura Alvarado plans to perform definitive stone procedure for ureteral stone left side after fungal UTI is treated, may be in 2-3 weeks.     2. CKD, was on dialysis. After stent placement his creatinine has remained stable. Patient followed by nephrology    4. Urinary retention. Cystoscopy showed no obvious cause for outlet obstruction large wide open prostatic fossa and bladder neck without obstruction or contracture. Patient to continue on tamsulosin 0.4mg once daily. To continue on intermittent catheterization. Patient from assisted living, he won't be able to perform CIC. If does not void, may have to go with indwelling grove catheter.        Antoinette Dance, MD

## 2023-02-18 NOTE — PROGRESS NOTES
Daily Progress Note    Date:2023  Patient: Roc Ragsdale  : 1940  XSK:[id number]  CODE:Full Code No additional code details  PCP:Bety Forbes MD    Admit Date: 2023  7:14 PM   LOS: 5 days          71-year-old male with BPH/urinary retention with García in place, history of prior kidney stones, recent hospitalization with sepsis and pyelonephritis with acute renal failure requiring initiation of dialysis, presenting back from SNF with some lower abdominal discomfort and concern that García may not be draining properly, ultimately found to have obstructing left ureteral stone on CT abdomen/pelvis as well as UTI. Patient/family or apparently not agreeable to transfer for more urgent urologic intervention, however was evaluated by urology want service available on  to undergo cystoscopy with ureteral stent placement completed on . Treated on empiric antibiotics. Noted yeast from urine culture, fluconazole added and ID consulted. Patient remained in renal failure on initial evaluation, no improvement noted since last hospitalization, nephrology consulted, maintaining on dialysis. Subjective:     Pt feels if only he could get up and have privacy he would be able to void - though he is too weak to be able to stand long enough unassisted.        Objective:      Vital signs in last 24 hours:  Patient Vitals for the past 24 hrs:   BP Temp Temp src Pulse Resp SpO2   23 0749 (!) 155/84 97.3 °F (36.3 °C) -- 84 18 96 %   23 0639 -- -- -- -- -- 93 %   23 0543 137/71 97.3 °F (36.3 °C) -- 73 20 90 %   23 2201 -- -- -- -- 16 --   23 (!) 146/86 98.1 °F (36.7 °C) -- 88 16 92 %   23 1721 (!) 151/84 98.6 °F (37 °C) Temporal 84 18 95 %       Physical exam    General: No acute distress  Cardiac: Normal rate  Respiratory: No wheezes or rales  Abdomen: Nontender, no CVA tenderness, bowel sounds present  Extremities: No edema  : García in place      Lab Review Recent Results (from the past 24 hour(s))   Basic Metabolic Panel w/ Reflex to MG    Collection Time: 02/18/23  4:41 AM   Result Value Ref Range    Sodium 141 136 - 145 mmol/L    Potassium reflex Magnesium 3.4 (L) 3.5 - 5.0 mmol/L    Chloride 102 98 - 111 mmol/L    CO2 29 22 - 29 mmol/L    Anion Gap 10 7 - 19 mmol/L    Glucose 92 74 - 109 mg/dL    BUN 21 8 - 23 mg/dL    Creatinine 1.2 0.5 - 1.2 mg/dL    Est, Glom Filt Rate 60 (A) >60    Calcium 8.1 (L) 8.8 - 10.2 mg/dL   Magnesium    Collection Time: 02/18/23  4:41 AM   Result Value Ref Range    Magnesium 1.8 1.6 - 2.4 mg/dL       I/O last 3 completed shifts:  In: -   Out: 3589 [Urine:2215]  No intake/output data recorded.       Current Facility-Administered Medications:     potassium bicarb-citric acid (EFFER-K) effervescent tablet 20 mEq, 20 mEq, Oral, BID, Hesham Sherman MD, 20 mEq at 02/18/23 0900    fluconazole (DIFLUCAN) in 0.9 % sodium chloride IVPB 200 mg, 200 mg, IntraVENous, Q24H, Chandu Solano MD, Last Rate: 100 mL/hr at 02/17/23 1721, 200 mg at 02/17/23 1721    melatonin disintegrating tablet 5 mg, 5 mg, Oral, Nightly PRN, Porfirio Guzman MD, 5 mg at 02/17/23 2131    tamsulosin (FLOMAX) capsule 0.8 mg, 0.8 mg, Oral, Daily, J Luis Matos MD, 0.8 mg at 02/18/23 0900    sodium chloride flush 0.9 % injection 5-40 mL, 5-40 mL, IntraVENous, 2 times per day, J Luis Matos MD, 10 mL at 02/17/23 1121    sodium chloride flush 0.9 % injection 5-40 mL, 5-40 mL, IntraVENous, PRN, J Luis Matos MD    0.9 % sodium chloride infusion, , IntraVENous, PRN, J Luis Matos MD, Last Rate: 15 mL/hr at 02/16/23 0849, New Bag at 02/16/23 0849    [Held by provider] enoxaparin Sodium (LOVENOX) injection 30 mg, 30 mg, SubCUTAneous, Daily, Porfirio Guzman MD    ondansetron (ZOFRAN-ODT) disintegrating tablet 4 mg, 4 mg, Oral, Q8H PRN **OR** ondansetron (ZOFRAN) injection 4 mg, 4 mg, IntraVENous, Q6H PRN, J Luis Matos MD    [Held by provider] aspirin chewable tablet 81 mg, 81 mg, Oral, Daily, Coco Irene MD    atorvastatin (LIPITOR) tablet 20 mg, 20 mg, Oral, Nightly, Stephany Harper MD, 20 mg at 02/17/23 2131    docusate sodium (COLACE) capsule 100 mg, 100 mg, Oral, Daily, Stephany Harper MD, 100 mg at 02/18/23 0900    metoprolol succinate (TOPROL XL) extended release tablet 50 mg, 50 mg, Oral, Daily, Stephany Harper MD, 50 mg at 02/18/23 0900    pregabalin (LYRICA) capsule 25 mg, 25 mg, Oral, Daily, Stephany Harper MD, 25 mg at 02/18/23 0900    simethicone (MYLICON) chewable tablet 80 mg, 80 mg, Oral, Q6H PRN, Stephany Harper MD    QUEtiapine (SEROQUEL) tablet 25 mg, 25 mg, Oral, Nightly, Stephany Harper MD, 25 mg at 02/17/23 2131    vitamin D (CHOLECALCIFEROL) capsule 5,000 Units, 5,000 Units, Oral, Daily, Stephany Harper MD, 5,000 Units at 02/18/23 0900    allopurinol (ZYLOPRIM) tablet 100 mg, 100 mg, Oral, Daily, Stephany Harper MD, 100 mg at 02/18/23 0900    pantoprazole (PROTONIX) tablet 20 mg, 20 mg, Oral, QAM AC, Stephany Harper MD, 20 mg at 02/18/23 7327    mirtazapine (REMERON) tablet 7.5 mg, 7.5 mg, Oral, Nightly, Stephany Harper MD, 7.5 mg at 02/17/23 2131    polyethylene glycol (GLYCOLAX) packet 17 g, 17 g, Oral, BID, Stephany Harper MD, 17 g at 02/18/23 0901    HYDROmorphone HCl PF (DILAUDID) injection 0.5 mg, 0.5 mg, IntraVENous, Q3H PRN, Stephany Harper MD    HYDROcodone-acetaminophen [facility])  MG per tablet 1 tablet, 1 tablet, Oral, Q4H PRN, Stephany Hraper MD, 1 tablet at 02/17/23 2135        Reviewed chart, history, vitals, labs, diagnostics and medications        Assessment/plan  Principal Problem:    Obstruction of left ureter  Active Problems:    Acute renal failure with acute tubular necrosis superimposed on chronic kidney disease, on chronic dialysis ([facility] [address]    Complicated UTI (urinary tract infection)    Urinary retention  Resolved Problems:    * No resolved hospital problems. *        Obstructive Uropathy with left ureteral calculus  -Urology team involved. - S/p cystoscopy with left ureteral stent placement, will need definitive treatment of stone on outpatient basis   - urology team report of wide open prostatic fossa and bladder neck without obstruction. They recommend to d/c grove and PRN straight cath for >300cc residuals. Cont flomax.    - grove removed - pt not voiding - cont PRN straight cath as per urology recs. Complicated UTI in setting of Grove catheter and left ureteral obstruction  - ID following  - On meropenem empirically - this was stopped per ID  - Fluconazole for fungal infection - recs for 21 days of PO treatment - switch to PO on discharge. Acute renal failure with ATN requiring dialysis  Ureteral obstruction  Urologic intervention as noted above  --Continue Flomax  --Continue dialysis as per nephrology   - pt Cr actually holding steady without HD   - had HD catheter removed 2/17 as per nephrology instruction. --Continue to monitor closely - renal function continues better. Constipation  - Continue bowel regimen including Colace and MiraLAX        Mood disorder  Continue Seroquel and Remeron        Dementia. Supportive care. DVT PPx Lovenox to resume.          Devyn French MD 2/18/2023 1:02 PM

## 2023-02-18 NOTE — PROGRESS NOTES
Renal Progress Note    Thank you to requesting provider: Dr Belkis Addison, for asking us to see Maximo Quiroga    Reason for consultation:  ALEXANDRO    Chief Complaint:  Abdominal pain, urine retention. History of Presenting Illness      Mr. Maximo Quiroga is a pleasant 79year old man who presented with abdominal pain and was found with obstructive 1 cm stone in mid left ureter with mild left hydronephrosis. He has a history of prior kidney stones. He has recently started dialysis for ALEXANDRO after pyelonephritis and sepsis. He is s/p left ureteral stent placement today by Dr Genaro Kaplan. Patient is currently comfortable. He is non-oliguric. His repeat serum creatinine was 1.6. He is growing yeast in his urine. His grove catheter was removed on 2/16 but has since required several intermittent bladder catheterization for urinary retention. Vascular surgery has removed his Permcath on 2/17.     Past Medical/Surgical History      Active Ambulatory Problems     Diagnosis Date Noted    Mixed hyperlipidemia 08/16/2017    Essential hypertension 08/16/2017    Restless legs syndrome 08/16/2017    Gastroesophageal reflux disease without esophagitis 08/16/2017    Peripheral neuropathic pain 08/16/2017    Primary osteoarthritis involving multiple joints 08/16/2017    Gout 08/16/2017    Benign non-nodular prostatic hyperplasia with lower urinary tract symptoms 08/16/2017    Elevated PSA 08/16/2017    Idiopathic peripheral neuropathy 02/19/2018    Chronic idiopathic constipation 08/20/2018    Prediabetes 10/14/2022    Pyelonephritis of left kidney 01/23/2023    Renal stone 01/23/2023    Visual disturbance 01/31/2023    Acute renal failure with acute tubular necrosis superimposed on chronic kidney disease, on chronic dialysis ([address] 02/01/2023    Pyelonephritis 02/02/2023    Severe malnutrition ([address] 02/03/2023     Resolved Ambulatory Problems     Diagnosis Date Noted    No Resolved Ambulatory Problems     No Additional Past Medical History Review of Systems     Constitutional:  No weight loss, no fever/chills  Eyes:  No eye pain, no eye redness  Cardiovascular:  No chest pain, no worsening of edema  Respiratory:  No hemoptysis, no stidor  Gastrointestinal:  No blood in stool, no n/v, no diarrhea  Genitoruinary:  No hematuria, + difficulty with urination  Musculoskeletal:  No joint swelling, no redness  Integumentary:  No Rash, no itching  Neurological:  No focal weakness, No new sensory deficit  Psychiatric:  No depression, no confusion  Endocrine:  No polyuria, no polydipsia       Medications        Current Facility-Administered Medications:     potassium bicarb-citric acid (EFFER-K) effervescent tablet 20 mEq, 20 mEq, Oral, BID, Buzz Monk MD, 20 mEq at 02/18/23 0900    fluconazole (DIFLUCAN) in 0.9 % sodium chloride IVPB 200 mg, 200 mg, IntraVENous, Q24H, Torrie Liz MD, Last Rate: 100 mL/hr at 02/17/23 1721, 200 mg at 02/17/23 1721    melatonin disintegrating tablet 5 mg, 5 mg, Oral, Nightly PRN, Ayan Dumont MD, 5 mg at 02/17/23 2131    tamsulosin (FLOMAX) capsule 0.8 mg, 0.8 mg, Oral, Daily, Demetrius Del Rosario MD, 0.8 mg at 02/18/23 0900    sodium chloride flush 0.9 % injection 5-40 mL, 5-40 mL, IntraVENous, 2 times per day, Demetrius Del Rosario MD, 10 mL at 02/17/23 1121    sodium chloride flush 0.9 % injection 5-40 mL, 5-40 mL, IntraVENous, PRN, Demetrius Del Rosario MD    0.9 % sodium chloride infusion, , IntraVENous, PRN, Demetrius Del Rosario MD, Last Rate: 15 mL/hr at 02/16/23 0849, New Bag at 02/16/23 0849    [Held by provider] enoxaparin Sodium (LOVENOX) injection 30 mg, 30 mg, SubCUTAneous, Daily, Ayan Dumont MD    ondansetron (ZOFRAN-ODT) disintegrating tablet 4 mg, 4 mg, Oral, Q8H PRN **OR** ondansetron (ZOFRAN) injection 4 mg, 4 mg, IntraVENous, Q6H PRN, Demetrius Del Rosario MD    [Held by provider] aspirin chewable tablet 81 mg, 81 mg, Oral, Daily, Ayan Dumont MD    atorvastatin (LIPITOR) tablet 20 mg, 20 mg, Oral, Nightly, Iliana Streeter MD, 20 mg at 02/17/23 2131    calcitRIOL (ROCALTROL) capsule 0.25 mcg, 0.25 mcg, Oral, Daily, Iliana Streeter MD, 0.25 mcg at 02/18/23 0900    docusate sodium (COLACE) capsule 100 mg, 100 mg, Oral, Daily, Iliana Streeter MD, 100 mg at 02/18/23 0900    metoprolol succinate (TOPROL XL) extended release tablet 50 mg, 50 mg, Oral, Daily, Iliana Streeter MD, 50 mg at 02/18/23 0900    pregabalin (LYRICA) capsule 25 mg, 25 mg, Oral, Daily, Iliana Streeter MD, 25 mg at 02/18/23 0900    sodium bicarbonate tablet 650 mg, 650 mg, Oral, BID, Iliana Streeter MD, 650 mg at 02/18/23 0900    simethicone (MYLICON) chewable tablet 80 mg, 80 mg, Oral, Q6H PRN, Iliana Streeter MD    QUEtiapine (SEROQUEL) tablet 25 mg, 25 mg, Oral, Nightly, Iliana Streeter MD, 25 mg at 02/17/23 2131    vitamin D (CHOLECALCIFEROL) capsule 5,000 Units, 5,000 Units, Oral, Daily, Iliana Streeter MD, 5,000 Units at 02/18/23 0900    allopurinol (ZYLOPRIM) tablet 100 mg, 100 mg, Oral, Daily, Iliana Streeter MD, 100 mg at 02/18/23 0900    pantoprazole (PROTONIX) tablet 20 mg, 20 mg, Oral, QAM AC, Iliana Streeter MD, 20 mg at 02/18/23 8097    mirtazapine (REMERON) tablet 7.5 mg, 7.5 mg, Oral, Nightly, Iliana Streeter MD, 7.5 mg at 02/17/23 2131    polyethylene glycol (GLYCOLAX) packet 17 g, 17 g, Oral, BID, Iliana Streeter MD, 17 g at 02/18/23 0901    HYDROmorphone HCl PF (DILAUDID) injection 0.5 mg, 0.5 mg, IntraVENous, Q3H PRN, Iliana Streeter MD    HYDROcodone-acetaminophen [facility])  MG per tablet 1 tablet, 1 tablet, Oral, Q4H PRN, Iliana Streeter MD, 1 tablet at 02/17/23 1141  Outpatient Medications Marked as Taking for the 2/12/23 encounter [facility] Encounter)   Medication Sig Dispense Refill    acetaminophen (TYLENOL) 325 MG tablet Take 650 mg by mouth every 6 hours as needed for Pain      ondansetron (ZOFRAN) 4 MG tablet Take 4 mg by mouth every 6 hours as needed for Nausea or Vomiting      traMADol (ULTRAM) 50 MG tablet Take 50 mg by mouth every 8 hours as needed for Pain. Allergies   Codeine    Family History       Family History   Problem Relation Age of Onset    Uterine Cancer Mother          at age 52    Coronary Art Dis Father         MI at 62, smoker    COPD Sister         smoker    COPD Sister         smoker    Heart Failure Sister     COPD Sister         smoker    Stroke Brother         smoker    Coronary Art Dis Brother         open heart surgry, MI at 80- yo    No Known Problems Son     No Known Problems Daughter      Family history negative for kidney disease. 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     Employer: RETIRED     Comment: retired    Tobacco Use    Smoking status: Former     Packs/day: 3.00     Years: 35.00     Pack years: 105.00     Types: Cigarettes, Pipe     Start date:      Quit date:      Years since quittin.1    Smokeless tobacco: Never   Vaping Use    Vaping Use: Never used   Substance and Sexual Activity    Alcohol use: Not Currently     Comment: \"not vbery much\", \"beer every 15 years\"    Drug use: Never    Sexual activity: Yes     Partners: Female     Comment: wife, has 2 kids   Social History Narrative    CODE STATUS: Full Code    HEALTH CARE PROXY: his children    1.) Jacoby Hagan, his daughter, [phone]    2.) Logan Yanez, his son, [phone]    AMBULATES: independently, sometimes with cane    DOMICILED: has 3 story home but he has an elevator     Social Determinants of Health     Financial Resource Strain: Low Risk     Difficulty of Paying Living Expenses: Not hard at all   Food Insecurity: No Food Insecurity    Worried About [address] in the Last Year: Never true    [address] in the Last Year: Never true   Transportation Needs: No Transportation Needs    Lack of Transportation (Medical): No    Lack of Transportation (Non-Medical): No   Physical Activity: Insufficiently Active    Days of Exercise per Week: 2 days    Minutes of Exercise per Session: 10 min   Stress: No Stress Concern Present    Feeling of Stress : Not at all   Social Connections: Moderately Isolated    Frequency of Communication with Friends and Family: More than three times a week    Frequency of Social Gatherings with Friends and Family: Twice a week    Attends [employer] Services: 1 to 4 times per year    Active Member of [employer] or Organizations: No    Attends Club or Organization Meetings: Never    Marital Status:    Intimate Partner Violence: Not At Risk    Fear of Current or Ex-Partner: No    Emotionally Abused: No    Physically Abused: No    Sexually Abused: No   Housing Stability: Low Risk     Unable to Pay for Housing in the Last Year: No    Number of Places Lived in the Last Year: 2    Unstable Housing in the Last Year: No       Physical Exam     Blood pressure (!) 155/84, pulse 84, temperature 97.3 °F (36.3 °C), resp. rate 18, height 6' (1.829 m), weight 208 lb 8 oz (94.6 kg), SpO2 96 %.     General:  NAD, A+Ox3, ill-appearing, normal body habitus  HEENT:  atraumatic, normocephalic  Neck:  no masses, no JVD  Chest:  CTAB, good respiratory effort, good air movement  CV:  RRR, soft systolic murmur    Abdomen:  NTND, soft, +BS, no hepatosplenomegaly  Extremities:  No peripheral edema  Neurological:  Moving all four extremities   Lymphatics:  No palpable lymph nodes   Skin:  No rash, no jaundice  Psychiatric:  Normal insight and judgement, good recall    Data     Recent Labs     02/16/23  0248   WBC 5.3   HGB 10.1*   HCT 31.6*   MCV 85.6          Recent Labs     02/16/23  0248 02/17/23  0207 02/18/23  0441    141 141   K 3.8 3.4* 3.4*    100 102   CO2 19* 27 29   GLUCOSE 86 98 92   MG  --  1.6 1.8   BUN 24* 25* 21   CREATININE 1.6* 1.5* 1.2   LABGLOM 42* 46* 60*         Assessment:  ALEXANDRO- ATN and obstruction  Recent pyelonephritis and sepsis  Abdominal pain  Left ureter stone with mild left hydronephrosis  Hypokalemia  CKD stage 3a  Urine retention  Urinary fungal infection      Plan:  Follow up labs for now.  Continue diflucan. Follow up with urology concerning outcomes of urinary retention and possible need for García cath. Would hold calcitriol and sodium bicarbonate. Check Mg and supplement K.

## 2023-02-18 NOTE — PLAN OF CARE
Problem: Discharge Planning  Goal: Discharge to home or other facility with appropriate resources  2/18/2023 1041 by Jose Elias Suarez LPN  Outcome: Progressing  Flowsheets (Taken 2/18/2023 0900)  Discharge to home or other facility with appropriate resources:   Arrange for needed discharge resources and transportation as appropriate   Identify barriers to discharge with patient and caregiver   Identify discharge learning needs (meds, wound care, etc)   Refer to discharge planning if patient needs post-hospital services based on physician order or complex needs related to functional status, cognitive ability or social support system  2/18/2023 0255 by Mildred Amin RN  Outcome: Progressing  [address] (Taken 2/17/2023 2045)  Discharge to home or other facility with appropriate resources: Identify barriers to discharge with patient and caregiver     Problem: Safety - Adult  Goal: Free from fall injury  2/18/2023 1041 by Jose Elias Suarez LPN  Outcome: Progressing  2/18/2023 0255 by Mildred Amin RN  Outcome: Progressing  Flowsheets (Taken 2/18/2023 0252)  Free From Fall Injury: Instruct family/caregiver on patient safety     Problem: ABCDS Injury Assessment  Goal: Absence of physical injury  2/18/2023 1041 by Jose Elias Suarez LPN  Outcome: Progressing  2/18/2023 0255 by Mildred Amin RN  Outcome: Progressing  Flowsheets (Taken 2/18/2023 0252)  Absence of Physical Injury: Implement safety measures based on patient assessment     Problem: Neurosensory - Adult  Goal: Achieves stable or improved neurological status  2/18/2023 1041 by Jose Elias Suarez LPN  Outcome: Progressing  Flowsheets (Taken 2/18/2023 0900)  Achieves stable or improved neurological status:   Assess for and report changes in neurological status   Initiate measures to prevent increased intracranial pressure   Maintain blood pressure and fluid volume within ordered parameters to optimize cerebral perfusion and minimize risk of hemorrhage   Monitor temperature, glucose, and sodium.  Initiate appropriate interventions as ordered  2/18/2023 0255 by Don Owens RN  Outcome: Progressing  Flowsheets (Taken 2/17/2023 2045)  Achieves stable or improved neurological status: Assess for and report changes in neurological status  Goal: Achieves maximal functionality and self care  2/18/2023 1041 by Helane Spatz, LPN  Outcome: Progressing  Flowsheets (Taken 2/18/2023 0900)  Achieves maximal functionality and self care:   Monitor swallowing and airway patency with patient fatigue and changes in neurological status   Encourage and assist patient to increase activity and self care with guidance from physical therapy/occupational therapy   Encourage visually impaired, hearing impaired and aphasic patients to use assistive/communication devices  2/18/2023 0255 by Don Owens RN  Outcome: Progressing  Flowsheets (Taken 2/17/2023 2045)  Achieves maximal functionality and self care: Monitor swallowing and airway patency with patient fatigue and changes in neurological status     Problem: Respiratory - Adult  Goal: Achieves optimal ventilation and oxygenation  2/18/2023 1041 by Helane Spatz, LPN  Outcome: Progressing  Flowsheets (Taken 2/18/2023 0900)  Achieves optimal ventilation and oxygenation:   Assess for changes in respiratory status   Assess for changes in mentation and behavior   Position to facilitate oxygenation and minimize respiratory effort   Oxygen supplementation based on oxygen saturation or arterial blood gases  2/18/2023 0255 by Don Owens RN  Outcome: Progressing  Flowsheets (Taken 2/17/2023 2045)  Achieves optimal ventilation and oxygenation: Assess for changes in respiratory status     Problem: Cardiovascular - Adult  Goal: Maintains optimal cardiac output and hemodynamic stability  2/18/2023 1041 by Helane Spatz, LPN  Outcome: Progressing  Flowsheets (Taken 2/18/2023 0900)  Maintains optimal cardiac output and hemodynamic stability: Monitor blood pressure and heart rate   Monitor urine output and notify Licensed Independent Practitioner for values outside of normal range   Assess for signs of decreased cardiac output  2/18/2023 0255 by Lisset Florian RN  Outcome: Progressing  Flowsheets (Taken 2/17/2023 2045)  Maintains optimal cardiac output and hemodynamic stability: Monitor blood pressure and heart rate  Goal: Absence of cardiac dysrhythmias or at baseline  2/18/2023 1041 by Virgie Maurice LPN  Outcome: Progressing  Flowsheets (Taken 2/18/2023 0900)  Absence of cardiac dysrhythmias or at baseline:   Monitor cardiac rate and rhythm   Assess for signs of decreased cardiac output   Administer antiarrhythmia medication and electrolyte replacement as ordered  2/18/2023 0255 by Lisset Florian RN  Outcome: Progressing  [address] (Taken 2/17/2023 2045)  Absence of cardiac dysrhythmias or at baseline: Monitor cardiac rate and rhythm     Problem: Skin/Tissue Integrity - Adult  Goal: Skin integrity remains intact  2/18/2023 1041 by Virgie Maurice LPN  Outcome: Progressing  Flowsheets (Taken 2/18/2023 0900)  Skin Integrity Remains Intact: Monitor for areas of redness and/or skin breakdown  2/18/2023 0255 by Lisset Florian RN  Outcome: Progressing  Flowsheets  Taken 2/18/2023 0252  Skin Integrity Remains Intact: Monitor for areas of redness and/or skin breakdown  Taken 2/17/2023 2045  Skin Integrity Remains Intact: Monitor for areas of redness and/or skin breakdown  Goal: Oral mucous membranes remain intact  2/18/2023 1041 by Virgie Maurice LPN  Outcome: Progressing  Flowsheets (Taken 2/18/2023 0900)  Oral Mucous Membranes Remain Intact:   Assess oral mucosa and hygiene practices   Implement preventative oral hygiene regimen   Implement oral medicated treatments as ordered  2/18/2023 0255 by Lisset Florian RN  Outcome: Progressing  Flowsheets  Taken 2/18/2023 0252  Oral Mucous Membranes Remain Intact: Assess oral mucosa and hygiene practices  Taken 2/17/2023 2045  Oral Mucous Membranes Remain Intact: Assess oral mucosa and hygiene practices     Problem: Musculoskeletal - Adult  Goal: Return mobility to safest level of function  2/18/2023 1041 by Enrike Obregon LPN  Outcome: Progressing  Flowsheets (Taken 2/18/2023 0900)  Return Mobility to Safest Level of Function:   Assess patient stability and activity tolerance for standing, transferring and ambulating with or without assistive devices   Assist with transfers and ambulation using safe patient handling equipment as needed   Obtain physical therapy/occupational therapy consults as needed  2/18/2023 0255 by Liana Quiñonez RN  Outcome: Progressing  Flowsheets (Taken 2/17/2023 2045)  Return Mobility to Safest Level of Function: Assess patient stability and activity tolerance for standing, transferring and ambulating with or without assistive devices  Goal: Maintain proper alignment of affected body part  2/18/2023 1041 by Enrike Orbegon LPN  Outcome: Progressing  Flowsheets (Taken 2/18/2023 0900)  Maintain proper alignment of affected body part:   Support and protect limb and body alignment per provider's orders   Instruct and reinforce with patient and family use of appropriate assistive device and precautions (e.g. spinal or hip dislocation precautions)  2/18/2023 0255 by Liana Quiñonez RN  Outcome: Progressing  Flowsheets (Taken 2/17/2023 2045)  Maintain proper alignment of affected body part: Support and protect limb and body alignment per provider's orders  Goal: Return ADL status to a safe level of function  2/18/2023 1041 by Enrike Obregon LPN  Outcome: Progressing  Flowsheets (Taken 2/18/2023 0900)  Return ADL Status to a Safe Level of Function:   Administer medication as ordered   Assess activities of daily living deficits and provide assistive devices as needed   Obtain physical therapy/occupational therapy consults as needed   Assist and instruct patient to increase activity and self care as tolerated  2/18/2023 0255 by Afua Motley RN  Outcome: Progressing  Flowsheets (Taken 2/17/2023 2045)  Return ADL Status to a Safe Level of Function: Administer medication as ordered     Problem: Gastrointestinal - Adult  Goal: Minimal or absence of nausea and vomiting  2/18/2023 1041 by Nisa Terrazas LPN  Outcome: Progressing  Flowsheets (Taken 2/18/2023 0900)  Minimal or absence of nausea and vomiting: Advance diet as tolerated, if ordered  2/18/2023 0255 by Afua Motley RN  Outcome: Progressing  Flowsheets (Taken 2/17/2023 2045)  Minimal or absence of nausea and vomiting: Administer IV fluids as ordered to ensure adequate hydration  Goal: Maintains or returns to baseline bowel function  2/18/2023 1041 by Nisa Terrazas LPN  Outcome: Progressing  Flowsheets (Taken 2/18/2023 0900)  Maintains or returns to baseline bowel function:   Assess bowel function   Encourage oral fluids to ensure adequate hydration   Administer ordered medications as needed  2/18/2023 0255 by Afua Motley RN  Outcome: Bryanna Kang (Taken 2/17/2023 2045)  Maintains or returns to baseline bowel function: Assess bowel function  Goal: Maintains adequate nutritional intake  2/18/2023 1041 by Nisa Terrazas LPN  Outcome: Progressing  Flowsheets (Taken 2/18/2023 0900)  Maintains adequate nutritional intake:   Monitor percentage of each meal consumed   Identify factors contributing to decreased intake, treat as appropriate   Monitor intake and output, weight and lab values  2/18/2023 0255 by Afua Motley RN  Outcome: Progressing  Flowsheets (Taken 2/17/2023 2045)  Maintains adequate nutritional intake: Monitor percentage of each meal consumed     Problem: Genitourinary - Adult  Goal: Absence of urinary retention  2/18/2023 1041 by Nisa Terrazas LPN  Outcome: Progressing  Flowsheets (Taken 2/18/2023 0900)  Absence of urinary retention:   Assess patients ability to void and empty bladder   Monitor intake/output and perform bladder scan as needed   Discuss catheterization for long term situations as appropriate  2/18/2023 0255 by Lisset Florian RN  Outcome: Progressing  Flowsheets (Taken 2/17/2023 2045)  Absence of urinary retention: Assess patients ability to void and empty bladder  Goal: Urinary catheter remains patent  2/18/2023 1041 by Virgie Maurice LPN  Outcome: Progressing  2/18/2023 0255 by Lisset Florian RN  Outcome: Progressing  Flowsheets (Taken 2/17/2023 2045)  Urinary catheter remains patent: Assess patency of urinary catheter     Problem: Infection - Adult  Goal: Absence of infection at discharge  2/18/2023 1041 by Virgie Maurice LPN  Outcome: Progressing  Flowsheets (Taken 2/18/2023 0900)  Absence of infection at discharge:   Assess and monitor for signs and symptoms of infection   Monitor lab/diagnostic results   Monitor all insertion sites i.e., indwelling lines, tubes and drains  2/18/2023 0255 by Lisset Florian RN  Outcome: Progressing  Flowsheets (Taken 2/17/2023 2045)  Absence of infection at discharge: Assess and monitor for signs and symptoms of infection  Goal: Absence of infection during hospitalization  2/18/2023 1041 by Virgie Maurice LPN  Outcome: Progressing  Flowsheets (Taken 2/18/2023 0900)  Absence of infection during hospitalization:   Assess and monitor for signs and symptoms of infection   Monitor lab/diagnostic results   Monitor all insertion sites i.e., indwelling lines, tubes and drains  2/18/2023 0255 by Lisset Florian RN  Outcome: Progressing  Flowsheets (Taken 2/17/2023 2045)  Absence of infection during hospitalization: Assess and monitor for signs and symptoms of infection     Problem: Metabolic/Fluid and Electrolytes - Adult  Goal: Electrolytes maintained within normal limits  2/18/2023 1041 by Virgie Maurice LPN  Outcome: Progressing  Flowsheets (Taken 2/18/2023 0900)  Electrolytes maintained within normal limits:   Monitor labs and assess patient for signs and symptoms of electrolyte imbalances   Administer electrolyte replacement as ordered   Monitor response to electrolyte replacements, including repeat lab results as appropriate  2/18/2023 0255 by Janessa Lott RN  Outcome: Progressing  Flowsheets (Taken 2/17/2023 2045)  Electrolytes maintained within normal limits: Monitor labs and assess patient for signs and symptoms of electrolyte imbalances  Goal: Hemodynamic stability and optimal renal function maintained  2/18/2023 1041 by Jeromy Wilson LPN  Outcome: Progressing  Flowsheets (Taken 2/18/2023 0900)  Hemodynamic stability and optimal renal function maintained:   Monitor labs and assess for signs and symptoms of volume excess or deficit   Monitor intake, output and patient weight   Monitor urine specific gravity, serum osmolarity and serum sodium as indicated or ordered  2/18/2023 0255 by Janessa Lott RN  Outcome: Progressing  Flowsheets (Taken 2/17/2023 2045)  Hemodynamic stability and optimal renal function maintained: Monitor labs and assess for signs and symptoms of volume excess or deficit     Problem: Hematologic - Adult  Goal: Maintains hematologic stability  2/18/2023 1041 by Jeromy Wilson LPN  Outcome: Progressing  Flowsheets (Taken 2/18/2023 0900)  Maintains hematologic stability:   Assess for signs and symptoms of bleeding or hemorrhage   Monitor labs for bleeding or clotting disorders   Administer blood products/factors as ordered  2/18/2023 0255 by Janessa Lott RN  Outcome: Progressing  Flowsheets (Taken 2/17/2023 2045)  Maintains hematologic stability: Assess for signs and symptoms of bleeding or hemorrhage     Problem: Pain  Goal: Verbalizes/displays adequate comfort level or baseline comfort level  2/18/2023 1041 by Jeromy Wilson LPN  Outcome: Progressing  2/18/2023 0255 by Janessa Lott RN  Outcome: Progressing     Problem: Skin/Tissue Integrity  Goal: Absence of new skin breakdown  Description: 1.   Monitor for areas of redness and/or skin breakdown  2. Assess vascular access sites hourly  3. Every 4-6 hours minimum:  Change oxygen saturation probe site  4. Every 4-6 hours:  If on nasal continuous positive airway pressure, respiratory therapy assess nares and determine need for appliance change or resting period.   2/18/2023 1041 by Dena Donis LPN  Outcome: Progressing  2/18/2023 0255 by Mallory Vaughan RN  Outcome: Progressing     Problem: Nutrition Deficit:  Goal: Optimize nutritional status  2/18/2023 1041 by Dena Donis LPN  Outcome: Progressing  2/18/2023 0255 by Mallory Vaughan RN  Outcome: Progressing     Problem: Chronic Conditions and Co-morbidities  Goal: Patient's chronic conditions and co-morbidity symptoms are monitored and maintained or improved  2/18/2023 1041 by Dena Donis LPN  Outcome: Progressing  Flowsheets (Taken 2/18/2023 0900)  Care Plan - Patient's Chronic Conditions and Co-Morbidity Symptoms are Monitored and Maintained or Improved:   Monitor and assess patient's chronic conditions and comorbid symptoms for stability, deterioration, or improvement   Collaborate with multidisciplinary team to address chronic and comorbid conditions and prevent exacerbation or deterioration   Update acute care plan with appropriate goals if chronic or comorbid symptoms are exacerbated and prevent overall improvement and discharge  2/18/2023 0255 by Mallory Vaughan RN  Outcome: Progressing  Flowsheets (Taken 2/17/2023 2045)  Care Plan - Patient's Chronic Conditions and Co-Morbidity Symptoms are Monitored and Maintained or Improved: Monitor and assess patient's chronic conditions and comorbid symptoms for stability, deterioration, or improvement

## 2023-02-19 LAB
ANION GAP SERPL CALCULATED.3IONS-SCNC: 8 MMOL/L (ref 7–19)
BUN BLDV-MCNC: 19 MG/DL (ref 8–23)
CALCIUM SERPL-MCNC: 8.1 MG/DL (ref 8.8–10.2)
CHLORIDE BLD-SCNC: 102 MMOL/L (ref 98–111)
CO2: 31 MMOL/L (ref 22–29)
CREAT SERPL-MCNC: 1.2 MG/DL (ref 0.5–1.2)
GFR SERPL CREATININE-BSD FRML MDRD: 60 ML/MIN/{1.73_M2}
GLUCOSE BLD-MCNC: 98 MG/DL (ref 74–109)
POTASSIUM REFLEX MAGNESIUM: 3.7 MMOL/L (ref 3.5–5)
SODIUM BLD-SCNC: 141 MMOL/L (ref 136–145)

## 2023-02-19 PROCEDURE — 36415 COLL VENOUS BLD VENIPUNCTURE: CPT

## 2023-02-19 PROCEDURE — 6370000000 HC RX 637 (ALT 250 FOR IP): Performed by: UROLOGY

## 2023-02-19 PROCEDURE — 1210000000 HC MED SURG R&B

## 2023-02-19 PROCEDURE — 87086 URINE CULTURE/COLONY COUNT: CPT

## 2023-02-19 PROCEDURE — 51798 US URINE CAPACITY MEASURE: CPT

## 2023-02-19 PROCEDURE — 2580000003 HC RX 258: Performed by: UROLOGY

## 2023-02-19 PROCEDURE — 6360000002 HC RX W HCPCS: Performed by: INTERNAL MEDICINE

## 2023-02-19 PROCEDURE — 80048 BASIC METABOLIC PNL TOTAL CA: CPT

## 2023-02-19 PROCEDURE — 6370000000 HC RX 637 (ALT 250 FOR IP): Performed by: INTERNAL MEDICINE

## 2023-02-19 PROCEDURE — 94760 N-INVAS EAR/PLS OXIMETRY 1: CPT

## 2023-02-19 RX ORDER — FLUCONAZOLE 100 MG/1
200 TABLET ORAL DAILY
Status: DISCONTINUED | OUTPATIENT
Start: 2023-02-19 | End: 2023-02-20 | Stop reason: HOSPADM

## 2023-02-19 RX ORDER — FLUCONAZOLE 100 MG/1
100 TABLET ORAL DAILY
Status: DISCONTINUED | OUTPATIENT
Start: 2023-02-19 | End: 2023-02-19

## 2023-02-19 RX ORDER — FLUCONAZOLE 200 MG/1
200 TABLET ORAL DAILY
Qty: 19 TABLET | Refills: 0 | DISCHARGE
Start: 2023-02-20 | End: 2023-03-11

## 2023-02-19 RX ADMIN — QUETIAPINE FUMARATE 25 MG: 25 TABLET ORAL at 21:26

## 2023-02-19 RX ADMIN — TAMSULOSIN HYDROCHLORIDE 0.8 MG: 0.4 CAPSULE ORAL at 09:24

## 2023-02-19 RX ADMIN — PANTOPRAZOLE SODIUM 20 MG: 20 TABLET, DELAYED RELEASE ORAL at 06:02

## 2023-02-19 RX ADMIN — MIRTAZAPINE 7.5 MG: 7.5 TABLET ORAL at 21:26

## 2023-02-19 RX ADMIN — ATORVASTATIN CALCIUM 20 MG: 20 TABLET, FILM COATED ORAL at 21:26

## 2023-02-19 RX ADMIN — ALLOPURINOL 100 MG: 100 TABLET ORAL at 09:25

## 2023-02-19 RX ADMIN — Medication 5000 UNITS: at 09:25

## 2023-02-19 RX ADMIN — FLUCONAZOLE 200 MG: 100 TABLET ORAL at 09:25

## 2023-02-19 RX ADMIN — DOCUSATE SODIUM 100 MG: 100 CAPSULE, LIQUID FILLED ORAL at 09:25

## 2023-02-19 RX ADMIN — PREGABALIN 25 MG: 25 CAPSULE ORAL at 09:25

## 2023-02-19 RX ADMIN — ENOXAPARIN SODIUM 40 MG: 100 INJECTION SUBCUTANEOUS at 13:38

## 2023-02-19 RX ADMIN — METOPROLOL SUCCINATE 50 MG: 50 TABLET, EXTENDED RELEASE ORAL at 09:25

## 2023-02-19 RX ADMIN — SODIUM CHLORIDE, PRESERVATIVE FREE 10 ML: 5 INJECTION INTRAVENOUS at 21:26

## 2023-02-19 NOTE — DISCHARGE SUMMARY
Hospital Medicine Discharge Summary    Patient ID: Mariana Lai      Patient's PCP: Ynes Washington MD    Admit Date: 2/12/2023     Discharge Date:   2/19/2023    Admitting Provider: No admitting provider for patient encounter. Discharge Provider: Ava Kee MD     Discharge Diagnoses: Active Hospital Problems    Diagnosis     Obstruction of left ureter [N13.5]      Priority: Medium    Complicated UTI (urinary tract infection) [N39.0]      Priority: Medium    Urinary retention [R33.9]      Priority: Medium    Acute renal failure with acute tubular necrosis superimposed on chronic kidney disease, on chronic dialysis ([facility] [place] [address] [N17.0, N18.9, Z99.2]      Priority: Medium       The patient was seen and examined on day of discharge and this discharge summary is in conjunction with any daily progress note from day of discharge. Hospital Course:  28-year-old male with BPH/urinary retention with García in place, history of prior kidney stones, recent hospitalization with sepsis and pyelonephritis with acute renal failure requiring initiation of dialysis, presenting back from SNF with some lower abdominal discomfort and concern that García may not be draining properly, ultimately found to have obstructing left ureteral stone on CT abdomen/pelvis as well as UTI. Patient/family or apparently not agreeable to transfer for more urgent urologic intervention, however was evaluated by urology want service available on 2/14 to undergo cystoscopy with ureteral stent placement completed on 2/14. Treated on empiric antibiotics. Noted yeast from urine culture, fluconazole added and ID consulted. Patient remained in renal failure on initial evaluation, no improvement noted since last hospitalization, nephrology consulted, maintaining on dialysis. Post L ureteral stent placement his renal function showed sustained improvement.  His R vas cath was removed this admission and renal function remains stable improved. No plan for HD on discharge. Obstructive Uropathy with left ureteral calculus  -Urology team involved. - S/p cystoscopy with left ureteral stent placement, will need definitive treatment of stone on outpatient basis              - urology team report of wide open prostatic fossa and bladder neck without obstruction. They recommend to d/c grove and PRN straight cath for >300cc residuals. Cont flomax.               - grove removed - pt not voiding - cont PRN straight cath as per urology recs - plan to replace grove on discharge as per urology recs. Complicated UTI in setting of Grove catheter and left ureteral obstruction  - ID following  - On meropenem empirically - this was stopped per ID  - Fluconazole for fungal infection - recs for 21 days of PO treatment - switch to PO diflucan - this is scheduled to run till March 10th. Acute renal failure with ATN requiring dialysis  Ureteral obstruction  Urologic intervention as noted above  --Continue Flomax  --Continue dialysis as per nephrology              - pt Cr actually holding steady without HD              - had HD catheter removed 2/17 as per nephrology instruction. --Continue to monitor closely - renal function continues better. - no plan for HD on discharge - nephrology team signed off. Constipation  - Continue bowel regimen including Colace and MiraLAX          Mood disorder  Continue Seroquel and Remeron           Dementia. Supportive care. Physical Exam Performed:     /86   Pulse 96   Temp 97.7 °F (36.5 °C) (Temporal)   Resp 16   Ht 6' (1.829 m)   Wt 197 lb 9.6 oz (89.6 kg)   SpO2 95%   BMI 26.80 kg/m²       General appearance:  No apparent distress, appears stated age and cooperative. HEENT:  Normal cephalic, atraumatic without obvious deformity. Pupils equal, round, and reactive to light. Extra ocular muscles intact. Conjunctivae/corneas clear.   Neck: Supple, with full range of motion. No jugular venous distention. Trachea midline. Respiratory:  Normal respiratory effort. Clear to auscultation, bilaterally without Rales/Wheezes/Rhonchi. Cardiovascular:  Regular rate and rhythm with normal S1/S2 without murmurs, rubs or gallops. Abdomen: Soft, non-tender, non-distended with normal bowel sounds. Musculoskeletal:  No clubbing, cyanosis or edema bilaterally. Full range of motion without deformity. Skin: Skin color, texture, turgor normal.  No rashes or lesions. Neurologic:  Neurovascularly intact without any focal sensory/motor deficits. Cranial nerves: II-XII intact, grossly non-focal.  Psychiatric:  Alert and oriented, thought content appropriate, normal insight  Capillary Refill: Brisk,< 3 seconds   Peripheral Pulses: +2 palpable, equal bilaterally       Labs: For convenience and continuity at follow-up the following most recent labs are provided:      CBC:    Lab Results   Component Value Date/Time    WBC 5.3 02/16/2023 02:48 AM    HGB 10.1 02/16/2023 02:48 AM    HCT 31.6 02/16/2023 02:48 AM     02/16/2023 02:48 AM       Renal:    Lab Results   Component Value Date/Time     02/19/2023 03:48 AM    K 3.7 02/19/2023 03:48 AM     02/19/2023 03:48 AM    CO2 31 02/19/2023 03:48 AM    BUN 19 02/19/2023 03:48 AM    CREATININE 1.2 02/19/2023 03:48 AM    CALCIUM 8.1 02/19/2023 03:48 AM         Significant Diagnostic Studies    Radiology:   VL Extremity Venous Right         FL RETROGRADE PYELOGRAM W WO KUB   Final Result      CT ABDOMEN PELVIS WO CONTRAST Additional Contrast? None   Final Result   1 cm obstructing left mid ureteral calculus. Mild left hydronephrosis. Punctate nonobstructing right-sided nephrolithiasis. Soft tissue fullness at the pancreatic tail. Cannot exclude underlying mass. Correlation with contrast-enhanced pancreatic protocol MRI or CT is suggested a   possible.    Scattered hepatic hypoattenuating lesion, which are compatible with cysts while others are too small to accurately characterize. Additional findings per body of the report. Consults:     IP CONSULT TO NEPHROLOGY  IP CONSULT TO UROLOGY  IP CONSULT TO INFECTIOUS DISEASES  IP CONSULT TO VASCULAR SURGERY    Disposition:  ECF    Condition at Discharge: Stable    Discharge Instructions/Follow-up:  PCP 1 week     Code Status:  Full Code     Activity: activity as tolerated    Diet: regular diet      Discharge Medications:     Current Discharge Medication List             Details   fluconazole (DIFLUCAN) 200 MG tablet Take 1 tablet by mouth daily for 19 doses  Qty: 19 tablet, Refills: 0                Details   acetaminophen (TYLENOL) 325 MG tablet Take 650 mg by mouth every 6 hours as needed for Pain      ondansetron (ZOFRAN) 4 MG tablet Take 4 mg by mouth every 6 hours as needed for Nausea or Vomiting      pregabalin (LYRICA) 25 MG capsule Take 1 capsule by mouth daily for 30 days.  Max Daily Amount: 25 mg  Qty: 30 capsule, Refills: 0    Associated Diagnoses: Idiopathic peripheral neuropathy      mirtazapine (REMERON) 7.5 MG tablet Take 1 tablet by mouth nightly  Qty: 30 tablet, Refills: 3      QUEtiapine (SEROQUEL) 25 MG tablet Take 1 tablet by mouth nightly  Qty: 60 tablet, Refills: 3      docusate sodium (COLACE, DULCOLAX) 100 MG CAPS Take 100 mg by mouth daily  Qty: 30 capsule, Refills: 1      polyethylene glycol (GLYCOLAX) 17 g packet Take 17 g by mouth 2 times daily  Qty: 527 g, Refills: 1      calcitRIOL (ROCALTROL) 0.25 MCG capsule Take 1 capsule by mouth daily  Qty: 30 capsule, Refills: 3      simethicone (MYLICON) 80 MG chewable tablet Take 1 tablet by mouth every 6 hours as needed for Flatulence  Qty: 180 tablet, Refills: 3      aspirin 81 MG chewable tablet Take 1 tablet by mouth daily  Qty: 30 tablet, Refills: 3      tamsulosin (FLOMAX) 0.4 MG capsule Take 1 capsule by mouth daily  Qty: 90 capsule, Refills: 1    Associated Diagnoses: Benign prostatic hyperplasia, unspecified whether lower urinary tract symptoms present      metoprolol succinate (TOPROL XL) 50 MG extended release tablet Take 1 tablet by mouth once daily  Qty: 90 tablet, Refills: 0    Associated Diagnoses: Essential hypertension      allopurinol (ZYLOPRIM) 100 MG tablet Take 1 tablet by mouth daily  Qty: 90 tablet, Refills: 1    Associated Diagnoses: Gout, unspecified cause, unspecified chronicity, unspecified site      atorvastatin (LIPITOR) 20 MG tablet Take 1 tablet by mouth daily  Qty: 90 tablet, Refills: 3    Associated Diagnoses: Mixed hyperlipidemia      omeprazole (PRILOSEC) 20 MG delayed release capsule Take 1 capsule by mouth daily  Qty: 90 capsule, Refills: 3    Associated Diagnoses: Gastroesophageal reflux disease without esophagitis      vitamin D (ERGOCALCIFEROL) 1.25 MG (39954 UT) CAPS capsule Take 1 capsule by mouth once a week  Qty: 5 capsule, Refills: 3    Associated Diagnoses: Vitamin D deficiency             Time Spent on discharge: 30min in the examination, evaluation, counseling and review of medications and discharge plan. Signed:      Julio Cesar Harrington MD   2/19/2023      Thank you Beba Saha MD for the opportunity to be involved in this patient's care. If you have any questions or concerns, please feel free to contact me at [phone].

## 2023-02-19 NOTE — PROGRESS NOTES
Urology Attending Progress Note      Subjective: No complaints. Unable to void. On intermittent cath by nursing staff. No fever or chills. Tolerating diet. No pain. Has left ureteral stent and a stone. Seen by ID getting treated by oral diflucan for fungal uti. Vitals:  /86   Pulse 96   Temp 97.7 °F (36.5 °C) (Temporal)   Resp 16   Ht 6' (1.829 m)   Wt 197 lb 9.6 oz (89.6 kg)   SpO2 95%   BMI 26.80 kg/m²   Temp  Av.5 °F (36.4 °C)  Min: 97.2 °F (36.2 °C)  Max: 97.7 °F (36.5 °C)    Intake/Output Summary (Last 24 hours) at 2023 1015  Last data filed at 2023 0925  Gross per 24 hour   Intake 750 ml   Output 925 ml   Net -175 ml       Exam: Patient in no distress. Neck no mass or tenderness. Abdomen: soft, non-tender. Bladder not palpable  External genitalia normal  PVRs as per nurse 200 to over 500mls. Labs:  WBC:    Lab Results   Component Value Date/Time    WBC 5.3 2023 02:48 AM     Hemoglobin/Hematocrit:    Lab Results   Component Value Date/Time    HGB 10.1 2023 02:48 AM    HCT 31.6 2023 02:48 AM     BMP:    Lab Results   Component Value Date/Time     2023 03:48 AM    K 3.7 2023 03:48 AM     2023 03:48 AM    CO2 31 2023 03:48 AM    BUN 19 2023 03:48 AM    LABALBU 3.0 2023 09:42 PM    CREATININE 1.2 2023 03:48 AM    CALCIUM 8.1 2023 03:48 AM    GFRAA >59 2022 03:38 PM    LABGLOM 60 2023 03:48 AM     PT/INR:    Lab Results   Component Value Date/Time    PROTIME 14.2 2022 03:38 PM    INR 1.10 2022 03:38 PM     PTT:    Lab Results   Component Value Date/Time    APTT 31.2 2022 03:38 PM   [APTT          Impression/Plan: Patient with history of left ureteral stone has a ureteral stent. Awaiting definitive stone treatment after treating his fungal UTI with diflucan. Plan for ureteral stone procedure by Dr. Terri Bingham in approx 2 weeks. On oral diflucan.  Urine to be sent for culture as coming for stone surgery. ID on board. To contact Dr. Alycia Mccarty office to obtain date for definitive stone procedure. History of urinary retention (chronic). Patient has previous BPH surgeries. On intermittent catheterizations, unable to void. To place a grove indwelling catheter before going to assisted living facility. From urology point of view can be discharged.      Debora Mccabe MD

## 2023-02-19 NOTE — PLAN OF CARE
Problem: Discharge Planning  Goal: Discharge to home or other facility with appropriate resources  2/19/2023 1143 by Mae Kennedy LPN  Outcome: Progressing  Flowsheets (Taken 2/19/2023 1137)  Discharge to home or other facility with appropriate resources:   Identify barriers to discharge with patient and caregiver   Arrange for needed discharge resources and transportation as appropriate   Identify discharge learning needs (meds, wound care, etc)   Refer to discharge planning if patient needs post-hospital services based on physician order or complex needs related to functional status, cognitive ability or social support system  2/19/2023 0502 by Rosabel Homans, RN  Outcome: Raf Jamee (Taken 2/18/2023 2040)  Discharge to home or other facility with appropriate resources: Identify barriers to discharge with patient and caregiver     Problem: Safety - Adult  Goal: Free from fall injury  2/19/2023 1143 by Mae Kennedy LPN  Outcome: Progressing  2/19/2023 0502 by Rosabel Homans, RN  Outcome: Progressing  Flowsheets (Taken 2/19/2023 0501)  Free From Fall Injury: Instruct family/caregiver on patient safety     Problem: ABCDS Injury Assessment  Goal: Absence of physical injury  2/19/2023 1143 by Mae Kennedy LPN  Outcome: Progressing  2/19/2023 0502 by Rosabel Homans, RN  Outcome: Progressing  Flowsheets (Taken 2/19/2023 0501)  Absence of Physical Injury: Implement safety measures based on patient assessment     Problem: Neurosensory - Adult  Goal: Achieves stable or improved neurological status  2/19/2023 1143 by Mae Kennedy LPN  Outcome: Progressing  Flowsheets (Taken 2/19/2023 0925)  Achieves stable or improved neurological status:   Assess for and report changes in neurological status   Initiate measures to prevent increased intracranial pressure   Maintain blood pressure and fluid volume within ordered parameters to optimize cerebral perfusion and minimize risk of hemorrhage  2/19/2023 0502 by Magali Reinoso RN  Outcome: Progressing  Flowsheets (Taken 2/18/2023 2040)  Achieves stable or improved neurological status: Assess for and report changes in neurological status  Goal: Achieves maximal functionality and self care  2/19/2023 1143 by Laura Castillo LPN  Outcome: Progressing  Flowsheets (Taken 2/19/2023 0925)  Achieves maximal functionality and self care:   Monitor swallowing and airway patency with patient fatigue and changes in neurological status   Encourage and assist patient to increase activity and self care with guidance from physical therapy/occupational therapy   Encourage visually impaired, hearing impaired and aphasic patients to use assistive/communication devices  2/19/2023 0502 by Magali Reinoso RN  Outcome: Progressing  Flowsheets (Taken 2/18/2023 2040)  Achieves maximal functionality and self care: Monitor swallowing and airway patency with patient fatigue and changes in neurological status     Problem: Respiratory - Adult  Goal: Achieves optimal ventilation and oxygenation  2/19/2023 1143 by Laura Castillo LPN  Outcome: Progressing  Flowsheets (Taken 2/19/2023 0925)  Achieves optimal ventilation and oxygenation:   Assess for changes in respiratory status   Assess for changes in mentation and behavior  2/19/2023 0502 by Magali Reinoso RN  Outcome: Progressing  Flowsheets (Taken 2/18/2023 2040)  Achieves optimal ventilation and oxygenation: Assess for changes in respiratory status     Problem: Cardiovascular - Adult  Goal: Maintains optimal cardiac output and hemodynamic stability  2/19/2023 1143 by Laura Castillo LPN  Outcome: Progressing  2/19/2023 0502 by Magali Reinoso RN  Outcome: Progressing  Flowsheets (Taken 2/18/2023 2040)  Maintains optimal cardiac output and hemodynamic stability: Monitor blood pressure and heart rate  Goal: Absence of cardiac dysrhythmias or at baseline  2/19/2023 1143 by Laura Castillo LPN  Outcome: Progressing  2/19/2023 0502 by Robles Weaver Sam Noland RN  Outcome: Progressing  Flowsheets (Taken 2/18/2023 2040)  Absence of cardiac dysrhythmias or at baseline: Monitor cardiac rate and rhythm     Problem: Skin/Tissue Integrity - Adult  Goal: Skin integrity remains intact  2/19/2023 1143 by Dana Aburto LPN  Outcome: Progressing  Flowsheets (Taken 2/19/2023 0925)  Skin Integrity Remains Intact: Monitor for areas of redness and/or skin breakdown  2/19/2023 0502 by Eric Agustin RN  Outcome: Progressing  Flowsheets  Taken 2/19/2023 0501  Skin Integrity Remains Intact: Monitor for areas of redness and/or skin breakdown  Taken 2/18/2023 2040  Skin Integrity Remains Intact: Monitor for areas of redness and/or skin breakdown  Goal: Oral mucous membranes remain intact  2/19/2023 1143 by Dana Aburto LPN  Outcome: Progressing  Flowsheets (Taken 2/19/2023 0925)  Oral Mucous Membranes Remain Intact:   Assess oral mucosa and hygiene practices   Implement preventative oral hygiene regimen   Implement oral medicated treatments as ordered  2/19/2023 0502 by Eric Agustin RN  Outcome: Progressing  Flowsheets  Taken 2/19/2023 0501  Oral Mucous Membranes Remain Intact: Assess oral mucosa and hygiene practices  Taken 2/18/2023 2040  Oral Mucous Membranes Remain Intact: Assess oral mucosa and hygiene practices     Problem: Musculoskeletal - Adult  Goal: Return mobility to safest level of function  2/19/2023 1143 by Dana Aburto LPN  Outcome: Progressing  Flowsheets (Taken 2/19/2023 0925)  Return Mobility to Safest Level of Function:   Assess patient stability and activity tolerance for standing, transferring and ambulating with or without assistive devices   Assist with transfers and ambulation using safe patient handling equipment as needed   Ensure adequate protection for wounds/incisions during mobilization  2/19/2023 0502 by Eric Agustin RN  Outcome: Progressing  Flowsheets (Taken 2/18/2023 2040)  Return Mobility to Safest Level of Function: Assess patient stability and activity tolerance for standing, transferring and ambulating with or without assistive devices  Goal: Maintain proper alignment of affected body part  2/19/2023 1143 by Cristy Stevens LPN  Outcome: Progressing  Flowsheets (Taken 2/19/2023 0925)  Maintain proper alignment of affected body part:   Support and protect limb and body alignment per provider's orders   Instruct and reinforce with patient and family use of appropriate assistive device and precautions (e.g. spinal or hip dislocation precautions)  2/19/2023 0502 by Annie Hamilton RN  Outcome: Progressing  Flowsheets (Taken 2/18/2023 2040)  Maintain proper alignment of affected body part: Support and protect limb and body alignment per provider's orders  Goal: Return ADL status to a safe level of function  2/19/2023 1143 by Cristy Stevens LPN  Outcome: Progressing  Flowsheets (Taken 2/19/2023 0925)  Return ADL Status to a Safe Level of Function:   Administer medication as ordered   Assess activities of daily living deficits and provide assistive devices as needed   Obtain physical therapy/occupational therapy consults as needed  2/19/2023 0502 by Annie Hamilton RN  Outcome: Progressing  Flowsheets (Taken 2/18/2023 2040)  Return ADL Status to a Safe Level of Function: Administer medication as ordered     Problem: Gastrointestinal - Adult  Goal: Minimal or absence of nausea and vomiting  2/19/2023 1143 by Cristy Stevens LPN  Outcome: Progressing  Flowsheets (Taken 2/19/2023 0925)  Minimal or absence of nausea and vomiting:   Administer IV fluids as ordered to ensure adequate hydration   Maintain NPO status until nausea and vomiting are resolved  2/19/2023 0502 by Annie Hamilton RN  Outcome: Progressing  Flowsheets (Taken 2/18/2023 2040)  Minimal or absence of nausea and vomiting: Advance diet as tolerated, if ordered  Goal: Maintains or returns to baseline bowel function  2/19/2023 1143 by Cristy Stevens LPN  Outcome: Progressing  Flowsheets (Taken 2/19/2023 0925)  Maintains or returns to baseline bowel function:   Assess bowel function   Encourage oral fluids to ensure adequate hydration  2/19/2023 0502 by Laura Cohen RN  Outcome: Progressing  Flowsheets (Taken 2/18/2023 2040)  Maintains or returns to baseline bowel function: Assess bowel function  Goal: Maintains adequate nutritional intake  2/19/2023 1143 by Alexey Frausto LPN  Outcome: Progressing  Flowsheets (Taken 2/19/2023 0925)  Maintains adequate nutritional intake:   Monitor percentage of each meal consumed   Identify factors contributing to decreased intake, treat as appropriate  2/19/2023 0502 by Laura Cohen RN  Outcome: Progressing  Flowsheets (Taken 2/18/2023 2040)  Maintains adequate nutritional intake: Monitor percentage of each meal consumed     Problem: Genitourinary - Adult  Goal: Absence of urinary retention  2/19/2023 1143 by Alexey Frausto LPN  Outcome: Progressing  2/19/2023 0502 by Laura Cohen RN  Outcome: Progressing  Flowsheets (Taken 2/18/2023 2040)  Absence of urinary retention: Assess patients ability to void and empty bladder  Goal: Urinary catheter remains patent  2/19/2023 1143 by Alexey Frausto LPN  Outcome: Progressing  2/19/2023 0502 by Laura Cohen RN  Outcome: Progressing  Flowsheets (Taken 2/18/2023 2040)  Urinary catheter remains patent: Assess patency of urinary catheter     Problem: Infection - Adult  Goal: Absence of infection at discharge  2/19/2023 1143 by Alexey Frausto LPN  Outcome: Progressing  [address] (Taken 2/19/2023 1137)  Absence of infection at discharge:   Assess and monitor for signs and symptoms of infection   Monitor lab/diagnostic results  2/19/2023 0502 by Laura Cohen RN  Outcome: Progressing  Flowsheets (Taken 2/18/2023 2040)  Absence of infection at discharge: Assess and monitor for signs and symptoms of infection  Goal: Absence of infection during hospitalization  2/19/2023 1143 by Saundra Julian Chin Rogel LPN  Outcome: Progressing  Flowsheets (Taken 2/19/2023 1137)  Absence of infection during hospitalization:   Assess and monitor for signs and symptoms of infection   Monitor lab/diagnostic results  2/19/2023 0502 by Steve Montejo RN  Outcome: Progressing  Flowsheets (Taken 2/18/2023 2040)  Absence of infection during hospitalization: Assess and monitor for signs and symptoms of infection     Problem: Metabolic/Fluid and Electrolytes - Adult  Goal: Electrolytes maintained within normal limits  2/19/2023 1143 by Jonas Alegria LPN  Outcome: Progressing  Flowsheets (Taken 2/19/2023 1137)  Electrolytes maintained within normal limits:   Monitor labs and assess patient for signs and symptoms of electrolyte imbalances   Administer electrolyte replacement as ordered  2/19/2023 0502 by Steve Montejo RN  Outcome: Progressing  Flowsheets (Taken 2/18/2023 2040)  Electrolytes maintained within normal limits: Monitor labs and assess patient for signs and symptoms of electrolyte imbalances  Goal: Hemodynamic stability and optimal renal function maintained  2/19/2023 1143 by Jonas Alegria LPN  Outcome: Progressing  Flowsheets (Taken 2/19/2023 1137)  Hemodynamic stability and optimal renal function maintained:   Monitor labs and assess for signs and symptoms of volume excess or deficit   Monitor intake, output and patient weight  2/19/2023 0502 by Steve Montejo RN  Outcome: Progressing  Flowsheets (Taken 2/18/2023 2040)  Hemodynamic stability and optimal renal function maintained: Monitor labs and assess for signs and symptoms of volume excess or deficit     Problem: Hematologic - Adult  Goal: Maintains hematologic stability  2/19/2023 1143 by Jonas Alegria LPN  Outcome: Progressing  Flowsheets (Taken 2/19/2023 1137)  Maintains hematologic stability:   Assess for signs and symptoms of bleeding or hemorrhage   Monitor labs for bleeding or clotting disorders  2/19/2023 0502 by Steve Montejo RN  Outcome: Progressing  Flowsheets (Taken 2/18/2023 2040)  Maintains hematologic stability: Assess for signs and symptoms of bleeding or hemorrhage     Problem: Pain  Goal: Verbalizes/displays adequate comfort level or baseline comfort level  2/19/2023 1143 by Cici Stubbs LPN  Outcome: Progressing  2/19/2023 0502 by Taiwo Delgado RN  Outcome: Progressing     Problem: Skin/Tissue Integrity  Goal: Absence of new skin breakdown  Description: 1. Monitor for areas of redness and/or skin breakdown  2. Assess vascular access sites hourly  3. Every 4-6 hours minimum:  Change oxygen saturation probe site  4. Every 4-6 hours:  If on nasal continuous positive airway pressure, respiratory therapy assess nares and determine need for appliance change or resting period.   2/19/2023 1143 by Cici Stubbs LPN  Outcome: Progressing  2/19/2023 0502 by Taiwo Delgado RN  Outcome: Progressing     Problem: Nutrition Deficit:  Goal: Optimize nutritional status  2/19/2023 1143 by Cici Stubbs LPN  Outcome: Progressing  2/19/2023 0502 by Taiwo Delgado RN  Outcome: Progressing     Problem: Chronic Conditions and Co-morbidities  Goal: Patient's chronic conditions and co-morbidity symptoms are monitored and maintained or improved  2/19/2023 1143 by Cici Stubbs LPN  Outcome: Progressing  Flowsheets (Taken 2/19/2023 1137)  Care Plan - Patient's Chronic Conditions and Co-Morbidity Symptoms are Monitored and Maintained or Improved:   Monitor and assess patient's chronic conditions and comorbid symptoms for stability, deterioration, or improvement   Collaborate with multidisciplinary team to address chronic and comorbid conditions and prevent exacerbation or deterioration  2/19/2023 0502 by Taiwo Delgado RN  Outcome: Progressing  Flowsheets (Taken 2/18/2023 2040)  Care Plan - Patient's Chronic Conditions and Co-Morbidity Symptoms are Monitored and Maintained or Improved: Monitor and assess patient's chronic conditions and comorbid symptoms for stability, deterioration, or improvement

## 2023-02-19 NOTE — PROGRESS NOTES
Renal Progress Note    Thank you to requesting provider: Dr Gisela Velez, for asking us to see Bethany Robbins    Reason for consultation:  ALEXANDRO    Chief Complaint:  Abdominal pain, urine retention. History of Presenting Illness      Mr. Bethany Robbins is a pleasant 79year old man who presented with abdominal pain and was found with obstructive 1 cm stone in mid left ureter with mild left hydronephrosis. He has a history of prior kidney stones. He has recently started dialysis for ALEXANDRO after pyelonephritis and sepsis. He is s/p left ureteral stent placement today by Dr Amber Nation. Patient is currently comfortable. He is non-oliguric. His repeat serum creatinine was 1.6. He is growing yeast in his urine. His grove catheter was removed on 2/16 but has since required several intermittent bladder catheterization for urinary retention. Vascular surgery has removed his Permcath on 2/17. Patient continues to experience dysuria.      Past Medical/Surgical History      Active Ambulatory Problems     Diagnosis Date Noted    Mixed hyperlipidemia 08/16/2017    Essential hypertension 08/16/2017    Restless legs syndrome 08/16/2017    Gastroesophageal reflux disease without esophagitis 08/16/2017    Peripheral neuropathic pain 08/16/2017    Primary osteoarthritis involving multiple joints 08/16/2017    Gout 08/16/2017    Benign non-nodular prostatic hyperplasia with lower urinary tract symptoms 08/16/2017    Elevated PSA 08/16/2017    Idiopathic peripheral neuropathy 02/19/2018    Chronic idiopathic constipation 08/20/2018    Prediabetes 10/14/2022    Pyelonephritis of left kidney 01/23/2023    Renal stone 01/23/2023    Visual disturbance 01/31/2023    Acute renal failure with acute tubular necrosis superimposed on chronic kidney disease, on chronic dialysis ([address] 02/01/2023    Pyelonephritis 02/02/2023    Severe malnutrition ([address] 02/03/2023     Resolved Ambulatory Problems     Diagnosis Date Noted    No Resolved Ambulatory Problems     No Additional Past Medical History         Review of Systems     Constitutional:  No weight loss, no fever/chills  Eyes:  No eye pain, no eye redness  Cardiovascular:  No chest pain, no worsening of edema  Respiratory:  No hemoptysis, no stidor  Gastrointestinal:  No blood in stool, no n/v, no diarrhea  Genitoruinary:  No hematuria, + difficulty with urination  Musculoskeletal:  No joint swelling, no redness  Integumentary:  No Rash, no itching  Neurological:  No focal weakness, No new sensory deficit  Psychiatric:  No depression, no confusion  Endocrine:  No polyuria, no polydipsia       Medications        Current Facility-Administered Medications:     fluconazole (DIFLUCAN) tablet 200 mg, 200 mg, Oral, Daily, Keri Lanes, MD, 200 mg at 02/19/23 0925    enoxaparin (LOVENOX) injection 40 mg, 40 mg, SubCUTAneous, Daily, Keri Lanes, MD, 40 mg at 02/18/23 1410    melatonin disintegrating tablet 5 mg, 5 mg, Oral, Nightly PRN, Coco Irene MD, 5 mg at 02/18/23 2024    tamsulosin (FLOMAX) capsule 0.8 mg, 0.8 mg, Oral, Daily, Stephany Harper MD, 0.8 mg at 02/19/23 3977    sodium chloride flush 0.9 % injection 5-40 mL, 5-40 mL, IntraVENous, 2 times per day, Stephany Harper MD, 10 mL at 02/17/23 1121    sodium chloride flush 0.9 % injection 5-40 mL, 5-40 mL, IntraVENous, PRN, Stephany Harper MD    0.9 % sodium chloride infusion, , IntraVENous, PRN, Stephany Harper MD, Last Rate: 15 mL/hr at 02/16/23 0849, New Bag at 02/16/23 0849    ondansetron (ZOFRAN-ODT) disintegrating tablet 4 mg, 4 mg, Oral, Q8H PRN **OR** ondansetron (ZOFRAN) injection 4 mg, 4 mg, IntraVENous, Q6H PRN, Stephany Harper MD    [Held by provider] aspirin chewable tablet 81 mg, 81 mg, Oral, Daily, Coco Irene MD    atorvastatin (LIPITOR) tablet 20 mg, 20 mg, Oral, Nightly, Stephany Harper MD, 20 mg at 02/18/23 2024    docusate sodium (COLACE) capsule 100 mg, 100 mg, Oral, Daily, Stephany Harper MD, 100 mg at 23    metoprolol succinate (TOPROL XL) extended release tablet 50 mg, 50 mg, Oral, Daily, Carlos Hernandez MD, 50 mg at 23    pregabalin (LYRICA) capsule 25 mg, 25 mg, Oral, Daily, Carlos Hernandez MD, 25 mg at 23    simethicone (MYLICON) chewable tablet 80 mg, 80 mg, Oral, Q6H PRN, Carlos Hernandez MD    QUEtiapine (SEROQUEL) tablet 25 mg, 25 mg, Oral, Nightly, Carlos Hernandez MD, 25 mg at 23    vitamin D (CHOLECALCIFEROL) capsule 5,000 Units, 5,000 Units, Oral, Daily, Carlos Hernandez MD, 5,000 Units at 23    allopurinol (ZYLOPRIM) tablet 100 mg, 100 mg, Oral, Daily, Carlos Hernandez MD, 100 mg at 23    pantoprazole (PROTONIX) tablet 20 mg, 20 mg, Oral, QAM AC, Carlos Hernandez MD, 20 mg at 23    mirtazapine (REMERON) tablet 7.5 mg, 7.5 mg, Oral, Nightly, Carlos Hernandez MD, 7.5 mg at 23    polyethylene glycol (GLYCOLAX) packet 17 g, 17 g, Oral, BID, Carlos Hernandez MD, 17 g at 23    HYDROmorphone HCl PF (DILAUDID) injection 0.5 mg, 0.5 mg, IntraVENous, Q3H PRN, Carlos Hernandez MD    HYDROcodone-acetaminophen [facility])  MG per tablet 1 tablet, 1 tablet, Oral, Q4H PRN, Carlos Hernandez MD, 1 tablet at 23  Outpatient Medications Marked as Taking for the 23 encounter [facility] Encounter)   Medication Sig Dispense Refill    acetaminophen (TYLENOL) 325 MG tablet Take 650 mg by mouth every 6 hours as needed for Pain      ondansetron (ZOFRAN) 4 MG tablet Take 4 mg by mouth every 6 hours as needed for Nausea or Vomiting      traMADol (ULTRAM) 50 MG tablet Take 50 mg by mouth every 8 hours as needed for Pain.          Allergies   Codeine    Family History       Family History   Problem Relation Age of Onset    Uterine Cancer Mother          at age 52    Coronary Art Dis Father         MI at 62, smoker    COPD Sister         smoker    COPD Sister         smoker Heart Failure Sister     COPD Sister         smoker    Stroke Brother         smoker    Coronary Art Dis Brother         open heart surgry, MI at 80- yo    No Known Problems Son     No Known Problems Daughter      Family history negative for kidney disease. 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     Employer: RETIRED     Comment: retired    Tobacco Use    Smoking status: Former     Packs/day: 3.00     Years: 35.00     Pack years: 105.00     Types: Cigarettes, Pipe     Start date:      Quit date:      Years since quittin.1    Smokeless tobacco: Never   Vaping Use    Vaping Use: Never used   Substance and Sexual Activity    Alcohol use: Not Currently     Comment: \"not vbery much\", \"beer every 15 years\"    Drug use: Never    Sexual activity: Yes     Partners: Female     Comment: wife, has 2 kids   Social History Narrative    CODE STATUS: Full Code    HEALTH CARE PROXY: his children    1.) Camille Durand, his daughter, [phone]    2.) Marylou Cedillo, his son, [phone]    AMBULATES: independently, sometimes with cane    DOMICILED: has 3 story home but he has an elevator     Social Determinants of Health     Financial Resource Strain: Low Risk     Difficulty of Paying Living Expenses: Not hard at all   Food Insecurity: No Food Insecurity    Worried About [address] in the Last Year: Never true    [address] in the Last Year: Never true   Transportation Needs: No Transportation Needs    Lack of Transportation (Medical): No    Lack of Transportation (Non-Medical):  No   Physical Activity: Insufficiently Active    Days of Exercise per Week: 2 days    Minutes of Exercise per Session: 10 min   Stress: No Stress Concern Present Feeling of Stress : Not at all   Social Connections: Moderately Isolated    Frequency of Communication with Friends and Family: More than three times a week    Frequency of Social Gatherings with Friends and Family: Twice a week    Attends Anab[employer]ist Services: 1 to 4 times per year    Active Member of "[employer]" Group or Organizations: No    Attends Club or Organization Meetings: Never    Marital Status:    Intimate Partner Violence: Not At Risk    Fear of Current or Ex-Partner: No    Emotionally Abused: No    Physically Abused: No    Sexually Abused: No   Housing Stability: Low Risk     Unable to Pay for Housing in the Last Year: No    Number of Places Lived in the Last Year: 2    Unstable Housing in the Last Year: No       Physical Exam     Blood pressure 134/86, pulse 96, temperature 97.7 °F (36.5 °C), temperature source Temporal, resp. rate 16, height 6' (1.829 m), weight 197 lb 9.6 oz (89.6 kg), SpO2 95 %. General:  NAD, A+Ox3, ill-appearing, normal body habitus  HEENT:  atraumatic, normocephalic  Neck:  no masses, no JVD  Chest:  CTAB, good respiratory effort, good air movement  CV:  RRR, soft systolic murmur    Abdomen:  NTND, soft, +BS, no hepatosplenomegaly  Extremities:  No peripheral edema  Neurological:  Moving all four extremities   Lymphatics:  No palpable lymph nodes   Skin:  No rash, no jaundice  Psychiatric:  Normal insight and judgement, good recall    Data     No results for input(s): WBC, HGB, HCT, MCV, PLT in the last 72 hours.     Recent Labs     02/17/23  0207 02/18/23  0441 02/19/23  0348    141 141   K 3.4* 3.4* 3.7    102 102   CO2 27 29 31*   GLUCOSE 98 92 98   MG 1.6 1.8  --    BUN 25* 21 19   CREATININE 1.5* 1.2 1.2   LABGLOM 46* 60* 60*         Assessment:  ALEXANDRO- ATN and obstruction  Recent pyelonephritis and sepsis  Abdominal pain  Left ureter stone with mild left hydronephrosis  Hypokalemia  CKD stage 3a  Urine retention  Urinary fungal infection      Plan:  Follow up labs for now.  Continue diflucan. Follow up with urology concerning outcomes of urinary retention and possible need for García cath. Mg and K levels were ok. Will sign off, recall nephology as needed.

## 2023-02-20 ENCOUNTER — TELEPHONE (OUTPATIENT)
Dept: UROLOGY | Age: 83
End: 2023-02-20

## 2023-02-20 VITALS
HEART RATE: 88 BPM | HEIGHT: 72 IN | TEMPERATURE: 97.2 F | SYSTOLIC BLOOD PRESSURE: 153 MMHG | BODY MASS INDEX: 27.02 KG/M2 | DIASTOLIC BLOOD PRESSURE: 90 MMHG | WEIGHT: 199.5 LBS | RESPIRATION RATE: 17 BRPM | OXYGEN SATURATION: 94 %

## 2023-02-20 LAB
ANION GAP SERPL CALCULATED.3IONS-SCNC: 10 MMOL/L (ref 7–19)
BUN BLDV-MCNC: 18 MG/DL (ref 8–23)
CALCIUM SERPL-MCNC: 8.7 MG/DL (ref 8.8–10.2)
CHLORIDE BLD-SCNC: 100 MMOL/L (ref 98–111)
CO2: 30 MMOL/L (ref 22–29)
CREAT SERPL-MCNC: 1.3 MG/DL (ref 0.5–1.2)
GFR SERPL CREATININE-BSD FRML MDRD: 54 ML/MIN/{1.73_M2}
GLUCOSE BLD-MCNC: 90 MG/DL (ref 74–109)
POTASSIUM REFLEX MAGNESIUM: 3.9 MMOL/L (ref 3.5–5)
SARS-COV-2, NAAT: NOT DETECTED
SODIUM BLD-SCNC: 140 MMOL/L (ref 136–145)

## 2023-02-20 PROCEDURE — 80048 BASIC METABOLIC PNL TOTAL CA: CPT

## 2023-02-20 PROCEDURE — 2580000003 HC RX 258: Performed by: UROLOGY

## 2023-02-20 PROCEDURE — 94760 N-INVAS EAR/PLS OXIMETRY 1: CPT

## 2023-02-20 PROCEDURE — 6370000000 HC RX 637 (ALT 250 FOR IP): Performed by: UROLOGY

## 2023-02-20 PROCEDURE — 93971 EXTREMITY STUDY: CPT | Performed by: SURGERY

## 2023-02-20 PROCEDURE — 6370000000 HC RX 637 (ALT 250 FOR IP): Performed by: INTERNAL MEDICINE

## 2023-02-20 PROCEDURE — 36415 COLL VENOUS BLD VENIPUNCTURE: CPT

## 2023-02-20 PROCEDURE — 87635 SARS-COV-2 COVID-19 AMP PRB: CPT

## 2023-02-20 RX ORDER — PREGABALIN 25 MG/1
25 CAPSULE ORAL DAILY
Qty: 30 CAPSULE | Refills: 0 | Status: ON HOLD | OUTPATIENT
Start: 2023-02-20 | End: 2023-03-22

## 2023-02-20 RX ADMIN — TAMSULOSIN HYDROCHLORIDE 0.8 MG: 0.4 CAPSULE ORAL at 08:52

## 2023-02-20 RX ADMIN — METOPROLOL SUCCINATE 50 MG: 50 TABLET, EXTENDED RELEASE ORAL at 08:52

## 2023-02-20 RX ADMIN — PREGABALIN 25 MG: 25 CAPSULE ORAL at 08:52

## 2023-02-20 RX ADMIN — PANTOPRAZOLE SODIUM 20 MG: 20 TABLET, DELAYED RELEASE ORAL at 05:57

## 2023-02-20 RX ADMIN — DOCUSATE SODIUM 100 MG: 100 CAPSULE, LIQUID FILLED ORAL at 08:52

## 2023-02-20 RX ADMIN — Medication 5000 UNITS: at 08:52

## 2023-02-20 RX ADMIN — FLUCONAZOLE 200 MG: 100 TABLET ORAL at 08:52

## 2023-02-20 RX ADMIN — ALLOPURINOL 100 MG: 100 TABLET ORAL at 08:52

## 2023-02-20 RX ADMIN — SODIUM CHLORIDE, PRESERVATIVE FREE 10 ML: 5 INJECTION INTRAVENOUS at 08:53

## 2023-02-20 NOTE — CARE COORDINATION
2nd IMM Letter given to patient. Reviewed, discussed, answered questions, and signed by patient. Declined to stay 4 hours prior to discharge. Signed copy placed in patient's chart.      02/20/23 1053   IMM Letter   IMM Letter given to Patient/Family/Significant other/Guardian/POA/by: given to patient by Elsie Pacheco RN BSN    IMM Letter date given: 02/20/23   IMM Letter time given: 1035   Electronically signed by Jose De Jesus Lacey RN, BSN on 2/20/2023 at 10:54 AM

## 2023-02-20 NOTE — PLAN OF CARE
Problem: Discharge Planning  Goal: Discharge to home or other facility with appropriate resources  2/20/2023 1213 by Claudia Callahan RN  Outcome: Completed  2/20/2023 1211 by Claudia Callahan RN  Outcome: Progressing  2/20/2023 0119 by Silvia Loyd RN  Outcome: Progressing  Flowsheets (Taken 2/19/2023 2030)  Discharge to home or other facility with appropriate resources: Identify barriers to discharge with patient and caregiver     Problem: Safety - Adult  Goal: Free from fall injury  2/20/2023 1213 by Claudia Callahan RN  Outcome: Completed  2/20/2023 1211 by Claudia Callahan RN  Outcome: Progressing  2/20/2023 0119 by Silvia Loyd RN  Outcome: Progressing     Problem: ABCDS Injury Assessment  Goal: Absence of physical injury  2/20/2023 1213 by Claudia Callahan RN  Outcome: Completed  2/20/2023 1211 by Claudia Callahan RN  Outcome: Progressing  2/20/2023 0119 by Silvia Loyd RN  Outcome: Progressing     Problem: Neurosensory - Adult  Goal: Achieves stable or improved neurological status  2/20/2023 1213 by Claudia Callahan RN  Outcome: Completed  2/20/2023 1211 by Claudia Callahan RN  Outcome: Progressing  2/20/2023 0119 by Silvia Loyd RN  Outcome: Progressing  Flowsheets (Taken 2/19/2023 2030)  Achieves stable or improved neurological status: Assess for and report changes in neurological status  Goal: Achieves maximal functionality and self care  2/20/2023 1213 by Claudia Callahan RN  Outcome: Completed  2/20/2023 1211 by Claudia Callahan RN  Outcome: Progressing  2/20/2023 0119 by Silvia Loyd RN  Outcome: Progressing  Flowsheets (Taken 2/19/2023 2030)  Achieves maximal functionality and self care: Monitor swallowing and airway patency with patient fatigue and changes in neurological status     Problem: Respiratory - Adult  Goal: Achieves optimal ventilation and oxygenation  2/20/2023 1213 by Claudia Callahan RN  Outcome: Completed  2/20/2023 1211 by Claudia Callahan RN  Outcome: Progressing  2/20/2023 0119 by Afua Motley RN  Outcome: Progressing     Problem: Cardiovascular - Adult  Goal: Maintains optimal cardiac output and hemodynamic stability  2/20/2023 1213 by Shae Garcia RN  Outcome: Completed  2/20/2023 1211 by Shae Garcia RN  Outcome: Progressing  2/20/2023 0119 by Afua Motley RN  Outcome: Progressing  Goal: Absence of cardiac dysrhythmias or at baseline  2/20/2023 1213 by Shae Garcia RN  Outcome: Completed  2/20/2023 1211 by Shae Garcia RN  Outcome: Progressing  2/20/2023 0119 by Afua Motley RN  Outcome: Progressing     Problem: Skin/Tissue Integrity - Adult  Goal: Skin integrity remains intact  2/20/2023 1213 by Shae Garcia RN  Outcome: Completed  2/20/2023 1211 by Shae Garcia RN  Outcome: Progressing  2/20/2023 0119 by Afua Motley RN  Outcome: Progressing  Flowsheets (Taken 2/19/2023 2030)  Skin Integrity Remains Intact: Monitor for areas of redness and/or skin breakdown  Goal: Oral mucous membranes remain intact  2/20/2023 1213 by Shae Garcia RN  Outcome: Completed  2/20/2023 1211 by Shae Garcia RN  Outcome: Progressing  2/20/2023 0119 by Afua Motley RN  Outcome: Progressing  Flowsheets (Taken 2/19/2023 2030)  Oral Mucous Membranes Remain Intact: Assess oral mucosa and hygiene practices     Problem: Musculoskeletal - Adult  Goal: Return mobility to safest level of function  2/20/2023 1213 by Shae Garcia RN  Outcome: Completed  2/20/2023 1211 by Shae Garcia RN  Outcome: Progressing  2/20/2023 0119 by Afua Motley RN  Outcome: Progressing  Flowsheets (Taken 2/19/2023 2030)  Return Mobility to Safest Level of Function: Assess patient stability and activity tolerance for standing, transferring and ambulating with or without assistive devices  Goal: Maintain proper alignment of affected body part  2/20/2023 1213 by Shae Garcia RN  Outcome: Completed  2/20/2023 1211 by Shae Garcia RN  Outcome: Progressing  2/20/2023 0119 by Afua Motley, RN  Outcome: Progressing  Flowsheets (Taken 2/19/2023 2030)  Maintain proper alignment of affected body part: Support and protect limb and body alignment per provider's orders  Goal: Return ADL status to a safe level of function  2/20/2023 1213 by Alysha Moreau RN  Outcome: Completed  2/20/2023 1211 by Alysha Moreau RN  Outcome: Progressing  2/20/2023 0119 by Mildred Amin RN  Outcome: Progressing  Flowsheets (Taken 2/19/2023 2030)  Return ADL Status to a Safe Level of Function: Administer medication as ordered     Problem: Gastrointestinal - Adult  Goal: Minimal or absence of nausea and vomiting  2/20/2023 1213 by Alysha Moreau RN  Outcome: Completed  2/20/2023 1211 by Alysha Moreau RN  Outcome: Progressing  2/20/2023 0119 by Mildred Amin RN  Outcome: Progressing  Goal: Maintains or returns to baseline bowel function  2/20/2023 1213 by Alysha Moreau RN  Outcome: Completed  2/20/2023 1211 by Alysha Moreau RN  Outcome: Progressing  2/20/2023 0119 by Mildred Amin RN  Outcome: Progressing  Goal: Maintains adequate nutritional intake  2/20/2023 1213 by Alysha Moreau RN  Outcome: Completed  2/20/2023 1211 by Alysha Moreau RN  Outcome: Progressing  2/20/2023 0119 by Mildred Amin RN  Outcome: Progressing     Problem: Genitourinary - Adult  Goal: Absence of urinary retention  2/20/2023 1213 by Alysha Moreau RN  Outcome: Completed  2/20/2023 1211 by Alysha Moreau RN  Outcome: Progressing  2/20/2023 0119 by Mildred Amin RN  Outcome: Progressing  Goal: Urinary catheter remains patent  2/20/2023 1213 by Alysha Moreau RN  Outcome: Completed  2/20/2023 1211 by Alysha Moreau RN  Outcome: Progressing  2/20/2023 0119 by Mildred Amin RN  Outcome: Progressing     Problem: Infection - Adult  Goal: Absence of infection at discharge  2/20/2023 1213 by Alysha Moreau RN  Outcome: Completed  2/20/2023 1211 by Alysha Moreau RN  Outcome: Progressing  2/20/2023 0119 by Mildred Amin RN  Outcome: Progressing  Flowsheets (Taken 2/19/2023 2030)  Absence of infection at discharge: Assess and monitor for signs and symptoms of infection  Goal: Absence of infection during hospitalization  2/20/2023 1213 by Maria Antonia Joyner RN  Outcome: Completed  2/20/2023 1211 by Maria Antonia Joyner RN  Outcome: Progressing  2/20/2023 0119 by Steve Montejo RN  Outcome: Progressing  Flowsheets (Taken 2/19/2023 2030)  Absence of infection during hospitalization: Assess and monitor for signs and symptoms of infection     Problem: Metabolic/Fluid and Electrolytes - Adult  Goal: Electrolytes maintained within normal limits  2/20/2023 1213 by Maria Antonia Joyner RN  Outcome: Completed  2/20/2023 1211 by Maria Antonia Joyner RN  Outcome: Progressing  2/20/2023 0119 by Steve Montejo RN  Outcome: Progressing  Flowsheets (Taken 2/19/2023 2030)  Electrolytes maintained within normal limits: Monitor labs and assess patient for signs and symptoms of electrolyte imbalances  Goal: Hemodynamic stability and optimal renal function maintained  2/20/2023 1213 by Maria Antonia Joyner RN  Outcome: Completed  2/20/2023 1211 by Maria Antonia Joyner RN  Outcome: Progressing  2/20/2023 0119 by Steve Montejo RN  Outcome: Progressing  Flowsheets (Taken 2/19/2023 2030)  Hemodynamic stability and optimal renal function maintained: Monitor labs and assess for signs and symptoms of volume excess or deficit     Problem: Hematologic - Adult  Goal: Maintains hematologic stability  2/20/2023 1213 by Maria Antonia Joyner RN  Outcome: Completed  2/20/2023 1211 by Maria Antonia Joyner RN  Outcome: Progressing  2/20/2023 0119 by Steve Montejo RN  Outcome: Progressing  Flowsheets (Taken 2/19/2023 2030)  Maintains hematologic stability: Assess for signs and symptoms of bleeding or hemorrhage     Problem: Pain  Goal: Verbalizes/displays adequate comfort level or baseline comfort level  2/20/2023 1213 by Maria Antonia Joyner RN  Outcome: Completed  2/20/2023 1211 by Maria Antonia Joyner RN  Outcome: Progressing  2/20/2023 0119 by Silvia Loyd RN  Outcome: Progressing     Problem: Skin/Tissue Integrity  Goal: Absence of new skin breakdown  Description: 1. Monitor for areas of redness and/or skin breakdown  2. Assess vascular access sites hourly  3. Every 4-6 hours minimum:  Change oxygen saturation probe site  4. Every 4-6 hours:  If on nasal continuous positive airway pressure, respiratory therapy assess nares and determine need for appliance change or resting period.   2/20/2023 1213 by Claudia Callahan RN  Outcome: Completed  2/20/2023 1211 by Claudia Callahan RN  Outcome: Progressing  2/20/2023 0119 by Silvia Loyd RN  Outcome: Progressing     Problem: Nutrition Deficit:  Goal: Optimize nutritional status  2/20/2023 1213 by Claudia Callahan RN  Outcome: Completed  2/20/2023 1211 by Claudia Callahan RN  Outcome: Progressing  2/20/2023 0119 by Silvia Loyd RN  Outcome: Progressing     Problem: Chronic Conditions and Co-morbidities  Goal: Patient's chronic conditions and co-morbidity symptoms are monitored and maintained or improved  2/20/2023 1213 by Claudia Callahan RN  Outcome: Completed  2/20/2023 1211 by Claudia Callahan RN  Outcome: Progressing  2/20/2023 0119 by Silvia Loyd RN  Outcome: Progressing  Flowsheets (Taken 2/19/2023 2030)  Care Plan - Patient's Chronic Conditions and Co-Morbidity Symptoms are Monitored and Maintained or Improved: Monitor and assess patient's chronic conditions and comorbid symptoms for stability, deterioration, or improvement

## 2023-02-20 NOTE — DISCHARGE INSTR - COC
Continuity of Care Form    Patient Name: Loki Araujo   :  1940  MRN:  [id number]    Admit date:  2023  Discharge date:  ***    Code Status Order: Full Code   Advance Directives:     Admitting Physician:  No admitting provider for patient encounter.   PCP: Rowena Cervantes MD    Discharging Nurse: [facility] Unit/Room#: 2615/649-25  Discharging Unit Phone Number: ***    Emergency Contact:   Extended Emergency Contact Information  Primary Emergency Contact: [address] [address] Phone: [phone]  Relation: Child  Secondary Emergency Contact: Donna Anand Phone: [phone]  Relation: Child    Past Surgical History:  Past Surgical History:   Procedure Laterality Date    CYSTOSCOPY Left 2023    CYSTOSCOPY; RETROGRADE PYLEGRAM URETERAL STENT INSERTION performed by George Zafar MD at [address] [place] [address] Dr Chace RANDALL         Immunization History:   Immunization History   Administered Date(s) Administered    COVID-19, MODERNA BLUE border, Primary or Immunocompromised, (age 12y+), IM, 100 mcg/0.5mL 2021, 2021    COVID-19, MODERNA Booster BLUE border, (age 18y+), IM, 50mcg/0.25mL 11/10/2021    Influenza, FLUAD, (age 72 y+), Adjuvanted, 0.5mL 10/12/2020, 10/22/2021    Influenza, High Dose (Fluzone 65 yrs and older) 10/12/2016, 10/12/2017, 2018    Pneumococcal Conjugate 13-valent (Pbhkimk05) 10/12/2016    Pneumococcal Polysaccharide (Jrmwlcjfe17) 10/05/2015    Tdap (Boostrix, Adacel) 2018    Zoster Recombinant (Shingrix) 10/12/2020, 2021       Active Problems:  Patient Active Problem List   Diagnosis Code    Mixed hyperlipidemia E78.2    Essential hypertension I10    Restless legs syndrome G25.81    Gastroesophageal reflux disease without esophagitis K21.9    Peripheral neuropathic pain M79.2    Primary osteoarthritis involving multiple joints M15.9    Gout M10.9    Benign non-nodular prostatic hyperplasia with lower urinary tract symptoms N40.1    Elevated PSA R97.20    Idiopathic peripheral neuropathy G60.9    Chronic idiopathic constipation K59.04    Prediabetes R73.03    Pyelonephritis of left kidney N12    Renal stone N20.0    Visual disturbance H53.9    Acute renal failure with acute tubular necrosis superimposed on chronic kidney disease, on chronic dialysis (HCC) N17.0, N18.9, Z99.2    Pyelonephritis N12    Severe malnutrition ([address] E43    Obstruction of left ureter T44.8    Complicated UTI (urinary tract infection) N39.0    Urinary retention R33.9       Isolation/Infection:   Isolation            No Isolation          Patient Infection Status       Infection Onset Added Last Indicated Last Indicated By Review Planned Expiration Resolved Resolved By    None active    Resolved    COVID-19 22 Respiratory Panel, Molecular, with COVID-19 (Restricted: peds pts or suitable admitted adults)   22     COVID-19 (Rule Out) 22 Respiratory Panel, Molecular, with COVID-19 (Restricted: peds pts or suitable admitted adults) (Ordered)   22 Rule-Out Test Resulted            Nurse Assessment:  Last Vital Signs: BP (!) 153/90   Pulse 94   Temp 97.2 °F (36.2 °C) (Temporal)   Resp 18   Ht 6' (1.829 m)   Wt 199 lb 8 oz (90.5 kg)   SpO2 95%   BMI 27.06 kg/m²     Last documented pain score (0-10 scale): Pain Level: 4  Last Weight:   Wt Readings from Last 1 Encounters:   23 199 lb 8 oz (90.5 kg)     Mental Status:  {IP PT MENTAL STATUS:}    IV Access:  { NEL IV ACCESS:553684128}    Nursing Mobility/ADLs:  Walking   {[facility] DME UEDV:680324944}  Transfer  {[facility] DME UVCN:851351951}  Bathing  {P DME XXEB:674312295}  Dressing  {[facility] DME EKDW:250267943}  Toileting  {P DME USCA:644575325}  Feeding  {[facility] DME UUCI:554938051}  Med Admin  {[facility] DME IADW:364285015}  Med Delivery   { NEL MED Delivery:545949300}    Wound Care Documentation and Therapy: Elimination:  Continence: Bowel: {YES / WM:87001}  Bladder: {YES / NM:31064}  Urinary Catheter: {Urinary Catheter:288641953}   Colostomy/Ileostomy/Ileal Conduit: {YES / DE:47665}       Date of Last BM: ***    Intake/Output Summary (Last 24 hours) at 2023 1057  Last data filed at 2023 0950  Gross per 24 hour   Intake 520 ml   Output 950 ml   Net -430 ml     I/O last 3 completed shifts:   In: 12 [P.O.:450]  Out: 1425 [Urine:1425]    Safety Concerns:     [employer] Safety Concerns:238217018}    Impairments/Disabilities:      [employer] Impairments/Disabilities:575043872}    Nutrition Therapy:  Current Nutrition Therapy:   [employer] Diet List:528104104}    Routes of Feeding: {[facility] DME Other Feedings:930535962}  Liquids: {Slp liquid thickness:91603}  Daily Fluid Restriction: {[facility] DME Yes amt example:600439826}  Last Modified Barium Swallow with Video (Video Swallowing Test): {Done Not Done DMPJ:210126446}    Treatments at the Time of Hospital Discharge:   Respiratory Treatments: ***  Oxygen Therapy:  {Therapy; copd oxygen:52696}  Ventilator:    { CC Vent AVRF:023983643}    Rehab Therapies: {THERAPEUTIC INTERVENTION:7686197619}  Weight Bearing Status/Restrictions: [employer] Weight Bearin}  Other Medical Equipment (for information only, NOT a DME order):  {EQUIPMENT:308608413}  Other Treatments: ***    Patient's personal belongings (please select all that are sent with patient):  {[facility] DME Belongings:993442696}    RN SIGNATURE:  {Esignature:278219571}    CASE MANAGEMENT/SOCIAL WORK SECTION    Inpatient Status Date: ***    Readmission Risk Assessment Score:  Readmission Risk              Risk of Unplanned Readmission:  23           Discharging to Facility/ Agency   Name:   Address:  Phone:  Fax:    Dialysis Facility (if applicable)   Name:  Address:  Dialysis Schedule:  Phone:  Fax:    / signature: {Esignature:168657497}    PHYSICIAN SECTION    Prognosis: Guarded    Condition at Discharge: Stable    Rehab Potential (if transferring to Rehab): Guarded    Recommended Labs or Other Treatments After Discharge: García care. Urology follow up 1 week lithotripsy and stent removal.     ECF with PTOT. Physician Certification: I certify the above information and transfer of Tasha Yoder  is necessary for the continuing treatment of the diagnosis listed and that he requires [facility] for less 30 days.      Update Admission H&P: No change in H&P    PHYSICIAN SIGNATURE:  Electronically signed by Wade Alvarez MD on 2/20/23 at 10:57 AM CST

## 2023-02-20 NOTE — CARE COORDINATION
Pt is able to DC back to [facility] on Monday February 20th. Pt will need an updated negative covid test before DC.   [address] [phone] P  [phone] [id number]  Electronically signed by Raquel Spear on 2/20/2023 at 9:18 AM

## 2023-02-20 NOTE — PLAN OF CARE
Problem: Discharge Planning  Goal: Discharge to home or other facility with appropriate resources  2/20/2023 1211 by Natalie Martinez RN  Outcome: Progressing  2/20/2023 0119 by Romana Bos, RN  Outcome: Progressing  Flowsheets (Taken 2/19/2023 2030)  Discharge to home or other facility with appropriate resources: Identify barriers to discharge with patient and caregiver     Problem: Safety - Adult  Goal: Free from fall injury  2/20/2023 1211 by Natalie Martinez RN  Outcome: Progressing  2/20/2023 0119 by Romana Bos, RN  Outcome: Progressing     Problem: ABCDS Injury Assessment  Goal: Absence of physical injury  2/20/2023 1211 by Natalie Martinez RN  Outcome: Progressing  2/20/2023 0119 by Romana Bos, RN  Outcome: Progressing     Problem: Neurosensory - Adult  Goal: Achieves stable or improved neurological status  2/20/2023 1211 by Natalie Martinez RN  Outcome: Progressing  2/20/2023 0119 by Romana Bos, RN  Outcome: Progressing  Flowsheets (Taken 2/19/2023 2030)  Achieves stable or improved neurological status: Assess for and report changes in neurological status  Goal: Achieves maximal functionality and self care  2/20/2023 1211 by Natalie Martinez RN  Outcome: Progressing  2/20/2023 0119 by Romana Bos, RN  Outcome: Progressing  Flowsheets (Taken 2/19/2023 2030)  Achieves maximal functionality and self care: Monitor swallowing and airway patency with patient fatigue and changes in neurological status     Problem: Respiratory - Adult  Goal: Achieves optimal ventilation and oxygenation  2/20/2023 1211 by Natalie Martinez RN  Outcome: Progressing  2/20/2023 0119 by Romana Bos, RN  Outcome: Progressing     Problem: Cardiovascular - Adult  Goal: Maintains optimal cardiac output and hemodynamic stability  2/20/2023 1211 by Natalie Martinez RN  Outcome: Progressing  2/20/2023 0119 by Romana Bos, RN  Outcome: Progressing  Goal: Absence of cardiac dysrhythmias or at baseline  2/20/2023 1211 by Natalie Martinez RN  Outcome: Progressing  2/20/2023 0119 by Chacha Fairchild RN  Outcome: Progressing     Problem: Skin/Tissue Integrity - Adult  Goal: Skin integrity remains intact  2/20/2023 1211 by Guido Reyes RN  Outcome: Progressing  2/20/2023 0119 by Chacha Fairchild RN  Outcome: Progressing  Flowsheets (Taken 2/19/2023 2030)  Skin Integrity Remains Intact: Monitor for areas of redness and/or skin breakdown  Goal: Oral mucous membranes remain intact  2/20/2023 1211 by Guido Reyes RN  Outcome: Progressing  2/20/2023 0119 by Chacha Fairchild RN  Outcome: Progressing  Flowsheets (Taken 2/19/2023 2030)  Oral Mucous Membranes Remain Intact: Assess oral mucosa and hygiene practices     Problem: Musculoskeletal - Adult  Goal: Return mobility to safest level of function  2/20/2023 1211 by Guido Reyes RN  Outcome: Progressing  2/20/2023 0119 by Chacha Fairchild RN  Outcome: Progressing  Flowsheets (Taken 2/19/2023 2030)  Return Mobility to Safest Level of Function: Assess patient stability and activity tolerance for standing, transferring and ambulating with or without assistive devices  Goal: Maintain proper alignment of affected body part  2/20/2023 1211 by Guido Reyes RN  Outcome: Progressing  2/20/2023 0119 by Chacha Fairchild RN  Outcome: Progressing  Flowsheets (Taken 2/19/2023 2030)  Maintain proper alignment of affected body part: Support and protect limb and body alignment per provider's orders  Goal: Return ADL status to a safe level of function  2/20/2023 1211 by Guido Reyes RN  Outcome: Progressing  2/20/2023 0119 by Chacha Fairchild RN  Outcome: Progressing  Flowsheets (Taken 2/19/2023 2030)  Return ADL Status to a Safe Level of Function: Administer medication as ordered     Problem: Gastrointestinal - Adult  Goal: Minimal or absence of nausea and vomiting  2/20/2023 1211 by Guido Reyes RN  Outcome: Progressing  2/20/2023 0119 by Chacha Fairchild RN  Outcome: Progressing  Goal: Maintains or returns to baseline bowel function  2/20/2023 1211 by Shahida Cadet RN  Outcome: Progressing  2/20/2023 0119 by Hebert Burns RN  Outcome: Progressing  Goal: Maintains adequate nutritional intake  2/20/2023 1211 by Shahida Cadet RN  Outcome: Progressing  2/20/2023 0119 by Hebert Burns RN  Outcome: Progressing     Problem: Genitourinary - Adult  Goal: Absence of urinary retention  2/20/2023 1211 by Shahida Cadet RN  Outcome: Progressing  2/20/2023 0119 by Hebert Burns RN  Outcome: Progressing  Goal: Urinary catheter remains patent  2/20/2023 1211 by Shahida Cadet RN  Outcome: Progressing  2/20/2023 0119 by Hebert Burns RN  Outcome: Progressing     Problem: Infection - Adult  Goal: Absence of infection at discharge  2/20/2023 1211 by Shahida Cadet RN  Outcome: Progressing  2/20/2023 0119 by Hebert Burns RN  Outcome: Progressing  Flowsheets (Taken 2/19/2023 2030)  Absence of infection at discharge: Assess and monitor for signs and symptoms of infection  Goal: Absence of infection during hospitalization  2/20/2023 1211 by Shahida Cadet RN  Outcome: Progressing  2/20/2023 0119 by Hebert Burns RN  Outcome: Progressing  Flowsheets (Taken 2/19/2023 2030)  Absence of infection during hospitalization: Assess and monitor for signs and symptoms of infection     Problem: Metabolic/Fluid and Electrolytes - Adult  Goal: Electrolytes maintained within normal limits  2/20/2023 1211 by Shahida Cadet RN  Outcome: Progressing  2/20/2023 0119 by Hebert Burns RN  Outcome: Progressing  Flowsheets (Taken 2/19/2023 2030)  Electrolytes maintained within normal limits: Monitor labs and assess patient for signs and symptoms of electrolyte imbalances  Goal: Hemodynamic stability and optimal renal function maintained  2/20/2023 1211 by Shahida Cadet RN  Outcome: Progressing  2/20/2023 0119 by Hebert Burns RN  Outcome: Progressing  Flowsheets (Taken 2/19/2023 2030)  Hemodynamic stability and optimal renal function maintained: Monitor labs and assess for signs and symptoms of volume excess or deficit     Problem: Hematologic - Adult  Goal: Maintains hematologic stability  2/20/2023 1211 by Claudia Callahan RN  Outcome: Progressing  2/20/2023 0119 by Silvia Loyd RN  Outcome: Progressing  Flowsheets (Taken 2/19/2023 2030)  Maintains hematologic stability: Assess for signs and symptoms of bleeding or hemorrhage     Problem: Pain  Goal: Verbalizes/displays adequate comfort level or baseline comfort level  2/20/2023 1211 by Claudia Callahan RN  Outcome: Progressing  2/20/2023 0119 by Silvia Loyd RN  Outcome: Progressing     Problem: Skin/Tissue Integrity  Goal: Absence of new skin breakdown  Description: 1. Monitor for areas of redness and/or skin breakdown  2. Assess vascular access sites hourly  3. Every 4-6 hours minimum:  Change oxygen saturation probe site  4. Every 4-6 hours:  If on nasal continuous positive airway pressure, respiratory therapy assess nares and determine need for appliance change or resting period.   2/20/2023 1211 by Claudia Callahan RN  Outcome: Progressing  2/20/2023 0119 by Silvia Loyd RN  Outcome: Progressing     Problem: Nutrition Deficit:  Goal: Optimize nutritional status  2/20/2023 1211 by Claudia Callahan RN  Outcome: Progressing  2/20/2023 0119 by Silvia Loyd RN  Outcome: Progressing     Problem: Chronic Conditions and Co-morbidities  Goal: Patient's chronic conditions and co-morbidity symptoms are monitored and maintained or improved  2/20/2023 1211 by Claudia Callahan RN  Outcome: Progressing  2/20/2023 0119 by Silvia Loyd RN  Outcome: Progressing  Flowsheets (Taken 2/19/2023 2030)  Care Plan - Patient's Chronic Conditions and Co-Morbidity Symptoms are Monitored and Maintained or Improved: Monitor and assess patient's chronic conditions and comorbid symptoms for stability, deterioration, or improvement

## 2023-02-20 NOTE — DISCHARGE SUMMARY
Hospital Medicine Discharge Summary    Patient ID: Wily Menard      Patient's PCP: Jumana Echavarria MD    Admit Date: 2/12/2023     Discharge Date:   2/20/2023    Admitting Provider: No admitting provider for patient encounter. Discharge Provider: Lili Humphrey MD     Discharge Diagnoses: Active Hospital Problems    Diagnosis     Obstruction of left ureter [N13.5]      Priority: Medium    Complicated UTI (urinary tract infection) [N39.0]      Priority: Medium    Urinary retention [R33.9]      Priority: Medium    Acute renal failure with acute tubular necrosis superimposed on chronic kidney disease, on chronic dialysis ([facility] [address] [N17.0, N18.9, Z99.2]      Priority: Medium       The patient was seen and examined on day of discharge and this discharge summary is in conjunction with any daily progress note from day of discharge. Hospital Course:  79-year-old male with BPH/urinary retention with García in place, history of prior kidney stones, recent hospitalization with sepsis and pyelonephritis with acute renal failure requiring initiation of dialysis, presenting back from SNF with some lower abdominal discomfort and concern that García may not be draining properly, ultimately found to have obstructing left ureteral stone on CT abdomen/pelvis as well as UTI. Patient/family or apparently not agreeable to transfer for more urgent urologic intervention, however was evaluated by urology want service available on 2/14 to undergo cystoscopy with ureteral stent placement completed on 2/14. Treated on empiric antibiotics. Noted yeast from urine culture, fluconazole added and ID consulted. Patient remained in renal failure on initial evaluation, no improvement noted since last hospitalization, nephrology consulted, maintaining on dialysis. Post L ureteral stent placement his renal function showed sustained improvement.  His R vas cath was removed this admission and renal function remains stable improved. No plan for HD on discharge. Obstructive Uropathy with left ureteral calculus  -Urology team involved. - S/p cystoscopy with left ureteral stent placement, will need definitive treatment of stone on outpatient basis              - urology team report of wide open prostatic fossa and bladder neck without obstruction. They recommend to d/c grove and PRN straight cath for >300cc residuals. Cont flomax.               - grove removed - pt not voiding - cont PRN straight cath as per urology recs - plan to replace grove on discharge as per urology recs. Complicated UTI in setting of Grove catheter and left ureteral obstruction  - ID following  - On meropenem empirically - this was stopped per ID  - Fluconazole for fungal infection - recs for 21 days of PO treatment - switch to PO diflucan - this is scheduled to run till March 10th. Acute renal failure with ATN requiring dialysis  Ureteral obstruction  Urologic intervention as noted above  --Continue Flomax  --Continue dialysis as per nephrology              - pt Cr actually holding steady without HD              - had HD catheter removed 2/17 as per nephrology instruction. --Continue to monitor closely - renal function continues better. - no plan for HD on discharge - nephrology team signed off. Constipation  - Continue bowel regimen including Colace and MiraLAX          Mood disorder  Continue Seroquel and Remeron           Dementia. Supportive care.         ____________________________________________    Grove placed back on 2/19 and draining well. Clinically remains stable. Discharge delayed pending placement.        vAa Kee MD  2/20/2023  10:58 AM              Physical Exam Performed:     BP (!) 153/90   Pulse 94   Temp 97.2 °F (36.2 °C) (Temporal)   Resp 18   Ht 6' (1.829 m)   Wt 199 lb 8 oz (90.5 kg)   SpO2 95%   BMI 27.06 kg/m²       General appearance:  No apparent distress, appears stated age and cooperative. HEENT:  Normal cephalic, atraumatic without obvious deformity. Pupils equal, round, and reactive to light. Extra ocular muscles intact. Conjunctivae/corneas clear. Neck: Supple, with full range of motion. No jugular venous distention. Trachea midline. Respiratory:  Normal respiratory effort. Clear to auscultation, bilaterally without Rales/Wheezes/Rhonchi. Cardiovascular:  Regular rate and rhythm with normal S1/S2 without murmurs, rubs or gallops. Abdomen: Soft, non-tender, non-distended with normal bowel sounds. Musculoskeletal:  No clubbing, cyanosis or edema bilaterally. Full range of motion without deformity. Skin: Skin color, texture, turgor normal.  No rashes or lesions. Neurologic:  Neurovascularly intact without any focal sensory/motor deficits. Cranial nerves: II-XII intact, grossly non-focal.  Psychiatric:  Alert and oriented, thought content appropriate, normal insight  Capillary Refill: Brisk,< 3 seconds   Peripheral Pulses: +2 palpable, equal bilaterally       Labs: For convenience and continuity at follow-up the following most recent labs are provided:      CBC:    Lab Results   Component Value Date/Time    WBC 5.3 02/16/2023 02:48 AM    HGB 10.1 02/16/2023 02:48 AM    HCT 31.6 02/16/2023 02:48 AM     02/16/2023 02:48 AM       Renal:    Lab Results   Component Value Date/Time     02/20/2023 02:16 AM    K 3.9 02/20/2023 02:16 AM     02/20/2023 02:16 AM    CO2 30 02/20/2023 02:16 AM    BUN 18 02/20/2023 02:16 AM    CREATININE 1.3 02/20/2023 02:16 AM    CALCIUM 8.7 02/20/2023 02:16 AM         Significant Diagnostic Studies    Radiology:   VL Extremity Venous Right   Final Result      FL RETROGRADE PYELOGRAM W WO KUB   Final Result      CT ABDOMEN PELVIS WO CONTRAST Additional Contrast? None   Final Result   1 cm obstructing left mid ureteral calculus. Mild left hydronephrosis. Punctate nonobstructing right-sided nephrolithiasis.    Soft tissue fullness at the pancreatic tail. Cannot exclude underlying mass. Correlation with contrast-enhanced pancreatic protocol MRI or CT is suggested a   possible. Scattered hepatic hypoattenuating lesion, which are compatible with cysts while   others are too small to accurately characterize. Additional findings per body of the report. Consults:     IP CONSULT TO NEPHROLOGY  IP CONSULT TO UROLOGY  IP CONSULT TO INFECTIOUS DISEASES  IP CONSULT TO VASCULAR SURGERY  IP CONSULT TO SOCIAL WORK    Disposition:  ECF    Condition at Discharge: Stable    Discharge Instructions/Follow-up:  PCP 1 week     Code Status:  Full Code     Activity: activity as tolerated    Diet: regular diet      Discharge Medications:     Current Discharge Medication List             Details   fluconazole (DIFLUCAN) 200 MG tablet Take 1 tablet by mouth daily for 19 doses  Qty: 19 tablet, Refills: 0                Details   acetaminophen (TYLENOL) 325 MG tablet Take 650 mg by mouth every 6 hours as needed for Pain      ondansetron (ZOFRAN) 4 MG tablet Take 4 mg by mouth every 6 hours as needed for Nausea or Vomiting      pregabalin (LYRICA) 25 MG capsule Take 1 capsule by mouth daily for 30 days.  Max Daily Amount: 25 mg  Qty: 30 capsule, Refills: 0    Associated Diagnoses: Idiopathic peripheral neuropathy      mirtazapine (REMERON) 7.5 MG tablet Take 1 tablet by mouth nightly  Qty: 30 tablet, Refills: 3      QUEtiapine (SEROQUEL) 25 MG tablet Take 1 tablet by mouth nightly  Qty: 60 tablet, Refills: 3      docusate sodium (COLACE, DULCOLAX) 100 MG CAPS Take 100 mg by mouth daily  Qty: 30 capsule, Refills: 1      polyethylene glycol (GLYCOLAX) 17 g packet Take 17 g by mouth 2 times daily  Qty: 527 g, Refills: 1      calcitRIOL (ROCALTROL) 0.25 MCG capsule Take 1 capsule by mouth daily  Qty: 30 capsule, Refills: 3      simethicone (MYLICON) 80 MG chewable tablet Take 1 tablet by mouth every 6 hours as needed for Flatulence  Qty: 180 tablet, Refills: 3      aspirin 81 MG chewable tablet Take 1 tablet by mouth daily  Qty: 30 tablet, Refills: 3      tamsulosin (FLOMAX) 0.4 MG capsule Take 1 capsule by mouth daily  Qty: 90 capsule, Refills: 1    Associated Diagnoses: Benign prostatic hyperplasia, unspecified whether lower urinary tract symptoms present      metoprolol succinate (TOPROL XL) 50 MG extended release tablet Take 1 tablet by mouth once daily  Qty: 90 tablet, Refills: 0    Associated Diagnoses: Essential hypertension      allopurinol (ZYLOPRIM) 100 MG tablet Take 1 tablet by mouth daily  Qty: 90 tablet, Refills: 1    Associated Diagnoses: Gout, unspecified cause, unspecified chronicity, unspecified site      atorvastatin (LIPITOR) 20 MG tablet Take 1 tablet by mouth daily  Qty: 90 tablet, Refills: 3    Associated Diagnoses: Mixed hyperlipidemia      omeprazole (PRILOSEC) 20 MG delayed release capsule Take 1 capsule by mouth daily  Qty: 90 capsule, Refills: 3    Associated Diagnoses: Gastroesophageal reflux disease without esophagitis      vitamin D (ERGOCALCIFEROL) 1.25 MG (53370 UT) CAPS capsule Take 1 capsule by mouth once a week  Qty: 5 capsule, Refills: 3    Associated Diagnoses: Vitamin D deficiency             Time Spent on discharge: 30min in the examination, evaluation, counseling and review of medications and discharge plan. Signed:      Amadou Ramos MD   2/20/2023      Thank you Yudi Brand MD for the opportunity to be involved in this patient's care. If you have any questions or concerns, please feel free to contact me at [phone].

## 2023-02-20 NOTE — DISCHARGE INSTR - DIET
Good nutrition is important when healing from an illness, injury, or surgery. Follow any nutrition recommendations given to you during your hospital stay. If you were given an oral nutrition supplement while in the hospital, continue to take this supplement at home. You can take it with meals, in-between meals, and/or before bedtime. These supplements can be purchased at most local grocery stores, pharmacies, and chain [employer]-stores. If you have any questions about your diet or nutrition, call the hospital and ask for the dietitian. ADULT DIET; Regular; 4 carb choices (60 gm/meal);  Low Fat/Low Chol/High Fiber/2 gm Na

## 2023-02-21 ENCOUNTER — TELEPHONE (OUTPATIENT)
Dept: INTERNAL MEDICINE | Age: 83
End: 2023-02-21

## 2023-02-21 LAB — URINE CULTURE, ROUTINE: NORMAL

## 2023-02-21 NOTE — TELEPHONE ENCOUNTER
[place], [phone]    Per Nathan Rutledge, nursing staff at [facility] [place], patient is doing well. Patient continues to participate in therapy. No known discharge plans at this time.

## 2023-02-27 ENCOUNTER — OFFICE VISIT (OUTPATIENT)
Dept: VASCULAR SURGERY | Age: 83
End: 2023-02-27
Payer: MEDICARE

## 2023-02-27 VITALS
DIASTOLIC BLOOD PRESSURE: 63 MMHG | SYSTOLIC BLOOD PRESSURE: 91 MMHG | OXYGEN SATURATION: 98 % | HEART RATE: 108 BPM | TEMPERATURE: 97.4 F

## 2023-02-27 DIAGNOSIS — N17.9 AKI (ACUTE KIDNEY INJURY) (HCC): Primary | ICD-10-CM

## 2023-02-27 PROCEDURE — 1111F DSCHRG MED/CURRENT MED MERGE: CPT | Performed by: PHYSICIAN ASSISTANT

## 2023-02-27 PROCEDURE — G8417 CALC BMI ABV UP PARAM F/U: HCPCS | Performed by: PHYSICIAN ASSISTANT

## 2023-02-27 PROCEDURE — 1036F TOBACCO NON-USER: CPT | Performed by: PHYSICIAN ASSISTANT

## 2023-02-27 PROCEDURE — G8427 DOCREV CUR MEDS BY ELIG CLIN: HCPCS | Performed by: PHYSICIAN ASSISTANT

## 2023-02-27 PROCEDURE — 99202 OFFICE O/P NEW SF 15 MIN: CPT | Performed by: PHYSICIAN ASSISTANT

## 2023-02-27 PROCEDURE — 3074F SYST BP LT 130 MM HG: CPT | Performed by: PHYSICIAN ASSISTANT

## 2023-02-27 PROCEDURE — 1123F ACP DISCUSS/DSCN MKR DOCD: CPT | Performed by: PHYSICIAN ASSISTANT

## 2023-02-27 PROCEDURE — G8484 FLU IMMUNIZE NO ADMIN: HCPCS | Performed by: PHYSICIAN ASSISTANT

## 2023-02-27 PROCEDURE — 3078F DIAST BP <80 MM HG: CPT | Performed by: PHYSICIAN ASSISTANT

## 2023-02-27 NOTE — PROGRESS NOTES
Patient Care Team:  Keith Hamman, MD as PCP - General (Internal Medicine)  Keith Hamman, [facility]      History and Physical    Mr. Jenny Higgins is an 76-year-old male who presents today for hospital follow-up after placement of PermCath. He developed acute kidney injury secondary to pyelonephritis. His PermCath has been removed she is now off dialysis. He still has a García catheter in place. He reports he has a follow-up appointment with nephrology.     (Chart reviewed including PMH, medications and allergies, previous imaging/tests for comparison, current imaging/tests, labs, other pertinent providers office/consult notes)     Lety Lipscomb is a 79 y.o. male with the following history reviewed and recorded in [employer]:  Patient Active Problem List    Diagnosis Date Noted    Obstruction of left ureter 02/13/2023     Priority: Medium    Complicated UTI (urinary tract infection) 02/13/2023     Priority: Medium    Urinary retention 02/13/2023     Priority: Medium    Severe malnutrition ([address] 02/03/2023     Priority: Medium    Pyelonephritis 02/02/2023     Priority: Medium    Acute renal failure with acute tubular necrosis superimposed on chronic kidney disease, on chronic dialysis ([address] 02/01/2023     Priority: Medium    Visual disturbance 01/31/2023     Priority: Medium    Pyelonephritis of left kidney 01/23/2023     Priority: Medium    Renal stone 01/23/2023     Priority: Medium    Prediabetes 10/14/2022     Priority: Medium    Chronic idiopathic constipation 08/20/2018    Idiopathic peripheral neuropathy 02/19/2018    Mixed hyperlipidemia 08/16/2017    Essential hypertension 08/16/2017    Restless legs syndrome 08/16/2017    Gastroesophageal reflux disease without esophagitis 08/16/2017    Peripheral neuropathic pain 08/16/2017    Primary osteoarthritis involving multiple joints 08/16/2017    Gout 08/16/2017    Benign non-nodular prostatic hyperplasia with lower urinary tract symptoms 08/16/2017    Elevated PSA 08/16/2017     Current Outpatient Medications   Medication Sig Dispense Refill    pregabalin (LYRICA) 25 MG capsule Take 1 capsule by mouth daily for 30 days. Max Daily Amount: 25 mg 30 capsule 0    fluconazole (DIFLUCAN) 200 MG tablet Take 1 tablet by mouth daily for 19 doses 19 tablet 0    acetaminophen (TYLENOL) 325 MG tablet Take 650 mg by mouth every 6 hours as needed for Pain      ondansetron (ZOFRAN) 4 MG tablet Take 4 mg by mouth every 6 hours as needed for Nausea or Vomiting      mirtazapine (REMERON) 7.5 MG tablet Take 1 tablet by mouth nightly 30 tablet 3    QUEtiapine (SEROQUEL) 25 MG tablet Take 1 tablet by mouth nightly 60 tablet 3    docusate sodium (COLACE, DULCOLAX) 100 MG CAPS Take 100 mg by mouth daily 30 capsule 1    polyethylene glycol (GLYCOLAX) 17 g packet Take 17 g by mouth 2 times daily 527 g 1    calcitRIOL (ROCALTROL) 0.25 MCG capsule Take 1 capsule by mouth daily 30 capsule 3    simethicone (MYLICON) 80 MG chewable tablet Take 1 tablet by mouth every 6 hours as needed for Flatulence 180 tablet 3    aspirin 81 MG chewable tablet Take 1 tablet by mouth daily 30 tablet 3    tamsulosin (FLOMAX) 0.4 MG capsule Take 1 capsule by mouth daily 90 capsule 1    metoprolol succinate (TOPROL XL) 50 MG extended release tablet Take 1 tablet by mouth once daily 90 tablet 0    allopurinol (ZYLOPRIM) 100 MG tablet Take 1 tablet by mouth daily 90 tablet 1    atorvastatin (LIPITOR) 20 MG tablet Take 1 tablet by mouth daily 90 tablet 3    omeprazole (PRILOSEC) 20 MG delayed release capsule Take 1 capsule by mouth daily 90 capsule 3    vitamin D (ERGOCALCIFEROL) 1.25 MG (15383 UT) CAPS capsule Take 1 capsule by mouth once a week 5 capsule 3     No current facility-administered medications for this visit.      Allergies: Codeine  Past Medical History:   Diagnosis Date    Benign non-nodular prostatic hyperplasia with lower urinary tract symptoms 08/16/2017    Essential hypertension 2017    Gastroesophageal reflux disease without esophagitis 2017    Gout 2017    Idiopathic peripheral neuropathy 2018    Mixed hyperlipidemia 2017    Peripheral neuropathic pain 2017    Primary osteoarthritis involving multiple joints 2017    Renal stone 2023    Renal stone 2023    Restless legs syndrome 2017     Past Surgical History:   Procedure Laterality Date    CYSTOSCOPY Left 2023    CYSTOSCOPY; RETROGRADE PYLEGRAM URETERAL STENT INSERTION performed by Daryle Lovings, MD at [place] [address] Bilateral 1963    TURP       Family History   Problem Relation Age of Onset    Uterine Cancer Mother          at age 52    Coronary Art Dis Father         MI at 62, smoker    COPD Sister         smoker    COPD Sister         smoker    Heart Failure Sister     COPD Sister         smoker    Stroke Brother         smoker    Coronary Art Dis Brother         open heart surgry, MI at 80- yo    No Known Problems Son     No Known Problems Daughter      Social History     Tobacco Use    Smoking status: Former     Packs/day: 3.00     Years: 35.00     Pack years: 105.00     Types: Cigarettes, Pipe     Start date:      Quit date:      Years since quittin.1    Smokeless tobacco: Never   Substance Use Topics    Alcohol use: Not Currently     Comment: \"not vbery much\", \"beer every 15 years\"       Review of Systems    Constitutional - no significant activity change, appetite change, or unexpected weight change. No fever or chills. No diaphoresis or significant fatigue. HENT - no significant rhinorrhea or epistaxis. No tinnitus or significant hearing loss. Eyes - no sudden vision change or amaurosis. Respiratory - no significant shortness of breath, wheezing, or stridor. No apnea, cough, or chest tightness associated with shortness of breath. Cardiovascular - no chest pain, syncope, or significant dizziness.   No palpitations or significant leg swelling. No claudication. Gastrointestinal - no abdominal swelling or pain. No blood in stool. No severe constipation, diarrhea, nausea, or vomiting. Genitourinary - No dysuria, frequency, or urgency. No flank pain or hematuria. Musculoskeletal - no back pain, gait disturbance, or myalgia. Skin - no color change, rash, pallor, or new wound. Neurologic - no dizziness, facial asymmetry, or light headedness. No seizures. No speech difficulty or lateralizing weakness. Hematologic - no easy bruising or excessive bleeding. Psychiatric - no severe anxiety or nervousness. No confusion. All other review of systems are negative. Physical Exam    BP 91/63 (Site: Left Lower Arm, Position: Sitting, Cuff Size: Medium Adult)   Pulse (!) 108   Temp 97.4 °F (36.3 °C)   SpO2 98%     Constitutional - well developed, well nourished. No diaphoresis or acute distress. HENT - head normocephalic. Right external ear canal appears normal.  Left external ear canal appears normal.  Septum appears midline. Eyes - conjunctiva normal.  EOMS normal.  No exudate. No icterus. Neck- ROM appears normal, no tracheal deviation. Old PermCath site right chest area clean dry and intact no signs of infection. Cardiovascular - Regular rate and rhythm. Extremities - No cyanosis, clubbing, or significant edema. No signs atheroembolic event. Pulmonary - effort appears normal.  No respiratory distress. Lungs - No wheezes noted. GI - Abdomen - soft, non tender, bowel sounds X 4 quadrants. No guarding or rebound tenderness. No distension or palpable mass. Genitourinary -García catheter intact to drainage bag. Musculoskeletal - ROM appears normal.  No significant edema. Neurologic - alert and oriented X 3. Physiologic. Skin - warm, dry, and intact. No rash, erythema, or pallor.   Psychiatric - mood, affect, and behavior appear normal.  Judgment and thought processes appear normal.      Assessment      1. ALEXANDRO (acute kidney injury) ([facility] [address]          Plan      Instructed the patient to keep the old PermCath site clean and dry. He is to notify us if he develops any redness, swelling pain or drainage.   Follow-up as needed

## 2023-02-28 ENCOUNTER — TELEPHONE (OUTPATIENT)
Dept: INTERNAL MEDICINE | Age: 83
End: 2023-02-28

## 2023-02-28 NOTE — TELEPHONE ENCOUNTER
[place], [phone]    Per Mckay Goncalves, nursing staff at [facility], patient is doing well. Patient continues to participate in therapy. No known discharge plans at this time.

## 2023-03-07 ENCOUNTER — TELEPHONE (OUTPATIENT)
Dept: INTERNAL MEDICINE | Age: 83
End: 2023-03-07

## 2023-03-07 NOTE — TELEPHONE ENCOUNTER
[place], [phone]    Per Adarsh Sylvester, nursing staff at [facility], patient is doing well. Patient continues to participate in therapy. No known discharge plans at this time.

## 2023-03-13 ENCOUNTER — OFFICE VISIT (OUTPATIENT)
Dept: UROLOGY | Age: 83
End: 2023-03-13
Payer: MEDICARE

## 2023-03-13 VITALS — BODY MASS INDEX: 27.63 KG/M2 | TEMPERATURE: 97.3 F | WEIGHT: 204 LBS | HEIGHT: 72 IN

## 2023-03-13 DIAGNOSIS — N20.1 CALCULUS OF PROXIMAL LEFT URETER: Primary | ICD-10-CM

## 2023-03-13 DIAGNOSIS — R33.9 URINARY RETENTION: ICD-10-CM

## 2023-03-13 PROCEDURE — G8484 FLU IMMUNIZE NO ADMIN: HCPCS | Performed by: NURSE PRACTITIONER

## 2023-03-13 PROCEDURE — G8427 DOCREV CUR MEDS BY ELIG CLIN: HCPCS | Performed by: NURSE PRACTITIONER

## 2023-03-13 PROCEDURE — 99215 OFFICE O/P EST HI 40 MIN: CPT | Performed by: NURSE PRACTITIONER

## 2023-03-13 PROCEDURE — G8417 CALC BMI ABV UP PARAM F/U: HCPCS | Performed by: NURSE PRACTITIONER

## 2023-03-13 PROCEDURE — 1036F TOBACCO NON-USER: CPT | Performed by: NURSE PRACTITIONER

## 2023-03-13 PROCEDURE — 1111F DSCHRG MED/CURRENT MED MERGE: CPT | Performed by: NURSE PRACTITIONER

## 2023-03-13 PROCEDURE — 1123F ACP DISCUSS/DSCN MKR DOCD: CPT | Performed by: NURSE PRACTITIONER

## 2023-03-13 ASSESSMENT — ENCOUNTER SYMPTOMS
BACK PAIN: 0
ABDOMINAL DISTENTION: 0
ABDOMINAL PAIN: 0
NAUSEA: 0
VOMITING: 0

## 2023-03-13 NOTE — PROGRESS NOTES
Loki Araujo is a 79 y.o., male, Established patient who presents today   Chief Complaint   Patient presents with    Follow-up     I am here today for my 2 week follow up. The Nursing home tried removing my catheter twice and I was unable to void after. HPI   Patient presents for follow-up after being seen in the hospital for a left proximal ureteral stone. His daughter accompanies him for help in gathering medical history. On 2/14/2023, he was taken to the operating room for placement of a left ureteral stent. He had just been released from the hospital after being treated for pyelonephritis 2 or 3 weeks prior. At his prior admission, CT revealed stone in the left renal pelvis. He returned to the ER on 2/12/2023 with complaints of abdominal pain and repeat CT scan revealed the stone had dropped into the proximal left ureter with hydronephrosis. He had also been undergoing dialysis for acute kidney injury since his admission in January. He also has prior history of urinary retention status post TUR in the past.  During cystoscopy for stent placement, Dr. Dhiraj Olivo noted no urethral strictures. He did not note any significant obstruction from the prostate or any residual regrowth of adenomatous tissue. The bladder neck was wide open. There was some catheter irritation in the dome of his bladder as well as some trabeculation, otherwise no significant abnormalities. Dr. Dhiraj Olivo had planned on attempting to treat the stone with ureteroscopy, however, the patient's urine was cloudy, so again, only a stent was placed and urine was sent for culture. The urine culture did grow Gloria guilliermondii. Dr. Mindy Favre was consulted for further recommendations of treatment. He recommended fluconazole 200 mg for 3 weeks. The patient has completed this therapy while at his skilled nursing facility. Repeat urine culture prior to discharge on 2/19/2023 was negative.     As he has finished his course of antibiotics, we will now plan for definitive treatment of his stone. Again, patient also with history of urinary retention. He has failed multiple voiding trials, 1 in the hospital and at least 2 at the skilled nursing facility. I did offer him a voiding trial here in the office today, but patient reports he would like to wait until the time of his surgery if at all possible. He does not believe the facility can accommodate intermittent catheterizations and patient reports he will not be able to continue the practice of intermittent catheterization once he returns home. He is currently maintained on Flomax. REVIEW OF SYSTEMS:  Review of Systems   Constitutional:  Positive for fatigue. Negative for chills and fever. Gastrointestinal:  Negative for abdominal distention, abdominal pain, nausea and vomiting. Genitourinary:  Negative for difficulty urinating, dysuria, flank pain, frequency, hematuria and urgency. Musculoskeletal:  Negative for back pain and gait problem. Neurological:  Positive for weakness. Psychiatric/Behavioral:  Negative for agitation and confusion.       Past Medical History:   Diagnosis Date    Benign non-nodular prostatic hyperplasia with lower urinary tract symptoms 08/16/2017    Essential hypertension 08/16/2017    Gastroesophageal reflux disease without esophagitis 08/16/2017    Gout 08/16/2017    Idiopathic peripheral neuropathy 02/19/2018    Mixed hyperlipidemia 08/16/2017    Peripheral neuropathic pain 08/16/2017    Primary osteoarthritis involving multiple joints 08/16/2017    Renal stone 01/23/2023    Renal stone 02/12/2023    Restless legs syndrome 08/16/2017       Past Surgical History:   Procedure Laterality Date    CYSTOSCOPY Left 2/14/2023    CYSTOSCOPY; RETROGRADE PYLEGRAM URETERAL STENT INSERTION performed by Avni Tejada MD at [address] [place] [address]  Bilateral 1963    TURP  2003       Current Outpatient Medications   Medication Sig Dispense Refill pregabalin (LYRICA) 25 MG capsule Take 1 capsule by mouth daily for 30 days. Max Daily Amount: 25 mg 30 capsule 0    acetaminophen (TYLENOL) 325 MG tablet Take 650 mg by mouth every 6 hours as needed for Pain      ondansetron (ZOFRAN) 4 MG tablet Take 4 mg by mouth every 6 hours as needed for Nausea or Vomiting      mirtazapine (REMERON) 7.5 MG tablet Take 1 tablet by mouth nightly 30 tablet 3    QUEtiapine (SEROQUEL) 25 MG tablet Take 1 tablet by mouth nightly 60 tablet 3    docusate sodium (COLACE, DULCOLAX) 100 MG CAPS Take 100 mg by mouth daily 30 capsule 1    calcitRIOL (ROCALTROL) 0.25 MCG capsule Take 1 capsule by mouth daily 30 capsule 3    simethicone (MYLICON) 80 MG chewable tablet Take 1 tablet by mouth every 6 hours as needed for Flatulence 180 tablet 3    aspirin 81 MG chewable tablet Take 1 tablet by mouth daily 30 tablet 3    tamsulosin (FLOMAX) 0.4 MG capsule Take 1 capsule by mouth daily 90 capsule 1    metoprolol succinate (TOPROL XL) 50 MG extended release tablet Take 1 tablet by mouth once daily 90 tablet 0    allopurinol (ZYLOPRIM) 100 MG tablet Take 1 tablet by mouth daily 90 tablet 1    atorvastatin (LIPITOR) 20 MG tablet Take 1 tablet by mouth daily 90 tablet 3    omeprazole (PRILOSEC) 20 MG delayed release capsule Take 1 capsule by mouth daily 90 capsule 3    vitamin D (ERGOCALCIFEROL) 1.25 MG (65076 UT) CAPS capsule Take 1 capsule by mouth once a week 5 capsule 3     No current facility-administered medications for this visit. 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     Employer: RETIRED     Comment: retired 2005   Tobacco Use    Smoking status: Former     Packs/day: 3.00     Years: 35.00     Pack years: 105.00     Types: Cigarettes, Pipe     Start date:      Quit date:      Years since quittin.2    Smokeless tobacco: Never   Vaping Use    Vaping Use: Never used   Substance and Sexual Activity    Alcohol use: Not Currently     Comment: \"not vbery much\", \"beer every 15 years\"    Drug use: Never    Sexual activity: Yes     Partners: Female     Comment: wife, has 2 kids   Social History Narrative    CODE STATUS: Full Code    HEALTH CARE PROXY: his children    1.) Jason Keen, his daughter, [phone]    2.) Maico Benjamin, his son, [phone]    AMBULATES: independently, sometimes with cane    DOMICILED: has 3 story home but he has an elevator     Social Determinants of Health     Financial Resource Strain: Low Risk     Difficulty of Paying Living Expenses: Not hard at all   Food Insecurity: No Food Insecurity    Worried About [address] in the Last Year: Never true    [address] in the Last Year: Never true   Transportation Needs: No Transportation Needs    Lack of Transportation (Medical): No    Lack of Transportation (Non-Medical): No   Physical Activity: Insufficiently Active    Days of Exercise per Week: 2 days    Minutes of Exercise per Session: 10 min   Stress: No Stress Concern Present    Feeling of Stress : Not at all   Social Connections: Moderately Isolated    Frequency of Communication with Friends and Family: More than three times a week    Frequency of Social Gatherings with Friends and Family: Twice a week    Attends Scientologist Services: 1 to 4 times per year    Active Member of [employer] or Organizations: No    Attends Club or Organization Meetings: Never    Marital Status:     Intimate Partner Violence: Not At Risk    Fear of Current or Ex-Partner: No    Emotionally Abused: No    Physically Abused: No    Sexually Abused: No   Housing Stability: Low Risk     Unable to Pay for Housing in the Last Year: No    Number of Places Lived in the Last Year: 2    Unstable Housing in the Last Year: No       Family History   Problem Relation Age of Onset    Uterine Cancer Mother          at age 52    Coronary Art Dis Father         MI at 62, smoker    COPD Sister         smoker    COPD Sister         smoker    Heart Failure Sister     COPD Sister         smoker    Stroke Brother         smoker    Coronary Art Dis Brother         open heart surgry, MI at 80- yo    No Known Problems Son     No Known Problems Daughter        PHYSICAL EXAM:  Temp 97.3 °F (36.3 °C) (Temporal)   Ht 6' (1.829 m)   Wt 204 lb (92.5 kg)   BMI 27.67 kg/m²   Physical Exam  Vitals and nursing note reviewed. Constitutional:       General: He is not in acute distress. Appearance: Normal appearance. He is not ill-appearing. Pulmonary:      Effort: Pulmonary effort is normal. No respiratory distress. Abdominal:      General: There is no distension. Tenderness: There is no abdominal tenderness. There is no right CVA tenderness or left CVA tenderness. Neurological:      Mental Status: He is alert and oriented to person, place, and time. Mental status is at baseline. Motor: Weakness present. Gait: Gait abnormal.   Psychiatric:         Mood and Affect: Mood normal.         Behavior: Behavior normal.     IMAGING:  Impression   1 cm obstructing left mid ureteral calculus. Mild left hydronephrosis. Punctate nonobstructing right-sided nephrolithiasis. Soft tissue fullness at the pancreatic tail. Cannot exclude underlying mass. Correlation with contrast-enhanced pancreatic protocol MRI or CT is suggested a   possible. Scattered hepatic hypoattenuating lesion, which are compatible with cysts while   others are too small to accurately characterize. Additional findings per body of the report. ASSESSMENT/PLAN  1. Calculus of proximal left ureter  Patient presents with a left proximal ureter stone.   Based on the stone location and severity of symptoms the patient has elected to proceed with ureteroscopic management of the stone. Patient has completed his course of antibiotics with fluconazole. Explained that Dr. Laura Currie would remove his current retained stent to treat the stone and most likely replace the left ureteral stent. We have discussed other modalities of treatment, including but not limited to a trial of medical expulsion therapy, ESWL, and monitoring the stone with subsequent imaging. Risks of the procedure were discussed which include but are not limited to bleeding, infection, postprocedural pain, damage to the kidney, ureter, bladder, or urethra, stricturing or perforation of the ureter which may require additional procedures, pain arising from the postoperative stent, failure to remove the stone, etc.    The patient has been provided with preoperative arrival times/lab work and has been instructed to strain urine prior to the procedure. 2. Urinary retention  Patient also with urinary retention. Has failed multiple voiding trials. Offered voiding trial here in the office today but patient reports he would like to wait until the day of his surgery if at all possible since the catheter would be removed anyway. I did explain to the patient that we would need to continue either intermittent catheterization or indwelling García catheter after surgery if he was unable to void. He voices understanding. At least 50 minutes were spent on the day of the visit reviewing the patient's past medical records/imaging, speaking face to face with the patient, and charting in the post visit period. No orders of the defined types were placed in this encounter. No follow-ups on file. An electronic signature was used to authenticate this note.     RUSSELL ESPOSITO, APRN - CNP    All information inputted into the note by the MA to include chief complaint, past medical history, past surgical history, medications, allergies, social and family history and review of systems has been reviewed and updated as needed by me. EMR Dragon/transcription disclaimer: Much of this document is electronic transcription/translation of spoken language to printed text. The electronic translation of spoken language may be erroneous or, at times, nonsensical words or phrases may be inadvertently transcribed.  Although I have reviewed the document for such errors, some may still exist.

## 2023-03-14 ENCOUNTER — TELEPHONE (OUTPATIENT)
Dept: INTERNAL MEDICINE | Age: 83
End: 2023-03-14

## 2023-03-14 NOTE — TELEPHONE ENCOUNTER
[facility] [phone]    Per Madeline, nursing staff at [facility], patient is doing well. Patient continues to participate in therapy. No known discharge plans at this time.

## 2023-03-15 NOTE — PROGRESS NOTES
Physician Progress Note      PATIENT:               Valerie Rubin  CSN #:                  [id number]  :                       1940  ADMIT DATE:       2023 7:14 PM  [address] DATE:        2023 4:30 PM  RESPONDING  PROVIDER #:        Lenon Landau MD          QUERY TEXT:    Pt admitted with UTI. Pt noted to have Chronic Grove Catheter from nursing   home. If possible, please document in the progress notes and discharge   summary if you are evaluating and/or treating any of the following:  [    The medical record reflects the following:  Risk Factors: Grove Catheter, UTI  Clinical Indicators: DC summary noted \"Complicated UTI in setting of Grove   catheter and left ureteral obstruction\". Concern was for Grove may not be   draining properly and ultimately found to have obstructing left ureteral stone   on CT. Treatment: IV Merrem, Diflucan,  Cystoscopy, New grove placed    Thank you in advance,    Mandie Feliciano RN-BSN, [facility]  Clinical   [email or web address]  Jennifer [place], [address]  Options provided:  -- UTI due to chronic indwelling urinary catheter  -- UTI not due to indwelling urinary catheter  -- Other - I will add my own diagnosis  -- Disagree - Not applicable / Not valid  -- Disagree - Clinically unable to determine / Unknown  -- Refer to Clinical Documentation Reviewer    PROVIDER RESPONSE TEXT:    UTI is due to the chronic indwelling urinary catheter.     Query created by: Anastasiya Ziegler on 2023 9:44 AM      Electronically signed by:  Lenon Landau MD 3/15/2023 5:55 PM

## 2023-03-17 ENCOUNTER — HOSPITAL ENCOUNTER (INPATIENT)
Age: 83
LOS: 4 days | Discharge: OTHER INSTITUTION | End: 2023-03-21
Attending: EMERGENCY MEDICINE | Admitting: INTERNAL MEDICINE
Payer: MEDICARE

## 2023-03-17 ENCOUNTER — HOSPITAL ENCOUNTER (OUTPATIENT)
Dept: PREADMISSION TESTING | Age: 83
Discharge: HOME | End: 2023-03-21
Payer: MEDICARE

## 2023-03-17 ENCOUNTER — APPOINTMENT (OUTPATIENT)
Dept: CT IMAGING | Age: 83
End: 2023-03-17
Payer: MEDICARE

## 2023-03-17 DIAGNOSIS — N39.0 URINARY TRACT INFECTION WITHOUT HEMATURIA, SITE UNSPECIFIED: ICD-10-CM

## 2023-03-17 DIAGNOSIS — R11.10 VOMITING AND DIARRHEA: Primary | ICD-10-CM

## 2023-03-17 DIAGNOSIS — R19.7 VOMITING AND DIARRHEA: Primary | ICD-10-CM

## 2023-03-17 DIAGNOSIS — G60.9 IDIOPATHIC PERIPHERAL NEUROPATHY: ICD-10-CM

## 2023-03-17 PROBLEM — A41.9 SEPSIS (HCC): Status: ACTIVE | Noted: 2023-03-17

## 2023-03-17 PROBLEM — Z97.8 FOLEY CATHETER IN PLACE: Status: ACTIVE | Noted: 2023-03-17

## 2023-03-17 PROBLEM — R11.2 NAUSEA VOMITING AND DIARRHEA: Status: ACTIVE | Noted: 2023-03-17

## 2023-03-17 PROBLEM — Z96.0 URETERAL STENT PRESENT: Status: ACTIVE | Noted: 2023-03-17

## 2023-03-17 LAB
ALBUMIN SERPL-MCNC: 3.2 G/DL (ref 3.5–5.2)
ALBUMIN SERPL-MCNC: 3.3 G/DL (ref 3.5–5.2)
ALP SERPL-CCNC: 117 U/L (ref 40–130)
ALP SERPL-CCNC: 118 U/L (ref 40–130)
ALT SERPL-CCNC: 9 U/L (ref 5–41)
ALT SERPL-CCNC: 9 U/L (ref 5–41)
ANION GAP SERPL CALCULATED.3IONS-SCNC: 13 MMOL/L (ref 7–19)
ANION GAP SERPL CALCULATED.3IONS-SCNC: 17 MMOL/L (ref 7–19)
AST SERPL-CCNC: 13 U/L (ref 5–40)
AST SERPL-CCNC: 13 U/L (ref 5–40)
BACTERIA #/AREA URNS HPF: ABNORMAL /HPF
BASOPHILS # BLD: 0.1 K/UL (ref 0–0.2)
BASOPHILS # BLD: 0.1 K/UL (ref 0–0.2)
BASOPHILS NFR BLD: 0.3 % (ref 0–1)
BASOPHILS NFR BLD: 0.4 % (ref 0–1)
BILIRUB SERPL-MCNC: 1 MG/DL (ref 0.2–1.2)
BILIRUB SERPL-MCNC: 1 MG/DL (ref 0.2–1.2)
BILIRUB UR QL STRIP: NEGATIVE
BUN SERPL-MCNC: 26 MG/DL (ref 8–23)
BUN SERPL-MCNC: 28 MG/DL (ref 8–23)
CALCIUM SERPL-MCNC: 10 MG/DL (ref 8.8–10.2)
CALCIUM SERPL-MCNC: 9.8 MG/DL (ref 8.8–10.2)
CHLORIDE SERPL-SCNC: 100 MMOL/L (ref 98–111)
CHLORIDE SERPL-SCNC: 100 MMOL/L (ref 98–111)
CLARITY UR: ABNORMAL
CO2 SERPL-SCNC: 24 MMOL/L (ref 22–29)
CO2 SERPL-SCNC: 27 MMOL/L (ref 22–29)
COLOR UR: YELLOW
CREAT SERPL-MCNC: 1.4 MG/DL (ref 0.5–1.2)
CREAT SERPL-MCNC: 1.4 MG/DL (ref 0.5–1.2)
EOSINOPHIL # BLD: 0.1 K/UL (ref 0–0.6)
EOSINOPHIL # BLD: 0.1 K/UL (ref 0–0.6)
EOSINOPHIL NFR BLD: 0.3 % (ref 0–5)
EOSINOPHIL NFR BLD: 0.5 % (ref 0–5)
ERYTHROCYTE [DISTWIDTH] IN BLOOD BY AUTOMATED COUNT: 15.7 % (ref 11.5–14.5)
ERYTHROCYTE [DISTWIDTH] IN BLOOD BY AUTOMATED COUNT: 15.7 % (ref 11.5–14.5)
GLUCOSE SERPL-MCNC: 120 MG/DL (ref 74–109)
GLUCOSE SERPL-MCNC: 140 MG/DL (ref 74–109)
GLUCOSE UR STRIP.AUTO-MCNC: NEGATIVE MG/DL
HCT VFR BLD AUTO: 35.9 % (ref 42–52)
HCT VFR BLD AUTO: 36.7 % (ref 42–52)
HGB BLD-MCNC: 11.1 G/DL (ref 14–18)
HGB BLD-MCNC: 11.4 G/DL (ref 14–18)
HGB UR STRIP.AUTO-MCNC: ABNORMAL MG/L
IMM GRANULOCYTES # BLD: 0.2 K/UL
IMM GRANULOCYTES # BLD: 0.2 K/UL
KETONES UR STRIP.AUTO-MCNC: 15 MG/DL
LACTATE BLDV-SCNC: 1.1 MMOL/L (ref 0.5–1.9)
LACTATE BLDV-SCNC: 1.3 MG/DL (ref 0.5–1.9)
LEUKOCYTE ESTERASE UR QL STRIP.AUTO: ABNORMAL
LIPASE SERPL-CCNC: 21 U/L (ref 13–60)
LYMPHOCYTES # BLD: 1.2 K/UL (ref 1.1–4.5)
LYMPHOCYTES # BLD: 2.1 K/UL (ref 1.1–4.5)
LYMPHOCYTES NFR BLD: 6.3 % (ref 20–40)
LYMPHOCYTES NFR BLD: 9.3 % (ref 20–40)
MCH RBC QN AUTO: 26.8 PG (ref 27–31)
MCH RBC QN AUTO: 27.1 PG (ref 27–31)
MCHC RBC AUTO-ENTMCNC: 30.9 G/DL (ref 33–37)
MCHC RBC AUTO-ENTMCNC: 31.1 G/DL (ref 33–37)
MCV RBC AUTO: 86.7 FL (ref 80–94)
MCV RBC AUTO: 87.2 FL (ref 80–94)
MONOCYTES # BLD: 1.3 K/UL (ref 0–0.9)
MONOCYTES # BLD: 1.7 K/UL (ref 0–0.9)
MONOCYTES NFR BLD: 7.1 % (ref 0–10)
MONOCYTES NFR BLD: 7.4 % (ref 0–10)
MUCOUS THREADS URNS QL MICRO: ABNORMAL /LPF
NEUTROPHILS # BLD: 15.7 K/UL (ref 1.5–7.5)
NEUTROPHILS # BLD: 18.9 K/UL (ref 1.5–7.5)
NEUTS SEG NFR BLD: 81.5 % (ref 50–65)
NEUTS SEG NFR BLD: 84.7 % (ref 50–65)
NITRITE UR QL STRIP.AUTO: POSITIVE
PH UR STRIP.AUTO: 5.5 [PH] (ref 5–8)
PLATELET # BLD AUTO: 343 K/UL (ref 130–400)
PLATELET # BLD AUTO: 405 K/UL (ref 130–400)
PMV BLD AUTO: 10.4 FL (ref 9.4–12.4)
PMV BLD AUTO: 11.1 FL (ref 9.4–12.4)
POTASSIUM SERPL-SCNC: 4.4 MMOL/L (ref 3.5–5)
POTASSIUM SERPL-SCNC: 4.8 MMOL/L (ref 3.5–5)
PROT SERPL-MCNC: 6.8 G/DL (ref 6.6–8.7)
PROT SERPL-MCNC: 6.9 G/DL (ref 6.6–8.7)
PROT UR STRIP.AUTO-MCNC: 30 MG/DL
RBC # BLD AUTO: 4.14 M/UL (ref 4.7–6.1)
RBC # BLD AUTO: 4.21 M/UL (ref 4.7–6.1)
RBC #/AREA URNS HPF: ABNORMAL /HPF (ref 0–2)
SARS-COV-2 RDRP RESP QL NAA+PROBE: NOT DETECTED
SODIUM SERPL-SCNC: 140 MMOL/L (ref 136–145)
SODIUM SERPL-SCNC: 141 MMOL/L (ref 136–145)
SP GR UR STRIP.AUTO: 1.02 (ref 1–1.03)
TROPONIN T SERPL-MCNC: 0.03 NG/ML (ref 0–0.03)
TROPONIN T SERPL-MCNC: 0.03 NG/ML (ref 0–0.03)
TROPONIN T SERPL-MCNC: 0.04 NG/ML (ref 0–0.03)
TROPONIN T SERPL-MCNC: 0.04 NG/ML (ref 0–0.03)
UROBILINOGEN UR STRIP.AUTO-MCNC: 0.2 E.U./DL
WBC # BLD AUTO: 18.6 K/UL (ref 4.8–10.8)
WBC # BLD AUTO: 23.1 K/UL (ref 4.8–10.8)
WBC #/AREA URNS HPF: ABNORMAL /HPF (ref 0–5)

## 2023-03-17 PROCEDURE — 85025 COMPLETE CBC W/AUTO DIFF WBC: CPT

## 2023-03-17 PROCEDURE — 83690 ASSAY OF LIPASE: CPT

## 2023-03-17 PROCEDURE — 2580000003 HC RX 258: Performed by: EMERGENCY MEDICINE

## 2023-03-17 PROCEDURE — 84484 ASSAY OF TROPONIN QUANT: CPT

## 2023-03-17 PROCEDURE — 80053 COMPREHEN METABOLIC PANEL: CPT

## 2023-03-17 PROCEDURE — 87040 BLOOD CULTURE FOR BACTERIA: CPT

## 2023-03-17 PROCEDURE — 6360000002 HC RX W HCPCS: Performed by: EMERGENCY MEDICINE

## 2023-03-17 PROCEDURE — 1210000000 HC MED SURG R&B

## 2023-03-17 PROCEDURE — 94760 N-INVAS EAR/PLS OXIMETRY 1: CPT

## 2023-03-17 PROCEDURE — 87635 SARS-COV-2 COVID-19 AMP PRB: CPT

## 2023-03-17 PROCEDURE — 2580000003 HC RX 258: Performed by: NURSE PRACTITIONER

## 2023-03-17 PROCEDURE — 93005 ELECTROCARDIOGRAM TRACING: CPT | Performed by: NURSE PRACTITIONER

## 2023-03-17 PROCEDURE — 81001 URINALYSIS AUTO W/SCOPE: CPT

## 2023-03-17 PROCEDURE — 87086 URINE CULTURE/COLONY COUNT: CPT

## 2023-03-17 PROCEDURE — 6360000002 HC RX W HCPCS: Performed by: NURSE PRACTITIONER

## 2023-03-17 PROCEDURE — 96374 THER/PROPH/DIAG INJ IV PUSH: CPT

## 2023-03-17 PROCEDURE — 99285 EMERGENCY DEPT VISIT HI MDM: CPT

## 2023-03-17 PROCEDURE — 74176 CT ABD & PELVIS W/O CONTRAST: CPT

## 2023-03-17 PROCEDURE — 83605 ASSAY OF LACTIC ACID: CPT

## 2023-03-17 PROCEDURE — 36415 COLL VENOUS BLD VENIPUNCTURE: CPT

## 2023-03-17 PROCEDURE — 87186 SC STD MICRODIL/AGAR DIL: CPT

## 2023-03-17 PROCEDURE — 93005 ELECTROCARDIOGRAM TRACING: CPT | Performed by: EMERGENCY MEDICINE

## 2023-03-17 RX ORDER — ACETAMINOPHEN 650 MG/1
650 SUPPOSITORY RECTAL EVERY 6 HOURS PRN
Status: DISCONTINUED | OUTPATIENT
Start: 2023-03-17 | End: 2023-03-21 | Stop reason: HOSPADM

## 2023-03-17 RX ORDER — LEVOFLOXACIN 5 MG/ML
500 INJECTION, SOLUTION INTRAVENOUS ONCE
OUTPATIENT
Start: 2023-03-21

## 2023-03-17 RX ORDER — ENOXAPARIN SODIUM 100 MG/ML
40 INJECTION SUBCUTANEOUS EVERY 24 HOURS
Status: DISCONTINUED | OUTPATIENT
Start: 2023-03-17 | End: 2023-03-21 | Stop reason: HOSPADM

## 2023-03-17 RX ORDER — SODIUM CHLORIDE 0.9 % (FLUSH) 0.9 %
5-40 SYRINGE (ML) INJECTION PRN
Status: DISCONTINUED | OUTPATIENT
Start: 2023-03-17 | End: 2023-03-21 | Stop reason: HOSPADM

## 2023-03-17 RX ORDER — ONDANSETRON 2 MG/ML
4 INJECTION INTRAMUSCULAR; INTRAVENOUS ONCE
Status: COMPLETED | OUTPATIENT
Start: 2023-03-17 | End: 2023-03-17

## 2023-03-17 RX ORDER — SODIUM CHLORIDE 0.9 % (FLUSH) 0.9 %
5-40 SYRINGE (ML) INJECTION EVERY 12 HOURS SCHEDULED
Status: DISCONTINUED | OUTPATIENT
Start: 2023-03-17 | End: 2023-03-21 | Stop reason: HOSPADM

## 2023-03-17 RX ORDER — 0.9 % SODIUM CHLORIDE 0.9 %
1000 INTRAVENOUS SOLUTION INTRAVENOUS ONCE
Status: COMPLETED | OUTPATIENT
Start: 2023-03-17 | End: 2023-03-17

## 2023-03-17 RX ORDER — 0.9 % SODIUM CHLORIDE 0.9 %
1250 INTRAVENOUS SOLUTION INTRAVENOUS ONCE
Status: COMPLETED | OUTPATIENT
Start: 2023-03-17 | End: 2023-03-17

## 2023-03-17 RX ORDER — ACETAMINOPHEN 325 MG/1
650 TABLET ORAL EVERY 6 HOURS PRN
Status: DISCONTINUED | OUTPATIENT
Start: 2023-03-17 | End: 2023-03-20 | Stop reason: SDUPTHER

## 2023-03-17 RX ORDER — MENTHOL AND ZINC OXIDE .44; 20.625 G/100G; G/100G
1 OINTMENT TOPICAL 3 TIMES DAILY PRN
COMMUNITY

## 2023-03-17 RX ORDER — SODIUM CHLORIDE 9 MG/ML
INJECTION, SOLUTION INTRAVENOUS PRN
Status: DISCONTINUED | OUTPATIENT
Start: 2023-03-17 | End: 2023-03-21 | Stop reason: HOSPADM

## 2023-03-17 RX ORDER — POLYETHYLENE GLYCOL 1450
1 POWDER (GRAM) MISCELLANEOUS DAILY PRN
COMMUNITY

## 2023-03-17 RX ORDER — 0.9 % SODIUM CHLORIDE 0.9 %
500 INTRAVENOUS SOLUTION INTRAVENOUS ONCE
Status: DISCONTINUED | OUTPATIENT
Start: 2023-03-17 | End: 2023-03-17

## 2023-03-17 RX ORDER — SODIUM CHLORIDE 9 MG/ML
INJECTION, SOLUTION INTRAVENOUS CONTINUOUS
Status: DISCONTINUED | OUTPATIENT
Start: 2023-03-17 | End: 2023-03-21 | Stop reason: HOSPADM

## 2023-03-17 RX ADMIN — ENOXAPARIN SODIUM 40 MG: 100 INJECTION SUBCUTANEOUS at 16:56

## 2023-03-17 RX ADMIN — SODIUM CHLORIDE 500 ML: 9 INJECTION, SOLUTION INTRAVENOUS at 12:26

## 2023-03-17 RX ADMIN — CEFEPIME 2000 MG: 2 INJECTION, POWDER, FOR SOLUTION INTRAVENOUS at 14:16

## 2023-03-17 RX ADMIN — SODIUM CHLORIDE 1250 ML: 9 INJECTION, SOLUTION INTRAVENOUS at 15:19

## 2023-03-17 RX ADMIN — ONDANSETRON 4 MG: 2 INJECTION INTRAMUSCULAR; INTRAVENOUS at 12:26

## 2023-03-17 RX ADMIN — SODIUM CHLORIDE: 9 INJECTION, SOLUTION INTRAVENOUS at 17:01

## 2023-03-17 RX ADMIN — SODIUM CHLORIDE 1000 ML: 9 INJECTION, SOLUTION INTRAVENOUS at 13:00

## 2023-03-17 ASSESSMENT — ENCOUNTER SYMPTOMS
VOMITING: 1
BLOOD IN STOOL: 0
DIARRHEA: 1
ABDOMINAL PAIN: 0
EYE PAIN: 0
ABDOMINAL PAIN: 1
SHORTNESS OF BREATH: 0
NAUSEA: 1

## 2023-03-17 ASSESSMENT — PAIN - FUNCTIONAL ASSESSMENT: PAIN_FUNCTIONAL_ASSESSMENT: NONE - DENIES PAIN

## 2023-03-17 NOTE — CARE COORDINATION
Electronically signed by Falguni Brown on 3/17/2023 at 3:23 PM    03/17/23 1502   Readmission Assessment   Number of Days since last admission? 8-30 days   Previous Disposition SNF   Who is being Jovanna Loomis  (Pt. daughter Estela Wong)   What was the patient's/caregiver's perception as to why they think they needed to return back to the hospital? Other (Comment)  (PT. family said there was nothing that the hospital could have done.)   Did you visit your Primary Care Physician after you left the hospital, before you returned this time? No  (unknown Pt. family thinks he did not see PCP)   Why weren't you able to visit your PCP? Other (Comment)  (Went straight to ER)   Did you see a specialist, such as Cardiac, Pulmonary, Orthopedic Physician, etc. after you left the hospital? Other (Comment)  (unknown)   Who advised the patient to return to the hospital? [facility] Staff   Does the patient report anything that got in the way of taking their medications? Yes  (Family is not sure if Pt received medication at [facility])   What reasons did they give? Other (Comment)  (Unknown Pt. family is going to check to make sure)   In our efforts to provide the best possible care to you and others like you, can you think of anything that we could have done to help you after you left the hospital the first time, so that you might not have needed to return so soon?  Other (Comment)  (Pt. family reported there was nothing different that the hospital could have done differently.)

## 2023-03-17 NOTE — PROGRESS NOTES
4 Eyes Skin Assessment    Dakota Faust is being assessed upon: Admission    I agree that Raf Kevin, MESERET, along with Sha Quigley (either 2 RN's or 1 LPN and 1 RN) have performed a thorough Head to Toe Skin Assessment on the patient. ALL assessment sites listed below have been assessed. Areas assessed by both nurses:     [x]   Head, Face, and Ears   [x]   Shoulders, Back, and Chest  [x]   Arms, Elbows, and Hands   [x]   Coccyx, Sacrum, and Ischium  [x]   Legs, Feet, and Heels    Does the Patient have Skin Breakdown?  Redness sacrum      Isiah Prevention initiated: Yes  Wound Care Orders initiated: NA    [facility] nurse consulted for Pressure Injury (Stage 3,4, Unstageable, DTI, NWPT, and Complex wounds) and New or Established Ostomies: NA        Primary Nurse eSignature: Riki Glez RN on 3/17/2023 at 4:36 PM      Co-Signer eSignature: Electronically signed by Sha Quigley RN on 3/17/23 at 5:52 PM CDT

## 2023-03-17 NOTE — PROGRESS NOTES
Spoke to Dr. Amy Jernigan regarding consult for patient and he states he spoke to Dr. Ponce to advise of consult.

## 2023-03-17 NOTE — CARE COORDINATION
Electronically signed by Jett Estrada on 3/17/2023 at 3:20 PM    03/17/23 1518   /Social Work Whiteboard Notes   /Social Work Whiteboard SNF, Family Support, Family would like to discuss transportation, possible DME, and if Pt. can return back to SNF prior to discharge with SW.

## 2023-03-17 NOTE — DISCHARGE INSTRUCTIONS
The day before surgery you will receive a phone call from the surgery nurse to let you know what time to arrive on the day of surgery. This call will usually be between 2-4 PM. If you do not receive a phone call by 4 PM the day before your surgery please call [phone] and let them know you have not received an arrival time. If your surgery is on Monday, your call will be on the Friday before your Monday surgery. The morning of surgery, you may take all your prescribed medications with a sip of water. Any exceptions to this would be listed below:           [facility] not eat or drink anything after midnight, the night before your surgery. No gum or candy the morning of surgery. This is extremely important for your safety. Take a bath (or shower) the night before your surgery and you may brush your teeth the morning of your surgery. You will be scheduled to arrive at the hospital 2 hours before your surgery, or follow your surgeon's instructions. Dress comfortably. Wear loose clothing that will be easy to remove and comfortable for your trip home. You may wear eyeglasses or contacts but bring your cases with you as they must be remove before your surgery. Hearing aids and dentures will need to be removed before your surgery. Do not wear any jewelry, including body jewelry. All jewelry will need to be removed prior to your surgery. Do not wear fingernail polish or make-up. It is best not to bring any valuables with you. If you are to stay in the hospital overnight, bring your robe, slippers and personal toiletries that you may need. POSTOPERATIVE GUIDELINES AFTER RECEIVING ANESTHESIA    If you are to go home after your surgery, you will need a responsible adult to drive you home.      You will not be able to take public transportation after your discharge from the Operative Care Unit unless you are accompanied by a

## 2023-03-17 NOTE — H&P
reconstructions were performed. One or more of the following dose reduction techniques were used: Automated exposure control, adjustment of the mA and/or kV according to patient size, and/or use of iterative reconstruction technique. No intravenous contrast was administered. COMPARISON: Abdomen pelvis CT from 2/13/2023 FINDINGS: Statements: Lack of intravenous contrast compromises evaluation of solid organs and vasculature. Thoracic: Patchy opacity in the left lower lobe. Scattered calcified lung granulomas. Hepatobiliary: Stable hepatic hypoattenuating lesions throughout the liver, favored to represent cyst, some too small to characterize. The gallbladder is unremarkable. Pancreas: No abnormality identified in the pancreas. Spleen: No abnormality identified in the spleen. Adrenals: No abnormality identified in either adrenal gland. Genitourinary: No parenchymal abnormality identified in either kidney. No hydronephrosis. Redemonstration of punctate right renal calculus and a new nonobstructing 1 cm stone in the left kidney. This renal calculus has possibly migrated superiorly since previous exam.Stable 1.3 cm parenchymal calcification in the left kidney. A new right ureteral stent is seen with the proximal pigtail in the renal pelvis and distal pigtail in the urinary bladder. The urinary bladder is decompressed by García catheter. Stable prostate enlargement. Gastrointestinal: No evidence of bowel obstruction or perienteric inflammation. No CT evidence for acute appendicitis. Colonic diverticulosis without diverticulitis. Vascular/Lymphatics: No enlarged lymph nodes by CT size criteria. Abdominal aorta is normal in caliber. Moderate diffuse arterial calcifications. MSK/Body Wall: No concerning bony lesion identified. Peritoneum/Other: No extraluminal air. No extraluminal fluid. Interval placement of the left ureteral stent with resolution of the left hydronephrosis. There has been superior migration of the previously noted 1 cm -- -- 93 % -- -- -- --      Recent Labs     03/17/23  0944 03/17/23  1113 03/17/23  1311   WBC 23.1* 18.6*  --    LACTA  --   --  1.1   CREATININE 1.4* 1.4*  --    BILITOT 1.0 1.0  --    * 343  --         Severe sepsis identified date: on admission  time: 1300    Fluid Resuscitation Rationale: at least 30mL/kg based on entered actual weight at time of triage    Repeat lactate level: not indicated due to initial lactate < 2    Reassessment Exam: Not applicable. Patient does not have septic shock. Assessment/Plan:  Principal Problem:    Sepsis ([address]  Active Problems:    Complicated UTI (urinary tract infection)    Grove catheter in place    Ureteral stent present    Essential hypertension  Resolved Problems:    * No resolved hospital problems. *     Principal Problem:    Sepsis/Complicated UTI/Grove catheter in place/Ureteral stent present  -follow pan cx  -cefepime 2g iv q12hrs  -30cc/kg fluid bolus  -ns at 100cc/hr  -goal map >65  -pressors as warranted  -ensure adequate iv access  -lactate  -consult urology  -telemetry  -pt/ot consult  -monitor renal fxn/lytes  -avoid nephrotoxic agents  -grove catheter care    Nausea, vomiting and diarrhea   -ns at 100cc/hr   -antiemetics  -I's and O's  -daily weight  -gi molecular panel   -c.diff contact isolation    Elevated troponin   -trend troponin  -consult cards as warranted    Active Problems:    Essential hypertension  -monitor blood pressure  -continue antihypertensive meds  -avoid hypotension  Resolved Problems:    * No resolved hospital problems.  *  Signed:  GOLD Lux - CNP, 3/17/2023 3:17 PM

## 2023-03-17 NOTE — CARE COORDINATION
Case Management Assessment  Initial Evaluation    Date/Time of Evaluation: 3/17/2023 3:14 PM  Assessment Completed by: Lynda Boykin    If patient is discharged prior to next notation, then this note serves as note for discharge by case management. Patient Name: Juana Terrell                   YOB: 1940  Diagnosis: Vomiting and diarrhea [R11.10, R19.7]  Sepsis ([address] [A41.9]  Urinary tract infection without hematuria, site unspecified [N39.0]                   Date / Time: 3/17/2023 10:55 AM    Patient Admission Status: Inpatient   Readmission Risk (Low < 19, Mod (19-27), High > 27): Readmission Risk Score: 22.8    Current PCP: Nery Naranjo MD  PCP verified by CM? Yes    Chart Reviewed: Yes      History Provided by: Child/Family (daughter POA)  Patient Orientation: Alert and Oriented    Patient Cognition: Alert    Hospitalization in the last 30 days (Readmission):  Yes    If yes, Readmission Assessment in  Navigator will be completed. Advance Directives:      Code Status: Full Code   Patient's Primary Decision Maker is: Legal Next of Kin (POA daughter)    Primary Decision MakerFcatracho Gurmeet Santana Child - [phone]    Secondary Decision Maker: Gerardo Coffman  Child - [phone]    Discharge Planning:    Patient lives with: Other (Comment) (SNF) Type of Home: [place]  Primary Care Giver:  Other (Comment) (SNF)  Patient Support Systems include: Children, Family Members   Current Financial resources: Medicare, [place] ([place]  Current community resources: None (Pt/family denies)  Current services prior to admission: Lenny Virk            Current DME:              Type of Home Care services:  (P) None (SNF)    ADLS  Prior functional level: Assistance with the following:, Bathing, Dressing, Toileting, Mobility  Current functional level: Assistance with the following:, Bathing, Dressing, Toileting, Mobility    PT AM-PAC:   /24  OT AM-PAC:   /24    Family can Vomiting and diarrhea [R11.10, R19.7]  [address] [address] [facility] [A41.9]  Urinary tract infection without hematuria, site unspecified [N39.0]      Cesar Russell  Case Management Department

## 2023-03-17 NOTE — ED PROVIDER NOTES
ill-appearing. He is not toxic-appearing. HENT:      Head: Normocephalic and atraumatic. Mouth/Throat:      Mouth: Mucous membranes are dry. Eyes:      General: No scleral icterus. Pupils: Pupils are equal, round, and reactive to light. Neck:      Vascular: No JVD. Cardiovascular:      Rate and Rhythm: Regular rhythm. Tachycardia present. Pulses: Normal pulses. Heart sounds: Normal heart sounds. Pulmonary:      Effort: Pulmonary effort is normal. No respiratory distress. Breath sounds: Normal breath sounds. Abdominal:      General: There is no distension. Palpations: Abdomen is soft. Tenderness: There is no abdominal tenderness. There is no guarding or rebound. Genitourinary:     Comments: García catheter in place draining yellow urine  Musculoskeletal:         General: No tenderness. Cervical back: Normal range of motion and neck supple. Right lower leg: No edema. Left lower leg: No edema. Skin:     General: Skin is warm and dry. Capillary Refill: Capillary refill takes less than 2 seconds. Neurological:      General: No focal deficit present. Mental Status: He is alert and oriented to person, place, and time. Psychiatric:         Mood and Affect: Mood normal.         Behavior: Behavior normal.       DIAGNOSTIC RESULTS     EKG: All EKG's are interpreted by the Emergency Department Physician who either signs or Co-signs this chart in the absence of a cardiologist.    Sinus tachycardia. Normal QT. Borderline ST changes. Artifact.     RADIOLOGY:   Non-plain film images such as CT, Ultrasound and MRI are read by the radiologist. Latha Sand images are visualized and preliminarily interpreted by the emergency physician with the below findings:        Interpretation per the Radiologist below, if available at the time of this note:    CT ABDOMEN PELVIS WO CONTRAST Additional Contrast? None   Final Result   Interval placement of the left ureteral stent with resolution of the left   hydronephrosis. There has been superior migration of the previously noted 1 cm   stone now located within the lower pole of the left kidney. Stable nonobstructing punctate right renal calculus.    Other findings are not significantly interval change from prior exam.            ED BEDSIDE ULTRASOUND:   Performed by ED Physician - none    LABS:  Labs Reviewed   CBC WITH AUTO DIFFERENTIAL - Abnormal; Notable for the following components:       Result Value    WBC 18.6 (*)     RBC 4.14 (*)     Hemoglobin 11.1 (*)     Hematocrit 35.9 (*)     MCH 26.8 (*)     MCHC 30.9 (*)     RDW 15.7 (*)     Neutrophils % 84.7 (*)     Lymphocytes % 6.3 (*)     Neutrophils Absolute 15.7 (*)     Monocytes Absolute 1.30 (*)     All other components within normal limits   COMPREHENSIVE METABOLIC PANEL - Abnormal; Notable for the following components:    Glucose 140 (*)     BUN 28 (*)     Creatinine 1.4 (*)     Est, Glom Filt Rate 50 (*)     Albumin 3.3 (*)     All other components within normal limits   URINALYSIS WITH REFLEX TO CULTURE - Abnormal; Notable for the following components:    Clarity, UA TURBID (*)     Ketones, Urine 15 (*)     Blood, Urine MODERATE (*)     Protein, UA 30 (*)     Nitrite, Urine POSITIVE (*)     Leukocyte Esterase, Urine LARGE (*)     All other components within normal limits   MICROSCOPIC URINALYSIS - Abnormal; Notable for the following components:    Mucus, UA Rare (*)     WBC, UA TNTC (*)     RBC, UA TNTC (*)     Bacteria, UA 4+ (*)     All other components within normal limits   TROPONIN - Abnormal; Notable for the following components:    Troponin 0.04 (*)     All other components within normal limits   COVID-19, RAPID   CULTURE, BLOOD 1   CULTURE, BLOOD 2   GASTROINTESTINAL PANEL, MOLECULAR   CULTURE, URINE   LIPASE   LACTIC ACID   LACTATE, SEPSIS   LACTATE, SEPSIS   TROPONIN   TROPONIN   TROPONIN       All other labs were within normal range or not returned as

## 2023-03-18 LAB
ALBUMIN SERPL-MCNC: 2.5 G/DL (ref 3.5–5.2)
ALP SERPL-CCNC: 130 U/L (ref 40–130)
ALT SERPL-CCNC: 6 U/L (ref 5–41)
ANION GAP SERPL CALCULATED.3IONS-SCNC: 10 MMOL/L (ref 7–19)
AST SERPL-CCNC: 12 U/L (ref 5–40)
BASOPHILS # BLD: 0 K/UL (ref 0–0.2)
BASOPHILS NFR BLD: 0.3 % (ref 0–1)
BILIRUB SERPL-MCNC: 0.4 MG/DL (ref 0.2–1.2)
BUN SERPL-MCNC: 29 MG/DL (ref 8–23)
CALCIUM SERPL-MCNC: 8.8 MG/DL (ref 8.8–10.2)
CHLORIDE SERPL-SCNC: 106 MMOL/L (ref 98–111)
CO2 SERPL-SCNC: 25 MMOL/L (ref 22–29)
CREAT SERPL-MCNC: 1.1 MG/DL (ref 0.5–1.2)
EKG P AXIS: 49 DEGREES
EKG P-R INTERVAL: 120 MS
EKG Q-T INTERVAL: 302 MS
EKG QRS DURATION: 112 MS
EKG QTC CALCULATION (BAZETT): 412 MS
EKG T AXIS: 94 DEGREES
EOSINOPHIL # BLD: 0.1 K/UL (ref 0–0.6)
EOSINOPHIL NFR BLD: 0.8 % (ref 0–5)
ERYTHROCYTE [DISTWIDTH] IN BLOOD BY AUTOMATED COUNT: 15.9 % (ref 11.5–14.5)
GLUCOSE SERPL-MCNC: 89 MG/DL (ref 74–109)
HCT VFR BLD AUTO: 31.6 % (ref 42–52)
HGB BLD-MCNC: 9.7 G/DL (ref 14–18)
IMM GRANULOCYTES # BLD: 0.1 K/UL
LYMPHOCYTES # BLD: 1.7 K/UL (ref 1.1–4.5)
LYMPHOCYTES NFR BLD: 11.7 % (ref 20–40)
MCH RBC QN AUTO: 27.1 PG (ref 27–31)
MCHC RBC AUTO-ENTMCNC: 30.7 G/DL (ref 33–37)
MCV RBC AUTO: 88.3 FL (ref 80–94)
MONOCYTES # BLD: 0.9 K/UL (ref 0–0.9)
MONOCYTES NFR BLD: 6.5 % (ref 0–10)
NEUTROPHILS # BLD: 11.4 K/UL (ref 1.5–7.5)
NEUTS SEG NFR BLD: 80 % (ref 50–65)
PLATELET # BLD AUTO: 255 K/UL (ref 130–400)
PMV BLD AUTO: 10.8 FL (ref 9.4–12.4)
POTASSIUM SERPL-SCNC: 3.9 MMOL/L (ref 3.5–5)
PROT SERPL-MCNC: 5.7 G/DL (ref 6.6–8.7)
RBC # BLD AUTO: 3.58 M/UL (ref 4.7–6.1)
SODIUM SERPL-SCNC: 141 MMOL/L (ref 136–145)
TROPONIN T SERPL-MCNC: 0.04 NG/ML (ref 0–0.03)
TROPONIN T SERPL-MCNC: 0.04 NG/ML (ref 0–0.03)
WBC # BLD AUTO: 14.3 K/UL (ref 4.8–10.8)

## 2023-03-18 PROCEDURE — 97116 GAIT TRAINING THERAPY: CPT

## 2023-03-18 PROCEDURE — 87507 IADNA-DNA/RNA PROBE TQ 12-25: CPT

## 2023-03-18 PROCEDURE — 93010 ELECTROCARDIOGRAM REPORT: CPT | Performed by: INTERNAL MEDICINE

## 2023-03-18 PROCEDURE — 6360000002 HC RX W HCPCS: Performed by: NURSE PRACTITIONER

## 2023-03-18 PROCEDURE — 51702 INSERT TEMP BLADDER CATH: CPT

## 2023-03-18 PROCEDURE — 97530 THERAPEUTIC ACTIVITIES: CPT

## 2023-03-18 PROCEDURE — 85025 COMPLETE CBC W/AUTO DIFF WBC: CPT

## 2023-03-18 PROCEDURE — 36415 COLL VENOUS BLD VENIPUNCTURE: CPT

## 2023-03-18 PROCEDURE — 80053 COMPREHEN METABOLIC PANEL: CPT

## 2023-03-18 PROCEDURE — 1210000000 HC MED SURG R&B

## 2023-03-18 PROCEDURE — 2580000003 HC RX 258: Performed by: NURSE PRACTITIONER

## 2023-03-18 PROCEDURE — 84484 ASSAY OF TROPONIN QUANT: CPT

## 2023-03-18 PROCEDURE — 97162 PT EVAL MOD COMPLEX 30 MIN: CPT

## 2023-03-18 RX ADMIN — SODIUM CHLORIDE, PRESERVATIVE FREE 10 ML: 5 INJECTION INTRAVENOUS at 09:12

## 2023-03-18 RX ADMIN — CEFEPIME 2000 MG: 2 INJECTION, POWDER, FOR SOLUTION INTRAVENOUS at 14:49

## 2023-03-18 RX ADMIN — SODIUM CHLORIDE, PRESERVATIVE FREE 10 ML: 5 INJECTION INTRAVENOUS at 20:35

## 2023-03-18 RX ADMIN — CEFEPIME 2000 MG: 2 INJECTION, POWDER, FOR SOLUTION INTRAVENOUS at 01:20

## 2023-03-18 RX ADMIN — ENOXAPARIN SODIUM 40 MG: 100 INJECTION SUBCUTANEOUS at 14:49

## 2023-03-18 ASSESSMENT — ENCOUNTER SYMPTOMS
RESPIRATORY NEGATIVE: 1
ALLERGIC/IMMUNOLOGIC NEGATIVE: 1
ABDOMINAL PAIN: 1
EYES NEGATIVE: 1

## 2023-03-18 ASSESSMENT — PAIN SCALES - GENERAL: PAINLEVEL_OUTOF10: 0

## 2023-03-18 ASSESSMENT — PAIN - FUNCTIONAL ASSESSMENT: PAIN_FUNCTIONAL_ASSESSMENT: ACTIVITIES ARE NOT PREVENTED

## 2023-03-18 NOTE — PROGRESS NOTES
Comprehensive Nutrition Assessment    Type and Reason for Visit:  Initial, Positive Nutrition Screen    Nutrition Recommendations/Plan:   Follow for advancing diet, po intake     Malnutrition Assessment:  Malnutrition Status: At risk for malnutrition (Comment) (03/18/23 1896)    Context:  Acute Illness     Findings of the 6 clinical characteristics of malnutrition:  Energy Intake:  Mild decrease in energy intake (Comment)  Weight Loss:  Greater than 5% over 1 month     Body Fat Loss:  Mild body fat loss Buccal region   Muscle Mass Loss:  No significant muscle mass loss    Fluid Accumulation:  No significant fluid accumulation Extremities   Strength:  Not Performed    Nutrition Assessment:    +NS for decreased po intake and weight. At present time pt is NPO. Aware pt has had issues with nausea/vomiting and diarrhea the past few days. Has had approx 25# or 12.5%weight loss in 1 month of UBW    Nutrition Related Findings:    Recent removal of permacath Wound Type: None       Current Nutrition Intake & Therapies:    Average Meal Intake: NPO  Average Supplements Intake: NPO  Diet NPO  ADULT ORAL NUTRITION SUPPLEMENT; Breakfast, Lunch, Dinner; Standard High Calorie/High Protein Oral Supplement    Anthropometric Measures:  Height: 6' (182.9 cm)  Ideal Body Weight (IBW): 178 lbs (81 kg)    Admission Body Weight: 178 lb (80.7 kg) (stated)  Current Body Weight: 174 lb 9.6 oz (79.2 kg), 98.1 % IBW.  Weight Source: Bed Scale  Current BMI (kg/m2): 23.7  Usual Body Weight: 199 lb 8 oz (90.5 kg) (2/2023)  % Weight Change (Calculated): -12.5  Weight Adjustment For: No Adjustment  BMI Categories: Normal Weight (BMI 22.0 to 24.9) age over 72    Estimated Daily Nutrient Needs:  Energy Requirements Based On: Kcal/kg  Weight Used for Energy Requirements: Current  Energy (kcal/day): 1318-7674 kcals (25-30 kcals/kg)     Protein (g/day): 79-158g  Method Used for Fluid Requirements: 1 ml/kcal  Fluid (ml/day): 3552-6731

## 2023-03-18 NOTE — PROGRESS NOTES
In to assess patient, and patient not responding to verbal or max stimuli. Patient was pale, lips cyanotic, and Cap refill greater than three seconds. Vital signs taken and heart rate was 131 and SPO2 was 48% on room air. Rapid Response called at 2130. Patient responding five minutes later and SPO2 now 93% on room air and heart rate of 124. Rapid response cancelled. Will notify provider.

## 2023-03-18 NOTE — PROGRESS NOTES
Negative     A1C: No results for input(s): LABA1C in the last 72 hours. ABG:No results for input(s): PHART, BCR2GDO, PO2ART, SYO2PCB, BEART, HGBAE, J6HNZQTY, CARBOXHGBART in the last 72 hours. RAD:   CT ABDOMEN PELVIS WO CONTRAST Additional Contrast? None    Result Date: 3/17/2023  CLINICAL HISTORY: Abdominal pain TECHNIQUE: Axial images of the abdomen and pelvis were obtained without IV contrast.Coronal and sagittal multiplanar reconstructions were performed. One or more of the following dose reduction techniques were used: Automated exposure control, adjustment of the mA and/or kV according to patient size, and/or use of iterative reconstruction technique. No intravenous contrast was administered. COMPARISON: Abdomen pelvis CT from 2/13/2023 FINDINGS: Statements: Lack of intravenous contrast compromises evaluation of solid organs and vasculature. Thoracic: Patchy opacity in the left lower lobe. Scattered calcified lung granulomas. Hepatobiliary: Stable hepatic hypoattenuating lesions throughout the liver, favored to represent cyst, some too small to characterize. The gallbladder is unremarkable. Pancreas: No abnormality identified in the pancreas. Spleen: No abnormality identified in the spleen. Adrenals: No abnormality identified in either adrenal gland. Genitourinary: No parenchymal abnormality identified in either kidney. No hydronephrosis. Redemonstration of punctate right renal calculus and a new nonobstructing 1 cm stone in the left kidney. This renal calculus has possibly migrated superiorly since previous exam.Stable 1.3 cm parenchymal calcification in the left kidney. A new right ureteral stent is seen with the proximal pigtail in the renal pelvis and distal pigtail in the urinary bladder. The urinary bladder is decompressed by García catheter. Stable prostate enlargement. Gastrointestinal: No evidence of bowel obstruction or perienteric inflammation. No CT evidence for acute appendicitis. Colonic diverticulosis this encounter note is an electronic transcription/translation of spoken language to printed text.  The electronic translation of spoken language may permit erroneous, or at times, nonsensical words or phrases to be inadvertently transcribed; although attempts have made to review the note for such errors, some may still exist.

## 2023-03-18 NOTE — PROGRESS NOTES
Physcial Therapy Plan  General Plan: 6-7 times per week  Current Treatment Recommendations: Strengthening, ROM, Balance training, Functional mobility training, Transfer training, Gait training, Patient/Caregiver education & training, Safety education & training, Equipment evaluation, education, & procurement, Therapeutic activities  Safety Devices  Type of Devices: Gait belt, Call light within reach, Chair alarm in place, Patient at risk for falls, Nurse notified, Nimisha Cutter elevated for pressure relief, Left in chair     Restrictions  Restrictions/Precautions  Restrictions/Precautions: Fall Risk, Contact Precautions  Position Activity Restriction  Other position/activity restrictions: c diff rule out     Subjective   Pain: none  General  Chart Reviewed: Yes  Patient assessed for rehabilitation services?: Yes  Additional Pertinent Hx: rapid response 3/17 for low O2 sats (48%) while pt sleeping  Response To Previous Treatment: Not applicable  Family / Caregiver Present: No  Referring Practitioner: GOLD Lopez CNP  Referral Date : 03/17/23  Diagnosis: UTI without hematuria  Follows Commands: Within Functional Limits  General Comment  Comments: RN, Emeka Navarro PT. Subjective  Subjective: Pt. willing to work with therapy. Needs to go to the bathroom.          Social/Functional History  Social/Functional History  Lives With: Other (comment) (SNF)  Type of Home: Facility ([facility])  Bathroom Toilet: Handicap height  Bathroom Equipment:  (unknown)  Bathroom Accessibility: Wheelchair accessible  Home Equipment: Hospital bed  Receives Help From: Family  ADL Assistance: Needs assistance  Homemaking Assistance: Needs assistance  Ambulation Assistance: Needs assistance (walking about 21' with RW)  Transfer Assistance: Needs assistance  Active : No  Patient's  Info: Daughter/POA Pt. may need to be transferred by ambulance Pt. 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

## 2023-03-18 NOTE — CONSULTS
AM     BMP:    Lab Results   Component Value Date/Time     03/17/2023 11:13 AM    K 4.4 03/17/2023 11:13 AM    K 3.9 02/20/2023 02:16 AM     03/17/2023 11:13 AM    CO2 27 03/17/2023 11:13 AM    BUN 28 03/17/2023 11:13 AM    LABALBU 3.3 03/17/2023 11:13 AM    CREATININE 1.4 03/17/2023 11:13 AM    CALCIUM 9.8 03/17/2023 11:13 AM    GFRAA >59 05/19/2022 03:38 PM    LABGLOM 50 03/17/2023 11:13 AM     PT/INR:    Lab Results   Component Value Date/Time    PROTIME 14.2 05/19/2022 03:38 PM    INR 1.10 05/19/2022 03:38 PM     PTT:    Lab Results   Component Value Date/Time    APTT 31.2 05/19/2022 03:38 PM   [APTT        Imaging: Images reviewed  CT ABDOMEN PELVIS WO CONTRAST Additional Contrast? None: Patient Communication     Add Comments   Add Notifications      Radiation Dose Estimates    No radiation information found for this patient  Narrative   CLINICAL HISTORY: Abdominal pain   TECHNIQUE: Axial images of the abdomen and pelvis were obtained without IV   contrast.Coronal and sagittal multiplanar reconstructions were performed. One   or more of the following dose reduction techniques were used: Automated exposure   control, adjustment of the mA and/or kV according to patient size, and/or use of   iterative reconstruction technique. No intravenous contrast was administered. COMPARISON: Abdomen pelvis CT from 2/13/2023   FINDINGS:   Statements: Lack of intravenous contrast compromises evaluation of solid organs   and vasculature. Thoracic: Patchy opacity in the left lower lobe. Scattered calcified lung   granulomas. Hepatobiliary: Stable hepatic hypoattenuating lesions throughout the liver,   favored to represent cyst, some too small to characterize. The gallbladder is   unremarkable. Pancreas: No abnormality identified in the pancreas. Spleen: No abnormality identified in the spleen. Adrenals: No abnormality identified in either adrenal gland.    Genitourinary: No parenchymal abnormality identified in either kidney. No   hydronephrosis. Redemonstration of punctate right renal calculus and a new   nonobstructing 1 cm stone in the left kidney. This renal calculus has possibly   migrated superiorly since previous exam.Stable 1.3 cm parenchymal calcification   in the left kidney. A new right ureteral stent is seen with the proximal pigtail   in the renal pelvis and distal pigtail in the urinary bladder. The urinary   bladder is decompressed by Grove catheter. Stable prostate enlargement. Gastrointestinal: No evidence of bowel obstruction or perienteric inflammation. No CT evidence for acute appendicitis. Colonic diverticulosis without   diverticulitis. Vascular/Lymphatics: No enlarged lymph nodes by CT size criteria. Abdominal   aorta is normal in caliber. Moderate diffuse arterial calcifications. MSK/Body Wall: No concerning bony lesion identified. Peritoneum/Other: No extraluminal air. No extraluminal fluid. Impression   Interval placement of the left ureteral stent with resolution of the left   hydronephrosis. There has been superior migration of the previously noted 1 cm   stone now located within the lower pole of the left kidney. Stable nonobstructing punctate right renal calculus. Other findings are not significantly interval change from prior exam.       Images reviewed. Impression/Plan:   Patient admitted with abdominal pain, weakness. Feeling better. Tolerating diet. Due for ureteral stone surgery next week with Dr. James Vergara. Has an indwelling grove catheter. Was treated for fungal infection before. On cefepime, pending urine culture. To discuss with  Monday and to proceed with stone surgery.        Jenny Reyes MD

## 2023-03-19 PROBLEM — A04.72 C. DIFFICILE DIARRHEA: Status: ACTIVE | Noted: 2023-03-19

## 2023-03-19 LAB
ADV 40+41 DNA STL QL NAA+NON-PROBE: NOT DETECTED
ALBUMIN SERPL-MCNC: 2.4 G/DL (ref 3.5–5.2)
ALP SERPL-CCNC: 81 U/L (ref 40–130)
ALT SERPL-CCNC: 7 U/L (ref 5–41)
ANION GAP SERPL CALCULATED.3IONS-SCNC: 11 MMOL/L (ref 7–19)
AST SERPL-CCNC: 12 U/L (ref 5–40)
BACTERIA UR CULT: ABNORMAL
BACTERIA UR CULT: ABNORMAL
BASOPHILS # BLD: 0 K/UL (ref 0–0.2)
BASOPHILS NFR BLD: 0.2 % (ref 0–1)
BILIRUB SERPL-MCNC: 0.3 MG/DL (ref 0.2–1.2)
BUN SERPL-MCNC: 23 MG/DL (ref 8–23)
C CAYETANENSIS DNA STL QL NAA+NON-PROBE: NOT DETECTED
C COLI+JEJ+UPSA DNA STL QL NAA+NON-PROBE: NOT DETECTED
C DIF TOX TCDA+TCDB STL QL NAA+NON-PROBE: DETECTED
CALCIUM SERPL-MCNC: 8.1 MG/DL (ref 8.8–10.2)
CHLORIDE SERPL-SCNC: 107 MMOL/L (ref 98–111)
CO2 SERPL-SCNC: 23 MMOL/L (ref 22–29)
CREAT SERPL-MCNC: 1 MG/DL (ref 0.5–1.2)
CRYPTOSP DNA STL QL NAA+NON-PROBE: NOT DETECTED
E HISTOLYT DNA STL QL NAA+NON-PROBE: NOT DETECTED
EAEC PAA PLAS AGGR+AATA ST NAA+NON-PRB: NOT DETECTED
EC STX1+STX2 GENES STL QL NAA+NON-PROBE: NOT DETECTED
EOSINOPHIL # BLD: 0.3 K/UL (ref 0–0.6)
EOSINOPHIL NFR BLD: 2.6 % (ref 0–5)
EPEC EAE GENE STL QL NAA+NON-PROBE: NOT DETECTED
ERYTHROCYTE [DISTWIDTH] IN BLOOD BY AUTOMATED COUNT: 15.8 % (ref 11.5–14.5)
ETEC LTA+ST1A+ST1B TOX ST NAA+NON-PROBE: NOT DETECTED
G LAMBLIA DNA STL QL NAA+NON-PROBE: NOT DETECTED
GI PATH DNA+RNA PNL STL NAA+NON-PROBE: NOT DETECTED
GLUCOSE SERPL-MCNC: 90 MG/DL (ref 74–109)
HCT VFR BLD AUTO: 29.4 % (ref 42–52)
HGB BLD-MCNC: 9.3 G/DL (ref 14–18)
IMM GRANULOCYTES # BLD: 0.1 K/UL
LYMPHOCYTES # BLD: 1 K/UL (ref 1.1–4.5)
LYMPHOCYTES NFR BLD: 9.1 % (ref 20–40)
MAGNESIUM SERPL-MCNC: 1.6 MG/DL (ref 1.6–2.4)
MCH RBC QN AUTO: 27.3 PG (ref 27–31)
MCHC RBC AUTO-ENTMCNC: 31.6 G/DL (ref 33–37)
MCV RBC AUTO: 86.2 FL (ref 80–94)
MONOCYTES # BLD: 0.6 K/UL (ref 0–0.9)
MONOCYTES NFR BLD: 5.4 % (ref 0–10)
NEUTROPHILS # BLD: 9.2 K/UL (ref 1.5–7.5)
NEUTS SEG NFR BLD: 82.2 % (ref 50–65)
NOROVIRUS GI+II RNA STL QL NAA+NON-PROBE: NOT DETECTED
ORGANISM: ABNORMAL
P SHIGELLOIDES DNA STL QL NAA+NON-PROBE: NOT DETECTED
PLATELET # BLD AUTO: 240 K/UL (ref 130–400)
PMV BLD AUTO: 10.9 FL (ref 9.4–12.4)
POTASSIUM SERPL-SCNC: 3.4 MMOL/L (ref 3.5–5)
PROT SERPL-MCNC: 4.6 G/DL (ref 6.6–8.7)
RBC # BLD AUTO: 3.41 M/UL (ref 4.7–6.1)
RVA RNA STL QL NAA+NON-PROBE: NOT DETECTED
S ENT+BONG DNA STL QL NAA+NON-PROBE: NOT DETECTED
SAPO I+II+IV+V RNA STL QL NAA+NON-PROBE: NOT DETECTED
SHIGELLA SP+EIEC IPAH ST NAA+NON-PROBE: NOT DETECTED
SODIUM SERPL-SCNC: 141 MMOL/L (ref 136–145)
V CHOL+PARA+VUL DNA STL QL NAA+NON-PROBE: NOT DETECTED
V CHOLERAE DNA STL QL NAA+NON-PROBE: NOT DETECTED
WBC # BLD AUTO: 11.2 K/UL (ref 4.8–10.8)
Y ENTEROCOL DNA STL QL NAA+NON-PROBE: NOT DETECTED

## 2023-03-19 PROCEDURE — 85025 COMPLETE CBC W/AUTO DIFF WBC: CPT

## 2023-03-19 PROCEDURE — 1210000000 HC MED SURG R&B

## 2023-03-19 PROCEDURE — 6370000000 HC RX 637 (ALT 250 FOR IP): Performed by: NURSE PRACTITIONER

## 2023-03-19 PROCEDURE — 51702 INSERT TEMP BLADDER CATH: CPT

## 2023-03-19 PROCEDURE — 2580000003 HC RX 258: Performed by: NURSE PRACTITIONER

## 2023-03-19 PROCEDURE — 94760 N-INVAS EAR/PLS OXIMETRY 1: CPT

## 2023-03-19 PROCEDURE — 83735 ASSAY OF MAGNESIUM: CPT

## 2023-03-19 PROCEDURE — 6360000002 HC RX W HCPCS: Performed by: NURSE PRACTITIONER

## 2023-03-19 PROCEDURE — 80053 COMPREHEN METABOLIC PANEL: CPT

## 2023-03-19 PROCEDURE — 36415 COLL VENOUS BLD VENIPUNCTURE: CPT

## 2023-03-19 RX ORDER — PREGABALIN 25 MG/1
25 CAPSULE ORAL DAILY
Status: DISCONTINUED | OUTPATIENT
Start: 2023-03-19 | End: 2023-03-19

## 2023-03-19 RX ORDER — TAMSULOSIN HYDROCHLORIDE 0.4 MG/1
0.4 CAPSULE ORAL DAILY
Status: DISCONTINUED | OUTPATIENT
Start: 2023-03-19 | End: 2023-03-21 | Stop reason: HOSPADM

## 2023-03-19 RX ORDER — QUETIAPINE FUMARATE 25 MG/1
25 TABLET, FILM COATED ORAL NIGHTLY
Status: DISCONTINUED | OUTPATIENT
Start: 2023-03-19 | End: 2023-03-21 | Stop reason: HOSPADM

## 2023-03-19 RX ORDER — DOCUSATE SODIUM 100 MG/1
100 CAPSULE, LIQUID FILLED ORAL DAILY
Status: DISCONTINUED | OUTPATIENT
Start: 2023-03-19 | End: 2023-03-21 | Stop reason: HOSPADM

## 2023-03-19 RX ORDER — MIRTAZAPINE 7.5 MG/1
7.5 TABLET, FILM COATED ORAL NIGHTLY
Status: DISCONTINUED | OUTPATIENT
Start: 2023-03-19 | End: 2023-03-21 | Stop reason: HOSPADM

## 2023-03-19 RX ORDER — ATORVASTATIN CALCIUM 20 MG/1
20 TABLET, FILM COATED ORAL DAILY
Status: DISCONTINUED | OUTPATIENT
Start: 2023-03-19 | End: 2023-03-21 | Stop reason: HOSPADM

## 2023-03-19 RX ORDER — SIMETHICONE 80 MG
80 TABLET,CHEWABLE ORAL EVERY 6 HOURS PRN
Status: DISCONTINUED | OUTPATIENT
Start: 2023-03-19 | End: 2023-03-21 | Stop reason: HOSPADM

## 2023-03-19 RX ORDER — ASPIRIN 81 MG/1
81 TABLET, CHEWABLE ORAL DAILY
Status: DISCONTINUED | OUTPATIENT
Start: 2023-03-19 | End: 2023-03-21 | Stop reason: HOSPADM

## 2023-03-19 RX ORDER — VANCOMYCIN HYDROCHLORIDE 125 MG/1
125 CAPSULE ORAL 4 TIMES DAILY
Status: DISCONTINUED | OUTPATIENT
Start: 2023-03-19 | End: 2023-03-21 | Stop reason: HOSPADM

## 2023-03-19 RX ORDER — ACETAMINOPHEN 325 MG/1
650 TABLET ORAL EVERY 6 HOURS PRN
Status: DISCONTINUED | OUTPATIENT
Start: 2023-03-19 | End: 2023-03-21 | Stop reason: HOSPADM

## 2023-03-19 RX ORDER — ALLOPURINOL 100 MG/1
100 TABLET ORAL DAILY
Status: DISCONTINUED | OUTPATIENT
Start: 2023-03-19 | End: 2023-03-21 | Stop reason: HOSPADM

## 2023-03-19 RX ORDER — ONDANSETRON 4 MG/1
4 TABLET, FILM COATED ORAL EVERY 6 HOURS PRN
Status: DISCONTINUED | OUTPATIENT
Start: 2023-03-19 | End: 2023-03-21 | Stop reason: HOSPADM

## 2023-03-19 RX ORDER — CALCITRIOL 0.25 UG/1
0.25 CAPSULE, LIQUID FILLED ORAL DAILY
Status: DISCONTINUED | OUTPATIENT
Start: 2023-03-19 | End: 2023-03-21 | Stop reason: HOSPADM

## 2023-03-19 RX ORDER — METOPROLOL SUCCINATE 50 MG/1
100 TABLET, EXTENDED RELEASE ORAL DAILY
Status: DISCONTINUED | OUTPATIENT
Start: 2023-03-19 | End: 2023-03-21 | Stop reason: HOSPADM

## 2023-03-19 RX ORDER — PANTOPRAZOLE SODIUM 40 MG/1
40 TABLET, DELAYED RELEASE ORAL
Status: DISCONTINUED | OUTPATIENT
Start: 2023-03-20 | End: 2023-03-21 | Stop reason: HOSPADM

## 2023-03-19 RX ORDER — PREGABALIN 25 MG/1
25 CAPSULE ORAL 2 TIMES DAILY
Status: DISCONTINUED | OUTPATIENT
Start: 2023-03-19 | End: 2023-03-21 | Stop reason: HOSPADM

## 2023-03-19 RX ADMIN — PREGABALIN 25 MG: 25 CAPSULE ORAL at 09:47

## 2023-03-19 RX ADMIN — ENOXAPARIN SODIUM 40 MG: 100 INJECTION SUBCUTANEOUS at 16:27

## 2023-03-19 RX ADMIN — SODIUM CHLORIDE, PRESERVATIVE FREE 10 ML: 5 INJECTION INTRAVENOUS at 08:19

## 2023-03-19 RX ADMIN — PREGABALIN 25 MG: 25 CAPSULE ORAL at 20:24

## 2023-03-19 RX ADMIN — CEFEPIME 2000 MG: 2 INJECTION, POWDER, FOR SOLUTION INTRAVENOUS at 14:04

## 2023-03-19 RX ADMIN — METOPROLOL SUCCINATE 100 MG: 50 TABLET, EXTENDED RELEASE ORAL at 09:47

## 2023-03-19 RX ADMIN — VANCOMYCIN HYDROCHLORIDE 125 MG: 125 CAPSULE ORAL at 20:24

## 2023-03-19 RX ADMIN — ATORVASTATIN CALCIUM 20 MG: 20 TABLET, FILM COATED ORAL at 09:54

## 2023-03-19 RX ADMIN — SODIUM CHLORIDE, PRESERVATIVE FREE 10 ML: 5 INJECTION INTRAVENOUS at 20:24

## 2023-03-19 RX ADMIN — TAMSULOSIN HYDROCHLORIDE 0.4 MG: 0.4 CAPSULE ORAL at 09:47

## 2023-03-19 RX ADMIN — VANCOMYCIN HYDROCHLORIDE 125 MG: 125 CAPSULE ORAL at 16:26

## 2023-03-19 RX ADMIN — ALLOPURINOL 100 MG: 100 TABLET ORAL at 09:54

## 2023-03-19 RX ADMIN — ASPIRIN 81 MG 81 MG: 81 TABLET ORAL at 09:54

## 2023-03-19 RX ADMIN — MIRTAZAPINE 7.5 MG: 7.5 TABLET ORAL at 20:24

## 2023-03-19 RX ADMIN — CALCITRIOL CAPSULES 0.25 MCG 0.25 MCG: 0.25 CAPSULE ORAL at 09:54

## 2023-03-19 RX ADMIN — SODIUM CHLORIDE: 9 INJECTION, SOLUTION INTRAVENOUS at 20:37

## 2023-03-19 RX ADMIN — CEFEPIME 2000 MG: 2 INJECTION, POWDER, FOR SOLUTION INTRAVENOUS at 02:26

## 2023-03-19 RX ADMIN — QUETIAPINE FUMARATE 25 MG: 25 TABLET ORAL at 20:24

## 2023-03-19 ASSESSMENT — ENCOUNTER SYMPTOMS
EYES NEGATIVE: 1
ALLERGIC/IMMUNOLOGIC NEGATIVE: 1
ABDOMINAL PAIN: 1
RESPIRATORY NEGATIVE: 1

## 2023-03-19 ASSESSMENT — PAIN SCALES - GENERAL
PAINLEVEL_OUTOF10: 0
PAINLEVEL_OUTOF10: 4

## 2023-03-19 ASSESSMENT — PAIN DESCRIPTION - DESCRIPTORS: DESCRIPTORS: DISCOMFORT

## 2023-03-19 ASSESSMENT — PAIN DESCRIPTION - ORIENTATION: ORIENTATION: LOWER

## 2023-03-19 ASSESSMENT — PAIN SCALES - WONG BAKER: WONGBAKER_NUMERICALRESPONSE: 0

## 2023-03-19 ASSESSMENT — PAIN - FUNCTIONAL ASSESSMENT: PAIN_FUNCTIONAL_ASSESSMENT: ACTIVITIES ARE NOT PREVENTED

## 2023-03-19 ASSESSMENT — PAIN DESCRIPTION - LOCATION: LOCATION: SACRUM;GENERALIZED

## 2023-03-19 ASSESSMENT — PAIN DESCRIPTION - PAIN TYPE: TYPE: CHRONIC PAIN

## 2023-03-19 NOTE — PROGRESS NOTES
left kidney. Stable nonobstructing punctate right renal calculus. Other findings are not significantly interval change from prior exam.            Assessment/Plan   Principal Problem:    Sepsis ([address]  Active Problems:    Complicated UTI (urinary tract infection)    García catheter in place    Ureteral stent present    Nausea vomiting and diarrhea    C. difficile diarrhea    Essential hypertension  Resolved Problems:    * No resolved hospital problems. *      Principal Problem:    Sepsis ([address]   WBC improved   Cont cefepime   NS at 100 cc/hr   Urology following   Daily lab   Follow renal fx   Avoid hypotension   Urine positive for e choli- on maxipime  Active Problems:    Complicated UTI (urinary tract infection)/García catheter in place /Ureteral stent present   Cont abx   Strict I and O   IVF   Daily lab   Urology following   García care   Urine culture positive for e choli on maxipime . Culture shows sensitivity to E. coli    Nausea vomiting and diarrhea   Treat nausea    Daily weight   Advance to clear liq  C. difficile diarrhea   Vancomycin protocol 125 4 times daily for 10 days        Essential hypertension   Normotensive at present   Moniter for need to adjust meds   Avoid hypotension  Elevated troponin   Repeat and trend   No chest pain  Resolved Problems:    * No resolved hospital problems. *    Antibiotic: cefepime     DVT Prophylaxis: lovenox      Discharge planning: tbd       Further Orders per Clinical course/attending. Electronically signed by GOLD Villanueva CNP on 3/19/2023 at 1:27 PM       EMR Dragon/Transcription disclaimer:   Much of this encounter note is an electronic transcription/translation of spoken language to printed text.  The electronic translation of spoken language may permit erroneous, or at times, nonsensical words or phrases to be inadvertently transcribed; although attempts have made to review the note for such errors, some may still exist.

## 2023-03-19 NOTE — PROGRESS NOTES
Component Value Date/Time    WBC 11.2 03/19/2023 03:53 AM     Hemoglobin/Hematocrit:    Lab Results   Component Value Date/Time    HGB 9.3 03/19/2023 03:53 AM    HCT 29.4 03/19/2023 03:53 AM     BMP:    Lab Results   Component Value Date/Time     03/19/2023 03:53 AM    K 3.4 03/19/2023 03:53 AM     03/19/2023 03:53 AM    CO2 23 03/19/2023 03:53 AM    BUN 23 03/19/2023 03:53 AM    LABALBU 2.4 03/19/2023 03:53 AM    CREATININE 1.0 03/19/2023 03:53 AM    CALCIUM 8.1 03/19/2023 03:53 AM    GFRAA >59 05/19/2022 03:38 PM    LABGLOM >60 03/19/2023 03:53 AM     PT/INR:    Lab Results   Component Value Date/Time    PROTIME 14.2 05/19/2022 03:38 PM    INR 1.10 05/19/2022 03:38 PM     PTT:    Lab Results   Component Value Date/Time    APTT 31.2 05/19/2022 03:38 PM   [APTT        Imaging: Images reviewed  CT ABDOMEN PELVIS WO CONTRAST Additional Contrast? None: Patient Communication     Add Comments   Add Notifications      Radiation Dose Estimates    No radiation information found for this patient  Narrative   CLINICAL HISTORY: Abdominal pain   TECHNIQUE: Axial images of the abdomen and pelvis were obtained without IV   contrast.Coronal and sagittal multiplanar reconstructions were performed. One   or more of the following dose reduction techniques were used: Automated exposure   control, adjustment of the mA and/or kV according to patient size, and/or use of   iterative reconstruction technique. No intravenous contrast was administered. COMPARISON: Abdomen pelvis CT from 2/13/2023   FINDINGS:   Statements: Lack of intravenous contrast compromises evaluation of solid organs   and vasculature. Thoracic: Patchy opacity in the left lower lobe. Scattered calcified lung   granulomas. Hepatobiliary: Stable hepatic hypoattenuating lesions throughout the liver,   favored to represent cyst, some too small to characterize. The gallbladder is   unremarkable. Pancreas: No abnormality identified in the pancreas.    Spleen:

## 2023-03-20 ENCOUNTER — APPOINTMENT (OUTPATIENT)
Dept: GENERAL RADIOLOGY | Age: 83
End: 2023-03-20
Payer: MEDICARE

## 2023-03-20 LAB
ALBUMIN SERPL-MCNC: 2.1 G/DL (ref 3.5–5.2)
ALP SERPL-CCNC: 82 U/L (ref 40–130)
ALT SERPL-CCNC: 9 U/L (ref 5–41)
ANION GAP SERPL CALCULATED.3IONS-SCNC: 9 MMOL/L (ref 7–19)
AST SERPL-CCNC: 14 U/L (ref 5–40)
BASOPHILS # BLD: 0 K/UL (ref 0–0.2)
BASOPHILS NFR BLD: 0.3 % (ref 0–1)
BILIRUB SERPL-MCNC: <0.2 MG/DL (ref 0.2–1.2)
BUN SERPL-MCNC: 19 MG/DL (ref 8–23)
CALCIUM SERPL-MCNC: 8.3 MG/DL (ref 8.8–10.2)
CHLORIDE SERPL-SCNC: 112 MMOL/L (ref 98–111)
CO2 SERPL-SCNC: 21 MMOL/L (ref 22–29)
CREAT SERPL-MCNC: 0.8 MG/DL (ref 0.5–1.2)
EKG P AXIS: 65 DEGREES
EKG P-R INTERVAL: 152 MS
EKG Q-T INTERVAL: 308 MS
EKG QRS DURATION: 110 MS
EKG QTC CALCULATION (BAZETT): 411 MS
EKG T AXIS: 107 DEGREES
EOSINOPHIL # BLD: 0.3 K/UL (ref 0–0.6)
EOSINOPHIL NFR BLD: 3.9 % (ref 0–5)
ERYTHROCYTE [DISTWIDTH] IN BLOOD BY AUTOMATED COUNT: 15.8 % (ref 11.5–14.5)
GLUCOSE SERPL-MCNC: 85 MG/DL (ref 74–109)
HCT VFR BLD AUTO: 32.5 % (ref 42–52)
HGB BLD-MCNC: 9.9 G/DL (ref 14–18)
IMM GRANULOCYTES # BLD: 0.1 K/UL
LYMPHOCYTES # BLD: 1.1 K/UL (ref 1.1–4.5)
LYMPHOCYTES NFR BLD: 14.9 % (ref 20–40)
MAGNESIUM SERPL-MCNC: 1.9 MG/DL (ref 1.6–2.4)
MCH RBC QN AUTO: 27 PG (ref 27–31)
MCHC RBC AUTO-ENTMCNC: 30.5 G/DL (ref 33–37)
MCV RBC AUTO: 88.8 FL (ref 80–94)
MONOCYTES # BLD: 0.6 K/UL (ref 0–0.9)
MONOCYTES NFR BLD: 7.4 % (ref 0–10)
NEUTROPHILS # BLD: 5.5 K/UL (ref 1.5–7.5)
NEUTS SEG NFR BLD: 72.7 % (ref 50–65)
PLATELET # BLD AUTO: 236 K/UL (ref 130–400)
PMV BLD AUTO: 10.7 FL (ref 9.4–12.4)
POTASSIUM SERPL-SCNC: 3.3 MMOL/L (ref 3.5–5)
PROT SERPL-MCNC: 5.1 G/DL (ref 6.6–8.7)
RBC # BLD AUTO: 3.66 M/UL (ref 4.7–6.1)
SODIUM SERPL-SCNC: 142 MMOL/L (ref 136–145)
WBC # BLD AUTO: 7.5 K/UL (ref 4.8–10.8)

## 2023-03-20 PROCEDURE — 36415 COLL VENOUS BLD VENIPUNCTURE: CPT

## 2023-03-20 PROCEDURE — 74018 RADEX ABDOMEN 1 VIEW: CPT | Performed by: RADIOLOGY

## 2023-03-20 PROCEDURE — 6370000000 HC RX 637 (ALT 250 FOR IP): Performed by: NURSE PRACTITIONER

## 2023-03-20 PROCEDURE — 6360000002 HC RX W HCPCS: Performed by: NURSE PRACTITIONER

## 2023-03-20 PROCEDURE — 93010 ELECTROCARDIOGRAM REPORT: CPT | Performed by: INTERNAL MEDICINE

## 2023-03-20 PROCEDURE — 94761 N-INVAS EAR/PLS OXIMETRY MLT: CPT

## 2023-03-20 PROCEDURE — 99231 SBSQ HOSP IP/OBS SF/LOW 25: CPT | Performed by: NURSE PRACTITIONER

## 2023-03-20 PROCEDURE — 85025 COMPLETE CBC W/AUTO DIFF WBC: CPT

## 2023-03-20 PROCEDURE — 74018 RADEX ABDOMEN 1 VIEW: CPT

## 2023-03-20 PROCEDURE — 80053 COMPREHEN METABOLIC PANEL: CPT

## 2023-03-20 PROCEDURE — 1210000000 HC MED SURG R&B

## 2023-03-20 PROCEDURE — 2580000003 HC RX 258: Performed by: NURSE PRACTITIONER

## 2023-03-20 PROCEDURE — 83735 ASSAY OF MAGNESIUM: CPT

## 2023-03-20 RX ORDER — POTASSIUM CHLORIDE 20 MEQ/1
40 TABLET, EXTENDED RELEASE ORAL PRN
Status: DISCONTINUED | OUTPATIENT
Start: 2023-03-20 | End: 2023-03-21 | Stop reason: HOSPADM

## 2023-03-20 RX ORDER — POTASSIUM CHLORIDE 7.45 MG/ML
10 INJECTION INTRAVENOUS PRN
Status: DISCONTINUED | OUTPATIENT
Start: 2023-03-20 | End: 2023-03-21 | Stop reason: HOSPADM

## 2023-03-20 RX ADMIN — CEFEPIME 2000 MG: 2 INJECTION, POWDER, FOR SOLUTION INTRAVENOUS at 01:13

## 2023-03-20 RX ADMIN — METOPROLOL SUCCINATE 100 MG: 50 TABLET, EXTENDED RELEASE ORAL at 08:50

## 2023-03-20 RX ADMIN — VANCOMYCIN HYDROCHLORIDE 125 MG: 125 CAPSULE ORAL at 13:42

## 2023-03-20 RX ADMIN — ATORVASTATIN CALCIUM 20 MG: 20 TABLET, FILM COATED ORAL at 08:50

## 2023-03-20 RX ADMIN — DOCUSATE SODIUM 100 MG: 100 CAPSULE, LIQUID FILLED ORAL at 08:50

## 2023-03-20 RX ADMIN — CEFEPIME 2000 MG: 2 INJECTION, POWDER, FOR SOLUTION INTRAVENOUS at 13:42

## 2023-03-20 RX ADMIN — PANTOPRAZOLE SODIUM 40 MG: 40 TABLET, DELAYED RELEASE ORAL at 06:02

## 2023-03-20 RX ADMIN — VANCOMYCIN HYDROCHLORIDE 125 MG: 125 CAPSULE ORAL at 08:50

## 2023-03-20 RX ADMIN — VANCOMYCIN HYDROCHLORIDE 125 MG: 125 CAPSULE ORAL at 15:55

## 2023-03-20 RX ADMIN — ASPIRIN 81 MG 81 MG: 81 TABLET ORAL at 08:50

## 2023-03-20 RX ADMIN — PREGABALIN 25 MG: 25 CAPSULE ORAL at 08:50

## 2023-03-20 RX ADMIN — SODIUM CHLORIDE, PRESERVATIVE FREE 10 ML: 5 INJECTION INTRAVENOUS at 20:42

## 2023-03-20 RX ADMIN — ALLOPURINOL 100 MG: 100 TABLET ORAL at 08:50

## 2023-03-20 RX ADMIN — PREGABALIN 25 MG: 25 CAPSULE ORAL at 20:42

## 2023-03-20 RX ADMIN — SODIUM CHLORIDE: 9 INJECTION, SOLUTION INTRAVENOUS at 13:32

## 2023-03-20 RX ADMIN — SODIUM CHLORIDE, PRESERVATIVE FREE 10 ML: 5 INJECTION INTRAVENOUS at 08:51

## 2023-03-20 RX ADMIN — MIRTAZAPINE 7.5 MG: 7.5 TABLET ORAL at 20:42

## 2023-03-20 RX ADMIN — TAMSULOSIN HYDROCHLORIDE 0.4 MG: 0.4 CAPSULE ORAL at 08:50

## 2023-03-20 RX ADMIN — VANCOMYCIN HYDROCHLORIDE 125 MG: 125 CAPSULE ORAL at 20:42

## 2023-03-20 RX ADMIN — CALCITRIOL CAPSULES 0.25 MCG 0.25 MCG: 0.25 CAPSULE ORAL at 08:50

## 2023-03-20 RX ADMIN — QUETIAPINE FUMARATE 25 MG: 25 TABLET ORAL at 20:42

## 2023-03-20 ASSESSMENT — ENCOUNTER SYMPTOMS
NAUSEA: 1
DIARRHEA: 1
ABDOMINAL PAIN: 1
RESPIRATORY NEGATIVE: 1

## 2023-03-20 NOTE — PROGRESS NOTES
Urology Progress Note    SUBJECTIVE: Patient resting in bed. Family at the bedside. No  complaints, but does complain of overall weakness. Concerned about where to go and how to be managed after discharge. OBJECTIVE:   Review of Systems     Physical  VITALS:  /71   Pulse 69   Temp 97.3 °F (36.3 °C) (Oral)   Resp 16   Ht 6' (1.829 m)   Wt 182 lb 1 oz (82.6 kg)   SpO2 95%   BMI 24.69 kg/m²   TEMPERATURE:  Current - Temp: 97.3 °F (36.3 °C);  Max - Temp  Av.8 °F (36.6 °C)  Min: 97.3 °F (36.3 °C)  Max: 98.6 °F (37 °C)   24 HR I&O   Intake/Output Summary (Last 24 hours) at 3/20/2023 1327  Last data filed at 3/20/2023 0942  Gross per 24 hour   Intake 320 ml   Output 300 ml   Net 20 ml     BACK: no tenderness in spine or flanks  ABDOMEN:  tenderness noted diffusely  HEART:  tachy at times  CHEST:  normal respiratory effort  GENITAL/URINARY:  FC in place    Data  CBC:   Recent Labs     23  0808 23  0353 23  0341   WBC 14.3* 11.2* 7.5   HGB 9.7* 9.3* 9.9*   HCT 31.6* 29.4* 32.5*    240 236     BMP:    Recent Labs     23  0808 23  0353 23  0341    141 142   K 3.9 3.4* 3.3*    107 112*   CO2 25 23 21*   BUN 29* 23 19   CREATININE 1.1 1.0 0.8   GLUCOSE 89 90 85     U/A:    Lab Results   Component Value Date/Time    COLORU YELLOW 2023 11:13 AM    PHUR 5.5 2023 11:13 AM    WBCUA TNTC 2023 11:13 AM    RBCUA TNTC 2023 11:13 AM    MUCUS Rare 2023 11:13 AM    YEAST Present 2023 11:05 PM    BACTERIA 4+ 2023 11:13 AM    CLARITYU TURBID 2023 11:13 AM    SPECGRAV 1.019 2023 11:13 AM    LEUKOCYTESUR LARGE 2023 11:13 AM    UROBILINOGEN 0.2 2023 11:13 AM    BILIRUBINUR Negative 2023 11:13 AM    BLOODU MODERATE 2023 11:13 AM    GLUCOSEU Negative 2023 11:13 AM    AMORPHOUS Rare 2023 11:05 PM       Radiology:   CT ABDOMEN PELVIS WO CONTRAST Additional Contrast? None    Result Date: 3/17/2023  CLINICAL HISTORY: Abdominal pain TECHNIQUE: Axial images of the abdomen and pelvis were obtained without IV contrast.Coronal and sagittal multiplanar reconstructions were performed. One or more of the following dose reduction techniques were used: Automated exposure control, adjustment of the mA and/or kV according to patient size, and/or use of iterative reconstruction technique. No intravenous contrast was administered. COMPARISON: Abdomen pelvis CT from 2/13/2023 FINDINGS: Statements: Lack of intravenous contrast compromises evaluation of solid organs and vasculature. Thoracic: Patchy opacity in the left lower lobe. Scattered calcified lung granulomas. Hepatobiliary: Stable hepatic hypoattenuating lesions throughout the liver, favored to represent cyst, some too small to characterize. The gallbladder is unremarkable. Pancreas: No abnormality identified in the pancreas. Spleen: No abnormality identified in the spleen. Adrenals: No abnormality identified in either adrenal gland. Genitourinary: No parenchymal abnormality identified in either kidney. No hydronephrosis. Redemonstration of punctate right renal calculus and a new nonobstructing 1 cm stone in the left kidney. This renal calculus has possibly migrated superiorly since previous exam.Stable 1.3 cm parenchymal calcification in the left kidney. A new right ureteral stent is seen with the proximal pigtail in the renal pelvis and distal pigtail in the urinary bladder. The urinary bladder is decompressed by García catheter. Stable prostate enlargement. Gastrointestinal: No evidence of bowel obstruction or perienteric inflammation. No CT evidence for acute appendicitis. Colonic diverticulosis without diverticulitis. Vascular/Lymphatics: No enlarged lymph nodes by CT size criteria. Abdominal aorta is normal in caliber. Moderate diffuse arterial calcifications. MSK/Body Wall: No concerning bony lesion identified. Peritoneum/Other: No extraluminal air. No

## 2023-03-20 NOTE — CARE COORDINATION
Spoke with pts daughter Leora Gordon and son Terry Lockwood at bedside. Answered all of daughters questions she had. PT is from [place], plan will likely be for pt to return back to [place] once medically stable, but daughter to let writer know for sure tomorrow. Urology procedure postponed until next week per urology due to c-diff. Gave daughter a snf choice list to reivew just for her information. Pt does not have a bedhold at [place]. Will follow up with daughter tomorrow.    Electronically signed by Goran Acuna RN on 3/20/2023 at 1:42 PM

## 2023-03-20 NOTE — PROGRESS NOTES
Patient's family stated patient's face on right side was slightly swollen. Check area is slightly puffier than the left. Patient does not endorse any pain or difficulty swallowing. Patient did state he needed some \"teeth work\" done; however, he couldn't do it at the time.     Electronically signed by Ren Patel RN on 3/20/23 at 1:46 PM CDT

## 2023-03-20 NOTE — PROGRESS NOTES
kg)   SpO2 95%   BMI 24.69 kg/m²     24HR INTAKE/OUTPUT:    Intake/Output Summary (Last 24 hours) at 3/20/2023 1326  Last data filed at 3/20/2023 3663  Gross per 24 hour   Intake 320 ml   Output 300 ml   Net 20 ml       Physical Exam  Constitutional:       General: He is sleeping. Appearance: Normal appearance. He is normal weight. He is ill-appearing. HENT:      Nose: Nose normal.      Mouth/Throat:      Mouth: Mucous membranes are moist.      Pharynx: Oropharynx is clear. Eyes:      Pupils: Pupils are equal, round, and reactive to light. Cardiovascular:      Rate and Rhythm: Normal rate and regular rhythm. Pulses: Normal pulses. Heart sounds: Normal heart sounds. Pulmonary:      Effort: Pulmonary effort is normal.      Breath sounds: Normal breath sounds. Abdominal:      General: Abdomen is flat. Bowel sounds are normal. There is no distension. Palpations: Abdomen is soft. Tenderness: There is no abdominal tenderness. Musculoskeletal:         General: Normal range of motion. Cervical back: Normal range of motion. Skin:     General: Skin is warm and dry. Capillary Refill: Capillary refill takes less than 2 seconds. Coloration: Skin is pale. Neurological:      General: No focal deficit present. Mental Status: He is oriented to person, place, and time and easily aroused. Mental status is at baseline.         Medications:      sodium chloride      sodium chloride 100 mL/hr at 03/19/23 2037      allopurinol  100 mg Oral Daily    aspirin  81 mg Oral Daily    atorvastatin  20 mg Oral Daily    calcitRIOL  0.25 mcg Oral Daily    docusate sodium  100 mg Oral Daily    metoprolol succinate  100 mg Oral Daily    mirtazapine  7.5 mg Oral Nightly    pantoprazole  40 mg Oral QAM AC    QUEtiapine  25 mg Oral Nightly    tamsulosin  0.4 mg Oral Daily    pregabalin  25 mg Oral BID    vancomycin  125 mg Oral 4x Daily    sodium chloride flush  5-40 mL IntraVENous 2 times per use of iterative reconstruction technique. No intravenous contrast was administered. COMPARISON: Abdomen pelvis CT from 2/13/2023 FINDINGS: Statements: Lack of intravenous contrast compromises evaluation of solid organs and vasculature. Thoracic: Patchy opacity in the left lower lobe. Scattered calcified lung granulomas. Hepatobiliary: Stable hepatic hypoattenuating lesions throughout the liver, favored to represent cyst, some too small to characterize. The gallbladder is unremarkable. Pancreas: No abnormality identified in the pancreas. Spleen: No abnormality identified in the spleen. Adrenals: No abnormality identified in either adrenal gland. Genitourinary: No parenchymal abnormality identified in either kidney. No hydronephrosis. Redemonstration of punctate right renal calculus and a new nonobstructing 1 cm stone in the left kidney. This renal calculus has possibly migrated superiorly since previous exam.Stable 1.3 cm parenchymal calcification in the left kidney. A new right ureteral stent is seen with the proximal pigtail in the renal pelvis and distal pigtail in the urinary bladder. The urinary bladder is decompressed by García catheter. Stable prostate enlargement. Gastrointestinal: No evidence of bowel obstruction or perienteric inflammation. No CT evidence for acute appendicitis. Colonic diverticulosis without diverticulitis. Vascular/Lymphatics: No enlarged lymph nodes by CT size criteria. Abdominal aorta is normal in caliber. Moderate diffuse arterial calcifications. MSK/Body Wall: No concerning bony lesion identified. Peritoneum/Other: No extraluminal air. No extraluminal fluid. Interval placement of the left ureteral stent with resolution of the left hydronephrosis. There has been superior migration of the previously noted 1 cm stone now located within the lower pole of the left kidney. Stable nonobstructing punctate right renal calculus.  Other findings are not significantly interval change from prior

## 2023-03-21 ENCOUNTER — TELEPHONE (OUTPATIENT)
Dept: UROLOGY | Age: 83
End: 2023-03-21

## 2023-03-21 VITALS
RESPIRATION RATE: 20 BRPM | DIASTOLIC BLOOD PRESSURE: 76 MMHG | BODY MASS INDEX: 24.66 KG/M2 | HEIGHT: 72 IN | SYSTOLIC BLOOD PRESSURE: 137 MMHG | OXYGEN SATURATION: 98 % | WEIGHT: 182.06 LBS | TEMPERATURE: 97.7 F | HEART RATE: 76 BPM

## 2023-03-21 LAB
ALBUMIN SERPL-MCNC: 2.3 G/DL (ref 3.5–5.2)
ALP SERPL-CCNC: 84 U/L (ref 40–130)
ALT SERPL-CCNC: 8 U/L (ref 5–41)
ANION GAP SERPL CALCULATED.3IONS-SCNC: 11 MMOL/L (ref 7–19)
AST SERPL-CCNC: 13 U/L (ref 5–40)
BASOPHILS # BLD: 0 K/UL (ref 0–0.2)
BASOPHILS NFR BLD: 0.3 % (ref 0–1)
BILIRUB SERPL-MCNC: <0.2 MG/DL (ref 0.2–1.2)
BUN SERPL-MCNC: 18 MG/DL (ref 8–23)
CALCIUM SERPL-MCNC: 8.2 MG/DL (ref 8.8–10.2)
CHLORIDE SERPL-SCNC: 109 MMOL/L (ref 98–111)
CO2 SERPL-SCNC: 20 MMOL/L (ref 22–29)
CREAT SERPL-MCNC: 0.9 MG/DL (ref 0.5–1.2)
EOSINOPHIL # BLD: 0.3 K/UL (ref 0–0.6)
EOSINOPHIL NFR BLD: 3.7 % (ref 0–5)
ERYTHROCYTE [DISTWIDTH] IN BLOOD BY AUTOMATED COUNT: 15.6 % (ref 11.5–14.5)
GLUCOSE SERPL-MCNC: 90 MG/DL (ref 74–109)
HCT VFR BLD AUTO: 29.4 % (ref 42–52)
HGB BLD-MCNC: 9.3 G/DL (ref 14–18)
IMM GRANULOCYTES # BLD: 0.1 K/UL
LYMPHOCYTES # BLD: 1.1 K/UL (ref 1.1–4.5)
LYMPHOCYTES NFR BLD: 14.2 % (ref 20–40)
MCH RBC QN AUTO: 27.3 PG (ref 27–31)
MCHC RBC AUTO-ENTMCNC: 31.6 G/DL (ref 33–37)
MCV RBC AUTO: 86.2 FL (ref 80–94)
MONOCYTES # BLD: 0.5 K/UL (ref 0–0.9)
MONOCYTES NFR BLD: 6.4 % (ref 0–10)
NEUTROPHILS # BLD: 5.6 K/UL (ref 1.5–7.5)
NEUTS SEG NFR BLD: 74.6 % (ref 50–65)
PLATELET # BLD AUTO: 260 K/UL (ref 130–400)
PMV BLD AUTO: 10.8 FL (ref 9.4–12.4)
POTASSIUM SERPL-SCNC: 3.6 MMOL/L (ref 3.5–5)
PROT SERPL-MCNC: 4.7 G/DL (ref 6.6–8.7)
RBC # BLD AUTO: 3.41 M/UL (ref 4.7–6.1)
REASON FOR REJECTION: NORMAL
REJECTED TEST: NORMAL
SARS-COV-2 RDRP RESP QL NAA+PROBE: NOT DETECTED
SODIUM SERPL-SCNC: 140 MMOL/L (ref 136–145)
WBC # BLD AUTO: 7.5 K/UL (ref 4.8–10.8)

## 2023-03-21 PROCEDURE — 97530 THERAPEUTIC ACTIVITIES: CPT

## 2023-03-21 PROCEDURE — 36415 COLL VENOUS BLD VENIPUNCTURE: CPT

## 2023-03-21 PROCEDURE — 6360000002 HC RX W HCPCS: Performed by: NURSE PRACTITIONER

## 2023-03-21 PROCEDURE — 97535 SELF CARE MNGMENT TRAINING: CPT

## 2023-03-21 PROCEDURE — 99232 SBSQ HOSP IP/OBS MODERATE 35: CPT | Performed by: UROLOGY

## 2023-03-21 PROCEDURE — 6370000000 HC RX 637 (ALT 250 FOR IP): Performed by: NURSE PRACTITIONER

## 2023-03-21 PROCEDURE — 87635 SARS-COV-2 COVID-19 AMP PRB: CPT

## 2023-03-21 PROCEDURE — 85025 COMPLETE CBC W/AUTO DIFF WBC: CPT

## 2023-03-21 PROCEDURE — 80053 COMPREHEN METABOLIC PANEL: CPT

## 2023-03-21 PROCEDURE — 2580000003 HC RX 258: Performed by: NURSE PRACTITIONER

## 2023-03-21 PROCEDURE — 97165 OT EVAL LOW COMPLEX 30 MIN: CPT

## 2023-03-21 RX ORDER — VANCOMYCIN HYDROCHLORIDE 125 MG/1
125 CAPSULE ORAL 4 TIMES DAILY
Qty: 33 CAPSULE | Refills: 0 | DISCHARGE
Start: 2023-03-21 | End: 2023-03-30

## 2023-03-21 RX ORDER — PREGABALIN 25 MG/1
25 CAPSULE ORAL DAILY
Qty: 3 CAPSULE | Refills: 0 | Status: SHIPPED | DISCHARGE
Start: 2023-03-21 | End: 2023-03-24

## 2023-03-21 RX ORDER — CEFDINIR 300 MG/1
300 CAPSULE ORAL 2 TIMES DAILY
Qty: 12 CAPSULE | Refills: 0 | DISCHARGE
Start: 2023-03-21 | End: 2023-03-21 | Stop reason: SDUPTHER

## 2023-03-21 RX ORDER — CEFDINIR 300 MG/1
300 CAPSULE ORAL DAILY
Qty: 15 CAPSULE | Refills: 0 | DISCHARGE
Start: 2023-03-21 | End: 2023-04-05

## 2023-03-21 RX ADMIN — PANTOPRAZOLE SODIUM 40 MG: 40 TABLET, DELAYED RELEASE ORAL at 06:53

## 2023-03-21 RX ADMIN — VANCOMYCIN HYDROCHLORIDE 125 MG: 125 CAPSULE ORAL at 09:00

## 2023-03-21 RX ADMIN — CALCITRIOL CAPSULES 0.25 MCG 0.25 MCG: 0.25 CAPSULE ORAL at 09:00

## 2023-03-21 RX ADMIN — CEFEPIME 2000 MG: 2 INJECTION, POWDER, FOR SOLUTION INTRAVENOUS at 01:19

## 2023-03-21 RX ADMIN — TAMSULOSIN HYDROCHLORIDE 0.4 MG: 0.4 CAPSULE ORAL at 09:00

## 2023-03-21 RX ADMIN — VANCOMYCIN HYDROCHLORIDE 125 MG: 125 CAPSULE ORAL at 13:46

## 2023-03-21 RX ADMIN — ALLOPURINOL 100 MG: 100 TABLET ORAL at 09:00

## 2023-03-21 RX ADMIN — METOPROLOL SUCCINATE 100 MG: 50 TABLET, EXTENDED RELEASE ORAL at 09:00

## 2023-03-21 RX ADMIN — ATORVASTATIN CALCIUM 20 MG: 20 TABLET, FILM COATED ORAL at 09:00

## 2023-03-21 RX ADMIN — PREGABALIN 25 MG: 25 CAPSULE ORAL at 09:00

## 2023-03-21 NOTE — TELEPHONE ENCOUNTER
Pt's daughter, Snehal Wellington, called asking to reschedule procedure with Dr. Macarena Ballard on 03/29/23. Snehal Almodovaramanuel is asking if possible to get rescheduled to a Thursday. Please contact to discuss.   Thank you

## 2023-03-21 NOTE — DISCHARGE SUMMARY
recommended to continue García catheter at this time. Patient's lab work today is unremarkable. His KUB shows stable 8 mm left renal calculus. Vital signs are stable. He will be sent home with 6 more days worth of Omnicef to complete 10-day course of antibiotics, as well as oral vancomycin for treatment of C. difficile. Per urology, his aspirin will need to be held from 3/28/2023 forward in anticipation of ESWL for the first week of April. Patient is currently in stable condition to be discharged. Significant Diagnostic Studies:   CT ABDOMEN PELVIS WO CONTRAST Additional Contrast? None    Result Date: 3/17/2023  CLINICAL HISTORY: Abdominal pain TECHNIQUE: Axial images of the abdomen and pelvis were obtained without IV contrast.Coronal and sagittal multiplanar reconstructions were performed. One or more of the following dose reduction techniques were used: Automated exposure control, adjustment of the mA and/or kV according to patient size, and/or use of iterative reconstruction technique. No intravenous contrast was administered. COMPARISON: Abdomen pelvis CT from 2/13/2023 FINDINGS: Statements: Lack of intravenous contrast compromises evaluation of solid organs and vasculature. Thoracic: Patchy opacity in the left lower lobe. Scattered calcified lung granulomas. Hepatobiliary: Stable hepatic hypoattenuating lesions throughout the liver, favored to represent cyst, some too small to characterize. The gallbladder is unremarkable. Pancreas: No abnormality identified in the pancreas. Spleen: No abnormality identified in the spleen. Adrenals: No abnormality identified in either adrenal gland. Genitourinary: No parenchymal abnormality identified in either kidney. No hydronephrosis. Redemonstration of punctate right renal calculus and a new nonobstructing 1 cm stone in the left kidney. This renal calculus has possibly migrated superiorly since previous exam.Stable 1.3 cm parenchymal calcification in the left kidney. A Caps capsule  Commonly known as: ERGOCALCIFEROL  Take 1 capsule by mouth once a week               Where to Get Your Medications        Information about where to get these medications is not yet available    Ask your nurse or doctor about these medications  cefdinir 300 MG capsule  vancomycin 125 MG capsule          Discharge Instructions: Follow up with Erica Don MD in 7-14 days. Take medications as directed. Resume activity as tolerated. Diet: ADULT ORAL NUTRITION SUPPLEMENT; Breakfast, Lunch, Dinner; Clear Liquid Oral Supplement  ADULT DIET; Full Liquid; would like some potato soup     Disposition: Patient is medically stable and will be discharged today. Time spent on discharge 38 minutes spent in assessing patient, reviewing medications, discussion with nursing, confirming safe discharge plan and preparation of discharge summary. Signed:  Electronically signed by GOLD Ahumada CNP on 3/21/23 at 11:35 AM CDT     EMR Dragon/Transcription disclaimer:   Much of this encounter note is an electronic transcription/translation of spoken language to printed text.  The electronic translation of spoken language may permit erroneous, or at times, nonsensical words or phrases to be inadvertently transcribed; although attempts have made to review the note for such errors, some may still exist.

## 2023-03-21 NOTE — PROGRESS NOTES
Contact Precautions  Position Activity Restriction  Other position/activity restrictions: c diff rule out    Subjective   General  Patient assessed for rehabilitation services?: Yes  Additional Pertinent Hx: L renal stone  Diagnosis: sepsis, N/V/D, c.diff     Social/Functional History  Social/Functional History  Lives With: Other (comment) ([facility])  Type of Home: Facility ([place] [id number])  Bathroom Toilet: Handicap height  Bathroom Equipment:  (unknown)  Bathroom Accessibility: Wheelchair accessible  Home Equipment: Hospital bed  Receives Help From: Family  ADL Assistance: Needs assistance  Homemaking Assistance: Needs assistance  Ambulation Assistance: Needs assistance (walking about 21' with RW)  Transfer Assistance: Needs assistance  Active : No  Patient's  Info: Daughter/POA Pt. may need to be transferred by ambulance Pt. family unsure  Education: Some college  Occupation: Retired  Type of Occupation: Factory  Additional Comments: reports walking with PT and RW at [facility] 20'       Objective   Heart Rate: [address]: Monitor  BP: 137/76  BP Location: Right upper arm  MAP (Calculated): 96  Resp: 18  SpO2: 100 %  O2 Device: None (Room air)             Safety Devices  Type of Devices: Call light within reach; Left in bed;Bed alarm in place;Gait belt           ADL  Feeding: Independent;Setup  Grooming: Independent;Setup  UE Bathing: Independent;Setup  LE Bathing: Moderate assistance  UE Dressing: Setup  LE Dressing: Moderate assistance;Maximum assistance (per clinical observation of prerequisite skills)  Toileting:  Moderate assistance (per clinical observation of prerequisite skills)        Bed mobility  Supine to Sit: Minimal assistance  Sit to Supine: Contact guard assistance  Scooting: Stand by assistance     Hearing  Hearing: Exceptions to [facility]  Hearing Exceptions: Hard of hearing/hearing concerns  Cognition  Overall Cognitive Status: WFL  Orientation  Overall Orientation Status: Within Functional Limits                     LUE AROM (degrees)  LUE AROM : WFL  RUE AROM (degrees)  RUE AROM : WFL                       Goals  Short Term Goals  Time Frame for Short Term Goals: 2 weeks  Short Term Goal 1: Toilet with CGA  Short Term Goal 2: Toilet transfer CGA  Short Term Goal 3: 1-2 handed standing activity x 2 mins with CGA  Short Term Goal 4:  Ind in ther activity/ther ex recommendations after ed  Long Term Goals  Long Term Goal 1: progress as tolerated         Jake Wheeler OT  Electronically signed by Jake Wheeler OT on 3/21/2023 at 1:42 PM

## 2023-03-21 NOTE — PROGRESS NOTES
Physician Progress Note      PATIENT:               Aleja Olivo  CSN #:                  [id number]  :                       1940  ADMIT DATE:       3/17/2023 10:55 AM  DISCH DATE:  RESPONDING  PROVIDER #: Bradford Garza MD MPH          QUERY TEXT:    Pt admitted with sepsis. Pt noted to have an indwelling grove catheter and a   ureteral stent along with noted C. difficile diarrhea. If possible, please   document in the progress notes and discharge summary if you are evaluating and   / or treating any of the following: The medical record reflects the following:  Risk Factors: recent ureteral stent placement with kidney stone noted,   indwelling grove catheter, C. diff diarrhea, complicated UTI  Clinical Indicators: presented with chills/fever/diarrhea in the setting of   known kidney stone and indwelling grove; WBC 18.6, -126, R 21-26; CT   abd-left ureteral stent . ..stone now located within the lower pole of left   kidney; urine culture >100,000 heavy growth E. coli, GI panel-Clostridium   difficile toxin detected  Treatment: blood cultures,  ml/IV bolus, urine culture, NS 1000 ml/IV   bolus, Maxipime IV, NS @ 100 ml/hr,  consult, Vanco po    Thank you,  Gwendolyn Chung, CCDS  [phone]  Options provided:  -- Sepsis related to indwelling grove catheter  -- Sepsis related to ureteral stent  -- Sepsis related to C. diff diarrhea  -- Sepsis related to, Please specify condition. -- Other - I will add my own diagnosis  -- Disagree - Not applicable / Not valid  -- Disagree - Clinically unable to determine / Unknown  -- Refer to Clinical Documentation Reviewer    PROVIDER RESPONSE TEXT:    Sepsis, likely due to UTI, with the risk factor of indwelling Grove catheter   as well as CT of diarrhea.     Query created by: Gagan Roberts on 3/20/2023 11:40 AM      Electronically signed by:  Bradford Garza MD MPH 3/21/2023 4:01 PM

## 2023-03-21 NOTE — PLAN OF CARE
Nutrition Problem #1: Inadequate oral intake, Unintended weight loss  Intervention: Food and/or Nutrient Delivery: Continue NPO  Nutritional
Problem: Discharge Planning  Goal: Discharge to home or other facility with appropriate resources  3/21/2023 0350 by Erin Grady RN  Outcome: Progressing  Flowsheets (Taken 3/20/2023 2050)  Discharge to home or other facility with appropriate resources: Identify barriers to discharge with patient and caregiver  3/21/2023 0350 by Erin Grady RN  Outcome: Progressing  Flowsheets (Taken 3/20/2023 2050)  Discharge to home or other facility with appropriate resources: Identify barriers to discharge with patient and caregiver     Problem: Safety - Adult  Goal: Free from fall injury  3/21/2023 0350 by Erin Grady RN  Outcome: Progressing  3/21/2023 0350 by Erin Grady RN  Outcome: Progressing     Problem: ABCDS Injury Assessment  Goal: Absence of physical injury  3/21/2023 0350 by Erin Grady RN  Outcome: Progressing  3/21/2023 0350 by Erin Gardy RN  Outcome: Progressing     Problem: Nutrition Deficit:  Goal: Optimize nutritional status  3/21/2023 0350 by Erin Grady RN  Outcome: Progressing  3/21/2023 0350 by Erin Grady RN  Outcome: Progressing     Problem: Pain  Goal: Verbalizes/displays adequate comfort level or baseline comfort level  3/21/2023 0350 by Erin Grady RN  Outcome: Progressing  3/21/2023 0350 by Erin Grady RN  Outcome: Progressing     Problem: Skin/Tissue Integrity  Goal: Absence of new skin breakdown  Description: 1. Monitor for areas of redness and/or skin breakdown  2. Assess vascular access sites hourly  3. Every 4-6 hours minimum:  Change oxygen saturation probe site  4. Every 4-6 hours:  If on nasal continuous positive airway pressure, respiratory therapy assess nares and determine need for appliance change or resting period.   3/21/2023 0350 by Erin Grady RN  Outcome: Progressing  3/21/2023 0350 by Erin Grady RN  Outcome: Progressing
Problem: Discharge Planning  Goal: Discharge to home or other facility with appropriate resources  Outcome: Progressing  Flowsheets (Taken 3/17/2023 1526 by Nadine Juarez RN)  Discharge to home or other facility with appropriate resources:   Identify barriers to discharge with patient and caregiver   Identify discharge learning needs (meds, wound care, etc)     Problem: Safety - Adult  Goal: Free from fall injury  Outcome: Progressing     Problem: ABCDS Injury Assessment  Goal: Absence of physical injury  Outcome: Progressing
Problem: Discharge Planning  Goal: Discharge to home or other facility with appropriate resources  Outcome: Progressing  Flowsheets (Taken 3/18/2023 0937 by Jessee Ashford LPN)  Discharge to home or other facility with appropriate resources:   Identify barriers to discharge with patient and caregiver   Arrange for needed discharge resources and transportation as appropriate   Arrange for interpreters to assist at discharge as needed   Identify discharge learning needs (meds, wound care, etc)   Refer to discharge planning if patient needs post-hospital services based on physician order or complex needs related to functional status, cognitive ability or social support system     Problem: Safety - Adult  Goal: Free from fall injury  Outcome: Progressing     Problem: ABCDS Injury Assessment  Goal: Absence of physical injury  Outcome: Progressing     Problem: Nutrition Deficit:  Goal: Optimize nutritional status  Outcome: Progressing   Electronically signed by Salma Reyna RN on 3/18/2023 at 11:14 PM
Problem: Discharge Planning  Goal: Discharge to home or other facility with appropriate resources  Outcome: Progressing  Flowsheets (Taken 3/19/2023 2033)  Discharge to home or other facility with appropriate resources: Identify barriers to discharge with patient and caregiver     Problem: Safety - Adult  Goal: Free from fall injury  Outcome: Progressing     Problem: ABCDS Injury Assessment  Goal: Absence of physical injury  Outcome: Progressing     Problem: Nutrition Deficit:  Goal: Optimize nutritional status  Outcome: Progressing     Problem: Pain  Goal: Verbalizes/displays adequate comfort level or baseline comfort level  Outcome: Progressing     Problem: Skin/Tissue Integrity  Goal: Absence of new skin breakdown  Description: 1. Monitor for areas of redness and/or skin breakdown  2. Assess vascular access sites hourly  3. Every 4-6 hours minimum:  Change oxygen saturation probe site  4. Every 4-6 hours:  If on nasal continuous positive airway pressure, respiratory therapy assess nares and determine need for appliance change or resting period.   Outcome: Progressing
probe site  4. Every 4-6 hours:  If on nasal continuous positive airway pressure, respiratory therapy assess nares and determine need for appliance change or resting period.   3/20/2023 1045 by Shaheen Rutherford RN  Outcome: Progressing  3/20/2023 0728 by Kath Arriaza RN  Outcome: Progressing

## 2023-03-21 NOTE — PROGRESS NOTES
Messaged received from [place], [place] regarding [address] patient's grove catheter and doing a voiding trial prior to DC. Patient is refusing catheter removal at this time. Trista made aware. Patient is going to be scheduled for ESWL outpatient with Dr. Andrea Bowman on 4/4/23 per office. Will notify Alejandra of this and to hold aspirin for 7 days prior.

## 2023-03-21 NOTE — H&P (VIEW-ONLY)
Urology Progress Note      SUBJECTIVE: No complaints he would like to get his catheter out he is not really getting bad    OBJECTIVE: Cefepime day #5, oral Vanco for C. difficile    REVIEW OF SYSTEMS:   Review of Systems      Physical  VITALS:  /72   Pulse 88   Temp 97.4 °F (36.3 °C) (Oral)   Resp 18   Ht 6' (1.829 m)   Wt 182 lb 1 oz (82.6 kg)   SpO2 96%   BMI 24.69 kg/m²   TEMPERATURE:  Current - Temp: 97.4 °F (36.3 °C); Max - Temp  Av.4 °F (36.3 °C)  Min: 97.3 °F (36.3 °C)  Max: 97.5 °F (36.4 °C)   24 HR I&O   Intake/Output Summary (Last 24 hours) at 3/21/2023 07  Last data filed at 3/20/2023 1945  Gross per 24 hour   Intake 1779.79 ml   Output 900 ml   Net 879.79 ml     BACK: no tenderness in spine or flanks  ABDOMEN:  soft, non-distended, and non-tender  HEART:  normal rate and regular rhythm  CHEST: Normal respiratory effort  GENITAL/URINARY: García catheter in place draining clear urine    Data       CBC:   Recent Labs     23  0353 23  0341 23  0645   WBC 11.2* 7.5 7.5   HGB 9.3* 9.9* 9.3*   HCT 29.4* 32.5* 29.4*    236 260     BMP:    Recent Labs     23  0353 23  0341 23  0351    142 140   K 3.4* 3.3* 3.6    112* 109   CO2 23 21* 20*   BUN 23 19 18   CREATININE 1.0 0.8 0.9   GLUCOSE 90 85 90       No results for input(s): LABURIN in the last 72 hours. No results for input(s): BC in the last 72 hours. No results for input(s): Bernralphce Agudelo in the last 72 hours. Radiology:      Imaging studies: KUB shows stone is visible adjacent to the colon stent just lateral to it. It was visible on prior KUB as well. Somewhat obscured by overlying bowel gas.     ASSESSMENT AND PLAN    Patient Active Problem List   Diagnosis    Mixed hyperlipidemia    Essential hypertension    Restless legs syndrome    Gastroesophageal reflux disease without esophagitis    Peripheral neuropathic pain    Primary osteoarthritis involving multiple joints    Gout
Housing in the Last Year: No       Family History   Problem Relation Age of Onset    Uterine Cancer Mother          at age 52    Coronary Art Dis Father         MI at 62, smoker    COPD Sister         smoker    COPD Sister         smoker    Heart Failure Sister     COPD Sister         smoker    Stroke Brother         smoker    Coronary Art Dis Brother         open heart surgry, MI at 80- yo    No Known Problems Son     No Known Problems Daughter        PHYSICAL EXAM:  Temp 97.3 °F (36.3 °C) (Temporal)   Ht 6' (1.829 m)   Wt 204 lb (92.5 kg)   BMI 27.67 kg/m²   Physical Exam  Vitals and nursing note reviewed. Constitutional:       General: He is not in acute distress. Appearance: Normal appearance. He is not ill-appearing. Pulmonary:      Effort: Pulmonary effort is normal. No respiratory distress. Abdominal:      General: There is no distension. Tenderness: There is no abdominal tenderness. There is no right CVA tenderness or left CVA tenderness. Neurological:      Mental Status: He is alert and oriented to person, place, and time. Mental status is at baseline. Motor: Weakness present. Gait: Gait abnormal.   Psychiatric:         Mood and Affect: Mood normal.         Behavior: Behavior normal.     IMAGING:  Impression   1 cm obstructing left mid ureteral calculus. Mild left hydronephrosis. Punctate nonobstructing right-sided nephrolithiasis. Soft tissue fullness at the pancreatic tail. Cannot exclude underlying mass. Correlation with contrast-enhanced pancreatic protocol MRI or CT is suggested a   possible. Scattered hepatic hypoattenuating lesion, which are compatible with cysts while   others are too small to accurately characterize. Additional findings per body of the report. ASSESSMENT/PLAN  1. Calculus of proximal left ureter  Patient presents with a left proximal ureter stone.   Based on the stone location and severity of symptoms the patient has elected to proceed

## 2023-03-21 NOTE — PROGRESS NOTES
Urology Progress Note      SUBJECTIVE: No complaints he would like to get his catheter out he is not really getting bad    OBJECTIVE: Cefepime day #5, oral Vanco for C. difficile    REVIEW OF SYSTEMS:   Review of Systems      Physical  VITALS:  /72   Pulse 88   Temp 97.4 °F (36.3 °C) (Oral)   Resp 18   Ht 6' (1.829 m)   Wt 182 lb 1 oz (82.6 kg)   SpO2 96%   BMI 24.69 kg/m²   TEMPERATURE:  Current - Temp: 97.4 °F (36.3 °C); Max - Temp  Av.4 °F (36.3 °C)  Min: 97.3 °F (36.3 °C)  Max: 97.5 °F (36.4 °C)   24 HR I&O   Intake/Output Summary (Last 24 hours) at 3/21/2023 07  Last data filed at 3/20/2023 1945  Gross per 24 hour   Intake 1779.79 ml   Output 900 ml   Net 879.79 ml     BACK: no tenderness in spine or flanks  ABDOMEN:  soft, non-distended, and non-tender  HEART:  normal rate and regular rhythm  CHEST: Normal respiratory effort  GENITAL/URINARY: García catheter in place draining clear urine    Data       CBC:   Recent Labs     23  0353 23  0341 23  0645   WBC 11.2* 7.5 7.5   HGB 9.3* 9.9* 9.3*   HCT 29.4* 32.5* 29.4*    236 260     BMP:    Recent Labs     23  0353 23  0341 23  0351    142 140   K 3.4* 3.3* 3.6    112* 109   CO2 23 21* 20*   BUN 23 19 18   CREATININE 1.0 0.8 0.9   GLUCOSE 90 85 90       No results for input(s): LABURIN in the last 72 hours. No results for input(s): BC in the last 72 hours. No results for input(s): Daphne Adolfo in the last 72 hours. Radiology:      Imaging studies: KUB shows stone is visible adjacent to the colon stent just lateral to it. It was visible on prior KUB as well. Somewhat obscured by overlying bowel gas.     ASSESSMENT AND PLAN    Patient Active Problem List   Diagnosis    Mixed hyperlipidemia    Essential hypertension    Restless legs syndrome    Gastroesophageal reflux disease without esophagitis    Peripheral neuropathic pain    Primary osteoarthritis involving multiple joints    Gout

## 2023-03-21 NOTE — CARE COORDINATION
Spoke with Konstantin Samuel at [facility], pt will be able to return there today, they have an isolation room available.   Electronically signed by Tyshawn Schuster RN on 3/21/2023 at 11:43 AM

## 2023-03-22 ENCOUNTER — TELEPHONE (OUTPATIENT)
Dept: INTERNAL MEDICINE | Age: 83
End: 2023-03-22

## 2023-03-22 LAB
BACTERIA BLD CULT ORG #2: NORMAL
BACTERIA BLD CULT: NORMAL

## 2023-03-22 NOTE — TELEPHONE ENCOUNTER
SKILLED NURSING FACILITY:   INITIAL CALL POST-HOSPITAL DISCHARGE    SNF: [facility]    PHONE NUMBER: [phone]     / :    THERAPY:    ANTICIPATED LENGTH OF STAY: unknown

## 2023-03-29 ENCOUNTER — HOSPITAL ENCOUNTER (OUTPATIENT)
Dept: PREADMISSION TESTING | Age: 83
Discharge: HOME | End: 2023-04-02

## 2023-03-29 ENCOUNTER — TELEPHONE (OUTPATIENT)
Dept: INTERNAL MEDICINE | Age: 83
End: 2023-03-29

## 2023-03-29 NOTE — TELEPHONE ENCOUNTER
[place], [phone]    Per Madeline, nursing staff at [facility], patient is doing well. Patient continues to participate in therapy. No known discharge plans at this time.

## 2023-04-05 ENCOUNTER — LAB REQUISITION (OUTPATIENT)
Dept: LAB | Facility: HOSPITAL | Age: 83
End: 2023-04-05
Payer: MEDICARE

## 2023-04-05 ENCOUNTER — TELEPHONE (OUTPATIENT)
Dept: INTERNAL MEDICINE | Age: 83
End: 2023-04-05

## 2023-04-05 DIAGNOSIS — Z00.00 ENCOUNTER FOR GENERAL ADULT MEDICAL EXAMINATION WITHOUT ABNORMAL FINDINGS: ICD-10-CM

## 2023-04-05 LAB
APTT PPP: 36.1 SECONDS (ref 24.1–35)
BASOPHILS # BLD AUTO: 0.03 10*3/MM3 (ref 0–0.2)
BASOPHILS NFR BLD AUTO: 0.4 % (ref 0–1.5)
DEPRECATED RDW RBC AUTO: 53.2 FL (ref 37–54)
EOSINOPHIL # BLD AUTO: 0.21 10*3/MM3 (ref 0–0.4)
EOSINOPHIL NFR BLD AUTO: 3 % (ref 0.3–6.2)
ERYTHROCYTE [DISTWIDTH] IN BLOOD BY AUTOMATED COUNT: 16.4 % (ref 12.3–15.4)
HCT VFR BLD AUTO: 31.7 % (ref 37.5–51)
HGB BLD-MCNC: 9.4 G/DL (ref 13–17.7)
IMM GRANULOCYTES # BLD AUTO: 0.02 10*3/MM3 (ref 0–0.05)
IMM GRANULOCYTES NFR BLD AUTO: 0.3 % (ref 0–0.5)
INR PPP: 1.01 (ref 0.91–1.09)
LYMPHOCYTES # BLD AUTO: 1.69 10*3/MM3 (ref 0.7–3.1)
LYMPHOCYTES NFR BLD AUTO: 23.9 % (ref 19.6–45.3)
MCH RBC QN AUTO: 26.3 PG (ref 26.6–33)
MCHC RBC AUTO-ENTMCNC: 29.7 G/DL (ref 31.5–35.7)
MCV RBC AUTO: 88.8 FL (ref 79–97)
MONOCYTES # BLD AUTO: 0.69 10*3/MM3 (ref 0.1–0.9)
MONOCYTES NFR BLD AUTO: 9.7 % (ref 5–12)
NEUTROPHILS NFR BLD AUTO: 4.44 10*3/MM3 (ref 1.7–7)
NEUTROPHILS NFR BLD AUTO: 62.7 % (ref 42.7–76)
NRBC BLD AUTO-RTO: 0 /100 WBC (ref 0–0.2)
PLATELET # BLD AUTO: 271 10*3/MM3 (ref 140–450)
PMV BLD AUTO: 11.3 FL (ref 6–12)
PROTHROMBIN TIME: 13.4 SECONDS (ref 11.8–14.8)
RBC # BLD AUTO: 3.57 10*6/MM3 (ref 4.14–5.8)
WBC NRBC COR # BLD: 7.08 10*3/MM3 (ref 3.4–10.8)

## 2023-04-05 PROCEDURE — 85610 PROTHROMBIN TIME: CPT | Performed by: INTERNAL MEDICINE

## 2023-04-05 PROCEDURE — 85025 COMPLETE CBC W/AUTO DIFF WBC: CPT | Performed by: INTERNAL MEDICINE

## 2023-04-05 PROCEDURE — 85730 THROMBOPLASTIN TIME PARTIAL: CPT | Performed by: INTERNAL MEDICINE

## 2023-04-05 PROCEDURE — 85576 BLOOD PLATELET AGGREGATION: CPT | Performed by: INTERNAL MEDICINE

## 2023-04-05 NOTE — TELEPHONE ENCOUNTER
[place], [phone]    Per Sergio Bean, nursing staff at [facility], patient is doing well. Patient continues to participate in therapy. No known discharge plans at this time.

## 2023-04-05 NOTE — PROGRESS NOTES
Spoke with jo at [facility] earlier today. States labs have been drawn and they will send them soon. As of 1430 no labs received from [facility]. Called facility again and spoke to staff on wing 9 (wing 10 not available) and states they will fax now to [phone].

## 2023-04-06 ENCOUNTER — APPOINTMENT (OUTPATIENT)
Dept: GENERAL RADIOLOGY | Age: 83
End: 2023-04-06
Attending: UROLOGY
Payer: MEDICARE

## 2023-04-06 ENCOUNTER — ANESTHESIA EVENT (OUTPATIENT)
Dept: OPERATING ROOM | Age: 83
End: 2023-04-06
Payer: MEDICARE

## 2023-04-06 ENCOUNTER — HOSPITAL ENCOUNTER (OUTPATIENT)
Age: 83
Setting detail: OUTPATIENT SURGERY
Discharge: HOME | End: 2023-04-06
Attending: UROLOGY | Admitting: UROLOGY
Payer: MEDICARE

## 2023-04-06 ENCOUNTER — ANESTHESIA (OUTPATIENT)
Dept: OPERATING ROOM | Age: 83
End: 2023-04-06
Payer: MEDICARE

## 2023-04-06 VITALS
WEIGHT: 182 LBS | DIASTOLIC BLOOD PRESSURE: 92 MMHG | TEMPERATURE: 97.3 F | HEART RATE: 92 BPM | OXYGEN SATURATION: 97 % | HEIGHT: 72 IN | SYSTOLIC BLOOD PRESSURE: 152 MMHG | RESPIRATION RATE: 16 BRPM | BODY MASS INDEX: 24.65 KG/M2

## 2023-04-06 DIAGNOSIS — N20.0 LEFT RENAL STONE: Primary | ICD-10-CM

## 2023-04-06 LAB
CLOSURE TME COLL+EPINEP BLD: NORMAL S
HCT VFR BLD AUTO: 33.9 % (ref 42–52)
HGB BLD-MCNC: 10.6 G/DL (ref 14–18)
Lab: NORMAL
PLT THERAPY DRUG: NORMAL

## 2023-04-06 PROCEDURE — 7100000011 HC PHASE II RECOVERY - ADDTL 15 MIN: Performed by: UROLOGY

## 2023-04-06 PROCEDURE — A4216 STERILE WATER/SALINE, 10 ML: HCPCS | Performed by: ANESTHESIOLOGY

## 2023-04-06 PROCEDURE — 2709999900 HC NON-CHARGEABLE SUPPLY: Performed by: UROLOGY

## 2023-04-06 PROCEDURE — 85014 HEMATOCRIT: CPT

## 2023-04-06 PROCEDURE — 36415 COLL VENOUS BLD VENIPUNCTURE: CPT

## 2023-04-06 PROCEDURE — 6360000002 HC RX W HCPCS

## 2023-04-06 PROCEDURE — 7100000000 HC PACU RECOVERY - FIRST 15 MIN: Performed by: UROLOGY

## 2023-04-06 PROCEDURE — 50590 FRAGMENTING OF KIDNEY STONE: CPT | Performed by: UROLOGY

## 2023-04-06 PROCEDURE — C1758 CATHETER, URETERAL: HCPCS | Performed by: UROLOGY

## 2023-04-06 PROCEDURE — 52310 CYSTOSCOPY AND TREATMENT: CPT | Performed by: UROLOGY

## 2023-04-06 PROCEDURE — 2500000003 HC RX 250 WO HCPCS

## 2023-04-06 PROCEDURE — 74018 RADEX ABDOMEN 1 VIEW: CPT

## 2023-04-06 PROCEDURE — 7100000010 HC PHASE II RECOVERY - FIRST 15 MIN: Performed by: UROLOGY

## 2023-04-06 PROCEDURE — C1769 GUIDE WIRE: HCPCS | Performed by: UROLOGY

## 2023-04-06 PROCEDURE — 3600000004 HC SURGERY LEVEL 4 BASE: Performed by: UROLOGY

## 2023-04-06 PROCEDURE — 7100000001 HC PACU RECOVERY - ADDTL 15 MIN: Performed by: UROLOGY

## 2023-04-06 PROCEDURE — 3700000000 HC ANESTHESIA ATTENDED CARE: Performed by: UROLOGY

## 2023-04-06 PROCEDURE — 3700000001 HC ADD 15 MINUTES (ANESTHESIA): Performed by: UROLOGY

## 2023-04-06 PROCEDURE — 2500000003 HC RX 250 WO HCPCS: Performed by: ANESTHESIOLOGY

## 2023-04-06 PROCEDURE — 85018 HEMOGLOBIN: CPT

## 2023-04-06 PROCEDURE — 2580000003 HC RX 258: Performed by: ANESTHESIOLOGY

## 2023-04-06 PROCEDURE — 74018 RADEX ABDOMEN 1 VIEW: CPT | Performed by: RADIOLOGY

## 2023-04-06 PROCEDURE — 6370000000 HC RX 637 (ALT 250 FOR IP): Performed by: UROLOGY

## 2023-04-06 PROCEDURE — 3600000014 HC SURGERY LEVEL 4 ADDTL 15MIN: Performed by: UROLOGY

## 2023-04-06 PROCEDURE — 6360000002 HC RX W HCPCS: Performed by: NURSE PRACTITIONER

## 2023-04-06 RX ORDER — HYDROCODONE BITARTRATE AND ACETAMINOPHEN 5; 325 MG/1; MG/1
1 TABLET ORAL EVERY 6 HOURS PRN
Qty: 12 TABLET | Refills: 0 | Status: SHIPPED | OUTPATIENT
Start: 2023-04-06 | End: 2023-04-09

## 2023-04-06 RX ORDER — SODIUM CHLORIDE 9 MG/ML
INJECTION, SOLUTION INTRAVENOUS CONTINUOUS
Status: DISCONTINUED | OUTPATIENT
Start: 2023-04-06 | End: 2023-04-06 | Stop reason: HOSPADM

## 2023-04-06 RX ORDER — ONDANSETRON 2 MG/ML
INJECTION INTRAMUSCULAR; INTRAVENOUS PRN
Status: DISCONTINUED | OUTPATIENT
Start: 2023-04-06 | End: 2023-04-06 | Stop reason: SDUPTHER

## 2023-04-06 RX ORDER — ONDANSETRON 2 MG/ML
4 INJECTION INTRAMUSCULAR; INTRAVENOUS EVERY 4 HOURS PRN
Status: DISCONTINUED | OUTPATIENT
Start: 2023-04-06 | End: 2023-04-06 | Stop reason: HOSPADM

## 2023-04-06 RX ORDER — NITROFURANTOIN 25; 75 MG/1; MG/1
100 CAPSULE ORAL 2 TIMES DAILY
Qty: 10 CAPSULE | Refills: 0 | Status: SHIPPED | OUTPATIENT
Start: 2023-04-06 | End: 2023-04-11

## 2023-04-06 RX ORDER — ROCURONIUM BROMIDE 10 MG/ML
INJECTION, SOLUTION INTRAVENOUS PRN
Status: DISCONTINUED | OUTPATIENT
Start: 2023-04-06 | End: 2023-04-06 | Stop reason: SDUPTHER

## 2023-04-06 RX ORDER — SODIUM CHLORIDE 9 MG/ML
INJECTION, SOLUTION INTRAVENOUS PRN
Status: DISCONTINUED | OUTPATIENT
Start: 2023-04-06 | End: 2023-04-06 | Stop reason: HOSPADM

## 2023-04-06 RX ORDER — DEXAMETHASONE SODIUM PHOSPHATE 10 MG/ML
INJECTION, SOLUTION INTRAMUSCULAR; INTRAVENOUS PRN
Status: DISCONTINUED | OUTPATIENT
Start: 2023-04-06 | End: 2023-04-06 | Stop reason: SDUPTHER

## 2023-04-06 RX ORDER — SODIUM CHLORIDE 0.9 % (FLUSH) 0.9 %
5-40 SYRINGE (ML) INJECTION PRN
Status: DISCONTINUED | OUTPATIENT
Start: 2023-04-06 | End: 2023-04-06 | Stop reason: HOSPADM

## 2023-04-06 RX ORDER — TAMSULOSIN HYDROCHLORIDE 0.4 MG/1
0.4 CAPSULE ORAL ONCE
Status: COMPLETED | OUTPATIENT
Start: 2023-04-06 | End: 2023-04-06

## 2023-04-06 RX ORDER — SODIUM CHLORIDE 0.9 % (FLUSH) 0.9 %
5-40 SYRINGE (ML) INJECTION EVERY 12 HOURS SCHEDULED
Status: DISCONTINUED | OUTPATIENT
Start: 2023-04-06 | End: 2023-04-06 | Stop reason: HOSPADM

## 2023-04-06 RX ORDER — SODIUM CHLORIDE, SODIUM LACTATE, POTASSIUM CHLORIDE, CALCIUM CHLORIDE 600; 310; 30; 20 MG/100ML; MG/100ML; MG/100ML; MG/100ML
INJECTION, SOLUTION INTRAVENOUS CONTINUOUS
Status: DISCONTINUED | OUTPATIENT
Start: 2023-04-06 | End: 2023-04-06 | Stop reason: HOSPADM

## 2023-04-06 RX ORDER — PROPOFOL 10 MG/ML
INJECTION, EMULSION INTRAVENOUS PRN
Status: DISCONTINUED | OUTPATIENT
Start: 2023-04-06 | End: 2023-04-06 | Stop reason: SDUPTHER

## 2023-04-06 RX ORDER — HYDROMORPHONE HYDROCHLORIDE 1 MG/ML
0.25 INJECTION, SOLUTION INTRAMUSCULAR; INTRAVENOUS; SUBCUTANEOUS EVERY 5 MIN PRN
Status: DISCONTINUED | OUTPATIENT
Start: 2023-04-06 | End: 2023-04-06 | Stop reason: HOSPADM

## 2023-04-06 RX ORDER — EPHEDRINE SULFATE 50 MG/ML
INJECTION, SOLUTION INTRAVENOUS PRN
Status: DISCONTINUED | OUTPATIENT
Start: 2023-04-06 | End: 2023-04-06 | Stop reason: SDUPTHER

## 2023-04-06 RX ORDER — HYDROMORPHONE HYDROCHLORIDE 1 MG/ML
0.5 INJECTION, SOLUTION INTRAMUSCULAR; INTRAVENOUS; SUBCUTANEOUS
Status: DISCONTINUED | OUTPATIENT
Start: 2023-04-06 | End: 2023-04-06 | Stop reason: HOSPADM

## 2023-04-06 RX ORDER — ONDANSETRON 2 MG/ML
4 INJECTION INTRAMUSCULAR; INTRAVENOUS
Status: DISCONTINUED | OUTPATIENT
Start: 2023-04-06 | End: 2023-04-06 | Stop reason: HOSPADM

## 2023-04-06 RX ORDER — FENTANYL CITRATE 50 UG/ML
INJECTION, SOLUTION INTRAMUSCULAR; INTRAVENOUS PRN
Status: DISCONTINUED | OUTPATIENT
Start: 2023-04-06 | End: 2023-04-06 | Stop reason: SDUPTHER

## 2023-04-06 RX ORDER — OXYCODONE HYDROCHLORIDE AND ACETAMINOPHEN 5; 325 MG/1; MG/1
2 TABLET ORAL EVERY 4 HOURS PRN
Status: DISCONTINUED | OUTPATIENT
Start: 2023-04-06 | End: 2023-04-06 | Stop reason: HOSPADM

## 2023-04-06 RX ORDER — LEVOFLOXACIN 5 MG/ML
500 INJECTION, SOLUTION INTRAVENOUS ONCE
Status: COMPLETED | OUTPATIENT
Start: 2023-04-06 | End: 2023-04-06

## 2023-04-06 RX ORDER — LIDOCAINE HYDROCHLORIDE 10 MG/ML
INJECTION, SOLUTION EPIDURAL; INFILTRATION; INTRACAUDAL; PERINEURAL PRN
Status: DISCONTINUED | OUTPATIENT
Start: 2023-04-06 | End: 2023-04-06 | Stop reason: SDUPTHER

## 2023-04-06 RX ORDER — HYDROMORPHONE HYDROCHLORIDE 1 MG/ML
0.5 INJECTION, SOLUTION INTRAMUSCULAR; INTRAVENOUS; SUBCUTANEOUS EVERY 5 MIN PRN
Status: DISCONTINUED | OUTPATIENT
Start: 2023-04-06 | End: 2023-04-06 | Stop reason: HOSPADM

## 2023-04-06 RX ADMIN — PHENYLEPHRINE HYDROCHLORIDE 200 MCG: 10 INJECTION INTRAVENOUS at 17:54

## 2023-04-06 RX ADMIN — PROPOFOL 50 MG: 10 INJECTION, EMULSION INTRAVENOUS at 18:24

## 2023-04-06 RX ADMIN — PHENYLEPHRINE HYDROCHLORIDE 200 MCG: 10 INJECTION INTRAVENOUS at 17:38

## 2023-04-06 RX ADMIN — LIDOCAINE HYDROCHLORIDE 50 MG: 10 INJECTION, SOLUTION EPIDURAL; INFILTRATION; INTRACAUDAL; PERINEURAL at 17:13

## 2023-04-06 RX ADMIN — SODIUM CHLORIDE, SODIUM LACTATE, POTASSIUM CHLORIDE, AND CALCIUM CHLORIDE: 600; 310; 30; 20 INJECTION, SOLUTION INTRAVENOUS at 18:02

## 2023-04-06 RX ADMIN — PHENYLEPHRINE HYDROCHLORIDE 100 MCG: 10 INJECTION INTRAVENOUS at 17:31

## 2023-04-06 RX ADMIN — PHENYLEPHRINE HYDROCHLORIDE 200 MCG: 10 INJECTION INTRAVENOUS at 17:46

## 2023-04-06 RX ADMIN — SODIUM CHLORIDE, SODIUM LACTATE, POTASSIUM CHLORIDE, AND CALCIUM CHLORIDE: 600; 310; 30; 20 INJECTION, SOLUTION INTRAVENOUS at 14:56

## 2023-04-06 RX ADMIN — ONDANSETRON 4 MG: 2 INJECTION INTRAMUSCULAR; INTRAVENOUS at 17:31

## 2023-04-06 RX ADMIN — TAMSULOSIN HYDROCHLORIDE 0.4 MG: 0.4 CAPSULE ORAL at 19:28

## 2023-04-06 RX ADMIN — SUGAMMADEX 200 MG: 100 INJECTION, SOLUTION INTRAVENOUS at 18:46

## 2023-04-06 RX ADMIN — PROPOFOL 130 MG: 10 INJECTION, EMULSION INTRAVENOUS at 17:13

## 2023-04-06 RX ADMIN — FENTANYL CITRATE 50 MCG: 0.05 INJECTION, SOLUTION INTRAMUSCULAR; INTRAVENOUS at 17:20

## 2023-04-06 RX ADMIN — EPHEDRINE SULFATE 15 MG: 50 INJECTION INTRAMUSCULAR; INTRAVENOUS; SUBCUTANEOUS at 18:30

## 2023-04-06 RX ADMIN — FAMOTIDINE 20 MG: 10 INJECTION, SOLUTION INTRAVENOUS at 15:19

## 2023-04-06 RX ADMIN — DEXAMETHASONE SODIUM PHOSPHATE 10 MG: 10 INJECTION, SOLUTION INTRAMUSCULAR; INTRAVENOUS at 17:31

## 2023-04-06 RX ADMIN — LEVOFLOXACIN 500 MG: 500 INJECTION, SOLUTION INTRAVENOUS at 17:22

## 2023-04-06 RX ADMIN — FENTANYL CITRATE 50 MCG: 0.05 INJECTION, SOLUTION INTRAMUSCULAR; INTRAVENOUS at 17:13

## 2023-04-06 RX ADMIN — PHENYLEPHRINE HYDROCHLORIDE 200 MCG: 10 INJECTION INTRAVENOUS at 17:33

## 2023-04-06 RX ADMIN — PHENYLEPHRINE HYDROCHLORIDE 100 MCG: 10 INJECTION INTRAVENOUS at 17:49

## 2023-04-06 RX ADMIN — PHENYLEPHRINE HYDROCHLORIDE 100 MCG: 10 INJECTION INTRAVENOUS at 18:01

## 2023-04-06 RX ADMIN — ROCURONIUM BROMIDE 50 MG: 10 INJECTION, SOLUTION INTRAVENOUS at 17:13

## 2023-04-06 RX ADMIN — PHENYLEPHRINE HYDROCHLORIDE 100 MCG: 10 INJECTION INTRAVENOUS at 17:25

## 2023-04-06 RX ADMIN — ROCURONIUM BROMIDE 20 MG: 10 INJECTION, SOLUTION INTRAVENOUS at 18:24

## 2023-04-06 RX ADMIN — PHENYLEPHRINE HYDROCHLORIDE 200 MCG: 10 INJECTION INTRAVENOUS at 17:28

## 2023-04-06 RX ADMIN — PHENYLEPHRINE HYDROCHLORIDE 200 MCG: 10 INJECTION INTRAVENOUS at 18:05

## 2023-04-06 RX ADMIN — PHENYLEPHRINE HYDROCHLORIDE 200 MCG: 10 INJECTION INTRAVENOUS at 18:10

## 2023-04-06 RX ADMIN — EPHEDRINE SULFATE 10 MG: 50 INJECTION INTRAMUSCULAR; INTRAVENOUS; SUBCUTANEOUS at 17:31

## 2023-04-06 ASSESSMENT — LIFESTYLE VARIABLES: SMOKING_STATUS: 0

## 2023-04-06 NOTE — INTERVAL H&P NOTE
Update History & Physical    The patient's History and Physical of March 13, 2023 was reviewed with the patient and I examined the patient. There was interval patient was met in the hospital for UTI urinary retention and C. difficile as a complication of receiving antibiotics for his obstructive pyelonephritis from his left ureteral stone. At that time KUB showed the stone had migrated back up into the kidney and is felt that he should undergo ESWL he has completed his course of antibiotic therapy. He is now off his aspirin plan is to proceed with left renal ESWL if stone fragments we will remove the stent. . The surgical site was confirmed by the patient and me. Plan: The risks, benefits, expected outcome, and alternative to the recommended procedure have been discussed with the patient. Patient understands and wants to proceed with the procedure.      Electronically signed by Lulu Rodríguez MD on 4/6/2023 at 4:29 PM

## 2023-04-06 NOTE — INTERVAL H&P NOTE
Update History & Physical    The patient's History and Physical of March 21, 2023 was reviewed with the patient and I examined the patient. There was no change. The surgical site was confirmed by the patient and me. Plan: The risks, benefits, expected outcome, and alternative to the recommended procedure have been discussed with the patient. Patient understands and wants to proceed with the procedure.      Electronically signed by Sukhi Iglesias MD on 4/6/2023 at 4:28 PM

## 2023-04-06 NOTE — ANESTHESIA PRE PROCEDURE
U73.7    Complicated UTI (urinary tract infection) N39.0    Urinary retention R33.9    Sepsis ([address] A41.9    García catheter in place Z97.8    Ureteral stent present Z96.0    Nausea vomiting and diarrhea R11.2, R19.7    C. difficile diarrhea A04.72       Past Medical History:        Diagnosis Date    Benign non-nodular prostatic hyperplasia with lower urinary tract symptoms 2017    Essential hypertension 2017    Gastroesophageal reflux disease without esophagitis 2017    Gout 2017    Idiopathic peripheral neuropathy 2018    Mixed hyperlipidemia 2017    Peripheral neuropathic pain 2017    Primary osteoarthritis involving multiple joints 2017    Renal stone 2023    Renal stone 2023    Restless legs syndrome 2017       Past Surgical History:        Procedure Laterality Date    CYSTOSCOPY Left 2023    CYSTOSCOPY; RETROGRADE PYLEGRAM URETERAL STENT INSERTION performed by Avni Tejada MD at [facility] Bilateral 1963    TURP         Social History:    Social History     Tobacco Use    Smoking status: Former     Packs/day: 3.00     Years: 35.00     Pack years: 105.00     Types: Cigarettes, Pipe     Start date:      Quit date:      Years since quittin.2    Smokeless tobacco: Never   Substance Use Topics    Alcohol use: Not Currently     Comment: \"not vbery much\", \"beer every 15 years\"                                Counseling given: Not Answered      Vital Signs (Current):   Vitals:    23 1437   BP: 137/75   Pulse: (!) 114   Resp: 14   Temp: 97.5 °F (36.4 °C)   TempSrc: Tympanic   SpO2: 94%   Weight: 182 lb (82.6 kg)   Height: 6' (1.829 m)                                              BP Readings from Last 3 Encounters:   23 137/75   23 137/76   23 91/63       NPO Status: Time of last liquid consumption:                         Time of last solid consumption:

## 2023-04-07 NOTE — DISCHARGE INSTRUCTIONS
*****STRAIN ALL URINE*****STRAIN ALL URINE*******STRAIN ALL URINE    Anticipate some blood in the urine. If the blood begins to be thicker especially with clots, contact our office. Fever >101F should be called to the office. Low grade fever under 100.5 is common. Take acetaminophen, if not allergic, 500-1000mg as needed, not to exceed 4 grams in one day (4000mg). Some discomfort in the back, flank, or abdomen and increased urinary frequency is common as you are passing fragments. This may occur intermittently until the stone is passed. If a script was provided, take all of your antibiotic as prescribed.      Pain medication should be taken with light meal or snack to limit nause

## 2023-04-07 NOTE — ANESTHESIA POSTPROCEDURE EVALUATION
Department of Anesthesiology  Postprocedure Note    Patient: Emeka Morrell  MRN: [id number]  YOB: 1940  Date of evaluation: 4/6/2023      Procedure Summary     Date: 04/06/23 Room / Location: [address]    Anesthesia Start: 1707 Anesthesia Stop:     Procedures:       LEFT RENAL EXTRACORPOREAL SHOCK WAVE LITHOTRIPSY (Left)      POSSIBLE CYSTOSCOPY LEFT STENT REMOVAL (Left) Diagnosis:       Calculus of proximal left ureter      Retained ureteral stent      (Calculus of proximal left ureter [N20.1])      (Retained ureteral stent [Z96.0])    Surgeons: Mariela Ontiveros MD Responsible Provider: GOLD Swenson CRNA    Anesthesia Type: general ASA Status: 3          Anesthesia Type: No value filed.     Genie Phase I: Genie Score: 10    Genie Phase II:        Anesthesia Post Evaluation    Patient location during evaluation: PACU  Patient participation: complete - patient participated  Level of consciousness: sleepy but conscious  Pain score: 0  Airway patency: patent  Nausea & Vomiting: no nausea and no vomiting  Complications: no  Cardiovascular status: blood pressure returned to baseline  Respiratory status: acceptable and room air  Hydration status: euvolemic  Comments: /87   Pulse (!) 103   Temp 97.6 °F (36.4 °C) (Tympanic)   Resp 20   Ht 6' (1.829 m)   Wt 182 lb (82.6 kg)   SpO2 99%   BMI 24.68 kg/m²

## 2023-04-07 NOTE — OP NOTE
Brief  Operative Note      Patient: Johnathan Mathur  YOB: 1940  MRN: [id number]    Date of Procedure: 4/6/2023    Pre-Op Diagnosis: Calculus of proximal left ureter [N20.1]  Retained ureteral stent [Z96.0]  Presents  chronic García cath    Post-Op Diagnosis: Same       Procedure(s):  LEFT RENAL EXTRACORPOREAL SHOCK WAVE LITHOTRIPSY   CYSTOSCOPY LEFT STENT REMOVAL  Placement of temporary indwelling García catheter simple    Surgeon(s):  Prema Arzate MD    Assistant:   * No surgical staff found *    Anesthesia: General    Estimated Blood Loss (mL): 0    Complications: None    Specimens:   * No specimens in log *    Implants:  * No implants in log *      Drains:   Urinary Catheter 04/06/23 García (Active)       [REMOVED] Urinary Catheter 03/17/23 García (Removed)   $ Urethral catheter insertion $ Not inserted for procedure 03/19/23 2033   Catheter Indications Urinary retention (acute or chronic), continuous bladder irrigation or bladder outlet obstruction 03/21/23 0752   Site Assessment No urethral drainage; Swelling 03/21/23 0752   Urine Color Yellow 03/21/23 0752   Urine Appearance Cloudy 03/21/23 0752   Urine Odor Malodorous 03/21/23 0752   Collection Container Standard 03/21/23 0752   Securement Method Securing device (Describe) 03/21/23 0752   Catheter Care  Soap and water 03/21/23 0752   Catheter Best Practices  Catheter secured to thigh;Drainage tube clipped to bed; Bag below bladder;Bag not on floor; Tamper seal intact 03/21/23 0752   Status Draining 03/21/23 0752   Output (mL) 900 mL 03/20/23 1430       Findings: Left renal calculus treated with 3000 shockwaves. The first 2000 shockwaves were treated at a power level of 7. The last 1000 was treated at power of 8 there appear to be good fragmentation of stone with no fragments appearing larger than the diameter of the stent therefore the stent was removed. 12 [place] García catheter was removed and replaced with new catheter.     Detailed
García catheter for attempted voiding  trial.  Unfortunately, he has had a prior TURP. He is on alpha blocker  therapy and so there is nothing more surgically or medically to do so. If he failed this, he may require a chronic catheter.         Sukhi Iglesias MD    D: 04/06/2023 20:07:25      T: 04/06/2023 20:11:28     [email or web address]  Job#: [id number]     Doc#: [id number]    CC:

## 2023-04-10 DIAGNOSIS — Z13.29 SCREENING FOR THYROID DISORDER: ICD-10-CM

## 2023-04-10 DIAGNOSIS — I10 ESSENTIAL HYPERTENSION: ICD-10-CM

## 2023-04-10 DIAGNOSIS — M10.9 GOUT, UNSPECIFIED CAUSE, UNSPECIFIED CHRONICITY, UNSPECIFIED SITE: ICD-10-CM

## 2023-04-10 DIAGNOSIS — E78.2 MIXED HYPERLIPIDEMIA: ICD-10-CM

## 2023-04-10 DIAGNOSIS — K21.9 GASTROESOPHAGEAL REFLUX DISEASE WITHOUT ESOPHAGITIS: ICD-10-CM

## 2023-04-10 LAB
ALBUMIN SERPL-MCNC: 3.7 G/DL (ref 3.5–5.2)
ALP SERPL-CCNC: 109 U/L (ref 40–130)
ALT SERPL-CCNC: 12 U/L (ref 5–41)
ANION GAP SERPL CALCULATED.3IONS-SCNC: 17 MMOL/L (ref 7–19)
AST SERPL-CCNC: 15 U/L (ref 5–40)
BASOPHILS # BLD: 0 K/UL (ref 0–0.2)
BASOPHILS NFR BLD: 0.3 % (ref 0–1)
BILIRUB SERPL-MCNC: 0.4 MG/DL (ref 0.2–1.2)
BUN SERPL-MCNC: 18 MG/DL (ref 8–23)
CALCIUM SERPL-MCNC: 9.8 MG/DL (ref 8.8–10.2)
CHLORIDE SERPL-SCNC: 105 MMOL/L (ref 98–111)
CHOLEST SERPL-MCNC: 186 MG/DL (ref 160–199)
CO2 SERPL-SCNC: 26 MMOL/L (ref 22–29)
CREAT SERPL-MCNC: 1.1 MG/DL (ref 0.5–1.2)
EOSINOPHIL # BLD: 0.1 K/UL (ref 0–0.6)
EOSINOPHIL NFR BLD: 1.7 % (ref 0–5)
ERYTHROCYTE [DISTWIDTH] IN BLOOD BY AUTOMATED COUNT: 15.9 % (ref 11.5–14.5)
GLUCOSE SERPL-MCNC: 94 MG/DL (ref 74–109)
HCT VFR BLD AUTO: 37 % (ref 42–52)
HDLC SERPL-MCNC: 62 MG/DL (ref 55–121)
HGB BLD-MCNC: 11.2 G/DL (ref 14–18)
IMM GRANULOCYTES # BLD: 0 K/UL
LDLC SERPL CALC-MCNC: 89 MG/DL
LYMPHOCYTES # BLD: 1.6 K/UL (ref 1.1–4.5)
LYMPHOCYTES NFR BLD: 20.5 % (ref 20–40)
MCH RBC QN AUTO: 27.4 PG (ref 27–31)
MCHC RBC AUTO-ENTMCNC: 30.3 G/DL (ref 33–37)
MCV RBC AUTO: 90.5 FL (ref 80–94)
MONOCYTES # BLD: 0.6 K/UL (ref 0–0.9)
MONOCYTES NFR BLD: 7.7 % (ref 0–10)
NEUTROPHILS # BLD: 5.4 K/UL (ref 1.5–7.5)
NEUTS SEG NFR BLD: 69.4 % (ref 50–65)
PLATELET # BLD AUTO: 285 K/UL (ref 130–400)
PMV BLD AUTO: 11 FL (ref 9.4–12.4)
POTASSIUM SERPL-SCNC: 4.7 MMOL/L (ref 3.5–5)
PROT SERPL-MCNC: 6.2 G/DL (ref 6.6–8.7)
PSA SERPL-MCNC: 12.36 NG/ML (ref 0–4)
RBC # BLD AUTO: 4.09 M/UL (ref 4.7–6.1)
SODIUM SERPL-SCNC: 148 MMOL/L (ref 136–145)
TRIGL SERPL-MCNC: 174 MG/DL (ref 0–149)
TSH SERPL DL<=0.005 MIU/L-ACNC: 1.78 UIU/ML (ref 0.27–4.2)
URATE SERPL-MCNC: 7.4 MG/DL (ref 3.4–7)
WBC # BLD AUTO: 7.7 K/UL (ref 4.8–10.8)

## 2023-04-14 PROBLEM — Z99.2 ACUTE RENAL FAILURE WITH ACUTE TUBULAR NECROSIS SUPERIMPOSED ON CHRONIC KIDNEY DISEASE, ON CHRONIC DIALYSIS (HCC): Status: RESOLVED | Noted: 2023-02-01 | Resolved: 2023-04-14

## 2023-04-14 PROBLEM — N18.6 ACUTE RENAL FAILURE WITH ACUTE TUBULAR NECROSIS SUPERIMPOSED ON CHRONIC KIDNEY DISEASE, ON CHRONIC DIALYSIS (HCC): Status: RESOLVED | Noted: 2023-02-01 | Resolved: 2023-04-14

## 2023-04-14 PROBLEM — N17.0 ACUTE RENAL FAILURE WITH ACUTE TUBULAR NECROSIS SUPERIMPOSED ON CHRONIC KIDNEY DISEASE, ON CHRONIC DIALYSIS (HCC): Status: RESOLVED | Noted: 2023-02-01 | Resolved: 2023-04-14

## 2023-04-14 PROBLEM — N18.9 ACUTE RENAL FAILURE WITH ACUTE TUBULAR NECROSIS SUPERIMPOSED ON CHRONIC KIDNEY DISEASE, ON CHRONIC DIALYSIS (HCC): Status: RESOLVED | Noted: 2023-02-01 | Resolved: 2023-04-14

## 2023-04-14 PROBLEM — E43 SEVERE MALNUTRITION (HCC): Status: RESOLVED | Noted: 2023-02-03 | Resolved: 2023-04-14

## 2023-04-19 ENCOUNTER — TELEPHONE (OUTPATIENT)
Dept: INTERNAL MEDICINE | Age: 83
End: 2023-04-19

## 2023-04-19 NOTE — TELEPHONE ENCOUNTER
[place], [phone]    Per Ignacio Dawson, nursing staff at [facility], patient is doing well. Patient continues to participate in therapy. No known discharge plans at this time.

## 2023-04-20 ENCOUNTER — TELEPHONE (OUTPATIENT)
Dept: INTERNAL MEDICINE CLINIC | Age: 83
End: 2023-04-20

## 2023-04-20 NOTE — TELEPHONE ENCOUNTER
[address] [facility] from Mikey Batista to be dishcarged there tomorrow      Will Dr Mally Larry follow pt for [facility] ?

## 2023-04-21 ENCOUNTER — LAB REQUISITION (OUTPATIENT)
Dept: LAB | Facility: HOSPITAL | Age: 83
End: 2023-04-21
Payer: MEDICARE

## 2023-04-21 LAB
ANION GAP SERPL CALCULATED.3IONS-SCNC: 12 MMOL/L (ref 5–15)
BUN SERPL-MCNC: 23 MG/DL (ref 8–23)
BUN/CREAT SERPL: 20.9 (ref 7–25)
CALCIUM SPEC-SCNC: 9.4 MG/DL (ref 8.6–10.5)
CHLORIDE SERPL-SCNC: 105 MMOL/L (ref 98–107)
CO2 SERPL-SCNC: 25 MMOL/L (ref 22–29)
CREAT SERPL-MCNC: 1.1 MG/DL (ref 0.76–1.27)
EGFRCR SERPLBLD CKD-EPI 2021: 66.2 ML/MIN/1.73
GLUCOSE SERPL-MCNC: 88 MG/DL (ref 65–99)
POTASSIUM SERPL-SCNC: 4.3 MMOL/L (ref 3.5–5.2)
SODIUM SERPL-SCNC: 142 MMOL/L (ref 136–145)

## 2023-04-21 PROCEDURE — 80048 BASIC METABOLIC PNL TOTAL CA: CPT | Performed by: NURSE PRACTITIONER

## 2023-04-21 PROCEDURE — 36415 COLL VENOUS BLD VENIPUNCTURE: CPT | Performed by: NURSE PRACTITIONER

## 2023-04-22 DIAGNOSIS — G60.9 IDIOPATHIC PERIPHERAL NEUROPATHY: ICD-10-CM

## 2023-04-23 DIAGNOSIS — G60.9 IDIOPATHIC PERIPHERAL NEUROPATHY: ICD-10-CM

## 2023-04-24 ENCOUNTER — TELEPHONE (OUTPATIENT)
Dept: INTERNAL MEDICINE | Age: 83
End: 2023-04-24

## 2023-04-24 ENCOUNTER — OFFICE VISIT (OUTPATIENT)
Dept: PRIMARY CARE CLINIC | Age: 83
End: 2023-04-24

## 2023-04-24 ENCOUNTER — TELEPHONE (OUTPATIENT)
Dept: INTERNAL MEDICINE CLINIC | Age: 83
End: 2023-04-24

## 2023-04-24 VITALS
HEIGHT: 72 IN | OXYGEN SATURATION: 99 % | HEART RATE: 122 BPM | BODY MASS INDEX: 24.65 KG/M2 | SYSTOLIC BLOOD PRESSURE: 100 MMHG | TEMPERATURE: 97.1 F | WEIGHT: 182 LBS | RESPIRATION RATE: 20 BRPM | DIASTOLIC BLOOD PRESSURE: 60 MMHG

## 2023-04-24 DIAGNOSIS — K21.9 GASTROESOPHAGEAL REFLUX DISEASE WITHOUT ESOPHAGITIS: ICD-10-CM

## 2023-04-24 DIAGNOSIS — G60.9 IDIOPATHIC PERIPHERAL NEUROPATHY: ICD-10-CM

## 2023-04-24 DIAGNOSIS — E55.9 VITAMIN D DEFICIENCY: ICD-10-CM

## 2023-04-24 DIAGNOSIS — M10.9 GOUT, UNSPECIFIED CAUSE, UNSPECIFIED CHRONICITY, UNSPECIFIED SITE: ICD-10-CM

## 2023-04-24 DIAGNOSIS — E78.2 MIXED HYPERLIPIDEMIA: ICD-10-CM

## 2023-04-24 DIAGNOSIS — I10 ESSENTIAL HYPERTENSION: ICD-10-CM

## 2023-04-24 DIAGNOSIS — K59.04 CHRONIC IDIOPATHIC CONSTIPATION: Chronic | ICD-10-CM

## 2023-04-24 DIAGNOSIS — M79.2 PERIPHERAL NEUROPATHIC PAIN: Chronic | ICD-10-CM

## 2023-04-24 DIAGNOSIS — N40.0 BENIGN PROSTATIC HYPERPLASIA, UNSPECIFIED WHETHER LOWER URINARY TRACT SYMPTOMS PRESENT: ICD-10-CM

## 2023-04-24 DIAGNOSIS — E78.2 MIXED HYPERLIPIDEMIA: Chronic | ICD-10-CM

## 2023-04-24 DIAGNOSIS — Z09 HOSPITAL DISCHARGE FOLLOW-UP: Primary | ICD-10-CM

## 2023-04-24 DIAGNOSIS — I10 ESSENTIAL HYPERTENSION: Chronic | ICD-10-CM

## 2023-04-24 RX ORDER — CALCITRIOL 0.25 UG/1
0.25 CAPSULE, LIQUID FILLED ORAL DAILY
Qty: 30 CAPSULE | Refills: 3 | Status: SHIPPED | OUTPATIENT
Start: 2023-04-24

## 2023-04-24 RX ORDER — SIMETHICONE 80 MG
80 TABLET,CHEWABLE ORAL EVERY 6 HOURS PRN
Qty: 180 TABLET | Refills: 3 | Status: SHIPPED | OUTPATIENT
Start: 2023-04-24

## 2023-04-24 RX ORDER — OMEPRAZOLE 20 MG/1
20 CAPSULE, DELAYED RELEASE ORAL DAILY
Qty: 90 CAPSULE | Refills: 3 | Status: SHIPPED | OUTPATIENT
Start: 2023-04-24

## 2023-04-24 RX ORDER — METOPROLOL SUCCINATE 25 MG/1
25 TABLET, EXTENDED RELEASE ORAL DAILY
Qty: 90 TABLET | Refills: 1 | Status: SHIPPED | OUTPATIENT
Start: 2023-04-24 | End: 2023-07-23

## 2023-04-24 RX ORDER — ATORVASTATIN CALCIUM 20 MG/1
20 TABLET, FILM COATED ORAL DAILY
Qty: 90 TABLET | Refills: 3 | Status: CANCELLED | OUTPATIENT
Start: 2023-04-24

## 2023-04-24 RX ORDER — METOPROLOL SUCCINATE 25 MG/1
25 TABLET, EXTENDED RELEASE ORAL DAILY
Qty: 90 TABLET | Refills: 1 | Status: CANCELLED | OUTPATIENT
Start: 2023-04-24 | End: 2023-07-23

## 2023-04-24 RX ORDER — PSEUDOEPHEDRINE HCL 30 MG
100 TABLET ORAL DAILY
Qty: 30 CAPSULE | Refills: 1 | Status: SHIPPED | OUTPATIENT
Start: 2023-04-24

## 2023-04-24 RX ORDER — PREGABALIN 25 MG/1
25 CAPSULE ORAL DAILY
Qty: 30 CAPSULE | Refills: 0 | Status: SHIPPED | OUTPATIENT
Start: 2023-04-24 | End: 2023-04-24

## 2023-04-24 RX ORDER — MIRTAZAPINE 7.5 MG/1
7.5 TABLET, FILM COATED ORAL NIGHTLY
Qty: 30 TABLET | Refills: 3 | Status: SHIPPED | OUTPATIENT
Start: 2023-04-24

## 2023-04-24 RX ORDER — ERGOCALCIFEROL 1.25 MG/1
50000 CAPSULE ORAL WEEKLY
Qty: 5 CAPSULE | Refills: 3 | Status: SHIPPED | OUTPATIENT
Start: 2023-04-24

## 2023-04-24 RX ORDER — CALCITRIOL 0.25 UG/1
0.25 CAPSULE, LIQUID FILLED ORAL DAILY
Qty: 30 CAPSULE | Refills: 3 | Status: CANCELLED | OUTPATIENT
Start: 2023-04-24

## 2023-04-24 RX ORDER — TAMSULOSIN HYDROCHLORIDE 0.4 MG/1
0.4 CAPSULE ORAL DAILY
Qty: 90 CAPSULE | Refills: 1 | Status: CANCELLED | OUTPATIENT
Start: 2023-04-24

## 2023-04-24 RX ORDER — PREGABALIN 100 MG/1
CAPSULE ORAL
Qty: 60 CAPSULE | Refills: 0 | OUTPATIENT
Start: 2023-04-24

## 2023-04-24 RX ORDER — OMEPRAZOLE 20 MG/1
20 CAPSULE, DELAYED RELEASE ORAL DAILY
Qty: 90 CAPSULE | Refills: 3 | Status: CANCELLED | OUTPATIENT
Start: 2023-04-24

## 2023-04-24 RX ORDER — SIMETHICONE 80 MG
80 TABLET,CHEWABLE ORAL EVERY 6 HOURS PRN
Qty: 180 TABLET | Refills: 3 | Status: CANCELLED | OUTPATIENT
Start: 2023-04-24

## 2023-04-24 RX ORDER — ERGOCALCIFEROL 1.25 MG/1
50000 CAPSULE ORAL WEEKLY
Qty: 5 CAPSULE | Refills: 3 | Status: CANCELLED | OUTPATIENT
Start: 2023-04-24

## 2023-04-24 RX ORDER — PREGABALIN 100 MG/1
100 CAPSULE ORAL DAILY
Qty: 30 CAPSULE | Refills: 0 | Status: SHIPPED | OUTPATIENT
Start: 2023-04-24 | End: 2023-05-24

## 2023-04-24 RX ORDER — PSEUDOEPHEDRINE HCL 30 MG
100 TABLET ORAL DAILY
Qty: 30 CAPSULE | Refills: 1 | Status: CANCELLED | OUTPATIENT
Start: 2023-04-24

## 2023-04-24 RX ORDER — PREGABALIN 25 MG/1
25 CAPSULE ORAL DAILY
Qty: 3 CAPSULE | Refills: 0 | Status: CANCELLED | OUTPATIENT
Start: 2023-04-24 | End: 2023-04-27

## 2023-04-24 RX ORDER — QUETIAPINE FUMARATE 25 MG/1
25 TABLET, FILM COATED ORAL NIGHTLY
Qty: 60 TABLET | Refills: 3 | Status: CANCELLED | OUTPATIENT
Start: 2023-04-24

## 2023-04-24 RX ORDER — ATORVASTATIN CALCIUM 20 MG/1
20 TABLET, FILM COATED ORAL DAILY
Qty: 90 TABLET | Refills: 3 | Status: SHIPPED | OUTPATIENT
Start: 2023-04-24

## 2023-04-24 RX ORDER — ASPIRIN 81 MG/1
81 TABLET, CHEWABLE ORAL DAILY
Qty: 30 TABLET | Refills: 3 | Status: SHIPPED | OUTPATIENT
Start: 2023-04-24

## 2023-04-24 RX ORDER — ASPIRIN 81 MG/1
81 TABLET, CHEWABLE ORAL DAILY
Qty: 30 TABLET | Refills: 3 | Status: CANCELLED | OUTPATIENT
Start: 2023-04-24

## 2023-04-24 RX ORDER — MIRTAZAPINE 7.5 MG/1
7.5 TABLET, FILM COATED ORAL NIGHTLY
Qty: 30 TABLET | Refills: 3 | Status: CANCELLED | OUTPATIENT
Start: 2023-04-24

## 2023-04-24 RX ORDER — TAMSULOSIN HYDROCHLORIDE 0.4 MG/1
0.4 CAPSULE ORAL DAILY
Qty: 90 CAPSULE | Refills: 1 | Status: SHIPPED | OUTPATIENT
Start: 2023-04-24

## 2023-04-24 RX ORDER — ALLOPURINOL 100 MG/1
100 TABLET ORAL DAILY
Qty: 90 TABLET | Refills: 1 | Status: SHIPPED | OUTPATIENT
Start: 2023-04-24

## 2023-04-24 RX ORDER — ALLOPURINOL 100 MG/1
100 TABLET ORAL DAILY
Qty: 90 TABLET | Refills: 1 | Status: CANCELLED | OUTPATIENT
Start: 2023-04-24

## 2023-04-24 RX ORDER — QUETIAPINE FUMARATE 25 MG/1
25 TABLET, FILM COATED ORAL NIGHTLY
Qty: 60 TABLET | Refills: 3 | Status: SHIPPED | OUTPATIENT
Start: 2023-04-24

## 2023-04-24 NOTE — TELEPHONE ENCOUNTER
[facility] 45 Transitions Initial Follow Up Call    Outreach made within 2 business days of discharge: Yes    Patient: Sreedhar Johnson   Patient : 1940 MRN: [id number]    Reason for Admission: Emesis, Diarrhea    Discharge Date: 23      Discharge Diagnosis:  1. Vomiting and diarrhea    2. Urinary tract infection without hematuria, site unspecified       Spoke with: Patient    Discharge department/facility: [facility] Patient Contact:  Was patient able to fill all prescriptions: No: needs refills on all his medications  Was patient instructed to bring all medications to the follow-up visit: Yes  Is patient taking all medications as directed in the discharge summary? Yes  Does patient understand their discharge instructions: Yes  Does patient have questions or concerns that need addressed prior to 7-14 day follow up office visit: no    Spoke to the patient he said he is doing well. He does need refills on all of his medications. He will be following up in the office today. He is back to his normal diet. He will have home health. I have pended the medications to Dr Belkis Serrano to review.     RECORDS RE[facility] FROM [facility]    Scheduled appointment with PCP within 7-14 days    Follow Up  Future Appointments   Date Time Provider Edie Bey   2023 12:45 PM Abe Clark MD [facility]   2023  9:30 AM GOLD Breen - CNP N [facility]   10/16/2023 10:45 AM [place] [facility]   2024 11:00 AM SCHEDULE, [facility] LPN [facility]       [place], [place]

## 2023-04-24 NOTE — TELEPHONE ENCOUNTER
[address] [address] nurse admitted pt 4/23 and nursing plans to see pt 1 X week for 3 weeks     They also need the ok to accept orders from [facility]  - is that ok ?

## 2023-04-24 NOTE — TELEPHONE ENCOUNTER
Wily Menard called to request a refill on his medication. Last office visit : 4/14/2023   Next office visit : 4/24/2023     Last UDS:   Amphetamine Screen, Urine   Date Value Ref Range Status   01/06/2023 Negative  Final     Barbiturate Screen, Urine   Date Value Ref Range Status   01/06/2023 Negative  Final     Benzodiazepine Screen, Urine   Date Value Ref Range Status   01/06/2023 Negative  Final     Buprenorphine Urine   Date Value Ref Range Status   01/06/2023 Negative  Final     Cocaine Metabolite Screen, Urine   Date Value Ref Range Status   01/06/2023 Negative  Final     Gabapentin Screen, Urine   Date Value Ref Range Status   02/19/2019 NEGATIVE  Final     MDMA, Urine   Date Value Ref Range Status   01/06/2023 Negative  Final     Methamphetamine, Urine   Date Value Ref Range Status   01/06/2023 Negative  Final     Opiate Scrn, Ur   Date Value Ref Range Status   01/06/2023 Negative  Final     Oxycodone Screen, Ur   Date Value Ref Range Status   01/06/2023 Negative  Final     PCP Screen, Urine   Date Value Ref Range Status   01/06/2023 Negative  Final     Propoxyphene Screen, Urine   Date Value Ref Range Status   01/06/2023 Negative  Final     THC Screen, Urine   Date Value Ref Range Status   01/06/2023 Negative  Final     Tricyclic Antidepressants, Urine   Date Value Ref Range Status   01/06/2023 Negative  Final       Last Deleta Frees: 04/24/2023  Medication Contract: 01/06/2023   Last Fill: 02/20/2023 # 30 and on 04/21/2023 # 3 only    Requested Prescriptions     Pending Prescriptions Disp Refills    pregabalin (LYRICA) 100 MG capsule [Pharmacy Med Name: PREGABALIN 100MG CAPSULES] 60 capsule      Sig: TAKE ONE CAPSULE BY MOUTH TWICE DAILY         Please approve or refuse this medication.    Betsey Lucio LPN

## 2023-04-24 NOTE — TELEPHONE ENCOUNTER
Saji Prince called to request a refill on his medication.       Last office visit : 4/24/2023   Next office visit : 8/28/2023     Requested Prescriptions     Pending Prescriptions Disp Refills    allopurinol (ZYLOPRIM) 100 MG tablet 90 tablet 1     Sig: Take 1 tablet by mouth daily    aspirin 81 MG chewable tablet 30 tablet 3     Sig: Take 1 tablet by mouth daily Indications: **hold aspirin starting on 3/28/2023 in anticipation of ESWL the first week in April per Urology**    atorvastatin (LIPITOR) 20 MG tablet 90 tablet 3     Sig: Take 1 tablet by mouth daily    calcitRIOL (ROCALTROL) 0.25 MCG capsule 30 capsule 3     Sig: Take 1 capsule by mouth daily    docusate (COLACE, DULCOLAX) 100 MG CAPS 30 capsule 1     Sig: Take 100 mg by mouth daily    metoprolol succinate (TOPROL XL) 25 MG extended release tablet 90 tablet 1     Sig: Take 1 tablet by mouth daily Take 1 tablet by mouth once daily    mirtazapine (REMERON) 7.5 MG tablet 30 tablet 3     Sig: Take 1 tablet by mouth nightly    omeprazole (PRILOSEC) 20 MG delayed release capsule 90 capsule 3     Sig: Take 1 capsule by mouth daily    QUEtiapine (SEROQUEL) 25 MG tablet 60 tablet 3     Sig: Take 1 tablet by mouth nightly    simethicone (MYLICON) 80 MG chewable tablet 180 tablet 3     Sig: Take 1 tablet by mouth every 6 hours as needed for Flatulence    tamsulosin (FLOMAX) 0.4 MG capsule 90 capsule 1     Sig: Take 1 capsule by mouth daily    vitamin D (ERGOCALCIFEROL) 1.25 MG (21512 UT) CAPS capsule 5 capsule 3     Sig: Take 1 capsule by mouth once a week            Madison Vaz LPN

## 2023-04-24 NOTE — TELEPHONE ENCOUNTER
[facility] 45 Transitions Initial Follow Up Call    Outreach made within 2 business days of discharge: Yes    Patient: Wily Menard   Patient : 1940 MRN: [id number]    Reason for Admission: Emesis, Diarrhea    Discharge Date: 23      Discharge Diagnosis:  1. Vomiting and diarrhea    2. Urinary tract infection without hematuria, site unspecified         Spoke with: Attempted to make contact with patient/caregiver for an initial transitions of care follow up call post discharge without success. I will reach out again at a later time. Any previously scheduled hospital follow up appointments noted below.         Discharge department/facility[facility]    RECORDS REQUESTED FROM [facility]    Scheduled appointment with PCP within 7-14 days    Follow Up  Future Appointments   Date Time Provider Edie Bey   2023 12:45 PM Jmuana Echavarria MD [facility]   2023  9:30 AM Thomas Thornton APRN - CNP N [facility]   10/16/2023 10:45 AM Bety Craft MD [facility]   2024 11:00 AM SCHEDULE, [facility] LPN [facility]       [place], [place]

## 2023-04-25 ENCOUNTER — TELEPHONE (OUTPATIENT)
Dept: INTERNAL MEDICINE CLINIC | Age: 83
End: 2023-04-25

## 2023-04-25 ENCOUNTER — TELEPHONE (OUTPATIENT)
Dept: PRIMARY CARE CLINIC | Age: 83
End: 2023-04-25

## 2023-04-25 NOTE — TELEPHONE ENCOUNTER
[address] [address] OT eval completed and per OT Pt functioning at wheelchair level but PLOF was ambulatory  Recommendation :  OT 2x/week x 3 weeks to increase safety and independence with functional mobility for adl routines from ambulatory level    Please advise if OT visits approved

## 2023-04-25 NOTE — TELEPHONE ENCOUNTER
Sonya Perez, called and said [facility] isn't covered by the patient's insurance. Please resend all of the patient's medications to [facility] on [address].

## 2023-04-26 NOTE — TELEPHONE ENCOUNTER
Called and spoke with Giovanny. Informed her that they will need to contact [facility] to have them contact [facility] to have the prescriptions transferred. Called [facility] to inquire about the Lyrica prescription and was informed that they have filled all the prescriptions including the Lyrica for 28 days in med trays and delivered them to the home. Called giovanny back to inform her that since the Lyrica has been filled they will have to call the office when the refill is due and request at that time to have the pharmacy changed from [facility] to [facility]. Giovanny verbalized understanding.

## 2023-05-08 ENCOUNTER — OFFICE VISIT (OUTPATIENT)
Dept: PRIMARY CARE CLINIC | Age: 83
End: 2023-05-08
Payer: MEDICARE

## 2023-05-08 VITALS
TEMPERATURE: 97.3 F | DIASTOLIC BLOOD PRESSURE: 50 MMHG | HEART RATE: 82 BPM | OXYGEN SATURATION: 100 % | WEIGHT: 189.6 LBS | BODY MASS INDEX: 25.71 KG/M2 | SYSTOLIC BLOOD PRESSURE: 92 MMHG

## 2023-05-08 DIAGNOSIS — G25.81 RESTLESS LEGS SYNDROME: Primary | ICD-10-CM

## 2023-05-08 DIAGNOSIS — M79.2 PERIPHERAL NEUROPATHIC PAIN: Chronic | ICD-10-CM

## 2023-05-08 PROCEDURE — G8417 CALC BMI ABV UP PARAM F/U: HCPCS | Performed by: NURSE PRACTITIONER

## 2023-05-08 PROCEDURE — G8427 DOCREV CUR MEDS BY ELIG CLIN: HCPCS | Performed by: NURSE PRACTITIONER

## 2023-05-08 PROCEDURE — 1036F TOBACCO NON-USER: CPT | Performed by: NURSE PRACTITIONER

## 2023-05-08 PROCEDURE — 3074F SYST BP LT 130 MM HG: CPT | Performed by: NURSE PRACTITIONER

## 2023-05-08 PROCEDURE — 1123F ACP DISCUSS/DSCN MKR DOCD: CPT | Performed by: NURSE PRACTITIONER

## 2023-05-08 PROCEDURE — 99214 OFFICE O/P EST MOD 30 MIN: CPT | Performed by: NURSE PRACTITIONER

## 2023-05-08 PROCEDURE — 3078F DIAST BP <80 MM HG: CPT | Performed by: NURSE PRACTITIONER

## 2023-05-08 RX ORDER — ROPINIROLE 0.5 MG/1
0.5 TABLET, FILM COATED ORAL NIGHTLY
Qty: 30 TABLET | Refills: 0 | Status: SHIPPED | OUTPATIENT
Start: 2023-05-08

## 2023-05-08 ASSESSMENT — ENCOUNTER SYMPTOMS
ALLERGIC/IMMUNOLOGIC NEGATIVE: 1
EYES NEGATIVE: 1
GASTROINTESTINAL NEGATIVE: 1
RESPIRATORY NEGATIVE: 1

## 2023-05-08 NOTE — PROGRESS NOTES
maintenance. Instructed to continue currentmedications, diet and exercise. Patient agreed with treatment plan. Follow up as directed. MEDICATIONS:  Orders Placed This Encounter   Medications    rOPINIRole (REQUIP) 0.5 MG tablet     Sig: Take 1 tablet by mouth at bedtime     Dispense:  30 tablet     Refill:  0         ORDERS:  No orders of the defined types were placed in this encounter. Follow-up:  Return in about 2 weeks (around 5/22/2023) for follow up with PCP. PATIENT INSTRUCTIONS:  Patient Instructions   Lyrica of the morning  Requip at evening/ bedtime    Electronically signed by GOLD Fischer on 5/9/2023 at 10:12 AM    EMR Dragon/transcription disclaimer:  Much of thisencounter note is electronic transcription/translation of spoken language to printed texts. The electronic translation of spoken language may be erroneous, or at times, nonsensical words or phrases may be inadvertentlytranscribed.   Although I have reviewed the note for such errors, some may still exist.

## 2023-05-10 ENCOUNTER — OFFICE VISIT (OUTPATIENT)
Dept: UROLOGY | Age: 83
End: 2023-05-10
Payer: MEDICARE

## 2023-05-10 ENCOUNTER — HOSPITAL ENCOUNTER (OUTPATIENT)
Dept: GENERAL RADIOLOGY | Age: 83
Discharge: HOME | End: 2023-05-10
Payer: MEDICARE

## 2023-05-10 VITALS — TEMPERATURE: 97.3 F | BODY MASS INDEX: 25.87 KG/M2 | WEIGHT: 191 LBS | HEIGHT: 72 IN

## 2023-05-10 DIAGNOSIS — R97.20 ELEVATED PSA: ICD-10-CM

## 2023-05-10 DIAGNOSIS — N20.0 NEPHROLITHIASIS: ICD-10-CM

## 2023-05-10 DIAGNOSIS — N20.0 LEFT RENAL STONE: ICD-10-CM

## 2023-05-10 DIAGNOSIS — R33.9 URINARY RETENTION: Primary | ICD-10-CM

## 2023-05-10 DIAGNOSIS — R33.9 URINARY RETENTION: ICD-10-CM

## 2023-05-10 DIAGNOSIS — N20.1 CALCULUS OF PROXIMAL LEFT URETER: ICD-10-CM

## 2023-05-10 LAB — PSA SERPL-MCNC: 5.61 NG/ML (ref 0–4)

## 2023-05-10 PROCEDURE — 74018 RADEX ABDOMEN 1 VIEW: CPT

## 2023-05-10 PROCEDURE — 74018 RADEX ABDOMEN 1 VIEW: CPT | Performed by: RADIOLOGY

## 2023-05-10 PROCEDURE — 99024 POSTOP FOLLOW-UP VISIT: CPT | Performed by: NURSE PRACTITIONER

## 2023-05-10 PROCEDURE — 51700 IRRIGATION OF BLADDER: CPT | Performed by: NURSE PRACTITIONER

## 2023-05-10 PROCEDURE — 1036F TOBACCO NON-USER: CPT | Performed by: NURSE PRACTITIONER

## 2023-05-10 PROCEDURE — G8427 DOCREV CUR MEDS BY ELIG CLIN: HCPCS | Performed by: NURSE PRACTITIONER

## 2023-05-10 PROCEDURE — 1123F ACP DISCUSS/DSCN MKR DOCD: CPT | Performed by: NURSE PRACTITIONER

## 2023-05-10 PROCEDURE — G8417 CALC BMI ABV UP PARAM F/U: HCPCS | Performed by: NURSE PRACTITIONER

## 2023-05-10 NOTE — PROGRESS NOTES
Patient of SINA Cardozo presents today for voiding trial post episode of urinary retention. The patient denies any fever, chills or  N&V. After patient had given consent, using the catheter in place, 240cc of sterile water was installed into the bladder with no complications. Patient was unable to void. SINA Cardozo was in office at time of procedure. Procedure Note (5/10/23): After receiving verbal orders from SINA Molly Juliane, I was instructed to place urethral grove catheter. Using sterile technique, patient was cleaned with Hibiclens and sterile water solution. A 16F catheter was inserted into the bladder, bladder was drained of 250cc of urine, 10 mL of sterile water was used to fill up the balloon. The catheter was then hooked up to a drainage bag. The patient tolerated the procedure well. Patient should follow up as scheduled.
may be inadvertently transcribed.  Although I  have reviewed the document for such errors, some may still exist.

## 2023-05-11 ENCOUNTER — TELEPHONE (OUTPATIENT)
Dept: INTERNAL MEDICINE CLINIC | Age: 83
End: 2023-05-11

## 2023-05-11 DIAGNOSIS — N12 PYELONEPHRITIS OF LEFT KIDNEY: Primary | ICD-10-CM

## 2023-05-11 ASSESSMENT — ENCOUNTER SYMPTOMS
NAUSEA: 0
ABDOMINAL DISTENTION: 0
ABDOMINAL PAIN: 0
VOMITING: 0
BACK PAIN: 0

## 2023-05-20 ENCOUNTER — TELEPHONE (OUTPATIENT)
Dept: PRIMARY CARE CLINIC | Age: 83
End: 2023-05-20

## 2023-05-22 ENCOUNTER — OFFICE VISIT (OUTPATIENT)
Dept: PRIMARY CARE CLINIC | Age: 83
End: 2023-05-22
Payer: MEDICARE

## 2023-05-22 VITALS
OXYGEN SATURATION: 100 % | WEIGHT: 190 LBS | DIASTOLIC BLOOD PRESSURE: 72 MMHG | BODY MASS INDEX: 25.77 KG/M2 | SYSTOLIC BLOOD PRESSURE: 120 MMHG | TEMPERATURE: 97.4 F | HEART RATE: 90 BPM

## 2023-05-22 DIAGNOSIS — G60.9 IDIOPATHIC PERIPHERAL NEUROPATHY: ICD-10-CM

## 2023-05-22 DIAGNOSIS — G25.81 RESTLESS LEGS SYNDROME: Primary | ICD-10-CM

## 2023-05-22 DIAGNOSIS — Z74.09 DECREASED STRENGTH, ENDURANCE, AND MOBILITY: ICD-10-CM

## 2023-05-22 DIAGNOSIS — R53.1 DECREASED STRENGTH, ENDURANCE, AND MOBILITY: ICD-10-CM

## 2023-05-22 DIAGNOSIS — K21.9 GASTROESOPHAGEAL REFLUX DISEASE WITHOUT ESOPHAGITIS: ICD-10-CM

## 2023-05-22 DIAGNOSIS — Z97.8 FOLEY CATHETER IN PLACE: ICD-10-CM

## 2023-05-22 DIAGNOSIS — I10 ESSENTIAL HYPERTENSION: ICD-10-CM

## 2023-05-22 DIAGNOSIS — M10.9 GOUT, UNSPECIFIED CAUSE, UNSPECIFIED CHRONICITY, UNSPECIFIED SITE: ICD-10-CM

## 2023-05-22 DIAGNOSIS — N40.0 BENIGN PROSTATIC HYPERPLASIA, UNSPECIFIED WHETHER LOWER URINARY TRACT SYMPTOMS PRESENT: ICD-10-CM

## 2023-05-22 DIAGNOSIS — R68.89 DECREASED STRENGTH, ENDURANCE, AND MOBILITY: ICD-10-CM

## 2023-05-22 PROCEDURE — 1123F ACP DISCUSS/DSCN MKR DOCD: CPT | Performed by: NURSE PRACTITIONER

## 2023-05-22 PROCEDURE — 3078F DIAST BP <80 MM HG: CPT | Performed by: NURSE PRACTITIONER

## 2023-05-22 PROCEDURE — G8417 CALC BMI ABV UP PARAM F/U: HCPCS | Performed by: NURSE PRACTITIONER

## 2023-05-22 PROCEDURE — G8427 DOCREV CUR MEDS BY ELIG CLIN: HCPCS | Performed by: NURSE PRACTITIONER

## 2023-05-22 PROCEDURE — 99214 OFFICE O/P EST MOD 30 MIN: CPT | Performed by: NURSE PRACTITIONER

## 2023-05-22 PROCEDURE — 3074F SYST BP LT 130 MM HG: CPT | Performed by: NURSE PRACTITIONER

## 2023-05-22 PROCEDURE — 1036F TOBACCO NON-USER: CPT | Performed by: NURSE PRACTITIONER

## 2023-05-22 RX ORDER — OMEPRAZOLE 20 MG/1
20 CAPSULE, DELAYED RELEASE ORAL DAILY
Qty: 90 CAPSULE | Refills: 1 | Status: SHIPPED | OUTPATIENT
Start: 2023-05-22

## 2023-05-22 RX ORDER — ROPINIROLE 0.5 MG/1
0.5 TABLET, FILM COATED ORAL NIGHTLY
Qty: 90 TABLET | Refills: 1 | Status: SHIPPED | OUTPATIENT
Start: 2023-05-22

## 2023-05-22 RX ORDER — TAMSULOSIN HYDROCHLORIDE 0.4 MG/1
0.4 CAPSULE ORAL DAILY
Qty: 90 CAPSULE | Refills: 1 | Status: SHIPPED | OUTPATIENT
Start: 2023-05-22

## 2023-05-22 RX ORDER — MIRTAZAPINE 7.5 MG/1
7.5 TABLET, FILM COATED ORAL NIGHTLY
Qty: 90 TABLET | Refills: 1 | Status: SHIPPED | OUTPATIENT
Start: 2023-05-22 | End: 2023-08-20

## 2023-05-22 RX ORDER — PREGABALIN 100 MG/1
100 CAPSULE ORAL DAILY
Qty: 30 CAPSULE | Refills: 2 | Status: SHIPPED | OUTPATIENT
Start: 2023-05-22 | End: 2023-06-21

## 2023-05-22 RX ORDER — ETODOLAC 400 MG/1
400 TABLET, FILM COATED ORAL 2 TIMES DAILY
Qty: 180 TABLET | Refills: 1 | Status: SHIPPED | OUTPATIENT
Start: 2023-05-22 | End: 2024-05-21

## 2023-05-22 RX ORDER — QUETIAPINE FUMARATE 25 MG/1
25 TABLET, FILM COATED ORAL NIGHTLY
Qty: 90 TABLET | Refills: 0 | Status: SHIPPED | OUTPATIENT
Start: 2023-05-22 | End: 2023-08-20

## 2023-05-22 RX ORDER — METOPROLOL SUCCINATE 25 MG/1
25 TABLET, EXTENDED RELEASE ORAL DAILY
Qty: 90 TABLET | Refills: 1 | Status: SHIPPED | OUTPATIENT
Start: 2023-05-22 | End: 2023-08-20

## 2023-05-22 RX ORDER — ALLOPURINOL 100 MG/1
100 TABLET ORAL DAILY
Qty: 90 TABLET | Refills: 1 | Status: SHIPPED | OUTPATIENT
Start: 2023-05-22

## 2023-05-22 RX ORDER — CALCITRIOL 0.25 UG/1
0.25 CAPSULE, LIQUID FILLED ORAL DAILY
Qty: 30 CAPSULE | Refills: 1 | Status: SHIPPED | OUTPATIENT
Start: 2023-05-22 | End: 2023-08-20

## 2023-05-22 ASSESSMENT — ENCOUNTER SYMPTOMS
RESPIRATORY NEGATIVE: 1
EYES NEGATIVE: 1
ALLERGIC/IMMUNOLOGIC NEGATIVE: 1
GASTROINTESTINAL NEGATIVE: 1

## 2023-05-22 NOTE — PROGRESS NOTES
[address] [facility]  [address] [address] [address]  Dept: [phone]  Dept Fax: [phone]  Loc: [phone]    Ladonna Campbell is a 79 y.o. male who presents today for his medical conditions/complaints as noted below. Ladonna Campbell is c/o of Follow-up        HPI:   He presents with his daughter today for follow up on his leg pain and swelling. He was a previous patient of   but his daughter is wanting to switch her care over. Last month, he was started on Requip 0.5mg at bedtime for restless legs. He states his legs are doing better and not aching as much anymore. He has continued to take his Lyrica 100mg daily. He continues to have home health to come out. He states he is trying to keep his feet and legs elevated. He states his knees \"won't bend\". He mentions that he is unable to walk, does not have strength in his legs. He is wheelchair dependent at this time. Last POCT UDS and medication contract 1/6/23    He has grove and wanting order for Grove bags. Ordered from [facility] last night. Next appt with Urology is in November.   HPI   Chief Complaint   Patient presents with    Follow-up     Past Medical History:   Diagnosis Date    Acute renal failure with acute tubular necrosis superimposed on chronic kidney disease, on chronic dialysis ([address] 2/1/2023    Benign non-nodular prostatic hyperplasia with lower urinary tract symptoms 08/16/2017    Essential hypertension 08/16/2017    Gastroesophageal reflux disease without esophagitis 08/16/2017    Gout 08/16/2017    Idiopathic peripheral neuropathy 02/19/2018    Mixed hyperlipidemia 08/16/2017    Peripheral neuropathic pain 08/16/2017    Primary osteoarthritis involving multiple joints 08/16/2017    Renal stone 01/23/2023    Renal stone 02/12/2023    Restless legs syndrome 08/16/2017    Severe malnutrition ([address] 2/3/2023      Past Surgical History:   Procedure Laterality Date

## 2023-05-26 ENCOUNTER — TELEPHONE (OUTPATIENT)
Dept: UROLOGY | Age: 83
End: 2023-05-26

## 2023-05-26 NOTE — TELEPHONE ENCOUNTER
Angela with [facility] left VM. Patient has cath and was replaced in office on 5/10. They do not have any orders stating they can replace the cath should he have issues. She wasn't sure if this is something that needs to be done in office due to difficulty placing it or if home health can replace should they need to. She would like to speak with the nurse, the best number to contact her is [phone].

## 2023-05-26 NOTE — TELEPHONE ENCOUNTER
Spoke with Angela and told her the order was placed at his last visit with us and was in his chart and we also gave the patient a paper copy of the order. I will refax the order to her so patient can get his monthly cath changes.

## 2023-06-19 ENCOUNTER — TELEPHONE (OUTPATIENT)
Dept: UROLOGY | Age: 83
End: 2023-06-19

## 2023-06-20 ENCOUNTER — TELEPHONE (OUTPATIENT)
Dept: CASE MANAGEMENT | Age: 83
End: 2023-06-20

## 2023-06-20 NOTE — TELEPHONE ENCOUNTER
[facility]    Patient was discharged on 6/15/23 from [facility] services. Patient is not homebound at this time.

## 2023-06-29 ENCOUNTER — TELEPHONE (OUTPATIENT)
Dept: UROLOGY | Age: 83
End: 2023-06-29

## 2023-07-07 ENCOUNTER — TELEPHONE (OUTPATIENT)
Dept: PRIMARY CARE CLINIC | Age: 83
End: 2023-07-07

## 2023-07-10 ENCOUNTER — TELEPHONE (OUTPATIENT)
Dept: PRIMARY CARE CLINIC | Age: 83
End: 2023-07-10

## 2023-07-10 NOTE — TELEPHONE ENCOUNTER
----- Message from Daisy Cee sent at 7/10/2023  1:34 PM CDT -----  Subject: Appointment Request    Reason for Call: Established Patient Appointment needed: Routine ED Follow   Up Visit    QUESTIONS    Reason for appointment request? Available appointments did not meet   patient need     Additional Information for Provider? Pt's daughter Manohar Pena states he   went to the ED yesterday because García catheter broke, and he was   diagnosed with UTI. She says he will start medication today and they need   a follow-up appt with Phillip Wilcox.  Please call [phone] to schedule   or advise.  ---------------------------------------------------------------------------  --------------  Obdulio Mcnair INFO  [phone]; OK to leave message on [employer],OK to respond with   electronic message via [employer] portal (only for patients who have   registered [employer] account)  ---------------------------------------------------------------------------  --------------  SCRIPT ANSWERS

## 2023-07-10 NOTE — TELEPHONE ENCOUNTER
Please call the daughter and let her know that it appears Urology has been working on this. She will need to follow up with their office.  GOLD Baker

## 2023-07-12 NOTE — TELEPHONE ENCOUNTER
Called and left a vm to call urologist regarding [facility],[address] to have grove changed.

## 2023-07-13 ENCOUNTER — TELEPHONE (OUTPATIENT)
Dept: PRIMARY CARE CLINIC | Age: 83
End: 2023-07-13

## 2023-07-13 DIAGNOSIS — Z97.8 FOLEY CATHETER IN PLACE: ICD-10-CM

## 2023-07-13 DIAGNOSIS — R33.9 URINARY RETENTION: Primary | ICD-10-CM

## 2023-07-13 NOTE — TELEPHONE ENCOUNTER
Patient's daughter is wanting to get father in with Dr. Sb Ryan at [facility]. He was discharged from [facility] ED for a UTI and needs to see Urology for UTI supplies if he isn't able to get Home Health. PT was discharged from [facility] on 6/15/23.

## 2023-07-17 ENCOUNTER — OFFICE VISIT (OUTPATIENT)
Dept: PRIMARY CARE CLINIC | Age: 83
End: 2023-07-17
Payer: MEDICARE

## 2023-07-17 ENCOUNTER — TELEPHONE (OUTPATIENT)
Dept: PRIMARY CARE CLINIC | Age: 83
End: 2023-07-17

## 2023-07-17 VITALS
OXYGEN SATURATION: 99 % | BODY MASS INDEX: 27.18 KG/M2 | WEIGHT: 200.4 LBS | TEMPERATURE: 97.6 F | SYSTOLIC BLOOD PRESSURE: 120 MMHG | HEART RATE: 79 BPM | DIASTOLIC BLOOD PRESSURE: 76 MMHG

## 2023-07-17 DIAGNOSIS — R20.2 NUMBNESS AND TINGLING OF BOTH UPPER EXTREMITIES: ICD-10-CM

## 2023-07-17 DIAGNOSIS — N39.0 URINARY TRACT INFECTION WITHOUT HEMATURIA, SITE UNSPECIFIED: Primary | ICD-10-CM

## 2023-07-17 DIAGNOSIS — R20.0 NUMBNESS AND TINGLING OF BOTH UPPER EXTREMITIES: ICD-10-CM

## 2023-07-17 DIAGNOSIS — R60.9 PERIPHERAL EDEMA: ICD-10-CM

## 2023-07-17 DIAGNOSIS — Z97.8 INDWELLING FOLEY CATHETER PRESENT: ICD-10-CM

## 2023-07-17 PROCEDURE — 3078F DIAST BP <80 MM HG: CPT | Performed by: NURSE PRACTITIONER

## 2023-07-17 PROCEDURE — 1123F ACP DISCUSS/DSCN MKR DOCD: CPT | Performed by: NURSE PRACTITIONER

## 2023-07-17 PROCEDURE — G8417 CALC BMI ABV UP PARAM F/U: HCPCS | Performed by: NURSE PRACTITIONER

## 2023-07-17 PROCEDURE — 99214 OFFICE O/P EST MOD 30 MIN: CPT | Performed by: NURSE PRACTITIONER

## 2023-07-17 PROCEDURE — G8427 DOCREV CUR MEDS BY ELIG CLIN: HCPCS | Performed by: NURSE PRACTITIONER

## 2023-07-17 PROCEDURE — 1036F TOBACCO NON-USER: CPT | Performed by: NURSE PRACTITIONER

## 2023-07-17 PROCEDURE — 3074F SYST BP LT 130 MM HG: CPT | Performed by: NURSE PRACTITIONER

## 2023-07-17 RX ORDER — CEFDINIR 300 MG/1
CAPSULE ORAL
COMMUNITY
Start: 2023-07-09

## 2023-07-17 ASSESSMENT — ENCOUNTER SYMPTOMS
GASTROINTESTINAL NEGATIVE: 1
ALLERGIC/IMMUNOLOGIC NEGATIVE: 1
RESPIRATORY NEGATIVE: 1
EYES NEGATIVE: 1

## 2023-07-17 NOTE — PROGRESS NOTES
and side effects of prescribed medications. All patient questions answered. Pt voiced understanding. Reviewed health maintenance. Instructed to continue currentmedications, diet and exercise. Patient agreed with treatment plan. Follow up as directed. MEDICATIONS:  No orders of the defined types were placed in this encounter. ORDERS:  No orders of the defined types were placed in this encounter. Follow-up:  Return for keep follow up with PCP. PATIENT INSTRUCTIONS:  There are no Patient Instructions on file for this visit. Electronically signed by GOLD Ford on 7/17/2023 at 11:23 AM    EMR Dragon/transcription disclaimer:  Much of thisencounter note is electronic transcription/translation of spoken language to printed texts. The electronic translation of spoken language may be erroneous, or at times, nonsensical words or phrases may be inadvertentlytranscribed.   Although I have reviewed the note for such errors, some may still exist.

## 2023-07-18 ENCOUNTER — TELEPHONE (OUTPATIENT)
Dept: PRIMARY CARE CLINIC | Age: 83
End: 2023-07-18

## 2023-07-18 NOTE — TELEPHONE ENCOUNTER
Called Pt's daughter and left message to let her know that referral to Dr. Desiree Gramajo has been placed and that they are going to reach out to her or her brother to set up the appointment.

## 2023-07-19 RX ORDER — CALCITRIOL 0.25 UG/1
CAPSULE, LIQUID FILLED ORAL
Qty: 30 CAPSULE | Refills: 0 | Status: SHIPPED | OUTPATIENT
Start: 2023-07-19

## 2023-07-19 NOTE — TELEPHONE ENCOUNTER
William Ruiz called to request a refill on his medication.       Last office visit : 7/17/2023   Next office visit : 8/21/2023     Requested Prescriptions     Pending Prescriptions Disp Refills    calcitRIOL (ROCALTROL) 0.25 MCG capsule [Pharmacy Med Name: Calcitriol 0.25 MCG Oral Capsule] 30 capsule 0     Sig: Take 1 capsule by mouth once daily            Sanju Mcmillan

## 2023-07-24 ENCOUNTER — PROCEDURE VISIT (OUTPATIENT)
Dept: UROLOGY | Facility: CLINIC | Age: 83
End: 2023-07-24
Payer: MEDICARE

## 2023-07-24 ENCOUNTER — OFFICE VISIT (OUTPATIENT)
Dept: UROLOGY | Facility: CLINIC | Age: 83
End: 2023-07-24
Payer: MEDICARE

## 2023-07-24 VITALS — TEMPERATURE: 97.8 F | WEIGHT: 200 LBS | BODY MASS INDEX: 27.09 KG/M2 | HEIGHT: 72 IN

## 2023-07-24 DIAGNOSIS — R33.9 URINARY RETENTION: Primary | ICD-10-CM

## 2023-07-24 DIAGNOSIS — N20.0 NEPHROLITHIASIS: ICD-10-CM

## 2023-07-24 PROCEDURE — 99204 OFFICE O/P NEW MOD 45 MIN: CPT

## 2023-07-24 PROCEDURE — 1160F RVW MEDS BY RX/DR IN RCRD: CPT

## 2023-07-24 PROCEDURE — 1159F MED LIST DOCD IN RCRD: CPT

## 2023-07-24 PROCEDURE — 99211 OFF/OP EST MAY X REQ PHY/QHP: CPT

## 2023-07-31 ENCOUNTER — PROCEDURE VISIT (OUTPATIENT)
Dept: UROLOGY | Facility: CLINIC | Age: 83
End: 2023-07-31
Payer: MEDICARE

## 2023-07-31 VITALS — HEART RATE: 84 BPM | SYSTOLIC BLOOD PRESSURE: 148 MMHG | OXYGEN SATURATION: 98 % | DIASTOLIC BLOOD PRESSURE: 79 MMHG

## 2023-07-31 DIAGNOSIS — R33.9 URINARY RETENTION: Primary | ICD-10-CM

## 2023-08-07 ENCOUNTER — OFFICE VISIT (OUTPATIENT)
Dept: PRIMARY CARE CLINIC | Age: 83
End: 2023-08-07
Payer: MEDICARE

## 2023-08-07 VITALS
TEMPERATURE: 97.5 F | WEIGHT: 207.8 LBS | DIASTOLIC BLOOD PRESSURE: 80 MMHG | OXYGEN SATURATION: 97 % | HEART RATE: 98 BPM | SYSTOLIC BLOOD PRESSURE: 122 MMHG | BODY MASS INDEX: 28.18 KG/M2

## 2023-08-07 DIAGNOSIS — M79.2 PERIPHERAL NEUROPATHIC PAIN: Chronic | ICD-10-CM

## 2023-08-07 DIAGNOSIS — G60.9 IDIOPATHIC PERIPHERAL NEUROPATHY: ICD-10-CM

## 2023-08-07 DIAGNOSIS — G25.81 RESTLESS LEGS SYNDROME: Primary | Chronic | ICD-10-CM

## 2023-08-07 PROCEDURE — 1036F TOBACCO NON-USER: CPT | Performed by: NURSE PRACTITIONER

## 2023-08-07 PROCEDURE — 1123F ACP DISCUSS/DSCN MKR DOCD: CPT | Performed by: NURSE PRACTITIONER

## 2023-08-07 PROCEDURE — 3079F DIAST BP 80-89 MM HG: CPT | Performed by: NURSE PRACTITIONER

## 2023-08-07 PROCEDURE — G8427 DOCREV CUR MEDS BY ELIG CLIN: HCPCS | Performed by: NURSE PRACTITIONER

## 2023-08-07 PROCEDURE — G8417 CALC BMI ABV UP PARAM F/U: HCPCS | Performed by: NURSE PRACTITIONER

## 2023-08-07 PROCEDURE — 99213 OFFICE O/P EST LOW 20 MIN: CPT | Performed by: NURSE PRACTITIONER

## 2023-08-07 PROCEDURE — 3074F SYST BP LT 130 MM HG: CPT | Performed by: NURSE PRACTITIONER

## 2023-08-07 RX ORDER — ROPINIROLE 0.5 MG/1
0.5 TABLET, FILM COATED ORAL NIGHTLY
Qty: 90 TABLET | Refills: 1 | Status: SHIPPED | OUTPATIENT
Start: 2023-08-07

## 2023-08-07 RX ORDER — PREGABALIN 100 MG/1
100 CAPSULE ORAL DAILY
Qty: 30 CAPSULE | Refills: 2 | Status: SHIPPED | OUTPATIENT
Start: 2023-08-07 | End: 2023-09-06

## 2023-08-07 ASSESSMENT — ENCOUNTER SYMPTOMS
RESPIRATORY NEGATIVE: 1
EYES NEGATIVE: 1
GASTROINTESTINAL NEGATIVE: 1
ALLERGIC/IMMUNOLOGIC NEGATIVE: 1

## 2023-08-07 NOTE — PROGRESS NOTES
[facility]  Counts include 234 beds at the [facility],[address] [place] [address]  Dept: [phone]  Dept Fax: [phone]  Loc: [phone]    Fernanda Hector is a 79 y.o. male who presents today for his medical conditions/complaints as noted below. Fernanda Hector is c/o of Follow-up        HPI:   He presents with his daughter and son for medication follow up. They report he saw Urologist at [facility].  Catheter was discontinued and able to urinate on his own. He has previously been in wheelchair and today he is using rollator. He states the Requip helps his legs not jump at night. Right foot swelling but \"that is all the time\".    HPI   Chief Complaint   Patient presents with    Follow-up     Past Medical History:   Diagnosis Date    Acute renal failure with acute tubular necrosis superimposed on chronic kidney disease, on chronic dialysis ([address]) 2/1/2023    Benign non-nodular prostatic hyperplasia with lower urinary tract symptoms 08/16/2017    Essential hypertension 08/16/2017    Gastroesophageal reflux disease without esophagitis 08/16/2017    Gout 08/16/2017    Idiopathic peripheral neuropathy 02/19/2018    Mixed hyperlipidemia 08/16/2017    Peripheral neuropathic pain 08/16/2017    Primary osteoarthritis involving multiple joints 08/16/2017    Renal stone 01/23/2023    Renal stone 02/12/2023    Restless legs syndrome 08/16/2017    Severe malnutrition ([address]) 2/3/2023      Past Surgical History:   Procedure Laterality Date    BLADDER SURGERY Bilateral 4/6/2023    POSSIBLE CYSTOSCOPY LEFT STENT REMOVAL/ CATH PLACEMENT performed by Kay Herrera MD at [address] Left 2/14/2023    CYSTOSCOPY; RETROGRADE PYLEGRAM URETERAL STENT INSERTION performed by Kay Herrera MD at [facility] Bilateral 1963    LITHOTRIPSY Left 4/6/2023    LEFT RENAL EXTRACORPOREAL SHOCK WAVE LITHOTRIPSY performed by Kay Hrerera MD at [address] [place] [address]  2003

## 2023-08-16 RX ORDER — CALCITRIOL 0.25 UG/1
CAPSULE, LIQUID FILLED ORAL
Qty: 30 CAPSULE | Refills: 5 | Status: SHIPPED | OUTPATIENT
Start: 2023-08-16

## 2023-08-21 ENCOUNTER — OFFICE VISIT (OUTPATIENT)
Dept: UROLOGY | Facility: CLINIC | Age: 83
End: 2023-08-21
Payer: MEDICARE

## 2023-08-21 VITALS — BODY MASS INDEX: 27.79 KG/M2 | WEIGHT: 205.2 LBS | HEIGHT: 72 IN | TEMPERATURE: 97.8 F

## 2023-08-21 DIAGNOSIS — R33.9 URINARY RETENTION: Primary | ICD-10-CM

## 2023-08-21 LAB
BILIRUB BLD-MCNC: ABNORMAL MG/DL
CLARITY, POC: ABNORMAL
COLOR UR: YELLOW
GLUCOSE UR STRIP-MCNC: NEGATIVE MG/DL
KETONES UR QL: NEGATIVE
LEUKOCYTE EST, POC: ABNORMAL
NITRITE UR-MCNC: POSITIVE MG/ML
PH UR: 5.5 [PH] (ref 5–8)
PROT UR STRIP-MCNC: NEGATIVE MG/DL
RBC # UR STRIP: NEGATIVE /UL
SP GR UR: 1.02 (ref 1–1.03)
UROBILINOGEN UR QL: ABNORMAL

## 2023-08-21 PROCEDURE — 1160F RVW MEDS BY RX/DR IN RCRD: CPT

## 2023-08-21 PROCEDURE — 81001 URINALYSIS AUTO W/SCOPE: CPT

## 2023-08-21 PROCEDURE — 99214 OFFICE O/P EST MOD 30 MIN: CPT

## 2023-08-21 PROCEDURE — 1159F MED LIST DOCD IN RCRD: CPT

## 2023-08-21 PROCEDURE — 51798 US URINE CAPACITY MEASURE: CPT

## 2023-09-25 ENCOUNTER — OFFICE VISIT (OUTPATIENT)
Dept: UROLOGY | Facility: CLINIC | Age: 83
End: 2023-09-25

## 2023-09-25 VITALS — WEIGHT: 212 LBS | BODY MASS INDEX: 28.71 KG/M2 | TEMPERATURE: 97.8 F | HEIGHT: 72 IN

## 2023-09-25 DIAGNOSIS — R33.9 URINARY RETENTION: Primary | ICD-10-CM

## 2023-09-25 LAB
BILIRUB BLD-MCNC: ABNORMAL MG/DL
CLARITY, POC: CLEAR
COLOR UR: YELLOW
GLUCOSE UR STRIP-MCNC: NEGATIVE MG/DL
KETONES UR QL: NEGATIVE
LEUKOCYTE EST, POC: ABNORMAL
NITRITE UR-MCNC: POSITIVE MG/ML
PH UR: 5.5 [PH] (ref 5–8)
PROT UR STRIP-MCNC: ABNORMAL MG/DL
RBC # UR STRIP: ABNORMAL /UL
SP GR UR: 1.02 (ref 1–1.03)
UROBILINOGEN UR QL: ABNORMAL

## 2023-09-25 PROCEDURE — 99214 OFFICE O/P EST MOD 30 MIN: CPT

## 2023-09-25 PROCEDURE — 81001 URINALYSIS AUTO W/SCOPE: CPT

## 2023-09-25 PROCEDURE — 1160F RVW MEDS BY RX/DR IN RCRD: CPT

## 2023-09-25 PROCEDURE — 51798 US URINE CAPACITY MEASURE: CPT

## 2023-09-25 PROCEDURE — 1159F MED LIST DOCD IN RCRD: CPT

## 2023-09-28 RX ORDER — QUETIAPINE FUMARATE 25 MG/1
25 TABLET, FILM COATED ORAL NIGHTLY
Qty: 90 TABLET | Refills: 0 | Status: SHIPPED | OUTPATIENT
Start: 2023-09-28 | End: 2023-12-27

## 2023-11-06 ENCOUNTER — OFFICE VISIT (OUTPATIENT)
Dept: PRIMARY CARE CLINIC | Age: 83
End: 2023-11-06
Payer: MEDICARE

## 2023-11-06 VITALS
SYSTOLIC BLOOD PRESSURE: 138 MMHG | DIASTOLIC BLOOD PRESSURE: 84 MMHG | BODY MASS INDEX: 29.08 KG/M2 | TEMPERATURE: 98.1 F | HEART RATE: 87 BPM | OXYGEN SATURATION: 99 % | WEIGHT: 214.4 LBS

## 2023-11-06 DIAGNOSIS — E78.2 MIXED HYPERLIPIDEMIA: Chronic | ICD-10-CM

## 2023-11-06 DIAGNOSIS — L73.9 FOLLICULITIS: ICD-10-CM

## 2023-11-06 DIAGNOSIS — M79.2 PERIPHERAL NEUROPATHIC PAIN: ICD-10-CM

## 2023-11-06 DIAGNOSIS — G60.9 IDIOPATHIC PERIPHERAL NEUROPATHY: Primary | ICD-10-CM

## 2023-11-06 DIAGNOSIS — R97.20 ELEVATED PSA: ICD-10-CM

## 2023-11-06 DIAGNOSIS — R73.03 PREDIABETES: ICD-10-CM

## 2023-11-06 DIAGNOSIS — G25.81 RESTLESS LEGS SYNDROME: ICD-10-CM

## 2023-11-06 DIAGNOSIS — I10 ESSENTIAL HYPERTENSION: ICD-10-CM

## 2023-11-06 LAB
ALBUMIN SERPL-MCNC: 4.5 G/DL (ref 3.5–5.2)
ALP SERPL-CCNC: 137 U/L (ref 40–130)
ALT SERPL-CCNC: 17 U/L (ref 5–41)
ANION GAP SERPL CALCULATED.3IONS-SCNC: 8 MMOL/L (ref 7–19)
AST SERPL-CCNC: 19 U/L (ref 5–40)
BASOPHILS # BLD: 0 K/UL (ref 0–0.2)
BASOPHILS NFR BLD: 0.5 % (ref 0–1)
BILIRUB SERPL-MCNC: 0.4 MG/DL (ref 0.2–1.2)
BUN SERPL-MCNC: 34 MG/DL (ref 8–23)
CALCIUM SERPL-MCNC: 10.4 MG/DL (ref 8.8–10.2)
CHLORIDE SERPL-SCNC: 107 MMOL/L (ref 98–111)
CHOLEST SERPL-MCNC: 179 MG/DL (ref 160–199)
CO2 SERPL-SCNC: 31 MMOL/L (ref 22–29)
CREAT SERPL-MCNC: 1.5 MG/DL (ref 0.5–1.2)
EOSINOPHIL # BLD: 0.3 K/UL (ref 0–0.6)
EOSINOPHIL NFR BLD: 3.6 % (ref 0–5)
ERYTHROCYTE [DISTWIDTH] IN BLOOD BY AUTOMATED COUNT: 15.1 % (ref 11.5–14.5)
GLUCOSE SERPL-MCNC: 102 MG/DL (ref 74–109)
HBA1C MFR BLD: 6 % (ref 4–6)
HCT VFR BLD AUTO: 45.5 % (ref 42–52)
HDLC SERPL-MCNC: 68 MG/DL (ref 55–121)
HGB BLD-MCNC: 13.5 G/DL (ref 14–18)
IMM GRANULOCYTES # BLD: 0.1 K/UL
LDLC SERPL CALC-MCNC: 85 MG/DL
LYMPHOCYTES # BLD: 2 K/UL (ref 1.1–4.5)
LYMPHOCYTES NFR BLD: 24.5 % (ref 20–40)
MCH RBC QN AUTO: 26.3 PG (ref 27–31)
MCHC RBC AUTO-ENTMCNC: 29.7 G/DL (ref 33–37)
MCV RBC AUTO: 88.5 FL (ref 80–94)
MONOCYTES # BLD: 0.7 K/UL (ref 0–0.9)
MONOCYTES NFR BLD: 8.3 % (ref 0–10)
NEUTROPHILS # BLD: 5.2 K/UL (ref 1.5–7.5)
NEUTS SEG NFR BLD: 62.5 % (ref 50–65)
PLATELET # BLD AUTO: 256 K/UL (ref 130–400)
PMV BLD AUTO: 11 FL (ref 9.4–12.4)
POTASSIUM SERPL-SCNC: 5.7 MMOL/L (ref 3.5–5)
PROT SERPL-MCNC: 7.3 G/DL (ref 6.6–8.7)
PSA SERPL-MCNC: 10.63 NG/ML (ref 0–4)
RBC # BLD AUTO: 5.14 M/UL (ref 4.7–6.1)
SODIUM SERPL-SCNC: 146 MMOL/L (ref 136–145)
TRIGL SERPL-MCNC: 130 MG/DL (ref 0–149)
WBC # BLD AUTO: 8.3 K/UL (ref 4.8–10.8)

## 2023-11-06 PROCEDURE — 99214 OFFICE O/P EST MOD 30 MIN: CPT | Performed by: NURSE PRACTITIONER

## 2023-11-06 PROCEDURE — G8417 CALC BMI ABV UP PARAM F/U: HCPCS | Performed by: NURSE PRACTITIONER

## 2023-11-06 PROCEDURE — 3078F DIAST BP <80 MM HG: CPT | Performed by: NURSE PRACTITIONER

## 2023-11-06 PROCEDURE — 1123F ACP DISCUSS/DSCN MKR DOCD: CPT | Performed by: NURSE PRACTITIONER

## 2023-11-06 PROCEDURE — 1036F TOBACCO NON-USER: CPT | Performed by: NURSE PRACTITIONER

## 2023-11-06 PROCEDURE — G8427 DOCREV CUR MEDS BY ELIG CLIN: HCPCS | Performed by: NURSE PRACTITIONER

## 2023-11-06 PROCEDURE — G8484 FLU IMMUNIZE NO ADMIN: HCPCS | Performed by: NURSE PRACTITIONER

## 2023-11-06 PROCEDURE — 3074F SYST BP LT 130 MM HG: CPT | Performed by: NURSE PRACTITIONER

## 2023-11-06 SDOH — ECONOMIC STABILITY: INCOME INSECURITY: HOW HARD IS IT FOR YOU TO PAY FOR THE VERY BASICS LIKE FOOD, HOUSING, MEDICAL CARE, AND HEATING?: SOMEWHAT HARD

## 2023-11-06 SDOH — ECONOMIC STABILITY: FOOD INSECURITY: WITHIN THE PAST 12 MONTHS, THE FOOD YOU BOUGHT JUST DIDN'T LAST AND YOU DIDN'T HAVE MONEY TO GET MORE.: NEVER TRUE

## 2023-11-06 SDOH — ECONOMIC STABILITY: FOOD INSECURITY: WITHIN THE PAST 12 MONTHS, YOU WORRIED THAT YOUR FOOD WOULD RUN OUT BEFORE YOU GOT MONEY TO BUY MORE.: NEVER TRUE

## 2023-11-06 NOTE — PROGRESS NOTES
Future  -     Comprehensive Metabolic Panel; Future  -     Lipid, Fasting; Future  7. Prediabetes  -     Hemoglobin A1C; Future      Return in about 2 weeks (around 11/20/2023) for Medicare wellness. Bactroban ointment to face twice daily   Continue current medicine  Return in 2 weeks for AWV    PDMP Monitoring:    Last PDMP Gokul Choi as Reviewed:  Review User Review Instant Review Result            Urine Drug Screenings (1 yr)       POCT Rapid Drug Screen  Collected: 1/6/2023 (Final result)              POCT Rapid Drug Screen  Collected: 2/19/2019 11:45 AM (Final result)                  Medication Contract and Consent for Opioid Use Documents Filed       Patient Documents       Type of Document Status Date Received Received By Description    Medication Contract Signed 8/18/2017 10:00 AM ATIYA CH     Medication Contract Received 2/19/2019 10:31 AM ISA MONTOYA Medication contract 2/19/19    Medication Contract Received 1/6/2023 12:38 PM Vernon Carbone Medication Contract                     Patient given educational materials -see patient instructions. Discussed use, benefit, and side effects of prescribed medications. All patient questions answered. Pt voiced understanding. Reviewed health maintenance. Instructed to continue currentmedications, diet and exercise. Patient agreed with treatment plan. Follow up as directed. MEDICATIONS:  Orders Placed This Encounter   Medications    mupirocin (BACTROBAN) 2 % ointment     Sig: Apply topically 2 times daily x 5-7 days     Dispense:  30 g     Refill:  0         ORDERS:  Orders Placed This Encounter   Procedures    Hemoglobin A1C    Comprehensive Metabolic Panel    CBC with Auto Differential    Lipid, Fasting       Follow-up:  Return in about 2 weeks (around 11/20/2023) for Medicare wellness. PATIENT INSTRUCTIONS:  There are no Patient Instructions on file for this visit.   Electronically signed by GOLD Pinon on 11/7/2023 at 12:11 PM    EMR

## 2023-11-07 ASSESSMENT — ENCOUNTER SYMPTOMS
ALLERGIC/IMMUNOLOGIC NEGATIVE: 1
GASTROINTESTINAL NEGATIVE: 1
RESPIRATORY NEGATIVE: 1
EYES NEGATIVE: 1

## 2023-11-14 RX ORDER — MIRTAZAPINE 7.5 MG/1
7.5 TABLET, FILM COATED ORAL NIGHTLY
Qty: 90 TABLET | Refills: 0 | Status: SHIPPED | OUTPATIENT
Start: 2023-11-14 | End: 2024-02-12

## 2023-11-17 RX ORDER — ETODOLAC 400 MG/1
400 TABLET, FILM COATED ORAL 2 TIMES DAILY
Qty: 180 TABLET | Refills: 0 | Status: SHIPPED | OUTPATIENT
Start: 2023-11-17

## 2023-11-17 SDOH — HEALTH STABILITY: PHYSICAL HEALTH: ON AVERAGE, HOW MANY MINUTES DO YOU ENGAGE IN EXERCISE AT THIS LEVEL?: 10 MIN

## 2023-11-17 SDOH — HEALTH STABILITY: PHYSICAL HEALTH: ON AVERAGE, HOW MANY DAYS PER WEEK DO YOU ENGAGE IN MODERATE TO STRENUOUS EXERCISE (LIKE A BRISK WALK)?: 2 DAYS

## 2023-11-17 ASSESSMENT — LIFESTYLE VARIABLES
HOW OFTEN DO YOU HAVE A DRINK CONTAINING ALCOHOL: NEVER
HOW MANY STANDARD DRINKS CONTAINING ALCOHOL DO YOU HAVE ON A TYPICAL DAY: 0
HOW OFTEN DO YOU HAVE A DRINK CONTAINING ALCOHOL: 1
HOW MANY STANDARD DRINKS CONTAINING ALCOHOL DO YOU HAVE ON A TYPICAL DAY: PATIENT DOES NOT DRINK
HOW OFTEN DO YOU HAVE SIX OR MORE DRINKS ON ONE OCCASION: 1

## 2023-11-17 ASSESSMENT — PATIENT HEALTH QUESTIONNAIRE - PHQ9
SUM OF ALL RESPONSES TO PHQ QUESTIONS 1-9: 0
2. FEELING DOWN, DEPRESSED OR HOPELESS: 0
1. LITTLE INTEREST OR PLEASURE IN DOING THINGS: 0
SUM OF ALL RESPONSES TO PHQ QUESTIONS 1-9: 0
SUM OF ALL RESPONSES TO PHQ9 QUESTIONS 1 & 2: 0

## 2023-11-17 NOTE — TELEPHONE ENCOUNTER
Rk Nguyen called requesting a refill of the below medication which has been pended for you:     Requested Prescriptions     Pending Prescriptions Disp Refills    etodolac (LODINE) 400 MG tablet [Pharmacy Med Name: Etodolac 400 MG Oral Tablet] 180 tablet 0     Sig: Take 1 tablet by mouth twice daily       Last Appointment Date: 11/6/2023  Next Appointment Date: 11/20/2023    Allergies   Allergen Reactions    Morphine Nausea Only    Codeine Nausea Only

## 2023-11-20 ENCOUNTER — OFFICE VISIT (OUTPATIENT)
Dept: PRIMARY CARE CLINIC | Age: 83
End: 2023-11-20
Payer: MEDICARE

## 2023-11-20 VITALS
HEIGHT: 72 IN | HEART RATE: 104 BPM | DIASTOLIC BLOOD PRESSURE: 78 MMHG | WEIGHT: 216.4 LBS | BODY MASS INDEX: 29.31 KG/M2 | OXYGEN SATURATION: 99 % | SYSTOLIC BLOOD PRESSURE: 120 MMHG | TEMPERATURE: 97.8 F

## 2023-11-20 DIAGNOSIS — Z00.00 MEDICARE ANNUAL WELLNESS VISIT, SUBSEQUENT: Primary | ICD-10-CM

## 2023-11-20 DIAGNOSIS — G60.9 IDIOPATHIC PERIPHERAL NEUROPATHY: ICD-10-CM

## 2023-11-20 PROCEDURE — 3078F DIAST BP <80 MM HG: CPT | Performed by: NURSE PRACTITIONER

## 2023-11-20 PROCEDURE — G8484 FLU IMMUNIZE NO ADMIN: HCPCS | Performed by: NURSE PRACTITIONER

## 2023-11-20 PROCEDURE — 1123F ACP DISCUSS/DSCN MKR DOCD: CPT | Performed by: NURSE PRACTITIONER

## 2023-11-20 PROCEDURE — G0439 PPPS, SUBSEQ VISIT: HCPCS | Performed by: NURSE PRACTITIONER

## 2023-11-20 PROCEDURE — 3074F SYST BP LT 130 MM HG: CPT | Performed by: NURSE PRACTITIONER

## 2023-11-20 RX ORDER — ROPINIROLE 0.5 MG/1
0.5 TABLET, FILM COATED ORAL NIGHTLY
Qty: 90 TABLET | Refills: 1 | Status: SHIPPED | OUTPATIENT
Start: 2023-11-20

## 2023-11-20 RX ORDER — PREGABALIN 100 MG/1
100 CAPSULE ORAL DAILY
Qty: 30 CAPSULE | Refills: 2 | Status: SHIPPED | OUTPATIENT
Start: 2023-11-20 | End: 2023-12-20

## 2023-11-20 ASSESSMENT — LIFESTYLE VARIABLES
HOW MANY STANDARD DRINKS CONTAINING ALCOHOL DO YOU HAVE ON A TYPICAL DAY: PATIENT DOES NOT DRINK
HOW OFTEN DO YOU HAVE A DRINK CONTAINING ALCOHOL: NEVER

## 2023-11-20 NOTE — PROGRESS NOTES
Medicare Annual Wellness Visit    Jesus Mars is here for Medicare AWV    Assessment & Plan   Medicare annual wellness visit, subsequent    Recommendations for Preventive Services Due: see orders and patient instructions/AVS.  Recommended screening schedule for the next 5-10 years is provided to the patient in written form: see Patient Instructions/AVS.     Return in about 3 months (around 2/20/2024) for follow up with PCP. Subjective   The following acute and/or chronic problems were also addressed today:  He presents with his son for Annual Medicare Wellness exam.  He resides [facility] living [address]. Patient's complete Health Risk Assessment and screening values have been reviewed and are found in Flowsheets. The following problems were reviewed today and where indicated follow up appointments were made and/or referrals ordered. Positive Risk Factor Screenings with Interventions:    Fall Risk:  Do you feel unsteady or are you worried about falling? : (!) yes  2 or more falls in past year?: no  Fall with injury in past year?: no     Interventions:    Patient declines any further evaluation or treatment  States he uses his rollator or at time his wheelchair. Dentist Screen:  Have you seen the dentist within the past year?: (!) No    Intervention:  Patient declines any further evaluation or treatment     Vision Screen:  Do you have difficulty driving, watching TV, or doing any of your daily activities because of your eyesight?: No  Have you had an eye exam within the past year?: (!) No  No results found. Interventions:   Patient comments: trouble reading up close. He was told Opthalmologic that he had \"cataracts\".   Advised follow up Opthalmologist.     ADL's:   Patient reports needing help with:  Select all that apply: (!) Transportation  Interventions:  Patient declined any further interventions or treatment    Advanced Directives:  Do you have a Living Will?: (!)

## 2023-12-04 DIAGNOSIS — E78.2 MIXED HYPERLIPIDEMIA: ICD-10-CM

## 2023-12-04 RX ORDER — ATORVASTATIN CALCIUM 20 MG/1
20 TABLET, FILM COATED ORAL DAILY
Qty: 90 TABLET | Refills: 3 | Status: CANCELLED | OUTPATIENT
Start: 2023-12-04

## 2023-12-04 RX ORDER — ATORVASTATIN CALCIUM 20 MG/1
20 TABLET, FILM COATED ORAL DAILY
Qty: 90 TABLET | Refills: 3 | Status: SHIPPED | OUTPATIENT
Start: 2023-12-04

## 2023-12-04 NOTE — PROGRESS NOTES
William Ruiz called to request a refill on his medication.       Last office visit : 11/20/2023   Next office visit : 2/26/2024     Requested Prescriptions     Pending Prescriptions Disp Refills    atorvastatin (LIPITOR) 20 MG tablet 90 tablet 3     Sig: Take 1 tablet by mouth daily            [place], [facility]

## 2023-12-26 DIAGNOSIS — K21.9 GASTROESOPHAGEAL REFLUX DISEASE WITHOUT ESOPHAGITIS: ICD-10-CM

## 2023-12-27 RX ORDER — OMEPRAZOLE 20 MG/1
20 CAPSULE, DELAYED RELEASE ORAL DAILY
Qty: 90 CAPSULE | Refills: 0 | Status: SHIPPED | OUTPATIENT
Start: 2023-12-27

## 2023-12-27 NOTE — TELEPHONE ENCOUNTER
Dayla Lesch called to request a refill on his medication.       Last office visit : 11/20/2023   Next office visit : 2/26/2024     Requested Prescriptions     Pending Prescriptions Disp Refills    omeprazole (PRILOSEC) 20 MG delayed release capsule [Pharmacy Med Name: Omeprazole 20 MG Oral Capsule Delayed Release] 90 capsule 0     Sig: Take 1 capsule by mouth once daily            Singh Ewing

## 2024-01-01 DIAGNOSIS — M10.9 GOUT, UNSPECIFIED CAUSE, UNSPECIFIED CHRONICITY, UNSPECIFIED SITE: ICD-10-CM

## 2024-01-02 RX ORDER — QUETIAPINE FUMARATE 25 MG/1
25 TABLET, FILM COATED ORAL NIGHTLY
Qty: 90 TABLET | Refills: 0 | Status: SHIPPED | OUTPATIENT
Start: 2024-01-02 | End: 2024-04-01

## 2024-01-02 RX ORDER — ALLOPURINOL 100 MG/1
100 TABLET ORAL DAILY
Qty: 90 TABLET | Refills: 0 | Status: SHIPPED | OUTPATIENT
Start: 2024-01-02

## 2024-01-02 NOTE — TELEPHONE ENCOUNTER
Perez called requesting a refill of the below medication which has been pended for you:     Requested Prescriptions     Pending Prescriptions Disp Refills    allopurinol (ZYLOPRIM) 100 MG tablet [Pharmacy Med Name: Allopurinol 100 MG Oral Tablet] 90 tablet 0     Sig: Take 1 tablet by mouth once daily    QUEtiapine (SEROQUEL) 25 MG tablet [Pharmacy Med Name: QUEtiapine Fumarate 25 MG Oral Tablet] 90 tablet 0     Sig: Take 1 tablet by mouth nightly       Last Appointment Date: 11/20/2023  Next Appointment Date: 2/26/2024    Allergies   Allergen Reactions    Morphine Nausea Only    Codeine Nausea Only

## 2024-01-22 RX ORDER — ETODOLAC 400 MG/1
400 TABLET, FILM COATED ORAL 2 TIMES DAILY
Qty: 180 TABLET | Refills: 0 | Status: SHIPPED | OUTPATIENT
Start: 2024-01-22

## 2024-01-22 NOTE — TELEPHONE ENCOUNTER
Perez called requesting a refill of the below medication which has been pended for you:     Requested Prescriptions     Pending Prescriptions Disp Refills    etodolac (LODINE) 400 MG tablet [Pharmacy Med Name: Etodolac 400 MG Oral Tablet] 180 tablet 0     Sig: Take 1 tablet by mouth twice daily       Last Appointment Date: 11/20/2023  Next Appointment Date: 2/26/2024    Allergies   Allergen Reactions    Morphine Nausea Only    Codeine Nausea Only

## 2024-02-14 NOTE — TELEPHONE ENCOUNTER
Perez Coffman called to request a refill on his medication.      Last office visit : 11/20/2023   Next office visit : 2/26/2024     Requested Prescriptions     Pending Prescriptions Disp Refills    mirtazapine (REMERON) 7.5 MG tablet [Pharmacy Med Name: Mirtazapine 7.5 MG Oral Tablet] 90 tablet 0     Sig: Take 1 tablet by mouth nightly            Irene Cisneros MA

## 2024-02-15 RX ORDER — MIRTAZAPINE 7.5 MG/1
7.5 TABLET, FILM COATED ORAL NIGHTLY
Qty: 90 TABLET | Refills: 0 | Status: SHIPPED | OUTPATIENT
Start: 2024-02-15 | End: 2024-05-15

## 2024-02-26 ENCOUNTER — OFFICE VISIT (OUTPATIENT)
Dept: PRIMARY CARE CLINIC | Age: 84
End: 2024-02-26
Payer: COMMERCIAL

## 2024-02-26 VITALS
DIASTOLIC BLOOD PRESSURE: 78 MMHG | HEART RATE: 104 BPM | WEIGHT: 217.4 LBS | TEMPERATURE: 97.5 F | OXYGEN SATURATION: 99 % | SYSTOLIC BLOOD PRESSURE: 112 MMHG | BODY MASS INDEX: 29.48 KG/M2

## 2024-02-26 DIAGNOSIS — Z51.81 MEDICATION MONITORING ENCOUNTER: Primary | ICD-10-CM

## 2024-02-26 DIAGNOSIS — R60.9 PERIPHERAL EDEMA: ICD-10-CM

## 2024-02-26 DIAGNOSIS — G60.9 IDIOPATHIC PERIPHERAL NEUROPATHY: ICD-10-CM

## 2024-02-26 PROCEDURE — 3074F SYST BP LT 130 MM HG: CPT | Performed by: NURSE PRACTITIONER

## 2024-02-26 PROCEDURE — 1123F ACP DISCUSS/DSCN MKR DOCD: CPT | Performed by: NURSE PRACTITIONER

## 2024-02-26 PROCEDURE — 3078F DIAST BP <80 MM HG: CPT | Performed by: NURSE PRACTITIONER

## 2024-02-26 PROCEDURE — 99213 OFFICE O/P EST LOW 20 MIN: CPT | Performed by: NURSE PRACTITIONER

## 2024-02-26 RX ORDER — PREGABALIN 100 MG/1
100 CAPSULE ORAL DAILY
Qty: 30 CAPSULE | Refills: 2 | Status: SHIPPED | OUTPATIENT
Start: 2024-02-26 | End: 2024-03-27

## 2024-02-26 ASSESSMENT — PATIENT HEALTH QUESTIONNAIRE - PHQ9
SUM OF ALL RESPONSES TO PHQ QUESTIONS 1-9: 0
SUM OF ALL RESPONSES TO PHQ QUESTIONS 1-9: 0
2. FEELING DOWN, DEPRESSED OR HOPELESS: 0
SUM OF ALL RESPONSES TO PHQ QUESTIONS 1-9: 0
SUM OF ALL RESPONSES TO PHQ9 QUESTIONS 1 & 2: 0
1. LITTLE INTEREST OR PLEASURE IN DOING THINGS: 0
SUM OF ALL RESPONSES TO PHQ QUESTIONS 1-9: 0

## 2024-02-26 NOTE — PROGRESS NOTES
1 capsule by mouth daily for 30 days. Max Daily Amount: 100 mg, Disp-30 capsule, R-2Normal  3. Peripheral edema      Return in about 3 months (around 5/26/2024) for follow up with PCP.     Strongly advised to avoid the tomato juice of the morning, decrease salt intake, increase water intake and elevate feet and legs when sitting.   Patient information provided  Refill Lyrica  Follow up in 3 months with labs  PDMP Monitoring:    Last PDMP Arthur as Reviewed:  Review User Review Instant Review Result            Urine Drug Screenings (1 yr)       POCT Rapid Drug Screen  Collected: 1/6/2023 (Final result)              POCT Rapid Drug Screen  Collected: 2/19/2019 11:45 AM (Final result)                  Medication Contract and Consent for Opioid Use Documents Filed       Patient Documents       Type of Document Status Date Received Received By Description    Medication Contract Signed 8/18/2017 10:00 AM MAYATIYA     Medication Contract Received 2/19/2019 10:31 AM ISA MONTOYA Medication contract 2/19/19    Medication Contract Received 1/6/2023 12:38 PM AMANDA JOYA Medication Contract                     Patient given educational materials -see patient instructions.  Discussed use, benefit, and side effects of prescribed medications.  All patient questions answered.  Pt voiced understanding. Reviewed health maintenance.  Instructed to continue currentmedications, diet and exercise.  Patient agreed with treatment plan. Follow up as directed.   MEDICATIONS:  Orders Placed This Encounter   Medications    pregabalin (LYRICA) 100 MG capsule     Sig: Take 1 capsule by mouth daily for 30 days. Max Daily Amount: 100 mg     Dispense:  30 capsule     Refill:  2         ORDERS:  No orders of the defined types were placed in this encounter.      Follow-up:  Return in about 3 months (around 5/26/2024) for follow up with PCP.    PATIENT INSTRUCTIONS:  There are no Patient Instructions on file for this visit.  Electronically

## 2024-03-25 ENCOUNTER — OFFICE VISIT (OUTPATIENT)
Dept: UROLOGY | Facility: CLINIC | Age: 84
End: 2024-03-25
Payer: MEDICARE

## 2024-03-25 VITALS — TEMPERATURE: 97.4 F | BODY MASS INDEX: 28.71 KG/M2 | HEIGHT: 72 IN | WEIGHT: 212 LBS

## 2024-03-25 DIAGNOSIS — R33.9 RETENTION OF URINE: ICD-10-CM

## 2024-03-25 DIAGNOSIS — N13.8 BPH WITH URINARY OBSTRUCTION: ICD-10-CM

## 2024-03-25 DIAGNOSIS — N40.1 BPH WITH URINARY OBSTRUCTION: ICD-10-CM

## 2024-03-25 DIAGNOSIS — R33.9 URINARY RETENTION: Primary | ICD-10-CM

## 2024-03-25 LAB
BILIRUB BLD-MCNC: ABNORMAL MG/DL
CLARITY, POC: ABNORMAL
COLOR UR: YELLOW
GLUCOSE UR STRIP-MCNC: NEGATIVE MG/DL
KETONES UR QL: NEGATIVE
LEUKOCYTE EST, POC: ABNORMAL
NITRITE UR-MCNC: POSITIVE MG/ML
PH UR: 5.5 [PH] (ref 5–8)
PROT UR STRIP-MCNC: ABNORMAL MG/DL
RBC # UR STRIP: ABNORMAL /UL
SP GR UR: 1.02 (ref 1–1.03)
UROBILINOGEN UR QL: ABNORMAL

## 2024-03-25 PROCEDURE — 51798 US URINE CAPACITY MEASURE: CPT

## 2024-03-25 PROCEDURE — 99214 OFFICE O/P EST MOD 30 MIN: CPT

## 2024-03-25 PROCEDURE — 1159F MED LIST DOCD IN RCRD: CPT

## 2024-03-25 PROCEDURE — 81001 URINALYSIS AUTO W/SCOPE: CPT

## 2024-03-25 PROCEDURE — 1160F RVW MEDS BY RX/DR IN RCRD: CPT

## 2024-03-25 RX ORDER — QUETIAPINE FUMARATE 25 MG/1
1 TABLET, FILM COATED ORAL NIGHTLY
COMMUNITY
Start: 2023-02-07 | End: 2024-04-02

## 2024-03-25 RX ORDER — TAMSULOSIN HYDROCHLORIDE 0.4 MG/1
1 CAPSULE ORAL NIGHTLY
Qty: 90 CAPSULE | Refills: 3 | Status: SHIPPED | OUTPATIENT
Start: 2024-03-25

## 2024-03-25 RX ORDER — ROPINIROLE 0.5 MG/1
1 TABLET, FILM COATED ORAL
COMMUNITY
Start: 2023-11-20

## 2024-03-25 RX ORDER — PREGABALIN 100 MG/1
100 CAPSULE ORAL DAILY
COMMUNITY

## 2024-03-25 RX ORDER — ASPIRIN 81 MG/1
81 TABLET ORAL DAILY
COMMUNITY

## 2024-03-25 RX ORDER — FINASTERIDE 5 MG/1
5 TABLET, FILM COATED ORAL DAILY
Qty: 90 TABLET | Refills: 3 | Status: SHIPPED | OUTPATIENT
Start: 2024-03-25

## 2024-03-25 RX ORDER — CALCITRIOL 0.25 UG/1
CAPSULE, LIQUID FILLED ORAL
COMMUNITY
Start: 2023-02-07

## 2024-03-25 RX ORDER — MIRTAZAPINE 7.5 MG/1
1 TABLET, FILM COATED ORAL NIGHTLY
COMMUNITY
Start: 2024-02-15 | End: 2024-05-16

## 2024-03-30 DIAGNOSIS — M10.9 GOUT, UNSPECIFIED CAUSE, UNSPECIFIED CHRONICITY, UNSPECIFIED SITE: ICD-10-CM

## 2024-04-01 RX ORDER — ALLOPURINOL 100 MG/1
100 TABLET ORAL DAILY
Qty: 90 TABLET | Refills: 1 | Status: SHIPPED | OUTPATIENT
Start: 2024-04-01

## 2024-04-02 DIAGNOSIS — I10 ESSENTIAL HYPERTENSION: ICD-10-CM

## 2024-04-02 RX ORDER — METOPROLOL SUCCINATE 25 MG/1
25 TABLET, EXTENDED RELEASE ORAL DAILY
Qty: 90 TABLET | Refills: 1 | Status: SHIPPED | OUTPATIENT
Start: 2024-04-02

## 2024-04-24 DIAGNOSIS — K21.9 GASTROESOPHAGEAL REFLUX DISEASE WITHOUT ESOPHAGITIS: ICD-10-CM

## 2024-04-24 DIAGNOSIS — G60.9 IDIOPATHIC PERIPHERAL NEUROPATHY: ICD-10-CM

## 2024-04-25 RX ORDER — OMEPRAZOLE 20 MG/1
20 CAPSULE, DELAYED RELEASE ORAL DAILY
Qty: 90 CAPSULE | Refills: 0 | OUTPATIENT
Start: 2024-04-25

## 2024-04-25 RX ORDER — QUETIAPINE FUMARATE 25 MG/1
25 TABLET, FILM COATED ORAL NIGHTLY
Qty: 90 TABLET | Refills: 0 | OUTPATIENT
Start: 2024-04-25 | End: 2024-07-24

## 2024-04-25 RX ORDER — PREGABALIN 100 MG/1
100 CAPSULE ORAL DAILY
Qty: 30 CAPSULE | Refills: 2 | OUTPATIENT
Start: 2024-04-25 | End: 2024-05-25

## 2024-05-06 DIAGNOSIS — K21.9 GASTROESOPHAGEAL REFLUX DISEASE WITHOUT ESOPHAGITIS: ICD-10-CM

## 2024-05-06 RX ORDER — QUETIAPINE FUMARATE 25 MG/1
25 TABLET, FILM COATED ORAL NIGHTLY
Qty: 90 TABLET | Refills: 0 | Status: SHIPPED | OUTPATIENT
Start: 2024-05-06 | End: 2024-08-04

## 2024-05-06 NOTE — TELEPHONE ENCOUNTER
Perez Coffman called to request a refill on his medication.      Last office visit : 2/26/2024   Next office visit : 5/20/2024     Requested Prescriptions     Pending Prescriptions Disp Refills    QUEtiapine (SEROQUEL) 25 MG tablet [Pharmacy Med Name: QUEtiapine Fumarate 25 MG Oral Tablet] 90 tablet 0     Sig: Take 1 tablet by mouth nightly            Irene Cisneros MA

## 2024-05-08 RX ORDER — OMEPRAZOLE 20 MG/1
20 CAPSULE, DELAYED RELEASE ORAL DAILY
Qty: 90 CAPSULE | Refills: 0 | Status: SHIPPED | OUTPATIENT
Start: 2024-05-08

## 2024-05-08 NOTE — TELEPHONE ENCOUNTER
Perez Coffman called to request a refill on his medication.      Last office visit : 2/26/2024   Next office visit : 5/6/2024     Requested Prescriptions     Pending Prescriptions Disp Refills    omeprazole (PRILOSEC) 20 MG delayed release capsule [Pharmacy Med Name: Omeprazole 20 MG Oral Capsule Delayed Release] 90 capsule 0     Sig: Take 1 capsule by mouth once daily            Mandi Brown MA

## 2024-05-13 DIAGNOSIS — E78.2 MIXED HYPERLIPIDEMIA: Chronic | ICD-10-CM

## 2024-05-13 DIAGNOSIS — R73.03 PREDIABETES: Primary | ICD-10-CM

## 2024-05-13 DIAGNOSIS — I10 ESSENTIAL HYPERTENSION: Chronic | ICD-10-CM

## 2024-05-13 LAB
ALBUMIN SERPL-MCNC: 4.2 G/DL (ref 3.5–5.2)
ALP SERPL-CCNC: 136 U/L (ref 40–130)
ALT SERPL-CCNC: 14 U/L (ref 5–41)
ANION GAP SERPL CALCULATED.3IONS-SCNC: 11 MMOL/L (ref 7–19)
AST SERPL-CCNC: 16 U/L (ref 5–40)
BASOPHILS # BLD: 0 K/UL (ref 0–0.2)
BASOPHILS NFR BLD: 0.3 % (ref 0–1)
BILIRUB SERPL-MCNC: 0.4 MG/DL (ref 0.2–1.2)
BUN SERPL-MCNC: 39 MG/DL (ref 8–23)
CALCIUM SERPL-MCNC: 9.9 MG/DL (ref 8.8–10.2)
CHLORIDE SERPL-SCNC: 107 MMOL/L (ref 98–111)
CHOLEST SERPL-MCNC: 161 MG/DL (ref 160–199)
CO2 SERPL-SCNC: 27 MMOL/L (ref 22–29)
CREAT SERPL-MCNC: 1.5 MG/DL (ref 0.5–1.2)
EOSINOPHIL # BLD: 0.2 K/UL (ref 0–0.6)
EOSINOPHIL NFR BLD: 2.2 % (ref 0–5)
ERYTHROCYTE [DISTWIDTH] IN BLOOD BY AUTOMATED COUNT: 14.7 % (ref 11.5–14.5)
GLUCOSE SERPL-MCNC: 110 MG/DL (ref 74–109)
HBA1C MFR BLD: 6.2 % (ref 4–6)
HCT VFR BLD AUTO: 43.3 % (ref 42–52)
HDLC SERPL-MCNC: 55 MG/DL (ref 55–121)
HGB BLD-MCNC: 13.1 G/DL (ref 14–18)
IMM GRANULOCYTES # BLD: 0 K/UL
LDLC SERPL CALC-MCNC: 77 MG/DL
LYMPHOCYTES # BLD: 1.7 K/UL (ref 1.1–4.5)
LYMPHOCYTES NFR BLD: 23.3 % (ref 20–40)
MCH RBC QN AUTO: 26.6 PG (ref 27–31)
MCHC RBC AUTO-ENTMCNC: 30.3 G/DL (ref 33–37)
MCV RBC AUTO: 88 FL (ref 80–94)
MONOCYTES # BLD: 0.5 K/UL (ref 0–0.9)
MONOCYTES NFR BLD: 6.9 % (ref 0–10)
NEUTROPHILS # BLD: 4.8 K/UL (ref 1.5–7.5)
NEUTS SEG NFR BLD: 67 % (ref 50–65)
PLATELET # BLD AUTO: 219 K/UL (ref 130–400)
PMV BLD AUTO: 10.7 FL (ref 9.4–12.4)
POTASSIUM SERPL-SCNC: 5.6 MMOL/L (ref 3.5–5)
PROT SERPL-MCNC: 7.1 G/DL (ref 6.6–8.7)
RBC # BLD AUTO: 4.92 M/UL (ref 4.7–6.1)
SODIUM SERPL-SCNC: 145 MMOL/L (ref 136–145)
TRIGL SERPL-MCNC: 146 MG/DL (ref 0–149)
WBC # BLD AUTO: 7.1 K/UL (ref 4.8–10.8)

## 2024-05-13 RX ORDER — MIRTAZAPINE 7.5 MG/1
7.5 TABLET, FILM COATED ORAL NIGHTLY
Qty: 90 TABLET | Refills: 0 | Status: SHIPPED | OUTPATIENT
Start: 2024-05-13 | End: 2024-08-11

## 2024-05-13 RX ORDER — ETODOLAC 400 MG/1
400 TABLET, FILM COATED ORAL 2 TIMES DAILY
Qty: 180 TABLET | Refills: 0 | Status: SHIPPED | OUTPATIENT
Start: 2024-05-13

## 2024-05-20 ENCOUNTER — OFFICE VISIT (OUTPATIENT)
Dept: PRIMARY CARE CLINIC | Age: 84
End: 2024-05-20
Payer: MEDICARE

## 2024-05-20 VITALS
HEART RATE: 88 BPM | OXYGEN SATURATION: 99 % | SYSTOLIC BLOOD PRESSURE: 118 MMHG | TEMPERATURE: 97.2 F | BODY MASS INDEX: 29.24 KG/M2 | WEIGHT: 215.6 LBS | DIASTOLIC BLOOD PRESSURE: 80 MMHG

## 2024-05-20 DIAGNOSIS — R29.898 WEAKNESS OF BOTH LOWER EXTREMITIES: ICD-10-CM

## 2024-05-20 DIAGNOSIS — R73.03 PREDIABETES: Primary | ICD-10-CM

## 2024-05-20 DIAGNOSIS — R60.0 PERIPHERAL EDEMA: ICD-10-CM

## 2024-05-20 DIAGNOSIS — I10 ESSENTIAL HYPERTENSION: Chronic | ICD-10-CM

## 2024-05-20 DIAGNOSIS — M15.9 PRIMARY OSTEOARTHRITIS INVOLVING MULTIPLE JOINTS: Chronic | ICD-10-CM

## 2024-05-20 DIAGNOSIS — G25.81 RESTLESS LEGS SYNDROME: Chronic | ICD-10-CM

## 2024-05-20 DIAGNOSIS — Z99.89 DEPENDENT ON WALKER FOR AMBULATION: ICD-10-CM

## 2024-05-20 DIAGNOSIS — M79.2 PERIPHERAL NEUROPATHIC PAIN: Chronic | ICD-10-CM

## 2024-05-20 DIAGNOSIS — E78.2 MIXED HYPERLIPIDEMIA: Chronic | ICD-10-CM

## 2024-05-20 DIAGNOSIS — K21.9 GASTROESOPHAGEAL REFLUX DISEASE WITHOUT ESOPHAGITIS: Chronic | ICD-10-CM

## 2024-05-20 PROCEDURE — 3074F SYST BP LT 130 MM HG: CPT | Performed by: NURSE PRACTITIONER

## 2024-05-20 PROCEDURE — 3079F DIAST BP 80-89 MM HG: CPT | Performed by: NURSE PRACTITIONER

## 2024-05-20 PROCEDURE — G8427 DOCREV CUR MEDS BY ELIG CLIN: HCPCS | Performed by: NURSE PRACTITIONER

## 2024-05-20 PROCEDURE — G8417 CALC BMI ABV UP PARAM F/U: HCPCS | Performed by: NURSE PRACTITIONER

## 2024-05-20 PROCEDURE — 1036F TOBACCO NON-USER: CPT | Performed by: NURSE PRACTITIONER

## 2024-05-20 PROCEDURE — 99214 OFFICE O/P EST MOD 30 MIN: CPT | Performed by: NURSE PRACTITIONER

## 2024-05-20 PROCEDURE — 1123F ACP DISCUSS/DSCN MKR DOCD: CPT | Performed by: NURSE PRACTITIONER

## 2024-05-20 ASSESSMENT — ENCOUNTER SYMPTOMS
EYES NEGATIVE: 1
ALLERGIC/IMMUNOLOGIC NEGATIVE: 1
GASTROINTESTINAL NEGATIVE: 1

## 2024-05-20 NOTE — PROGRESS NOTES
Dialysis 4-dose series) Never done    Respiratory Syncytial Virus (RSV) Pregnant or age 60 yrs+ (1 - 1-dose 60+ series) Never done    COVID-19 Vaccine (4 - 2023-24 season) 01/01/2050 (Originally 9/1/2023)    Prostate Specific Antigen (PSA) Screening or Monitoring  11/06/2024    Annual Wellness Visit (Medicare)  11/20/2024    Depression Screen  02/26/2025    Lipids  05/13/2025    DTaP/Tdap/Td vaccine (2 - Td or Tdap) 09/17/2028    Colorectal Cancer Screen  11/06/2117    Flu vaccine  Completed    Shingles vaccine  Completed    Pneumococcal 65+ years Vaccine  Completed    Hepatitis A vaccine  Aged Out    Hib vaccine  Aged Out    Polio vaccine  Aged Out    Meningococcal (ACWY) vaccine  Aged Out       Subjective   SUBJECTIVE/OBJECTIVE:  @HPI@    Review of Systems   Constitutional: Negative.    HENT: Negative.     Eyes: Negative.    Respiratory:  Positive for shortness of breath.    Cardiovascular:  Positive for leg swelling.   Gastrointestinal: Negative.    Endocrine: Negative.    Genitourinary: Negative.    Musculoskeletal: Negative.    Skin: Negative.    Allergic/Immunologic: Negative.    Neurological: Negative.    Hematological: Negative.    Psychiatric/Behavioral: Negative.            Objective   Physical Exam  Vitals and nursing note reviewed.   Constitutional:       Appearance: Normal appearance.   HENT:      Head: Normocephalic.      Nose: Nose normal.   Cardiovascular:      Rate and Rhythm: Normal rate and regular rhythm.      Pulses: Normal pulses.      Heart sounds: Normal heart sounds.   Pulmonary:      Effort: Pulmonary effort is normal.      Breath sounds: Normal breath sounds. No wheezing or rhonchi.   Abdominal:      General: Abdomen is flat. Bowel sounds are normal.      Palpations: Abdomen is soft.      Tenderness: There is no abdominal tenderness.   Musculoskeletal:      Cervical back: Normal range of motion.      Left foot: Swelling (minimal) present.      Comments: Using a rollator walker   Skin:

## 2024-05-22 ASSESSMENT — ENCOUNTER SYMPTOMS: SHORTNESS OF BREATH: 1

## 2024-05-28 DIAGNOSIS — G60.9 IDIOPATHIC PERIPHERAL NEUROPATHY: ICD-10-CM

## 2024-05-29 RX ORDER — PREGABALIN 100 MG/1
100 CAPSULE ORAL DAILY
Qty: 30 CAPSULE | Refills: 2 | Status: SHIPPED | OUTPATIENT
Start: 2024-05-29 | End: 2024-06-28

## 2024-07-24 DIAGNOSIS — K21.9 GASTROESOPHAGEAL REFLUX DISEASE WITHOUT ESOPHAGITIS: ICD-10-CM

## 2024-07-31 NOTE — TELEPHONE ENCOUNTER
Perez Coffman called to request a refill on his medication.      Last office visit : 5/20/2024   Next office visit : 8/26/2024     Requested Prescriptions     Pending Prescriptions Disp Refills    QUEtiapine (SEROQUEL) 25 MG tablet [Pharmacy Med Name: QUEtiapine Fumarate 25 MG Oral Tablet] 90 tablet 0     Sig: Take 1 tablet by mouth nightly            Irene Cisneros MA

## 2024-07-31 NOTE — TELEPHONE ENCOUNTER
Perez Coffman called to request a refill on his medication.      Last office visit : 5/20/2024   Next office visit : 7/29/2024     Requested Prescriptions     Pending Prescriptions Disp Refills    omeprazole (PRILOSEC) 20 MG delayed release capsule [Pharmacy Med Name: Omeprazole 20 MG Oral Capsule Delayed Release] 90 capsule 0     Sig: Take 1 capsule by mouth once daily            Irene Cisneros MA

## 2024-08-02 RX ORDER — QUETIAPINE FUMARATE 25 MG/1
25 TABLET, FILM COATED ORAL NIGHTLY
Qty: 90 TABLET | Refills: 0 | Status: SHIPPED | OUTPATIENT
Start: 2024-08-02 | End: 2024-10-31

## 2024-08-02 RX ORDER — OMEPRAZOLE 20 MG/1
20 CAPSULE, DELAYED RELEASE ORAL DAILY
Qty: 90 CAPSULE | Refills: 0 | Status: SHIPPED | OUTPATIENT
Start: 2024-08-02

## 2024-08-08 NOTE — TELEPHONE ENCOUNTER
Perez Coffman called to request a refill on his medication.      Last office visit : 5/20/2024   Next office visit : 8/26/2024     Requested Prescriptions     Pending Prescriptions Disp Refills    rOPINIRole (REQUIP) 0.5 MG tablet [Pharmacy Med Name: rOPINIRole HCl 0.5 MG Oral Tablet] 90 tablet 0     Sig: TAKE 1 TABLET BY MOUTH AT BEDTIME    etodolac (LODINE) 400 MG tablet [Pharmacy Med Name: Etodolac 400 MG Oral Tablet] 180 tablet 0     Sig: Take 1 tablet by mouth twice daily    mirtazapine (REMERON) 7.5 MG tablet [Pharmacy Med Name: Mirtazapine 7.5 MG Oral Tablet] 90 tablet 0     Sig: Take 1 tablet by mouth nightly            Irene Cisneros MA

## 2024-08-12 RX ORDER — ETODOLAC 400 MG/1
400 TABLET, FILM COATED ORAL 2 TIMES DAILY
Qty: 180 TABLET | Refills: 0 | Status: SHIPPED | OUTPATIENT
Start: 2024-08-12

## 2024-08-12 RX ORDER — ROPINIROLE 0.5 MG/1
0.5 TABLET, FILM COATED ORAL NIGHTLY
Qty: 90 TABLET | Refills: 0 | Status: SHIPPED | OUTPATIENT
Start: 2024-08-12

## 2024-08-12 RX ORDER — MIRTAZAPINE 7.5 MG/1
7.5 TABLET, FILM COATED ORAL NIGHTLY
Qty: 90 TABLET | Refills: 0 | Status: SHIPPED | OUTPATIENT
Start: 2024-08-12 | End: 2024-11-10

## 2024-08-13 DIAGNOSIS — K21.9 GASTROESOPHAGEAL REFLUX DISEASE WITHOUT ESOPHAGITIS: ICD-10-CM

## 2024-08-13 NOTE — TELEPHONE ENCOUNTER
Perez Coffman called to request a refill on his medication.      Last office visit : 5/20/2024   Next office visit : 8/26/2024     Requested Prescriptions     Pending Prescriptions Disp Refills    omeprazole (PRILOSEC) 20 MG delayed release capsule 90 capsule 0     Sig: Take 1 capsule by mouth daily            Irene Cisneros MA

## 2024-08-19 DIAGNOSIS — R73.03 PREDIABETES: ICD-10-CM

## 2024-08-19 DIAGNOSIS — E78.2 MIXED HYPERLIPIDEMIA: Chronic | ICD-10-CM

## 2024-08-19 DIAGNOSIS — I10 ESSENTIAL HYPERTENSION: Chronic | ICD-10-CM

## 2024-08-19 LAB
ALBUMIN SERPL-MCNC: 4.1 G/DL (ref 3.5–5.2)
ALP SERPL-CCNC: 154 U/L (ref 40–130)
ALT SERPL-CCNC: 21 U/L (ref 5–41)
ANION GAP SERPL CALCULATED.3IONS-SCNC: 14 MMOL/L (ref 7–19)
AST SERPL-CCNC: 19 U/L (ref 5–40)
BASOPHILS # BLD: 0.1 K/UL (ref 0–0.2)
BASOPHILS NFR BLD: 0.5 % (ref 0–1)
BILIRUB SERPL-MCNC: 0.5 MG/DL (ref 0.2–1.2)
BUN SERPL-MCNC: 40 MG/DL (ref 8–23)
CALCIUM SERPL-MCNC: 9.9 MG/DL (ref 8.8–10.2)
CHLORIDE SERPL-SCNC: 105 MMOL/L (ref 98–111)
CO2 SERPL-SCNC: 26 MMOL/L (ref 22–29)
CREAT SERPL-MCNC: 1.6 MG/DL (ref 0.5–1.2)
EOSINOPHIL # BLD: 0.3 K/UL (ref 0–0.6)
EOSINOPHIL NFR BLD: 2.9 % (ref 0–5)
ERYTHROCYTE [DISTWIDTH] IN BLOOD BY AUTOMATED COUNT: 14.2 % (ref 11.5–14.5)
GLUCOSE SERPL-MCNC: 115 MG/DL (ref 74–109)
HBA1C MFR BLD: 6 % (ref 4–6)
HCT VFR BLD AUTO: 44 % (ref 42–52)
HGB BLD-MCNC: 13.1 G/DL (ref 14–18)
IMM GRANULOCYTES # BLD: 0.1 K/UL
LYMPHOCYTES # BLD: 2.5 K/UL (ref 1.1–4.5)
LYMPHOCYTES NFR BLD: 23.1 % (ref 20–40)
MCH RBC QN AUTO: 27 PG (ref 27–31)
MCHC RBC AUTO-ENTMCNC: 29.8 G/DL (ref 33–37)
MCV RBC AUTO: 90.5 FL (ref 80–94)
MONOCYTES # BLD: 0.8 K/UL (ref 0–0.9)
MONOCYTES NFR BLD: 7.7 % (ref 0–10)
NEUTROPHILS # BLD: 7.1 K/UL (ref 1.5–7.5)
NEUTS SEG NFR BLD: 65.2 % (ref 50–65)
PLATELET # BLD AUTO: 241 K/UL (ref 130–400)
PMV BLD AUTO: 10 FL (ref 9.4–12.4)
POTASSIUM SERPL-SCNC: 5.1 MMOL/L (ref 3.5–5)
PROT SERPL-MCNC: 7.3 G/DL (ref 6.6–8.7)
RBC # BLD AUTO: 4.86 M/UL (ref 4.7–6.1)
SODIUM SERPL-SCNC: 145 MMOL/L (ref 136–145)
WBC # BLD AUTO: 10.9 K/UL (ref 4.8–10.8)

## 2024-08-26 ENCOUNTER — OFFICE VISIT (OUTPATIENT)
Dept: PRIMARY CARE CLINIC | Age: 84
End: 2024-08-26
Payer: MEDICARE

## 2024-08-26 VITALS
WEIGHT: 221.8 LBS | DIASTOLIC BLOOD PRESSURE: 88 MMHG | BODY MASS INDEX: 30.08 KG/M2 | TEMPERATURE: 97.7 F | SYSTOLIC BLOOD PRESSURE: 134 MMHG | OXYGEN SATURATION: 98 % | HEART RATE: 118 BPM

## 2024-08-26 DIAGNOSIS — R33.9 URINARY RETENTION: ICD-10-CM

## 2024-08-26 DIAGNOSIS — R94.4 DECREASED GFR: ICD-10-CM

## 2024-08-26 DIAGNOSIS — G25.81 RESTLESS LEGS SYNDROME: Chronic | ICD-10-CM

## 2024-08-26 DIAGNOSIS — I10 ESSENTIAL HYPERTENSION: Chronic | ICD-10-CM

## 2024-08-26 DIAGNOSIS — G60.9 IDIOPATHIC PERIPHERAL NEUROPATHY: Primary | ICD-10-CM

## 2024-08-26 DIAGNOSIS — E55.9 VITAMIN D DEFICIENCY: ICD-10-CM

## 2024-08-26 DIAGNOSIS — R73.03 PREDIABETES: ICD-10-CM

## 2024-08-26 PROCEDURE — G8417 CALC BMI ABV UP PARAM F/U: HCPCS | Performed by: NURSE PRACTITIONER

## 2024-08-26 PROCEDURE — 99214 OFFICE O/P EST MOD 30 MIN: CPT | Performed by: NURSE PRACTITIONER

## 2024-08-26 PROCEDURE — 3075F SYST BP GE 130 - 139MM HG: CPT | Performed by: NURSE PRACTITIONER

## 2024-08-26 PROCEDURE — 3079F DIAST BP 80-89 MM HG: CPT | Performed by: NURSE PRACTITIONER

## 2024-08-26 PROCEDURE — 1123F ACP DISCUSS/DSCN MKR DOCD: CPT | Performed by: NURSE PRACTITIONER

## 2024-08-26 PROCEDURE — G8427 DOCREV CUR MEDS BY ELIG CLIN: HCPCS | Performed by: NURSE PRACTITIONER

## 2024-08-26 PROCEDURE — 1036F TOBACCO NON-USER: CPT | Performed by: NURSE PRACTITIONER

## 2024-08-26 ASSESSMENT — ENCOUNTER SYMPTOMS
EYES NEGATIVE: 1
ALLERGIC/IMMUNOLOGIC NEGATIVE: 1
GASTROINTESTINAL NEGATIVE: 1
RESPIRATORY NEGATIVE: 1

## 2024-08-26 NOTE — PROGRESS NOTES
reviewed. Exam conducted with a chaperone present (son).   Constitutional:       Appearance: Normal appearance.   HENT:      Head: Normocephalic.      Nose: Nose normal.   Cardiovascular:      Rate and Rhythm: Normal rate and regular rhythm.      Pulses: Normal pulses.      Heart sounds: Normal heart sounds.   Pulmonary:      Effort: Pulmonary effort is normal.      Breath sounds: Normal breath sounds. No wheezing or rhonchi.   Abdominal:      General: Abdomen is flat. Bowel sounds are normal.      Palpations: Abdomen is soft.      Tenderness: There is no abdominal tenderness.   Musculoskeletal:         General: Normal range of motion.      Cervical back: Normal range of motion.      Comments: Using rollator walker     Skin:     General: Skin is warm and dry.   Neurological:      Mental Status: He is alert and oriented to person, place, and time.   Psychiatric:         Mood and Affect: Mood normal.         Behavior: Behavior normal.            ASSESSMENT/PLAN:  1. Idiopathic peripheral neuropathy  2. Decreased GFR  -     Comprehensive Metabolic Panel; Future  -     Microalbumin, Ur; Future  3. Urinary retention  -     Microalbumin, Ur; Future  4. Prediabetes  -     Comprehensive Metabolic Panel; Future  -     Microalbumin, Ur; Future  -     Hemoglobin A1C; Future  5. Essential hypertension  -     Comprehensive Metabolic Panel; Future  -     Microalbumin, Ur; Future  6. Restless legs syndrome  7. Vitamin D deficiency  -     Vitamin D 25 Hydroxy; Future      Return in about 3 months (around 11/26/2024) for follow up with PCP with blood work few days prior to appt.     Has appt with [facility] this week  PDMP reviewed  Medication contract updated  Continue current medications  Follow up in 3 months with labs      PDMP Monitoring:    Last PDMP Arthur as Reviewed:  Review User Review Instant Review Result   NGHIA VASQUEZ 8/26/2024  4:52 PM Reviewed PDMP [1]       Urine Drug Screenings (1 yr)       POCT Rapid Drug  Screen  Collected: 1/6/2023 (Final result)              POCT Rapid Drug Screen  Collected: 2/19/2019 11:45 AM (Final result)                  Medication Contract and Consent for Opioid Use Documents Filed       Patient Documents       Type of Document Status Date Received Received By Description    Medication Contract Signed 8/18/2017 10:00 AM ATIYA CH     Medication Contract Received 2/19/2019 10:31 AM ISA MONTOYA Medication contract 2/19/19    Medication Contract Received 1/6/2023 12:38 PM AMANDA JOYA Medication Contract                     Patient given educational materials -see patient instructions.  Discussed use, benefit, and side effects of prescribed medications.  All patient questions answered.  Pt voiced understanding. Reviewed health maintenance.  Instructed to continue currentmedications, diet and exercise.  Patient agreed with treatment plan. Follow up as directed.   MEDICATIONS:  No orders of the defined types were placed in this encounter.        ORDERS:  Orders Placed This Encounter   Procedures    Comprehensive Metabolic Panel    Microalbumin, Ur    Hemoglobin A1C    Vitamin D 25 Hydroxy       Follow-up:  Return in about 3 months (around 11/26/2024) for follow up with PCP with blood work few days prior to appt.    PATIENT INSTRUCTIONS:  Patient Instructions   Drink more water and less caffeine    Electronically signed by GOLD Turner on 8/26/2024 at 4:52 PM    EMR Dragon/transcription disclaimer:  Much of thisencounter note is electronic transcription/translation of spoken language to printed texts.  The electronic translation of spoken language may be erroneous, or at times, nonsensical words or phrases may be inadvertentlytranscribed.  Although I have reviewed the note for such errors, some may still exist.

## 2024-08-31 DIAGNOSIS — G60.9 IDIOPATHIC PERIPHERAL NEUROPATHY: ICD-10-CM

## 2024-08-31 NOTE — TELEPHONE ENCOUNTER
Perez ROCHA Coffman called to request a refill on his medication.      Last office visit : 8/26/2024   Next office visit : 11/25/2024     Last UDS:   Amphetamines   Date Value Ref Range Status   04/02/2013 NEGATIVE NEGATIVE Final     Comment:     UNCONFIRMED SCREENING RESULTS FOR ALL TESTS-NOT TO BE USED  FOR NON-MEDICAL PURPOSES  URINE AMPHETAMINE [id number]=3188 NG/ML     Benzodiazepines   Date Value Ref Range Status   04/02/2013 NEGATIVE NEGATIVE Final     Comment:     URINE BENZODIAZEPINE TDDHYY=472 NG/ML     Benzodiazepine Screen, Urine   Date Value Ref Range Status   01/06/2023 Negative  Final     Buprenorphine Urine   Date Value Ref Range Status   01/06/2023 Negative  Final     Cocaine Metabolite Screen, Urine   Date Value Ref Range Status   01/06/2023 Negative  Final     Gabapentin Screen, Urine   Date Value Ref Range Status   02/19/2019 NEGATIVE  Final     Oxycodone Screen, Ur   Date Value Ref Range Status   01/06/2023 Negative  Final     Propoxyphene Screen, Urine   Date Value Ref Range Status   01/06/2023 Negative  Final     THC Screen, Urine   Date Value Ref Range Status   01/06/2023 Negative  Final     Tricyclic Antidepressants, Urine   Date Value Ref Range Status   01/06/2023 Negative  Final       Last Frederick: 4/24/2023  Medication Contract: 8/26/2024   Last Fill: 5/29/2024 with 2 refills    Requested Prescriptions     Pending Prescriptions Disp Refills    pregabalin (LYRICA) 100 MG capsule [Pharmacy Med Name: Pregabalin 100 MG Oral Capsule] 30 capsule 0     Sig: Take 1 capsule by mouth daily for 30 days. Max Daily Amount: 100 mg         Please approve or refuse this medication.   Jordana Calhoun LPN

## 2024-09-01 RX ORDER — MIRTAZAPINE 7.5 MG/1
7.5 TABLET, FILM COATED ORAL NIGHTLY
Qty: 90 TABLET | Refills: 0 | OUTPATIENT
Start: 2024-09-01 | End: 2024-11-30

## 2024-09-03 RX ORDER — PREGABALIN 100 MG/1
100 CAPSULE ORAL DAILY
Qty: 30 CAPSULE | Refills: 0 | Status: SHIPPED | OUTPATIENT
Start: 2024-09-03 | End: 2024-10-03

## 2024-09-19 DIAGNOSIS — M10.9 GOUT, UNSPECIFIED CAUSE, UNSPECIFIED CHRONICITY, UNSPECIFIED SITE: ICD-10-CM

## 2024-09-19 RX ORDER — ALLOPURINOL 100 MG/1
100 TABLET ORAL DAILY
Qty: 90 TABLET | Refills: 0 | Status: SHIPPED | OUTPATIENT
Start: 2024-09-19

## 2024-09-23 ENCOUNTER — TELEPHONE (OUTPATIENT)
Dept: UROLOGY | Facility: CLINIC | Age: 84
End: 2024-09-23
Payer: MEDICARE

## 2024-09-23 ENCOUNTER — OFFICE VISIT (OUTPATIENT)
Dept: UROLOGY | Facility: CLINIC | Age: 84
End: 2024-09-23
Payer: MEDICARE

## 2024-09-23 ENCOUNTER — HOSPITAL ENCOUNTER (OUTPATIENT)
Dept: GENERAL RADIOLOGY | Facility: HOSPITAL | Age: 84
Discharge: HOME | End: 2024-09-23
Payer: MEDICARE

## 2024-09-23 VITALS — WEIGHT: 219 LBS | BODY MASS INDEX: 29.66 KG/M2 | HEIGHT: 72 IN | TEMPERATURE: 97.4 F

## 2024-09-23 DIAGNOSIS — N20.0 KIDNEY STONES: ICD-10-CM

## 2024-09-23 DIAGNOSIS — N13.8 BPH WITH URINARY OBSTRUCTION: ICD-10-CM

## 2024-09-23 DIAGNOSIS — R33.9 RETENTION OF URINE: ICD-10-CM

## 2024-09-23 DIAGNOSIS — N40.1 BPH WITH URINARY OBSTRUCTION: ICD-10-CM

## 2024-09-23 DIAGNOSIS — R33.9 URINARY RETENTION: Primary | ICD-10-CM

## 2024-09-23 LAB
BILIRUB BLD-MCNC: ABNORMAL MG/DL
CLARITY, POC: CLEAR
COLOR UR: YELLOW
GLUCOSE UR STRIP-MCNC: NEGATIVE MG/DL
KETONES UR QL: NEGATIVE
LEUKOCYTE EST, POC: ABNORMAL
NITRITE UR-MCNC: POSITIVE MG/ML
PH UR: 5.5 [PH] (ref 5–8)
PROT UR STRIP-MCNC: ABNORMAL MG/DL
RBC # UR STRIP: NEGATIVE /UL
SP GR UR: 1.02 (ref 1–1.03)
UROBILINOGEN UR QL: ABNORMAL

## 2024-09-23 PROCEDURE — 51798 US URINE CAPACITY MEASURE: CPT

## 2024-09-23 PROCEDURE — 81001 URINALYSIS AUTO W/SCOPE: CPT

## 2024-09-23 PROCEDURE — 1159F MED LIST DOCD IN RCRD: CPT

## 2024-09-23 PROCEDURE — 99214 OFFICE O/P EST MOD 30 MIN: CPT

## 2024-09-23 PROCEDURE — 1160F RVW MEDS BY RX/DR IN RCRD: CPT

## 2024-09-23 PROCEDURE — 74018 RADEX ABDOMEN 1 VIEW: CPT

## 2024-09-23 RX ORDER — FINASTERIDE 5 MG/1
5 TABLET, FILM COATED ORAL DAILY
Qty: 90 TABLET | Refills: 3 | Status: SHIPPED | OUTPATIENT
Start: 2024-09-23

## 2024-09-23 RX ORDER — TAMSULOSIN HYDROCHLORIDE 0.4 MG/1
1 CAPSULE ORAL NIGHTLY
Qty: 90 CAPSULE | Refills: 3 | Status: SHIPPED | OUTPATIENT
Start: 2024-09-23

## 2024-09-23 RX ORDER — METOPROLOL SUCCINATE 25 MG/1
1 TABLET, EXTENDED RELEASE ORAL DAILY
COMMUNITY
Start: 2024-04-02

## 2024-09-23 RX ORDER — ALLOPURINOL 100 MG/1
1 TABLET ORAL DAILY
COMMUNITY
Start: 2024-09-19

## 2024-09-29 DIAGNOSIS — I10 ESSENTIAL HYPERTENSION: ICD-10-CM

## 2024-10-01 RX ORDER — METOPROLOL SUCCINATE 25 MG/1
25 TABLET, EXTENDED RELEASE ORAL DAILY
Qty: 90 TABLET | Refills: 0 | Status: SHIPPED | OUTPATIENT
Start: 2024-10-01

## 2024-10-01 NOTE — TELEPHONE ENCOUNTER
Perez Coffman called to request a refill on his medication.      Last office visit : 8/26/2024   Next office visit : 11/25/2024     Requested Prescriptions     Pending Prescriptions Disp Refills    metoprolol succinate (TOPROL XL) 25 MG extended release tablet [Pharmacy Med Name: Metoprolol Succinate ER 25 MG Oral Tablet Extended Release 24 Hour] 90 tablet 0     Sig: Take 1 tablet by mouth once daily            Irene Cisneros MA

## 2024-10-02 DIAGNOSIS — G60.9 IDIOPATHIC PERIPHERAL NEUROPATHY: ICD-10-CM

## 2024-10-02 RX ORDER — PREGABALIN 100 MG/1
100 CAPSULE ORAL DAILY
Qty: 30 CAPSULE | Refills: 0 | Status: SHIPPED | OUTPATIENT
Start: 2024-10-02 | End: 2024-11-01

## 2024-11-01 DIAGNOSIS — G60.9 IDIOPATHIC PERIPHERAL NEUROPATHY: ICD-10-CM

## 2024-11-04 NOTE — TELEPHONE ENCOUNTER
Perez called requesting a refill of the below medication which has been pended for you:     Requested Prescriptions     Pending Prescriptions Disp Refills    pregabalin (LYRICA) 100 MG capsule 30 capsule 0     Sig: Take 1 capsule by mouth daily for 30 days. Max Daily Amount: 100 mg       Last Appointment Date: 8/26/2024  Next Appointment Date: 11/25/2024    Allergies   Allergen Reactions    Morphine Nausea Only    Codeine Nausea Only

## 2024-11-05 RX ORDER — PREGABALIN 100 MG/1
100 CAPSULE ORAL DAILY
Qty: 30 CAPSULE | Refills: 0 | Status: SHIPPED | OUTPATIENT
Start: 2024-11-05 | End: 2024-12-05

## 2024-11-12 RX ORDER — MIRTAZAPINE 7.5 MG/1
7.5 TABLET, FILM COATED ORAL NIGHTLY
Qty: 90 TABLET | Refills: 0 | Status: SHIPPED | OUTPATIENT
Start: 2024-11-12 | End: 2025-02-10

## 2024-11-12 RX ORDER — ROPINIROLE 0.5 MG/1
0.5 TABLET, FILM COATED ORAL NIGHTLY
Qty: 90 TABLET | Refills: 0 | Status: SHIPPED | OUTPATIENT
Start: 2024-11-12

## 2024-11-12 RX ORDER — ETODOLAC 400 MG/1
400 TABLET, FILM COATED ORAL 2 TIMES DAILY
Qty: 180 TABLET | Refills: 0 | Status: SHIPPED | OUTPATIENT
Start: 2024-11-12

## 2024-11-12 NOTE — TELEPHONE ENCOUNTER
Perez called requesting a refill of the below medication which has been pended for you:     Requested Prescriptions     Pending Prescriptions Disp Refills    rOPINIRole (REQUIP) 0.5 MG tablet [Pharmacy Med Name: rOPINIRole HCl 0.5 MG Oral Tablet] 90 tablet 0     Sig: TAKE 1 TABLET BY MOUTH AT BEDTIME    etodolac (LODINE) 400 MG tablet [Pharmacy Med Name: Etodolac 400 MG Oral Tablet] 180 tablet 0     Sig: Take 1 tablet by mouth twice daily    mirtazapine (REMERON) 7.5 MG tablet [Pharmacy Med Name: Mirtazapine 7.5 MG Oral Tablet] 90 tablet 0     Sig: Take 1 tablet by mouth nightly       Last Appointment Date: 8/26/2024  Next Appointment Date: 11/25/2024    Allergies   Allergen Reactions    Morphine Nausea Only    Codeine Nausea Only

## 2024-11-13 DIAGNOSIS — K21.9 GASTROESOPHAGEAL REFLUX DISEASE WITHOUT ESOPHAGITIS: ICD-10-CM

## 2024-11-13 NOTE — TELEPHONE ENCOUNTER
Perez called requesting a refill of the below medication which has been pended for you:     Requested Prescriptions     Pending Prescriptions Disp Refills    omeprazole (PRILOSEC) 20 MG delayed release capsule 90 capsule 0     Sig: Take 1 capsule by mouth daily       Last Appointment Date: 8/26/2024  Next Appointment Date: 11/25/2024    Allergies   Allergen Reactions    Morphine Nausea Only    Codeine Nausea Only

## 2024-11-18 DIAGNOSIS — E55.9 VITAMIN D DEFICIENCY: ICD-10-CM

## 2024-11-18 DIAGNOSIS — I10 ESSENTIAL HYPERTENSION: Chronic | ICD-10-CM

## 2024-11-18 DIAGNOSIS — E87.5 HYPERKALEMIA: ICD-10-CM

## 2024-11-18 DIAGNOSIS — R73.03 PREDIABETES: ICD-10-CM

## 2024-11-18 DIAGNOSIS — R94.4 DECREASED GFR: ICD-10-CM

## 2024-11-18 DIAGNOSIS — R33.9 URINARY RETENTION: ICD-10-CM

## 2024-11-18 DIAGNOSIS — R80.9 MICROALBUMINURIA: Primary | ICD-10-CM

## 2024-11-18 LAB
25(OH)D3 SERPL-MCNC: 37.3 NG/ML
ALBUMIN SERPL-MCNC: 4 G/DL (ref 3.5–5.2)
ALP SERPL-CCNC: 129 U/L (ref 40–129)
ALT SERPL-CCNC: 19 U/L (ref 5–41)
ANION GAP SERPL CALCULATED.3IONS-SCNC: 12 MMOL/L (ref 7–19)
AST SERPL-CCNC: 20 U/L (ref 5–40)
BILIRUB SERPL-MCNC: 0.5 MG/DL (ref 0.2–1.2)
BUN SERPL-MCNC: 34 MG/DL (ref 8–23)
CALCIUM SERPL-MCNC: 9.8 MG/DL (ref 8.8–10.2)
CHLORIDE SERPL-SCNC: 108 MMOL/L (ref 98–111)
CO2 SERPL-SCNC: 26 MMOL/L (ref 22–29)
CREAT SERPL-MCNC: 1.5 MG/DL (ref 0.7–1.2)
CREAT UR-MCNC: 77 MG/DL (ref 39–259)
GLUCOSE SERPL-MCNC: 105 MG/DL (ref 70–99)
HBA1C MFR BLD: 6.2 % (ref 4–5.6)
MICROALBUMIN UR-MCNC: 6.4 MG/DL (ref 0–1.99)
MICROALBUMIN/CREAT UR-RTO: 83.1 MG/G
POTASSIUM SERPL-SCNC: 5.2 MMOL/L (ref 3.5–5)
PROT SERPL-MCNC: 6.8 G/DL (ref 6.4–8.3)
SODIUM SERPL-SCNC: 146 MMOL/L (ref 136–145)

## 2024-11-24 DIAGNOSIS — E78.2 MIXED HYPERLIPIDEMIA: ICD-10-CM

## 2024-11-25 ENCOUNTER — OFFICE VISIT (OUTPATIENT)
Dept: INTERNAL MEDICINE | Age: 84
End: 2024-11-25

## 2024-11-25 VITALS
SYSTOLIC BLOOD PRESSURE: 122 MMHG | OXYGEN SATURATION: 99 % | HEART RATE: 76 BPM | DIASTOLIC BLOOD PRESSURE: 72 MMHG | TEMPERATURE: 97 F | WEIGHT: 229 LBS | HEIGHT: 72 IN | BODY MASS INDEX: 31.02 KG/M2

## 2024-11-25 DIAGNOSIS — R80.9 MICROALBUMINURIA: ICD-10-CM

## 2024-11-25 DIAGNOSIS — Z79.899 MEDICATION MANAGEMENT: ICD-10-CM

## 2024-11-25 DIAGNOSIS — M10.9 GOUT, UNSPECIFIED CAUSE, UNSPECIFIED CHRONICITY, UNSPECIFIED SITE: ICD-10-CM

## 2024-11-25 DIAGNOSIS — I10 ESSENTIAL HYPERTENSION: Chronic | ICD-10-CM

## 2024-11-25 DIAGNOSIS — Z12.5 SCREENING PSA (PROSTATE SPECIFIC ANTIGEN): ICD-10-CM

## 2024-11-25 DIAGNOSIS — M79.2 PERIPHERAL NEUROPATHIC PAIN: Chronic | ICD-10-CM

## 2024-11-25 DIAGNOSIS — R73.03 PREDIABETES: ICD-10-CM

## 2024-11-25 DIAGNOSIS — E78.2 MIXED HYPERLIPIDEMIA: Primary | Chronic | ICD-10-CM

## 2024-11-25 DIAGNOSIS — G25.81 RESTLESS LEGS SYNDROME: Chronic | ICD-10-CM

## 2024-11-25 LAB
ALCOHOL URINE: NORMAL
AMPHETAMINE SCREEN URINE: NORMAL
BARBITURATE SCREEN URINE: NORMAL
BENZODIAZEPINE SCREEN, URINE: NORMAL
BUPRENORPHINE URINE: NORMAL
COCAINE METABOLITE SCREEN URINE: NORMAL
FENTANYL SCREEN, URINE: NORMAL
GABAPENTIN SCREEN, URINE: NORMAL
MDMA, URINE: NORMAL
METHADONE SCREEN, URINE: NORMAL
METHAMPHETAMINE, URINE: NORMAL
OPIATE SCREEN URINE: NORMAL
OXYCODONE SCREEN URINE: NORMAL
PHENCYCLIDINE SCREEN URINE: NORMAL
PROPOXYPHENE SCREEN, URINE: NORMAL
SYNTHETIC CANNABINOIDS(K2) SCREEN, URINE: NORMAL
THC SCREEN, URINE: NORMAL
TRAMADOL SCREEN URINE: NORMAL
TRICYCLIC ANTIDEPRESSANTS, UR: NORMAL

## 2024-11-25 RX ORDER — FINASTERIDE 5 MG/1
5 TABLET, FILM COATED ORAL DAILY
COMMUNITY
Start: 2024-09-19

## 2024-11-25 SDOH — ECONOMIC STABILITY: FOOD INSECURITY: WITHIN THE PAST 12 MONTHS, THE FOOD YOU BOUGHT JUST DIDN'T LAST AND YOU DIDN'T HAVE MONEY TO GET MORE.: NEVER TRUE

## 2024-11-25 SDOH — ECONOMIC STABILITY: FOOD INSECURITY: WITHIN THE PAST 12 MONTHS, YOU WORRIED THAT YOUR FOOD WOULD RUN OUT BEFORE YOU GOT MONEY TO BUY MORE.: NEVER TRUE

## 2024-11-25 SDOH — ECONOMIC STABILITY: INCOME INSECURITY: HOW HARD IS IT FOR YOU TO PAY FOR THE VERY BASICS LIKE FOOD, HOUSING, MEDICAL CARE, AND HEATING?: NOT HARD AT ALL

## 2024-11-25 ASSESSMENT — PATIENT HEALTH QUESTIONNAIRE - PHQ9
SUM OF ALL RESPONSES TO PHQ QUESTIONS 1-9: 0
SUM OF ALL RESPONSES TO PHQ QUESTIONS 1-9: 0
1. LITTLE INTEREST OR PLEASURE IN DOING THINGS: NOT AT ALL
2. FEELING DOWN, DEPRESSED OR HOPELESS: NOT AT ALL
SUM OF ALL RESPONSES TO PHQ9 QUESTIONS 1 & 2: 0
SUM OF ALL RESPONSES TO PHQ9 QUESTIONS 1 & 2: 0
SUM OF ALL RESPONSES TO PHQ QUESTIONS 1-9: 0
1. LITTLE INTEREST OR PLEASURE IN DOING THINGS: NOT AT ALL
SUM OF ALL RESPONSES TO PHQ QUESTIONS 1-9: 0
SUM OF ALL RESPONSES TO PHQ QUESTIONS 1-9: 0
2. FEELING DOWN, DEPRESSED OR HOPELESS: NOT AT ALL

## 2024-11-25 ASSESSMENT — LIFESTYLE VARIABLES
HOW OFTEN DO YOU HAVE A DRINK CONTAINING ALCOHOL: NEVER
HOW MANY STANDARD DRINKS CONTAINING ALCOHOL DO YOU HAVE ON A TYPICAL DAY: PATIENT DOES NOT DRINK

## 2024-11-25 NOTE — TELEPHONE ENCOUNTER
Perez called requesting a refill of the below medication which has been pended for you:     Requested Prescriptions     Pending Prescriptions Disp Refills    atorvastatin (LIPITOR) 20 MG tablet [Pharmacy Med Name: Atorvastatin Calcium 20 MG Oral Tablet] 90 tablet 0     Sig: Take 1 tablet by mouth once daily       Last Appointment Date: 8/26/2024  Next Appointment Date: 11/25/2024    Allergies   Allergen Reactions    Morphine Nausea Only    Codeine Nausea Only

## 2024-11-25 NOTE — PROGRESS NOTES
[facility]  [facility] INTER[facility] MED[facility]  [address] [place] [address] DRIVE  [address]  [place] [address]  Dept: [phone]  Dept Fax: [phone]  Loc: [phone]    Perez Coffman is a 84 y.o. male who presents today for his medical conditions/complaints as noted below.  Perez Coffman is c/o of 3 Month Follow-Up        HPI:   He presents with his son for follow-up on diabetes, pain in legs, and cholesterol.  He had labs done last week and his A1C 6.2, previously 6.0. his son states he eats a lot candy bars daily.  His son also states his father drinks soda and tea..      He continues to see [facility].  Had stent put in for kidneys but states Urology took it out.  He is wearing adult brief.  He states he had kidney dialysis in the past.   Saw Urology a few months ago.  Follow up is in 5-6 months. He has not had PSA level since November 2023.    Blood pressure is stable.  Lab Results   Component Value Date/Time    GLUCOSE 105 11/18/2024 10:38 AM    GLUCOSE 115 08/19/2024 09:26 AM    GLUCOSE 110 05/13/2024 11:22 AM    LABA1C 6.2 11/18/2024 10:38 AM    LABA1C 6.0 08/19/2024 09:26 AM    LABA1C 6.2 05/13/2024 11:22 AM     Lab Results   Component Value Date/Time    WBC 10.9 08/19/2024 09:26 AM    WBC 7.1 05/13/2024 11:22 AM    WBC 8.3 11/06/2023 10:53 AM    HGB 13.1 08/19/2024 09:26 AM    HGB 13.1 05/13/2024 11:22 AM    HGB 13.5 11/06/2023 10:53 AM    HCT 44.0 08/19/2024 09:26 AM    HCT 43.3 05/13/2024 11:22 AM    HCT 45.5 11/06/2023 10:53 AM    MCV 90.5 08/19/2024 09:26 AM    MCV 88.0 05/13/2024 11:22 AM    MCV 88.5 11/06/2023 10:53 AM     08/19/2024 09:26 AM     05/13/2024 11:22 AM     11/06/2023 10:53 AM       Lab Results   Component Value Date/Time     11/18/2024 10:38 AM     08/19/2024 09:26 AM     05/13/2024 11:22 AM    K 5.2 11/18/2024 10:38 AM    K 5.1 08/19/2024 09:26 AM    K 5.6 05/13/2024 11:22 AM    K 3.6 03/21/2023 03:51 AM    K 3.3 03/20/2023 03:41

## 2024-11-26 RX ORDER — ATORVASTATIN CALCIUM 20 MG/1
20 TABLET, FILM COATED ORAL DAILY
Qty: 90 TABLET | Refills: 1 | Status: SHIPPED | OUTPATIENT
Start: 2024-11-26

## 2024-11-27 RX ORDER — QUETIAPINE FUMARATE 25 MG/1
25 TABLET, FILM COATED ORAL NIGHTLY
Qty: 90 TABLET | Refills: 0 | Status: SHIPPED | OUTPATIENT
Start: 2024-11-27 | End: 2025-02-25

## 2024-11-27 RX ORDER — ALLOPURINOL 100 MG/1
100 TABLET ORAL DAILY
Qty: 90 TABLET | Refills: 0 | Status: SHIPPED | OUTPATIENT
Start: 2024-11-27

## 2024-11-27 RX ORDER — METOPROLOL SUCCINATE 25 MG/1
25 TABLET, EXTENDED RELEASE ORAL DAILY
Qty: 90 TABLET | Refills: 0 | Status: SHIPPED | OUTPATIENT
Start: 2024-11-27

## 2024-12-09 DIAGNOSIS — G60.9 IDIOPATHIC PERIPHERAL NEUROPATHY: ICD-10-CM

## 2024-12-09 RX ORDER — PREGABALIN 100 MG/1
100 CAPSULE ORAL DAILY
Qty: 30 CAPSULE | Refills: 0 | Status: SHIPPED | OUTPATIENT
Start: 2024-12-09 | End: 2025-01-08

## 2024-12-09 NOTE — TELEPHONE ENCOUNTER
Perez called requesting a refill of the below medication which has been pended for you:     Requested Prescriptions     Pending Prescriptions Disp Refills    pregabalin (LYRICA) 100 MG capsule 30 capsule 0     Sig: Take 1 capsule by mouth daily for 30 days. Max Daily Amount: 100 mg       Last Appointment Date: 11/25/2024  Next Appointment Date: 3/3/2025    Allergies   Allergen Reactions    Morphine Nausea Only    Codeine Nausea Only

## 2025-01-05 NOTE — PROGRESS NOTES
Renal Progress Note    Thank you to requesting provider: Dr Marianna Meigs, for asking us to see Salvador Davis    Reason for consultation:  ALEXANDRO. Chief Complaint:  Left flank pain. History of Presenting Illness      Patient is a very pleasant 78 yo man with pmhx of HTN, pre diabetes, gout, OA, peripheral neuropathy. He was admitted on 1/23 for left flank and lateral abdominal pain. He was also having chills and was not feeling well. He denies recurrent UTI. His abdominal CT in ED revealed non-obstructing left kidney stone of 4-5 mm in size. He was also having positive UA. He was subsequently admitted and was started with IV fluids and antibiotics for acute pyelonephritis. His serum creat was 1.4 on admission (baseline 1.1). It ab to 2.1 and 2.3. Renal service is consulted to manage his ALEXANDRO. Patient denies any prior renal dysfunction. He noticed some drop in his urine output. He also denies NSAIDs abuse. He was found to be bacteriemic to Staph epidermidis form urine origin. Today, he offered no new complaints. His flank pain has improrved some.      Past Medical/Surgical History      Active Ambulatory Problems     Diagnosis Date Noted    Mixed hyperlipidemia 08/16/2017    Essential hypertension 08/16/2017    Restless legs syndrome 08/16/2017    Gastroesophageal reflux disease without esophagitis 08/16/2017    Peripheral neuropathic pain 08/16/2017    Primary osteoarthritis involving multiple joints 08/16/2017    Gout 08/16/2017    Benign non-nodular prostatic hyperplasia with lower urinary tract symptoms 08/16/2017    Elevated PSA 08/16/2017    Idiopathic peripheral neuropathy 02/19/2018    Chronic idiopathic constipation 08/20/2018    Prediabetes 10/14/2022     Resolved Ambulatory Problems     Diagnosis Date Noted    No Resolved Ambulatory Problems     Past Medical History:   Diagnosis Date    Renal stone 1/23/2023         Review of Systems     Constitutional:  No weight loss, + fever/chills  Eyes:  No eye pain, no eye redness  Cardiovascular:  No chest pain, no worsening of edema  Respiratory:  No hemoptysis, no stidor  Gastrointestinal:  No blood in stool, no n/v, no diarrhea  Genitoruinary:  No hematuria, no difficulty with urination  Musculoskeletal:  No joint swelling, no redness  Integumentary:  No Rash, no itching  Neurological:  No focal weakness, No new sensory deficit  Psychiatric:  No depression, no confusion  Endocrine:  No polyuria, no polydipsia       Medications        Current Facility-Administered Medications:     perflutren lipid microspheres (DEFINITY) injection 1.5 mL, 1.5 mL, IntraVENous, ONCE PRN, Kaela Rosario MD, 1.5 mL at 01/26/23 0800    enoxaparin Sodium (LOVENOX) injection 30 mg, 30 mg, SubCUTAneous, Daily, GOLD Brewer CNP, 30 mg at 01/25/23 1819    cefTRIAXone (ROCEPHIN) 1,000 mg in sterile water 10 mL IV syringe, 1,000 mg, IntraVENous, Q24H, Kaela Rosario MD, 1,000 mg at 01/25/23 1819    vancomycin (VANCOCIN) intermittent dosing (placeholder), , Other, RX Placeholder, GOLD Brewer CNP    tamsulosin (FLOMAX) capsule 0.4 mg, 0.4 mg, Oral, Daily, GOLD Brewer CNP, 0.4 mg at 01/26/23 0856    sodium chloride flush 0.9 % injection 5-40 mL, 5-40 mL, IntraVENous, 2 times per day, GOLD Brewer CNP, 10 mL at 01/26/23 0856    sodium chloride flush 0.9 % injection 5-40 mL, 5-40 mL, IntraVENous, PRN, GOLD Brewer CNP    0.9 % sodium chloride infusion, , IntraVENous, PRN, GOLD Brewer CNP    ondansetron (ZOFRAN-ODT) disintegrating tablet 4 mg, 4 mg, Oral, Q8H PRN **OR** ondansetron (ZOFRAN) injection 4 mg, 4 mg, IntraVENous, Q6H PRN, GOLD Brewer CNP, 4 mg at 01/25/23 0251    acetaminophen (TYLENOL) tablet 650 mg, 650 mg, Oral, Q6H PRN **OR** acetaminophen (TYLENOL) suppository 650 mg, 650 mg, Rectal, Q6H PRN, GOLD Brewer CNP    polyethylene glycol (GLYCOLAX) packet 17 g, 17 g, Oral, Daily PRN, GOLD Brewer CNP, 17 g at 23 1422    0.9 % sodium chloride infusion, , IntraVENous, Continuous, Giselle Bass MD, Last Rate: 150 mL/hr at 23 0904, New Bag at 23 0904    traMADol (ULTRAM) tablet 50 mg, 50 mg, Oral, Q6H PRN, Giselle Bass MD, 50 mg at 23 1207    morphine (PF) injection 2 mg, 2 mg, IntraVENous, Q2H PRN **OR** morphine sulfate (PF) injection 4 mg, 4 mg, IntraVENous, Q2H PRN, Giselle Bass MD    allopurinol (ZYLOPRIM) tablet 100 mg, 100 mg, Oral, Daily, Candelaria Valenzuela, APRN - CNP, 100 mg at 23 0856    atorvastatin (LIPITOR) tablet 20 mg, 20 mg, Oral, Daily, Candelaria Valenzuela, APRN - CNP, 20 mg at 23 0856    metoprolol succinate (TOPROL XL) extended release tablet 50 mg, 50 mg, Oral, Daily, Candelaria Valenzuela, APRN - CNP, 50 mg at 23 0856    pantoprazole (PROTONIX) tablet 40 mg, 40 mg, Oral, QAM AC, Candelaria Valenzuela, APRN - CNP, 40 mg at 23 1579    pregabalin (LYRICA) capsule 100 mg, 100 mg, Oral, BID, Candelaria Valenzuela, APRN - CNP, 100 mg at 23 0856    docusate sodium (COLACE) capsule 100 mg, 100 mg, Oral, Daily, Candelaria Valenzuela, APRN - CNP, 100 mg at 23 0856  No outpatient medications have been marked as taking for the 23 encounter [facility] HOS[facility] Encounter). Allergies   Codeine    Family History       Family History   Problem Relation Age of Onset    Uterine Cancer Mother     Coronary Art Dis Father         MI at 62    COPD Sister     Stroke Brother      Family history negative for kidney disease. Social History      Social History     Socioeconomic History    Marital status:       Spouse name: Teresa Torres    Number of children: 2    Years of education: 12    Highest education level: None   Occupational History     Employer: RETIRED   Tobacco Use    Smoking status: Former     Packs/day: 3.00     Years: 30.00     Pack years: 90.00     Types: Cigarettes     Quit date: 1975     Years since quittin.1    Smokeless tobacco: Never   Substance and Sexual Activity    Alcohol use: No    Drug use: No    Sexual activity: Yes     Partners: Female     Comment: wife     Social Determinants of Health     Financial Resource Strain: Low Risk     Difficulty of Paying Living Expenses: Not hard at all   Food Insecurity: No Food Insecurity    Worried About Running Out of Food in the Last Year: Never true    Ran Out of Food in the Last Year: Never true   Physical Activity: Unknown    Days of Exercise per Week: 0 days   Intimate Partner Violence: Unknown    Fear of Current or Ex-Partner: Patient refused    Emotionally Abused: Patient refused    Physically Abused: Patient refused    Sexually Abused: Patient refused       Physical Exam     Blood pressure (!) 140/87, pulse 73, temperature 98.2 °F (36.8 °C), temperature source Oral, resp. rate 18, height 6' (1.829 m), weight 195 lb 11.2 oz (88.8 kg), SpO2 94 %. General:  NAD, A+Ox3, ill-appearing, normal body habitus  HEENT:  Atraumatic, normocephalic  Neck:  Supple, normal range of movement  Chest:  CTAB, good respiratory effort, good air movement  CV:  RRR, no murmurs Abdomen:  NTND, soft, +BS, no hepatosplenomegaly  Extremities:  No peripheral edema  Neurological:  Moving all four extremities   Psychiatric:  Normal insight and judgement, good recall    Data     Recent Labs     01/24/23  0255 01/25/23  0127 01/26/23  0139   WBC 13.1* 10.8 7.7   HGB 13.3* 10.9* 11.7*   HCT 42.3 34.9* 39.5*   MCV 89.8 90.4 91.4    141 136       Recent Labs     01/24/23  1518 01/25/23  0127 01/26/23  0139    142 141   K 5.2* 4.8 4.6    108 108   CO2 26 23 22   GLUCOSE 139* 106 91   BUN 41* 44* 46*   CREATININE 2.1* 2.3* 2.2*   LABGLOM 30* 27* 29*         Assessment:  ALEXANDRO- pre renal azotemia vs ATN  Acute pyelonephritis  HTN  Pre- type DM  Left kidney stone  Sec HPTH      Plan:  Continue IV fluids and antibiotics. Follow up labs. Avoid hypotension and nephrotoxins. Will add oral calcitriol. show

## 2025-01-08 DIAGNOSIS — G60.9 IDIOPATHIC PERIPHERAL NEUROPATHY: ICD-10-CM

## 2025-01-08 RX ORDER — PREGABALIN 100 MG/1
100 CAPSULE ORAL DAILY
Qty: 30 CAPSULE | Refills: 0 | Status: SHIPPED | OUTPATIENT
Start: 2025-01-08 | End: 2025-02-07

## 2025-02-04 DIAGNOSIS — I10 ESSENTIAL HYPERTENSION: Chronic | ICD-10-CM

## 2025-02-04 NOTE — TELEPHONE ENCOUNTER
Perez called requesting a refill of the below medication which has been pended for you:     Requested Prescriptions     Pending Prescriptions Disp Refills    metoprolol succinate (TOPROL XL) 25 MG extended release tablet 90 tablet 0     Sig: Take 1 tablet by mouth daily       Last Appointment Date: 11/25/2024  Next Appointment Date: 3/3/2025    Allergies   Allergen Reactions    Morphine Nausea Only    Codeine Nausea Only

## 2025-02-05 RX ORDER — METOPROLOL SUCCINATE 25 MG/1
25 TABLET, EXTENDED RELEASE ORAL DAILY
Qty: 90 TABLET | Refills: 0 | Status: SHIPPED | OUTPATIENT
Start: 2025-02-05

## 2025-02-06 DIAGNOSIS — K21.9 GASTROESOPHAGEAL REFLUX DISEASE WITHOUT ESOPHAGITIS: ICD-10-CM

## 2025-02-06 NOTE — TELEPHONE ENCOUNTER
Perez called requesting a refill of the below medication which has been pended for you:     Requested Prescriptions     Pending Prescriptions Disp Refills    omeprazole (PRILOSEC) 20 MG delayed release capsule [Pharmacy Med Name: OMEPRAZOLE 20MG CAP] 90 capsule 0     Sig: Take 1 capsule by mouth once daily       Last Appointment Date: 11/25/2024  Next Appointment Date: 3/3/2025    Allergies   Allergen Reactions    Morphine Nausea Only    Codeine Nausea Only

## 2025-02-07 RX ORDER — MIRTAZAPINE 7.5 MG/1
7.5 TABLET, FILM COATED ORAL NIGHTLY
Qty: 90 TABLET | Refills: 1 | Status: SHIPPED | OUTPATIENT
Start: 2025-02-07

## 2025-02-07 RX ORDER — ROPINIROLE 0.5 MG/1
0.5 TABLET, FILM COATED ORAL NIGHTLY
Qty: 90 TABLET | Refills: 1 | Status: SHIPPED | OUTPATIENT
Start: 2025-02-07

## 2025-02-07 RX ORDER — ETODOLAC 400 MG/1
400 TABLET, FILM COATED ORAL 2 TIMES DAILY
Qty: 180 TABLET | Refills: 1 | Status: SHIPPED | OUTPATIENT
Start: 2025-02-07

## 2025-02-09 DIAGNOSIS — G60.9 IDIOPATHIC PERIPHERAL NEUROPATHY: ICD-10-CM

## 2025-02-11 RX ORDER — PREGABALIN 100 MG/1
100 CAPSULE ORAL DAILY
Qty: 30 CAPSULE | Refills: 0 | Status: SHIPPED | OUTPATIENT
Start: 2025-02-11 | End: 2025-03-13

## 2025-06-16 ENCOUNTER — OFFICE VISIT (OUTPATIENT)
Dept: FAMILY MEDICINE CLINIC | Facility: CLINIC | Age: 85
End: 2025-06-16
Payer: MEDICARE

## 2025-06-16 VITALS
SYSTOLIC BLOOD PRESSURE: 120 MMHG | HEART RATE: 89 BPM | TEMPERATURE: 97 F | OXYGEN SATURATION: 96 % | WEIGHT: 229 LBS | HEIGHT: 72 IN | BODY MASS INDEX: 31.02 KG/M2 | DIASTOLIC BLOOD PRESSURE: 78 MMHG

## 2025-06-16 DIAGNOSIS — R06.09 DYSPNEA ON EXERTION: ICD-10-CM

## 2025-06-16 DIAGNOSIS — E78.2 MIXED HYPERLIPIDEMIA: ICD-10-CM

## 2025-06-16 DIAGNOSIS — K21.9 GASTROESOPHAGEAL REFLUX DISEASE WITHOUT ESOPHAGITIS: ICD-10-CM

## 2025-06-16 DIAGNOSIS — R60.0 PERIPHERAL EDEMA: Primary | ICD-10-CM

## 2025-06-16 DIAGNOSIS — I10 ESSENTIAL HYPERTENSION: ICD-10-CM

## 2025-06-16 PROCEDURE — 1160F RVW MEDS BY RX/DR IN RCRD: CPT | Performed by: NURSE PRACTITIONER

## 2025-06-16 PROCEDURE — 99214 OFFICE O/P EST MOD 30 MIN: CPT | Performed by: NURSE PRACTITIONER

## 2025-06-16 PROCEDURE — 1159F MED LIST DOCD IN RCRD: CPT | Performed by: NURSE PRACTITIONER

## 2025-06-16 PROCEDURE — 1126F AMNT PAIN NOTED NONE PRSNT: CPT | Performed by: NURSE PRACTITIONER

## 2025-06-16 NOTE — PROGRESS NOTES
"Subjective   Chief Complaint:  Establishing care, medication management    History of Present Illness  This is an 85-year-old male presenting as a new patient.  Has dyspnea on exertion occasionally but daily peripheral edema.  No chest pain or palpitations.  History of high blood pressure, has satisfactory readings on currently Toprol-XL.  History of GERD, no flareups, stable on omeprazole.  History of hyperlipidemia, on atorvastatin, due for labs.    Past Medical, Surgical, Social, and Family History:  Allergies   Allergen Reactions    Codeine Nausea And Vomiting    Morphine Nausea Only      Past Medical History:   Diagnosis Date    Arthritis     BPH (benign prostatic hyperplasia)     Elevated PSA     GERD (gastroesophageal reflux disease)     Hypercholesteremia     Hyperlipidemia     Hypertension     Urinary incontinence       Past Surgical History:   Procedure Laterality Date    COLONOSCOPY  12/08/2011    COLONOSCOPY  HYPERPLASTIC POLYP AT CECUM, ASCENDING, HEPATIC FLEXURE, RECALL 3 YEARS, DR STOLL    COLONOSCOPY N/A 11/06/2018    Procedure: COLONOSCOPY WITH ANESTHESIA;  Surgeon: Aron Stoll MD;  Location: [facility] ENDOSCOPY;  Service: Gastroenterology    HERNIA REPAIR      KIDNEY STONE SURGERY      PROSTATE SURGERY        Social History     Socioeconomic History    Marital status:    Tobacco Use    Smoking status: Never     Passive exposure: Never    Smokeless tobacco: Never   Vaping Use    Vaping status: Never Used   Substance and Sexual Activity    Alcohol use: No    Drug use: No    Sexual activity: Defer      Family History   Problem Relation Age of Onset    Cancer Mother     Heart disease Father         heart attack    GI problems Neg Hx     Colon cancer Neg Hx     Colon polyps Neg Hx        Objective   Vital Signs  /78   Pulse 89   Temp 97 °F (36.1 °C) (Infrared)   Ht 182.9 cm (72\")   Wt 104 kg (229 lb)   SpO2 96%   BMI 31.06 kg/m²    Physical Exam  Constitutional:       General: He " is not in acute distress.     Appearance: He is obese.   Cardiovascular:      Rate and Rhythm: Normal rate and regular rhythm.      Pulses: Normal pulses.      Heart sounds: No murmur heard.     No friction rub. No gallop.   Pulmonary:      Effort: Pulmonary effort is normal. No respiratory distress.      Breath sounds: Normal breath sounds. No wheezing or rhonchi.   Neurological:      Mental Status: He is alert.       Assessment & Plan   Assessment & Plan  Diagnoses and all orders for this visit:    1. Peripheral edema (Primary)  -     Adult Transthoracic Echo Complete W/ Cont if Necessary Per Protocol; Future  -     US Venous Doppler Lower Extremity Bilateral (duplex); Future    2. Dyspnea on exertion  -     Adult Transthoracic Echo Complete W/ Cont if Necessary Per Protocol; Future  -     proBNP    3. Essential hypertension  Comments:  - Continue Toprol-XL  Orders:  -     CBC & Differential  -     Comprehensive Metabolic Panel  -     TSH  -     Lipid Panel    4. Mixed hyperlipidemia  Comments:  Chronic due for labs-continue atorvastatin    5. Gastroesophageal reflux disease without esophagitis  Comments:  Chronic/stable continue omeprazole          Follow-up:  The patient will Return for 6 month medication management.    Records and Results Reviewed:  I reviewed current medications as given by patient and allergy list.    : Hybrid [employer]- and Dragon Speech Recognition - No recording technology was used in the exam room during encounter.    Electronically signed by PEDRO Moncada, 06/16/25, 10:30 AM CDT.

## 2025-06-17 LAB
ALBUMIN SERPL-MCNC: 4.1 G/DL (ref 3.5–5.2)
ALBUMIN/GLOB SERPL: 1.5 G/DL
ALP SERPL-CCNC: 156 U/L (ref 39–117)
ALT SERPL-CCNC: 15 U/L (ref 1–41)
AST SERPL-CCNC: 20 U/L (ref 1–40)
BASOPHILS # BLD AUTO: 0.04 10*3/MM3 (ref 0–0.2)
BASOPHILS NFR BLD AUTO: 0.5 % (ref 0–1.5)
BILIRUB SERPL-MCNC: 0.6 MG/DL (ref 0–1.2)
BUN SERPL-MCNC: 25 MG/DL (ref 8–23)
BUN/CREAT SERPL: 17.2 (ref 7–25)
CALCIUM SERPL-MCNC: 10.1 MG/DL (ref 8.6–10.5)
CHLORIDE SERPL-SCNC: 103 MMOL/L (ref 98–107)
CHOLEST SERPL-MCNC: 169 MG/DL (ref 0–200)
CO2 SERPL-SCNC: 26.7 MMOL/L (ref 22–29)
CREAT SERPL-MCNC: 1.45 MG/DL (ref 0.76–1.27)
EGFRCR SERPLBLD CKD-EPI 2021: 46.9 ML/MIN/1.73
EOSINOPHIL # BLD AUTO: 0.33 10*3/MM3 (ref 0–0.4)
EOSINOPHIL NFR BLD AUTO: 3.7 % (ref 0.3–6.2)
ERYTHROCYTE [DISTWIDTH] IN BLOOD BY AUTOMATED COUNT: 13.4 % (ref 12.3–15.4)
GLOBULIN SER CALC-MCNC: 2.8 GM/DL
GLUCOSE SERPL-MCNC: 103 MG/DL (ref 65–99)
HCT VFR BLD AUTO: 43.3 % (ref 37.5–51)
HDLC SERPL-MCNC: 59 MG/DL (ref 40–60)
HGB BLD-MCNC: 13.3 G/DL (ref 13–17.7)
IMM GRANULOCYTES # BLD AUTO: 0.05 10*3/MM3 (ref 0–0.05)
IMM GRANULOCYTES NFR BLD AUTO: 0.6 % (ref 0–0.5)
LDLC SERPL CALC-MCNC: 82 MG/DL (ref 0–100)
LYMPHOCYTES # BLD AUTO: 2.01 10*3/MM3 (ref 0.7–3.1)
LYMPHOCYTES NFR BLD AUTO: 22.8 % (ref 19.6–45.3)
MCH RBC QN AUTO: 26.4 PG (ref 26.6–33)
MCHC RBC AUTO-ENTMCNC: 30.7 G/DL (ref 31.5–35.7)
MCV RBC AUTO: 86.1 FL (ref 79–97)
MONOCYTES # BLD AUTO: 0.64 10*3/MM3 (ref 0.1–0.9)
MONOCYTES NFR BLD AUTO: 7.3 % (ref 5–12)
NEUTROPHILS # BLD AUTO: 5.75 10*3/MM3 (ref 1.7–7)
NEUTROPHILS NFR BLD AUTO: 65.1 % (ref 42.7–76)
NRBC BLD AUTO-RTO: 0 /100 WBC (ref 0–0.2)
NT-PROBNP SERPL-MCNC: 360 PG/ML (ref 0–486)
PLATELET # BLD AUTO: 279 10*3/MM3 (ref 140–450)
POTASSIUM SERPL-SCNC: 5.8 MMOL/L (ref 3.5–5.2)
PROT SERPL-MCNC: 6.9 G/DL (ref 6–8.5)
RBC # BLD AUTO: 5.03 10*6/MM3 (ref 4.14–5.8)
SODIUM SERPL-SCNC: 143 MMOL/L (ref 136–145)
TRIGL SERPL-MCNC: 163 MG/DL (ref 0–150)
TSH SERPL DL<=0.005 MIU/L-ACNC: 2.57 UIU/ML (ref 0.27–4.2)
VLDLC SERPL CALC-MCNC: 28 MG/DL (ref 5–40)
WBC # BLD AUTO: 8.82 10*3/MM3 (ref 3.4–10.8)

## 2025-06-18 ENCOUNTER — TELEPHONE (OUTPATIENT)
Dept: FAMILY MEDICINE CLINIC | Facility: CLINIC | Age: 85
End: 2025-06-18

## 2025-06-18 NOTE — TELEPHONE ENCOUNTER
Caller: Gokul Washington    Relationship to patient: Self    Best call back number:  Telephone Information:   Mobile [phone]        Patient is needing: IF OFFICE WAS GOING TO MAKE HIS APPT FOR NEPHROLOGY OR IF FERCHO WANTED HIM TO CALL AND MAKE THE APPT. PLEASE ADVISE

## 2025-06-25 DIAGNOSIS — R52 PAIN: Primary | ICD-10-CM

## 2025-06-25 RX ORDER — PREGABALIN 100 MG/1
100 CAPSULE ORAL DAILY
OUTPATIENT
Start: 2025-06-25

## 2025-06-25 NOTE — TELEPHONE ENCOUNTER
Caller: Gokul Washington EARNEST    Relationship: Self    Best call back number: [phone]    Requested Prescriptions:   Requested Prescriptions     Pending Prescriptions Disp Refills    pregabalin (LYRICA) 100 MG capsule       Sig: Take 1 capsule by mouth Daily.        [facility] - [facility] AT [facility]AL TO [facility] - [phone]  - [phone] FX     Last office visit with prescribing clinician: 6/16/2025   Last telemedicine visit with prescribing clinician: Visit date not found   Next office visit with prescribing clinician: 12/15/2025     Additional details provided by patient: PATIENT STATES THE PHARMACY REQUIRES AUTHORIZATION FOR THIS    Does the patient have less than a 3 day supply:  [x] Yes  [] No    Would you like a call back once the refill request has been completed: [] Yes [] No    If the office needs to give you a call back, can they leave a voicemail: [] Yes [] No    Kacey Combs Rep   06/25/25 10:27 CDT

## 2025-06-25 NOTE — TELEPHONE ENCOUNTER
Lets run a Forest, as long as no lapses, we can continue.  I need to know why he is taking it because this was not addressed on his visit.  Let me know that much and we can work on this, he will need to come in though for drug screen and CSA ASAP.    Electronically signed by PEDRO Moncada, 06/25/25, 12:30 PM CDT.

## 2025-06-25 NOTE — TELEPHONE ENCOUNTER
Rx Refill Note  Requested Prescriptions     Pending Prescriptions Disp Refills    pregabalin (LYRICA) 100 MG capsule       Sig: Take 1 capsule by mouth Daily.      Last office visit with office: 06/16/25  Next office visit with office: 12/15/25    UDS: none    DATE OF LAST REFILL: 05/20/25    Controlled Substance Agreement: not up to date    [place]HAEL: [place]       Bella Mittal MA  06/25/25, 10:46 CDT

## 2025-06-25 NOTE — TELEPHONE ENCOUNTER
He is a new patient to you, transfer from [facility]. [facility] was the last to fill medication

## 2025-06-25 NOTE — TELEPHONE ENCOUNTER
I cannot see where we have ever prescribed this, there is no drug screen, there is no CSA.  Possibly went to the wrong office?    Electronically signed by PEDRO Moncada, 06/25/25, 12:05 PM CDT.

## 2025-06-30 ENCOUNTER — CLINICAL SUPPORT (OUTPATIENT)
Dept: FAMILY MEDICINE CLINIC | Facility: CLINIC | Age: 85
End: 2025-06-30
Payer: MEDICARE

## 2025-06-30 DIAGNOSIS — Z51.81 THERAPEUTIC DRUG MONITORING: Primary | ICD-10-CM

## 2025-06-30 RX ORDER — PREGABALIN 100 MG/1
100 CAPSULE ORAL DAILY
Qty: 30 CAPSULE | Refills: 0 | Status: SHIPPED | OUTPATIENT
Start: 2025-06-30

## 2025-07-03 LAB
AMPHETAMINES UR QL SCN: NEGATIVE NG/ML
BARBITURATES UR QL SCN: NEGATIVE NG/ML
BENZODIAZ UR QL SCN: NEGATIVE NG/ML
BZE UR QL SCN: NEGATIVE NG/ML
CANNABINOIDS UR QL SCN: NEGATIVE NG/ML
CREAT UR-MCNC: 142.9 MG/DL (ref 20–300)
LABORATORY COMMENT REPORT: NORMAL
METHADONE UR QL SCN: NEGATIVE NG/ML
OPIATES UR QL SCN: NEGATIVE NG/ML
OXYCODONE+OXYMORPHONE UR QL SCN: NEGATIVE NG/ML
PCP UR QL: NEGATIVE NG/ML
PH UR: 8.6 [PH] (ref 4.5–8.9)
PROPOXYPH UR QL SCN: NEGATIVE NG/ML

## 2025-07-25 DIAGNOSIS — R52 PAIN: ICD-10-CM

## 2025-07-25 RX ORDER — PREGABALIN 100 MG/1
100 CAPSULE ORAL DAILY
Qty: 30 CAPSULE | Refills: 0 | Status: SHIPPED | OUTPATIENT
Start: 2025-07-25

## 2025-07-25 NOTE — TELEPHONE ENCOUNTER
Rx Refill Note  Requested Prescriptions     Pending Prescriptions Disp Refills    pregabalin (LYRICA) 100 MG capsule 30 capsule 0     Sig: Take 1 capsule by mouth Daily.      Last office visit with office: 06/16/2025  Next office visit with office: 12/15/2025    UDS: 06/30/2025    DATE OF LAST REFILL: 06/30/2025    Controlled Substance Agreement: up to date    RANDY OR ZEUSP: wnl         {TIP  Is Refill Pharmacy correct?:  Adele Muir MA  07/25/25, 10:19 CDT

## 2025-07-25 NOTE — TELEPHONE ENCOUNTER
Caller: WashingtonGokul    Relationship: Self    Best call back number: [phone]     Requested Prescriptions:   Requested Prescriptions     Pending Prescriptions Disp Refills    pregabalin (LYRICA) 100 MG capsule 30 capsule 0     Sig: Take 1 capsule by mouth Daily.        Pharmacy where request should be sent: [facility] [address] [place] [address] SHELLEY PUCKETT [facility] - [phone] [facility] [phone] FX     Last office visit with prescribing clinician: 6/16/2025   Last telemedicine visit with prescribing clinician: Visit date not found   Next office visit with prescribing clinician: 12/15/2025     Additional details provided by patient:     Does the patient have less than a 3 day supply:  [x] Yes  [] No    Would you like a call back once the refill request has been completed: [x] Yes [] No    If the office needs to give you a call back, can they leave a voicemail: [] Yes [] No    Kacey Galindo   07/25/25 10:18 CDT

## (undated) DEVICE — SOLUTION IRRIG 3000ML 0.9% SOD CHL USP UROMATIC PLAS CONT

## (undated) DEVICE — SEAL ENDO INSTR SELF SEAL UROLOGY

## (undated) DEVICE — CUFF,BP,DISP,1 TUBE,ADULT,HP: Brand: MEDLINE

## (undated) DEVICE — CATHETER URET 5FR L70CM OPN END SGL LUMN INJ HUB FLEXIMA

## (undated) DEVICE — GLOVE SURG SZ 75 CRM LTX FREE POLYISOPRENE POLYMER BEAD ANTI

## (undated) DEVICE — SURGICAL PROCEDURE PACK CYTOSCOPY

## (undated) DEVICE — THE CHANNEL CLEANING BRUSH IS A NYLON FLEXI BRUSH ATTACHED TO A FLEXIBLE PLASTIC SHEATH DESIGNED TO SAFELY REMOVE DEBRIS FROM FLEXIBLE ENDOSCOPES.

## (undated) DEVICE — DRAINBAG,ANTI-REFLUX TOWER,L/F,2000ML,LL: Brand: MEDLINE

## (undated) DEVICE — SNAR POLYP SENSATION MICRO OVL 13 240X40

## (undated) DEVICE — BAG DRNGE COMB PK

## (undated) DEVICE — GUIDEWIRE ENDOSCP L150CM DIA0.035IN TIP 3CM PTFE NIT

## (undated) DEVICE — PAD,EYE,1-5/8X2 5/8,STERILE,LF,1/PK: Brand: MEDLINE

## (undated) DEVICE — ENDOGATOR AUXILIARY WATER JET CONNECTOR: Brand: ENDOGATOR

## (undated) DEVICE — STERILE LATEX POWDER FREE SURGICAL GLOVES WITH HYDROGEL COATING: Brand: PROTEXIS

## (undated) DEVICE — THE SINGLE USE ETRAP – POLYP TRAP IS USED FOR SUCTION RETRIEVAL OF ENDOSCOPICALLY REMOVED POLYPS.: Brand: ETRAP

## (undated) DEVICE — MASK,OXYGEN,MED CONC,ADLT,7' TUB, UC: Brand: PENDING

## (undated) DEVICE — HYDROGEL COATED LATEX FOLEY CATHETER, 5 CC, 2-WAY, 16 FR (5.3 MM): Brand: DOVER

## (undated) DEVICE — MEDIA CONTRAST ISOVUE 300 100ML

## (undated) DEVICE — YANKAUER,BULB TIP WITH VENT: Brand: ARGYLE

## (undated) DEVICE — SENSR O2 OXIMAX FNGR A/ 18IN NONSTR

## (undated) DEVICE — TBG SMPL FLTR LINE NASL 02/C02 A/ BX/100

## (undated) DEVICE — Device: Brand: DEFENDO AIR/WATER/SUCTION AND BIOPSY VALVE